# Patient Record
Sex: MALE | Race: WHITE | Employment: OTHER | URBAN - METROPOLITAN AREA
[De-identification: names, ages, dates, MRNs, and addresses within clinical notes are randomized per-mention and may not be internally consistent; named-entity substitution may affect disease eponyms.]

---

## 2017-02-01 DIAGNOSIS — Z94.0 KIDNEY TRANSPLANT RECIPIENT: Primary | ICD-10-CM

## 2017-02-01 DIAGNOSIS — Z79.60 LONG-TERM USE OF IMMUNOSUPPRESSANT MEDICATION: ICD-10-CM

## 2017-02-01 RX ORDER — CYCLOSPORINE 100 MG/1
100 CAPSULE, LIQUID FILLED ORAL 2 TIMES DAILY
Qty: 60 CAPSULE | Refills: 11 | Status: SHIPPED | OUTPATIENT
Start: 2017-02-01 | End: 2017-12-26

## 2017-02-01 RX ORDER — CYCLOSPORINE 25 MG/1
25 CAPSULE, LIQUID FILLED ORAL 2 TIMES DAILY
Qty: 60 CAPSULE | Refills: 11 | Status: SHIPPED | OUTPATIENT
Start: 2017-02-01 | End: 2017-12-26

## 2017-02-15 ENCOUNTER — TRANSFERRED RECORDS (OUTPATIENT)
Dept: HEALTH INFORMATION MANAGEMENT | Facility: CLINIC | Age: 62
End: 2017-02-15

## 2017-04-11 DIAGNOSIS — Z79.60 LONG-TERM USE OF IMMUNOSUPPRESSANT MEDICATION: ICD-10-CM

## 2017-04-11 DIAGNOSIS — Z94.0 KIDNEY TRANSPLANT RECIPIENT: ICD-10-CM

## 2017-04-12 DIAGNOSIS — Z51.81 ENCOUNTER FOR THERAPEUTIC DRUG MONITORING: Primary | ICD-10-CM

## 2017-04-12 PROCEDURE — 80158 DRUG ASSAY CYCLOSPORINE: CPT

## 2017-04-15 LAB
CYCLOSPORINE BLD LC/MS/MS-MCNC: 53 UG/L (ref 50–400)
TME LAST DOSE: NORMAL H

## 2017-05-08 ENCOUNTER — TRANSFERRED RECORDS (OUTPATIENT)
Dept: HEALTH INFORMATION MANAGEMENT | Facility: CLINIC | Age: 62
End: 2017-05-08

## 2017-05-10 ENCOUNTER — TELEPHONE (OUTPATIENT)
Dept: TRANSPLANT | Facility: CLINIC | Age: 62
End: 2017-05-10

## 2017-05-10 NOTE — TELEPHONE ENCOUNTER
Pt stated he spoke w/ you regarding creatine levels being elevated pt had labs drawn Monday and would like an update and to discuss further

## 2017-05-11 NOTE — TELEPHONE ENCOUNTER
Called patient to discuss elevated Cr.  Pt reported having diarrhea once a day for the past two months.  Writer encouraged him to hydrate.  He does report intermittent edema in his lower extremities.  He states his cardiologist started and stopped a blood pressure medication, other than that, no new meds.  Message sent to Dr. Sun, will wait for further orders.

## 2017-05-15 ENCOUNTER — TELEPHONE (OUTPATIENT)
Dept: TRANSPLANT | Facility: CLINIC | Age: 62
End: 2017-05-15

## 2017-05-15 NOTE — TELEPHONE ENCOUNTER
"----- Message from Christi Sun MD sent at 5/15/2017  2:28 PM CDT -----  Regarding: RE: Elevated Cr  Franklyn can you please get DSA?  Also prot/Cr ratio.  If BP still labile would get transplant US with dopplers.  Thanks  ----- Message -----     From: Kaitlyn Mason RN     Sent: 5/15/2017  11:37 AM       To: Christi Sun MD, Melodie Sanz RN  Subject: FW: Elevated Cr                                  Pt called our office again today regarding his elevated Cr.  He also stated he has had erratic blood pressures, reported his highest BP to be 170/111.  Today his BP is stable at 116/87.  How would you like to proceed with elevated Cr and BP.      ----- Message -----     From: Kaitlyn Mason RN     Sent: 5/11/2017  11:16 AM       To: Christi Sun MD, Elroy Dumont RN  Subject: Elevated Cr                                      Pt's Cr has been trending up over the past few months.  I called patient today.  He feels, \"pretty well,\" but did report having diarrhea once a day for the past two months.  I did encourage him to hydrate.  He did not notice any changes in UOP.  He does not have any new medications, he states his cardiologist started and stopped a blood pressure medication earlier this year.  He reports some intermittent edema in his legs.    Would you like any additional tests?  How would you like to proceed with elevated Cr?    ThanksKaitlyn"

## 2017-05-15 NOTE — TELEPHONE ENCOUNTER
Issue:  Pt called regarding follow up of his elevated Cr.  He also states his BP is elevated, reported having BP of 170/111.  Today BP is stable at 116/87.    Plan:  Writer sent a message to Dr. Sun last week but have not heard back, another message was sent to her this morning.

## 2017-05-17 ENCOUNTER — TELEPHONE (OUTPATIENT)
Dept: TRANSPLANT | Facility: CLINIC | Age: 62
End: 2017-05-17

## 2017-05-17 DIAGNOSIS — Z94.0 KIDNEY TRANSPLANTED: Primary | ICD-10-CM

## 2017-05-17 DIAGNOSIS — T86.10 COMPLICATION OF TRANSPLANTED KIDNEY: ICD-10-CM

## 2017-05-17 NOTE — TELEPHONE ENCOUNTER
Rory Bustamante 27 years post kidney transplant    Reviewed the need for transplant labs and follow up ultrasound of his transplant  Kidney   Orders placed--  sent message     Reviewed his BP's -- his bp machine 10 years old   Reviewed that Megan will call him to review his BP at home

## 2017-05-19 ENCOUNTER — RESULTS ONLY (OUTPATIENT)
Dept: OTHER | Facility: CLINIC | Age: 62
End: 2017-05-19

## 2017-05-19 DIAGNOSIS — Z94.0 KIDNEY TRANSPLANTED: ICD-10-CM

## 2017-05-19 DIAGNOSIS — T86.10 COMPLICATION OF TRANSPLANTED KIDNEY: ICD-10-CM

## 2017-05-19 LAB
ANION GAP SERPL CALCULATED.3IONS-SCNC: 12 MMOL/L (ref 3–14)
BUN SERPL-MCNC: 47 MG/DL (ref 7–30)
CALCIUM SERPL-MCNC: 9.6 MG/DL (ref 8.5–10.1)
CHLORIDE SERPL-SCNC: 104 MMOL/L (ref 94–109)
CO2 SERPL-SCNC: 22 MMOL/L (ref 20–32)
CREAT SERPL-MCNC: 2.71 MG/DL (ref 0.66–1.25)
CREAT UR-MCNC: 94 MG/DL
ERYTHROCYTE [DISTWIDTH] IN BLOOD BY AUTOMATED COUNT: 13 % (ref 10–15)
GFR SERPL CREATININE-BSD FRML MDRD: 24 ML/MIN/1.7M2
GLUCOSE SERPL-MCNC: 89 MG/DL (ref 70–99)
HCT VFR BLD AUTO: 30.7 % (ref 40–53)
HGB BLD-MCNC: 10.9 G/DL (ref 13.3–17.7)
MCH RBC QN AUTO: 36.1 PG (ref 26.5–33)
MCHC RBC AUTO-ENTMCNC: 35.5 G/DL (ref 31.5–36.5)
MCV RBC AUTO: 102 FL (ref 78–100)
PLATELET # BLD AUTO: 132 10E9/L (ref 150–450)
POTASSIUM SERPL-SCNC: 4.2 MMOL/L (ref 3.4–5.3)
PROT UR-MCNC: 4.57 G/L
PROT/CREAT 24H UR: 4.84 G/G CR (ref 0–0.2)
RBC # BLD AUTO: 3.02 10E12/L (ref 4.4–5.9)
SODIUM SERPL-SCNC: 138 MMOL/L (ref 133–144)
WBC # BLD AUTO: 6.7 10E9/L (ref 4–11)

## 2017-05-19 PROCEDURE — 86832 HLA CLASS I HIGH DEFIN QUAL: CPT | Performed by: INTERNAL MEDICINE

## 2017-05-19 PROCEDURE — 86833 HLA CLASS II HIGH DEFIN QUAL: CPT | Performed by: INTERNAL MEDICINE

## 2017-05-22 ENCOUNTER — TELEPHONE (OUTPATIENT)
Dept: TRANSPLANT | Facility: CLINIC | Age: 62
End: 2017-05-22

## 2017-05-22 LAB — PRA DONOR SPECIFIC ABY: NORMAL

## 2017-05-22 NOTE — TELEPHONE ENCOUNTER
Called Rory Bustamante  reviewed preliminary ultrasound   Concern about 4 grams of urine protein    Sent message to Dr Sun for follow up plan

## 2017-05-24 ENCOUNTER — TELEPHONE (OUTPATIENT)
Dept: TRANSPLANT | Facility: CLINIC | Age: 62
End: 2017-05-24

## 2017-05-24 ENCOUNTER — CARE COORDINATION (OUTPATIENT)
Dept: NEPHROLOGY | Facility: CLINIC | Age: 62
End: 2017-05-24

## 2017-05-24 DIAGNOSIS — Z94.0 KIDNEY REPLACED BY TRANSPLANT: ICD-10-CM

## 2017-05-24 RX ORDER — LISINOPRIL 10 MG/1
10 TABLET ORAL DAILY
Qty: 90 TABLET | Refills: 1 | Status: SHIPPED | OUTPATIENT
Start: 2017-05-24 | End: 2017-07-18

## 2017-05-24 NOTE — TELEPHONE ENCOUNTER
Spoke to Rory Bustamante regarding the preliminary ultrasound results   Reviewed my concern  Regarding his increase protein in urine -   Sent message Dr Sun for follow up clinic

## 2017-05-24 NOTE — PROGRESS NOTES
Per Dr. 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  Please see what his BP is doing and ask which BP meds he is taking.  I would like to increase lisinopril to 10 daily unless his SBP < 110.  Hopefully he can come to clinic next week Tues.    Called patient. Said BP has been all over the place. Can range from 130/75 to 150/90. Has had a few readings as high as 174/117. Notes occasional headaches and nausea. Said when he has these symptoms, he knows BP is high. Denied edema, chest pain, shortness of breath, vision changes, or dizziness. He occasionally takes nifedipine dose a little early (after 20 hours, instead of q24hr).    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 He did check in with cardiologist, but was referred back to the U because of elevated creatinine. He's a little frustrated with the management / irregularity of BP.    Sent update to Dr. Sun and transplant coordinator.    Melani Mehta RN

## 2017-05-24 NOTE — PROGRESS NOTES
Received message from Dr. Sun:  Have him increase the lisinopril to 10.      Called patient with update. He agreed to plan, not currently taking any lisinopril. Expressed concern over possible development of leg cramps. Said it's happened to him before when he has no edema. He's unsure if it was due to lisinopril or lasix. He will monitor symptoms and call if any changes.    Melani Mehta RN

## 2017-05-26 ENCOUNTER — TELEPHONE (OUTPATIENT)
Dept: NEPHROLOGY | Facility: CLINIC | Age: 62
End: 2017-05-26

## 2017-05-26 ENCOUNTER — TELEPHONE (OUTPATIENT)
Dept: TRANSPLANT | Facility: CLINIC | Age: 62
End: 2017-05-26

## 2017-05-26 DIAGNOSIS — Z94.0 S/P KIDNEY TRANSPLANT: Primary | ICD-10-CM

## 2017-05-26 ASSESSMENT — ENCOUNTER SYMPTOMS
BOWEL INCONTINENCE: 0
BLOATING: 0
EXERCISE INTOLERANCE: 0
SYNCOPE: 0
LIGHT-HEADEDNESS: 0
NAUSEA: 0
TACHYCARDIA: 0
VOMITING: 0
RECTAL PAIN: 0
JAUNDICE: 0
LEG SWELLING: 1
BLOOD IN STOOL: 0
SLEEP DISTURBANCES DUE TO BREATHING: 0
DIARRHEA: 1
HYPOTENSION: 0
ORTHOPNEA: 0
HEARTBURN: 1
HYPERTENSION: 1
ABDOMINAL PAIN: 0
PALPITATIONS: 0
RECTAL BLEEDING: 0
LEG PAIN: 0
CONSTIPATION: 0
CLAUDICATION: 0

## 2017-05-26 NOTE — TELEPHONE ENCOUNTER
----- Message from Stacey Bray sent at 5/24/2017 10:58 AM CDT -----  Regarding: Update HLA typings  Rory AND his kidney donor brother Toni Bustamante need an updated HLA type for more accurate DSA reporting. Please order HLA Typing Complete SOT Recipient, [id number], in Epic. Have a 7ml EDTA drawn or buccal swab sent to Immunology. Stacey

## 2017-05-30 ENCOUNTER — OFFICE VISIT (OUTPATIENT)
Dept: NEPHROLOGY | Facility: CLINIC | Age: 62
End: 2017-05-30
Attending: INTERNAL MEDICINE
Payer: MEDICARE

## 2017-05-30 VITALS
TEMPERATURE: 98 F | DIASTOLIC BLOOD PRESSURE: 80 MMHG | HEART RATE: 81 BPM | HEIGHT: 73 IN | SYSTOLIC BLOOD PRESSURE: 141 MMHG | WEIGHT: 241.8 LBS | BODY MASS INDEX: 32.05 KG/M2 | RESPIRATION RATE: 18 BRPM

## 2017-05-30 DIAGNOSIS — Z94.0 S/P KIDNEY TRANSPLANT: ICD-10-CM

## 2017-05-30 DIAGNOSIS — R80.9 PROTEINURIA: ICD-10-CM

## 2017-05-30 DIAGNOSIS — Z94.0 S/P KIDNEY TRANSPLANT: Primary | ICD-10-CM

## 2017-05-30 LAB
ALBUMIN SERPL-MCNC: 3.4 G/DL (ref 3.4–5)
ANION GAP SERPL CALCULATED.3IONS-SCNC: 11 MMOL/L (ref 3–14)
BUN SERPL-MCNC: 62 MG/DL (ref 7–30)
CALCIUM SERPL-MCNC: 9.2 MG/DL (ref 8.5–10.1)
CHLORIDE SERPL-SCNC: 104 MMOL/L (ref 94–109)
CO2 SERPL-SCNC: 23 MMOL/L (ref 20–32)
CREAT SERPL-MCNC: 2.9 MG/DL (ref 0.66–1.25)
GFR SERPL CREATININE-BSD FRML MDRD: 22 ML/MIN/1.7M2
GLUCOSE SERPL-MCNC: 103 MG/DL (ref 70–99)
PHOSPHATE SERPL-MCNC: 3.9 MG/DL (ref 2.5–4.5)
POTASSIUM SERPL-SCNC: 4.4 MMOL/L (ref 3.4–5.3)
PTH-INTACT SERPL-MCNC: 238 PG/ML (ref 12–72)
SODIUM SERPL-SCNC: 138 MMOL/L (ref 133–144)

## 2017-05-30 PROCEDURE — 99213 OFFICE O/P EST LOW 20 MIN: CPT | Mod: ZF

## 2017-05-30 PROCEDURE — 86160 COMPLEMENT ANTIGEN: CPT | Performed by: INTERNAL MEDICINE

## 2017-05-30 PROCEDURE — 83970 ASSAY OF PARATHORMONE: CPT | Performed by: INTERNAL MEDICINE

## 2017-05-30 PROCEDURE — 80069 RENAL FUNCTION PANEL: CPT | Performed by: INTERNAL MEDICINE

## 2017-05-30 PROCEDURE — 36415 COLL VENOUS BLD VENIPUNCTURE: CPT | Performed by: INTERNAL MEDICINE

## 2017-05-30 ASSESSMENT — PAIN SCALES - GENERAL: PAINLEVEL: NO PAIN (0)

## 2017-05-30 NOTE — NURSING NOTE
"Chief Complaint   Patient presents with     RECHECK     kIDNEY FOLLOW UP       Initial /80 (BP Location: Right arm, Patient Position: Chair, Cuff Size: Adult Large)  Pulse 81  Temp 98  F (36.7  C) (Oral)  Resp 18  Ht 1.854 m (6' 1\")  Wt 109.7 kg (241 lb 12.8 oz)  BMI 31.9 kg/m2 Estimated body mass index is 31.9 kg/(m^2) as calculated from the following:    Height as of this encounter: 1.854 m (6' 1\").    Weight as of this encounter: 109.7 kg (241 lb 12.8 oz).  Medication Reconciliation: complete   FLORINA WASHINGTON CMA      "

## 2017-05-30 NOTE — PATIENT INSTRUCTIONS
1.  Check BP when standing  2.  Please ask cardiologist if you can stop plavix for a biopsy  3.  Proteinuria and Cr rise could be MPGN or another process.  Need biopsy to determine  4.  Stay on lisinopril 10 mg.  If BP stays over 120 and does not drop with standing, can increase to 20 mg  5.  Melani Mehta is the BP nurse.  Please call Hollie with your BP readings    6.  Would like to do biopsy if ok to stop plavix

## 2017-05-30 NOTE — MR AVS SNAPSHOT
After Visit Summary   5/30/2017    Rory Bustamante    MRN: [id number]           Patient Information     Date Of Birth          1955        Visit Information        Provider Department      5/30/2017 2:45 PM Christi Sun MD [facility] Nephrology        Today's Diagnoses     S/P kidney transplant    -  1    Proteinuria          Care Instructions    1.  Check BP when standing  2.  Please ask cardiologist if you can stop plavix for a biopsy  3.  Proteinuria and Cr rise could be MPGN or another process.  Need biopsy to determine  4.  Stay on lisinopril 10 mg.  If BP stays over 120 and does not drop with standing, can increase to 20 mg  5.  Melani Mehta is the BP nurse.  Please call Hollie with your BP readings    6.  Would like to do biopsy if ok to stop plavix            Follow-ups after your visit        Follow-up notes from your care team     Return in about 6 months (around 11/30/2017).      Your next 10 appointments already scheduled     May 30, 2017  4:45 PM CDT   Lab with  LAB   [facility] Lab ([facility])    [address] [place] [id number] [phone]            Oct 17, 2017 12:00 PM CDT   Lab with  LAB   [facility] ([facility])    [address] [place] [address] [id number] [phone]            Oct 17, 2017  1:05 PM CDT   (Arrive by 12:35 PM)   Return Visit with Christi Sun MD   [facility] Nephrology ([facility])    [address] [place] [id number] [phone]              Future tests that were ordered for you today     Open Future Orders        Priority Expected Expires Ordered    Renal panel Routine  6/29/2017 5/30/2017            Who to contact     If you have questions or need follow up information about today's clinic visit or your schedule please contact [facility] NEPHROLOG[facility] directly at [phone].  Normal or  "non-critical lab and imaging results will be communicated to you by MyChart, letter or phone within 4 business days after the clinic has received the results. If you do not hear from us within 7 days, please contact the clinic through [employer] or phone. If you have a critical or abnormal lab result, we will notify you by phone as soon as possible.  Submit refill requests through [employer] or call your pharmacy and they will forward the refill request to us. Please allow 3 business days for your refill to be completed.          Additional Information About Your Visit        [employer] Information     [employer] gives you secure access to your electronic health record. If you see a primary care provider, you can also send messages to your care team and make appointments. If you have questions, please call your primary care clinic.  If you do not have a primary care provider, please call [phone] and they will assist you.        Care EveryWhere ID     This is your Care EveryWhere ID. This could be used by other organizations to access your [facility] medical records  [id number]        Your Vitals Were     Pulse Temperature Respirations Height BMI (Body Mass Index)       81 98  F (36.7  C) (Oral) 18 1.854 m (6' 1\") 31.9 kg/m2        Blood Pressure from Last 3 Encounters:   05/30/17 141/80   10/21/16 140/76   10/27/15 130/84    Weight from Last 3 Encounters:   05/30/17 109.7 kg (241 lb 12.8 oz)   10/21/16 111.9 kg (246 lb 12.8 oz)   10/27/15 108 kg (238 lb)              We Performed the Following     Parathyroid Hormone Intact     PRA Donor Specific Antibody          Today's Medication Changes          These changes are accurate as of: 5/30/17  4:28 PM.  If you have any questions, ask your nurse or doctor.               Stop taking these medicines if you haven't already. Please contact your care team if you have questions.     TIMOLOL MALEATE PO   Stopped by:  Christi Sun MD                    Primary Care Provider " Office Phone # Fax #    Moris Diaz [phone]       [facility] [address] [facility] [address]  [facility] [address]        Thank you!     Thank you for choosing [facility] NEPHROLOGY  for your care. Our goal is always to provide you with excellent care. Hearing back from our patients is one way we can continue to improve our services. Please take a few minutes to complete the written survey that you may receive in the mail after your visit with us. Thank you!             Your Updated Medication List - Protect others around you: Learn how to safely use, store and throw away your medicines at www.disposemymeds.org.          This list is accurate as of: 5/30/17  4:28 PM.  Always use your most recent med list.                   Brand Name Dispense Instructions for use    allopurinol 100 MG tablet    ZYLOPRIM    180 tablet    Take 2 tablets by mouth daily.       ALPHAGAN OP      2 drops daily       BABY ASPIRIN PO      Take 81 mg by mouth daily       cholecalciferol 5000 UNITS Tabs tablet    vitamin D3    30 tablet    Take 1 tablet (5,000 Units) by mouth daily       CRESTOR 5 MG tablet   Generic drug:  rosuvastatin      Take 10 mg by mouth daily       * cycloSPORINE modified 100 MG capsule    GENERIC EQUIVALENT    60 capsule    Take 1 capsule (100 mg) by mouth 2 times daily (total dose 125 mg twice daily)       * cycloSPORINE modified 25 MG capsule    GENERIC EQUIVALENT    60 capsule    Take 1 capsule (25 mg) by mouth 2 times daily (total dose 125 mg twice daily)       glucosamine 500 MG Caps      Take 500 mg by mouth 3 times daily       ISOSORBIDE MONONITRATE PO      Take 30 mg by mouth 2 times daily       lisinopril 10 MG tablet    PRINIVIL/ZESTRIL    90 tablet    Take 1 tablet (10 mg) by mouth daily       metoprolol 100 MG tablet    LOPRESSOR    60 tablet    Take 1 tablet by mouth 2 times daily.       NIFEdipine ER osmotic 30 MG 24 hr tablet    NIFEDICAL XL    180 tablet    Take 2 tablets by  mouth daily.       NITROSTAT 0.4 MG sublingual tablet   Generic drug:  nitroglycerin      Place 0.4 mg under the tongue every 5 minutes as needed.       NONFORMULARY      Focus Macula Pro:  Eye vitamin and mineral supplement A, C, E, Zinc, Copper       PLAVIX 75 MG tablet   Generic drug:  clopidogrel      Take 75 mg by mouth daily.       predniSONE 5 MG tablet    DELTASONE     Take 5 mg by mouth daily.       probenecid 500 MG tablet    BENEMID     Take 500 mg by mouth 2 times daily.       sulfamethoxazole-trimethoprim 400-80 MG per tablet    BACTRIM/SEPTRA     Take 1 tablet by mouth daily Take on every other day       TAMSULOSIN HCL PO      Take 0.4 mg by mouth daily       TYLENOL ARTHRITIS PAIN 650 MG CR tablet   Generic drug:  acetaminophen      Take 2 tablets by mouth 3 times daily.       * Notice:  This list has 2 medication(s) that are the same as other medications prescribed for you. Read the directions carefully, and ask your doctor or other care provider to review them with you.

## 2017-05-30 NOTE — LETTER
5/30/2017      RE: Rory Bustamante  [address]  [address] [place] [id number]       REASON FOR VISIT:  Mr. Bustamante is a 61-year-old gentleman here for followup after an HLA identical transplant in 1989.  He has recently developed nephrotic range proteinuria.  He has no known recurrent MPGN first seen on a biopsy in 2008.  However, recently his proteinuria level has increased dramatically, as has his creatinine.  The protein was 0.45 in 02/2015, 1.27 grams in 11/2015, 0.86 gram in 08/2016 and is now 4.8 grams.  His albumin is 4.0.       His immunosuppression is currently cyclosporine with a target of 50-60 with prednisone 5 daily.  His Imuran was stopped in 2012 because he has extensive skin cancer.       Recently his blood pressure has been increasingly difficult to control.  It went up as high as 174/111 on 05/14.  Blood pressure meds have been adjusted and currently his blood pressure is in the 140/80-90 range at home.  His current blood pressure medications include lisinopril 10 mg started on 05/24, isosorbide 30 b.i.d. (started for angina), tamsulosin 0.4  (for voiding symptoms), nifedipine XL 60 mg daily and metoprolol 100 b.i.d.       The patient has a history of anginal symptoms.  He was completely evaluated in 08/2016 including cardiac Lexiscan.  He is status post MI in 2011 with two stents placed and currently is on Plavix for that.  He has not had chest pain for the past year.       He has been seeing a dermatologist and has had a number of skin lesions removed recently.       REVIEW OF SYSTEMS:  Comprehensive review of systems was completed and negative except as in the HPI.  He has stable peripheral neuropathy of uncertain cause.  He has no recent episodes of gout.       PAST MEDICAL HISTORY:   1.  HLA identical transplant in 1989.   2.  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    3.  Coronary artery disease, status post MI in 2011, status post stents x2 in 2011 and 2012, recurrent angina in  08/2016 with a cardiac Lexiscan most consistent with distal disease per the patient.  No intervention.    4.  History of hypertriglyceridemia, on fibrate.   5.  History of gout, none recently.   6.  Skin cancer with active followup by Dermatology currently.      MEDICATIONS:  CSA, lisinopril 10 mg, pred 5 mg.  Meds reviewed     SOCIAL HISTORY:  He has gained some weight.  He is unable to exercise at first because of the chest pain and also he has spinal stenosis, so he has back pain when he is doing most exercise.       SOCIAL HISTORY:  He does not smoke or drink alcohol.       PHYSICAL EXAMINATION:   VITAL SIGNS:  Blood pressure 141/80, heart rate 81, weight 242.   GENERAL:  He appears healthy.   HEENT:  He has two large bandages on his head where he has had recent lesions resected on the forehead.     SKIN:  Skin is diffusely involved with actinic keratoses and also warts.     LYMPH:  No adenopathy.   LUNGS:  Clear bilaterally.   CARDIAC:  Regular sinus rhythm.  No murmurs, rubs or gallops.   ABDOMEN:  Obese.  No bruit over the allograft.   LOWER EXTREMITIES:  No peripheral edema.   NEUROLOGIC:  Grossly intact.  He does have diminished sensation in his feet.  Normal gait.       LABORATORY DATA:  Creatinine 2.9.  Creatinine was 2.7 on 05/19 and 2.9 on 05/05.  BUN 62.  Protein-to-creatinine 4.8 grams per gram.  Hemoglobin 10.9.   (it has previously been normal).  Complement 91 (normal).  Renal ultrasound shows no renal artery stenosis and no hydronephrosis.    Electrolytes/Renal -   Recent Labs   Lab Test  05/30/17   1651  05/19/17   1051  10/21/16   1153   06/05/12   1749   NA  138  138  138   < >  139   POTASSIUM  4.4  4.2  4.1   < >  5.4*   CHLORIDE  104  104  104   < >  101   CO2  23  22  22   < >  26   BUN  62*  47*  37*   < >  37*   CR  2.90*  2.71*  1.92*   < >  2.25*   GLC  103*  89  118*   < >  108*   TRISHA  9.2  9.6  9.6   < >  10.1   PHOS  3.9   --   2.7   --   3.2    < > = values in this interval  not displayed.       CBC -   Recent Labs   Lab Test  06/06/17   0935  05/19/17   1051  10/21/16   1153   WBC  8.6  6.7  7.2   HGB  11.1*  10.9*  11.4*   PLT  171  132*  136*       LFTs -   Recent Labs   Lab Test  05/30/17   1651  10/21/16   1153  06/05/12   1749   07/01/09   1004   ALKPHOS   --   68   --    --   105   BILITOTAL   --   0.6   --    --   0.4   ALT   --   28   --    --   7   AST   --   21   --    --   24   PROTTOTAL   --   7.2   --    --   8.4   ALBUMIN  3.4  3.6  4.3   < >  4.4    < > = values in this interval not displayed.      IMPRESSION:   1.  Allograft function.  His allograft is failing.  He has known recurrent MPGN but, interestingly, his C3 is normal currently. It had previously been on the low side.  This suggests the possibility of a superimposed process such as antibody-mediated rejection causing this degree of proteinuria.  We will check a DSA today and I would like to do a biopsy.  Unfortunately, he is on Plavix so he will contact his cardiologist to be sure we can stop that medication prior to biopsy.  His creatinine has been stable for the past month at this level of 2.9 which suggests a process that is not acute.    2.  Proteinuria.  He is on lisinopril 10 mg and will increase that further if his systolic pressure is over 120 with no orthostatic change (the patient will measure this at home).   3.  Hypertension.  Blood pressure is more stable now, but we will continue to monitor.  I have asked Patti and Damaris to be involved in his blood pressure management.    4.  Calcium, phosphorus, PTH.  His parathyroid level is high for the first time.  We will need to get a phosphorus level with his next regular lab tests to determine further therapy.  I also ordered a vitamin D level.    5.  Nephrotic syndrome.  He is at increased risk for hypercholesterolemia as well as clotting.  His outpatient cardiologist is following his lipid levels.    6.  Immunosuppression.  His doses are low because he  has extensive skin cancer.  We might consider an increase once we get the DSA and the kidney biopsy.  Today in clinic he asked about the possibility of another transplant which would be reasonable given his age.  He would need to lose weight.       PLAN:   1.  Kidney biopsy if patient can stop Plavix.   2.  Continue lisinopril 10 mg and increase to 20 if systolic pressure stays over 120.   3.  Monthly lab tests.   4.  Check DSA.   5.  Check phosphorus with next labs.         CHRISTI LANGSTON MD               D: 2017 13:37   T: 2017 17:30   MT: DP      Name:     JEANMARIE GONZALEZ   MRN:      [id number]        Account:      [id number]   :      1955           Service Date: 2017      Document: [id number]        Christi Langston MD

## 2017-05-31 DIAGNOSIS — Z94.0 KIDNEY TRANSPLANT RECIPIENT: Primary | ICD-10-CM

## 2017-05-31 DIAGNOSIS — R80.9 PROTEINURIA: ICD-10-CM

## 2017-05-31 DIAGNOSIS — N05.5 MPGN (MEMBRANOPROLIFERATIVE GLOMERULONEPHRITIDES): ICD-10-CM

## 2017-05-31 LAB — C3 SERPL-MCNC: 91 MG/DL (ref 76–169)

## 2017-05-31 NOTE — PROGRESS NOTES
REASON FOR VISIT:  Mr. Bustamante is a 61-year-old gentleman here for followup after an HLA identical transplant in 1989.  He has recently developed nephrotic range proteinuria.  He has no known recurrent MPGN first seen on a biopsy in 2008.  However, recently his proteinuria level has increased dramatically, as has his creatinine.  The protein was 0.45 in 02/2015, 1.27 grams in 11/2015, 0.86 gram in 08/2016 and is now 4.8 grams.  His albumin is 4.0.       His immunosuppression is currently cyclosporine with a target of 50-60 with prednisone 5 daily.  His Imuran was stopped in 2012 because he has extensive skin cancer.       Recently his blood pressure has been increasingly difficult to control.  It went up as high as 174/111 on 05/14.  Blood pressure meds have been adjusted and currently his blood pressure is in the 140/80-90 range at home.  His current blood pressure medications include lisinopril 10 mg started on 05/24, isosorbide 30 b.i.d. (started for angina), tamsulosin 0.4  (for voiding symptoms), nifedipine XL 60 mg daily and metoprolol 100 b.i.d.       The patient has a history of anginal symptoms.  He was completely evaluated in 08/2016 including cardiac Lexiscan.  He is status post MI in 2011 with two stents placed and currently is on Plavix for that.  He has not had chest pain for the past year.       He has been seeing a dermatologist and has had a number of skin lesions removed recently.       REVIEW OF SYSTEMS:  Comprehensive review of systems was completed and negative except as in the HPI.  He has stable peripheral neuropathy of uncertain cause.  He has no recent episodes of gout.       PAST MEDICAL HISTORY:   1.  HLA identical transplant in 1989.   2.  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    3.  Coronary artery disease, status post MI in 2011, status post stents x2 in 2011 and 2012, recurrent angina in 08/2016 with a cardiac Lexiscan most consistent with distal disease per the  patient.  No intervention.    4.  History of hypertriglyceridemia, on fibrate.   5.  History of gout, none recently.   6.  Skin cancer with active followup by Dermatology currently.      MEDICATIONS:  CSA, lisinopril 10 mg, pred 5 mg.  Meds reviewed     SOCIAL HISTORY:  He has gained some weight.  He is unable to exercise at first because of the chest pain and also he has spinal stenosis, so he has back pain when he is doing most exercise.       SOCIAL HISTORY:  He does not smoke or drink alcohol.       PHYSICAL EXAMINATION:   VITAL SIGNS:  Blood pressure 141/80, heart rate 81, weight 242.   GENERAL:  He appears healthy.   HEENT:  He has two large bandages on his head where he has had recent lesions resected on the forehead.     SKIN:  Skin is diffusely involved with actinic keratoses and also warts.     LYMPH:  No adenopathy.   LUNGS:  Clear bilaterally.   CARDIAC:  Regular sinus rhythm.  No murmurs, rubs or gallops.   ABDOMEN:  Obese.  No bruit over the allograft.   LOWER EXTREMITIES:  No peripheral edema.   NEUROLOGIC:  Grossly intact.  He does have diminished sensation in his feet.  Normal gait.       LABORATORY DATA:  Creatinine 2.9.  Creatinine was 2.7 on 05/19 and 2.9 on 05/05.  BUN 62.  Protein-to-creatinine 4.8 grams per gram.  Hemoglobin 10.9.   (it has previously been normal).  Complement 91 (normal).  Renal ultrasound shows no renal artery stenosis and no hydronephrosis.    Electrolytes/Renal -   Recent Labs   Lab Test  05/30/17   1651  05/19/17   1051  10/21/16   1153   06/05/12   1749   NA  138  138  138   < >  139   POTASSIUM  4.4  4.2  4.1   < >  5.4*   CHLORIDE  104  104  104   < >  101   CO2  23  22  22   < >  26   BUN  62*  47*  37*   < >  37*   CR  2.90*  2.71*  1.92*   < >  2.25*   GLC  103*  89  118*   < >  108*   TRISHA  9.2  9.6  9.6   < >  10.1   PHOS  3.9   --   2.7   --   3.2    < > = values in this interval not displayed.       CBC -   Recent Labs   Lab Test  06/06/17   0940   05/19/17   1051  10/21/16   1153   WBC  8.6  6.7  7.2   HGB  11.1*  10.9*  11.4*   PLT  171  132*  136*       LFTs -   Recent Labs   Lab Test  05/30/17   1651  10/21/16   1153  06/05/12   1749   07/01/09   1004   ALKPHOS   --   68   --    --   105   BILITOTAL   --   0.6   --    --   0.4   ALT   --   28   --    --   7   AST   --   21   --    --   24   PROTTOTAL   --   7.2   --    --   8.4   ALBUMIN  3.4  3.6  4.3   < >  4.4    < > = values in this interval not displayed.      IMPRESSION:   1.  Allograft function.  His allograft is failing.  He has known recurrent MPGN but, interestingly, his C3 is normal currently. It had previously been on the low side.  This suggests the possibility of a superimposed process such as antibody-mediated rejection causing this degree of proteinuria.  We will check a DSA today and I would like to do a biopsy.  Unfortunately, he is on Plavix so he will contact his cardiologist to be sure we can stop that medication prior to biopsy.  His creatinine has been stable for the past month at this level of 2.9 which suggests a process that is not acute.    2.  Proteinuria.  He is on lisinopril 10 mg and will increase that further if his systolic pressure is over 120 with no orthostatic change (the patient will measure this at home).   3.  Hypertension.  Blood pressure is more stable now, but we will continue to monitor.  I have asked Patti and Damaris to be involved in his blood pressure management.    4.  Calcium, phosphorus, PTH.  His parathyroid level is high for the first time.  We will need to get a phosphorus level with his next regular lab tests to determine further therapy.  I also ordered a vitamin D level.    5.  Nephrotic syndrome.  He is at increased risk for hypercholesterolemia as well as clotting.  His outpatient cardiologist is following his lipid levels.    6.  Immunosuppression.  His doses are low because he has extensive skin cancer.  We might consider an increase once we get  the DSA and the kidney biopsy.  Today in clinic he asked about the possibility of another transplant which would be reasonable given his age.  He would need to lose weight.       PLAN:   1.  Kidney biopsy if patient can stop Plavix.   2.  Continue lisinopril 10 mg and increase to 20 if systolic pressure stays over 120.   3.  Monthly lab tests.   4.  Check DSA.   5.  Check phosphorus with next labs.         SARABJIT LANGSTON MD               D: 2017 13:37   T: 2017 17:30   MT: LM      Name:     JEANMARIE GONZALEZ   MRN:      [id number]        Account:      [id number]   :      1955           Service Date: 2017      Document: [id number]

## 2017-05-31 NOTE — PROGRESS NOTES
Kidney Transplant Biopsy for Proteinuria  - on Plavix and aspirin for heart  - per cardiology, OK to HOLD Plavix for biopsy, but continue aspirin 81 mg daily    PLAN:  - since patient must continue on aspirin, will ask IR to do biopsy  - patient instructed to HOLD Plavix for biopsy

## 2017-06-02 NOTE — PROGRESS NOTES
ADDENDUM:  Scheduled in IR for biopsy on Tuesday, June 6, 2017  Check in to [facility] Waiting Room at 9:30 for 11:00 procedure    PHONE CALL:  Called patient with biopsy instructions.

## 2017-06-05 ENCOUNTER — TELEPHONE (OUTPATIENT)
Dept: INTERVENTIONAL RADIOLOGY/VASCULAR | Facility: CLINIC | Age: 62
End: 2017-06-05

## 2017-06-06 ENCOUNTER — RESULTS ONLY (OUTPATIENT)
Dept: OTHER | Facility: CLINIC | Age: 62
End: 2017-06-06

## 2017-06-06 ENCOUNTER — HOSPITAL ENCOUNTER (OUTPATIENT)
Facility: CLINIC | Age: 62
Discharge: HOME | End: 2017-06-06
Attending: INTERNAL MEDICINE | Admitting: INTERNAL MEDICINE
Payer: MEDICARE

## 2017-06-06 ENCOUNTER — APPOINTMENT (OUTPATIENT)
Dept: INTERVENTIONAL RADIOLOGY/VASCULAR | Facility: CLINIC | Age: 62
End: 2017-06-06
Attending: INTERNAL MEDICINE
Payer: MEDICARE

## 2017-06-06 ENCOUNTER — APPOINTMENT (OUTPATIENT)
Dept: MEDSURG UNIT | Facility: CLINIC | Age: 62
End: 2017-06-06
Attending: INTERNAL MEDICINE
Payer: MEDICARE

## 2017-06-06 VITALS
HEART RATE: 69 BPM | DIASTOLIC BLOOD PRESSURE: 74 MMHG | RESPIRATION RATE: 14 BRPM | OXYGEN SATURATION: 98 % | TEMPERATURE: 97.6 F | SYSTOLIC BLOOD PRESSURE: 124 MMHG

## 2017-06-06 DIAGNOSIS — N05.5 MPGN (MEMBRANOPROLIFERATIVE GLOMERULONEPHRITIDES): ICD-10-CM

## 2017-06-06 DIAGNOSIS — R80.9 PROTEINURIA: ICD-10-CM

## 2017-06-06 DIAGNOSIS — Z94.0 KIDNEY TRANSPLANT RECIPIENT: ICD-10-CM

## 2017-06-06 LAB
BASOPHILS # BLD AUTO: 0 10E9/L (ref 0–0.2)
BASOPHILS NFR BLD AUTO: 0.5 %
DIFFERENTIAL METHOD BLD: ABNORMAL
EOSINOPHIL # BLD AUTO: 0.2 10E9/L (ref 0–0.7)
EOSINOPHIL NFR BLD AUTO: 2.5 %
ERYTHROCYTE [DISTWIDTH] IN BLOOD BY AUTOMATED COUNT: 13.1 % (ref 10–15)
HCT VFR BLD AUTO: 32 % (ref 40–53)
HGB BLD-MCNC: 11.1 G/DL (ref 13.3–17.7)
IMM GRANULOCYTES # BLD: 0.1 10E9/L (ref 0–0.4)
IMM GRANULOCYTES NFR BLD: 0.8 %
INR PPP: 1 (ref 0.86–1.14)
LYMPHOCYTES # BLD AUTO: 1.9 10E9/L (ref 0.8–5.3)
LYMPHOCYTES NFR BLD AUTO: 22.5 %
MCH RBC QN AUTO: 35.5 PG (ref 26.5–33)
MCHC RBC AUTO-ENTMCNC: 34.7 G/DL (ref 31.5–36.5)
MCV RBC AUTO: 102 FL (ref 78–100)
MONOCYTES # BLD AUTO: 0.6 10E9/L (ref 0–1.3)
MONOCYTES NFR BLD AUTO: 7.2 %
NEUTROPHILS # BLD AUTO: 5.7 10E9/L (ref 1.6–8.3)
NEUTROPHILS NFR BLD AUTO: 66.5 %
NRBC # BLD AUTO: 0 10*3/UL
NRBC BLD AUTO-RTO: 0 /100
PLATELET # BLD AUTO: 171 10E9/L (ref 150–450)
RBC # BLD AUTO: 3.13 10E12/L (ref 4.4–5.9)
WBC # BLD AUTO: 8.6 10E9/L (ref 4–11)

## 2017-06-06 PROCEDURE — 88350 IMFLUOR EA ADDL 1ANTB STN PX: CPT | Performed by: INTERNAL MEDICINE

## 2017-06-06 PROCEDURE — 81376 HLA II TYPING 1 LOCUS LR: CPT | Performed by: INTERNAL MEDICINE

## 2017-06-06 PROCEDURE — 88305 TISSUE EXAM BY PATHOLOGIST: CPT | Performed by: INTERNAL MEDICINE

## 2017-06-06 PROCEDURE — 85610 PROTHROMBIN TIME: CPT | Performed by: RADIOLOGY

## 2017-06-06 PROCEDURE — 88313 SPECIAL STAINS GROUP 2: CPT | Performed by: INTERNAL MEDICINE

## 2017-06-06 PROCEDURE — 27210995 ZZH RX 272: Performed by: PHYSICIAN ASSISTANT

## 2017-06-06 PROCEDURE — 81370 HLA I & II TYPING LR: CPT | Performed by: INTERNAL MEDICINE

## 2017-06-06 PROCEDURE — 88346 IMFLUOR 1ST 1ANTB STAIN PX: CPT | Performed by: INTERNAL MEDICINE

## 2017-06-06 PROCEDURE — 25000128 H RX IP 250 OP 636: Performed by: PHYSICIAN ASSISTANT

## 2017-06-06 PROCEDURE — 40000167 ZZH STATISTIC PP CARE STAGE 2

## 2017-06-06 PROCEDURE — 76942 ECHO GUIDE FOR BIOPSY: CPT

## 2017-06-06 PROCEDURE — 88348 ELECTRON MICROSCOPY DX: CPT | Performed by: INTERNAL MEDICINE

## 2017-06-06 PROCEDURE — 85025 COMPLETE CBC W/AUTO DIFF WBC: CPT | Performed by: RADIOLOGY

## 2017-06-06 RX ORDER — NALOXONE HYDROCHLORIDE 0.4 MG/ML
.1-.4 INJECTION, SOLUTION INTRAMUSCULAR; INTRAVENOUS; SUBCUTANEOUS
Status: DISCONTINUED | OUTPATIENT
Start: 2017-06-06 | End: 2017-06-06 | Stop reason: HOSPADM

## 2017-06-06 RX ORDER — FLUMAZENIL 0.1 MG/ML
0.2 INJECTION, SOLUTION INTRAVENOUS
Status: DISCONTINUED | OUTPATIENT
Start: 2017-06-06 | End: 2017-06-06 | Stop reason: HOSPADM

## 2017-06-06 RX ORDER — LIDOCAINE 40 MG/G
CREAM TOPICAL
Status: DISCONTINUED | OUTPATIENT
Start: 2017-06-06 | End: 2017-06-06 | Stop reason: HOSPADM

## 2017-06-06 RX ORDER — SODIUM CHLORIDE 9 MG/ML
INJECTION, SOLUTION INTRAVENOUS CONTINUOUS
Status: DISCONTINUED | OUTPATIENT
Start: 2017-06-06 | End: 2017-06-06 | Stop reason: HOSPADM

## 2017-06-06 RX ORDER — FENTANYL CITRATE 50 UG/ML
25-50 INJECTION, SOLUTION INTRAMUSCULAR; INTRAVENOUS EVERY 5 MIN PRN
Status: DISCONTINUED | OUTPATIENT
Start: 2017-06-06 | End: 2017-06-06 | Stop reason: HOSPADM

## 2017-06-06 RX ADMIN — SODIUM CHLORIDE: 9 INJECTION, SOLUTION INTRAVENOUS at 09:58

## 2017-06-06 RX ADMIN — LIDOCAINE HYDROCHLORIDE 10 ML: 10 INJECTION, SOLUTION EPIDURAL; INFILTRATION; INTRACAUDAL; PERINEURAL at 11:32

## 2017-06-06 NOTE — PROCEDURES
Interventional Radiology Brief Post Procedure Note    Procedure: IR RENAL BIOPSY RIGHT    Proceduralist: Casper Berg PA-C    Assistant: None    Time Out: Prior to the start of the procedure and with procedural staff participation, I verbally confirmed the patient s identity using two indicators, relevant allergies, that the procedure was appropriate and matched the consent or emergent situation, and that the correct equipment/implants were available. Immediately prior to starting the procedure I conducted the Time Out with the procedural staff and re-confirmed the patient s name, procedure, and site/side. (The Joint Commission universal protocol was followed.)  Yes    Sedation: None. Local Anesthestic used    Findings: Completed ultrasound-guided RLQ transplant renal biopsy. 3 passes yielded 3 cores given to pathology at bedside. Gelfoam in tract. No immediate complication.    Estimated Blood Loss: Less than 10 ml    Fluoroscopy Time: N/a    SPECIMENS: Core needle biopsy specimens sent for pathological analysis    Complications: 1. None     Condition: Stable    Plan: Follow up per primary team. Biopsy results pending.    Comments: See dictated procedure note for full details.    Casper Berg PA-C

## 2017-06-06 NOTE — PROGRESS NOTES
Rory arrived on 2a to prepare for a transplanted kidney biopsy.  Pre-procedure assessment is complete.  He is being consented.  Labs have been collected.

## 2017-06-06 NOTE — DISCHARGE INSTRUCTIONS
[facility]    Interventional Radiology  Patient Instructions Following Biopsy    AFTER YOU GO HOME  ? If you were given sedation DO NOT drive or operate machinery at home or at work for at least 24 hours  ? DO relax and take it easy for 48 hours, no strenuous activity for 24 hours  ? DO drink plenty of fluids  ? DO resume your regular diet, unless otherwise instructed by your Primary Physician  ? Keep the dressing dry and in place for 24 hours.  ? DO NOT drink alcoholic beverages the day of your procedure  ? DO NOT do any strenuous exercise or lifting (> 10 lbs) for at least 7 days following your procedure  ? DO NOT take a bath or shower for at least 12 hours following your procedure  ? Remove dressing after shower the next day. Replace with Band aid for 2 days.  Never leave a wet dressing in place.  ? DO NOT make any important or legal decisions for 24 hours following your procedure  ? There should be minimum drainage from the biopsy site    CALL THE PHYSICIAN IF:  ? You start bleeding from the procedure site.  If you do start to bleed from that site, lie down flat and hold pressure on the site for a minimum of 10 minutes.  Your physician will tell you if you need to return to the hospital  ? You develop nausea or vomiting  ? You have excessive swelling, redness, or tenderness at the site  ? You have drainage that looks like it is infected.  ? You experience severe pain  ? You develop hives or a rash or unexplained itching  ? You develop shortness of breath  ? You develop a temperature of 101 degrees F or greater  ? You develop bloody clots or red urine after you are discharged        [facility] INTERVENTIONAL RADIOLOGY DEPARTMENT  Procedure Physician:  Casper RAMOS                                     Date of procedure: June 6, 2017  Telephone Numbers: [phone] Monday-Friday 8:00 am to 4:30 pm  [phone] After 4:30 pm Monday-Friday, Weekends & Holidays.   Ask for the Interventional Radiologist  on call.  Someone is on call 24 hrs/day  [facility] toll free number: [phone] Monday-Friday 8:00 am to 4:30 pm  [facility] Emergency Dept: [phone]

## 2017-06-06 NOTE — PROGRESS NOTES
Rory returned to 2a after having a transplanted kidney biopsy.  The biopsy site is dry and intact.  He is tolerating po.  He states he has no pain.  1330:  Rory did well throughout recovery.  He ambulated in the hallway without difficulty.  Discharge instructions were reviewed with patient prior to discharge.  He was discharged to home accompanied by his spouse.

## 2017-06-06 NOTE — PROGRESS NOTES
Interventional Radiology Intra-procedural Nursing Note    Patient Name: Rory Bustamante  Medical Record Number: [id number]  Today's Date: June 6, 2017    Start Time:1120  End of procedure time: 1128  Procedure: kidney biopsy  Report given to: Zoe RONQUILLO  Time pt departs:  1135      Other Notes: pt from 2 a to ir 6. VSS, no sedation. Lido 10 cc. 3 cores given to path.    JENNIFER SALAS

## 2017-06-06 NOTE — PROGRESS NOTES
Interventional Radiology Pre-Procedure Sedation Assessment   Time of Assessment: 10:03 AM    Expected Level: Moderate Sedation    Indication: Sedation is required for the following type of Procedure: Biopsy    Sedation and procedural consent: Risks, benefits and alternatives were discussed with Patient    PO Intake: Appropriately NPO for procedure    ASA Class: Class 2 - MILD SYSTEMIC DISEASE, NO ACUTE PROBLEMS, NO FUNCTIONAL LIMITATIONS.    Mallampati: Grade 2:  Soft palate, base of uvula, tonsillar pillars, and portion of posterior pharyngeal wall visible    Lungs: Lungs Clear with good breath sounds bilaterally    Heart: Normal heart sounds and rate    History and physical reviewed and no updates needed. I have reviewed the lab findings, diagnostic data, medications, and the plan for sedation. I have determined this patient to be an appropriate candidate for the planned sedation and procedure and have reassessed the patient IMMEDIATELY PRIOR to sedation and procedure.    Dalia Oneil PA-C

## 2017-06-06 NOTE — IP AVS SNAPSHOT
[address] [address]                                       After Visit Summary   6/6/2017    Rory Bustamante    MRN: [id number]           After Visit Summary Signature Page     I have received my discharge instructions, and my questions have been answered. I have discussed any challenges I see with this plan with the nurse or doctor.    ..........................................................................................................................................  Patient/Patient Representative Signature      ..........................................................................................................................................  Patient Representative Print Name and Relationship to Patient    ..................................................               ................................................  Date                                            Time    ..........................................................................................................................................  Reviewed by Signature/Title    ...................................................              ..............................................  Date                                                            Time

## 2017-06-06 NOTE — IP AVS SNAPSHOT
MRN:[id number]                      After Visit Summary   6/6/2017    Rory Bustamante    MRN: [id number]           Visit Information        Department      6/6/2017  9:14 AM [address] [facility] [place]          Review of your medicines      UNREVIEWED medicines. Ask your doctor about these medicines        Dose / Directions    allopurinol 100 MG tablet   Commonly known as:  ZYLOPRIM   Used for:  Kidney replaced by transplant, Gout        Dose:  200 mg   Take 2 tablets by mouth daily.   Quantity:  180 tablet   Refills:  3       ALPHAGAN OP        2 drops daily   Refills:  0       BABY ASPIRIN PO        Dose:  81 mg   Take 81 mg by mouth daily   Refills:  0       cholecalciferol 5000 UNITS Tabs tablet   Commonly known as:  vitamin D3   Used for:  Hyperparathyroidism due to renal insufficiency (H)        Dose:  5000 Units   Take 1 tablet (5,000 Units) by mouth daily   Quantity:  30 tablet   Refills:  0       CRESTOR 5 MG tablet   Generic drug:  rosuvastatin        Dose:  10 mg   Take 10 mg by mouth daily   Refills:  0       * cycloSPORINE modified 100 MG capsule   Commonly known as:  GENERIC EQUIVALENT   Used for:  Kidney transplant recipient, Long-term use of immunosuppressant medication        Dose:  100 mg   Take 1 capsule (100 mg) by mouth 2 times daily (total dose 125 mg twice daily)   Quantity:  60 capsule   Refills:  11       * cycloSPORINE modified 25 MG capsule   Commonly known as:  GENERIC EQUIVALENT   Used for:  Kidney transplant recipient, Long-term use of immunosuppressant medication        Dose:  25 mg   Take 1 capsule (25 mg) by mouth 2 times daily (total dose 125 mg twice daily)   Quantity:  60 capsule   Refills:  11       glucosamine 500 MG Caps        Dose:  500 mg   Take 500 mg by mouth 3 times daily   Refills:  0       ISOSORBIDE MONONITRATE PO        Dose:  60 mg   Take 60 mg by mouth daily   Refills:  0       lisinopril 10 MG tablet   Commonly known as:  PRINIVIL/ZESTRIL   Used for:   Kidney replaced by transplant        Dose:  10 mg   Take 1 tablet (10 mg) by mouth daily   Quantity:  90 tablet   Refills:  1       metoprolol 100 MG tablet   Commonly known as:  LOPRESSOR   Used for:  Unspecified hypertensive kidney disease with chronic kidney disease stage I through stage IV, or unspecified, Kidney replaced by transplant        Dose:  100 mg   Take 1 tablet by mouth 2 times daily.   Quantity:  60 tablet   Refills:  6       NIFEdipine ER osmotic 30 MG 24 hr tablet   Commonly known as:  NIFEDICAL XL   Used for:  Unspecified hypertensive kidney disease with chronic kidney disease stage V or end stage renal disease        Dose:  60 mg   Take 2 tablets by mouth daily.   Quantity:  180 tablet   Refills:  4       NITROSTAT 0.4 MG sublingual tablet   Generic drug:  nitroglycerin        Dose:  0.4 mg   Place 0.4 mg under the tongue every 5 minutes as needed.   Refills:  0       NONFORMULARY        Focus Macula Pro:  Eye vitamin and mineral supplement A, C, E, Zinc, Copper   Refills:  0       PLAVIX 75 MG tablet   Generic drug:  clopidogrel        Dose:  75 mg   Take 75 mg by mouth daily.   Refills:  0       predniSONE 5 MG tablet   Commonly known as:  DELTASONE        Dose:  5 mg   Take 5 mg by mouth daily.   Refills:  0       probenecid 500 MG tablet   Commonly known as:  BENEMID        Dose:  500 mg   Take 500 mg by mouth 2 times daily.   Refills:  0       sulfamethoxazole-trimethoprim 400-80 MG per tablet   Commonly known as:  BACTRIM/SEPTRA        Dose:  1 tablet   Take 1 tablet by mouth daily Take on every other day   Refills:  0       TAMSULOSIN HCL PO        Dose:  0.4 mg   Take 0.4 mg by mouth daily   Refills:  0       TYLENOL ARTHRITIS PAIN 650 MG CR tablet   Generic drug:  acetaminophen        Dose:  2 tablet   Take 2 tablets by mouth 3 times daily.   Refills:  0       * Notice:  This list has 2 medication(s) that are the same as other medications prescribed for you. Read the directions  carefully, and ask your doctor or other care provider to review them with you.             Protect others around you: Learn how to safely use, store and throw away your medicines at www.disposemymeds.org.         Follow-ups after your visit        Your next 10 appointments already scheduled     Oct 17, 2017 12:00 PM CDT   Lab with [facility]   [facility] ([facility])    [address]  1st Floor  [facility] [id number] [phone]            Oct 17, 2017  1:05 PM CDT   (Arrive by 12:35 PM)   Return Visit with Christi Sun MD   [facility] ([facility])    [address]  3rd Floor  [facility] [address] [phone]               Care Instructions        Further instructions from your care team       [facility]    Interventional Radiology  Patient Instructions Following Biopsy    AFTER YOU GO HOME  ? If you were given sedation DO NOT drive or operate machinery at home or at work for at least 24 hours  ? DO relax and take it easy for 48 hours, no strenuous activity for 24 hours  ? DO drink plenty of fluids  ? DO resume your regular diet, unless otherwise instructed by your Primary Physician  ? Keep the dressing dry and in place for 24 hours.  ? DO NOT drink alcoholic beverages the day of your procedure  ? DO NOT do any strenuous exercise or lifting (> 10 lbs) for at least 7 days following your procedure  ? DO NOT take a bath or shower for at least 12 hours following your procedure  ? Remove dressing after shower the next day. Replace with Band aid for 2 days.  Never leave a wet dressing in place.  ? DO NOT make any important or legal decisions for 24 hours following your procedure  ? There should be minimum drainage from the biopsy site    CALL THE PHYSICIAN IF:  ? You start bleeding from the procedure site.  If you do start to bleed from that site, lie down flat and hold pressure on the site for a minimum of 10  minutes.  Your physician will tell you if you need to return to the hospital  ? You develop nausea or vomiting  ? You have excessive swelling, redness, or tenderness at the site  ? You have drainage that looks like it is infected.  ? You experience severe pain  ? You develop hives or a rash or unexplained itching  ? You develop shortness of breath  ? You develop a temperature of 101 degrees F or greater  ? You develop bloody clots or red urine after you are discharged        [facility] INTERVENTIONAL RADIOLOGY DEPARTMENT  Procedure Physician:  Casper RAMOS                                     Date of procedure: June 6, 2017  Telephone Numbers: [phone] Monday-Friday 8:00 am to 4:30 pm  [phone] After 4:30 pm Monday-Friday, Weekends & Holidays.   Ask for the Interventional Radiologist on call.  Someone is on call 24 hrs/day  [facility] toll free number: [phone] Monday-Friday 8:00 am to 4:30 pm  [facility] Emergency Dept: [phone]           Additional Information About Your Visit        [employer] Information     The [employer] gives you secure access to your electronic health record. If you see a primary care provider, you can also send messages to your care team and make appointments. If you have questions, please call your primary care clinic.  If you do not have a primary care provider, please call [phone] and they will assist you.        Care EveryWhere ID     This is your Care EveryWhere ID. This could be used by other organizations to access your [facility] medical records  [id number]        Your Vitals Were     Blood Pressure Pulse Temperature Respirations Pulse Oximetry       127/78 69 97.6  F (36.4  C) 14 97%        Primary Care Provider Office Phone # Fax #    Moris Diaz [phone]      Thank you!     Thank you for choosing [facility] for your care. Our goal is always to provide you with excellent care. Hearing back from our patients is one way we can continue to improve our services.  Please take a few minutes to complete the written survey that you may receive in the mail after you visit with us. Thank you!             Medication List: This is a list of all your medications and when to take them. Check marks below indicate your daily home schedule. Keep this list as a reference.      Medications           Morning Afternoon Evening Bedtime As Needed    allopurinol 100 MG tablet   Commonly known as:  ZYLOPRIM   Take 2 tablets by mouth daily.                                ALPHAGAN OP   2 drops daily                                BABY ASPIRIN PO   Take 81 mg by mouth daily                                cholecalciferol 5000 UNITS Tabs tablet   Commonly known as:  vitamin D3   Take 1 tablet (5,000 Units) by mouth daily                                CRESTOR 5 MG tablet   Take 10 mg by mouth daily   Generic drug:  rosuvastatin                                * cycloSPORINE modified 100 MG capsule   Commonly known as:  GENERIC EQUIVALENT   Take 1 capsule (100 mg) by mouth 2 times daily (total dose 125 mg twice daily)                                * cycloSPORINE modified 25 MG capsule   Commonly known as:  GENERIC EQUIVALENT   Take 1 capsule (25 mg) by mouth 2 times daily (total dose 125 mg twice daily)                                glucosamine 500 MG Caps   Take 500 mg by mouth 3 times daily                                ISOSORBIDE MONONITRATE PO   Take 60 mg by mouth daily                                lisinopril 10 MG tablet   Commonly known as:  PRINIVIL/ZESTRIL   Take 1 tablet (10 mg) by mouth daily                                metoprolol 100 MG tablet   Commonly known as:  LOPRESSOR   Take 1 tablet by mouth 2 times daily.                                NIFEdipine ER osmotic 30 MG 24 hr tablet   Commonly known as:  NIFEDICAL XL   Take 2 tablets by mouth daily.                                NITROSTAT 0.4 MG sublingual tablet   Place 0.4 mg under the tongue every 5 minutes as needed.    Generic drug:  nitroglycerin                                NONFORMULARY   Focus Macula Pro:  Eye vitamin and mineral supplement A, C, E, Zinc, Copper                                PLAVIX 75 MG tablet   Take 75 mg by mouth daily.   Generic drug:  clopidogrel                                predniSONE 5 MG tablet   Commonly known as:  DELTASONE   Take 5 mg by mouth daily.                                probenecid 500 MG tablet   Commonly known as:  BENEMID   Take 500 mg by mouth 2 times daily.                                sulfamethoxazole-trimethoprim 400-80 MG per tablet   Commonly known as:  BACTRIM/SEPTRA   Take 1 tablet by mouth daily Take on every other day                                TAMSULOSIN HCL PO   Take 0.4 mg by mouth daily                                TYLENOL ARTHRITIS PAIN 650 MG CR tablet   Take 2 tablets by mouth 3 times daily.   Generic drug:  acetaminophen                                * Notice:  This list has 2 medication(s) that are the same as other medications prescribed for you. Read the directions carefully, and ask your doctor or other care provider to review them with you.

## 2017-06-13 LAB
A* LOCUS: NORMAL
A*: NORMAL
ABSSOP COMMENTS: NORMAL
ABTEST METHOD: NORMAL
B* LOCUS: NORMAL
B*: NORMAL
BW-1: NORMAL
BW-2: NORMAL
C* LOCUS: NORMAL
C*: NORMAL
DPA1*: NORMAL
DPB1*: NORMAL
DPB1*LOCUS: NORMAL
DQA1*LOCUS: NORMAL
DQB1* LOCUS: NORMAL
DRB1* LOCUS: NORMAL
DRB3* LOCUS: NORMAL
DRSSO TEST METHOD: NORMAL

## 2017-06-14 LAB
DONOR IDENTIFICATION: NORMAL
DSA COMMENTS: NORMAL
DSA PRESENT: NORMAL
DSA TEST METHOD: NORMAL
ORGAN: NORMAL
SA1 CELL: NORMAL
SA1 COMMENTS: NORMAL
SA1 HI RISK ABY: NORMAL
SA1 MOD RISK ABY: NORMAL
SA1 TEST METHOD: NORMAL
SA2 CELL: NORMAL
SA2 COMMENTS: NORMAL
SA2 HI RISK ABY UA: NORMAL
SA2 MOD RISK ABY: NORMAL
SA2 TEST METHOD: NORMAL

## 2017-06-23 LAB — COPATH REPORT: NORMAL

## 2017-07-07 DIAGNOSIS — T86.10 COMPLICATION OF TRANSPLANTED KIDNEY: ICD-10-CM

## 2017-07-07 DIAGNOSIS — Z94.0 KIDNEY TRANSPLANT RECIPIENT: Primary | ICD-10-CM

## 2017-07-07 DIAGNOSIS — Z79.60 LONG-TERM USE OF IMMUNOSUPPRESSANT MEDICATION: ICD-10-CM

## 2017-07-11 ENCOUNTER — TRANSFERRED RECORDS (OUTPATIENT)
Dept: HEALTH INFORMATION MANAGEMENT | Facility: CLINIC | Age: 62
End: 2017-07-11

## 2017-07-11 ENCOUNTER — CARE COORDINATION (OUTPATIENT)
Dept: NEPHROLOGY | Facility: CLINIC | Age: 62
End: 2017-07-11

## 2017-07-11 NOTE — PROGRESS NOTES
Reason for Call    Spoke with patient. States he hasn't been checking BP lately, as he got tired of checking it so often. He will start checking it again. Did notice BP went down after lisinopril increase (cannot recall exact number). Med makes him sleepy and diminished his gag reflex. His PCP stopped this med a few weeks ago as a result. He will follow up with PCP this week to discuss further.    Wanted to know more about biopsy results. Said he doesn't know what he should be doing other than managing BP.     Patient Education    1. Call this nurse if systolic (top number) blood pressure is consistently <110 or >160 for further instructions.     Plan    1. Follow up in 3 weeks    Patient was given an opportunity to ask questions and have those questions answered to his satisfaction.  Patient verbalized understanding of instructions provided and agreed to plan of care.    Melani Mehta RN

## 2017-07-17 ENCOUNTER — CARE COORDINATION (OUTPATIENT)
Dept: NEPHROLOGY | Facility: CLINIC | Age: 62
End: 2017-07-17

## 2017-07-17 DIAGNOSIS — Z94.0 KIDNEY REPLACED BY TRANSPLANT: Primary | ICD-10-CM

## 2017-07-17 NOTE — PROGRESS NOTES
Per Dr. Sun:    He has not had a renal panel for a while.  So he needs   1.  Renal panel this week   2.  Clinic appt.  If not tomorrow then how about Aug 3 when I see donors?   3.  Options class   4.  Dietician visit for weight loss and low phos, low sodium   5.  Transplant evaluation   6.  Re BP let's determine how active the PCP is.  I would still try to lower protein level with ARB   7.  Needs extra labs done as ordered Rosemary 15       Attempted to call patient. Call rang out, unable to leave voicemail. Will try again at a later time.    Melani Mehta RN

## 2017-07-18 RX ORDER — LOSARTAN POTASSIUM 50 MG/1
50 TABLET ORAL DAILY
Qty: 90 TABLET | Refills: 3 | Status: ON HOLD | OUTPATIENT
Start: 2017-07-18 | End: 2018-06-17

## 2017-07-18 NOTE — PROGRESS NOTES
Reason for Call    Followed up with patient. He agreed to referrals, as recommended. Will fax labs to his preferred lab. BP averaging 140/90's, PCP deferred management to nephrology. He is no longer taking lisinopril, wanted to know if there's something else he should be taking.    Discussed transplant referral. He's wondering if his sons could be worked up for an eval. However, at least one of them has struggled with substance use, so he wonders how long he would have to be sober to be considered.    Collaboration    Above discussed with Dr. Sun.  Orders received.    I would like him to try losartan 50 mg daily.  Would need to coordinate that with his PCP.  Both CEIs and ARBs reduce proteinuria.  People have more adverse reactions to CEIs.       Plan    1. Repeat labs  2. Follow up with transplant team and dietician  3. Schedule Kidney Smart class  4. Follow up in clinic with Dr. Sun  5. Begin losartan 50mg daily    Patient was given an opportunity to ask questions and have those questions answered to his satisfaction.  Patient verbalized understanding of instructions provided and agreed to plan of care.    Melani Mehta RN

## 2017-07-20 ENCOUNTER — TELEPHONE (OUTPATIENT)
Dept: TRANSPLANT | Facility: CLINIC | Age: 62
End: 2017-07-20

## 2017-07-20 ENCOUNTER — APPOINTMENT (OUTPATIENT)
Dept: TRANSPLANT | Facility: CLINIC | Age: 62
End: 2017-07-20
Attending: INTERNAL MEDICINE
Payer: MEDICARE

## 2017-07-20 DIAGNOSIS — I10 ESSENTIAL HYPERTENSION: ICD-10-CM

## 2017-07-20 DIAGNOSIS — N18.9 CHRONIC RENAL FAILURE: ICD-10-CM

## 2017-07-20 DIAGNOSIS — Z86.718 HISTORY OF DVT (DEEP VEIN THROMBOSIS): ICD-10-CM

## 2017-07-20 DIAGNOSIS — R76.0 LUPUS ANTICOAGULANT POSITIVE: Primary | ICD-10-CM

## 2017-07-20 DIAGNOSIS — E78.5 HYPERLIPIDEMIA: ICD-10-CM

## 2017-07-20 DIAGNOSIS — T86.91 TRANSPLANT FAILURE DUE TO REJECTION: ICD-10-CM

## 2017-07-20 DIAGNOSIS — Z76.82 ORGAN TRANSPLANT CANDIDATE: ICD-10-CM

## 2017-07-20 DIAGNOSIS — I25.10 CARDIOVASCULAR DISEASE: ICD-10-CM

## 2017-07-20 DIAGNOSIS — Z12.5 ENCOUNTER FOR SCREENING FOR MALIGNANT NEOPLASM OF PROSTATE: ICD-10-CM

## 2017-07-20 DIAGNOSIS — T86.12 KIDNEY TRANSPLANT FAILURE: ICD-10-CM

## 2017-07-20 NOTE — Clinical Note
Please see pre kidney transplant evaluation smart set orders. Pt is not yet on dialysis. Pt is a patient of Dr. Christi Sun - Dr. Sun wants to be scheduled as the nephrologist for eval.  Thanks Carolina

## 2017-07-20 NOTE — TELEPHONE ENCOUNTER
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 phone    Records:  [facility] from: [facility],  [facility], [facility]      Insurance information:  Current in EPIC    History of diabetes:  No            History of a kidney biopsy:  Yes         If Yes,when and where:  [facility]    Past Medical and Surgical History (updated in Epic medical / surgical):             History of  cancer personally:  Yes, What type? Skin cancer on knuckle removed a couple months ago, at [facility]                              History of cardiac events:  Yes              When and where was it was it treated? Heart attack 7/2005, stent placements, treated at [place] and [facility]             History abdominal surgeries other than previous transplant: No               History of previous transplant: Yes             If Yes where and estimated date:  12/13/1989 [facility]             Listed or in eval at another transplant center?  No             History of hospitalization in last 12 months:  No                History of blood transfusion:  Yes               Smoking history:  No     On dialysis: No        If NYOD, estimated GFR:  22  Height:  73\"  Weight:  241lbs  BMI:  37    Health Maintenance:  Colon:  Within last year, [place]  PSA:  unsure  Dental: year ago  Vaccines up to date  Special Needs (ie--wheelchair, assistance, guardian, interpretor):  no    As for the next steps, as long as we are able to get financial approval and receive your medical records, you should be expecting a call from your Transplant Coordinator in " about 2 weeks. Your Transplant Coordinator will go into more detail about the evaluation process, but we can put a hold on a tentative date for your transplant evaluation now. Kidney (K/P) evaluations are scheduled for Monday, Wednesday, or Thursdays, and can start as early as 6:30 am and end as late as 4:30pm. Would one of these days work better for you? Great, I am going to put a hold on Day/Month for you. Again, this appointment day and time is subject to change and is dependent on financial approval from your insurance company and our receiving your medical records so we are able to meet your individual needs.    I also want to schedule your first call with the coordinator. (Offer a couple choices and schedule patient's preferred date and time.)      Inform patient on the need to arrange age appropriate cancer screening, vaccines up to date and dental clearance    Reviewed evaluation process and reminded patient to complete questionnaire, complete medical records release, and review packet prior to evaluation visit     Informed patient that coordinator will review their chart and insurance coverage and if no concerns they will receive a call from a  to schedule evaluation

## 2017-07-20 NOTE — LETTER
07/20/17    Rory Bustamante  [address]  [address] [place] [address]          Dear Rory,    Thank you for your interest in the Transplant Center at [facility]. We look forward to being a part your care team and assisting you through the transplant process.    As we discussed, your transplant coordinator is Carolina Bull [phone].  You may call your coordinator at any time with questions or concerns. Your first scheduled call will be on Tuesday, August 1st, between 8am and Noon.  If this needs to change, call [phone].    Please complete the following.    1. Sign up for:    [employer], your electronic medical record    [employer], the Transplant Center's website (see enclosed booklet)    You can use these tools to learn more about your transplant, communicate with your care team, and track your medical details    2. Fill out and return the enclosed forms    Authorization for Electronic Communication    Authorization to Discuss Protected Health Information    [employer] Release of Information       Best Wishes,      Solid Organ Transplant Intake  [facility], [facility]    cc: Referring Physician        Dialysis Unit        PCP

## 2017-07-20 NOTE — Clinical Note
Please see order changes: canceled order for Transplant Nephrologist Block appt on 08- and added order for pt to be seen in [facility] for evaluation of new positive lupus anticoagulant result in patient who has a history of DVT. Also cancel appt for 2nd ABO as pt does not need this. Pt is in pre kidney transplant evaluation.  Thanks Carolina

## 2017-07-20 NOTE — LETTER
August 4, 2017    Jeanmarie Bustamante  [address]  [address] [place] [id number]      Dear Mr. Bustamante,    Attached is your Pre Kidney Evaluation schedule on August 21, 2017. Please feel free to contact me at [phone], if you have any questions.       Sincerely,   Luciana PIRES    CC:Carolina BEGUM                                                [facility]  [address] [place] [address]  [id number]      EVALUATION SCHEDULE: KIDNEY TRANSPLANT SLOT 3 EVAL    Patient:   JEANMARIE BUSTAMANTE   MR#:    [id number]  Coordinator:  Carolina BEGUM     [phone]  :   Luciana PIRES     [phone]  Location:   Transplant Center Clinic 3A  Date:    August 21, 2017    This is your evaluation schedule, please follow dates and times.  You  will receive reminder phone calls for other tests, but please follow this schedule only!  If you have any questions about dates and times,  please call us on number listed above.  Thank you, Transplant Clinic.     NO FOOD or DRINK AFTER MIDNIGHT  (You may only have small amounts of water)    Day/Date:   Monday, August 21, 2017  Time Location Activity   6:30a.m. [facility]  Imaging and Lab Testing  1st floor Blood tests (fasting, PLEASE)    7:00a.m. [facility]  Imaging and Lab Testing  1st floor Aorta Imaging =NO FOOD or DRINK AFTER MIDNIGHT!!   7:30a.m. [facility]  Transplant Services  3rd floor; Clinic 3A Review evaluation process, vitals, medication review   7:50a.m-  9:00a.m. [facility]  Transplant Services  3rd floor; Clinic 3A Pre-transplant class   9:00a.m. [facility]  Transplant Services  3rd floor; Clinic 3A; Consult Room Appointment with Mary Flower,  Registered Dietitian   9:30a.m. [facility]  Transplant Services  3rd floor; Clinic 3A Appointment with Dr. Dawkins,   Transplant  Surgeon   10:00a.m. [facility]  Transplant Services  3rd floor; Clinic 3A Meet with Carolina BEGUM  Transplant Coordinator   10:30a.m. [facility]  Transplant Services  3rd floor; Clinic 3B Appointment with Dr. Lizama,  Transplant Nephrologist   11:15a.m. [facility]  Transplant Services  3rd floor; Clinic 3A; Consult Room Appointment with either Christi Cristina  Transplant      12:15p.m.-  12:30p.m. [facility]  Transplant Services  3rd floor; Clinic 3A; Consult Room Research    12:30p.m.-  1:30p.m. LUNCH    1:30p.m. [facility]  Imaging and Lab Testing  1st floor Chest X-ray    1:45p.m. [facility]  Imaging and Lab Testing  1st floor 2nd ABO blood draw - confirmation of 1st draw   2:00p.m. [facility]  Imaging and Lab Testing  1st floor Lower Ext Ultrasound

## 2017-07-24 ENCOUNTER — ALLIED HEALTH/NURSE VISIT (OUTPATIENT)
Dept: TRANSPLANT | Facility: CLINIC | Age: 62
End: 2017-07-24
Attending: INTERNAL MEDICINE
Payer: MEDICARE

## 2017-07-24 VITALS — WEIGHT: 248.7 LBS | BODY MASS INDEX: 32.81 KG/M2

## 2017-07-24 DIAGNOSIS — N18.4 CHRONIC KIDNEY DISEASE, STAGE 4, SEVERELY DECREASED GFR (H): Primary | ICD-10-CM

## 2017-07-24 DIAGNOSIS — Z94.0 HISTORY OF KIDNEY TRANSPLANT: ICD-10-CM

## 2017-07-24 PROCEDURE — 97802 MEDICAL NUTRITION INDIV IN: CPT | Mod: ZF | Performed by: DIETITIAN, REGISTERED

## 2017-07-24 NOTE — PROGRESS NOTES
"OUTPATIENT NUTRITION ASSESSMENT NOTE    REASON FOR ASSESSMENT  Rory Bustamante is a 61 year old male seen by the dietitian for CKD diet referred by Dr. Sun  Pt accompanied by self  NUTRITION HISTORY  - Pt-reported special diets/eating habits: none   - Dining out/food not made at home: 2-3x/week (Daina, DQ).   - Appetite: too good  - Vitamins/supplements/herbal products: vit D, eye vitamin  - Pt is s/p kidney txp 1989. GFR now 22. He previously followed a low Na+, low protein diet. He cooks for himself and does not add salt, maybe a little seasoned salt, to foods. He knows to aim for 2 g Na+/day.     Typical food/fluid intake:  B: ground pork sausage (homemade w/ no salt) with 2 egg rolls or eggs with asparagus  L: \"small lunch\" - fruit and energy bar or stir nava with veggies, small amount of soy sauce and chix  D: cheese, crackers, avocado, 2 pieces toast or stir nava with veggies and chix   Snacks: bread, cheese, leftovers, occasional chips   Beverages: water, 20 oz Mt Dew/week, juice if wife buys it, sweet tea (not daily), 16 oz chocolate milk   ETOH (1 drink = 12 oz beer, 5 oz wine, 1.5 oz liquor): none    Physical Activity: Activity has declined over the past 1 year d/t spinal stenosis and other things. He can walk 10 minutes max. He did just buy a bike last week.     PHYSICAL FINDINGS  Observed  None; pt reports LE edema    ANTHROPOMETRICS  Height: 73\"  Weight: 248 lbs  BMI: 32.8  IBW: 184  % IBW: 135  Weight History: Wt has increased slightly x 1 yr d/t reduced activity and increased fluid retention.   Adjusted/dosing weight: 200 lbs/91 kg    LABS  Labs reviewed  K+/Phos WNL 5/2017    MEDICATIONS  Medications reviewed    PROCEDURES WITH NUTRITIONAL IMPLICATIONS  Kidney txp 1989    ASSESSED NUTRITION NEEDS:  Estimated Energy Needs: 2275 kcals (25 Kcal/Kg)  Justification: obese  Estimated Protein Needs: 55-73 protein (0.6-0.8 g pro/Kg)  Justification: CKD  Estimated Fluid Needs: (1 mL/Kcal)  Justification: " maintenance    MALNUTRITION  % Intake:  No decreased intake noted  % Weight Loss:  None noted  Subcutaneous Fat Loss:  None  Muscle Loss:  None  Fluid Accumulation/Edema:  Mild/Moderate  Malnutrition Diagnosis: Patient does not meet two of the above criteria necessary for diagnosing malnutrition    NUTRITION DIAGNOSIS:  Food and nutrition-related knowledge deficit related to length of time since hearing CKD diet education as evidenced by pt report, RD referral.    INTERVENTIONS  Provided handouts and education on the following:  - Na+ 2 g/day or less. Read labels to self educate, avoid canned/processed/boxed foods, choosing less processed meat, fresh or frozen fruits and veggies, etc.   - Avoid excessive amounts of protein. Pt is not consuming excessive protein currently.   - Pending upcoming renal panel results, may need to more strictly monitor K+/Phos intakes. Levels WNL 2 months ago. If levels increase or are elevated, educated pt on this.     Goals  Na+ <2 g/day  Patient Understanding: Good  Expected Compliance: Good  Follow-Up Plans: PRN  Provided pt with contact info.   Time spent with patient: 30 minutes.  Parisa Flower RD, LD

## 2017-07-24 NOTE — MR AVS SNAPSHOT
After Visit Summary   7/24/2017    Rory Bustamante    MRN: [id number]           Patient Information     Date Of Birth          1955        Visit Information        Provider Department      7/24/2017 3:00 PM Parisa Flower RD [facility]        Today's Diagnoses     Chronic kidney disease, stage 4, severely decreased GFR (H)    -  1    History of kidney transplant           Follow-ups after your visit        Your next 10 appointments already scheduled     Aug 03, 2017  3:00 PM CDT   (Arrive by 2:30 PM)   Return Visit with Christi Sun MD   [facility] ([facility])    [address] [place] [id number] [phone]            Oct 17, 2017 12:00 PM CDT   Lab with  LAB   [facility] ([facility])    [address] [place] [id number] [phone]            Oct 17, 2017  1:05 PM CDT   (Arrive by 12:35 PM)   Return Visit with Christi Sun MD   [facility] ([facility])    [address] [place] [id number] [phone]              Who to contact     If you have questions or need follow up information about today's clinic visit or your schedule please contact [facility] directly at [phone].  Normal or non-critical lab and imaging results will be communicated to you by MyChart, letter or phone within 4 business days after the clinic has received the results. If you do not hear from us within 7 days, please contact the clinic through [employer] or phone. If you have a critical or abnormal lab result, we will notify you by phone as soon as possible.  Submit refill requests through [employer] or call your pharmacy and they will forward the refill request to us. Please allow 3 business days for your refill to be completed.          Additional Information About Your Visit        [facility]  Information     [employer] gives you secure access to your electronic health record. If you see a primary care provider, you can also send messages to your care team and make appointments. If you have questions, please call your primary care clinic.  If you do not have a primary care provider, please call [phone] and they will assist you.        Care EveryWhere ID     This is your Care EveryWhere ID. This could be used by other organizations to access your [place] medical records  [id number]        Your Vitals Were     BMI (Body Mass Index)                   32.81 kg/m2            Blood Pressure from Last 3 Encounters:   06/06/17 124/74   05/30/17 141/80   10/21/16 140/76    Weight from Last 3 Encounters:   07/24/17 112.8 kg (248 lb 11.2 oz)   05/30/17 109.7 kg (241 lb 12.8 oz)   10/21/16 111.9 kg (246 lb 12.8 oz)              We Performed the Following     MNT INDIVIDUAL INITIAL EA 15 MIN        Primary Care Provider Office Phone # Fax #    Moris Diaz [phone]       [facility] [address] [facility] [address]  [facility] [id number]        Equal Access to Services     [facility] : Hadii aad ku hadasho Soomaali, waaxda luqadaha, qaybta kaalmada adeegyada, waxay idiin hayaan adeeg mylesaraeliseo lareedn ah. So [facility] [phone].    ATENCIÓN: Si habla español, tiene a carr disposición servicios gratuitos de asistencia lingüística. Llame al [phone].    We comply with applicable federal civil rights laws and [place] laws. We do not discriminate on the basis of race, color, national origin, age, disability sex, sexual orientation or gender identity.            Thank you!     Thank you for choosing [facility]  for your care. Our goal is always to provide you with excellent care. Hearing back from our patients is one way we can continue to improve our services. Please take a few minutes to complete the written survey that you may receive in the mail after your visit with us.  Thank you!             Your Updated Medication List - Protect others around you: Learn how to safely use, store and throw away your medicines at www.disposemymeds.org.          This list is accurate as of: 7/24/17  3:39 PM.  Always use your most recent med list.                   Brand Name Dispense Instructions for use Diagnosis    allopurinol 100 MG tablet    ZYLOPRIM    180 tablet    Take 2 tablets by mouth daily.    Kidney replaced by transplant, Gout       ALPHAGAN OP      2 drops daily        BABY ASPIRIN PO      Take 81 mg by mouth daily        cholecalciferol 5000 UNITS Tabs tablet    vitamin D3    30 tablet    Take 1 tablet (5,000 Units) by mouth daily    Hyperparathyroidism due to renal insufficiency (H)       CRESTOR 5 MG tablet   Generic drug:  rosuvastatin      Take 10 mg by mouth daily        * cycloSPORINE modified 100 MG capsule    GENERIC EQUIVALENT    60 capsule    Take 1 capsule (100 mg) by mouth 2 times daily (total dose 125 mg twice daily)    Kidney transplant recipient, Long-term use of immunosuppressant medication       * cycloSPORINE modified 25 MG capsule    GENERIC EQUIVALENT    60 capsule    Take 1 capsule (25 mg) by mouth 2 times daily (total dose 125 mg twice daily)    Kidney transplant recipient, Long-term use of immunosuppressant medication       glucosamine 500 MG Caps      Take 500 mg by mouth 3 times daily        ISOSORBIDE MONONITRATE PO      Take 60 mg by mouth daily        losartan 50 MG tablet    COZAAR    90 tablet    Take 1 tablet (50 mg) by mouth daily    Kidney replaced by transplant       metoprolol 100 MG tablet    LOPRESSOR    60 tablet    Take 1 tablet by mouth 2 times daily.    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    Hyp kid NOS w cr kid V       NITROSTAT 0.4 MG sublingual tablet   Generic drug:  nitroGLYcerin      Place 0.4 mg under the  tongue every 5 minutes as needed.        NONFORMULARY      Focus Macula Pro:  Eye vitamin and mineral supplement A, C, E, Zinc, Copper        PLAVIX 75 MG tablet   Generic drug:  clopidogrel      Take 75 mg by mouth daily.        predniSONE 5 MG tablet    DELTASONE     Take 5 mg by mouth daily.        probenecid 500 MG tablet    BENEMID     Take 500 mg by mouth 2 times daily.        sulfamethoxazole-trimethoprim 400-80 MG per tablet    BACTRIM/SEPTRA     Take 1 tablet by mouth daily Take on every other day        TAMSULOSIN HCL PO      Take 0.4 mg by mouth daily        TYLENOL ARTHRITIS PAIN 650 MG CR tablet   Generic drug:  acetaminophen      Take 2 tablets by mouth 3 times daily.        * Notice:  This list has 2 medication(s) that are the same as other medications prescribed for you. Read the directions carefully, and ask your doctor or other care provider to review them with you.

## 2017-07-25 ENCOUNTER — TELEPHONE (OUTPATIENT)
Dept: TRANSPLANT | Facility: CLINIC | Age: 62
End: 2017-07-25

## 2017-07-25 ENCOUNTER — CARE COORDINATION (OUTPATIENT)
Dept: NEPHROLOGY | Facility: CLINIC | Age: 62
End: 2017-07-25

## 2017-07-25 ENCOUNTER — MEDICAL CORRESPONDENCE (OUTPATIENT)
Dept: TRANSPLANT | Facility: CLINIC | Age: 62
End: 2017-07-25

## 2017-07-25 DIAGNOSIS — Z94.0 KIDNEY REPLACED BY TRANSPLANT: Primary | ICD-10-CM

## 2017-07-25 NOTE — TELEPHONE ENCOUNTER
rom: Parisa Flower, ANSHUL     Sent: 7/25/2017   8:30 AM       To: Elroy Dumont, RN, Adrienne Johnson RN  Subject: montly labs?                                     Hello! I met with Rory yesterday and although I see Eros is his post coordinator, he mentioned that Franklyn is (that is why I'm also copying you!). He mentioned that he requested monthly labs and was wondering about the status of this. Thanks!      Lab orders faxed to [facility].  Copy of lab order mailed to his residence.  Spoke with Rory and made aware.

## 2017-07-25 NOTE — PROGRESS NOTES
Reason for Call    Followed up with patient. Started losartan a few days late as he had to go to another town to  rx. Has not checked BP since then. He will start and record readings for appointment next week. Denied any new side effects or symptoms related to new medication. Will get follow up labs done this week.    Did have follow up questions regarding dietician meeting. Wondering if there are diet books recommended for kidney disease. Message sent to Mary.    Plan    1. Follow up with Dr. Sun on 8/3  2. Monitor BP daily and record readings    Patient was given an opportunity to ask questions and have those questions answered to his satisfaction.  Patient verbalized understanding of instructions provided and agreed to plan of care.      Melani Mehta RN

## 2017-07-26 ENCOUNTER — DOCUMENTATION ONLY (OUTPATIENT)
Dept: TRANSPLANT | Facility: CLINIC | Age: 62
End: 2017-07-26

## 2017-07-26 PROCEDURE — 80158 DRUG ASSAY CYCLOSPORINE: CPT | Performed by: INTERNAL MEDICINE

## 2017-07-28 DIAGNOSIS — Z48.298 AFTERCARE FOLLOWING ORGAN TRANSPLANT: Primary | ICD-10-CM

## 2017-07-28 LAB
CYCLOSPORINE BLD LC/MS/MS-MCNC: 70 UG/L (ref 50–400)
TME LAST DOSE: NORMAL H

## 2017-07-31 ENCOUNTER — MEDICAL CORRESPONDENCE (OUTPATIENT)
Dept: TRANSPLANT | Facility: CLINIC | Age: 62
End: 2017-07-31

## 2017-08-01 ENCOUNTER — MEDICAL CORRESPONDENCE (OUTPATIENT)
Dept: TRANSPLANT | Facility: CLINIC | Age: 62
End: 2017-08-01

## 2017-08-01 NOTE — TELEPHONE ENCOUNTER
Reviewed pt's chart. S/P kidney transplant 1989 that is now failing. Pt is followed by Dr. Sun. PMH coronary artery disease, HTN, hyperlipidemia, spinal stenosis gout. Pt sees cardiologist locally. Last stress test 08- does not suggest ischemia. Follows locally with dermatologist - last seen 07- - no documentation of any skin cancer history. Past surgical history includes kidney transplant. No history of alcohol use of smoking. BMI 37. All ok to proceed with pre kidney transplant evaluation. I called pt today and spoke to him at length. Pt confirmed he wants to pursue a new kidney transplant. I reviewed the general components of the pre kidney transplant evaluation and process. I explained a  will call him soon to confirm appts for pre kidney eval - pt will make own appts locally for cardiology eval and clearance and will continue to follow with derm. Also will plan to have pt scheduled with Dr. Sun for pre kidney eval nephrologist consult. Instructed pt to call me with questions. Pt expressed good understanding of all and was in good agreement with the plan.     Smart set orders into [facility] today for pre kidney eval - routed to .

## 2017-08-03 ENCOUNTER — RESULTS ONLY (OUTPATIENT)
Dept: OTHER | Facility: CLINIC | Age: 62
End: 2017-08-03

## 2017-08-03 ENCOUNTER — OFFICE VISIT (OUTPATIENT)
Dept: NEPHROLOGY | Facility: CLINIC | Age: 62
End: 2017-08-03
Attending: INTERNAL MEDICINE
Payer: MEDICARE

## 2017-08-03 VITALS
OXYGEN SATURATION: 96 % | DIASTOLIC BLOOD PRESSURE: 74 MMHG | BODY MASS INDEX: 32.55 KG/M2 | HEART RATE: 75 BPM | TEMPERATURE: 97.5 F | WEIGHT: 245.6 LBS | HEIGHT: 73 IN | SYSTOLIC BLOOD PRESSURE: 132 MMHG

## 2017-08-03 DIAGNOSIS — N06.8 ISOLATED PROTEINURIA WITH OTHER MORPHOLOGIC LESION: ICD-10-CM

## 2017-08-03 DIAGNOSIS — T86.12 KIDNEY TRANSPLANT FAILURE: ICD-10-CM

## 2017-08-03 DIAGNOSIS — Z94.0 S/P KIDNEY TRANSPLANT: ICD-10-CM

## 2017-08-03 DIAGNOSIS — N18.9 CHRONIC RENAL FAILURE: ICD-10-CM

## 2017-08-03 DIAGNOSIS — E78.5 HYPERLIPIDEMIA: ICD-10-CM

## 2017-08-03 DIAGNOSIS — T86.91 TRANSPLANT FAILURE DUE TO REJECTION: ICD-10-CM

## 2017-08-03 DIAGNOSIS — I10 ESSENTIAL HYPERTENSION: ICD-10-CM

## 2017-08-03 DIAGNOSIS — R80.9 PROTEINURIA: ICD-10-CM

## 2017-08-03 DIAGNOSIS — Z12.5 ENCOUNTER FOR SCREENING FOR MALIGNANT NEOPLASM OF PROSTATE: ICD-10-CM

## 2017-08-03 DIAGNOSIS — Z76.82 ORGAN TRANSPLANT CANDIDATE: ICD-10-CM

## 2017-08-03 DIAGNOSIS — I25.10 CARDIOVASCULAR DISEASE: ICD-10-CM

## 2017-08-03 DIAGNOSIS — Z94.0 S/P KIDNEY TRANSPLANT: Primary | ICD-10-CM

## 2017-08-03 LAB
ABO + RH BLD: NORMAL
ABO + RH BLD: NORMAL
ALBUMIN SERPL-MCNC: 3.8 G/DL (ref 3.4–5)
ALBUMIN UR-MCNC: >499 MG/DL
ALP SERPL-CCNC: 84 U/L (ref 40–150)
ALT SERPL W P-5'-P-CCNC: 22 U/L (ref 0–70)
ANION GAP SERPL CALCULATED.3IONS-SCNC: 13 MMOL/L (ref 3–14)
APPEARANCE UR: CLEAR
APTT PPP: 28 SEC (ref 22–37)
AST SERPL W P-5'-P-CCNC: 16 U/L (ref 0–45)
BASOPHILS # BLD AUTO: 0.1 10E9/L (ref 0–0.2)
BASOPHILS NFR BLD AUTO: 0.7 %
BILIRUB SERPL-MCNC: 0.3 MG/DL (ref 0.2–1.3)
BILIRUB UR QL STRIP: NEGATIVE
BLD GP AB SCN SERPL QL: NORMAL
BLOOD BANK CMNT PATIENT-IMP: NORMAL
BUN SERPL-MCNC: 58 MG/DL (ref 7–30)
CALCIUM SERPL-MCNC: 9.8 MG/DL (ref 8.5–10.1)
CHLORIDE SERPL-SCNC: 99 MMOL/L (ref 94–109)
CHOLEST SERPL-MCNC: 210 MG/DL
CO2 SERPL-SCNC: 22 MMOL/L (ref 20–32)
COLOR UR AUTO: YELLOW
CREAT SERPL-MCNC: 3.16 MG/DL (ref 0.66–1.25)
DIFFERENTIAL METHOD BLD: ABNORMAL
EOSINOPHIL # BLD AUTO: 0.2 10E9/L (ref 0–0.7)
EOSINOPHIL NFR BLD AUTO: 2 %
ERYTHROCYTE [DISTWIDTH] IN BLOOD BY AUTOMATED COUNT: 12.4 % (ref 10–15)
GFR SERPL CREATININE-BSD FRML MDRD: 20 ML/MIN/1.7M2
GLUCOSE SERPL-MCNC: 106 MG/DL (ref 70–99)
GLUCOSE UR STRIP-MCNC: NEGATIVE MG/DL
HBV CORE AB SERPL QL IA: NONREACTIVE
HBV SURFACE AB SERPL IA-ACNC: 0.26 M[IU]/ML
HBV SURFACE AG SERPL QL IA: NONREACTIVE
HCT VFR BLD AUTO: 30.6 % (ref 40–53)
HCV AB SERPL QL IA: NORMAL
HDLC SERPL-MCNC: 34 MG/DL
HGB BLD-MCNC: 11.1 G/DL (ref 13.3–17.7)
HGB UR QL STRIP: NEGATIVE
HIV 1+2 AB+HIV1 P24 AG SERPL QL IA: NORMAL
IMM GRANULOCYTES # BLD: 0.1 10E9/L (ref 0–0.4)
IMM GRANULOCYTES NFR BLD: 1.2 %
INR PPP: 0.99 (ref 0.86–1.14)
KETONES UR STRIP-MCNC: NEGATIVE MG/DL
LDLC SERPL CALC-MCNC: ABNORMAL MG/DL
LEUKOCYTE ESTERASE UR QL STRIP: ABNORMAL
LYMPHOCYTES # BLD AUTO: 1.4 10E9/L (ref 0.8–5.3)
LYMPHOCYTES NFR BLD AUTO: 16.8 %
MCH RBC QN AUTO: 35.9 PG (ref 26.5–33)
MCHC RBC AUTO-ENTMCNC: 36.3 G/DL (ref 31.5–36.5)
MCV RBC AUTO: 99 FL (ref 78–100)
MONOCYTES # BLD AUTO: 0.8 10E9/L (ref 0–1.3)
MONOCYTES NFR BLD AUTO: 9.4 %
NEUTROPHILS # BLD AUTO: 5.9 10E9/L (ref 1.6–8.3)
NEUTROPHILS NFR BLD AUTO: 69.9 %
NITRATE UR QL: NEGATIVE
NONHDLC SERPL-MCNC: 176 MG/DL
NRBC # BLD AUTO: 0 10*3/UL
NRBC BLD AUTO-RTO: 0 /100
PH UR STRIP: 5 PH (ref 5–7)
PHOSPHATE SERPL-MCNC: 3.2 MG/DL (ref 2.5–4.5)
PLATELET # BLD AUTO: 141 10E9/L (ref 150–450)
POTASSIUM SERPL-SCNC: 4.3 MMOL/L (ref 3.4–5.3)
PROT SERPL-MCNC: 7.8 G/DL (ref 6.8–8.8)
PSA SERPL-ACNC: 1.8 UG/L (ref 0–4)
RBC # BLD AUTO: 3.09 10E12/L (ref 4.4–5.9)
RBC #/AREA URNS AUTO: 2 /HPF (ref 0–2)
SODIUM SERPL-SCNC: 134 MMOL/L (ref 133–144)
SP GR UR STRIP: 1.01 (ref 1–1.03)
SPECIMEN EXP DATE BLD: NORMAL
SQUAMOUS #/AREA URNS AUTO: <1 /HPF (ref 0–1)
TRIGL SERPL-MCNC: 815 MG/DL
URN SPEC COLLECT METH UR: ABNORMAL
UROBILINOGEN UR STRIP-MCNC: 0 MG/DL (ref 0–2)
WBC # BLD AUTO: 8.4 10E9/L (ref 4–11)
WBC #/AREA URNS AUTO: 11 /HPF (ref 0–2)

## 2017-08-03 PROCEDURE — 86780 TREPONEMA PALLIDUM: CPT | Performed by: PHYSICIAN ASSISTANT

## 2017-08-03 PROCEDURE — G0499 HEPB SCREEN HIGH RISK INDIV: HCPCS | Performed by: PHYSICIAN ASSISTANT

## 2017-08-03 PROCEDURE — 86850 RBC ANTIBODY SCREEN: CPT | Performed by: PHYSICIAN ASSISTANT

## 2017-08-03 PROCEDURE — 40000866 ZZHCL STATISTIC HIV 1/2 ANTIGEN/ANTIBODY PRETRANSPLANT ONLY: Performed by: PHYSICIAN ASSISTANT

## 2017-08-03 PROCEDURE — 86706 HEP B SURFACE ANTIBODY: CPT | Performed by: PHYSICIAN ASSISTANT

## 2017-08-03 PROCEDURE — 86900 BLOOD TYPING SEROLOGIC ABO: CPT | Performed by: PHYSICIAN ASSISTANT

## 2017-08-03 PROCEDURE — 82784 ASSAY IGA/IGD/IGG/IGM EACH: CPT | Performed by: PHYSICIAN ASSISTANT

## 2017-08-03 PROCEDURE — 86335 IMMUNFIX E-PHORSIS/URINE/CSF: CPT | Performed by: INTERNAL MEDICINE

## 2017-08-03 PROCEDURE — 84100 ASSAY OF PHOSPHORUS: CPT | Performed by: PHYSICIAN ASSISTANT

## 2017-08-03 PROCEDURE — 80061 LIPID PANEL: CPT | Performed by: PHYSICIAN ASSISTANT

## 2017-08-03 PROCEDURE — 85613 RUSSELL VIPER VENOM DILUTED: CPT | Mod: 91 | Performed by: PHYSICIAN ASSISTANT

## 2017-08-03 PROCEDURE — 86886 COOMBS TEST INDIRECT TITER: CPT | Performed by: PHYSICIAN ASSISTANT

## 2017-08-03 PROCEDURE — G0103 PSA SCREENING: HCPCS | Performed by: PHYSICIAN ASSISTANT

## 2017-08-03 PROCEDURE — 86832 HLA CLASS I HIGH DEFIN QUAL: CPT | Performed by: INTERNAL MEDICINE

## 2017-08-03 PROCEDURE — 86665 EPSTEIN-BARR CAPSID VCA: CPT | Performed by: PHYSICIAN ASSISTANT

## 2017-08-03 PROCEDURE — 99213 OFFICE O/P EST LOW 20 MIN: CPT | Mod: ZF

## 2017-08-03 PROCEDURE — G0472 HEP C SCREEN HIGH RISK/OTHER: HCPCS | Performed by: PHYSICIAN ASSISTANT

## 2017-08-03 PROCEDURE — 85610 PROTHROMBIN TIME: CPT | Performed by: PHYSICIAN ASSISTANT

## 2017-08-03 PROCEDURE — 86787 VARICELLA-ZOSTER ANTIBODY: CPT | Performed by: PHYSICIAN ASSISTANT

## 2017-08-03 PROCEDURE — 86334 IMMUNOFIX E-PHORESIS SERUM: CPT | Performed by: PHYSICIAN ASSISTANT

## 2017-08-03 PROCEDURE — 86147 CARDIOLIPIN ANTIBODY EA IG: CPT | Performed by: PHYSICIAN ASSISTANT

## 2017-08-03 PROCEDURE — 80053 COMPREHEN METABOLIC PANEL: CPT | Performed by: PHYSICIAN ASSISTANT

## 2017-08-03 PROCEDURE — 81001 URINALYSIS AUTO W/SCOPE: CPT | Performed by: PHYSICIAN ASSISTANT

## 2017-08-03 PROCEDURE — 36415 COLL VENOUS BLD VENIPUNCTURE: CPT | Performed by: PHYSICIAN ASSISTANT

## 2017-08-03 PROCEDURE — 85613 RUSSELL VIPER VENOM DILUTED: CPT | Performed by: PHYSICIAN ASSISTANT

## 2017-08-03 PROCEDURE — 81241 F5 GENE: CPT | Performed by: PHYSICIAN ASSISTANT

## 2017-08-03 PROCEDURE — 87799 DETECT AGENT NOS DNA QUANT: CPT | Performed by: PHYSICIAN ASSISTANT

## 2017-08-03 PROCEDURE — 83883 ASSAY NEPHELOMETRY NOT SPEC: CPT | Performed by: PHYSICIAN ASSISTANT

## 2017-08-03 PROCEDURE — 81240 F2 GENE: CPT | Performed by: PHYSICIAN ASSISTANT

## 2017-08-03 PROCEDURE — 85670 THROMBIN TIME PLASMA: CPT | Performed by: PHYSICIAN ASSISTANT

## 2017-08-03 PROCEDURE — 86901 BLOOD TYPING SEROLOGIC RH(D): CPT | Performed by: PHYSICIAN ASSISTANT

## 2017-08-03 PROCEDURE — 85730 THROMBOPLASTIN TIME PARTIAL: CPT | Performed by: PHYSICIAN ASSISTANT

## 2017-08-03 PROCEDURE — 86480 TB TEST CELL IMMUN MEASURE: CPT | Performed by: PHYSICIAN ASSISTANT

## 2017-08-03 PROCEDURE — 86334 IMMUNOFIX E-PHORESIS SERUM: CPT | Performed by: INTERNAL MEDICINE

## 2017-08-03 PROCEDURE — 85025 COMPLETE CBC W/AUTO DIFF WBC: CPT | Performed by: PHYSICIAN ASSISTANT

## 2017-08-03 PROCEDURE — 86704 HEP B CORE ANTIBODY TOTAL: CPT | Performed by: PHYSICIAN ASSISTANT

## 2017-08-03 PROCEDURE — 86644 CMV ANTIBODY: CPT | Performed by: PHYSICIAN ASSISTANT

## 2017-08-03 RX ORDER — TIMOLOL MALEATE 2.5 MG/ML
SOLUTION/ DROPS OPHTHALMIC
Status: ON HOLD | COMMUNITY
Start: 2016-02-22 | End: 2017-12-08

## 2017-08-03 ASSESSMENT — PAIN SCALES - GENERAL: PAINLEVEL: NO PAIN (0)

## 2017-08-03 NOTE — PATIENT INSTRUCTIONS
1.  Triglyceride level is high.  May need to restart gemfibrozil.  Weight loss recommended  2.  Transplant evaluation is scheduled for Aug 21  3.  Low salt diet  4.  Continue to monitor BP  5. Will discuss fistula placement in August  6.  If swleling increases can add diuretic

## 2017-08-03 NOTE — PROGRESS NOTES
Assessment and Plan:  1. Kidney transplant evaluation - patient is a excellent candidate overall. Benefits of a living donor transplant were discussed. Has son and several siblings as possible donors   - full transplant eval   - PRA sent  2. 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  Biopsy is equivocal but shows ESRD   - fistula mapping  3. Hypertriglyceridemia:  Discussed role of wt loss, resumption of fibrate.     - to contact PCP once he has fasting lipids  4.  CAD: S/P stent placement remotely with CP recurrence last year.  Seen by cardiology with Lexiscan suggesting distal disease.    - Will need to see cardiology  5.  HBP: reasonable control today.    - Continued edema so could add diuretic if needed  6. Proteinuria: large increase in past year so needs immunofixation.  Biospy is equivocal about cause of proteinuria.  Pathologist specifically said the degree of proteinuria is out of proportion to glomerular damage.   - serum and urine IEP and immunofixation  7.  Hx Skin Ca:  Higher risk of skin cancer with re-induction.  Will need close derm FU as  well as derm clearance pre-op    Discussed the risks and benefits of a transplant, including the risk of surgery and immunosuppression medications.  Patients overall evaluation will be discussed in the Transplant Program's regular meeting with a final recommendation on the patients suitability for transplant to be made at that time.    Christi Sun MD    Consult:  Rory Bustamante was seen in consultation at the request of Dr. Sydney Dawkins for evaluation as a potential Kidney transplant recipient.    Reason for Visit:  Rory Bustamante is a 61 year old male with CKD from failing transplant who presents for kidney transplant evaluation.    HPI:  Patient  Mr. Bustamante is a 61-year-old gentleman S/P HLA identical transplant in 1989.  He has recently developed nephrotic range proteinuria.  He has no known recurrent MPGN first seen on a biopsy in 2008.   However, recently his proteinuria level has increased dramatically, as has his creatinine.  The protein was 0.45 in 02/2015, 1.27 grams in 11/2015, 0.86 gram in 08/2016 and is now 4.8 grams.  His albumin is 4.0.  Kidney biopsy in May 2017 showed end stage kidney.  Possibility of recurrent MPGN was not excluded but changes were not felt to be typical.       His immunosuppression is currently cyclosporine with a target of 50-60 with prednisone 5 daily.  His Imuran was stopped in 2012 because he has extensive skin cancer.        Recently his blood pressure has been increasingly difficult to control.  It went up as high as 174/111 on 05/14.  Blood pressure meds have been adjusted and currently his blood pressure is in the 130-150/80-90 range at home.  His current blood pressure medications include losartan 50 mg started 2 weeks ago after he developed throat constriction with lisinoprl, isosorbide 30 b.i.d. (started for angina), tamsulosin 0.4  (for voiding symptoms), nifedipine XL 60 mg daily and metoprolol 100 b.i.d.  He is not on a diuretic and is not alwyas compliant with low salt diet.       The patient has a history of anginal symptoms.  He was completely evaluated in 08/2016 including cardiac Lexiscan.  He is status post MI in 2011 with two stents placed and currently is on Plavix for that.  He has not had chest pain for the past year.        He has been seeing a dermatologist and has had a number of skin lesions removed         Kidney Disease Hx:        Kidney Disease Dx: see above       Biopsy Proven: Yes       On Dialysis: No       Primary Nephrologist: Dr Rianna lfowers      Medical Hx:       h/o HTN: Yes  Usual BP: 130-150/80-95       h/o DM:  No       h/o Protein in Urine: Yes        h/o Blood in Urine:  No       h/o Kidney Stones:  No       h/o UTI: No       h/o Chronic NSAID Use: No         Previous Transplant Hx:       Yes; see above         Transplant Sensitization Hx:       Previous Tx: Yes       Blood  Transfusion: Yes       Pregnancy: No         Uremic Symptoms: none          Cardiovascular Hx:       h/o Cardiac Issues:  S/P stents with re-eval 2016 Lexiscan DD      Exercise Tolerance: no chest pain or shortness of breath with exertion. Pt has recently started rising a bike and can go up to 3 miles without sx.  Exercise is limited by spinal stenosis and LE numbness         Health Maintenance:       Colonoscopy: Up to date         Potential Donor(s): Yes; son, siblings    ROS:  A comprehensive review of systems was obtained and negative, except as noted in the HPI or PMH.    PMH:   Medical records were reviewed  and PMH was discussed with patient and noted below.  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  HLA identical transplant.  Biopsy in 4/2008 with recurrent MPGN type 2, mild interstitial fibrosis and tubular atrophy, CNI toxicity     Spinal stenosis 2013     Warts        PSH:   Past Surgical History:   Procedure Laterality Date     C TOTAL KNEE ARTHROPLASTY  2015     TRANSPLANT KIDNEY RECIPIENT LIVING RELATED  1989    HLA identical     Personal or family history of bleeding or anesthesia problems: No    Family Hx:  Family History   Problem Relation Age of Onset     DIABETES Mother      Colon Cancer Mother 78     Cardiac Sudden Death Sister 87     DIABETES Brother      CEREBROVASCULAR DISEASE Brother        Personal Hx:   Social History     Social History     Marital status:      Spouse name: N/A      Number of children: N/A     Years of education: N/A     Occupational History     Not on file.     Social History Main Topics     Smoking status: Never Smoker     Smokeless tobacco: Never Used     Alcohol use No     Drug use: No     Sexual activity: Not on file     Other Topics Concern     Not on file     Social History Narrative       Allergies:  Allergies   Allergen Reactions     Simvastatin Muscle Pain (Myalgia)       Medications:  Prior to Admission medications    Medication Sig Start Date End Date Taking? Authorizing Provider   timolol (TIMOPTIC) 0.25 % ophthalmic solution 2 times daily. 2/22/16  Yes Reported, Patient   BEVACizumab (AVASTIN) 400 MG/16ML injection  10/6/16  Yes Reported, Patient   losartan (COZAAR) 50 MG tablet Take 1 tablet (50 mg) by mouth daily 7/18/17  Yes Christi Sun MD   cycloSPORINE modified (GENERIC EQUIVALENT) 100 MG capsule Take 1 capsule (100 mg) by mouth 2 times daily (total dose 125 mg twice daily) 2/1/17  Yes Christi Sun MD   cycloSPORINE modified (GENERIC EQUIVALENT) 25 MG capsule Take 1 capsule (25 mg) by mouth 2 times daily (total dose 125 mg twice daily) 2/1/17  Yes Christi Sun MD   cholecalciferol (VITAMIN D3) 5000 UNITS TABS tablet Take 1 tablet (5,000 Units) by mouth daily 11/17/15  Yes Christi Sun MD   BABY ASPIRIN PO Take 81 mg by mouth daily   Yes Reported, Patient   NONFORMULARY Focus Macula Pro:  Eye vitamin and mineral supplement A, C, E, Zinc, Copper   Yes Reported, Patient   ISOSORBIDE MONONITRATE PO Take 60 mg by mouth daily    Yes Reported, Patient   TAMSULOSIN HCL PO Take 0.4 mg by mouth daily    Yes Reported, Patient   allopurinol (ZYLOPRIM) 100 MG tablet Take 2 tablets by mouth daily. 1/14/13  Yes Christi Sun MD   NIFEdipine osmotic (NIFEDICAL XL) 30 MG 24 hr tablet Take 2 tablets by mouth daily. 9/10/12  Yes Christi Sun MD   metoprolol (LOPRESSOR) 100 MG tablet Take 1 tablet by mouth 2 times daily. 6/13/12  Yes Christi Sun  "MD DAGOBERTO   rosuvastatin (CRESTOR) 5 MG tablet Take 10 mg by mouth daily    Yes Reported, Patient   nitroGLYCERIN (NITROSTAT) 0.4 MG SL tablet Place 0.4 mg under the tongue every 5 minutes as needed.   Yes Reported, Patient   Brimonidine Tartrate (ALPHAGAN OP) 2 drops daily   Yes Reported, Patient   clopidogrel (PLAVIX) 75 MG tablet Take 75 mg by mouth daily.   Yes Reported, Patient   predniSONE (DELTASONE) 5 MG tablet Take 5 mg by mouth daily.   Yes Reported, Patient   probenecid (BENEMID) 500 MG tablet Take 500 mg by mouth 2 times daily.   Yes Reported, Patient   sulfamethoxazole-trimethoprim (BACTRIM,SEPTRA) 400-80 MG per tablet Take 1 tablet by mouth daily Take on every other day   Yes Reported, Patient   acetaminophen (TYLENOL ARTHRITIS PAIN) 650 MG CR tablet Take 2 tablets by mouth 3 times daily.   Yes Reported, Patient       Vitals:  /74  Pulse 75  Temp 97.5  F (36.4  C) (Oral)  Ht 1.854 m (6' 1\")  Wt 111.4 kg (245 lb 9.6 oz)  SpO2 96%  BMI 32.4 kg/m2  GENERAL APPEARANCE: alert and no distress  HENT: mouth without ulcers or lesions  LYMPHATICS: no cervical or supraclavicular nodes  RESP: lungs clear to auscultation - no rales, rhonchi or wheezes  CV: regular rhythm, normal rate, no rub, no murmur  EDEMA: one plus edema to mid calf  LE edema bilaterally  ABDOMEN: soft, nondistended, nontender, bowel sounds normal.  Central obesity  MS: extremities normal - no gross deformities noted, no evidence of inflammation in joints, no muscle tenderness.  Nonfunction LUE fistula  SKIN: no rash  multiple skin tags and AKs over head, LEs, UEs    Results:   Labs and imaging were ordered for this visit and reviewed by me.  Recent Results (from the past 336 hour(s))   TXP External Lab Result    Collection Time: 07/26/17 10:09 AM   Result Value Ref Range    WBC Count (External) 8.4 4.0 - 11.0 X10^3/uL    RBC Count (External) 3.07 (L) 4.40 - 6.00 X10^6/uL    Hemoglobin (External) 11.1 (L) 13.0 - 18.0 g/dL    Hematocrit " (External) 30.6 (L) 40.0 - 52.0 %    MCV (External) 100 (H) 80 - 96 fL    MCH (External) 36 (H) 27 - 34 pg    MCHC (External) 36 32 - 36 g/dL    Platelet Count (External) 126 (L) 150 - 420 X10^3/uL    RDW (External) 12.8 11.6 - 14.4 %    Sodium (External) 135 (L) 136 - 145 mmol/L    Potassium (External) 3.9 3.5 - 5.1 mmol/L    Chloride (External) 98 98 - 107 mmol/L    CO2 (External) 23 21 - 32 mmol/L    Calcium (External) 10.0 8.5 - 10.1 mg/dL    Glucose (External) 147 (H) 74 - 100 mg/dL    Urea Nitrogen (External) 61 (H) 7 - 18 mg/dL    Creatinine (External) 3.02 (H) 0.67 - 1.17 mg/dL    GFR Estimated (External) 21 (L) 60 - 150 ml/min/1.73 m(2)   Cyclosporine    Collection Time: 07/26/17 10:25 AM   Result Value Ref Range    Cyclosporine Last Dose 07/25/17 2225     Cyclosporine Level 70 50 - 400 ug/L   Routine UA with microscopic [KLG7292]    Collection Time: 08/03/17  2:08 PM   Result Value Ref Range    Color Urine Yellow     Appearance Urine Clear     Glucose Urine Negative NEG mg/dL    Bilirubin Urine Negative NEG    Ketones Urine Negative NEG mg/dL    Specific Gravity Urine 1.012 1.003 - 1.035    Blood Urine Negative NEG    pH Urine 5.0 5.0 - 7.0 pH    Protein Albumin Urine >499 (A) NEG mg/dL    Urobilinogen mg/dL 0.0 0.0 - 2.0 mg/dL    Nitrite Urine Negative NEG    Leukocyte Esterase Urine Small (A) NEG    Source Midstream Urine     WBC Urine 11 (H) 0 - 2 /HPF    RBC Urine 2 0 - 2 /HPF    Squamous Epithelial /HPF Urine <1 0 - 1 /HPF   Lipid Profile [LAB18]    Collection Time: 08/03/17  2:27 PM   Result Value Ref Range    Cholesterol 210 (H) <200 mg/dL    Triglycerides 815 (H) <150 mg/dL    HDL Cholesterol 34 (L) >39 mg/dL    LDL Cholesterol Calculated  <100 mg/dL     Cannot estimate LDL when triglyceride exceeds 400 mg/dL    Non HDL Cholesterol 176 (H) <130 mg/dL   Comprehensive metabolic panel [LAB17]    Collection Time: 08/03/17  2:27 PM   Result Value Ref Range    Sodium 134 133 - 144 mmol/L    Potassium 4.3  3.4 - 5.3 mmol/L    Chloride 99 94 - 109 mmol/L    Carbon Dioxide 22 20 - 32 mmol/L    Anion Gap 13 3 - 14 mmol/L    Glucose 106 (H) 70 - 99 mg/dL    Urea Nitrogen 58 (H) 7 - 30 mg/dL    Creatinine 3.16 (H) 0.66 - 1.25 mg/dL    GFR Estimate 20 (L) >60 mL/min/1.7m2    GFR Estimate If Black 24 (L) >60 mL/min/1.7m2    Calcium 9.8 8.5 - 10.1 mg/dL    Bilirubin Total 0.3 0.2 - 1.3 mg/dL    Albumin 3.8 3.4 - 5.0 g/dL    Protein Total 7.8 6.8 - 8.8 g/dL    Alkaline Phosphatase 84 40 - 150 U/L    ALT 22 0 - 70 U/L    AST 16 0 - 45 U/L   Phosphorus    Collection Time: 08/03/17  2:27 PM   Result Value Ref Range    Phosphorus 3.2 2.5 - 4.5 mg/dL   Prostate spec antigen screen [ZTR8489]    Collection Time: 08/03/17  2:27 PM   Result Value Ref Range    PSA 1.80 0 - 4 ug/L   INR [USW7616]    Collection Time: 08/03/17  2:27 PM   Result Value Ref Range    INR 0.99 0.86 - 1.14   Partial thromboplastin time [LAB56]    Collection Time: 08/03/17  2:27 PM   Result Value Ref Range    PTT 28 22 - 37 sec   CBC with platelets differential [ORG684]    Collection Time: 08/03/17  2:27 PM   Result Value Ref Range    WBC 8.4 4.0 - 11.0 10e9/L    RBC Count 3.09 (L) 4.4 - 5.9 10e12/L    Hemoglobin 11.1 (L) 13.3 - 17.7 g/dL    Hematocrit 30.6 (L) 40.0 - 53.0 %    MCV 99 78 - 100 fl    MCH 35.9 (H) 26.5 - 33.0 pg    MCHC 36.3 31.5 - 36.5 g/dL    RDW 12.4 10.0 - 15.0 %    Platelet Count 141 (L) 150 - 450 10e9/L    Diff Method Automated Method     % Neutrophils 69.9 %    % Lymphocytes 16.8 %    % Monocytes 9.4 %    % Eosinophils 2.0 %    % Basophils 0.7 %    % Immature Granulocytes 1.2 %    Nucleated RBCs 0 0 /100    Absolute Neutrophil 5.9 1.6 - 8.3 10e9/L    Absolute Lymphocytes 1.4 0.8 - 5.3 10e9/L    Absolute Monocytes 0.8 0.0 - 1.3 10e9/L    Absolute Eosinophils 0.2 0.0 - 0.7 10e9/L    Absolute Basophils 0.1 0.0 - 0.2 10e9/L    Abs Immature Granulocytes 0.1 0 - 0.4 10e9/L    Absolute Nucleated RBC 0.0    ABO/Rh type and screen [LHG151]     Collection Time: 08/03/17  2:28 PM   Result Value Ref Range    ABO Pending     RH(D) Pending     Antibody Screen Pending     Test Valid Only At       [facility]    Specimen Expires 08/06/2017

## 2017-08-03 NOTE — NURSING NOTE
"Chief Complaint   Patient presents with     RECHECK     Follow up kidney transplant       Initial /74  Pulse 75  Temp 97.5  F (36.4  C) (Oral)  Ht 1.854 m (6' 1\")  Wt 111.4 kg (245 lb 9.6 oz)  SpO2 96%  BMI 32.4 kg/m2 Estimated body mass index is 32.4 kg/(m^2) as calculated from the following:    Height as of this encounter: 1.854 m (6' 1\").    Weight as of this encounter: 111.4 kg (245 lb 9.6 oz).  Medication Reconciliation: complete   Rebecca Wong., CMA    "

## 2017-08-03 NOTE — MR AVS SNAPSHOT
After Visit Summary   8/3/2017    Rory Bustamante    MRN: [id number]           Patient Information     Date Of Birth          1955        Visit Information        Provider Department      8/3/2017 3:00 PM Christi Sun MD [facility]        Today's Diagnoses     S/P kidney transplant    -  1    Isolated proteinuria with other morphologic lesion          Care Instructions    1.  Triglyceride level is high.  May need to restart gemfibrozil.  Weight loss recommended  2.  Transplant evaluation is scheduled for Aug 21  3.  Low salt diet  4.  Continue to monitor BP  5. Will discuss fistula placement in August  6.  If swleling increases can add diuretic          Follow-ups after your visit        Follow-up notes from your care team     Return in about 6 months (around 2/3/2018).      Your next 10 appointments already scheduled     Aug 21, 2017  7:00 AM CDT   US AORTIC IMAGING with [email or web address]   [facility] US ([facility])    [address] [place] [id number] [phone]           Please bring a list of your medicines (including vitamins, minerals and over-the-counter drugs). Also, tell your doctor about any allergies you may have. Wear comfortable clothes and leave your valuables at home.  Adults: No eating or drinking for 8 hours before the exam. You may take medicine with a small sip of water.  Children: - Children 6+ years: No food or drink for 6 hours before exam. - Children 1-5 years: No food or drink for 4 hours before exam. - Infants, breast-fed: may have breast milk up to 2 hours before exam. - Infants, formula: may have bottle until 4 hours before exam.  Please call the Imaging Department at your exam site with any questions.            Aug 21, 2017  1:30 PM CDT   (Arrive by 1:15 PM)   XR CHEST 2 VIEWS with [id number]   [facility] Xray ([facility])    [address] [place] [address]  [place] [id number] [phone]           Please bring a list of your current medicines to your exam. (Include vitamins, minerals and over-thecounter medicines.) Leave your valuables at home.  Tell your doctor if there is a chance you may be pregnant.  You do not need to do anything special for this exam.            Feb 06, 2018 12:30 PM CST   Lab with  LAB   [facility] ([facility])    [address]  1st Floor  [facility] [id number] [phone]            Feb 06, 2018  1:30 PM CST   (Arrive by 1:00 PM)   Return Visit with Christi Sun MD   [facility] ([facility])    [address]  3rd Floor  [facility] [address] [phone]              Who to contact     If you have questions or need follow up information about today's clinic visit or your schedule please contact [facility] directly at [phone].  Normal or non-critical lab and imaging results will be communicated to you by [employer], letter or phone within 4 business days after the clinic has received the results. If you do not hear from us within 7 days, please contact the clinic through [employer] or phone. If you have a critical or abnormal lab result, we will notify you by phone as soon as possible.  Submit refill requests through [employer] or call your pharmacy and they will forward the refill request to us. Please allow 3 business days for your refill to be completed.          Additional Information About Your Visit        [employer] Information     [employer] gives you secure access to your electronic health record. If you see a primary care provider, you can also send messages to your care team and make appointments. If you have questions, please call your primary care clinic.  If you do not have a primary care provider, please call [phone] and they will assist you.        Care EveryWhere ID     This is your Care EveryWhere ID. This could be used by other  "organizations to access your [place] medical records  [id number]        Your Vitals Were     Pulse Temperature Height Pulse Oximetry BMI (Body Mass Index)       75 97.5  F (36.4  C) (Oral) 1.854 m (6' 1\") 96% 32.4 kg/m2        Blood Pressure from Last 3 Encounters:   08/03/17 132/74   06/06/17 124/74   05/30/17 141/80    Weight from Last 3 Encounters:   08/03/17 111.4 kg (245 lb 9.6 oz)   07/24/17 112.8 kg (248 lb 11.2 oz)   05/30/17 109.7 kg (241 lb 12.8 oz)              We Performed the Following     Immunofix and Prot Elect R Ur ([facility])     SERUM PROTEIN IMMUNO ELP        Primary Care Provider Office Phone # Fax #    Moris Diaz [phone]       [facility] [address]  [facility] [address]        Equal Access to Services     [facility]: Hadii aad ku hadasho Soomaali, waaxda luqadaha, qaybta kaalmada adeegyada, waxay idiin hayaan adeeg kharash la'nickn . So [facility] [place] [phone].    ATENCIÓN: Si habla español, tiene a carr disposición servicios gratuitos de asistencia lingüística. Llame al [phone].    We comply with applicable federal civil rights laws and [place] laws. We do not discriminate on the basis of race, color, national origin, age, disability sex, sexual orientation or gender identity.            Thank you!     Thank you for choosing [facility] NEPHROLOG[facility]  for your care. Our goal is always to provide you with excellent care. Hearing back from our patients is one way we can continue to improve our services. Please take a few minutes to complete the written survey that you may receive in the mail after your visit with us. Thank you!             Your Updated Medication List - Protect others around you: Learn how to safely use, store and throw away your medicines at www.disposemymeds.org.          This list is accurate as of: 8/3/17  4:41 PM.  Always use your most recent med list.                   Brand Name Dispense Instructions for use Diagnosis    " allopurinol 100 MG tablet    ZYLOPRIM    180 tablet    Take 2 tablets by mouth daily.    Kidney replaced by transplant, Gout       ALPHAGAN OP      2 drops daily        BABY ASPIRIN PO      Take 81 mg by mouth daily        BEVACizumab 400 MG/16ML injection    AVASTIN          cholecalciferol 5000 UNITS Tabs tablet    vitamin D3    30 tablet    Take 1 tablet (5,000 Units) by mouth daily    Hyperparathyroidism due to renal insufficiency (H)       CRESTOR 5 MG tablet   Generic drug:  rosuvastatin      Take 10 mg by mouth daily        * cycloSPORINE modified 100 MG capsule    GENERIC EQUIVALENT    60 capsule    Take 1 capsule (100 mg) by mouth 2 times daily (total dose 125 mg twice daily)    Kidney transplant recipient, Long-term use of immunosuppressant medication       * cycloSPORINE modified 25 MG capsule    GENERIC EQUIVALENT    60 capsule    Take 1 capsule (25 mg) by mouth 2 times daily (total dose 125 mg twice daily)    Kidney transplant recipient, Long-term use of immunosuppressant medication       ISOSORBIDE MONONITRATE PO      Take 60 mg by mouth daily        losartan 50 MG tablet    COZAAR    90 tablet    Take 1 tablet (50 mg) by mouth daily    Kidney replaced by transplant       metoprolol 100 MG tablet    LOPRESSOR    60 tablet    Take 1 tablet by mouth 2 times daily.    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    Hyp kid NOS w cr kid V       NITROSTAT 0.4 MG sublingual tablet   Generic drug:  nitroGLYcerin      Place 0.4 mg under the tongue every 5 minutes as needed.        NONFORMULARY      Focus Macula Pro:  Eye vitamin and mineral supplement A, C, E, Zinc, Copper        PLAVIX 75 MG tablet   Generic drug:  clopidogrel      Take 75 mg by mouth daily.        predniSONE 5 MG tablet    DELTASONE     Take 5 mg by mouth daily.        probenecid 500 MG tablet    BENEMID     Take 500 mg  by mouth 2 times daily.        sulfamethoxazole-trimethoprim 400-80 MG per tablet    BACTRIM/SEPTRA     Take 1 tablet by mouth daily Take on every other day        TAMSULOSIN HCL PO      Take 0.4 mg by mouth daily        timolol 0.25 % ophthalmic solution    TIMOPTIC     2 times daily.        TYLENOL ARTHRITIS PAIN 650 MG CR tablet   Generic drug:  acetaminophen      Take 2 tablets by mouth 3 times daily.        * Notice:  This list has 2 medication(s) that are the same as other medications prescribed for you. Read the directions carefully, and ask your doctor or other care provider to review them with you.

## 2017-08-04 DIAGNOSIS — Z94.0 STATUS POST KIDNEY TRANSPLANT: Primary | ICD-10-CM

## 2017-08-04 LAB
BKV DNA # SPEC NAA+PROBE: NORMAL COPIES/ML
BKV DNA SPEC NAA+PROBE-LOG#: NORMAL LOG COPIES/ML
BLD GP AB SCN TITR SERPL: NORMAL {TITER}
CARDIOLIPIN ANTIBODY IGG: NORMAL GPL-U/ML (ref 0–19.9)
CARDIOLIPIN ANTIBODY IGM: 1.1 MPL-U/ML (ref 0–19.9)
CMV IGG SERPL QL IA: 0.9 AI (ref 0–0.8)
EBV VCA IGG SER QL IA: ABNORMAL AI (ref 0–0.8)
KAPPA LC UR-MCNC: 4.88 MG/DL (ref 0.33–1.94)
KAPPA LC/LAMBDA SER: 1.15 {RATIO} (ref 0.26–1.65)
LAMBDA LC SERPL-MCNC: 4.23 MG/DL (ref 0.57–2.63)
PRA SINGLE ANTIGEN IGG ANTIBODY: NORMAL
PROT ELPH PNL UR ELPH: NORMAL
SPECIMEN SOURCE: NORMAL
T PALLIDUM IGG+IGM SER QL: NEGATIVE
THROMBIN TIME: 18.1 SEC (ref 13–19)
VZV IGG SER QL IA: ABNORMAL AI (ref 0–0.8)

## 2017-08-06 LAB
IGA SERPL-MCNC: 327 MG/DL (ref 70–380)
IGG SERPL-MCNC: 1080 MG/DL (ref 695–1620)
IGM SERPL-MCNC: 73 MG/DL (ref 60–265)
IMMUNOFIXATION ELP: NORMAL

## 2017-08-07 LAB
LA PPP-IMP: ABNORMAL
M TB TUBERC IFN-G BLD QL: NEGATIVE
M TB TUBERC IFN-G/MITOGEN IGNF BLD: 0 IU/ML

## 2017-08-08 LAB — COPATH REPORT: NORMAL

## 2017-08-16 DIAGNOSIS — N18.4 CKD (CHRONIC KIDNEY DISEASE) STAGE 4, GFR 15-29 ML/MIN (H): Primary | ICD-10-CM

## 2017-08-18 ENCOUNTER — TELEPHONE (OUTPATIENT)
Dept: TRANSPLANT | Facility: CLINIC | Age: 62
End: 2017-08-18

## 2017-08-21 ENCOUNTER — APPOINTMENT (OUTPATIENT)
Dept: TRANSPLANT | Facility: CLINIC | Age: 62
End: 2017-08-21
Attending: INTERNAL MEDICINE
Payer: MEDICARE

## 2017-08-21 ENCOUNTER — DOCUMENTATION ONLY (OUTPATIENT)
Dept: TRANSPLANT | Facility: CLINIC | Age: 62
End: 2017-08-21

## 2017-08-21 ENCOUNTER — OFFICE VISIT (OUTPATIENT)
Dept: TRANSPLANT | Facility: CLINIC | Age: 62
End: 2017-08-21
Attending: SURGERY
Payer: MEDICARE

## 2017-08-21 ENCOUNTER — OFFICE VISIT (OUTPATIENT)
Dept: HEMATOLOGY | Facility: CLINIC | Age: 62
End: 2017-08-21
Attending: PHYSICIAN ASSISTANT
Payer: MEDICARE

## 2017-08-21 VITALS
SYSTOLIC BLOOD PRESSURE: 171 MMHG | DIASTOLIC BLOOD PRESSURE: 93 MMHG | BODY MASS INDEX: 32.6 KG/M2 | HEART RATE: 79 BPM | WEIGHT: 246 LBS | HEIGHT: 73 IN | TEMPERATURE: 98 F

## 2017-08-21 VITALS
SYSTOLIC BLOOD PRESSURE: 171 MMHG | OXYGEN SATURATION: 97 % | DIASTOLIC BLOOD PRESSURE: 93 MMHG | HEIGHT: 73 IN | RESPIRATION RATE: 16 BRPM | HEART RATE: 79 BPM | TEMPERATURE: 98 F | BODY MASS INDEX: 32.72 KG/M2 | WEIGHT: 246.9 LBS

## 2017-08-21 DIAGNOSIS — R80.9 PROTEINURIA: ICD-10-CM

## 2017-08-21 DIAGNOSIS — Z76.82 ORGAN TRANSPLANT CANDIDATE: Primary | ICD-10-CM

## 2017-08-21 DIAGNOSIS — Z94.0 S/P KIDNEY TRANSPLANT: ICD-10-CM

## 2017-08-21 DIAGNOSIS — N18.4 CKD (CHRONIC KIDNEY DISEASE) STAGE 4, GFR 15-29 ML/MIN (H): ICD-10-CM

## 2017-08-21 DIAGNOSIS — R76.0 LUPUS ANTICOAGULANT POSITIVE: Primary | ICD-10-CM

## 2017-08-21 DIAGNOSIS — Z94.0 STATUS POST KIDNEY TRANSPLANT: Primary | ICD-10-CM

## 2017-08-21 DIAGNOSIS — N18.9 CHRONIC KIDNEY DISEASE, UNSPECIFIED CKD STAGE: ICD-10-CM

## 2017-08-21 LAB
CHOLEST SERPL-MCNC: 208 MG/DL
HDLC SERPL-MCNC: 28 MG/DL
LDLC SERPL CALC-MCNC: ABNORMAL MG/DL
LDLC SERPL DIRECT ASSAY-MCNC: 62 MG/DL
NONHDLC SERPL-MCNC: 180 MG/DL
TRIGL SERPL-MCNC: 838 MG/DL

## 2017-08-21 PROCEDURE — 83883 ASSAY NEPHELOMETRY NOT SPEC: CPT | Performed by: INTERNAL MEDICINE

## 2017-08-21 PROCEDURE — 82784 ASSAY IGA/IGD/IGG/IGM EACH: CPT | Performed by: INTERNAL MEDICINE

## 2017-08-21 PROCEDURE — 86334 IMMUNOFIX E-PHORESIS SERUM: CPT | Performed by: INTERNAL MEDICINE

## 2017-08-21 PROCEDURE — 99213 OFFICE O/P EST LOW 20 MIN: CPT

## 2017-08-21 PROCEDURE — 84166 PROTEIN E-PHORESIS/URINE/CSF: CPT | Performed by: INTERNAL MEDICINE

## 2017-08-21 PROCEDURE — 83721 ASSAY OF BLOOD LIPOPROTEIN: CPT | Performed by: INTERNAL MEDICINE

## 2017-08-21 PROCEDURE — 86335 IMMUNFIX E-PHORSIS/URINE/CSF: CPT | Performed by: INTERNAL MEDICINE

## 2017-08-21 PROCEDURE — 99213 OFFICE O/P EST LOW 20 MIN: CPT | Performed by: PHYSICIAN ASSISTANT

## 2017-08-21 PROCEDURE — 36415 COLL VENOUS BLD VENIPUNCTURE: CPT | Performed by: INTERNAL MEDICINE

## 2017-08-21 PROCEDURE — 99207 ZZC CONSULT E&M CHANGED TO SUBSEQUENT LEVEL: CPT | Performed by: PHYSICIAN ASSISTANT

## 2017-08-21 PROCEDURE — 80061 LIPID PANEL: CPT | Performed by: INTERNAL MEDICINE

## 2017-08-21 ASSESSMENT — PAIN SCALES - GENERAL
PAINLEVEL: NO PAIN (0)
PAINLEVEL: NO PAIN (0)

## 2017-08-21 NOTE — PROGRESS NOTES
Rory attended the Pre Kidney Transplant Patient Education class today with his wife.  They were very attentive.  Rory shared his story; that he needed re-transplant after having his kidney for 28 years.  I introduced the [facility] website and encouraged them to access it for additional education.  In addition to the standard video content I reviewed living donation, paired exchange, KDPI, typical length of stay, the need to stay locally post-transplant discharge and I described typical post-transplant surveillance routines.  They expressed understanding of the information presented.

## 2017-08-21 NOTE — PROGRESS NOTES
[facility]  [address] [place] [address] [address], [address] [address]  Phone: [phone], Fax: [phone].    Patient seen at: [facility]    Outpatient Visit Note:    Patient: Rory Bustamante  MRN: [id number]  : 1955  RENE: 2017    Reason:  History of living donor renal transplant back in the . Now with worsening renal function and being evaluated for a second renal transplant. Here for evaluation of recently incidentally found positive Lupus anticoagulant testing.     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 Sun, Nephrology for consultation in regard to recent incidental finding of positive Lupus anticoagulant during his routine pre-transplant workup.     Rory and his wife reports to me that he has had no history of DVT/PE. He did have a left upper extremity fistula many years ago prior to his renal transplant for dialysis. He does have CAD S/P stent placement a few years ago for which he has been on Plavix. He denies any history of arterial thrombosis / CVA    During his recent routine pre-transplant evaluation and lab testing, his Lupus anticoagulant came back positive. Hence he is referred to our center for pre-transplant hematological consultation.     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  HLA identical transplant.  Biopsy in 4/2008 with recurrent MPGN type 2, mild interstitial fibrosis and tubular atrophy, CNI toxicity     Spinal stenosis 2013     Warts        Past Surgical History:  Past Surgical History:   Procedure Laterality Date     C TOTAL KNEE ARTHROPLASTY  2015     TRANSPLANT KIDNEY RECIPIENT LIVING RELATED  1989    HLA identical       Medications:  Current Outpatient Prescriptions   Medication Sig Dispense Refill     timolol (TIMOPTIC) 0.25 % ophthalmic solution 2 times daily.       BEVACizumab (AVASTIN) 400 MG/16ML injection        losartan (COZAAR) 50 MG tablet Take 1 tablet (50 mg) by mouth daily 90 tablet 3     cycloSPORINE modified (GENERIC EQUIVALENT) 100 MG capsule Take 1 capsule (100 mg) by mouth 2 times daily (total dose 125 mg twice daily) 60 capsule 11     cycloSPORINE modified (GENERIC EQUIVALENT) 25 MG capsule Take 1 capsule (25 mg) by mouth 2 times daily (total dose 125 mg twice daily) 60 capsule 11     cholecalciferol (VITAMIN D3) 5000 UNITS TABS tablet Take 1 tablet (5,000 Units) by mouth daily 30 tablet 0     BABY ASPIRIN PO Take 81 mg by mouth daily       NONFORMULARY Focus Macula Pro:  Eye vitamin and mineral supplement A, C, E, Zinc, Copper       ISOSORBIDE MONONITRATE PO Take 60 mg by mouth daily        TAMSULOSIN HCL PO Take 0.4 mg by mouth daily        allopurinol (ZYLOPRIM) 100 MG tablet Take 2 tablets by mouth daily. 180 tablet 3     NIFEdipine osmotic (NIFEDICAL XL) 30 MG 24 hr tablet Take 2 tablets by mouth daily. (Patient taking differently: Take 90 mg by mouth daily ) 180 tablet 4     metoprolol (LOPRESSOR) 100 MG tablet Take 1 tablet by mouth 2 times daily. 60 tablet 6     rosuvastatin (CRESTOR) 5 MG tablet Take 20 mg by mouth daily        nitroGLYCERIN (NITROSTAT) 0.4 MG SL tablet Place 0.4 mg under the tongue every 5 minutes as needed.       Brimonidine Tartrate (ALPHAGAN OP) 2 drops daily       clopidogrel (PLAVIX) 75 MG  tablet Take 75 mg by mouth daily.       predniSONE (DELTASONE) 5 MG tablet Take 5 mg by mouth daily.       probenecid (BENEMID) 500 MG tablet Take 500 mg by mouth 2 times daily.       sulfamethoxazole-trimethoprim (BACTRIM,SEPTRA) 400-80 MG per tablet Take 1 tablet by mouth daily Take on every other day       acetaminophen (TYLENOL ARTHRITIS PAIN) 650 MG CR tablet Take 2 tablets by mouth 3 times daily.          Allergies:  Allergies   Allergen Reactions     Contrast Dye      Simvastatin Muscle Pain (Myalgia)       ROS:  Denies any bleeding issues. No gum bleeding, No nose bleed. Denies any hematuria or blood in stools. Denies any ecchymosis. Denies any fever, no chest pain. Denies any shortness of breath. Some bilateral lower extremity swelling and reported claudications.    Social History:  Denies any tobacco use. No significant alcohol use. Denies any illicit drug use.     Family History:  Denies any family history of DVT/PE.    Objectives:  Pleasant 61 year old male in no acute distress.  Vitals: B/P: 171/93, T: 98, P: 79, Wt: 246 lbs 0 oz  Exam:   Complete exam is not performed today.    Labs:  Component      Latest Ref Rng & Units 8/3/2017   Cardiolipin Antibody IgG      0.0 - 19.9 GPL-U/mL <1.6 . . .   Cardiolipin Antibody IgM      0.0 - 19.9 MPL-U/mL 1.1   INR      0.86 - 1.14 0.99   PTT      22 - 37 sec 28   Thrombin Time      13.0 - 19.0 sec 18.1   Lupus Result      NEG Positive (A) . . .   Factor V Leiden Mutation       Negative   Prothrombin Gene Mutation Negative       Assessment / Plan:  In summary, Rory is a 61 year old male who is being evaluated for his second renal transplant, who is incidentally found to have positive Lupus Anticoagulant during his pre-transplant evaluation referred to this clinic for pre-transplant hematological consultation. As mentioned above, Rory has had no history of DVT/PE. This positive Lupus anticoagulant is an incidental findings with no pathology to support the diagnosis  of Antiphospholipid Antibody Syndrome (APLS).     I explain to both Rory and his wife that this isolated positive Lupus anticoagulant testing is basically not clinically significant. Hence no specific hematological treatments are necessary or indicated. For his renal transplant, routine standard DVT/PE prophylaxis is recommended. I would also NOT recommend repeating this testing as the recommendation would not change since this patient has had no history of pathological findings to be concern about the diagnosis of APLS.     Diagnosis:  1. Isolated Positive Lupus Anticoagulant. With no history of DVT/PE or arterial thrombosis, this is as of no clinical significance.     The patient is given our center's contact information and is instructed to call if he should have any further questions or concerns.  Otherwise, I have no plans to see this patient back on a routine basis.     Richa Jimenez, nurse clinician is present throughout the entire clinic visit with the patient today.  Patient understands and agrees with the above plan and recommendation.    Total Time Spent:  23 minutes, all 23 minutes was spent on face-to-face consultation of the patient and coordination of care in regard to an isolated positive Lupus Anticoagulant testing.     Time IN: 15:43  Time OUT: 16:06      Gunner Daniel PA-C, MPAS  Physician Assistant  [facility].

## 2017-08-21 NOTE — NURSING NOTE
Present with Rory and his wife during entire visit with provider.  Rory and his wife say there has been no DVT history, so the isolated lupus anticoagulant testing is not of concern. No real follow up needed with out team, but they were given our contact card in case they have future questions. Richa Jimenez, RN - Nurse Clinician - [facility] - [phone]

## 2017-08-21 NOTE — PROGRESS NOTES
NUTRITION KIDNEY TRANSPLANT EVALUATION  Medical Nutrition Therapy    Weight and BMI:  Current BMI: 32.5  BMI is within criteria of <35 for kidney transplant    Malnutrition Status:    Pt does not meet malnutrition criteria       Visit Type: F/U Assessment    Rory Bustamante referred by Dr. Chu for MNT related to kidney transplant evaluation    Patient accompanied by his wife, Kajal    H/o previous txp: kidney 1989    Nutrition Assessment:  Anthropometrics  Height:   73 in   BMI:    32.57    Weight Status:Obesity Grade I BMI 30-34.9   Weight:  246 lbs            IBW (lb): 184  % IBW: 134    Wt Hx: No changes in weight    Adj/dosing BW: 200 lbs/91 kg       Recent Labs   Lab Test  08/21/17   0653  08/03/17   1427   CHOL  208*  210*   HDL  28*  34*   LDL  Cannot estimate LDL when triglyceride exceeds 400 mg/dL  Cannot estimate LDL when triglyceride exceeds 400 mg/dL   TRIG  838*  815*     Lab Results   Component Value Date    A1C 5.5 06/07/2011     Potassium   Date Value Ref Range Status   08/03/2017 4.3 3.4 - 5.3 mmol/L Final     Phosphorus: 3.2 on 8/3      Vitamins, Supplements, Herbals, Pertinent Meds:   Vit D, eye vitamin    Dialysis Modality: None    Nutrition History  Pt-reported special diets/eating habits: renal diet   I saw pt 1 month ago for renal diet education. We did not review diet recall again today.     Physical Activity  Did buy a bike after last visit, but reports that if he bikes in the morning, his legs become so fatigued and it is hard to walk later in the day. He overall has difficulty trying to figure out when would be best to bike, coupled with his leg fatigue, so unsure if biking at night would be better.      MALNUTRITION  % Intake:  No decreased intake noted  % Weight Loss:  None noted  Subcutaneous Fat Loss:  None  Muscle Loss:  None  Fluid Accumulation/Edema:  None noted  Malnutrition Diagnosis: Patient does not meet two of the above criteria necessary for diagnosing malnutrition     Nutrition  Prescription  Energy:  2275     (25 kcal/kg for maintenance)     Protein:  55-73  (0.6-0.8 g/kg for CKD)           Fluid:  1 ml/kcal or per MD        Micronutrient:  Na+: <2000 mg/day  K+: 4853-5749 mg/day  Phos: 800-1000 mg/day          Nutrition Diagnosis:  Food and nutrition related knowledge deficit r/t pre kidney transplant eval AEB pt verbalized not hearing pre/post transplant diet guidelines.    Nutrition Intervention:  Nutrition education provided:  Discussed sodium intake (low sodium foods and drinks, seasoning food without salt and tips for low sodium diet). Reviewed recent WNL K+/Phos labs and that if these become elevated, he will need to monitor these things more in his diet. If he does start dialysis, his diet will likely need some alterations and his dialysis RD would provide education on that. Pt otherwise has no questions or concerns about his diet at this time since our last visit.     Reviewed post txp diet guidelines in brief (will review in further detail post txp):   (1) Review of proper food safety measures d/t immunosuppressant therapy post-op and increased risk for food-borne illness (2) Stressed importance of not taking any herbal/Chinese/alternative medicines or supplements post txp (d/t risk for rejection, unknown effects on the organs, potential interactions with immunosuppresants). (3) Med regimen and possible side effects    Patient Understanding: Pt verbalized understanding of education provided.  Expected Compliance: Good  Follow-Up Plans: PRN     Nutrition Goals:  Limit Na+ <2000mg/day    Provided pt with contact info.   Time spent with patient: 15 minutes.  Parisa Flower, RD, LD

## 2017-08-21 NOTE — MR AVS SNAPSHOT
After Visit Summary   8/21/2017    Rory Bustamante    MRN: [id number]           Patient Information     Date Of Birth          1955        Visit Information        Provider Department      8/21/2017 9:30 AM [facility] PKE PATIENT 3 [facility] Solid Organ Transplant        Today's Diagnoses     Organ transplant candidate    -  1       Follow-ups after your visit        Your next 10 appointments already scheduled     Aug 21, 2017  1:30 PM CDT   (Arrive by 1:15 PM)   XR CHEST 2 VIEWS with [id number]   [facility] Xray ([facility])    [address] [id number] [phone]           Please bring a list of your current medicines to your exam. (Include vitamins, minerals and over-thecounter medicines.) Leave your valuables at home.  Tell your doctor if there is a chance you may be pregnant.  You do not need to do anything special for this exam.            Aug 21, 2017  2:00 PM CDT   US UPPER EXTREMITY VENOUS MAPPING BILATERAL with [id number]   [facility] US ([facility])    [address] [address] [phone]           Please bring a list of your medicines (including vitamins, minerals and over-the-counter drugs). Also, tell your doctor about any allergies you may have. Wear comfortable clothes and leave your valuables at home.  You do not need to do anything special to prepare for your exam.  Please call the Imaging Department at your exam site with any questions.            Aug 21, 2017  3:30 PM CDT   (Arrive by 3:15 PM)   NEW CLOTTING DISORDER with Gunner Daniel PA-C   [facility] Ble[facility] Clotting ([facility])    [address] [id number] [phone]            Feb 06, 2018 12:30 PM CST   Lab with  LAB   [facility] Lab ([facility])    [address] [address]  "  [phone]            Feb 06, 2018  1:30 PM CST   (Arrive by 1:00 PM)   Return Visit with Christi Sun MD   [facility] Nephrology ([facility])    [address] [address] [phone]              Who to contact     If you have questions or need follow up information about today's clinic visit or your schedule please contact [facility] directly at [phone].  Normal or non-critical lab and imaging results will be communicated to you by [employer], letter or phone within 4 business days after the clinic has received the results. If you do not hear from us within 7 days, please contact the clinic through [employer] or phone. If you have a critical or abnormal lab result, we will notify you by phone as soon as possible.  Submit refill requests through [employer] or call your pharmacy and they will forward the refill request to us. Please allow 3 business days for your refill to be completed.          Additional Information About Your Visit        [employer] Information     [employer] gives you secure access to your electronic health record. If you see a primary care provider, you can also send messages to your care team and make appointments. If you have questions, please call your primary care clinic.  If you do not have a primary care provider, please call [phone] and they will assist you.        Care EveryWhere ID     This is your Care EveryWhere ID. This could be used by other organizations to access your [facility] medical records  [id number]        Your Vitals Were     Pulse Temperature Respirations Height Pulse Oximetry BMI (Body Mass Index)    79 98  F (36.7  C) (Oral) 16 1.854 m (6' 1\") 97% 32.57 kg/m2       Blood Pressure from Last 3 Encounters:   08/21/17 (!) 171/93   08/03/17 132/74   06/06/17 124/74    Weight from Last 3 Encounters:   08/21/17 112 kg (246 lb 14.4 oz)   08/03/17 111.4 kg (245 lb 9.6 oz)   07/24/17 112.8 kg (248 " lb 11.2 oz)              Today, you had the following     No orders found for display         Today's Medication Changes          These changes are accurate as of: 8/21/17 11:26 AM.  If you have any questions, ask your nurse or doctor.               These medicines have changed or have updated prescriptions.        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   Quantity:  180 tablet   Refills:  4                Primary Care Provider Office Phone # Fax #    Moris Diaz [phone]       [facility] [address] [facility] [address]  [facility][note 3] [id number]        Equal Access to Services     FIDELIA [facility] : Hadii av simpson hadasho Soomaali, waaxda luqadaha, qaybta kaalmada adeegyada, devin perez. So [facility] [place] [facility] [phone].    ATENCIÓN: Si habla español, tiene a carr disposición servicios gratuitos de asistencia lingüística. Llame al [phone].    We comply with applicable federal civil rights laws and [place] laws. We do not discriminate on the basis of race, color, national origin, age, disability sex, sexual orientation or gender identity.            Thank you!     Thank you for choosing [facility]  for your care. Our goal is always to provide you with excellent care. Hearing back from our patients is one way we can continue to improve our services. Please take a few minutes to complete the written survey that you may receive in the mail after your visit with us. Thank you!             Your Updated Medication List - Protect others around you: Learn how to safely use, store and throw away your medicines at www.disposemymeds.org.          This list is accurate as of: 8/21/17 11:26 AM.  Always use your most recent med list.                   Brand Name Dispense Instructions for  use Diagnosis    allopurinol 100 MG tablet    ZYLOPRIM    180 tablet    Take 2 tablets by mouth daily.    Kidney replaced by transplant, Gout       ALPHAGAN OP      2 drops daily        BABY ASPIRIN PO      Take 81 mg by mouth daily        BEVACizumab 400 MG/16ML injection    AVASTIN          cholecalciferol 5000 UNITS Tabs tablet    vitamin D3    30 tablet    Take 1 tablet (5,000 Units) by mouth daily    Hyperparathyroidism due to renal insufficiency (H)       CRESTOR 5 MG tablet   Generic drug:  rosuvastatin      Take 20 mg by mouth daily        * cycloSPORINE modified 100 MG capsule    GENERIC EQUIVALENT    60 capsule    Take 1 capsule (100 mg) by mouth 2 times daily (total dose 125 mg twice daily)    Kidney transplant recipient, Long-term use of immunosuppressant medication       * cycloSPORINE modified 25 MG capsule    GENERIC EQUIVALENT    60 capsule    Take 1 capsule (25 mg) by mouth 2 times daily (total dose 125 mg twice daily)    Kidney transplant recipient, Long-term use of immunosuppressant medication       ISOSORBIDE MONONITRATE PO      Take 60 mg by mouth daily        losartan 50 MG tablet    COZAAR    90 tablet    Take 1 tablet (50 mg) by mouth daily    Kidney replaced by transplant       metoprolol 100 MG tablet    LOPRESSOR    60 tablet    Take 1 tablet by mouth 2 times daily.    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    Hyp kid NOS w cr kid V       NITROSTAT 0.4 MG sublingual tablet   Generic drug:  nitroGLYcerin      Place 0.4 mg under the tongue every 5 minutes as needed.        NONFORMULARY      Focus Macula Pro:  Eye vitamin and mineral supplement A, C, E, Zinc, Copper        PLAVIX 75 MG tablet   Generic drug:  clopidogrel      Take 75 mg by mouth daily.        predniSONE 5 MG tablet    DELTASONE     Take 5 mg by mouth daily.        probenecid 500 MG tablet    KIRK      Take 500 mg by mouth 2 times daily.        sulfamethoxazole-trimethoprim 400-80 MG per tablet    BACTRIM/SEPTRA     Take 1 tablet by mouth daily Take on every other day        TAMSULOSIN HCL PO      Take 0.4 mg by mouth daily        timolol 0.25 % ophthalmic solution    TIMOPTIC     2 times daily.        TYLENOL ARTHRITIS PAIN 650 MG CR tablet   Generic drug:  acetaminophen      Take 2 tablets by mouth 3 times daily.        * Notice:  This list has 2 medication(s) that are the same as other medications prescribed for you. Read the directions carefully, and ask your doctor or other care provider to review them with you.

## 2017-08-21 NOTE — MR AVS SNAPSHOT
After Visit Summary   8/21/2017    Rory Bustamante    MRN: [id number]           Patient Information     Date Of Birth          1955        Visit Information        Provider Department      8/21/2017 11:15 AM Ibeth Novak, CH[facility] Transplant        Today's Diagnoses     Organ transplant candidate    -  1       Follow-ups after your visit        Your next 10 appointments already scheduled     Feb 06, 2018 12:30 PM CST   Lab with  LAB   [facility] ([facility])    [address]  1st Floor  [place] [address] [phone]            Feb 06, 2018  1:30 PM CST   (Arrive by 1:00 PM)   Return Visit with Christi Sun MD   [facility] ([facility])    [address]  3rd [address]  [place] [address] [phone]              Who to contact     If you have questions or need follow up information about today's clinic visit or your schedule please contact [facility] directly at [phone].  Normal or non-critical lab and imaging results will be communicated to you by [employer], letter or phone within 4 business days after the clinic has received the results. If you do not hear from us within 7 days, please contact the clinic through [employer] or phone. If you have a critical or abnormal lab result, we will notify you by phone as soon as possible.  Submit refill requests through [employer] or call your pharmacy and they will forward the refill request to us. Please allow 3 business days for your refill to be completed.          Additional Information About Your Visit        [employer] Information     [employer] gives you secure access to your electronic health record. If you see a primary care provider, you can also send messages to your care team and make appointments. If you have questions, please call your primary care clinic.  If you do not have a primary care provider, please call  [phone] and they will assist you.        Care EveryWhere ID     This is your Care EveryWhere ID. This could be used by other organizations to access your [place] medical records  [id number]         Blood Pressure from Last 3 Encounters:   08/21/17 (!) 171/93   08/21/17 (!) 171/93 08/03/17 132/74    Weight from Last 3 Encounters:   08/21/17 111.6 kg (246 lb)   08/21/17 112 kg (246 lb 14.4 oz)   08/03/17 111.4 kg (245 lb 9.6 oz)              Today, you had the following     No orders found for display         Today's Medication Changes          These changes are accurate as of: 8/21/17 11:59 PM.  If you have any questions, ask your nurse or doctor.               These medicines have changed or have updated prescriptions.        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   Quantity:  180 tablet   Refills:  4                Primary Care Provider Office Phone # Fax #    Moris Diaz [phone]       [facility] [address]  [facility] [address]        Equal Access to Services     AZEEM JACOBSEN AH: Hadii av ku hadasho Soomaali, waaxda luqadaha, qaybta kaalmada adeegyada, waxay arsalan perez. So [facility] [phone].    ATENCIÓN: Si habla español, tiene a carr disposición servicios gratuitos de asistencia lingüística. Llame al [phone].    We comply with applicable federal civil rights laws and [place] laws. We do not discriminate on the basis of race, color, national origin, age, disability sex, sexual orientation or gender identity.            Thank you!     Thank you for choosing [facility]  for your care. Our goal is always to provide you with excellent care. Hearing back from our patients is one way we can continue to improve our services. Please  take a few minutes to complete the written survey that you may receive in the mail after your visit with us. Thank you!             Your Updated Medication List - Protect others around you: Learn how to safely use, store and throw away your medicines at www.disposemymeds.org.          This list is accurate as of: 8/21/17 11:59 PM.  Always use your most recent med list.                   Brand Name Dispense Instructions for use Diagnosis    allopurinol 100 MG tablet    ZYLOPRIM    180 tablet    Take 2 tablets by mouth daily.    Kidney replaced by transplant, Gout       ALPHAGAN OP      2 drops daily        BABY ASPIRIN PO      Take 81 mg by mouth daily        BEVACizumab 400 MG/16ML injection    AVASTIN          cholecalciferol 5000 UNITS Tabs tablet    vitamin D3    30 tablet    Take 1 tablet (5,000 Units) by mouth daily    Hyperparathyroidism due to renal insufficiency (H)       CRESTOR 5 MG tablet   Generic drug:  rosuvastatin      Take 20 mg by mouth daily        * cycloSPORINE modified 100 MG capsule    GENERIC EQUIVALENT    60 capsule    Take 1 capsule (100 mg) by mouth 2 times daily (total dose 125 mg twice daily)    Kidney transplant recipient, Long-term use of immunosuppressant medication       * cycloSPORINE modified 25 MG capsule    GENERIC EQUIVALENT    60 capsule    Take 1 capsule (25 mg) by mouth 2 times daily (total dose 125 mg twice daily)    Kidney transplant recipient, Long-term use of immunosuppressant medication       ISOSORBIDE MONONITRATE PO      Take 60 mg by mouth daily        losartan 50 MG tablet    COZAAR    90 tablet    Take 1 tablet (50 mg) by mouth daily    Kidney replaced by transplant       metoprolol 100 MG tablet    LOPRESSOR    60 tablet    Take 1 tablet by mouth 2 times daily.    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        NONFORMULARY      Focus Macula Pro:  Eye vitamin and mineral supplement A, C, E, Zinc, Copper        PLAVIX 75 MG tablet   Generic drug:  clopidogrel      Take 75 mg by mouth daily.        predniSONE 5 MG tablet    DELTASONE     Take 5 mg by mouth daily.        probenecid 500 MG tablet    BENEMID     Take 500 mg by mouth 2 times daily.        sulfamethoxazole-trimethoprim 400-80 MG per tablet    BACTRIM/SEPTRA     Take 1 tablet by mouth daily Take on every other day        TAMSULOSIN HCL PO      Take 0.4 mg by mouth daily        timolol 0.25 % ophthalmic solution    TIMOPTIC     2 times daily.        TYLENOL ARTHRITIS PAIN 650 MG CR tablet   Generic drug:  acetaminophen      Take 2 tablets by mouth 3 times daily.        * Notice:  This list has 2 medication(s) that are the same as other medications prescribed for you. Read the directions carefully, and ask your doctor or other care provider to review them with you.

## 2017-08-21 NOTE — PROGRESS NOTES
Transplant Surgery Consult Note     Medical record number: [id number]  YOB: 1955,   Consult requested by Dr. Sun for evaluation of kidney re- transplant candidacy.    Assessment and Recommendations:Mr. Bustamante appears to be a good candidate for kidney transplantation and has a good understanding of the risks and benefits of this approach to the management of renal failure. The following issues should be addressed prior to finalizing his transplant candidacy:     62 yo h/o kidney tx in  for CGN   Currently declining function from chr rejection  Is a candidate for retx  No live donors at this time  Has a qualifying GFR  Some abdominal obesity   Would recommend weight loss centered around the abdomen  Has a high PRA as well with O blood type  Could have a long wait if no live donors   He will keep looking   Risks of the surgical procedure including but not limited to the rare risk of mortality discussed in detail. Patient verbalized good understanding and had several pertinent questions which were answered satisfactorily.   On  bid and Pred 5 mg qd    Immunosuppressive regimen, management and long term risks discussed in detail.     The majority of our visit was spent in counselling, discussing the medical and surgical risks of kidney transplantation. We discussed approximate wait time and how that is influenced by issues such as blood type and sensitization (PRA) and access to a living donor. I contrasted potential waiting time for living vs  donor kidneys from  normal (0-85%) or higher (%) kidney donor profile index (KDPI) donors and their associated outcomes. I would not recommend this individual to consider kidneys from high KDPI donors. The reason for this decision is best summarized as: not on dialysis yet. Potential surgical complications of kidney transplantation include bleeding, superficial or deep wound complications (infection, hernia, lymphocele), ureteral  anastomotic failure (leak or stenosis), graft thrombosis, need for reoperation and other issues such as cardiac complications, pneumonia, deep venous thrombosis, pulmonary embolism, post transplant diabetes and death. The potential for recurrent disease or need for retransplantation was also addressed. We discussed the possible need for ureteral stent (and subsequent removal), and the utility of protocol biopsy and laboratory studies to evaluate for rejection or recurrent disease. We discussed the risk of graft rejection, our center's average graft and patient survival rates, immunosuppression protocols, as well as the potential opportunity to participate in clinical trials.  We also discussed the average length of stay, recovery process, and posttransplant lab and monitoring protocol.  I emphasized the need for strict immunosuppression medication adherence and the potential for complications of immunosuppression such as skin cancer or lymphoma, as well as a very low but not zero risk of donor-derived disease transmission risks (infection, cancer). Mr. Bustamante asked good questions and his candidacy will be reviewed at our Multidisciplinary Selection Committee. Thank you for the opportunity to participate in Mr. Bustamante's care.      Total time: 45 minutes  Counselling time: 40 minutes    .    ---------------------------------------------------------------------------------------------------    HPI: Mr. Bustamante has Chronic renal failure due to chronic rejn of tx kidney. The patient is non-diabetic.       The patient is not on dialysis.    Has potential kidney donors:  No.  Interested in participation in paired exchange if a donor is willing: Doesn't know     The patient has the following pertinent history:       No    Yes  Dialysis:    [x]      [] via:       Blood Transfusion                  [x]      []  Number of units:   Most recently:  Pregnancy:    [x]      [] Number:       Previous Transplant:  [x]      [] Details:     Cancer    [x]      [] Comment:   Kidney stones   [x]      [] Comment:      Recurrent infections  [x]      []  Type:                  Bladder dysfunction  [x]      [] Cause:    Claudication   [x]      [] Distance:    Previous Amputation  [x]      [] Cause:     Chronic anticoagulation  [x]      [] Indication:   Buddhism  [x]      []    Past Medical History:   Diagnosis Date     Angina effort (H) 8/2016     CAD (coronary artery disease) July 2011    MI in July 2011, stent placed, on plavix and aspirin     Gout     Uric acid as high as 11 previously, now on allopurinol and probenecid     Hypercholesteremia 2015     Hypertriglyceridemia 2015     Intraocular bleeding     previously treated wtih Avastin     Presence of stent in coronary artery August 2012     Proteinuria 11/2015    recurrent MPGN on biopsy     S/P kidney transplant     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  HLA identical transplant.  Biopsy in 4/2008 with recurrent MPGN type 2, mild interstitial fibrosis and tubular atrophy, CNI toxicity     Spinal stenosis 2013     Warts      Past Surgical History:   Procedure Laterality Date     C TOTAL KNEE ARTHROPLASTY  2015     TRANSPLANT KIDNEY RECIPIENT LIVING RELATED  1989    HLA identical     Family History   Problem Relation Age of Onset     DIABETES Mother      Colon Cancer Mother 78     Cardiac Sudden Death Sister 87     DIABETES Brother      CEREBROVASCULAR DISEASE Brother      Social History     Social History     Marital status:      Spouse name: N/A     Number of children: N/A     Years of education: N/A     Occupational History     Not on file.     Social History Main Topics     Smoking status: Never Smoker     Smokeless tobacco: Never Used     Alcohol use No     Drug use: No     Sexual activity: Not on file     Other Topics Concern     Not on file     Social History Narrative       ROS:   CONSTITUTIONAL:  No fevers or chills  EYES: negative for icterus  ENT:  negative for  hearing loss, tinnitus and sore throat  RESPIRATORY:  negative for cough, sputum, dyspnea  CARDIOVASCULAR:  negative for chest pain Fatigue  GASTROINTESTINAL:  negative for nausea, vomiting, diarrhea or constipation  GENITOURINARY:  negative for incontinence, dysuria, bladder emptying problems  HEME:  No easy bruising  INTEGUMENT:  negative for rash and pruritus  NEURO:  Negative for headache, seizure disorder  Allergies:   Allergies   Allergen Reactions     Contrast Dye      Simvastatin Muscle Pain (Myalgia)     Medications:  Prescription Medications as of 8/21/2017             timolol (TIMOPTIC) 0.25 % ophthalmic solution 2 times daily.    BEVACizumab (AVASTIN) 400 MG/16ML injection     losartan (COZAAR) 50 MG tablet Take 1 tablet (50 mg) by mouth daily    cycloSPORINE modified (GENERIC EQUIVALENT) 100 MG capsule Take 1 capsule (100 mg) by mouth 2 times daily (total dose 125 mg twice daily)    cycloSPORINE modified (GENERIC EQUIVALENT) 25 MG capsule Take 1 capsule (25 mg) by mouth 2 times daily (total dose 125 mg twice daily)    cholecalciferol (VITAMIN D3) 5000 UNITS TABS tablet Take 1 tablet (5,000 Units) by mouth daily    BABY ASPIRIN PO Take 81 mg by mouth daily    NONFORMULARY Focus Macula Pro:  Eye vitamin and mineral supplement A, C, E, Zinc, Copper    ISOSORBIDE MONONITRATE PO Take 60 mg by mouth daily     TAMSULOSIN HCL PO Take 0.4 mg by mouth daily     allopurinol (ZYLOPRIM) 100 MG tablet Take 2 tablets by mouth daily.    NIFEdipine osmotic (NIFEDICAL XL) 30 MG 24 hr tablet Take 2 tablets by mouth daily.    metoprolol (LOPRESSOR) 100 MG tablet Take 1 tablet by mouth 2 times daily.    rosuvastatin (CRESTOR) 5 MG tablet Take 20 mg by mouth daily     nitroGLYCERIN (NITROSTAT) 0.4 MG SL tablet Place 0.4 mg under the tongue every 5 minutes as needed.    Brimonidine Tartrate (ALPHAGAN OP) 2 drops daily    clopidogrel (PLAVIX) 75 MG tablet Take 75 mg by mouth daily.    predniSONE (DELTASONE) 5 MG tablet Take 5  mg by mouth daily.    probenecid (BENEMID) 500 MG tablet Take 500 mg by mouth 2 times daily.    sulfamethoxazole-trimethoprim (BACTRIM,SEPTRA) 400-80 MG per tablet Take 1 tablet by mouth daily Take on every other day    acetaminophen (TYLENOL ARTHRITIS PAIN) 650 MG CR tablet Take 2 tablets by mouth 3 times daily.        Exam:   Temp:  [98  F (36.7  C)] 98  F (36.7  C)  Pulse:  [79] 79  Resp:  [16] 16  BP: (171)/(93) 171/93  SpO2:  [97 %] 97 %  Appearance: in no apparent distress.   Skin: chronic steroid changes  Head and Neck: Normal, no rashes or jaundice  Respiratory: easy respirations, no audible wheezing.  Cardiovascular: RRR  Abdomen: obese, Surgical scars consistent with history     Diagnostics:   Recent Results (from the past 672 hour(s))   TXP External Lab Result    Collection Time: 07/26/17 10:09 AM   Result Value Ref Range    WBC Count (External) 8.4 4.0 - 11.0 X10^3/uL    RBC Count (External) 3.07 (L) 4.40 - 6.00 X10^6/uL    Hemoglobin (External) 11.1 (L) 13.0 - 18.0 g/dL    Hematocrit (External) 30.6 (L) 40.0 - 52.0 %    MCV (External) 100 (H) 80 - 96 fL    MCH (External) 36 (H) 27 - 34 pg    MCHC (External) 36 32 - 36 g/dL    Platelet Count (External) 126 (L) 150 - 420 X10^3/uL    RDW (External) 12.8 11.6 - 14.4 %    Sodium (External) 135 (L) 136 - 145 mmol/L    Potassium (External) 3.9 3.5 - 5.1 mmol/L    Chloride (External) 98 98 - 107 mmol/L    CO2 (External) 23 21 - 32 mmol/L    Calcium (External) 10.0 8.5 - 10.1 mg/dL    Glucose (External) 147 (H) 74 - 100 mg/dL    Urea Nitrogen (External) 61 (H) 7 - 18 mg/dL    Creatinine (External) 3.02 (H) 0.67 - 1.17 mg/dL    GFR Estimated (External) 21 (L) 60 - 150 ml/min/1.73 m(2)   Cyclosporine    Collection Time: 07/26/17 10:25 AM   Result Value Ref Range    Cyclosporine Last Dose 07/25/17 6685     Cyclosporine Level 70 50 - 400 ug/L   Routine UA with microscopic [[id number]]    Collection Time: 08/03/17  2:08 PM   Result Value Ref Range    Color Urine Yellow      Appearance Urine Clear     Glucose Urine Negative NEG mg/dL    Bilirubin Urine Negative NEG    Ketones Urine Negative NEG mg/dL    Specific Gravity Urine 1.012 1.003 - 1.035    Blood Urine Negative NEG    pH Urine 5.0 5.0 - 7.0 pH    Protein Albumin Urine >499 (A) NEG mg/dL    Urobilinogen mg/dL 0.0 0.0 - 2.0 mg/dL    Nitrite Urine Negative NEG    Leukocyte Esterase Urine Small (A) NEG    Source Midstream Urine     WBC Urine 11 (H) 0 - 2 /HPF    RBC Urine 2 0 - 2 /HPF    Squamous Epithelial /HPF Urine <1 0 - 1 /HPF   Protein immunofixation urine    Collection Time: 08/03/17  2:08 PM   Result Value Ref Range    Immunofix ELP Urine       No monoclonal protein seen on immunofixation.  Pathological significance   requires clinical correlation.   GARY Guardado M.D., Ph.D.     Lipid Profile [LAB18]    Collection Time: 08/03/17  2:27 PM   Result Value Ref Range    Cholesterol 210 (H) <200 mg/dL    Triglycerides 815 (H) <150 mg/dL    HDL Cholesterol 34 (L) >39 mg/dL    LDL Cholesterol Calculated  <100 mg/dL     Cannot estimate LDL when triglyceride exceeds 400 mg/dL    Non HDL Cholesterol 176 (H) <130 mg/dL   Comprehensive metabolic panel [LAB17]    Collection Time: 08/03/17  2:27 PM   Result Value Ref Range    Sodium 134 133 - 144 mmol/L    Potassium 4.3 3.4 - 5.3 mmol/L    Chloride 99 94 - 109 mmol/L    Carbon Dioxide 22 20 - 32 mmol/L    Anion Gap 13 3 - 14 mmol/L    Glucose 106 (H) 70 - 99 mg/dL    Urea Nitrogen 58 (H) 7 - 30 mg/dL    Creatinine 3.16 (H) 0.66 - 1.25 mg/dL    GFR Estimate 20 (L) >60 mL/min/1.7m2    GFR Estimate If Black 24 (L) >60 mL/min/1.7m2    Calcium 9.8 8.5 - 10.1 mg/dL    Bilirubin Total 0.3 0.2 - 1.3 mg/dL    Albumin 3.8 3.4 - 5.0 g/dL    Protein Total 7.8 6.8 - 8.8 g/dL    Alkaline Phosphatase 84 40 - 150 U/L    ALT 22 0 - 70 U/L    AST 16 0 - 45 U/L   Phosphorus    Collection Time: 08/03/17  2:27 PM   Result Value Ref Range    Phosphorus 3.2 2.5 - 4.5 mg/dL   Cardiolipin Constanza IgG and IgM  [[id number]]    Collection Time: 08/03/17  2:27 PM   Result Value Ref Range    Cardiolipin Antibody IgG <1.6  Negative   0.0 - 19.9 GPL-U/mL    Cardiolipin Antibody IgM 1.1 0.0 - 19.9 MPL-U/mL   Prostate spec antigen screen [[id number]]    Collection Time: 08/03/17  2:27 PM   Result Value Ref Range    PSA 1.80 0 - 4 ug/L   M Tuberculosis by Quantiferon [[id number]]    Collection Time: 08/03/17  2:27 PM   Result Value Ref Range    M Tuberculosis Result Negative NEG    M Tuberculosis Antigen Value 0.00 IU/mL   INR [EFZ0075]    Collection Time: 08/03/17  2:27 PM   Result Value Ref Range    INR 0.99 0.86 - 1.14   Partial thromboplastin time [LAB56]    Collection Time: 08/03/17  2:27 PM   Result Value Ref Range    PTT 28 22 - 37 sec   Thrombin time [UIU405]    Collection Time: 08/03/17  2:27 PM   Result Value Ref Range    Thrombin Time 18.1 13.0 - 19.0 sec   Lupus Anticoagulant Panel (XHB4007)    Collection Time: 08/03/17  2:27 PM   Result Value Ref Range    Lupus Result (A) NEG     Positive  (Note)  COMMENTS:  The INR is normal.  APTT ratio is normal.  DRVVT Screen ratio is elevated.  DRVVT Confirm Normalized ratio is elevated.  Thrombin time is normal.  POSITIVE TEST; THIS PATIENT HAS A  LUPUS ANTICOAGULANT.  Recommend repeat testing in 12 weeks to determine if the Lupus  Anticoagulant is persistent or transient, since a transient one does  not confer an increased thrombotic risk.  If the patient is on an anticoagulant, recommend repeat testing  without anticoagulation interference to rule out a false positive  lupus anticoagulant.  Patients with a lupus anticoagulant on warfarin therapy should be  monitored with a factor 2 (factor II) level or chromogenic factor 10  (factor X) assay.  Supa Carroll M.D. [phone]  8/7/2017    APTT:       Ratio  Patient  =  1.05  1:2 Mix  =  N/A  Reference:  Negative: Less than or equal to 1.16  Positive: Greater than or equal to 1.17     DILUTE LEIGHA VIPER VENOM TEST:  Screen Ratio  = 1.32   Normal is less than 1.21  Conf irm Normalized Ratio  = 1.24   Normal is less than 1.21       CBC with platelets differential [ERV722]    Collection Time: 08/03/17  2:27 PM   Result Value Ref Range    WBC 8.4 4.0 - 11.0 10e9/L    RBC Count 3.09 (L) 4.4 - 5.9 10e12/L    Hemoglobin 11.1 (L) 13.3 - 17.7 g/dL    Hematocrit 30.6 (L) 40.0 - 53.0 %    MCV 99 78 - 100 fl    MCH 35.9 (H) 26.5 - 33.0 pg    MCHC 36.3 31.5 - 36.5 g/dL    RDW 12.4 10.0 - 15.0 %    Platelet Count 141 (L) 150 - 450 10e9/L    Diff Method Automated Method     % Neutrophils 69.9 %    % Lymphocytes 16.8 %    % Monocytes 9.4 %    % Eosinophils 2.0 %    % Basophils 0.7 %    % Immature Granulocytes 1.2 %    Nucleated RBCs 0 0 /100    Absolute Neutrophil 5.9 1.6 - 8.3 10e9/L    Absolute Lymphocytes 1.4 0.8 - 5.3 10e9/L    Absolute Monocytes 0.8 0.0 - 1.3 10e9/L    Absolute Eosinophils 0.2 0.0 - 0.7 10e9/L    Absolute Basophils 0.1 0.0 - 0.2 10e9/L    Abs Immature Granulocytes 0.1 0 - 0.4 10e9/L    Absolute Nucleated RBC 0.0    PRA Single Antigen IgG Antibody    Collection Time: 08/03/17  2:27 PM   Result Value Ref Range    PRA Single Antigen IgG Antibody       Specimen received - Immunology report to follow upon completion.   BK virus PCR quantitative [[id number]]    Collection Time: 08/03/17  2:27 PM   Result Value Ref Range    BK Virus Specimen Plasma     BK Virus Result BK Virus DNA Not Detected BKNEG copies/mL    BK Virus Log  <2.7 Log copies/mL     Not Calculated   The Real-Time quantitative BK Virus assay was developed and its performance   characteristics determined by the [facility] at   the [facility] in [place], [place]. The   primers and probes for each analyte are Analyte Specific Reagents (ASRs)   manufactured by [employer].   ASRs are used in many laboratory tests necessary for standard medical care and   generally do not require U.S. Food and Drug Administration approval. The  FDA   has determined that such clearance or approval is not necessary.   This test is used for clinical purposes. It should not be regarded as   investigational or for research. This laboratory is certified under the   Clinical Laboratory Improvement Amendments of 1988 (CLIA-88) as qualified to   perform high complexity clinical laboratory testing.     CMV Antibody IgG [NAL0061]    Collection Time: 08/03/17  2:27 PM   Result Value Ref Range    CMV Antibody IgG 0.9 (H) 0.0 - 0.8 AI   EBV Capsid Antibody IgG [IGI8646]    Collection Time: 08/03/17  2:27 PM   Result Value Ref Range    EBV Capsid Antibody IgG (H) 0.0 - 0.8 AI     >8.0  Positive, suggests recent or past exposure   Antibody index (AI) values reflect qualitative changes in antibody   concentration that cannot be directly associated with clinical condition or   disease state.     Hepatitis B core antibody [[id number]]    Collection Time: 08/03/17  2:27 PM   Result Value Ref Range    Hepatitis B Core Constanza Nonreactive NR   Hepatitis B Surface Antibody [CIJ1240]    Collection Time: 08/03/17  2:27 PM   Result Value Ref Range    Hepatitis B Surface Antibody 0.26 <8.00 m[IU]/mL   Hepatitis B surface antigen [WAC635]    Collection Time: 08/03/17  2:27 PM   Result Value Ref Range    Hep B Surface Agn Nonreactive NR   Hepatitis C antibody [WXA665]    Collection Time: 08/03/17  2:27 PM   Result Value Ref Range    Hepatitis C Antibody  NR     Nonreactive   Assay performance characteristics have not been established for newborns,   infants, and children     HIV Antigen Antibody Combo Pretransplant    Collection Time: 08/03/17  2:27 PM   Result Value Ref Range    HIV Antigen Antibody Combo Pretransplant  NR     Nonreactive   HIV-1 p24 Ag & HIV-1/HIV-2 Ab Not Detected     Anti Treponema [[id number]]    Collection Time: 08/03/17  2:27 PM   Result Value Ref Range    Treponema pallidum Antibody Negative NEG   Varicella Zoster Virus Antibody IgG [[id number]]    Collection Time:  08/03/17  2:27 PM   Result Value Ref Range    Varicella Zoster Virus Antibody IgG (H) 0.0 - 0.8 AI     >8.0  Positive, suggests prev. exposure and probable immunity   Antibody index (AI) values reflect qualitative changes in antibody   concentration that cannot be directly associated with clinical condition or   disease state.     Kappa and lambda light chain    Collection Time: 08/03/17  2:27 PM   Result Value Ref Range    Kappa Free Lt Chain 4.88 (H) 0.33 - 1.94 mg/dL    Lambda Free Lt Chain 4.23 (H) 0.57 - 2.63 mg/dL    Kappa Lambda Ratio 1.15 0.26 - 1.65   Protein Immunofixation Serum    Collection Time: 08/03/17  2:27 PM   Result Value Ref Range    Immunofixation ELP       (Note)  Monoclonal IgA immunoglobulin of kappa light chain type.  Possible monoclonal IgG immunoglobulin of lambda light chain type.  Pathological significance requires clinical correlation.  MITESH Guardado M.D., Ph.D., Pathologist        IGG 1080 695 - 1620 mg/dL     70 - 380 mg/dL    IGM 73 60 - 265 mg/dL   Factor 2 and 5 mutation analysis    Collection Time: 08/03/17  2:27 PM   Result Value Ref Range    Copath Report       Patient Name: JEANMARIE GONZALEZ  MR#: [id number]  Specimen #: [id number]  Collected: 8/3/2017 14:27  Received: 8/4/2017 10:41  Reported: 8/8/2017 16:31  Ordering Phy(s): NILSA ORELLANA    For improved result formatting, select 'View Enhanced Report Format'  under Linked Documents section.  _________________________________________    TEST(S) REQUESTED:  Factor 5 Leiden and Factor 2 by PCR    SPECIMEN DESCRIPTION:  Blood    METHODOLOGY:   The regions of genomic DNA containing the S6001P Factor 5  gene mutation (Factor V Leiden) and the Factor 2(Prothrombin U53075G)  gene mutation were simultaneously amplified using the polymerase chain  reaction.  The amplified products were digested with restriction  endonuclease TaqI and products were analyzed by gel electrophoresis.    RESULTS:    Factor V 1691G>A (Leiden)   RESULTS:  Mutation analyzed:     1691G>A  Factor V 1691G>A (Leiden)  Interpretation:      ABSENT  Factor V 1691G>A (Leiden) mutation  genotype:      G/G    FACTOR 2/PROTHROMBIN RESUL TS:  Mutation analyzed:     06885Z>A  Factor 2 Mutation Interpretation:      ABSENT  Factor 2 Mutation genotype:      G/G    INTERPRETATION:  The patient is negative for the Factor V 1691G>A (Leiden) and negative  for the Factor 2 mutation.    COMMENTS:  If a patient is the recipient of an allogeneic bone marrow transplant,  this test must be done on a pre-transplant sample or buccal swab.  A  previous allogeneic bone marrow transplant will interfere with test  results.  Call the [facility]([phone]) for  instructions on sample collection for these patients.    This test was developed and its performance characteristics determined  by the [facility],  [facility]. It has not been cleared or approved by the FDA. The  laboratory is regulated under CLIA as qualified to perform  high-complexity testing. This test is used for clinical purposes. It  should not be regarded as investigational or for research.    A resident/celestino escobedow in an accredited training program was involved in the  selection of testing, review of laboratory data, and/or interpretation  of this case.  I, as the senior physician, attest that I: (i) confirmed  appropriate testing, (ii) examined the relevant raw data for the  specimen(s); and (iii) rendered or confirmed the interpretation(s).    Electronically Signed Out By:  Rigo Stiles MD    CPT Codes:  A: 15724-P5PLCP, 09208-Q1TITW, -GEJHXI(2)    TESTING LAB LOCATION:  [address] [address]  [phone]    COLLECTION SITE:  Client[facility]  [facility]  [facility]     ABO/Rh type and screen [SCL438]    Collection Time:  08/03/17  2:28 PM   Result Value Ref Range    ABO O     RH(D)  Pos     Antibody Screen Neg     Test Valid Only At       [facility]    Specimen Expires 08/06/2017    Antibody titer red cell [[id number]]    Collection Time: 08/03/17  2:28 PM   Result Value Ref Range    Antibody Titer Anti A: IgG >256  Anti B: IgG >256      Lipid panel reflex to direct LDL    Collection Time: 08/21/17  6:53 AM   Result Value Ref Range    Cholesterol 208 (H) <200 mg/dL    Triglycerides 838 (H) <150 mg/dL    HDL Cholesterol 28 (L) >39 mg/dL    LDL Cholesterol Calculated  <100 mg/dL     Cannot estimate LDL when triglyceride exceeds 400 mg/dL    Non HDL Cholesterol 180 (H) <130 mg/dL   LDL cholesterol direct    Collection Time: 08/21/17  6:53 AM   Result Value Ref Range    LDL Cholesterol Direct 62 <100 mg/dL     No results found for: CPRA

## 2017-08-21 NOTE — LETTER
2017       RE: Rory Bustamante  [address]  [address]  [facility] [address]     Dear Colleague,    Thank you for referring your patient, Rory Bustamante, to the [facility]. Please see a copy of my visit note below.    Transplant Surgery Consult Note     Medical record number: [id number]  YOB: 1955,   Consult requested by Dr. Sun for evaluation of kidney re- transplant candidacy.    Assessment and Recommendations:Mr. Bustamante appears to be a good candidate for kidney transplantation and has a good understanding of the risks and benefits of this approach to the management of renal failure. The following issues should be addressed prior to finalizing his transplant candidacy:     60 yo h/o kidney tx in  for CGN   Currently declining function from chr rejection  Is a candidate for retx  No live donors at this time  Has a qualifying GFR  Some abdominal obesity   Would recommend weight loss centered around the abdomen  Has a high PRA as well with O blood type  Could have a long wait if no live donors   He will keep looking   Risks of the surgical procedure including but not limited to the rare risk of mortality discussed in detail. Patient verbalized good understanding and had several pertinent questions which were answered satisfactorily.   On  bid and Pred 5 mg qd    Immunosuppressive regimen, management and long term risks discussed in detail.     The majority of our visit was spent in counselling, discussing the medical and surgical risks of kidney transplantation. We discussed approximate wait time and how that is influenced by issues such as blood type and sensitization (PRA) and access to a living donor. I contrasted potential waiting time for living vs  donor kidneys from  normal (0-85%) or higher (%) kidney donor profile index (KDPI) donors and their associated outcomes. I would not recommend this individual to  consider kidneys from high KDPI donors. The reason for this decision is best summarized as: not on dialysis yet. Potential surgical complications of kidney transplantation include bleeding, superficial or deep wound complications (infection, hernia, lymphocele), ureteral anastomotic failure (leak or stenosis), graft thrombosis, need for reoperation and other issues such as cardiac complications, pneumonia, deep venous thrombosis, pulmonary embolism, post transplant diabetes and death. The potential for recurrent disease or need for retransplantation was also addressed. We discussed the possible need for ureteral stent (and subsequent removal), and the utility of protocol biopsy and laboratory studies to evaluate for rejection or recurrent disease. We discussed the risk of graft rejection, our center's average graft and patient survival rates, immunosuppression protocols, as well as the potential opportunity to participate in clinical trials.  We also discussed the average length of stay, recovery process, and posttransplant lab and monitoring protocol.  I emphasized the need for strict immunosuppression medication adherence and the potential for complications of immunosuppression such as skin cancer or lymphoma, as well as a very low but not zero risk of donor-derived disease transmission risks (infection, cancer). Mr. Bustamante asked good questions and his candidacy will be reviewed at our Multidisciplinary Selection Committee. Thank you for the opportunity to participate in Mr. Bustamante's care.      Total time: 45 minutes  Counselling time: 40 minutes    .    ---------------------------------------------------------------------------------------------------    HPI: Mr. Bustamante has Chronic renal failure due to chronic rejn of tx kidney. The patient is non-diabetic.       The patient is not on dialysis.    Has potential kidney donors:  No.  Interested in participation in paired exchange if a donor is willing: Doesn't know      The patient has the following pertinent history:       No    Yes  Dialysis:    [x]      [] via:       Blood Transfusion                  [x]      []  Number of units:   Most recently:  Pregnancy:    [x]      [] Number:       Previous Transplant:  [x]      [] Details:    Cancer    [x]      [] Comment:   Kidney stones   [x]      [] Comment:      Recurrent infections  [x]      []  Type:                  Bladder dysfunction  [x]      [] Cause:    Claudication   [x]      [] Distance:    Previous Amputation  [x]      [] Cause:     Chronic anticoagulation  [x]      [] Indication:   Denominational  [x]      []    Past Medical History:   Diagnosis Date     Angina effort (H) 8/2016     CAD (coronary artery disease) July 2011    MI in July 2011, stent placed, on plavix and aspirin     Gout     Uric acid as high as 11 previously, now on allopurinol and probenecid     Hypercholesteremia 2015     Hypertriglyceridemia 2015     Intraocular bleeding     previously treated wtih Avastin     Presence of stent in coronary artery August 2012     Proteinuria 11/2015    recurrent MPGN on biopsy     S/P kidney transplant     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  HLA identical transplant.  Biopsy in 4/2008 with recurrent MPGN type 2, mild interstitial fibrosis and tubular atrophy, CNI toxicity     Spinal stenosis 2013     Warts      Past Surgical History:   Procedure Laterality Date     C TOTAL KNEE ARTHROPLASTY  2015     TRANSPLANT KIDNEY RECIPIENT LIVING RELATED  1989    HLA identical     Family History   Problem Relation Age of Onset     DIABETES Mother      Colon Cancer Mother 78     Cardiac Sudden Death Sister 87     DIABETES Brother      CEREBROVASCULAR DISEASE Brother      Social History     Social History     Marital status:      Spouse name: N/A     Number of children: N/A     Years of education: N/A     Occupational History     Not on file.     Social History Main Topics     Smoking status: Never Smoker      Smokeless tobacco: Never Used     Alcohol use No     Drug use: No     Sexual activity: Not on file     Other Topics Concern     Not on file     Social History Narrative       ROS:   CONSTITUTIONAL:  No fevers or chills  EYES: negative for icterus  ENT:  negative for hearing loss, tinnitus and sore throat  RESPIRATORY:  negative for cough, sputum, dyspnea  CARDIOVASCULAR:  negative for chest pain Fatigue  GASTROINTESTINAL:  negative for nausea, vomiting, diarrhea or constipation  GENITOURINARY:  negative for incontinence, dysuria, bladder emptying problems  HEME:  No easy bruising  INTEGUMENT:  negative for rash and pruritus  NEURO:  Negative for headache, seizure disorder  Allergies:   Allergies   Allergen Reactions     Contrast Dye      Simvastatin Muscle Pain (Myalgia)     Medications:  Prescription Medications as of 8/21/2017             timolol (TIMOPTIC) 0.25 % ophthalmic solution 2 times daily.    BEVACizumab (AVASTIN) 400 MG/16ML injection     losartan (COZAAR) 50 MG tablet Take 1 tablet (50 mg) by mouth daily    cycloSPORINE modified (GENERIC EQUIVALENT) 100 MG capsule Take 1 capsule (100 mg) by mouth 2 times daily (total dose 125 mg twice daily)    cycloSPORINE modified (GENERIC EQUIVALENT) 25 MG capsule Take 1 capsule (25 mg) by mouth 2 times daily (total dose 125 mg twice daily)    cholecalciferol (VITAMIN D3) 5000 UNITS TABS tablet Take 1 tablet (5,000 Units) by mouth daily    BABY ASPIRIN PO Take 81 mg by mouth daily    NONFORMULARY Focus Macula Pro:  Eye vitamin and mineral supplement A, C, E, Zinc, Copper    ISOSORBIDE MONONITRATE PO Take 60 mg by mouth daily     TAMSULOSIN HCL PO Take 0.4 mg by mouth daily     allopurinol (ZYLOPRIM) 100 MG tablet Take 2 tablets by mouth daily.    NIFEdipine osmotic (NIFEDICAL XL) 30 MG 24 hr tablet Take 2 tablets by mouth daily.    metoprolol (LOPRESSOR) 100 MG tablet Take 1 tablet by mouth 2 times daily.    rosuvastatin (CRESTOR) 5 MG tablet Take 20 mg by mouth  daily     nitroGLYCERIN (NITROSTAT) 0.4 MG SL tablet Place 0.4 mg under the tongue every 5 minutes as needed.    Brimonidine Tartrate (ALPHAGAN OP) 2 drops daily    clopidogrel (PLAVIX) 75 MG tablet Take 75 mg by mouth daily.    predniSONE (DELTASONE) 5 MG tablet Take 5 mg by mouth daily.    probenecid (BENEMID) 500 MG tablet Take 500 mg by mouth 2 times daily.    sulfamethoxazole-trimethoprim (BACTRIM,SEPTRA) 400-80 MG per tablet Take 1 tablet by mouth daily Take on every other day    acetaminophen (TYLENOL ARTHRITIS PAIN) 650 MG CR tablet Take 2 tablets by mouth 3 times daily.        Exam:   Temp:  [98  F (36.7  C)] 98  F (36.7  C)  Pulse:  [79] 79  Resp:  [16] 16  BP: (171)/(93) 171/93  SpO2:  [97 %] 97 %  Appearance: in no apparent distress.   Skin: chronic steroid changes  Head and Neck: Normal, no rashes or jaundice  Respiratory: easy respirations, no audible wheezing.  Cardiovascular: RRR  Abdomen: obese, Surgical scars consistent with history     Diagnostics:   Recent Results (from the past 672 hour(s))   TXP External Lab Result    Collection Time: 07/26/17 10:09 AM   Result Value Ref Range    WBC Count (External) 8.4 4.0 - 11.0 X10^3/uL    RBC Count (External) 3.07 (L) 4.40 - 6.00 X10^6/uL    Hemoglobin (External) 11.1 (L) 13.0 - 18.0 g/dL    Hematocrit (External) 30.6 (L) 40.0 - 52.0 %    MCV (External) 100 (H) 80 - 96 fL    MCH (External) 36 (H) 27 - 34 pg    MCHC (External) 36 32 - 36 g/dL    Platelet Count (External) 126 (L) 150 - 420 X10^3/uL    RDW (External) 12.8 11.6 - 14.4 %    Sodium (External) 135 (L) 136 - 145 mmol/L    Potassium (External) 3.9 3.5 - 5.1 mmol/L    Chloride (External) 98 98 - 107 mmol/L    CO2 (External) 23 21 - 32 mmol/L    Calcium (External) 10.0 8.5 - 10.1 mg/dL    Glucose (External) 147 (H) 74 - 100 mg/dL    Urea Nitrogen (External) 61 (H) 7 - 18 mg/dL    Creatinine (External) 3.02 (H) 0.67 - 1.17 mg/dL    GFR Estimated (External) 21 (L) 60 - 150 ml/min/1.73 m(2)    Cyclosporine    Collection Time: 07/26/17 10:25 AM   Result Value Ref Range    Cyclosporine Last Dose 07/25/17 2225     Cyclosporine Level 70 50 - 400 ug/L   Routine UA with microscopic [[id number]]    Collection Time: 08/03/17  2:08 PM   Result Value Ref Range    Color Urine Yellow     Appearance Urine Clear     Glucose Urine Negative NEG mg/dL    Bilirubin Urine Negative NEG    Ketones Urine Negative NEG mg/dL    Specific Gravity Urine 1.012 1.003 - 1.035    Blood Urine Negative NEG    pH Urine 5.0 5.0 - 7.0 pH    Protein Albumin Urine >499 (A) NEG mg/dL    Urobilinogen mg/dL 0.0 0.0 - 2.0 mg/dL    Nitrite Urine Negative NEG    Leukocyte Esterase Urine Small (A) NEG    Source Midstream Urine     WBC Urine 11 (H) 0 - 2 /HPF    RBC Urine 2 0 - 2 /HPF    Squamous Epithelial /HPF Urine <1 0 - 1 /HPF   Protein immunofixation urine    Collection Time: 08/03/17  2:08 PM   Result Value Ref Range    Immunofix ELP Urine       No monoclonal protein seen on immunofixation.  Pathological significance   requires clinical correlation.   GARY Guardado M.D., Ph.D.     Lipid Profile [LAB18]    Collection Time: 08/03/17  2:27 PM   Result Value Ref Range    Cholesterol 210 (H) <200 mg/dL    Triglycerides 815 (H) <150 mg/dL    HDL Cholesterol 34 (L) >39 mg/dL    LDL Cholesterol Calculated  <100 mg/dL     Cannot estimate LDL when triglyceride exceeds 400 mg/dL    Non HDL Cholesterol 176 (H) <130 mg/dL   Comprehensive metabolic panel [LAB17]    Collection Time: 08/03/17  2:27 PM   Result Value Ref Range    Sodium 134 133 - 144 mmol/L    Potassium 4.3 3.4 - 5.3 mmol/L    Chloride 99 94 - 109 mmol/L    Carbon Dioxide 22 20 - 32 mmol/L    Anion Gap 13 3 - 14 mmol/L    Glucose 106 (H) 70 - 99 mg/dL    Urea Nitrogen 58 (H) 7 - 30 mg/dL    Creatinine 3.16 (H) 0.66 - 1.25 mg/dL    GFR Estimate 20 (L) >60 mL/min/1.7m2    GFR Estimate If Black 24 (L) >60 mL/min/1.7m2    Calcium 9.8 8.5 - 10.1 mg/dL    Bilirubin Total 0.3 0.2 - 1.3 mg/dL     Albumin 3.8 3.4 - 5.0 g/dL    Protein Total 7.8 6.8 - 8.8 g/dL    Alkaline Phosphatase 84 40 - 150 U/L    ALT 22 0 - 70 U/L    AST 16 0 - 45 U/L   Phosphorus    Collection Time: 08/03/17  2:27 PM   Result Value Ref Range    Phosphorus 3.2 2.5 - 4.5 mg/dL   Cardiolipin Constanza IgG and IgM [LAT7715]    Collection Time: 08/03/17  2:27 PM   Result Value Ref Range    Cardiolipin Antibody IgG <1.6  Negative   0.0 - 19.9 GPL-U/mL    Cardiolipin Antibody IgM 1.1 0.0 - 19.9 MPL-U/mL   Prostate spec antigen screen [[id number]]    Collection Time: 08/03/17  2:27 PM   Result Value Ref Range    PSA 1.80 0 - 4 ug/L   M Tuberculosis by Quantiferon [[id number]]    Collection Time: 08/03/17  2:27 PM   Result Value Ref Range    M Tuberculosis Result Negative NEG    M Tuberculosis Antigen Value 0.00 IU/mL   INR [[id number]]    Collection Time: 08/03/17  2:27 PM   Result Value Ref Range    INR 0.99 0.86 - 1.14   Partial thromboplastin time [LAB56]    Collection Time: 08/03/17  2:27 PM   Result Value Ref Range    PTT 28 22 - 37 sec   Thrombin time [VXV896]    Collection Time: 08/03/17  2:27 PM   Result Value Ref Range    Thrombin Time 18.1 13.0 - 19.0 sec   Lupus Anticoagulant Panel (IYB4519)    Collection Time: 08/03/17  2:27 PM   Result Value Ref Range    Lupus Result (A) NEG     Positive  (Note)  COMMENTS:  The INR is normal.  APTT ratio is normal.  DRVVT Screen ratio is elevated.  DRVVT Confirm Normalized ratio is elevated.  Thrombin time is normal.  POSITIVE TEST; THIS PATIENT HAS A  LUPUS ANTICOAGULANT.  Recommend repeat testing in 12 weeks to determine if the Lupus  Anticoagulant is persistent or transient, since a transient one does  not confer an increased thrombotic risk.  If the patient is on an anticoagulant, recommend repeat testing  without anticoagulation interference to rule out a false positive  lupus anticoagulant.  Patients with a lupus anticoagulant on warfarin therapy should be  monitored with a factor 2 (factor II) level or  chromogenic factor 10  (factor X) assay.  Supa Carroll M.D. [phone]  8/7/2017    APTT:       Ratio  Patient  =  1.05  1:2 Mix  =  N/A  Reference:  Negative: Less than or equal to 1.16  Positive: Greater than or equal to 1.17     DILUTE LEIGHA VIPER VENOM TEST:  Screen Ratio = 1.32   Normal is less than 1.21  Conf irm Normalized Ratio  = 1.24   Normal is less than 1.21       CBC with platelets differential [TTS405]    Collection Time: 08/03/17  2:27 PM   Result Value Ref Range    WBC 8.4 4.0 - 11.0 10e9/L    RBC Count 3.09 (L) 4.4 - 5.9 10e12/L    Hemoglobin 11.1 (L) 13.3 - 17.7 g/dL    Hematocrit 30.6 (L) 40.0 - 53.0 %    MCV 99 78 - 100 fl    MCH 35.9 (H) 26.5 - 33.0 pg    MCHC 36.3 31.5 - 36.5 g/dL    RDW 12.4 10.0 - 15.0 %    Platelet Count 141 (L) 150 - 450 10e9/L    Diff Method Automated Method     % Neutrophils 69.9 %    % Lymphocytes 16.8 %    % Monocytes 9.4 %    % Eosinophils 2.0 %    % Basophils 0.7 %    % Immature Granulocytes 1.2 %    Nucleated RBCs 0 0 /100    Absolute Neutrophil 5.9 1.6 - 8.3 10e9/L    Absolute Lymphocytes 1.4 0.8 - 5.3 10e9/L    Absolute Monocytes 0.8 0.0 - 1.3 10e9/L    Absolute Eosinophils 0.2 0.0 - 0.7 10e9/L    Absolute Basophils 0.1 0.0 - 0.2 10e9/L    Abs Immature Granulocytes 0.1 0 - 0.4 10e9/L    Absolute Nucleated RBC 0.0    PRA Single Antigen IgG Antibody    Collection Time: 08/03/17  2:27 PM   Result Value Ref Range    PRA Single Antigen IgG Antibody       Specimen received - Immunology report to follow upon completion.   BK virus PCR quantitative [[id number]]    Collection Time: 08/03/17  2:27 PM   Result Value Ref Range    BK Virus Specimen Plasma     BK Virus Result BK Virus DNA Not Detected BKNEG copies/mL    BK Virus Log  <2.7 Log copies/mL     Not Calculated   The Real-Time quantitative BK Virus assay was developed and its performance   characteristics determined by the [facility] at   the [facility] [place] in  [place], [place]. The   primers and probes for each analyte are Analyte Specific Reagents (ASRs)   manufactured by [employer].   ASRs are used in many laboratory tests necessary for standard medical care and   generally do not require U.S. Food and Drug Administration approval. The FDA   has determined that such clearance or approval is not necessary.   This test is used for clinical purposes. It should not be regarded as   investigational or for research. This laboratory is certified under the   Clinical Laboratory Improvement Amendments of 1988 (CLIA-88) as qualified to   perform high complexity clinical laboratory testing.     CMV Antibody IgG [OXS0331]    Collection Time: 08/03/17  2:27 PM   Result Value Ref Range    CMV Antibody IgG 0.9 (H) 0.0 - 0.8 AI   EBV Capsid Antibody IgG [[id number]]    Collection Time: 08/03/17  2:27 PM   Result Value Ref Range    EBV Capsid Antibody IgG (H) 0.0 - 0.8 AI     >8.0  Positive, suggests recent or past exposure   Antibody index (AI) values reflect qualitative changes in antibody   concentration that cannot be directly associated with clinical condition or   disease state.     Hepatitis B core antibody [[id number]]    Collection Time: 08/03/17  2:27 PM   Result Value Ref Range    Hepatitis B Core Constanza Nonreactive NR   Hepatitis B Surface Antibody [[id number]]    Collection Time: 08/03/17  2:27 PM   Result Value Ref Range    Hepatitis B Surface Antibody 0.26 <8.00 m[IU]/mL   Hepatitis B surface antigen [KIE953]    Collection Time: 08/03/17  2:27 PM   Result Value Ref Range    Hep B Surface Agn Nonreactive NR   Hepatitis C antibody [ILA686]    Collection Time: 08/03/17  2:27 PM   Result Value Ref Range    Hepatitis C Antibody  NR     Nonreactive   Assay performance characteristics have not been established for newborns,   infants, and children     HIV Antigen Antibody Combo Pretransplant    Collection Time: 08/03/17  2:27 PM   Result Value Ref Range    HIV Antigen Antibody Combo  Pretransplant  NR     Nonreactive   HIV-1 p24 Ag & HIV-1/HIV-2 Ab Not Detected     Anti Treponema [[id number]]    Collection Time: 08/03/17  2:27 PM   Result Value Ref Range    Treponema pallidum Antibody Negative NEG   Varicella Zoster Virus Antibody IgG [[id number]]    Collection Time: 08/03/17  2:27 PM   Result Value Ref Range    Varicella Zoster Virus Antibody IgG (H) 0.0 - 0.8 AI     >8.0  Positive, suggests prev. exposure and probable immunity   Antibody index (AI) values reflect qualitative changes in antibody   concentration that cannot be directly associated with clinical condition or   disease state.     Kappa and lambda light chain    Collection Time: 08/03/17  2:27 PM   Result Value Ref Range    Kappa Free Lt Chain 4.88 (H) 0.33 - 1.94 mg/dL    Lambda Free Lt Chain 4.23 (H) 0.57 - 2.63 mg/dL    Kappa Lambda Ratio 1.15 0.26 - 1.65   Protein Immunofixation Serum    Collection Time: 08/03/17  2:27 PM   Result Value Ref Range    Immunofixation ELP       (Note)  Monoclonal IgA immunoglobulin of kappa light chain type.  Possible monoclonal IgG immunoglobulin of lambda light chain type.  Pathological significance requires clinical correlation.  MIETSH Guardado M.D., Ph.D., Pathologist        IGG 1080 695 - 1620 mg/dL     70 - 380 mg/dL    IGM 73 60 - 265 mg/dL   Factor 2 and 5 mutation analysis    Collection Time: 08/03/17  2:27 PM   Result Value Ref Range    Copath Report       Patient Name: JEANMARIE GONZALEZ  MR#: [id number]  Specimen #: [id number]  Collected: 8/3/2017 14:27  Received: 8/4/2017 10:41  Reported: 8/8/2017 16:31  Ordering Phy(s): NILSA ORELLANA    For improved result formatting, select 'View Enhanced Report Format'  under Linked Documents section.  _________________________________________    TEST(S) REQUESTED:  Factor 5 Leiden and Factor 2 by PCR    SPECIMEN DESCRIPTION:  Blood    METHODOLOGY:   The regions of genomic DNA containing the F6129P Factor 5  gene mutation (Factor V Leiden) and the  Factor 2(Prothrombin G87562D)  gene mutation were simultaneously amplified using the polymerase chain  reaction.  The amplified products were digested with restriction  endonuclease TaqI and products were analyzed by gel electrophoresis.    RESULTS:    Factor V 1691G>A (Leiden)  RESULTS:  Mutation analyzed:     1691G>A  Factor V 1691G>A (Leiden)  Interpretation:      ABSENT  Factor V 1691G>A (Leiden) mutation  genotype:      G/G    FACTOR 2/PROTHROMBIN RESUL TS:  Mutation analyzed:     74469D>A  Factor 2 Mutation Interpretation:      ABSENT  Factor 2 Mutation genotype:      G/G    INTERPRETATION:  The patient is negative for the Factor V 1691G>A (Leiden) and negative  for the Factor 2 mutation.    COMMENTS:  If a patient is the recipient of an allogeneic bone marrow transplant,  this test must be done on a pre-transplant sample or buccal swab.  A  previous allogeneic bone marrow transplant will interfere with test  results.  Call the [facility]([phone]) for  instructions on sample collection for these patients.    This test was developed and its performance characteristics determined  by the [facility]. It has not been cleared or approved by the FDA. The  laboratory is regulated under CLIA as qualified to perform  high-complexity testing. This test is used for clinical purposes. It  should not be regarded as investigational or for research.    A resident/celestino escobedow in an accredited training program was involved in the  selection of testing, review of laboratory data, and/or interpretation  of this case.  I, as the senior physician, attest that I: (i) confirmed  appropriate testing, (ii) examined the relevant raw data for the  specimen(s); and (iii) rendered or confirmed the interpretation(s).    Electronically Signed Out By:  Rigo Stiles MD    CPT Codes:  A: 60022-Z7PDDA, 28873-T6HTBN, -ZLJWGB(2)    TESTING LAB  LOCATION:  [facility]  [address], [facility] [address] [place] [id number] [phone]    COLLECTION SITE:  Client:  [facility]  Location:  [facility] (B)     ABO/Rh type and screen [BLG489]    Collection Time: 08/03/17  2:28 PM   Result Value Ref Range    ABO O     RH(D)  Pos     Antibody Screen Neg     Test Valid Only At       [facility] acute hospital    Specimen Expires 08/06/2017    Antibody titer red cell [[id number]]    Collection Time: 08/03/17  2:28 PM   Result Value Ref Range    Antibody Titer Anti A: IgG >256  Anti B: IgG >256      Lipid panel reflex to direct LDL    Collection Time: 08/21/17  6:53 AM   Result Value Ref Range    Cholesterol 208 (H) <200 mg/dL    Triglycerides 838 (H) <150 mg/dL    HDL Cholesterol 28 (L) >39 mg/dL    LDL Cholesterol Calculated  <100 mg/dL     Cannot estimate LDL when triglyceride exceeds 400 mg/dL    Non HDL Cholesterol 180 (H) <130 mg/dL   LDL cholesterol direct    Collection Time: 08/21/17  6:53 AM   Result Value Ref Range    LDL Cholesterol Direct 62 <100 mg/dL     No results found for: CPRA    Again, thank you for allowing me to participate in the care of your patient.      Sincerely,    BUFFY

## 2017-08-21 NOTE — MR AVS SNAPSHOT
After Visit Summary   8/21/2017    Rory Bustamante    MRN: [id number]           Patient Information     Date Of Birth          1955        Visit Information        Provider Department      8/21/2017 9:00 AM Parisa Flower RD [facility] Solid Organ Transplant        Today's Diagnoses     Organ transplant candidate    -  1       Follow-ups after your visit        Your next 10 appointments already scheduled     Aug 21, 2017  9:30 AM CDT   (Arrive by 9:15 AM)   Surgery Consult with ANN LEMUSE PATIENT 3   [facility] Solid Organ Transplant ([facility])    [address] [place] [id number]   [phone]            Aug 21, 2017 10:00 AM CDT   (Arrive by 9:45 AM)   SOT CARE COORDINATOR EVAL with Carolina Bull RN   [facility] Solid Organ Transplant ([facility])    [address] [place] [id number]   [phone]            Aug 21, 2017 11:15 AM CDT   (Arrive by 11:00 AM)   SOT SOCIAL WORK EVAL with CHARY Walter   [facility] Solid Organ Transplant ([facility])    [address] [place] [id number]   [phone]            Aug 21, 2017  1:30 PM CDT   (Arrive by 1:15 PM)   XR CHEST 2 VIEWS with UCXR1   [facility] Xray ([facility])    [address] [place] [id number] [phone]           Please bring a list of your current medicines to your exam. (Include vitamins, minerals and over-thecounter medicines.) Leave your valuables at home.  Tell your doctor if there is a chance you may be pregnant.  You do not need to do anything special for this exam.            Aug 21, 2017  2:00 PM CDT   US UPPER EXTREMITY VENOUS MAPPING BILATERAL with UCUSV2   [facility] Imaging [facility] US ([facility])    [address] [place] [id number] [phone]            Please bring a list of your medicines (including vitamins, minerals and over-the-counter drugs). Also, tell your doctor about any allergies you may have. Wear comfortable clothes and leave your valuables at home.  You do not need to do anything special to prepare for your exam.  Please call the Imaging Department at your exam site with any questions.            Aug 21, 2017  3:30 PM CDT   (Arrive by 3:15 PM)   NEW CLOTTING DISORDER with Gunner Daniel PA-C   [facility] Bleeding [facility] Clotting ([facility])    [address] [place] [id number] [phone]            Feb 06, 2018 12:30 PM CST   Lab with  LAB   [facility] Lab ([facility])    [address] [place] [id number] [phone]            Feb 06, 2018  1:30 PM CST   (Arrive by 1:00 PM)   Return Visit with Christi Sun MD   [facility] Nephrology ([facility])    [address] [place] [address] [phone]              Who to contact     If you have questions or need follow up information about today's clinic visit or your schedule please contact [facility] directly at [phone].  Normal or non-critical lab and imaging results will be communicated to you by MyChart, letter or phone within 4 business days after the clinic has received the results. If you do not hear from us within 7 days, please contact the clinic through MyChart or phone. If you have a critical or abnormal lab result, we will notify you by phone as soon as possible.  Submit refill requests through [employer] or call your pharmacy and they will forward the refill request to us. Please allow 3 business days for your refill to be completed.          Additional Information About Your Visit        [employer] Information     [employer] gives you secure access to your electronic health record. If you see a primary care provider, you  can also send messages to your care team and make appointments. If you have questions, please call your primary care clinic.  If you do not have a primary care provider, please call [phone] and they will assist you.        Care EveryWhere ID     This is your Care EveryWhere ID. This could be used by other organizations to access your [place] medical records  [id number]         Blood Pressure from Last 3 Encounters:   08/03/17 132/74   06/06/17 124/74   05/30/17 141/80    Weight from Last 3 Encounters:   08/03/17 111.4 kg (245 lb 9.6 oz)   07/24/17 112.8 kg (248 lb 11.2 oz)   05/30/17 109.7 kg (241 lb 12.8 oz)              Today, you had the following     No orders found for display         Today's Medication Changes          These changes are accurate as of: 8/21/17  9:16 AM.  If you have any questions, ask your nurse or doctor.               These medicines have changed or have updated prescriptions.        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   Quantity:  180 tablet   Refills:  4                Primary Care Provider Office Phone # Fax #    Moris Diaz [phone]       [facility] [address] [facility] [address]  [facility] [address]        Equal Access to Services     [facility]ID[facility] AH: Hadii av simpson hadasho Soomaali, waaxda luqadaha, qaybta kaalmada destiny, devin perez. So [facility] [phone].    ATENCIÓN: Si habla español, tiene a carr disposición servicios gratuitos de asistencia lingüística. Llame al [phone].    We comply with applicable federal civil rights laws and [place] laws. We do not discriminate on the basis of race, color, national origin, age, disability sex, sexual orientation or gender identity.            Thank you!     Thank you for choosing [facility]  for your care. Our goal is always to provide you with excellent care. Hearing back from our patients is one way we can continue to improve our services. Please take a few minutes to complete the written survey that you may receive in the mail after your visit with us. Thank you!             Your Updated Medication List - Protect others around you: Learn how to safely use, store and throw away your medicines at www.disposemymeds.org.          This list is accurate as of: 8/21/17  9:16 AM.  Always use your most recent med list.                   Brand Name Dispense Instructions for use Diagnosis    allopurinol 100 MG tablet    ZYLOPRIM    180 tablet    Take 2 tablets by mouth daily.    Kidney replaced by transplant, Gout       ALPHAGAN OP      2 drops daily        BABY ASPIRIN PO      Take 81 mg by mouth daily        BEVACizumab 400 MG/16ML injection    AVASTIN          cholecalciferol 5000 UNITS Tabs tablet    vitamin D3    30 tablet    Take 1 tablet (5,000 Units) by mouth daily    Hyperparathyroidism due to renal insufficiency (H)       CRESTOR 5 MG tablet   Generic drug:  rosuvastatin      Take 20 mg by mouth daily        * cycloSPORINE modified 100 MG capsule    GENERIC EQUIVALENT    60 capsule    Take 1 capsule (100 mg) by mouth 2 times daily (total dose 125 mg twice daily)    Kidney transplant recipient, Long-term use of immunosuppressant medication       * cycloSPORINE modified 25 MG capsule    GENERIC EQUIVALENT    60 capsule    Take 1 capsule (25 mg) by mouth 2 times daily (total dose 125 mg twice daily)    Kidney transplant recipient, Long-term use of immunosuppressant medication       ISOSORBIDE MONONITRATE PO      Take 60 mg by mouth daily        losartan 50 MG tablet    COZAAR    90 tablet    Take 1 tablet (50 mg) by mouth daily    Kidney replaced by transplant       metoprolol 100 MG tablet    LOPRESSOR    60 tablet    Take 1 tablet by mouth 2 times daily.    Hy kid NOS w cr kid  I-IV, Kidney replaced by transplant       NIFEdipine ER osmotic 30 MG 24 hr tablet    NIFEDICAL XL    180 tablet    Take 2 tablets by mouth daily.    Hyp kid NOS w cr kid V       NITROSTAT 0.4 MG sublingual tablet   Generic drug:  nitroGLYcerin      Place 0.4 mg under the tongue every 5 minutes as needed.        NONFORMULARY      Focus Macula Pro:  Eye vitamin and mineral supplement A, C, E, Zinc, Copper        PLAVIX 75 MG tablet   Generic drug:  clopidogrel      Take 75 mg by mouth daily.        predniSONE 5 MG tablet    DELTASONE     Take 5 mg by mouth daily.        probenecid 500 MG tablet    BENEMID     Take 500 mg by mouth 2 times daily.        sulfamethoxazole-trimethoprim 400-80 MG per tablet    BACTRIM/SEPTRA     Take 1 tablet by mouth daily Take on every other day        TAMSULOSIN HCL PO      Take 0.4 mg by mouth daily        timolol 0.25 % ophthalmic solution    TIMOPTIC     2 times daily.        TYLENOL ARTHRITIS PAIN 650 MG CR tablet   Generic drug:  acetaminophen      Take 2 tablets by mouth 3 times daily.        * Notice:  This list has 2 medication(s) that are the same as other medications prescribed for you. Read the directions carefully, and ask your doctor or other care provider to review them with you.

## 2017-08-21 NOTE — NURSING NOTE
"Chief Complaint   Patient presents with     Consult     consult with vanna DANIELS cma       Initial BP (!) 171/93  Pulse 79  Temp 98  F (36.7  C) (Oral)  Ht 1.854 m (6' 1\")  Wt 111.6 kg (246 lb)  BMI 32.46 kg/m2 Estimated body mass index is 32.46 kg/(m^2) as calculated from the following:    Height as of this encounter: 1.854 m (6' 1\").    Weight as of this encounter: 111.6 kg (246 lb).  Medication Reconciliation: complete    "

## 2017-08-21 NOTE — MR AVS SNAPSHOT
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    -  1    S/P kidney transplant        Chronic kidney disease, unspecified CKD stage           Follow-ups after your visit        Your next 10 appointments already scheduled     Feb 06, 2018 12:30 PM CST   Lab with  LAB   [facility] Lab ([facility])    [address] [facility]  [address] [facility] [address] [phone]            Feb 06, 2018  1:30 PM CST   (Arrive by 1:00 PM)   Return Visit with Christi Sun MD   [facility] Nephrology ([facility])    [address] [place] [address]  [address] [facility] [address] [phone]              Who to contact     If you have questions or need follow up information about today's clinic visit or your schedule please contact [facility] directly at [phone].  Normal or non-critical lab and imaging results will be communicated to you by "[employer]hart, letter or phone within 4 business days after the clinic has received the results. If you do not hear from us within 7 days, please contact the clinic through [employer] or phone. If you have a critical or abnormal lab result, we will notify you by phone as soon as possible.  Submit refill requests through [employer] or call your pharmacy and they will forward the refill request to us. Please allow 3 business days for your refill to be completed.          Additional Information About Your Visit        MyChart Information     [employer] gives you secure access to your electronic health record. If you see a primary care provider, you can also send messages to your care team and make appointments. If you  "have questions, please call your primary care clinic.  If you do not have a primary care provider, please call [phone] and they will assist you.        Care EveryWhere ID     This is your Care EveryWhere ID. This could be used by other organizations to access your [place] medical records  [id number]        Your Vitals Were     Pulse Temperature Height BMI (Body Mass Index)          79 98  F (36.7  C) (Oral) 1.854 m (6' 1\") 32.46 kg/m2         Blood Pressure from Last 3 Encounters:   08/21/17 (!) 171/93   08/21/17 (!) 171/93   08/03/17 132/74    Weight from Last 3 Encounters:   08/21/17 111.6 kg (246 lb)   08/21/17 112 kg (246 lb 14.4 oz)   08/03/17 111.4 kg (245 lb 9.6 oz)              Today, you had the following     No orders found for display         Today's Medication Changes          These changes are accurate as of: 8/21/17 11:59 PM.  If you have any questions, ask your nurse or doctor.               These medicines have changed or have updated prescriptions.        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   Quantity:  180 tablet   Refills:  4                Primary Care Provider Office Phone # Fax #    Moris Diaz [phone]       [facility] [address] [facility] [address]  [facility] [address]        Equal Access to Services     [facility] AH: Hadii aad ku hadasho Soomaali, waaxda luqadaha, qaybta kaalmada adeegyada, devin perez. So [facility] [phone].    ATENCIÓN: Si habla español, tiene a carr disposición servicios gratuitos de asistencia lingüística. Llame al [phone].    We comply with applicable federal civil rights laws and [place] laws. We do not discriminate on the basis of race, color, national origin, age, disability sex, sexual orientation or gender " identity.            Thank you!     Thank you for choosing [facility]  for your care. Our goal is always to provide you with excellent care. Hearing back from our patients is one way we can continue to improve our services. Please take a few minutes to complete the written survey that you may receive in the mail after your visit with us. Thank you!             Your Updated Medication List - Protect others around you: Learn how to safely use, store and throw away your medicines at www.disposemymeds.org.          This list is accurate as of: 8/21/17 11:59 PM.  Always use your most recent med list.                   Brand Name Dispense Instructions for use Diagnosis    allopurinol 100 MG tablet    ZYLOPRIM    180 tablet    Take 2 tablets by mouth daily.    Kidney replaced by transplant, Gout       ALPHAGAN OP      2 drops daily        BABY ASPIRIN PO      Take 81 mg by mouth daily        BEVACizumab 400 MG/16ML injection    AVASTIN          cholecalciferol 5000 UNITS Tabs tablet    vitamin D3    30 tablet    Take 1 tablet (5,000 Units) by mouth daily    Hyperparathyroidism due to renal insufficiency (H)       CRESTOR 5 MG tablet   Generic drug:  rosuvastatin      Take 20 mg by mouth daily        * cycloSPORINE modified 100 MG capsule    GENERIC EQUIVALENT    60 capsule    Take 1 capsule (100 mg) by mouth 2 times daily (total dose 125 mg twice daily)    Kidney transplant recipient, Long-term use of immunosuppressant medication       * cycloSPORINE modified 25 MG capsule    GENERIC EQUIVALENT    60 capsule    Take 1 capsule (25 mg) by mouth 2 times daily (total dose 125 mg twice daily)    Kidney transplant recipient, Long-term use of immunosuppressant medication       ISOSORBIDE MONONITRATE PO      Take 60 mg by mouth daily        losartan 50 MG tablet    COZAAR    90 tablet    Take 1 tablet (50 mg) by mouth daily    Kidney replaced by transplant       metoprolol 100 MG tablet    LOPRESSOR    60  tablet    Take 1 tablet by mouth 2 times daily.    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    Hyp kid NOS w cr kid V       NITROSTAT 0.4 MG sublingual tablet   Generic drug:  nitroGLYcerin      Place 0.4 mg under the tongue every 5 minutes as needed.        NONFORMULARY      Focus Macula Pro:  Eye vitamin and mineral supplement A, C, E, Zinc, Copper        PLAVIX 75 MG tablet   Generic drug:  clopidogrel      Take 75 mg by mouth daily.        predniSONE 5 MG tablet    DELTASONE     Take 5 mg by mouth daily.        probenecid 500 MG tablet    BENEMID     Take 500 mg by mouth 2 times daily.        sulfamethoxazole-trimethoprim 400-80 MG per tablet    BACTRIM/SEPTRA     Take 1 tablet by mouth daily Take on every other day        TAMSULOSIN HCL PO      Take 0.4 mg by mouth daily        timolol 0.25 % ophthalmic solution    TIMOPTIC     2 times daily.        TYLENOL ARTHRITIS PAIN 650 MG CR tablet   Generic drug:  acetaminophen      Take 2 tablets by mouth 3 times daily.        * Notice:  This list has 2 medication(s) that are the same as other medications prescribed for you. Read the directions carefully, and ask your doctor or other care provider to review them with you.

## 2017-08-21 NOTE — PROGRESS NOTES
Psychosocial Assessment  Patient Name/ Age: Rory Bustamante 61 year old   Medical Record #: [id number]  Duration of Interview:  45 min  Process:   Face-to-Face Interview                (counseling < 50%)   Present at Appointment: Rory and his wife Kajal        : TATIANA Lezama  Date:  2017        Type of transplant: Kidney    Donor type: Rory reported he does not have any potential donors at this time.     Cadaver   Prior Transplants:    Yes - Kidney  Status of Transplant: chronic rejection, not on dialysis       Current Living Situation    Location:   [address] [place], [place] [id number]  With Whom: lives with their family (wife and son Franki)       Family/ Social Support:    Rory reported he has two sons, Franki and Jay (lives in [place]). Rory has 9 siblings and 8 live near him. Rory reported his parents are .  available, helpful   Committed relationship: Rory has been  to Kajal for 39 years.      stable/supportive   Other supports: [employer] friends   available, helpful       Activities/ Functional Ability    Current level: ambulatory, visually impaired (glasses) and independent with ADL's     Transportation drives self       Vocational/Employment/Financial     Employment   disabled   Job Description   Rory reported he has been on SSDI for 3 years due to multiple medical issues. Gucci' wife Kajal is retired.    Income   SSDI and SS FDC (spouse)   Insurance      At this time, patient can afford medication costs:  Yes  private insurance (Cigna supplement) and Medicare    Discussed cost of Valcyte and the effect on the Medicare Part D [place]. Rory and Kajal expressed concerns about being able to afford Valcyte in the donut hole. Explained that unfortunately the grants for Valcyte are currently closed at this time and there is very little assistance available for this medication.        Medical Status    Current mode of treatment for ESRD kidney transplant    Complications none       Behavioral    Tobacco Use No Chemical Dependency No   Rory denied any tobacco use.  Rory denied any alcohol use, substance use, or history of chemical dependency treatment.      Psychiatric Impairment No  Rory denied any current or history of anxiety, depression, or mental health concerns.    Reading ability Good  Education level: Bachelor's Degree Recent Legal History No      Coping Style/Strategies: Rory reported he does not have any stress       Ability to Adhere to Complex Medical Regime: Yes     Adherence History: Rory reported he follow his physician's recommendations, takes his medications as prescribed, and attends his appointments as needed.        Education  _X_ Medicare  _X_ Rehabilitation  _X_ Donor issues  _X_ Community resources  _X_ Post discharge housing  _X_ Financial resources  _X_ Medical insurance options  _X_ Psych adjustment  _X_ Family adjustment  _X_ Health Care Directive Yes   Psychosocial Risks of Transplant Reviewed and Discussed:  _X_ Increased stress related to emotional,            family, social, employment or financial           situation  _X_ Affect on work and/or disability benefits  _X_ Affect on future health and life           insurance  _X_ Transplant outcome expectations may           not be met  _X_ Mental Health Risks: anxiety,           depression, PTSD, guilt, grief and           chronic fatigue     Notable Items:   Discussed cost of Valcyte and the effect on the Medicare Part D [place]. Rory and Kajal expressed concerns about being able to afford Valcyte in the donut hole. Explained that unfortunately the grants for Valcyte are currently closed at this time and there is very little assistance available for this medication.        Final Evaluation/Assessment   Patient seemed to process information well. Appeared well informed, motivated and able to follow post transplant requirements. Behavior was appropriate during interview. Has adequate  income and insurance coverage. Adequate social support. No major contraindications noted for transplant.  At this time patient appears to understand the risks and benefits of transplant.      Recommendation  Acceptable    Selection Criteria Met:  Plan for support Yes   Chemical Dependence Yes   Smoking Yes   Mental Health Yes   Adequate Finances Yes - Rory express concerned with affording Valcyte if in the Medicare Part D [facility].   Signature: TATIANA Lezama    Title: Clinical

## 2017-08-21 NOTE — NURSING NOTE
Patient roomed and ready for provider exam.     When writer requested this patients KDPI form he stated the surgeon informed him he did not need to fill one out at this time.

## 2017-08-22 ENCOUNTER — CARE COORDINATION (OUTPATIENT)
Dept: NEPHROLOGY | Facility: CLINIC | Age: 62
End: 2017-08-22

## 2017-08-22 LAB
ALBUMIN MFR UR ELPH: 80.5 %
ALPHA1 GLOB MFR UR ELPH: 3 %
ALPHA2 GLOB MFR UR ELPH: 3.7 %
B-GLOBULIN MFR UR ELPH: 4.8 %
GAMMA GLOB MFR UR ELPH: 8 %
KAPPA LC UR-MCNC: 3.56 MG/DL (ref 0.33–1.94)
KAPPA LC/LAMBDA SER: 0.91 {RATIO} (ref 0.26–1.65)
LAMBDA LC SERPL-MCNC: 3.93 MG/DL (ref 0.57–2.63)
M PROTEIN MFR UR ELPH: 0 %
PROT ELPH PNL UR ELPH: NORMAL
PROT PATTERN UR ELPH-IMP: ABNORMAL

## 2017-08-22 NOTE — PROGRESS NOTES
Received message from [place]a / Dr. Sun:    Patient above follows with Dr. Sun in kidney transplant/CKD clinic.  He has very high TGL which on fasting recheck remain >800.  Dr. Sun expressed concern that patient may need addt'l agent such as fenofibrate to treat. He does have a paraproteinemia work-up pending as well.  She did recommend to the patient diet adjustment and increased exercise with weight loss.     Attempted to call patient. Unable to leave a voicemail, no answer. Will try again later and send [employer] message.    Melani Mehta RN

## 2017-08-23 LAB
IGA SERPL-MCNC: 312 MG/DL (ref 70–380)
IGG SERPL-MCNC: 984 MG/DL (ref 695–1620)
IGM SERPL-MCNC: 74 MG/DL (ref 60–265)
PROT PATTERN SERPL IFE-IMP: NORMAL

## 2017-08-23 NOTE — PROGRESS NOTES
Spoke with patient. He is following up with his PCP next week and a cardiologist tomorrow. Said they are aware of triglyceride level. No further questions for nephrology at this time.    Melani Mehta RN

## 2017-08-28 ENCOUNTER — TRANSFERRED RECORDS (OUTPATIENT)
Dept: HEALTH INFORMATION MANAGEMENT | Facility: CLINIC | Age: 62
End: 2017-08-28

## 2017-09-01 ENCOUNTER — TELEPHONE (OUTPATIENT)
Dept: TRANSPLANT | Facility: CLINIC | Age: 62
End: 2017-09-01

## 2017-09-01 ENCOUNTER — TRANSFERRED RECORDS (OUTPATIENT)
Dept: HEALTH INFORMATION MANAGEMENT | Facility: CLINIC | Age: 62
End: 2017-09-01

## 2017-09-01 NOTE — TELEPHONE ENCOUNTER
Called pt today and spoke to him - reviewed the outcome of the Selection Committee this week. Explained I can add him onto the kidney wait list as soon as I have his KDPI form which pt currently has with him - pt to mail back to me.  I explained he will be added on INACTIVE status until his cardiology clearance is completed, needs to continue with dermatology and triglyceride management for optimization.  I explained I will call him again when I have received his KDPI form. Pt wanting to know what post transplant lab schedule he should be on - explained I will have his post transplant coordinator give him a call with this. Pt expressed good understanding of all and was in good agreement with the plan.

## 2017-09-01 NOTE — TELEPHONE ENCOUNTER
Spoke with Claribel lazo from [facility] who called to say pt is cleared for kidney transplant - I provided our fax # and she will fax the records today.

## 2017-09-14 ENCOUNTER — DOCUMENTATION ONLY (OUTPATIENT)
Dept: TRANSPLANT | Facility: CLINIC | Age: 62
End: 2017-09-14

## 2017-09-14 DIAGNOSIS — Z76.82 AWAITING ORGAN TRANSPLANT: Primary | ICD-10-CM

## 2017-09-14 NOTE — LETTER
September 14, 2017    Rory Bustamante  [address]  [address] [place] [id number]      Dear Mr. Bustamante,    It was a pleasure to see you here recently for consideration of kidney transplantation. Your pre-transplant evaluation appointments were on August 21, 2017. Your evaluation results were reviewed at our Multidisciplinary Selection Committee Meeting on August 30, 2017. The Committee has approved you as a candidate for kidney transplant pending the successful completion of the following things.     1. Cardiology clearance to proceed with kidney transplant surgery. Please follow-up with your local cardiologist for this. The records from this will need to be faxed to our Office at [phone] when available.   2. Lipid management needs to be optimized. Please follow-up with your regular physicians for this.    3. Hepatitis B and pneumovax vaccination records need to be faxed to our office at [phone].   4. Dental work needs to remain up to date.  Please make an appointment for a check-up if you have not had one in the last 12 months.     You have already been added onto the kidney transplant wait list here today, please see my separate letter regarding your listing. You have been placed on INACTIVE status due to needing all the above successfully completed. You will be notified by our Office when you can be changed to ACTIVE.      Please have any potential live donors register now online with our Program to [email or web address].  You will be notified by our Office in the event of an approved live donor.                              Please feel free to call me with any questions or concerns at [phone].            Sincerely,       Carolina Bull RN BSN Transplant Coordinator     Enclosure: [facility] Letter     CC: Moris Diaz MD (PCP), MD Jeremy Montague PA-C, RICHARD Simmons PA-C

## 2017-09-14 NOTE — LETTER
2017    Rory Bustamante  [address] [place] [address] [address]  [address] [place] [id number]      Dear Mr. Bustamante,    This letter is sent to confirm that you are a candidate in the kidney transplant program at the [facility].  You were placed on the kidney INACTIVE waitlist on 2017.  This means you will accumulate waiting time, but not receive  donor calls.  You have been placed on INACTIVE status due to an incomplete evaluation, please see my separate letter regarding the details of this. You will be notified by our Office when your status can be changed to ACTIVE.     Items we will need from you:      We will receive approval from your insurance company for the transplant procedure when your status is ready to change to ACTIVE.  It is critical that you notify us if there is any change in your insurance.  It is also important that you familiarize yourself with the details of your specific insurance policy.  Our patient  is available to assist you if you should have any questions regarding your coverage.      An ALA or PRA blood sample needs to be sent here every 3 months to match you with  donors or any potential living donors. Your next sample is due here by 2017.  You can have this done at our Clinic or any [facility] by simply making a Lab Appointment. In the event you go to an outside lab, special mailing boxes (called mailers) will be sent to you directly from the Outreach Department.  You should take the physician order form and the  to your home laboratory when it is time to for this testing to be done.  Additional mailers can be obtained by calling the Transplant Office and asking to speak to a kidney .      During this waiting period, we may request additional periodic laboratory tests with your primary physician.  It will be your responsibility to remind your physician to forward your results to the Transplant  Office.              We need to be kept informed of any changes in your medical condition such as:    o changes in your medications,   o significant changes in your health  o significant infections (such as pneumonia or abscesses)  o blood transfusions  o any condition which requires hospitalization  o any surgery      Remember to complete any routine cancer screening tests required before your transplant.  This includes colonoscopy; prostrate screening for men, and mammogram and gynecologic testing for women, as well as dental work.  Your primary care clinic can assist you with arranging for these exams.  Remind your caregivers to forward copies of the records and final reports.    We want you to know that our program has physician and surgeon coverage 24 hours a day, 365 days a year. If this coverage changes or there are substantial program changes, you will be notified in writing by letter.     Attached is a letter from the [facility] ([facility]). It describes the services and information offered to patients by [facility] and the [facility] Trans[facility] Network.    We appreciate having had the opportunity to participate in your care.  If you have questions, please feel free to call the Transplant Office at [phone] or [phone].      Sincerely,       Kidney Transplant Program    Enclosures: ALA/BRYANT Physician Order, Telephone Contact List, Travel Resources, [facility] Letter, Waitlist Information Update and While You Are Waiting  CC:   Moris Diaz MD (PCP), Christi Sun MD, Jeremy Sage PA-C, RICHARD Anne PA-c

## 2017-09-14 NOTE — LETTER
PHYSICIAN ORDER   ALA/PRA BLOOD    DATE & TIME ISSUED: 20173:14 PM  PATIENT NAME: Rory Bustamante   : 1955     [facility] MR# [if applicable]: [id number]     DIAGNOSIS/ICD-10 CODE: Z76.82  EXPIRES: (1 YEAR AFTER DATE ISSUED)  EVERY 12 weeks   1. Please draw 20ml of blood in red top (plain) tube for Antileukocyte Antibody (ALA or PRA).   2. Label tubes with the patient s name, complete lab slip.        3. Mailers, lab slips with instructions are sent to patient separately.     4. Call the Outreach Lab at [phone] to reorder mailers.      5. Mail blood to (this address is also on the mailers):    IMMUNOLOGY LABORATORY  [facility]  Room [phone]  [address]        Luke Chu MD  Surgical Director, Kidney Transplantation

## 2017-09-14 NOTE — PROGRESS NOTES
Added patient today 09/14/2017 onto the kidney transplant wait list in OS on INACTIVE status due to an incomplete evaluation. Pt is not yet on dialysis yet. Pt does qualify for wait time with an est GFR 20 on 08/03/2017.  Entered option of KDPI offers with KDPI > 85 % - consent signed. Generated listing letter, PRA order, Transplant Evaluation Summary Letter in EPIC - routed to .

## 2017-09-19 ENCOUNTER — MEDICAL CORRESPONDENCE (OUTPATIENT)
Dept: TRANSPLANT | Facility: CLINIC | Age: 62
End: 2017-09-19

## 2017-09-19 ENCOUNTER — TELEPHONE (OUTPATIENT)
Dept: TRANSPLANT | Facility: CLINIC | Age: 62
End: 2017-09-19

## 2017-09-19 NOTE — TELEPHONE ENCOUNTER
Spoke with pt today who called to ask about transplant requirements for lipid management - instructed pt to continue to work with his regular doctor on this and explain to him that it needs to be at as good of levels as they can achieve. Pt also had questions about dental work requirements which I answered.

## 2017-10-05 DIAGNOSIS — Z48.298 AFTERCARE FOLLOWING ORGAN TRANSPLANT: ICD-10-CM

## 2017-10-05 PROCEDURE — 80158 DRUG ASSAY CYCLOSPORINE: CPT | Performed by: INTERNAL MEDICINE

## 2017-10-08 LAB
CYCLOSPORINE BLD LC/MS/MS-MCNC: 90 UG/L (ref 50–400)
TME LAST DOSE: NORMAL H

## 2017-10-09 ENCOUNTER — TELEPHONE (OUTPATIENT)
Dept: TRANSPLANT | Facility: CLINIC | Age: 62
End: 2017-10-09

## 2017-10-09 NOTE — LETTER
OUTPATIENT LABORATORY TEST ORDER    DATE & TIME ISSUED: 2017 10:21 AM  PATIENT NAME: Rory Bustamante   : 1955       DIAGNOSIS / ICD - 10 CODES    Kidney Transplanted (Z94.0)    After Care Following Organ Transplant (Z48.298)    Long Term Use of Medication (Z79.899)    Complications Kidney Transplant (T86.10)    Creatinine Elevation (R79.89)        Please recheck Basic metabolic panel.      Lab results to be available on the same day drawn.  Patient should release information to the [facility].   Please fax to the Transplant Center at [phone].  Any questions please call [phone].  Thank you.

## 2017-10-10 ENCOUNTER — MEDICAL CORRESPONDENCE (OUTPATIENT)
Dept: HEALTH INFORMATION MANAGEMENT | Facility: CLINIC | Age: 62
End: 2017-10-10

## 2017-10-10 ENCOUNTER — TRANSFERRED RECORDS (OUTPATIENT)
Dept: HEALTH INFORMATION MANAGEMENT | Facility: CLINIC | Age: 62
End: 2017-10-10

## 2017-10-13 ENCOUNTER — TELEPHONE (OUTPATIENT)
Dept: TRANSPLANT | Facility: CLINIC | Age: 62
End: 2017-10-13

## 2017-10-16 ENCOUNTER — RESULTS ONLY (OUTPATIENT)
Dept: OTHER | Facility: CLINIC | Age: 62
End: 2017-10-16

## 2017-10-16 DIAGNOSIS — Z94.0 KIDNEY TRANSPLANTED: ICD-10-CM

## 2017-10-16 DIAGNOSIS — T86.10 COMPLICATION OF TRANSPLANTED KIDNEY: ICD-10-CM

## 2017-10-16 PROCEDURE — 86832 HLA CLASS I HIGH DEFIN QUAL: CPT | Performed by: INTERNAL MEDICINE

## 2017-10-16 PROCEDURE — 86833 HLA CLASS II HIGH DEFIN QUAL: CPT | Performed by: INTERNAL MEDICINE

## 2017-10-19 LAB — PRA DONOR SPECIFIC ABY: NORMAL

## 2017-10-20 ENCOUNTER — TRANSFERRED RECORDS (OUTPATIENT)
Dept: HEALTH INFORMATION MANAGEMENT | Facility: CLINIC | Age: 62
End: 2017-10-20

## 2017-10-23 LAB
DONOR IDENTIFICATION: NORMAL
DSA COMMENTS: NORMAL
DSA PRESENT: NORMAL
DSA TEST METHOD: NORMAL
ORGAN: NORMAL
SA1 CELL: NORMAL
SA1 COMMENTS: NORMAL
SA1 HI RISK ABY: NORMAL
SA1 MOD RISK ABY: NORMAL
SA1 TEST METHOD: NORMAL
SA2 CELL: NORMAL
SA2 COMMENTS: NORMAL
SA2 HI RISK ABY UA: NORMAL
SA2 MOD RISK ABY: NORMAL
SA2 TEST METHOD: NORMAL
UNOS CPRA: 98

## 2017-10-24 ENCOUNTER — TELEPHONE (OUTPATIENT)
Dept: TRANSPLANT | Facility: CLINIC | Age: 62
End: 2017-10-24

## 2017-10-24 NOTE — TELEPHONE ENCOUNTER
Returned a call today to patient and spoke to him at length. Pt had several questions and was not clear on what provider/coordinator was doing what in his health care. I reviewed in detail the purpose of nephrologist, primary care MD, pre kidney transplant coord, post kidney transplant coord and the routines/purpose of each. Pt not sure of how to fax records to the Transplant Coordinator's office - I provided this number. I explained I have received a cardiology clearance note from a local provider and will have Dr. Sun look at this for transplant approval. Pt stated he has already sent his PRA sample again - I reviewed that it was due 11/03/2017 - pt seemed aware of this but decided to send it anyway. I told him I will call him again after cardiology note reveiwed. Pt expressed good understanding of all and was in good agreement with the plan.     Staff message to Dr. Sun to review cardiology note.

## 2017-10-31 ENCOUNTER — TELEPHONE (OUTPATIENT)
Dept: TRANSPLANT | Facility: CLINIC | Age: 62
End: 2017-10-31

## 2017-10-31 DIAGNOSIS — Z76.82 AWAITING ORGAN TRANSPLANT: Primary | ICD-10-CM

## 2017-10-31 NOTE — TELEPHONE ENCOUNTER
Called pt today and spoke with him. Explained that Dr. Sun wants him to be seen here by a cardiologist for evaluation pre kidney transplant to get a recommendation to proceed with kidney transplant. Pt agreeable to this appt. Explained a  will call him soon to set up appt. Instructed him to call me with quesitons. Pt expressed good understanding of all and was in good agreement with the plan.

## 2017-10-31 NOTE — Clinical Note
Please see order for cardiologist appt. Pt is in pre kidney transplant eval and needs cardiology assessment and recommendation to proceed with kidney transplant surgery. Thanks Carolina

## 2017-11-09 ASSESSMENT — ENCOUNTER SYMPTOMS
LEG PAIN: 0
LOSS OF CONSCIOUSNESS: 0
TREMORS: 1
BLOATING: 0
DISTURBANCES IN COORDINATION: 0
NECK MASS: 0
NUMBNESS: 1
TROUBLE SWALLOWING: 0
EXERCISE INTOLERANCE: 1
SEIZURES: 0
BLOOD IN STOOL: 0
CONSTIPATION: 1
HOARSE VOICE: 0
SINUS PAIN: 0
ORTHOPNEA: 0
DIZZINESS: 1
RECTAL PAIN: 0
SLEEP DISTURBANCES DUE TO BREATHING: 0
JAUNDICE: 0
SYNCOPE: 0
BOWEL INCONTINENCE: 0
PARALYSIS: 0
SPEECH CHANGE: 0
HYPERTENSION: 1
SINUS CONGESTION: 1
NAUSEA: 1
PALPITATIONS: 0
HYPOTENSION: 1
DIARRHEA: 1
VOMITING: 0
TASTE DISTURBANCE: 0
WEAKNESS: 1
ABDOMINAL PAIN: 0
SORE THROAT: 0
SMELL DISTURBANCE: 0
LIGHT-HEADEDNESS: 1
MEMORY LOSS: 0
HEARTBURN: 1
TINGLING: 1
HEADACHES: 0

## 2017-11-11 ENCOUNTER — OFFICE VISIT (OUTPATIENT)
Dept: CARDIOLOGY | Facility: CLINIC | Age: 62
End: 2017-11-11
Attending: INTERNAL MEDICINE
Payer: MEDICARE

## 2017-11-11 VITALS
WEIGHT: 243.3 LBS | DIASTOLIC BLOOD PRESSURE: 90 MMHG | SYSTOLIC BLOOD PRESSURE: 169 MMHG | BODY MASS INDEX: 32.25 KG/M2 | OXYGEN SATURATION: 97 % | HEIGHT: 73 IN | HEART RATE: 70 BPM

## 2017-11-11 DIAGNOSIS — N18.4 CKD (CHRONIC KIDNEY DISEASE) STAGE 4, GFR 15-29 ML/MIN (H): ICD-10-CM

## 2017-11-11 DIAGNOSIS — Z01.810 PRE-OPERATIVE CARDIOVASCULAR EXAMINATION: Primary | ICD-10-CM

## 2017-11-11 PROCEDURE — 99204 OFFICE O/P NEW MOD 45 MIN: CPT | Mod: ZP | Performed by: INTERNAL MEDICINE

## 2017-11-11 PROCEDURE — 99213 OFFICE O/P EST LOW 20 MIN: CPT | Mod: ZF

## 2017-11-11 ASSESSMENT — PAIN SCALES - GENERAL: PAINLEVEL: NO PAIN (0)

## 2017-11-11 NOTE — PATIENT INSTRUCTIONS
Patient Instructions:  It was a pleasure to see you in the cardiology clinic today.      If you have any questions, you can reach my nurse, Toni Eisenberg at [phone].  Press Option #1 for the [facility], and then press Option #3 for nursing.  We are encouraging the use of [employer] to communicate with your HealthCare Provider    Please follow up with Dr. JERRICA Mcintyre as needed    Sincerely,    JERRICA Mcintyre MD     If you have an urgent need after hours (8:00 am to 4:30 pm) please call [phone] and ask for the cardiology fellow on call.

## 2017-11-11 NOTE — PROGRESS NOTES
SUBJECTIVE:  Rory Bustamante is a 62 year old male who presents for Renal Tx evaluation.    Patient with CGN. Had first Tx in 1989(Brother as donor). Now failing kidney function. Not on HD.    Not very active due to spinal stenosis. Occasional chest pain,especially after food.    Had dRCA stent on 7/26/2011. Had repeat angiogram after an abnormal stress echo in 2012. 60% ISR. Distal 99% stenosis. Attempted PCI. Not successful.    HTN+. On statin. No DM. Non smoker.    Patient Active Problem List    Diagnosis Date Noted     Proteinuria 11/01/2015     Priority: Medium     recurrent MPGN on biopsy       Peripheral neuropathy 10/27/2015     Priority: Medium     Gout 08/17/2015     Priority: Medium     Warts      Priority: Medium     S/P kidney transplant 06/05/2012     Priority: Medium     Essential hypertension 06/05/2012     Priority: Medium     Problem list name updated by automated process. Provider to review       CAD (coronary artery disease) 07/01/2011     Priority: Medium     MI in July 2011, stent placed, on plavix and aspirin      .  Current Outpatient Prescriptions   Medication Sig     BEVACizumab (AVASTIN) 400 MG/16ML injection      losartan (COZAAR) 50 MG tablet Take 1 tablet (50 mg) by mouth daily     cycloSPORINE modified (GENERIC EQUIVALENT) 100 MG capsule Take 1 capsule (100 mg) by mouth 2 times daily (total dose 125 mg twice daily)     cycloSPORINE modified (GENERIC EQUIVALENT) 25 MG capsule Take 1 capsule (25 mg) by mouth 2 times daily (total dose 125 mg twice daily)     cholecalciferol (VITAMIN D3) 5000 UNITS TABS tablet Take 1 tablet (5,000 Units) by mouth daily     BABY ASPIRIN PO Take 81 mg by mouth daily     NONFORMULARY 2 times daily Focus Macula Pro:  Eye vitamin and mineral supplement A, C, E, Zinc, Copper      ISOSORBIDE MONONITRATE PO Take 60 mg by mouth daily      TAMSULOSIN HCL PO Take 0.4 mg by mouth daily      allopurinol (ZYLOPRIM) 100 MG tablet Take 2 tablets by mouth daily.      NIFEdipine osmotic (NIFEDICAL XL) 30 MG 24 hr tablet Take 2 tablets by mouth daily. (Patient taking differently: Take 90 mg by mouth daily )     metoprolol (LOPRESSOR) 100 MG tablet Take 1 tablet by mouth 2 times daily.     rosuvastatin (CRESTOR) 5 MG tablet Take 20 mg by mouth daily      nitroGLYCERIN (NITROSTAT) 0.4 MG SL tablet Place 0.4 mg under the tongue every 5 minutes as needed.     clopidogrel (PLAVIX) 75 MG tablet Take 75 mg by mouth daily.     predniSONE (DELTASONE) 5 MG tablet Take 5 mg by mouth daily.     probenecid (BENEMID) 500 MG tablet Take 500 mg by mouth 2 times daily.     sulfamethoxazole-trimethoprim (BACTRIM,SEPTRA) 400-80 MG per tablet Take 1 tablet by mouth daily Take on every other day     acetaminophen (TYLENOL ARTHRITIS PAIN) 650 MG CR tablet Take 2 tablets by mouth 3 times daily.     timolol (TIMOPTIC) 0.25 % ophthalmic solution 2 times daily.     Brimonidine Tartrate (ALPHAGAN OP) 2 drops daily     No current facility-administered medications for this visit.      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  HLA identical transplant.  Biopsy in 4/2008 with recurrent MPGN type 2, mild interstitial fibrosis and tubular atrophy, CNI toxicity     Spinal stenosis 2013     Warts      Past Surgical History:   Procedure Laterality Date     C TOTAL KNEE ARTHROPLASTY  2015     TRANSPLANT KIDNEY RECIPIENT LIVING RELATED  1989    HLA identical     Allergies   Allergen Reactions     Contrast Dye       "Simvastatin Muscle Pain (Myalgia)     Social History     Social History     Marital status:      Spouse name: N/A     Number of children: N/A     Years of education: N/A     Occupational History     Not on file.     Social History Main Topics     Smoking status: Never Smoker     Smokeless tobacco: Never Used     Alcohol use No     Drug use: No     Sexual activity: Not on file     Other Topics Concern     Not on file     Social History Narrative     Family History   Problem Relation Age of Onset     DIABETES Mother      Colon Cancer Mother 78     Cardiac Sudden Death Sister 87     DIABETES Brother      CEREBROVASCULAR DISEASE Brother           REVIEW OF SYSTEMS:  General: negative, fever, chills, night sweats  Skin: negative, acne, rash and scaling  Eyes: negative, double vision, eye pain and photophobia  Ears/Nose/Throat: negative, nasal congestion and purulent rhinorrhea  Respiratory: No dyspnea on exertion, No cough, No hemoptysis and negative  Cardiovascular: negative, palpitations, tachycardia, irregular heart beat, chest pain, paroxysmal nocturnal dyspnea, dyspnea on exertion and orthopnea  Gastrointestinal: negative, dysphagia, nausea and vomiting  Genitourinary: negative, nocturia, dysuria and frequency  Musculoskeletal: negative, fracture, neck pain and joint swelling  Neurologic: negative, headaches, syncope, stroke, seizures and paralysis  Psychiatric: negative, nervous breakdown, thoughts of self-harm and thoughts of hurting someone else  Hematologic/Lymphatic/Immunologic: negative, bleeding disorder, chills and fever  Endocrine: negative, cold intolerance, heat intolerance and hot flashes       OBJECTIVE:  Blood pressure 169/90, pulse 70, height 1.854 m (6' 1\"), weight 110.4 kg (243 lb 4.8 oz), SpO2 97 %.  General Appearance: alert and no distress  Head: Normocephalic. No masses, lesions, tenderness or abnormalities  Eyes: conjuctiva clear, PERRL, EOM intact  Ears: External ears normal. Canals " clear. TM's normal.  Nose: Nares normal  Mouth: normal  Neck: Supple, no cervical adenopathy, no thyromegaly  Lungs: clear to auscultation  Cardiac: regular rate and rhythm, normal S1 and S2, no murmur  Abdomen: Soft, nontender.  Normal bowel sounds.  No hepatosplenomegaly or abnormal masses  Extremities: no peripheral edema, peripheral pulses normal  Musculoskeletal: negative  Neurological: Cranial nerves 2-12 intact, motor strength intact       ASSESSMENT/LUIS:  62 yr old male here for evaluation for a second Kidney Tx. CKD due to CGN. Not on HD now. First Tx in 1989.  HTN+ On Statin. No DM. Non smoker.  dRCA stent 2011. Repeat angiogram in 2012. Very distal RCA with 99% stenosis. Unsutable for PCI as bifurcation lesion. [facility].  Recent echo normal.  Stress MPI 8/2016. Normal except minimal ischemia of basal inferior wall corresponding to RCA disease unsuitable for intervention.     He may proceed with transplant. No reason to repeat an angiogram now as lesion unsuitable for PCI and the ischemic burden is minimal. May have minimal Trop elevation post op No other significant past medical history or family history. Intervention can prevent this.Discussed with patient with this possibility.  Per orders.   Return to Clinic PRN.  Answers for HPI/ROS submitted by the patient on 11/9/2017   General Symptoms: No  Skin Symptoms: No  HENT Symptoms: Yes  EYE SYMPTOMS: No  HEART SYMPTOMS: Yes  LUNG SYMPTOMS: No  INTESTINAL SYMPTOMS: Yes  URINARY SYMPTOMS: No  REPRODUCTIVE SYMPTOMS: No  SKELETAL SYMPTOMS: No  BLOOD SYMPTOMS: No  NERVOUS SYSTEM SYMPTOMS: Yes  MENTAL HEALTH SYMPTOMS: No  Ear pain: No  Ear discharge: No  Hearing loss: No  Tinnitus: Yes  Nosebleeds: No  Congestion: Yes  Sinus pain: No  Trouble swallowing: No   Voice hoarseness: No  Mouth sores: No  Sore throat: No  Tooth pain: No  Gum tenderness: Yes  Bleeding gums: No  Change in taste: No  Change in sense of smell: No  Dry mouth: Yes  Hearing  aid used: No  Neck lump: No  Chest pain or pressure: No  Fast or irregular heartbeat: No  Pain in legs with walking: No  Trouble breathing while lying down: No  Fingers or toes appear blue: No  High blood pressure: Yes  Low blood pressure: Yes  Fainting: No  Murmurs: No  Pacemaker: No  Varicose veins: No  Edema or swelling: Yes  Wake up at night with shortness of breath: No  Light-headedness: Yes  Exercise intolerance: Yes  Heart burn or indigestion: Yes  Nausea: Yes  Vomiting: No  Abdominal pain: No  Bloating: No  Constipation: Yes  Diarrhea: Yes  Blood in stool: No  Black stools: No  Rectal or Anal pain: No  Fecal incontinence: No  Yellowing of skin or eyes: No  Vomit with blood: No  Change in stools: No  Trouble with coordination: No  Dizziness or trouble with balance: Yes  Fainting or black-out spells: No  Memory loss: No  Headache: No  Seizures: No  Speech problems: No  Tingling: Yes  Tremor: Yes  Weakness: Yes  Difficulty walking: No  Paralysis: No  Numbness: Yes

## 2017-11-11 NOTE — NURSING NOTE
Chief Complaint   Patient presents with     New Patient     Pt is in pre kidney transplant evaluation. Please see pt for cardiology assessment and recommendation to proceed with kidney transplant surgery     Vitals were taken and medications were reconciled.  ALIRIO Dahl  10:17 AM

## 2017-11-11 NOTE — LETTER
11/11/2017      RE: Rory Bustamante  [address]  [address]  [place] [address]       Dear Colleague,    Thank you for the opportunity to participate in the care of your patient, Rory Bustamante, at the [facility]. Please see a copy of my visit note below.       SUBJECTIVE:  Rory Bustamante is a 62 year old male who presents for Renal Tx evaluation.    Patient with CGN. Had first Tx in 1989(Brother as donor). Now failing kidney function. Not on HD.    Not very active due to spinal stenosis. Occasional chest pain,especially after food.    Had dRCA stent on 7/26/2011. Had repeat angiogram after an abnormal stress echo in 2012. 60% ISR. Distal 99% stenosis. Attempted PCI. Not successful.    HTN+. On statin. No DM. Non smoker.    Patient Active Problem List    Diagnosis Date Noted     Proteinuria 11/01/2015     Priority: Medium     recurrent MPGN on biopsy       Peripheral neuropathy 10/27/2015     Priority: Medium     Gout 08/17/2015     Priority: Medium     Warts      Priority: Medium     S/P kidney transplant 06/05/2012     Priority: Medium     Essential hypertension 06/05/2012     Priority: Medium     Problem list name updated by automated process. Provider to review       CAD (coronary artery disease) 07/01/2011     Priority: Medium     MI in July 2011, stent placed, on plavix and aspirin      .  Current Outpatient Prescriptions   Medication Sig     BEVACizumab (AVASTIN) 400 MG/16ML injection      losartan (COZAAR) 50 MG tablet Take 1 tablet (50 mg) by mouth daily     cycloSPORINE modified (GENERIC EQUIVALENT) 100 MG capsule Take 1 capsule (100 mg) by mouth 2 times daily (total dose 125 mg twice daily)     cycloSPORINE modified (GENERIC EQUIVALENT) 25 MG capsule Take 1 capsule (25 mg) by mouth 2 times daily (total dose 125 mg twice daily)     cholecalciferol (VITAMIN D3) 5000 UNITS TABS tablet Take 1 tablet (5,000 Units) by mouth daily     BABY ASPIRIN PO Take 81 mg by  mouth daily     NONFORMULARY 2 times daily Focus Macula Pro:  Eye vitamin and mineral supplement A, C, E, Zinc, Copper      ISOSORBIDE MONONITRATE PO Take 60 mg by mouth daily      TAMSULOSIN HCL PO Take 0.4 mg by mouth daily      allopurinol (ZYLOPRIM) 100 MG tablet Take 2 tablets by mouth daily.     NIFEdipine osmotic (NIFEDICAL XL) 30 MG 24 hr tablet Take 2 tablets by mouth daily. (Patient taking differently: Take 90 mg by mouth daily )     metoprolol (LOPRESSOR) 100 MG tablet Take 1 tablet by mouth 2 times daily.     rosuvastatin (CRESTOR) 5 MG tablet Take 20 mg by mouth daily      nitroGLYCERIN (NITROSTAT) 0.4 MG SL tablet Place 0.4 mg under the tongue every 5 minutes as needed.     clopidogrel (PLAVIX) 75 MG tablet Take 75 mg by mouth daily.     predniSONE (DELTASONE) 5 MG tablet Take 5 mg by mouth daily.     probenecid (BENEMID) 500 MG tablet Take 500 mg by mouth 2 times daily.     sulfamethoxazole-trimethoprim (BACTRIM,SEPTRA) 400-80 MG per tablet Take 1 tablet by mouth daily Take on every other day     acetaminophen (TYLENOL ARTHRITIS PAIN) 650 MG CR tablet Take 2 tablets by mouth 3 times daily.     timolol (TIMOPTIC) 0.25 % ophthalmic solution 2 times daily.     Brimonidine Tartrate (ALPHAGAN OP) 2 drops daily     No current facility-administered medications for this visit.      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  HLA identical transplant.  Biopsy in 4/2008 with recurrent MPGN type 2, mild  interstitial fibrosis and tubular atrophy, CNI toxicity     Spinal stenosis 2013     Warts      Past Surgical History:   Procedure Laterality Date     C TOTAL KNEE ARTHROPLASTY  2015     TRANSPLANT KIDNEY RECIPIENT LIVING RELATED  1989    HLA identical     Allergies   Allergen Reactions     Contrast Dye      Simvastatin Muscle Pain (Myalgia)     Social History     Social History     Marital status:      Spouse name: N/A     Number of children: N/A     Years of education: N/A     Occupational History     Not on file.     Social History Main Topics     Smoking status: Never Smoker     Smokeless tobacco: Never Used     Alcohol use No     Drug use: No     Sexual activity: Not on file     Other Topics Concern     Not on file     Social History Narrative     Family History   Problem Relation Age of Onset     DIABETES Mother      Colon Cancer Mother 78     Cardiac Sudden Death Sister 87     DIABETES Brother      CEREBROVASCULAR DISEASE Brother           REVIEW OF SYSTEMS:  General: negative, fever, chills, night sweats  Skin: negative, acne, rash and scaling  Eyes: negative, double vision, eye pain and photophobia  Ears/Nose/Throat: negative, nasal congestion and purulent rhinorrhea  Respiratory: No dyspnea on exertion, No cough, No hemoptysis and negative  Cardiovascular: negative, palpitations, tachycardia, irregular heart beat, chest pain, paroxysmal nocturnal dyspnea, dyspnea on exertion and orthopnea  Gastrointestinal: negative, dysphagia, nausea and vomiting  Genitourinary: negative, nocturia, dysuria and frequency  Musculoskeletal: negative, fracture, neck pain and joint swelling  Neurologic: negative, headaches, syncope, stroke, seizures and paralysis  Psychiatric: negative, nervous breakdown, thoughts of self-harm and thoughts of hurting someone else  Hematologic/Lymphatic/Immunologic: negative, bleeding disorder, chills and fever  Endocrine: negative, cold intolerance, heat intolerance and hot flashes       " OBJECTIVE:  Blood pressure 169/90, pulse 70, height 1.854 m (6' 1\"), weight 110.4 kg (243 lb 4.8 oz), SpO2 97 %.  General Appearance: alert and no distress  Head: Normocephalic. No masses, lesions, tenderness or abnormalities  Eyes: conjuctiva clear, PERRL, EOM intact  Ears: External ears normal. Canals clear. TM's normal.  Nose: Nares normal  Mouth: normal  Neck: Supple, no cervical adenopathy, no thyromegaly  Lungs: clear to auscultation  Cardiac: regular rate and rhythm, normal S1 and S2, no murmur  Abdomen: Soft, nontender.  Normal bowel sounds.  No hepatosplenomegaly or abnormal masses  Extremities: no peripheral edema, peripheral pulses normal  Musculoskeletal: negative  Neurological: Cranial nerves 2-12 intact, motor strength intact       ASSESSMENT/LUIS:  62 yr old male here for evaluation for a second Kidney Tx. CKD due to CGN. Not on HD now. First Tx in 1989.  HTN+ On Statin. No DM. Non smoker.  dRCA stent 2011. Repeat angiogram in 2012. Very distal RCA with 99% stenosis. Unsutable for PCI as bifurcation lesion. Reviewed records from [facility].  Recent echo normal.  Stress MPI 8/2016. Normal except minimal ischemia of basal inferior wall corresponding to RCA disease unsuitable for intervention.     He may proceed with transplant. No reason to repeat an angiogram now as lesion unsuitable for PCI and the ischemic burden is minimal. May have minimal Trop elevation post op No other significant past medical history or family history. Intervention can prevent this.Discussed with patient with this possibility.  Per orders.   Return to Clinic PRN.    Sincerely,     JERRICA Kelley MD    "

## 2017-11-11 NOTE — MR AVS SNAPSHOT
After Visit Summary   11/11/2017    Rory Bustamante    MRN: [id number]           Patient Information     Date Of Birth          1955        Visit Information        Provider Department      11/11/2017 11:00 AM JERRICA Kelley MD [facility] Instructions    Patient Instructions:  It was a pleasure to see you in the cardiology clinic today.      If you have any questions, you can reach my nurse, Toni Eisenberg at [phone].  Press Option #1 for the [facility], and then press Option #3 for nursing.  We are encouraging the use of [employer] to communicate with your HealthCare Provider    Please follow up with Dr. JERRICA Mcintyre as needed    Sincerely,    JERRICA Mcintyre MD     If you have an urgent need after hours (8:00 am to 4:30 pm) please call [phone] and ask for the cardiology fellow on call.                    Follow-ups after your visit        Your next 10 appointments already scheduled     Nov 11, 2017 11:00 AM CST   (Arrive by 10:45 AM)   NEW PANCREAS/KIDNEY TRANSPLANT WORK-UP with JERRICA Kelley MD   [facility] ([facility])    [address] [place] [id number] [phone]            Feb 06, 2018 12:30 PM CST   Lab with  LAB   [facility] ([facility])    [address] [place] [id number] [phone]            Feb 06, 2018  1:30 PM CST   (Arrive by 1:00 PM)   Return Visit with Christi Sun MD   [facility] Neph[facility] ([facility])    [address] [place] [id number] [phone]              Who to contact     If you have questions or need follow up information about today's clinic visit or your schedule please contact [facility] directly at [phone].  Normal or non-critical lab and imaging results will be communicated to you by MyChart, letter or phone within 4  "business days after the clinic has received the results. If you do not hear from us within 7 days, please contact the clinic through [email or web address] or phone. If you have a critical or abnormal lab result, we will notify you by phone as soon as possible.  Submit refill requests through [email or web address] or call your pharmacy and they will forward the refill request to us. Please allow 3 business days for your refill to be completed.          Additional Information About Your Visit        [email or web address] Information     [email or web address] gives you secure access to your electronic health record. If you see a primary care provider, you can also send messages to your care team and make appointments. If you have questions, please call your primary care clinic.  If you do not have a primary care provider, please call [phone] and they will assist you.        Care EveryWhere ID     This is your Care EveryWhere ID. This could be used by other organizations to access your [facility] medical records  [id number]        Your Vitals Were     Pulse Height Pulse Oximetry BMI (Body Mass Index)          70 1.854 m (6' 1\") 97% 32.1 kg/m2         Blood Pressure from Last 3 Encounters:   11/11/17 169/90   08/21/17 (!) 171/93   08/21/17 (!) 171/93    Weight from Last 3 Encounters:   11/11/17 110.4 kg (243 lb 4.8 oz)   08/21/17 111.6 kg (246 lb)   08/21/17 112 kg (246 lb 14.4 oz)              Today, you had the following     No orders found for display         Today's Medication Changes          These changes are accurate as of: 11/11/17 10:55 AM.  If you have any questions, ask your nurse or doctor.               These medicines have changed or have updated prescriptions.        Dose/Directions    NIFEdipine ER osmotic 30 MG 24 hr tablet   Commonly known as:  NIFEDICAL XL   This may have changed:  how much to take   Used for:  Unspecified hypertensive kidney disease with chronic kidney disease stage V or end stage renal disease(403.91) (H)        Dose:  60 mg   Take 2 " tablets by mouth daily.   Quantity:  180 tablet   Refills:  4                Primary Care Provider Office Phone # Fax #    Moris Diaz [phone]       [facility] [address] [facility] [address]  [facility] [address]        Equal Access to Services     AZEEM ANN-MARIE : Hadii aad ku hadcollino Soomaali, waaxda luqadaha, qaybta kaalmada adeegyada, waxay idiin hayaan adevirginia sidhu lareedliudmila perez. So [facility] [phone].    ATENCIÓN: Si habla español, tiene a carr disposición servicios gratuitos de asistencia lingüística. Llame al [phone].    We comply with applicable federal civil rights laws and [place] laws. We do not discriminate on the basis of race, color, national origin, age, disability, sex, sexual orientation, or gender identity.            Thank you!     Thank you for choosing [facility]  for your care. Our goal is always to provide you with excellent care. Hearing back from our patients is one way we can continue to improve our services. Please take a few minutes to complete the written survey that you may receive in the mail after your visit with us. Thank you!             Your Updated Medication List - Protect others around you: Learn how to safely use, store and throw away your medicines at www.disposemymeds.org.          This list is accurate as of: 11/11/17 10:55 AM.  Always use your most recent med list.                   Brand Name Dispense Instructions for use Diagnosis    allopurinol 100 MG tablet    ZYLOPRIM    180 tablet    Take 2 tablets by mouth daily.    Kidney replaced by transplant, Gout       ALPHAGAN OP      2 drops daily        BABY ASPIRIN PO      Take 81 mg by mouth daily        BEVACizumab 400 MG/16ML injection    AVASTIN          cholecalciferol 5000 UNITS Tabs tablet    vitamin D3    30 tablet    Take 1 tablet (5,000 Units) by mouth daily    Hyperparathyroidism due to renal insufficiency (H)       CRESTOR 5 MG tablet   Generic drug:  rosuvastatin      Take 20  mg by mouth daily        * cycloSPORINE modified 100 MG capsule    GENERIC EQUIVALENT    60 capsule    Take 1 capsule (100 mg) by mouth 2 times daily (total dose 125 mg twice daily)    Kidney transplant recipient, Long-term use of immunosuppressant medication       * cycloSPORINE modified 25 MG capsule    GENERIC EQUIVALENT    60 capsule    Take 1 capsule (25 mg) by mouth 2 times daily (total dose 125 mg twice daily)    Kidney transplant recipient, Long-term use of immunosuppressant medication       ISOSORBIDE MONONITRATE PO      Take 60 mg by mouth daily        losartan 50 MG tablet    COZAAR    90 tablet    Take 1 tablet (50 mg) by mouth daily    Kidney replaced by transplant       metoprolol 100 MG tablet    LOPRESSOR    60 tablet    Take 1 tablet by mouth 2 times daily.    Unspecified hypertensive kidney disease with chronic kidney disease stage I through stage IV, or unspecified(403.90), Kidney replaced by transplant       NIFEdipine ER osmotic 30 MG 24 hr tablet    NIFEDICAL XL    180 tablet    Take 2 tablets by mouth daily.    Unspecified hypertensive kidney disease with chronic kidney disease stage V or end stage renal disease(403.91) (H)       NITROSTAT 0.4 MG sublingual tablet   Generic drug:  nitroGLYcerin      Place 0.4 mg under the tongue every 5 minutes as needed.        NONFORMULARY      2 times daily Focus Macula Pro:  Eye vitamin and mineral supplement A, C, E, Zinc, Copper        PLAVIX 75 MG tablet   Generic drug:  clopidogrel      Take 75 mg by mouth daily.        predniSONE 5 MG tablet    DELTASONE     Take 5 mg by mouth daily.        probenecid 500 MG tablet    BENEMID     Take 500 mg by mouth 2 times daily.        sulfamethoxazole-trimethoprim 400-80 MG per tablet    BACTRIM/SEPTRA     Take 1 tablet by mouth daily Take on every other day        TAMSULOSIN HCL PO      Take 0.4 mg by mouth daily        timolol 0.25 % ophthalmic solution    TIMOPTIC     2 times daily.        TYLENOL ARTHRITIS  PAIN 650 MG CR tablet   Generic drug:  acetaminophen      Take 2 tablets by mouth 3 times daily.        * Notice:  This list has 2 medication(s) that are the same as other medications prescribed for you. Read the directions carefully, and ask your doctor or other care provider to review them with you.

## 2017-11-28 ENCOUNTER — TELEPHONE (OUTPATIENT)
Dept: TRANSPLANT | Facility: CLINIC | Age: 62
End: 2017-11-28

## 2017-12-06 ENCOUNTER — COMMITTEE REVIEW (OUTPATIENT)
Dept: TRANSPLANT | Facility: CLINIC | Age: 62
End: 2017-12-06

## 2017-12-06 NOTE — LETTER
December 6, 2017    Rory Bustamante  [address]  [address] [place] [id number]      Dear Mr. Bustamante,    This letter is sent to notify you that your listing status was changed from Inactive to Active on 12/06/2017 on the kidney transplant waitlist at the [facility].  Your status was changed because you now meet all criteria for this. Your original listing date was 09/14/2017 and you have been earning wait time since then.     You will be transferred now to a new pre kidney transplant coordinator due to your ACTIVE status. You will now follow with Micaela Pablo RN - direct phone is [phone]. Micaela is assisted by Rosita Hannah LPN - direct phone is [phone]    Please continue to follow all of your regular post kidney transplant routines including communication as warranted with your post kidney transplant coordinator, Adrienne.     Please continue to have any potential live donors register asap online with our Program to initiate their evaluations at [email or web address]. You will be notified by our Office in the event of an approved live donor.     During this waiting period, we may still request periodic laboratory tests with your primary physician.  It will be your responsibility to remind your physician to forward your results to the Transplant Office.    We still need to be kept informed of any changes such as:    o changes in your insurance coverage  o change in your phone number, address or emergency contact  o significant changes in your health  o significant infections (such as pneumonia or abscesses)  o blood transfusions  o any condition which requires hospitalization or surgery                  Sincerely,        Kidney Transplant Program    Enclosures: ALA/PRA Physician Order, Telephone Contact List, Travel Resources, UNOS Letter, Waitlist Information Update and While You Are Waiting          CC: Moris Diaz MD;  Christi Sun MD;  Jeremy Sage PA-C

## 2017-12-06 NOTE — COMMITTEE REVIEW
Abdominal Patient Discussion Note Transplant Coordinator: Adrienne West  Transplant Surgeon:  Dr. Win Lemus     Referring Physician: Christi Sun    Committee Review Members:  Nephrology Elroy Lizama MD, Will Yang, APRN CNP, Sj Haskins MD   Nutrition Parisa Flower, RD   Pharmacy Jessica Bond, [facility]    - Clinical Ibeth Novak, [facility], Kassy Hunt, [facility]   Transplant Tiara Waite PA-C, Meri Romo, RN, Micaela Pablo, RN, Carolina Bull, RN, Missy Du, RN, Melodie Huff, SHIMA, Neo Modi MD, Sheron Gilman, SHIMA, Elmer Gallego MD, Win Lemus MD       Additional Discussion Notes and Findings: Committee in agreement that pt meets criteria to be ACTIVE status on DD kidney wait list now.  Noted that pt's triglyceride level is now 1329 - Dr. Win Lemus stating that this is not a problem or contraindication to proceeding with transplant and that pt should continue to follow with his local cardiologist to maximize the management of triglycerides.       Called patient to inform them of eligibility of listing to active status.    -Verified contact information as documented in EPIC  -Informed patient that a  donor offer can happen at any time (24 hours/day, 7 days/week)  -Instructed patient about importance of having PRAs drawn every 3 months via: ([facility])  -Reviewed organ offer process including request to accept or deny offer, informing coordinator of any recent infections  -Reviewed timeframe after call with organ offer (process for admission to hospital and pre-op testing and work and timeframe)  -Discussed expected length of surgical procedure, expected inpatient length of stay post transplant, and potential to stay locally post transplant.    Provided name of Micaela Pablo and Rosita Hannah and contact information for additional questions or concerns.      Instructed pt to continue his post kidney  Discontinue Bactrim.  No objective indication to continue antibiotics at this point.    Follow-up with ENT as an outpatient     transplant routines as he is doing while waiting for his new kidney transplant - pt confirmed he knows that Adrienne GENTILE is his post transplant coordinator. Instructed pt to continue to have any potential live donors register to initiate their evaluations and he will be notified in the event of an approved live donor by our Office. Also, instructed pt that his last triglyceride level was 1329 which is very high - explained he needs to work diligently with his local cardiologist to achieve maximum control of this - pt states he is doing this as well has discussed diet, etc with local cardiologist. I also explained his c PRA is 98 meaning he will likely have difficulty getting matched with a kidney donor.  I explained I will change his status to ACTIVE now on the kidney wait list and that he will receive a letter stating this in the mail. Instructed him to call me with questions. Pt expressed good understanding of all and was in good agreement with the plan.     Changed pt's status on DD kidney wait list to ACTIVE today, 12/06/2017. Generated change of status letter today in EPIC - routed to  for mailing.

## 2017-12-07 ENCOUNTER — DOCUMENTATION ONLY (OUTPATIENT)
Dept: LAB | Facility: CLINIC | Age: 62
End: 2017-12-07

## 2017-12-07 DIAGNOSIS — Z76.82 AWAITING ORGAN TRANSPLANT: Primary | ICD-10-CM

## 2017-12-08 ENCOUNTER — RESULTS ONLY (OUTPATIENT)
Dept: OTHER | Facility: CLINIC | Age: 62
End: 2017-12-08

## 2017-12-08 ENCOUNTER — DOCUMENTATION ONLY (OUTPATIENT)
Dept: TRANSPLANT | Facility: CLINIC | Age: 62
End: 2017-12-08

## 2017-12-08 ENCOUNTER — HOSPITAL ENCOUNTER (OUTPATIENT)
Facility: CLINIC | Age: 62
Discharge: HOME | End: 2017-12-08
Attending: TRANSPLANT SURGERY | Admitting: TRANSPLANT SURGERY
Payer: MEDICARE

## 2017-12-08 ENCOUNTER — APPOINTMENT (OUTPATIENT)
Dept: GENERAL RADIOLOGY | Facility: CLINIC | Age: 62
End: 2017-12-08
Attending: NURSE PRACTITIONER
Payer: MEDICARE

## 2017-12-08 ENCOUNTER — ORGAN (OUTPATIENT)
Dept: TRANSPLANT | Facility: CLINIC | Age: 62
End: 2017-12-08

## 2017-12-08 VITALS
BODY MASS INDEX: 32.13 KG/M2 | WEIGHT: 242.4 LBS | DIASTOLIC BLOOD PRESSURE: 89 MMHG | OXYGEN SATURATION: 95 % | RESPIRATION RATE: 18 BRPM | SYSTOLIC BLOOD PRESSURE: 141 MMHG | TEMPERATURE: 97.7 F | HEIGHT: 73 IN

## 2017-12-08 DIAGNOSIS — Z76.82 AWAITING ORGAN TRANSPLANT: Primary | ICD-10-CM

## 2017-12-08 DIAGNOSIS — I25.118 CORONARY ARTERY DISEASE OF NATIVE ARTERY OF NATIVE HEART WITH STABLE ANGINA PECTORIS (H): ICD-10-CM

## 2017-12-08 DIAGNOSIS — Z94.0 S/P KIDNEY TRANSPLANT: ICD-10-CM

## 2017-12-08 DIAGNOSIS — M19.90 CHRONIC ARTHRITIS: ICD-10-CM

## 2017-12-08 LAB
ABO + RH BLD: NORMAL
ABO + RH BLD: NORMAL
ALBUMIN SERPL-MCNC: 3.7 G/DL (ref 3.4–5)
ALBUMIN UR-MCNC: 30 MG/DL
ALP SERPL-CCNC: 50 U/L (ref 40–150)
ALT SERPL W P-5'-P-CCNC: 27 U/L (ref 0–70)
ANION GAP SERPL CALCULATED.3IONS-SCNC: 11 MMOL/L (ref 3–14)
APPEARANCE UR: CLEAR
APTT PPP: 29 SEC (ref 22–37)
AST SERPL W P-5'-P-CCNC: 26 U/L (ref 0–45)
BASOPHILS # BLD AUTO: 0 10E9/L (ref 0–0.2)
BASOPHILS NFR BLD AUTO: 0.8 %
BILIRUB SERPL-MCNC: 0.3 MG/DL (ref 0.2–1.3)
BILIRUB UR QL STRIP: NEGATIVE
BLD GP AB SCN SERPL QL: NORMAL
BLD PROD TYP BPU: NORMAL
BLOOD BANK CMNT PATIENT-IMP: NORMAL
BUN SERPL-MCNC: 73 MG/DL (ref 7–30)
CALCIUM SERPL-MCNC: 9.5 MG/DL (ref 8.5–10.1)
CHLORIDE SERPL-SCNC: 102 MMOL/L (ref 94–109)
CHOLEST SERPL-MCNC: 267 MG/DL
CMV IGG SERPL QL IA: 0.7 AI (ref 0–0.8)
CMV IGM SERPL QL IA: <0.2 AI (ref 0–0.8)
CO2 SERPL-SCNC: 20 MMOL/L (ref 20–32)
COLOR UR AUTO: ABNORMAL
CREAT SERPL-MCNC: 4.77 MG/DL (ref 0.66–1.25)
DIFFERENTIAL METHOD BLD: ABNORMAL
EBV VCA IGM SER QL IA: <0.2 AI (ref 0–0.8)
EOSINOPHIL # BLD AUTO: 0.1 10E9/L (ref 0–0.7)
EOSINOPHIL NFR BLD AUTO: 2.3 %
ERYTHROCYTE [DISTWIDTH] IN BLOOD BY AUTOMATED COUNT: 15.1 % (ref 10–15)
GFR SERPL CREATININE-BSD FRML MDRD: 13 ML/MIN/1.7M2
GLUCOSE SERPL-MCNC: 101 MG/DL (ref 70–99)
GLUCOSE UR STRIP-MCNC: 30 MG/DL
HBA1C MFR BLD: 4.8 % (ref 4.3–6)
HBV CORE IGM SERPL QL IA: NONREACTIVE
HBV SURFACE AG SERPL QL IA: NONREACTIVE
HCT VFR BLD AUTO: 25.7 % (ref 40–53)
HCV AB SERPL QL IA: NONREACTIVE
HDLC SERPL-MCNC: 7 MG/DL
HGB BLD-MCNC: 8.6 G/DL (ref 13.3–17.7)
HGB UR QL STRIP: NEGATIVE
HIV 1+2 AB+HIV1 P24 AG SERPL QL IA: NONREACTIVE
IMM GRANULOCYTES # BLD: 0 10E9/L (ref 0–0.4)
IMM GRANULOCYTES NFR BLD: 0.6 %
INR PPP: 1.02 (ref 0.86–1.14)
KETONES UR STRIP-MCNC: NEGATIVE MG/DL
LDLC SERPL CALC-MCNC: ABNORMAL MG/DL
LEUKOCYTE ESTERASE UR QL STRIP: NEGATIVE
LYMPHOCYTES # BLD AUTO: 1.2 10E9/L (ref 0.8–5.3)
LYMPHOCYTES NFR BLD AUTO: 23 %
MCH RBC QN AUTO: 35 PG (ref 26.5–33)
MCHC RBC AUTO-ENTMCNC: 33.5 G/DL (ref 31.5–36.5)
MCV RBC AUTO: 105 FL (ref 78–100)
MONOCYTES # BLD AUTO: 0.3 10E9/L (ref 0–1.3)
MONOCYTES NFR BLD AUTO: 5.4 %
MUCOUS THREADS #/AREA URNS LPF: PRESENT /LPF
NEUTROPHILS # BLD AUTO: 3.5 10E9/L (ref 1.6–8.3)
NEUTROPHILS NFR BLD AUTO: 67.9 %
NITRATE UR QL: NEGATIVE
NONHDLC SERPL-MCNC: 260 MG/DL
NRBC # BLD AUTO: 0 10*3/UL
NRBC BLD AUTO-RTO: 0 /100
NUM BPU REQUESTED: 2
PH UR STRIP: 6 PH (ref 5–7)
PLATELET # BLD AUTO: 162 10E9/L (ref 150–450)
POTASSIUM SERPL-SCNC: 5 MMOL/L (ref 3.4–5.3)
PROT SERPL-MCNC: 7.6 G/DL (ref 6.8–8.8)
PROT UR-MCNC: 0.77 G/L
PROT/CREAT 24H UR: 1.26 G/G CR (ref 0–0.2)
RBC # BLD AUTO: 2.46 10E12/L (ref 4.4–5.9)
RBC #/AREA URNS AUTO: <1 /HPF (ref 0–2)
SODIUM SERPL-SCNC: 133 MMOL/L (ref 133–144)
SOURCE: ABNORMAL
SP GR UR STRIP: 1.01 (ref 1–1.03)
SPECIMEN EXP DATE BLD: NORMAL
SQUAMOUS #/AREA URNS AUTO: <1 /HPF (ref 0–1)
TRIGL SERPL-MCNC: 1340 MG/DL
UROBILINOGEN UR STRIP-MCNC: NORMAL MG/DL (ref 0–2)
WBC # BLD AUTO: 5.2 10E9/L (ref 4–11)
WBC #/AREA URNS AUTO: <1 /HPF (ref 0–2)

## 2017-12-08 PROCEDURE — 87389 HIV-1 AG W/HIV-1&-2 AB AG IA: CPT | Performed by: NURSE PRACTITIONER

## 2017-12-08 PROCEDURE — 86665 EPSTEIN-BARR CAPSID VCA: CPT | Performed by: NURSE PRACTITIONER

## 2017-12-08 PROCEDURE — 86901 BLOOD TYPING SEROLOGIC RH(D): CPT | Performed by: NURSE PRACTITIONER

## 2017-12-08 PROCEDURE — 36415 COLL VENOUS BLD VENIPUNCTURE: CPT | Performed by: NURSE PRACTITIONER

## 2017-12-08 PROCEDURE — 25000125 ZZHC RX 250: Performed by: NURSE PRACTITIONER

## 2017-12-08 PROCEDURE — 86825 HLA X-MATH NON-CYTOTOXIC: CPT | Performed by: INTERNAL MEDICINE

## 2017-12-08 PROCEDURE — 86900 BLOOD TYPING SEROLOGIC ABO: CPT | Performed by: NURSE PRACTITIONER

## 2017-12-08 PROCEDURE — G0472 HEP C SCREEN HIGH RISK/OTHER: HCPCS | Performed by: NURSE PRACTITIONER

## 2017-12-08 PROCEDURE — 86645 CMV ANTIBODY IGM: CPT | Performed by: NURSE PRACTITIONER

## 2017-12-08 PROCEDURE — 80061 LIPID PANEL: CPT | Performed by: NURSE PRACTITIONER

## 2017-12-08 PROCEDURE — 85610 PROTHROMBIN TIME: CPT | Performed by: NURSE PRACTITIONER

## 2017-12-08 PROCEDURE — 86923 COMPATIBILITY TEST ELECTRIC: CPT | Performed by: NURSE PRACTITIONER

## 2017-12-08 PROCEDURE — 93010 ELECTROCARDIOGRAM REPORT: CPT | Performed by: INTERNAL MEDICINE

## 2017-12-08 PROCEDURE — 86833 HLA CLASS II HIGH DEFIN QUAL: CPT | Performed by: INTERNAL MEDICINE

## 2017-12-08 PROCEDURE — 86832 HLA CLASS I HIGH DEFIN QUAL: CPT | Performed by: INTERNAL MEDICINE

## 2017-12-08 PROCEDURE — 86825 HLA X-MATH NON-CYTOTOXIC: CPT | Mod: 91 | Performed by: INTERNAL MEDICINE

## 2017-12-08 PROCEDURE — 84156 ASSAY OF PROTEIN URINE: CPT | Performed by: NURSE PRACTITIONER

## 2017-12-08 PROCEDURE — 80053 COMPREHEN METABOLIC PANEL: CPT | Performed by: NURSE PRACTITIONER

## 2017-12-08 PROCEDURE — 40000141 ZZH STATISTIC PERIPHERAL IV START W/O US GUIDANCE

## 2017-12-08 PROCEDURE — 83036 HEMOGLOBIN GLYCOSYLATED A1C: CPT | Performed by: NURSE PRACTITIONER

## 2017-12-08 PROCEDURE — 25000132 ZZH RX MED GY IP 250 OP 250 PS 637: Mod: GY | Performed by: PHYSICIAN ASSISTANT

## 2017-12-08 PROCEDURE — 81001 URINALYSIS AUTO W/SCOPE: CPT | Performed by: NURSE PRACTITIONER

## 2017-12-08 PROCEDURE — A9270 NON-COVERED ITEM OR SERVICE: HCPCS | Mod: GY | Performed by: PHYSICIAN ASSISTANT

## 2017-12-08 PROCEDURE — 86705 HEP B CORE ANTIBODY IGM: CPT | Performed by: NURSE PRACTITIONER

## 2017-12-08 PROCEDURE — 87086 URINE CULTURE/COLONY COUNT: CPT | Performed by: NURSE PRACTITIONER

## 2017-12-08 PROCEDURE — 86644 CMV ANTIBODY: CPT | Performed by: NURSE PRACTITIONER

## 2017-12-08 PROCEDURE — 85730 THROMBOPLASTIN TIME PARTIAL: CPT | Performed by: NURSE PRACTITIONER

## 2017-12-08 PROCEDURE — 86850 RBC ANTIBODY SCREEN: CPT | Performed by: NURSE PRACTITIONER

## 2017-12-08 PROCEDURE — G0499 HEPB SCREEN HIGH RISK INDIV: HCPCS | Performed by: NURSE PRACTITIONER

## 2017-12-08 PROCEDURE — 71020 XR CHEST 2 VW: CPT

## 2017-12-08 PROCEDURE — 85025 COMPLETE CBC W/AUTO DIFF WBC: CPT | Performed by: NURSE PRACTITIONER

## 2017-12-08 PROCEDURE — 93005 ELECTROCARDIOGRAM TRACING: CPT

## 2017-12-08 RX ORDER — LOPERAMIDE HCL 2 MG
2 CAPSULE ORAL DAILY
COMMUNITY

## 2017-12-08 RX ORDER — METOPROLOL TARTRATE 1 MG/ML
5 INJECTION, SOLUTION INTRAVENOUS EVERY 6 HOURS
Status: DISCONTINUED | OUTPATIENT
Start: 2017-12-08 | End: 2017-12-08 | Stop reason: HOSPADM

## 2017-12-08 RX ORDER — CALCIUM CARBONATE 500 MG/1
500 TABLET, CHEWABLE ORAL DAILY PRN
Status: DISCONTINUED | OUTPATIENT
Start: 2017-12-08 | End: 2017-12-08 | Stop reason: HOSPADM

## 2017-12-08 RX ORDER — ACETAMINOPHEN 325 MG/1
975 TABLET ORAL ONCE
Status: DISCONTINUED | OUTPATIENT
Start: 2017-12-08 | End: 2017-12-08 | Stop reason: HOSPADM

## 2017-12-08 RX ORDER — CEFUROXIME 1.5 G/1
1.5 INJECTION, POWDER, FOR SOLUTION INTRAVENOUS ONCE
Status: DISCONTINUED | OUTPATIENT
Start: 2017-12-08 | End: 2017-12-08 | Stop reason: HOSPADM

## 2017-12-08 RX ORDER — NIFEDIPINE 90 MG/1
90 TABLET, FILM COATED, EXTENDED RELEASE ORAL DAILY
COMMUNITY
End: 2018-07-18 | Stop reason: DRUGHIGH

## 2017-12-08 RX ORDER — ACETAMINOPHEN 325 MG/1
650 TABLET ORAL ONCE
Status: DISCONTINUED | OUTPATIENT
Start: 2017-12-08 | End: 2017-12-08 | Stop reason: HOSPADM

## 2017-12-08 RX ORDER — FENOFIBRATE 145 MG/1
145 TABLET, COATED ORAL EVERY MORNING
COMMUNITY

## 2017-12-08 RX ADMIN — METOPROLOL TARTRATE 5 MG: 5 INJECTION INTRAVENOUS at 12:33

## 2017-12-08 RX ADMIN — CALCIUM CARBONATE (ANTACID) CHEW TAB 500 MG 500 MG: 500 CHEW TAB at 11:49

## 2017-12-08 ASSESSMENT — ACTIVITIES OF DAILY LIVING (ADL)
DRESS: 0-->INDEPENDENT
TOILETING: 0-->INDEPENDENT
FALL_HISTORY_WITHIN_LAST_SIX_MONTHS: NO
AMBULATION: 0-->INDEPENDENT
TRANSFERRING: 0-->INDEPENDENT
COGNITION: 0 - NO COGNITION ISSUES REPORTED
RETIRED_COMMUNICATION: 0-->UNDERSTANDS/COMMUNICATES WITHOUT DIFFICULTY
BATHING: 0-->INDEPENDENT
RETIRED_EATING: 0-->INDEPENDENT
SWALLOWING: 0-->SWALLOWS FOODS/LIQUIDS WITHOUT DIFFICULTY

## 2017-12-08 NOTE — PLAN OF CARE
Problem: Kidney Transplant (Adult)  Goal: Signs and Symptoms of Listed Potential Problems Will be Absent, Minimized or Managed (Kidney Transplant)  Signs and symptoms of listed potential problems will be absent, minimized or managed by discharge/transition of care (reference Kidney Transplant (Adult) CPG).     D:  Pt admitted to unit from home at 0930 to pre-op for DD K tx later today.  I:  Settled into room 206-1.  Oriented to room, call-light, pre-op routine.  VS, ht & wt obtained.  Profile initiated.  Assessment completed.  PIV placed by Vasc Access.  Labs drawn, urine specimens sent.  ECG and CXR performed.  Med Rec completed by NP.    A:  Pt has history of kidney transplant in 1989.  Alert and oriented x4, good historian for admission.  Arrived in company of his wife, Kajal, and sister, Jacki.  Denies pain.  Stated understanding of pre-op process as explained by this writer and NP.    P:  Continue plan of care until time of transfer to .    Update @ 1500:  Given scheduled iv Metoprolol x1.  Napped occasionally this afternoon.  Provided supplies for pre-op shower and next shift staff to assist with this.  Spoke to pt about [facility] - Arleen are interested in creating a login and watching videos after showering.  OR time tentatively scheduled for 1700 tonight.  Continue pre-op plan of care prior to transfer for transplant.

## 2017-12-08 NOTE — IP AVS SNAPSHOT
MRN:[id number]                      After Visit Summary   12/8/2017    Rory Bustamante    MRN: [id number]           Thank you!     Thank you for choosing [facility] for your care. Our goal is always to provide you with excellent care. Hearing back from our patients is one way we can continue to improve our services. Please take a few minutes to complete the written survey that you may receive in the mail after you visit with us. Thank you!        Patient Information     Date Of Birth          1955        Designated Caregiver       Most Recent Value    Caregiver    Will someone help with your care after discharge? yes    Name of designated caregiver Kajal Bustamante, wife    Phone number of caregiver [phone] /cell     Caregiver address [address] [place] [address], [address]  [address]      About your hospital stay     You were admitted on:  December 8, 2017 You last received care in the:  [address] [facility]    You were discharged on:  December 8, 2017        Reason for your hospital stay       Cancelled transplant due to need to address invasive well differentiated squamous cell carcinoma. Patient to follow up with dermatologist. Patient will be placed on inactive kidney list.                  Who to Call     For medical emergencies, please call 911.  For non-urgent questions about your medical care, please call your primary care provider or clinic, [phone]          Attending Provider     Provider Specialty    Finger, Dannie Payne MD Transplant       Primary Care Provider Office Phone # Fax #    Moris Diaz [phone]       When to contact your care team       Contact transplant coordinator with any new medical information.    Your transplant coordinator is Micaela Pablo. Phone: [phone], Fax: [phone]                  After Care Instructions     Activity       Your activity upon discharge: Resume previous activities, increase as tolerated. Exercise daily            Diet     "   Follow this diet upon discharge: Regular diet                  Follow-up Appointments     Follow Up and recommended labs and tests       Follow up with dermatologist or primary physician for management of  invasive well differentiated squamous cell carcinoma.  Follow up with urology for evaluation decreased urinary stream.                  Your next 10 appointments already scheduled     Feb 06, 2018 12:30 PM CST   Lab with  LAB   [facility] ([facility])    [address]  1st Floor  [facility] [id number] [phone]            Feb 06, 2018  1:30 PM CST   (Arrive by 1:00 PM)   Return Visit with Christi Sun MD   [facility] ([facility])    [address]  3rd Floor  [facility] [address] [phone]              Pending Results     Date and Time Order Name Status Description    12/8/2017 1010 Urine Culture Aerobic Bacterial Preliminary     12/8/2017 1010 EKG 12-lead, tracing only Preliminary     12/8/2017 1010 HLA Final Crossmatch Recipient In process             Statement of Approval     Ordered          12/08/17 8306  I have reviewed and agree with all the recommendations and orders detailed in this document.     Approved and electronically signed by:  Ruchi Niño PA-C             Admission Information     Date & Time Provider Department Dept. Phone    12/8/2017 Dannie Abbott MD [address] [facility] [place] [phone]      Your Vitals Were     Blood Pressure Temperature Respirations Height Weight Pulse Oximetry    141/89 (BP Location: Right arm, Cuff Size: Adult Regular) 97.7  F (36.5  C) (Oral) 18 1.854 m (6' 1\") 110 kg (242 lb 6.4 oz) 95%    BMI (Body Mass Index)                   31.98 kg/m2           MyChart Information     [employer] gives you secure access to your electronic health record. If you see a primary care provider, you can also send messages to your care team and make appointments. If you have " questions, please call your primary care clinic.  If you do not have a primary care provider, please call [phone] and they will assist you.        Care EveryWhere ID     This is your Care EveryWhere ID. This could be used by other organizations to access your [facility] medical records  [id number]        Equal Access to Services     AZEEM JACOBSEN : Hadii aad ku hadcollinsherrell Sodennis, waaxda luqadaha, qaybta kaalmada destiny, devin perez. [facility] [phone].    ATENCIÓN: Si habla español, tiene a carr disposición servicios gratuitos de asistencia lingüística. [facility] [phone].    We comply with applicable federal civil rights laws and [place] laws. We do not discriminate on the basis of race, color, national origin, age, disability, sex, sexual orientation, or gender identity.               Review of your medicines      CONTINUE these medicines which may have CHANGED, or have new prescriptions. If we are uncertain of the size of tablets/capsules you have at home, strength may be listed as something that might have changed.        Dose / Directions    allopurinol 100 MG tablet   Commonly known as:  ZYLOPRIM   This may have changed:    - how much to take  - when to take this   Used for:  Kidney replaced by transplant, Gout        Dose:  200 mg   Take 2 tablets by mouth daily.   Quantity:  180 tablet   Refills:  3         CONTINUE these medicines which have NOT CHANGED        Dose / Directions    ALPHAGAN OP        Dose:  1 drop   Apply 1 drop to eye 2 times daily   Refills:  0       BABY ASPIRIN PO        Dose:  81 mg   Take 81 mg by mouth daily   Refills:  0       BEVACizumab 400 MG/16ML injection   Commonly known as:  AVASTIN        Refills:  0       cholecalciferol 5000 UNITS Tabs tablet   Commonly known as:  vitamin D3   Used for:  Hyperparathyroidism due to renal insufficiency (H)        Dose:  5000 Units   Take 1 tablet (5,000 Units) by mouth daily   Quantity:  30 tablet    Refills:  0       CRESTOR 5 MG tablet   Generic drug:  rosuvastatin        Dose:  20 mg   Take 20 mg by mouth daily   Refills:  0       * cycloSPORINE modified 100 MG capsule   Commonly known as:  GENERIC EQUIVALENT   Used for:  Kidney transplant recipient, Long-term use of immunosuppressant medication        Dose:  100 mg   Take 1 capsule (100 mg) by mouth 2 times daily (total dose 125 mg twice daily)   Quantity:  60 capsule   Refills:  11       * cycloSPORINE modified 25 MG capsule   Commonly known as:  GENERIC EQUIVALENT   Used for:  Kidney transplant recipient, Long-term use of immunosuppressant medication        Dose:  25 mg   Take 1 capsule (25 mg) by mouth 2 times daily (total dose 125 mg twice daily)   Quantity:  60 capsule   Refills:  11       fenofibrate 145 MG tablet        Dose:  145 mg   Take 145 mg by mouth daily   Refills:  0       ISOSORBIDE MONONITRATE PO        Dose:  60 mg   Take 60 mg by mouth daily   Refills:  0       loperamide 2 MG capsule   Commonly known as:  IMODIUM        Dose:  1 mg   Take 1 mg by mouth daily   Refills:  0       losartan 50 MG tablet   Commonly known as:  COZAAR   Used for:  Kidney replaced by transplant        Dose:  50 mg   Take 1 tablet (50 mg) by mouth daily   Quantity:  90 tablet   Refills:  3       metoprolol 100 MG tablet   Commonly known as:  LOPRESSOR   Used for:  Unspecified hypertensive kidney disease with chronic kidney disease stage I through stage IV, or unspecified(403.90), Kidney replaced by transplant        Dose:  100 mg   Take 1 tablet by mouth 2 times daily.   Quantity:  60 tablet   Refills:  6       NIFEdipine ER 90 MG Tb24   Commonly known as:  ADALAT CC        Dose:  90 mg   Take 90 mg by mouth daily   Refills:  0       NITROSTAT 0.4 MG sublingual tablet   Generic drug:  nitroGLYcerin        Dose:  0.4 mg   Place 0.4 mg under the tongue every 5 minutes as needed.   Refills:  0       NONFORMULARY        2 times daily Focus Macula Pro:  Eye vitamin  and mineral supplement A, C, E, Zinc, Copper   Refills:  0       PLAVIX 75 MG tablet   Generic drug:  clopidogrel        Dose:  75 mg   Take 75 mg by mouth daily.   Refills:  0       predniSONE 5 MG tablet   Commonly known as:  DELTASONE        Dose:  5 mg   Take 5 mg by mouth daily.   Refills:  0       probenecid 500 MG tablet   Commonly known as:  BENEMID        Dose:  500 mg   Take 500 mg by mouth 2 times daily.   Refills:  0       sulfamethoxazole-trimethoprim 400-80 MG per tablet   Commonly known as:  BACTRIM/SEPTRA        Dose:  1 tablet   Take 1 tablet by mouth daily Take on every other day   Refills:  0       TAMSULOSIN HCL PO        Dose:  0.4 mg   Take 0.4 mg by mouth daily   Refills:  0       TYLENOL ARTHRITIS PAIN 650 MG CR tablet   Generic drug:  acetaminophen        Dose:  2 tablet   Take 2 tablets by mouth 3 times daily.   Refills:  0       * Notice:  This list has 2 medication(s) that are the same as other medications prescribed for you. Read the directions carefully, and ask your doctor or other care provider to review them with you.             Protect others around you: Learn how to safely use, store and throw away your medicines at www.disposemymeds.org.             Medication List: This is a list of all your medications and when to take them. Check marks below indicate your daily home schedule. Keep this list as a reference.      Medications           Morning Afternoon Evening Bedtime As Needed    allopurinol 100 MG tablet   Commonly known as:  ZYLOPRIM   Take 2 tablets by mouth daily.                                ALPHAGAN OP   Apply 1 drop to eye 2 times daily                                BABY ASPIRIN PO   Take 81 mg by mouth daily                                BEVACizumab 400 MG/16ML injection   Commonly known as:  AVASTIN                                cholecalciferol 5000 UNITS Tabs tablet   Commonly known as:  vitamin D3   Take 1 tablet (5,000 Units) by mouth daily                                 CRESTOR 5 MG tablet   Take 20 mg by mouth daily   Generic drug:  rosuvastatin                                * cycloSPORINE modified 100 MG capsule   Commonly known as:  GENERIC EQUIVALENT   Take 1 capsule (100 mg) by mouth 2 times daily (total dose 125 mg twice daily)                                * cycloSPORINE modified 25 MG capsule   Commonly known as:  GENERIC EQUIVALENT   Take 1 capsule (25 mg) by mouth 2 times daily (total dose 125 mg twice daily)                                fenofibrate 145 MG tablet   Take 145 mg by mouth daily                                ISOSORBIDE MONONITRATE PO   Take 60 mg by mouth daily                                loperamide 2 MG capsule   Commonly known as:  IMODIUM   Take 1 mg by mouth daily                                losartan 50 MG tablet   Commonly known as:  COZAAR   Take 1 tablet (50 mg) by mouth daily                                metoprolol 100 MG tablet   Commonly known as:  LOPRESSOR   Take 1 tablet by mouth 2 times daily.                                NIFEdipine ER 90 MG Tb24   Commonly known as:  ADALAT CC   Take 90 mg by mouth daily                                NITROSTAT 0.4 MG sublingual tablet   Place 0.4 mg under the tongue every 5 minutes as needed.   Generic drug:  nitroGLYcerin                                NONFORMULARY   2 times daily Focus Macula Pro:  Eye vitamin and mineral supplement A, C, E, Zinc, Copper                                PLAVIX 75 MG tablet   Take 75 mg by mouth daily.   Generic drug:  clopidogrel                                predniSONE 5 MG tablet   Commonly known as:  DELTASONE   Take 5 mg by mouth daily.                                probenecid 500 MG tablet   Commonly known as:  BENEMID   Take 500 mg by mouth 2 times daily.                                sulfamethoxazole-trimethoprim 400-80 MG per tablet   Commonly known as:  BACTRIM/SEPTRA   Take 1 tablet by mouth daily Take on every other day                                 TAMSULOSIN HCL PO   Take 0.4 mg by mouth daily                                TYLENOL ARTHRITIS PAIN 650 MG CR tablet   Take 2 tablets by mouth 3 times daily.   Generic drug:  acetaminophen                                * Notice:  This list has 2 medication(s) that are the same as other medications prescribed for you. Read the directions carefully, and ask your doctor or other care provider to review them with you.

## 2017-12-08 NOTE — IP AVS SNAPSHOT
[address] [address]    Phone:  [phone]                                       After Visit Summary   12/8/2017    Rory Bustamante    MRN: [id number]           After Visit Summary Signature Page     I have received my discharge instructions, and my questions have been answered. I have discussed any challenges I see with this plan with the nurse or doctor.    ..........................................................................................................................................  Patient/Patient Representative Signature      ..........................................................................................................................................  Patient Representative Print Name and Relationship to Patient    ..................................................               ................................................  Date                                            Time    ..........................................................................................................................................  Reviewed by Signature/Title    ...................................................              ..............................................  Date                                                            Time

## 2017-12-08 NOTE — H&P
[facility], [place]    History and Physical  Transplant Surgery     Date of Admission:  2017    Assessment & Plan   Rory Bustamante is a 62 year old  male with history of MPGN, LDKT  now failing but not yet on dialysis, CAD with RCA stent in  and subsequent RCA stenosis not suitable for PCI, stable angina, HTN, hypertriglyceridemia (>1000), skin cancer, BPH, arthritis, and spinal stenosis.  Presents today for a possible  donor kidney transplant.    NPO  Labs, CXR, EKG, crossmatch  Continue beta blocker due to CAD  Nephrology consult  Pre-op shower    Inocencia Rizzo NP     Chief Complaint   Organ transplant candidate    History of Present Illness   Rory Bustamante is a 62 year old highly sensitized male with history of MPGN, LDKT  now failing but not yet on dialysis, CAD with RCA stent in  and subsequent RCA stenosis not suitable for PCI, stable angina, HTN, hypertriglyceridemia (>1000), skin cancer, BPH, arthritis, and spinal stenosis.  Presents today for a possible  donor kidney transplant.    Pt feels well today.  Reports some ongoing sinus congestions with occasional cough, no other symptoms of URI, notes that cough/sputum is worse after dairy intake.  Does note occasional angina, usually with activity or after heavy meals, no recent change.  He has chronically loose stools 1-4x/day.  He has noted a weaker than usual urinary stream recently (last PSA noted as 1.8).  Multiple skin lesions noted, last saw Dermatology in August (seborrheic keratosis and hyperplasia per their note).    Past Medical History    I have reviewed this patient's medical history and updated it with pertinent information if needed.   Past Medical History:   Diagnosis Date     Angina effort (H) 2016     Arthritis      BPH (benign prostatic hyperplasia)      CAD (coronary artery disease) 2011    MI in 2011, stent placed, on plavix and aspirin     Glaucoma      Gout      Uric acid as high as 11 previously, now on allopurinol and probenecid     HTN (hypertension)      Hypercholesteremia      Hypertriglyceridemia      Intraocular bleeding     previously treated [facility] Avastin     Macular degeneration      Neuropathy     Limited sensation bilateral knees to feet     Presence of stent in coronary artery 2012     Proteinuria 2015    recurrent MPGN on biopsy     S/P kidney transplant     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  HLA identical transplant.  Biopsy in 2008 with recurrent MPGN type 2, mild interstitial fibrosis and tubular atrophy, CNI toxicity     Spinal stenosis      Warts        Past Surgical History   I have reviewed this patient's surgical history and updated it with pertinent information if needed.  Past Surgical History:   Procedure Laterality Date     C TOTAL KNEE ARTHROPLASTY       TONSILLECTOMY       TRANSPLANT KIDNEY RECIPIENT LIVING RELATED      HLA identical     VASECTOMY         Prior to Admission Medications   Prior to Admission Medications   Prescriptions Last Dose Informant Patient Reported? Taking?   BABY ASPIRIN PO Unknown at Unknown time Self Yes No   Sig: Take 81 mg by mouth daily   BEVACizumab (AVASTIN) 400 MG/16ML injection Unknown at Unknown time  Yes No   Brimonidine Tartrate (ALPHAGAN OP) Unknown at Unknown time Self Yes No   Sig: Apply 1 drop to eye 2 times daily    ISOSORBIDE MONONITRATE PO Unknown at Unknown time Self Yes No   Sig: Take 60 mg by mouth daily    NIFEdipine ER (ADALAT CC) 90 MG TB24 Unknown at Unknown time  Yes No   Sig: Take 90 mg by mouth daily   NONFORMULARY Unknown at Unknown time Self Yes No   Si times daily Focus Macula Pro:  Eye vitamin and mineral supplement A, C, E, Zinc, Copper    TAMSULOSIN HCL PO Unknown at Unknown time Self Yes No   Sig: Take 0.4 mg by mouth daily    acetaminophen (TYLENOL ARTHRITIS PAIN) 650 MG CR tablet Unknown at Unknown time Self Yes No   Sig: Take 2  tablets by mouth 3 times daily.   allopurinol (ZYLOPRIM) 100 MG tablet Unknown at Unknown time Self No No   Sig: Take 2 tablets by mouth daily.   Patient taking differently: Take 100 mg by mouth 2 times daily    cholecalciferol (VITAMIN D3) 5000 UNITS TABS tablet Unknown at Unknown time Self Yes No   Sig: Take 1 tablet (5,000 Units) by mouth daily   clopidogrel (PLAVIX) 75 MG tablet Unknown at Unknown time Self Yes No   Sig: Take 75 mg by mouth daily.   cycloSPORINE modified (GENERIC EQUIVALENT) 100 MG capsule Unknown at Unknown time Self No No   Sig: Take 1 capsule (100 mg) by mouth 2 times daily (total dose 125 mg twice daily)   cycloSPORINE modified (GENERIC EQUIVALENT) 25 MG capsule Unknown at Unknown time Self No No   Sig: Take 1 capsule (25 mg) by mouth 2 times daily (total dose 125 mg twice daily)   fenofibrate 145 MG tablet Unknown at Unknown time  Yes No   Sig: Take 145 mg by mouth daily   loperamide (IMODIUM) 2 MG capsule Unknown at Unknown time  Yes No   Sig: Take 1 mg by mouth daily   losartan (COZAAR) 50 MG tablet Unknown at Unknown time  No No   Sig: Take 1 tablet (50 mg) by mouth daily   metoprolol (LOPRESSOR) 100 MG tablet Unknown at Unknown time Self No No   Sig: Take 1 tablet by mouth 2 times daily.   nitroGLYCERIN (NITROSTAT) 0.4 MG SL tablet Past Month at Unknown time Self Yes Yes   Sig: Place 0.4 mg under the tongue every 5 minutes as needed.   predniSONE (DELTASONE) 5 MG tablet Unknown at Unknown time Self Yes No   Sig: Take 5 mg by mouth daily.   probenecid (BENEMID) 500 MG tablet Unknown at Unknown time Self Yes No   Sig: Take 500 mg by mouth 2 times daily.   rosuvastatin (CRESTOR) 5 MG tablet Unknown at Unknown time Self Yes No   Sig: Take 20 mg by mouth daily    sulfamethoxazole-trimethoprim (BACTRIM,SEPTRA) 400-80 MG per tablet Unknown at Unknown time Self Yes No   Sig: Take 1 tablet by mouth daily Take on every other day      Facility-Administered Medications: None     Allergies  "  Allergies   Allergen Reactions     Contrast Dye Other (See Comments) and Itching     Pt reports sneezing     Simvastatin Muscle Pain (Myalgia)       Social History   I have reviewed this patient's social history and updated it with pertinent information if needed. Rory Bustamante  reports that he has never smoked. He has never used smokeless tobacco. He reports that he does not drink alcohol or use illicit drugs.    Family History   I have reviewed this patient's family history and updated it with pertinent information if needed.   Family History   Problem Relation Age of Onset     DIABETES Mother      Colon Cancer Mother 78     Cardiac Sudden Death Sister 87     DIABETES Brother      CEREBROVASCULAR DISEASE Brother        Review of Systems   The 10 point Review of Systems is negative other than noted in the HPI or here. Pharyngitis 2 weeks ago, none since.  Chronic numbness BLE for \"25 years\".    Physical Exam   Temp: 97.7  F (36.5  C) Temp src: Oral BP: (!) 144/93   Heart Rate: 77 Resp: 18 SpO2: 96 % O2 Device: None (Room air)    Vital Signs with Ranges  Temp:  [97.7  F (36.5  C)] 97.7  F (36.5  C)  Heart Rate:  [77] 77  Resp:  [18] 18  BP: (144)/(93) 144/93  SpO2:  [96 %] 96 %  242 lbs 6.4 oz    Constitutional: Awake, alert, cooperative, no apparent distress, and appears stated age.  Eyes: Lids and lashes normal, pupils equal, round and reactive to light, extra ocular muscles intact, sclera clear, conjunctiva normal.  ENT: Normocephalic, without obvious abnormality, atraumatic, oral pharynx with moist mucus membranes, gums normal and good dentition.  Respiratory: No increased work of breathing, good air exchange, clear to auscultation bilaterally, no crackles or wheezing.  Cardiovascular: Normal apical impulse, regular rate and rhythm, normal S1 and S2, no S3 or S4, and no murmur noted.  GI: No scars, soft, non-distended, non-tender, no masses palpated  Skin: No bruising or bleeding, normal skin color, no rashes, " multiple scattered moles, skin tags, crusted lesions over face, back, neck, trunk, upper extremities  Musculoskeletal: There is no redness, warmth, or swelling of the joints.  Full range of motion noted.  Motor strength is 5 out of 5 all extremities bilaterally.  Tone is normal.  Neurologic: Awake, alert, oriented to name, place and time.  Motor is 5 out of 5 bilaterally.  Cerebellar finger to nose, heel to shin intact.  Sensory is abnormal: numbness bilateral knees to feet.  Gait is normal.   Neuropsychiatric: Calm, normal eye contact, alert, normal affect, oriented to self, place, time and situation, memory for past and recent events intact and thought process normal.    Data   Pending

## 2017-12-08 NOTE — PROGRESS NOTES
PATHOLOGY HLA CROSSMATCH CONSULTATION: DONOR/RECIPIENT  VIRTUAL CROSSMATCH-Kidney  Consultation Date: 2017  Consultation Requested by: Dr. Harrington  Regarding: Compatibility of  donor organ UNOS #[id number] with Rory Bustamante    Findings: Regarding a virtual crossmatch between Rory Bustamante and  donor listed above (match ID [id number]):  The most recent and historic antisera  were analyzed.  The patient has no antibodies listed with specificity against the donor organ.      Record Review Indicates: I personally reviewed the most recent serum and historic  sera.  The patient has no antibodies against the donor organ.  Based on historical data from this [facility]'s histocompatibility lab, the probability of an incompatible B cell crossmatch is near 0%.     The results of this virtual XM are:   -most recent serum: compatible   -peak #1: compatible          Disclaimer: Clinical judgement must take into account other factors, such as non-HLA antibodies not detected in the assay. The VXM gives probabilities only.  The probability does not account for the potential for auto-antibodies that may be present in the patient's serum.  These autoantibodies may render the physical crossmatch falsely positive, and would be detected by an autologous crossmatch.  When possible, confirm findings with prospective allogeneic and autologous flow crossmatches before going to transplant.         Raina Jett MD, PhD  Transfusion Medicine Attending  Medical Director, Blood Bank Laboratory  Associate Medical Director, HLA Lab  Pager [phone]

## 2017-12-08 NOTE — DISCHARGE SUMMARY
[facility], [place]    Discharge Summary  Solid Organ Transplant    Date of Admission:  2017  Date of Discharge:  2017  Discharging Provider: Ruchi Niño PA-C/    Discharge Diagnoses   Active Problems:    Organ transplant candidate      History of Present Illness   Rory Bustamante is a 62 year old  male with history of MPGN, LDKT 1989 now failing but not yet on dialysis, CAD with RCA stent in  and subsequent RCA stenosis not suitable for PCI, stable angina, HTN, hypertriglyceridemia (>1000), skin cancer, BPH, arthritis, and spinal stenosis.  Presents today for a possible  donor kidney transplant.    Hospital Course   Rory Bustamante was admitted on 2017.  The following problems were addressed during his hospitalization:    Cancelled transplant due to need to address invasive well differentiated squamous cell carcinoma. Patient to follow up with dermatologist. Patient will be placed on inactive kidney list.     Urinary hesitancy. Request patient follow up with Urology as well.     Ruchi Niño PA-C      Code Status   Full Code    Primary Care Physician   SERAFIN SHABAZZ    Constitutional: Awake, alert, cooperative, no apparent distress, and appears stated age.  Eyes: Lids and lashes normal, pupils equal, round and reactive to light, extra ocular muscles intact, sclera clear, conjunctiva normal.  ENT: Normocephalic, without obvious abnormality, atraumatic, oral pharynx with moist mucus membranes, gums normal and good dentition.  Respiratory: No increased work of breathing, good air exchange, clear to auscultation bilaterally, no crackles or wheezing.  Cardiovascular: Normal apical impulse, regular rate and rhythm, normal S1 and S2, no S3 or S4, and no murmur noted.  GI: No scars, soft, non-distended, non-tender, no masses palpated  Skin: No bruising or bleeding, normal skin color, no rashes, multiple scattered moles, skin tags, crusted lesions over  face, back, neck, trunk, upper extremities  Musculoskeletal: There is no redness, warmth, or swelling of the joints.  Full range of motion noted.  Motor strength is 5 out of 5 all extremities bilaterally.  Tone is normal.  Neurologic: Awake, alert, oriented to name, place and time.  Motor is 5 out of 5 bilaterally.  Cerebellar finger to nose, heel to shin intact.  Sensory is abnormal: numbness bilateral knees to feet.  Gait is normal.   Neuropsychiatric: Calm, normal eye contact, alert, normal affect, oriented to self, place, time and situation, memory for past and recent events intact and thought process normal.    Time Spent on this Encounter   Ruchi PEOPLES PA-C, personally saw the patient today and spent Less than 30 minutes discharging this patient.    Discharge Disposition   Discharged to home  Condition at discharge: Stable    Consultations This Hospital Stay   NEPHROLOGY KIDNEY/PANCREAS TRANSPLANT ADULT IP CONSULT  VASCULAR ACCESS CARE ADULT IP CONSULT    Discharge Orders     Reason for your hospital stay   Cancelled transplant due to need to address invasive well differentiated squamous cell carcinoma. Patient to follow up with dermatologist. Patient will be placed on inactive kidney list.     Follow Up and recommended labs and tests   Follow up with dermatologist or primary physician for management of  invasive well differentiated squamous cell carcinoma.  Follow up with urology for evaluation decreased urinary stream.     Activity   Your activity upon discharge: Resume previous activities, increase as tolerated. Exercise daily     When to contact your care team   Contact transplant coordinator with any new medical information.    Your transplant coordinator is Micaela Pablo. Phone: [phone], Fax: [phone]     Full Code     Diet   Follow this diet upon discharge: Regular diet       Discharge Medications   Current Discharge Medication List      CONTINUE these medications which have NOT  CHANGED    Details   nitroGLYCERIN (NITROSTAT) 0.4 MG SL tablet Place 0.4 mg under the tongue every 5 minutes as needed.      loperamide (IMODIUM) 2 MG capsule Take 1 mg by mouth daily      fenofibrate 145 MG tablet Take 145 mg by mouth daily      NIFEdipine ER (ADALAT CC) 90 MG TB24 Take 90 mg by mouth daily      BEVACizumab (AVASTIN) 400 MG/16ML injection       losartan (COZAAR) 50 MG tablet Take 1 tablet (50 mg) by mouth daily  Qty: 90 tablet, Refills: 3    Associated Diagnoses: Kidney replaced by transplant      cycloSPORINE modified (GENERIC EQUIVALENT) 100 MG capsule Take 1 capsule (100 mg) by mouth 2 times daily (total dose 125 mg twice daily)  Qty: 60 capsule, Refills: 11    Associated Diagnoses: Kidney transplant recipient; Long-term use of immunosuppressant medication      cycloSPORINE modified (GENERIC EQUIVALENT) 25 MG capsule Take 1 capsule (25 mg) by mouth 2 times daily (total dose 125 mg twice daily)  Qty: 60 capsule, Refills: 11    Associated Diagnoses: Kidney transplant recipient; Long-term use of immunosuppressant medication      cholecalciferol (VITAMIN D3) 5000 UNITS TABS tablet Take 1 tablet (5,000 Units) by mouth daily  Qty: 30 tablet, Refills: 0    Associated Diagnoses: Hyperparathyroidism due to renal insufficiency (H)      BABY ASPIRIN PO Take 81 mg by mouth daily      NONFORMULARY 2 times daily Focus Macula Pro:  Eye vitamin and mineral supplement A, C, E, Zinc, Copper       ISOSORBIDE MONONITRATE PO Take 60 mg by mouth daily       TAMSULOSIN HCL PO Take 0.4 mg by mouth daily       allopurinol (ZYLOPRIM) 100 MG tablet Take 2 tablets by mouth daily.  Qty: 180 tablet, Refills: 3    Associated Diagnoses: Kidney replaced by transplant; Gout      metoprolol (LOPRESSOR) 100 MG tablet Take 1 tablet by mouth 2 times daily.  Qty: 60 tablet, Refills: 6    Associated Diagnoses: Unspecified hypertensive kidney disease with chronic kidney disease stage I through stage IV, or unspecified(403.90); Kidney  replaced by transplant      rosuvastatin (CRESTOR) 5 MG tablet Take 20 mg by mouth daily       Brimonidine Tartrate (ALPHAGAN OP) Apply 1 drop to eye 2 times daily       clopidogrel (PLAVIX) 75 MG tablet Take 75 mg by mouth daily.      predniSONE (DELTASONE) 5 MG tablet Take 5 mg by mouth daily.      probenecid (BENEMID) 500 MG tablet Take 500 mg by mouth 2 times daily.      sulfamethoxazole-trimethoprim (BACTRIM,SEPTRA) 400-80 MG per tablet Take 1 tablet by mouth daily Take on every other day      acetaminophen (TYLENOL ARTHRITIS PAIN) 650 MG CR tablet Take 2 tablets by mouth 3 times daily.           Allergies   Allergies   Allergen Reactions     Contrast Dye Other (See Comments) and Itching     Pt reports sneezing     Simvastatin Muscle Pain (Myalgia)     Data   Results for orders placed or performed during the hospital encounter of 17   XR Chest 2 Views    Narrative    XR CHEST 2 VW  2017 11:37 AM      HISTORY:  donor transplant going to OR;     COMPARISON: 2017    FINDINGS: The cardiomediastinal silhouette and pulmonary vasculature  are within normal limits. No focal pulmonary opacity. No pleural  effusion or pneumothorax are identified. No suspicious osseous lesion.  Upper abdomen is unremarkable.      Impression    IMPRESSION: Clear lungs.    I have personally reviewed the examination and initial interpretation  and I agree with the findings.    MIKE MENDEZ MD

## 2017-12-08 NOTE — TELEPHONE ENCOUNTER
Organ Offer Encounter Information    Organ Offer Information   Organ offer date & time:  12/8/2017 12:53 AM   Coordinator/Fellow/Attending name:  ARMIN CARMONA   Organ(s):   Organ UNOS ID Match Run ID Comment Organ Laterality   Kidney [id number] MNOP, Back Up          Recent infections?:  No    New medications?:  No Recent pregnancy?:  No   Angicoagulation medications?:  Yes (Comment: Plavix, ASA) Recent vaccinations?:  No   Recent blood transfusions?:  No Recent hospitalizations?:  No   Has your insurance changed in the last 6-12 months?:  Neg    Discussed organ offer with:  Patient   Patient/Caregiver name:  Rory   Discussed risk category with Patient/Other:  N/A   Understood donor criteria, verbalized understanding   Patient/Other asked to speak to a surgeon?:  No   Discussed program-specific outcomes:  Did not have questions regarding SRTR   Right to decline organ offer without penalty, Patient/Other:  Aware of option to decline without penalty   Organ offer decision status Patient/Other:  Accepted Offer   Organ disposition:  Case Cancelled - Recipient medical condition   Additional Comments:  December 8, 2017 12:54 AM  Kidney:Local, DBD, Back Up  MD: Len  Plan: VXM done by Dr. Jett, compatible, no DSA; Spoke to patient about back up offer, he is currently in [place] & will come to the [facility] lab in the morning to have blood drawn for FXM.  Donor OR TBD - likely early tomorrow afternoon.  Requested patient call writer after he gets back to [place] tomorrow morning, orders placed for FXM.  Contact info provided.  Cristina Carmona RN   Transplant Coordinator    December 8, 2017 8:27 AM  Patient now primary for kidney, discussed with Dr. Harrington - will admit patient for FXM and pre-op.  Called patient to give admission instructions and update.  Donor OR at 1400.  Admissions: JADE Price 0900  Unit: [facility]  Update Provider Entering Orders:  Updated Yvonne - needs FXM order ASAP   Immunology:  Grecia updated  OR: Linda, booked for 1700  Blood Bank: Lucie updated, needs update with laterality  Research:  Text page sent  TransNet/ABO Verification:  TBD pending laterality  Add Organ: TBD pending laterality  Cristina Carmona, SHIMA   Transplant Coordinator    December 8, 2017 4:23 PM  Call from Dr. Abbott - patient is unable to proceed forward with transplant at this time due to some concerns for skin cancer, incomplete work up.  Transplant cancelled, Katherine in OR updated, blood bank updated.  Immunology updated.  Spoke to Mihir, the onsite coordinator, to inform right away.  MD spoke to patient.  Cristina Carmona, SHIMA   Transplant Coordinator             Attestation I have discussed all of the above with the Patient/Legal Guardian/Caregiver regarding this organ offer.:  Yes   Coordinator/Fellow/Attending name:  ARMIN CARMONA

## 2017-12-09 LAB
BACTERIA SPEC CULT: NO GROWTH
Lab: NORMAL
SPECIMEN SOURCE: NORMAL

## 2017-12-09 NOTE — PLAN OF CARE
Problem: Patient Care Overview  Goal: Plan of Care/Patient Progress Review  DISCHARGE: Kidney Tx cancelled d/t squamous cell carcinoma found on neck.  D: Patient with orders to discharge to home. Patient left 7A at 1800.  I: Discharge instructions, medications, & follow ups reviewed with patient. Copy of discharge summary given to patient.  PIV removed. All belongings packed & sent with patient.   A: Patient had no further questions regarding discharge. Patient transferred out by foot & left with wife.   P: Plan for follow up with Nephrology on Feb 6, 2018.

## 2017-12-11 ENCOUNTER — TELEPHONE (OUTPATIENT)
Dept: TRANSPLANT | Facility: CLINIC | Age: 62
End: 2017-12-11

## 2017-12-11 LAB
HLA FINAL CROSSMATCH RECIPIENT: NORMAL
HLA FINAL CROSSMATCH RECIPIENT: NORMAL
INTERPRETATION ECG - MUSE: NORMAL

## 2017-12-11 NOTE — LETTER
December 11, 2017    Rory Bustamante  [address]  [place] [facility] [id number]    Dear Mr. Bustamante,    This letter is sent to notify you that your listing status was changed from Active to Inactive on the kidney transplant waitlist at the [facility].  Your status was changed because you need to follow up with dermatology for squamous cell carcinoma. You will need to arrange follow up to have full margins excised. You also will need to follow up with urology for urinary hesitation.    During this waiting period, we may still request periodic laboratory tests with your primary physician.  It will be your responsibility to remind your physician to forward your results to the Transplant Office.    We still need to be kept informed of any changes such as:    o changes in your insurance coverage  o change in your phone number, address or emergency contact  o significant changes in your health  o significant infections (such as pneumonia or abscesses)  o blood transfusions  o any condition which requires hospitalization or surgery    Sincerely,        Kidney Transplant Program    Enclosures: Travel Resources, UNOS Letter, Waitlist Information Update and While You Are Waiting    CC: Moris Diaz MD (PCP), Christi Sun MD

## 2017-12-11 NOTE — TELEPHONE ENCOUNTER
Coordinator called pt to let him know that his status was changed to inactive on kidney wait list d/t needing dermatology clearance (for squamous cell carcinoma. Will need full margins excised). Pt will also need to f/u with urology for urinary hesitation.Pt states he is scheduled to see his PCP on 12/13/17 to f/u on both his derm and urinary hesitation. Pt states in Sept or Oct this year he noticed a thimble size growth on his neck. His PCP then took a biopsy which showed SCC. Pt was not aware this would affect his transplant status. D/t being on plavix and baby ASA his PCP had not wanted to go back in for further removal until he was completely healed from first biopsy. Pt states his Flomax dose may need to be adjusted for his urinary hesitation and will have PCP address this.  Explained he will not be eligible for kidney offers while inactive, but will still accumulate wait time. Pt verbalized understanding and had no further questions. Contact information provided.    UNOS updated, immunology & PFR notified, change in status letter routed to admin team to send out.

## 2017-12-12 LAB
BLD PROD TYP BPU: NORMAL
BLD PROD TYP BPU: NORMAL
BLD UNIT ID BPU: 0
BLD UNIT ID BPU: 0
BLOOD PRODUCT CODE: NORMAL
BLOOD PRODUCT CODE: NORMAL
BPU ID: NORMAL
BPU ID: NORMAL
DONOR IDENTIFICATION: NORMAL
DSA COMMENTS: NORMAL
DSA PRESENT: NORMAL
DSA TEST METHOD: NORMAL
ORGAN: NORMAL
SA1 CELL: NORMAL
SA1 COMMENTS: NORMAL
SA1 HI RISK ABY: NORMAL
SA1 MOD RISK ABY: NORMAL
SA1 TEST METHOD: NORMAL
SA2 CELL: NORMAL
SA2 COMMENTS: NORMAL
SA2 HI RISK ABY UA: NORMAL
SA2 MOD RISK ABY: NORMAL
SA2 TEST METHOD: NORMAL
TRANSFUSION STATUS PATIENT QL: NORMAL

## 2017-12-13 ENCOUNTER — TRANSFERRED RECORDS (OUTPATIENT)
Dept: HEALTH INFORMATION MANAGEMENT | Facility: CLINIC | Age: 62
End: 2017-12-13

## 2017-12-13 ENCOUNTER — TEAM CONFERENCE (OUTPATIENT)
Dept: TRANSPLANT | Facility: CLINIC | Age: 62
End: 2017-12-13

## 2017-12-13 LAB
CROSSMATCH RESULT: NORMAL
CROSSMATCH RESULT: NORMAL

## 2017-12-13 NOTE — TELEPHONE ENCOUNTER
TEAM CONFERENCE:    ATTENDEES: Ly Lemus Riad, Keys, Schumaker, Schenk, Coordinators, Social Work, Pharmacy, Finance, and Dietary    DISCUSSION and OUTCOME: Status change to inactive approved. Patient needs Dermatology f/u of SCC removal and Urology w/u of urinary hesitation.

## 2017-12-18 ENCOUNTER — MEDICAL CORRESPONDENCE (OUTPATIENT)
Dept: TRANSPLANT | Facility: CLINIC | Age: 62
End: 2017-12-18

## 2017-12-26 ENCOUNTER — TELEPHONE (OUTPATIENT)
Dept: TRANSPLANT | Facility: CLINIC | Age: 62
End: 2017-12-26

## 2017-12-26 ENCOUNTER — TRANSFERRED RECORDS (OUTPATIENT)
Dept: HEALTH INFORMATION MANAGEMENT | Facility: CLINIC | Age: 62
End: 2017-12-26

## 2017-12-26 DIAGNOSIS — Z79.60 LONG-TERM USE OF IMMUNOSUPPRESSANT MEDICATION: ICD-10-CM

## 2017-12-26 DIAGNOSIS — Z94.0 KIDNEY TRANSPLANT RECIPIENT: ICD-10-CM

## 2017-12-27 RX ORDER — CYCLOSPORINE 100 MG/1
CAPSULE, LIQUID FILLED ORAL
Qty: 180 CAPSULE | Refills: 3 | Status: SHIPPED | OUTPATIENT
Start: 2017-12-27 | End: 2018-05-03

## 2017-12-27 RX ORDER — CYCLOSPORINE 25 MG/1
CAPSULE, LIQUID FILLED ORAL
Qty: 180 CAPSULE | Refills: 3 | Status: SHIPPED | OUTPATIENT
Start: 2017-12-27 | End: 2018-05-03

## 2018-01-01 ENCOUNTER — ANESTHESIA EVENT (OUTPATIENT)
Dept: SURGERY | Facility: CLINIC | Age: 63
End: 2018-01-01
Payer: MEDICARE

## 2018-01-01 ENCOUNTER — CARE COORDINATION (OUTPATIENT)
Dept: UROLOGY | Facility: CLINIC | Age: 63
End: 2018-01-01

## 2018-01-01 ENCOUNTER — OFFICE VISIT (OUTPATIENT)
Dept: UROLOGY | Facility: CLINIC | Age: 63
End: 2018-01-01
Payer: MEDICARE

## 2018-01-01 ENCOUNTER — TELEPHONE (OUTPATIENT)
Dept: PHARMACY | Facility: CLINIC | Age: 63
End: 2018-01-01

## 2018-01-01 ENCOUNTER — PRE VISIT (OUTPATIENT)
Dept: UROLOGY | Facility: CLINIC | Age: 63
End: 2018-01-01

## 2018-01-01 ENCOUNTER — CARE COORDINATION (OUTPATIENT)
Dept: GASTROENTEROLOGY | Facility: CLINIC | Age: 63
End: 2018-01-01

## 2018-01-01 ENCOUNTER — TELEPHONE (OUTPATIENT)
Dept: CARDIOLOGY | Facility: CLINIC | Age: 63
End: 2018-01-01

## 2018-01-01 ENCOUNTER — TELEPHONE (OUTPATIENT)
Dept: UROLOGY | Facility: CLINIC | Age: 63
End: 2018-01-01

## 2018-01-01 ENCOUNTER — HOSPITAL ENCOUNTER (OUTPATIENT)
Facility: CLINIC | Age: 63
Discharge: HOME | End: 2018-11-05
Attending: UROLOGY | Admitting: UROLOGY
Payer: MEDICARE

## 2018-01-01 ENCOUNTER — DOCUMENTATION ONLY (OUTPATIENT)
Dept: TRANSPLANT | Facility: CLINIC | Age: 63
End: 2018-01-01

## 2018-01-01 ENCOUNTER — RADIANT APPOINTMENT (OUTPATIENT)
Dept: RADIOLOGY | Facility: AMBULATORY SURGERY CENTER | Age: 63
End: 2018-01-01
Attending: PHYSICIAN ASSISTANT
Payer: MEDICARE

## 2018-01-01 ENCOUNTER — TRANSFERRED RECORDS (OUTPATIENT)
Dept: HEALTH INFORMATION MANAGEMENT | Facility: CLINIC | Age: 63
End: 2018-01-01

## 2018-01-01 ENCOUNTER — HOSPITAL ENCOUNTER (OUTPATIENT)
Dept: MRI IMAGING | Facility: CLINIC | Age: 63
Discharge: HOME | End: 2018-10-29
Attending: SURGERY | Admitting: SURGERY
Payer: MEDICARE

## 2018-01-01 ENCOUNTER — APPOINTMENT (OUTPATIENT)
Dept: LAB | Facility: CLINIC | Age: 63
End: 2018-01-01
Attending: TRANSPLANT SURGERY
Payer: MEDICARE

## 2018-01-01 ENCOUNTER — TEAM CONFERENCE (OUTPATIENT)
Dept: TRANSPLANT | Facility: CLINIC | Age: 63
End: 2018-01-01

## 2018-01-01 ENCOUNTER — TRANSFERRED RECORDS (OUTPATIENT)
Dept: NEPHROLOGY | Facility: CLINIC | Age: 63
End: 2018-01-01

## 2018-01-01 ENCOUNTER — OFFICE VISIT (OUTPATIENT)
Dept: CARDIOLOGY | Facility: CLINIC | Age: 63
End: 2018-01-01
Attending: INTERNAL MEDICINE
Payer: MEDICARE

## 2018-01-01 ENCOUNTER — OFFICE VISIT (OUTPATIENT)
Dept: TRANSPLANT | Facility: CLINIC | Age: 63
End: 2018-01-01
Attending: SURGERY
Payer: MEDICARE

## 2018-01-01 ENCOUNTER — TELEPHONE (OUTPATIENT)
Dept: TRANSPLANT | Facility: CLINIC | Age: 63
End: 2018-01-01

## 2018-01-01 ENCOUNTER — OFFICE VISIT (OUTPATIENT)
Dept: TRANSPLANT | Facility: CLINIC | Age: 63
End: 2018-01-01
Attending: CLINICAL NURSE SPECIALIST
Payer: MEDICARE

## 2018-01-01 ENCOUNTER — TELEPHONE (OUTPATIENT)
Dept: GASTROENTEROLOGY | Facility: CLINIC | Age: 63
End: 2018-01-01

## 2018-01-01 ENCOUNTER — APPOINTMENT (OUTPATIENT)
Dept: ULTRASOUND IMAGING | Facility: CLINIC | Age: 63
End: 2018-01-01
Attending: EMERGENCY MEDICINE
Payer: MEDICARE

## 2018-01-01 ENCOUNTER — PRE VISIT (OUTPATIENT)
Dept: GASTROENTEROLOGY | Facility: CLINIC | Age: 63
End: 2018-01-01

## 2018-01-01 ENCOUNTER — TELEPHONE (OUTPATIENT)
Dept: NEPHROLOGY | Facility: CLINIC | Age: 63
End: 2018-01-01

## 2018-01-01 ENCOUNTER — HOSPITAL ENCOUNTER (EMERGENCY)
Facility: CLINIC | Age: 63
Discharge: HOME | End: 2018-10-29
Attending: EMERGENCY MEDICINE | Admitting: EMERGENCY MEDICINE
Payer: MEDICARE

## 2018-01-01 ENCOUNTER — APPOINTMENT (OUTPATIENT)
Dept: LAB | Facility: CLINIC | Age: 63
End: 2018-01-01
Payer: MEDICARE

## 2018-01-01 ENCOUNTER — PRE VISIT (OUTPATIENT)
Dept: CARDIOLOGY | Facility: CLINIC | Age: 63
End: 2018-01-01

## 2018-01-01 ENCOUNTER — ANESTHESIA (OUTPATIENT)
Dept: SURGERY | Facility: CLINIC | Age: 63
End: 2018-01-01
Payer: MEDICARE

## 2018-01-01 ENCOUNTER — TELEPHONE (OUTPATIENT)
Dept: CALL CENTER | Age: 63
End: 2018-01-01

## 2018-01-01 ENCOUNTER — OFFICE VISIT (OUTPATIENT)
Dept: GASTROENTEROLOGY | Facility: CLINIC | Age: 63
End: 2018-01-01
Attending: INTERNAL MEDICINE
Payer: MEDICARE

## 2018-01-01 VITALS
HEART RATE: 76 BPM | RESPIRATION RATE: 16 BRPM | DIASTOLIC BLOOD PRESSURE: 94 MMHG | WEIGHT: 239.2 LBS | TEMPERATURE: 97.6 F | BODY MASS INDEX: 31.7 KG/M2 | SYSTOLIC BLOOD PRESSURE: 164 MMHG | HEIGHT: 73 IN | OXYGEN SATURATION: 100 %

## 2018-01-01 VITALS
SYSTOLIC BLOOD PRESSURE: 154 MMHG | OXYGEN SATURATION: 97 % | RESPIRATION RATE: 16 BRPM | HEART RATE: 74 BPM | DIASTOLIC BLOOD PRESSURE: 96 MMHG | TEMPERATURE: 98 F

## 2018-01-01 VITALS
DIASTOLIC BLOOD PRESSURE: 96 MMHG | WEIGHT: 235 LBS | OXYGEN SATURATION: 97 % | BODY MASS INDEX: 31.14 KG/M2 | SYSTOLIC BLOOD PRESSURE: 154 MMHG | HEIGHT: 73 IN | RESPIRATION RATE: 16 BRPM | HEART RATE: 74 BPM | TEMPERATURE: 98 F

## 2018-01-01 VITALS
HEART RATE: 75 BPM | TEMPERATURE: 98 F | DIASTOLIC BLOOD PRESSURE: 59 MMHG | BODY MASS INDEX: 31.03 KG/M2 | WEIGHT: 234.1 LBS | OXYGEN SATURATION: 98 % | SYSTOLIC BLOOD PRESSURE: 139 MMHG | HEIGHT: 73 IN

## 2018-01-01 VITALS
SYSTOLIC BLOOD PRESSURE: 152 MMHG | DIASTOLIC BLOOD PRESSURE: 78 MMHG | WEIGHT: 236.2 LBS | HEART RATE: 96 BPM | BODY MASS INDEX: 31.3 KG/M2 | HEIGHT: 73 IN | OXYGEN SATURATION: 98 %

## 2018-01-01 VITALS
WEIGHT: 234 LBS | SYSTOLIC BLOOD PRESSURE: 138 MMHG | HEIGHT: 73 IN | BODY MASS INDEX: 31.01 KG/M2 | HEART RATE: 77 BPM | DIASTOLIC BLOOD PRESSURE: 62 MMHG

## 2018-01-01 VITALS — WEIGHT: 240 LBS | BODY MASS INDEX: 31.81 KG/M2 | HEIGHT: 73 IN

## 2018-01-01 VITALS
HEART RATE: 74 BPM | DIASTOLIC BLOOD PRESSURE: 80 MMHG | WEIGHT: 238 LBS | OXYGEN SATURATION: 99 % | SYSTOLIC BLOOD PRESSURE: 149 MMHG | BODY MASS INDEX: 31.54 KG/M2 | HEIGHT: 73 IN

## 2018-01-01 DIAGNOSIS — E78.1 HYPERTRIGLYCERIDEMIA: ICD-10-CM

## 2018-01-01 DIAGNOSIS — N18.4 CKD (CHRONIC KIDNEY DISEASE) STAGE 4, GFR 15-29 ML/MIN (H): Primary | ICD-10-CM

## 2018-01-01 DIAGNOSIS — R39.198 SLOW URINARY STREAM: Primary | ICD-10-CM

## 2018-01-01 DIAGNOSIS — R39.198 SLOWING OF URINARY STREAM: ICD-10-CM

## 2018-01-01 DIAGNOSIS — R31.0 GROSS HEMATURIA: ICD-10-CM

## 2018-01-01 DIAGNOSIS — N21.0 BLADDER STONE: ICD-10-CM

## 2018-01-01 DIAGNOSIS — D49.0 IPMN (INTRADUCTAL PAPILLARY MUCINOUS NEOPLASM): ICD-10-CM

## 2018-01-01 DIAGNOSIS — Z94.0 STATUS POST KIDNEY TRANSPLANT: ICD-10-CM

## 2018-01-01 DIAGNOSIS — R23.3 ECCHYMOSES, SPONTANEOUS: ICD-10-CM

## 2018-01-01 DIAGNOSIS — Z09 POSTOP CHECK: ICD-10-CM

## 2018-01-01 DIAGNOSIS — T82.9XXA COMPLICATION OF VASCULAR ACCESS FOR DIALYSIS, INITIAL ENCOUNTER: Primary | ICD-10-CM

## 2018-01-01 DIAGNOSIS — Z94.0 KIDNEY REPLACED BY TRANSPLANT: ICD-10-CM

## 2018-01-01 DIAGNOSIS — N21.0 BLADDER STONE: Primary | ICD-10-CM

## 2018-01-01 DIAGNOSIS — N18.5 CHRONIC KIDNEY DISEASE, STAGE 5, KIDNEY FAILURE (H): ICD-10-CM

## 2018-01-01 DIAGNOSIS — I77.0 AV FISTULA (H): Primary | ICD-10-CM

## 2018-01-01 DIAGNOSIS — N18.9 CHRONIC KIDNEY DISEASE, UNSPECIFIED CKD STAGE: ICD-10-CM

## 2018-01-01 DIAGNOSIS — D49.0 IPMN (INTRADUCTAL PAPILLARY MUCINOUS NEOPLASM): Primary | ICD-10-CM

## 2018-01-01 DIAGNOSIS — E78.5 HYPERLIPIDEMIA LDL GOAL <70: ICD-10-CM

## 2018-01-01 DIAGNOSIS — S40.021A HEMATOMA OF ARM, RIGHT, INITIAL ENCOUNTER: ICD-10-CM

## 2018-01-01 DIAGNOSIS — K85.00 IDIOPATHIC ACUTE PANCREATITIS WITHOUT INFECTION OR NECROSIS: ICD-10-CM

## 2018-01-01 DIAGNOSIS — T82.9XXA COMPLICATION OF ARTERIOVENOUS DIALYSIS FISTULA, INITIAL ENCOUNTER: ICD-10-CM

## 2018-01-01 DIAGNOSIS — K85.10 ACUTE BILIARY PANCREATITIS: Primary | ICD-10-CM

## 2018-01-01 DIAGNOSIS — R33.9 INCOMPLETE BLADDER EMPTYING: ICD-10-CM

## 2018-01-01 LAB
ALBUMIN SERPL-MCNC: 3.8 G/DL (ref 3.4–5)
ALBUMIN SERPL-MCNC: NORMAL G/DL (ref 3.4–5)
ALP SERPL-CCNC: 55 U/L (ref 40–150)
ALP SERPL-CCNC: NORMAL U/L (ref 40–150)
ALT SERPL W P-5'-P-CCNC: 24 U/L (ref 0–70)
ALT SERPL W P-5'-P-CCNC: NORMAL U/L (ref 0–70)
ANION GAP SERPL CALCULATED.3IONS-SCNC: 9 MMOL/L (ref 3–14)
ANION GAP SERPL CALCULATED.3IONS-SCNC: NORMAL MMOL/L (ref 6–17)
APPEARANCE STONE: NORMAL
APPEARANCE UR: CLEAR
AST SERPL W P-5'-P-CCNC: 32 U/L (ref 0–45)
AST SERPL W P-5'-P-CCNC: NORMAL U/L (ref 0–45)
BASOPHILS # BLD AUTO: 0 10E9/L (ref 0–0.2)
BASOPHILS NFR BLD AUTO: 0.6 %
BILIRUB SERPL-MCNC: 0.4 MG/DL (ref 0.2–1.3)
BILIRUB SERPL-MCNC: NORMAL MG/DL (ref 0.2–1.3)
BILIRUB UR QL: NORMAL
BUN SERPL-MCNC: 72 MG/DL (ref 7–30)
BUN SERPL-MCNC: 73 MG/DL (ref 8–27)
BUN SERPL-MCNC: NORMAL MG/DL (ref 7–30)
CALCIUM SERPL-MCNC: 9.3 MG/DL (ref 8.5–10.1)
CALCIUM SERPL-MCNC: 9.6 MG/DL (ref 8.6–10.2)
CALCIUM SERPL-MCNC: NORMAL MG/DL (ref 8.5–10.1)
CHLORIDE SERPL-SCNC: 101 MMOL/L (ref 94–109)
CHLORIDE SERPL-SCNC: NORMAL MMOL/L (ref 94–109)
CHLORIDE SERPLBLD-SCNC: 95 MMOL/L (ref 96–106)
CO2 SERPL-SCNC: 17 MMOL/L (ref 20–29)
CO2 SERPL-SCNC: 22 MMOL/L (ref 20–32)
CO2 SERPL-SCNC: NORMAL MMOL/L (ref 20–32)
COLOR UR: YELLOW
COMPN STONE: NORMAL
CREAT SERPL-MCNC: 3.92 MG/DL (ref 0.66–1.25)
CREAT SERPL-MCNC: 3.99 MG/DL (ref 0.67–1.17)
CREAT SERPL-MCNC: 5.13 MG/DL (ref 0.76–1.27)
CREAT SERPL-MCNC: NORMAL MG/DL (ref 0.66–1.25)
DIFFERENTIAL METHOD BLD: ABNORMAL
ELASTASE PANC STL-MCNT: NORMAL UG/G
EOSINOPHIL # BLD AUTO: 0.1 10E9/L (ref 0–0.7)
EOSINOPHIL NFR BLD AUTO: 1.6 %
ERYTHROCYTE [DISTWIDTH] IN BLOOD BY AUTOMATED COUNT: 14.2 % (ref 10–15)
FERRITIN SERPL-MCNC: 486 NG/ML
FERRITIN SERPL-MCNC: 580 NG/ML
FERRITIN SERPL-MCNC: 675 NG/ML
GFR SERPL CREATININE-BSD FRML MDRD: 11 ML/MIN/1.73M2
GFR SERPL CREATININE-BSD FRML MDRD: 15 ML/MIN/1.73M2 (ref 60–150)
GFR SERPL CREATININE-BSD FRML MDRD: 16 ML/MIN/1.7M2
GFR SERPL CREATININE-BSD FRML MDRD: NORMAL ML/MIN/1.7M2
GLUCOSE BLDC GLUCOMTR-MCNC: 108 MG/DL (ref 70–99)
GLUCOSE SERPL-MCNC: 142 MG/DL (ref 74–100)
GLUCOSE SERPL-MCNC: 158 MG/DL (ref 70–99)
GLUCOSE SERPL-MCNC: 92 MG/DL (ref 70–99)
GLUCOSE SERPL-MCNC: NORMAL MG/DL (ref 70–99)
GLUCOSE URINE: 100 MG/DL
HCT VFR BLD AUTO: 26.4 %
HCT VFR BLD AUTO: 28.5 %
HCT VFR BLD AUTO: 29.3 %
HCT VFR BLD AUTO: 30.2 %
HCT VFR BLD AUTO: 31.1 %
HCT VFR BLD AUTO: 31.1 %
HCT VFR BLD AUTO: 31.5 %
HCT VFR BLD AUTO: 33.3 % (ref 40–53)
HEMOGLOBIN: 10.1 G/DL (ref 13.3–17.7)
HEMOGLOBIN: 10.4 G/DL (ref 13.3–17.7)
HEMOGLOBIN: 10.5 G/DL (ref 13.3–17.7)
HEMOGLOBIN: 10.5 G/DL (ref 13.3–17.7)
HEMOGLOBIN: 9 G/DL (ref 13.3–17.7)
HEMOGLOBIN: 9.5 G/DL (ref 13.3–17.7)
HEMOGLOBIN: 9.7 G/DL (ref 13.3–17.7)
HGB BLD-MCNC: 10.7 G/DL (ref 13.3–17.7)
HGB UR QL: NORMAL
IMM GRANULOCYTES # BLD: 0 10E9/L (ref 0–0.4)
IMM GRANULOCYTES NFR BLD: 0.6 %
INR PPP: 1.15 (ref 0.86–1.14)
INR PPP: 2.6 (ref 0.86–1.14)
INR PPP: 3 (ref 0.9–1.1)
INTERPRETATION ECG - MUSE: NORMAL
IRON BINDING CAP: 312
IRON BINDING CAP: 385
IRON BINDING CAP: 387
IRON SATURATION INDEX: 20 %
IRON SATURATION INDEX: 21 %
IRON SATURATION INDEX: 28 %
IRON: 79 UG/DL
IRON: 81 UG/DL
IRON: 88 UG/DL
KETONES UR QL: NORMAL MG/DL
LEUKOCYTE ESTERASE URINE: NORMAL
LYMPHOCYTES # BLD AUTO: 1.1 10E9/L (ref 0.8–5.3)
LYMPHOCYTES NFR BLD AUTO: 22.2 %
MCH RBC QN AUTO: 35 PG (ref 26.5–33)
MCHC RBC AUTO-ENTMCNC: 32.1 G/DL (ref 31.5–36.5)
MCV RBC AUTO: 109 FL (ref 78–100)
MONOCYTES # BLD AUTO: 0.4 10E9/L (ref 0–1.3)
MONOCYTES NFR BLD AUTO: 7.5 %
NEUTROPHILS # BLD AUTO: 3.4 10E9/L (ref 1.6–8.3)
NEUTROPHILS NFR BLD AUTO: 67.5 %
NITRITE UR QL STRIP: NORMAL
NRBC # BLD AUTO: 0 10*3/UL
NRBC BLD AUTO-RTO: 0 /100
NUMBER STONE: NORMAL
PH UR STRIP: 6 PH (ref 5–7)
PLATELET # BLD AUTO: 104 10E9/L (ref 150–450)
POTASSIUM SERPL-SCNC: 4.6 MMOL/L (ref 3.5–5.2)
POTASSIUM SERPL-SCNC: 4.9 MMOL/L (ref 3.4–5.3)
POTASSIUM SERPL-SCNC: 5.3 MMOL/L (ref 3.5–5.1)
POTASSIUM SERPL-SCNC: NORMAL MMOL/L (ref 3.4–5.3)
PROCALCITONIN SERPL-MCNC: 0.06 NG/ML
PROT SERPL-MCNC: 7.7 G/DL (ref 6.8–8.8)
PROT SERPL-MCNC: NORMAL G/DL (ref 6.8–8.8)
PROTEIN ALBUMIN URINE: 100 MG/DL
RBC # BLD AUTO: 3.06 10E12/L (ref 4.4–5.9)
SIZE STONE: NORMAL MM
SODIUM SERPL-SCNC: 133 MMOL/L (ref 133–144)
SODIUM SERPL-SCNC: 135 MMOL/L (ref 134–144)
SODIUM SERPL-SCNC: NORMAL MMOL/L (ref 133–144)
SOURCE: NORMAL
SP GR UR STRIP: 1.02 (ref 1–1.03)
UROBILINOGEN UR QL STRIP: 0.2 EU/DL (ref 0.2–1)
WBC # BLD AUTO: 5.1 10E9/L (ref 4–11)
WT STONE: 332 MG

## 2018-01-01 PROCEDURE — G0463 HOSPITAL OUTPT CLINIC VISIT: HCPCS | Mod: ZF

## 2018-01-01 PROCEDURE — 37000008 ZZH ANESTHESIA TECHNICAL FEE, 1ST 30 MIN: Performed by: UROLOGY

## 2018-01-01 PROCEDURE — 99203 OFFICE O/P NEW LOW 30 MIN: CPT | Mod: ZP | Performed by: INTERNAL MEDICINE

## 2018-01-01 PROCEDURE — 93010 ELECTROCARDIOGRAM REPORT: CPT | Performed by: INTERNAL MEDICINE

## 2018-01-01 PROCEDURE — 85025 COMPLETE CBC W/AUTO DIFF WBC: CPT | Performed by: EMERGENCY MEDICINE

## 2018-01-01 PROCEDURE — 25000132 ZZH RX MED GY IP 250 OP 250 PS 637: Mod: GY | Performed by: ANESTHESIOLOGY

## 2018-01-01 PROCEDURE — 40000170 ZZH STATISTIC PRE-PROCEDURE ASSESSMENT II: Performed by: UROLOGY

## 2018-01-01 PROCEDURE — 82365 CALCULUS SPECTROSCOPY: CPT | Performed by: UROLOGY

## 2018-01-01 PROCEDURE — 36000064 ZZH SURGERY LEVEL 4 EA 15 ADDTL MIN - UMMC: Performed by: UROLOGY

## 2018-01-01 PROCEDURE — 25000128 H RX IP 250 OP 636: Performed by: ANESTHESIOLOGY

## 2018-01-01 PROCEDURE — 25000566 ZZH SEVOFLURANE, EA 15 MIN: Performed by: UROLOGY

## 2018-01-01 PROCEDURE — 82962 GLUCOSE BLOOD TEST: CPT

## 2018-01-01 PROCEDURE — 80053 COMPREHEN METABOLIC PANEL: CPT | Performed by: EMERGENCY MEDICINE

## 2018-01-01 PROCEDURE — 25000128 H RX IP 250 OP 636: Performed by: UROLOGY

## 2018-01-01 PROCEDURE — C1894 INTRO/SHEATH, NON-LASER: HCPCS | Performed by: UROLOGY

## 2018-01-01 PROCEDURE — 25000128 H RX IP 250 OP 636: Performed by: NURSE ANESTHETIST, CERTIFIED REGISTERED

## 2018-01-01 PROCEDURE — 25000125 ZZHC RX 250: Performed by: NURSE ANESTHETIST, CERTIFIED REGISTERED

## 2018-01-01 PROCEDURE — 93990 DOPPLER FLOW TESTING: CPT

## 2018-01-01 PROCEDURE — 84145 PROCALCITONIN (PCT): CPT | Performed by: EMERGENCY MEDICINE

## 2018-01-01 PROCEDURE — 71000014 ZZH RECOVERY PHASE 1 LEVEL 2 FIRST HR: Performed by: UROLOGY

## 2018-01-01 PROCEDURE — 74181 MRI ABDOMEN W/O CONTRAST: CPT

## 2018-01-01 PROCEDURE — G0463 HOSPITAL OUTPT CLINIC VISIT: HCPCS | Mod: 25

## 2018-01-01 PROCEDURE — 27210794 ZZH OR GENERAL SUPPLY STERILE: Performed by: UROLOGY

## 2018-01-01 PROCEDURE — 71000027 ZZH RECOVERY PHASE 2 EACH 15 MINS: Performed by: UROLOGY

## 2018-01-01 PROCEDURE — 71000015 ZZH RECOVERY PHASE 1 LEVEL 2 EA ADDTL HR: Performed by: UROLOGY

## 2018-01-01 PROCEDURE — 25000132 ZZH RX MED GY IP 250 OP 250 PS 637: Mod: GY | Performed by: STUDENT IN AN ORGANIZED HEALTH CARE EDUCATION/TRAINING PROGRAM

## 2018-01-01 PROCEDURE — 37000009 ZZH ANESTHESIA TECHNICAL FEE, EACH ADDTL 15 MIN: Performed by: UROLOGY

## 2018-01-01 PROCEDURE — 36415 COLL VENOUS BLD VENIPUNCTURE: CPT | Performed by: ANESTHESIOLOGY

## 2018-01-01 PROCEDURE — A9270 NON-COVERED ITEM OR SERVICE: HCPCS | Mod: GY | Performed by: STUDENT IN AN ORGANIZED HEALTH CARE EDUCATION/TRAINING PROGRAM

## 2018-01-01 PROCEDURE — 85610 PROTHROMBIN TIME: CPT | Performed by: ANESTHESIOLOGY

## 2018-01-01 PROCEDURE — 85610 PROTHROMBIN TIME: CPT | Performed by: EMERGENCY MEDICINE

## 2018-01-01 PROCEDURE — 40000065 ZZH STATISTIC EKG NON-CHARGEABLE

## 2018-01-01 PROCEDURE — 99284 EMERGENCY DEPT VISIT MOD MDM: CPT | Mod: 25 | Performed by: EMERGENCY MEDICINE

## 2018-01-01 PROCEDURE — C1769 GUIDE WIRE: HCPCS | Performed by: UROLOGY

## 2018-01-01 PROCEDURE — 99284 EMERGENCY DEPT VISIT MOD MDM: CPT | Mod: Z6 | Performed by: EMERGENCY MEDICINE

## 2018-01-01 PROCEDURE — 40000556 ZZH STATISTIC PERIPHERAL IV START W US GUIDANCE

## 2018-01-01 PROCEDURE — 36000066 ZZH SURGERY LEVEL 4 W FLUORO 1ST 30 MIN - UMMC: Performed by: UROLOGY

## 2018-01-01 PROCEDURE — 25000125 ZZHC RX 250: Performed by: STUDENT IN AN ORGANIZED HEALTH CARE EDUCATION/TRAINING PROGRAM

## 2018-01-01 RX ORDER — FENOFIBRATE 145 MG/1
145 TABLET, COATED ORAL
COMMUNITY
Start: 2018-01-01

## 2018-01-01 RX ORDER — SODIUM CHLORIDE 9 MG/ML
INJECTION, SOLUTION INTRAVENOUS CONTINUOUS PRN
Status: DISCONTINUED | OUTPATIENT
Start: 2018-01-01 | End: 2018-01-01

## 2018-01-01 RX ORDER — METOPROLOL TARTRATE 25 MG/1
100 TABLET, FILM COATED ORAL ONCE
Status: COMPLETED | OUTPATIENT
Start: 2018-01-01 | End: 2018-01-01

## 2018-01-01 RX ORDER — ONDANSETRON 4 MG/1
4 TABLET, ORALLY DISINTEGRATING ORAL EVERY 30 MIN PRN
Status: DISCONTINUED | OUTPATIENT
Start: 2018-01-01 | End: 2018-01-01 | Stop reason: HOSPADM

## 2018-01-01 RX ORDER — LABETALOL HYDROCHLORIDE 5 MG/ML
10 INJECTION, SOLUTION INTRAVENOUS
Status: COMPLETED | OUTPATIENT
Start: 2018-01-01 | End: 2018-01-01

## 2018-01-01 RX ORDER — HYDRALAZINE HYDROCHLORIDE 20 MG/ML
2.5-5 INJECTION INTRAMUSCULAR; INTRAVENOUS EVERY 10 MIN PRN
Status: DISCONTINUED | OUTPATIENT
Start: 2018-01-01 | End: 2018-01-01 | Stop reason: HOSPADM

## 2018-01-01 RX ORDER — FENTANYL CITRATE 50 UG/ML
INJECTION, SOLUTION INTRAMUSCULAR; INTRAVENOUS PRN
Status: DISCONTINUED | OUTPATIENT
Start: 2018-01-01 | End: 2018-01-01

## 2018-01-01 RX ORDER — CEFAZOLIN SODIUM 2 G/100ML
2 INJECTION, SOLUTION INTRAVENOUS
Status: COMPLETED | OUTPATIENT
Start: 2018-01-01 | End: 2018-01-01

## 2018-01-01 RX ORDER — FENTANYL CITRATE 50 UG/ML
25-50 INJECTION, SOLUTION INTRAMUSCULAR; INTRAVENOUS
Status: DISCONTINUED | OUTPATIENT
Start: 2018-01-01 | End: 2018-01-01 | Stop reason: HOSPADM

## 2018-01-01 RX ORDER — FENTANYL CITRATE 50 UG/ML
25-50 INJECTION, SOLUTION INTRAMUSCULAR; INTRAVENOUS EVERY 5 MIN PRN
Status: DISCONTINUED | OUTPATIENT
Start: 2018-01-01 | End: 2018-01-01 | Stop reason: HOSPADM

## 2018-01-01 RX ORDER — CEFAZOLIN SODIUM 1 G/3ML
1 INJECTION, POWDER, FOR SOLUTION INTRAMUSCULAR; INTRAVENOUS SEE ADMIN INSTRUCTIONS
Status: DISCONTINUED | OUTPATIENT
Start: 2018-01-01 | End: 2018-01-01 | Stop reason: HOSPADM

## 2018-01-01 RX ORDER — LIDOCAINE HYDROCHLORIDE 20 MG/ML
INJECTION, SOLUTION INFILTRATION; PERINEURAL PRN
Status: DISCONTINUED | OUTPATIENT
Start: 2018-01-01 | End: 2018-01-01

## 2018-01-01 RX ORDER — NALOXONE HYDROCHLORIDE 0.4 MG/ML
.1-.4 INJECTION, SOLUTION INTRAMUSCULAR; INTRAVENOUS; SUBCUTANEOUS
Status: DISCONTINUED | OUTPATIENT
Start: 2018-01-01 | End: 2018-01-01 | Stop reason: HOSPADM

## 2018-01-01 RX ORDER — ONDANSETRON 2 MG/ML
INJECTION INTRAMUSCULAR; INTRAVENOUS PRN
Status: DISCONTINUED | OUTPATIENT
Start: 2018-01-01 | End: 2018-01-01

## 2018-01-01 RX ORDER — CEPHALEXIN 500 MG/1
CAPSULE ORAL
COMMUNITY
Start: 2018-01-01

## 2018-01-01 RX ORDER — LIDOCAINE 40 MG/G
CREAM TOPICAL
Status: DISCONTINUED | OUTPATIENT
Start: 2018-01-01 | End: 2018-01-01 | Stop reason: HOSPADM

## 2018-01-01 RX ORDER — PROPOFOL 10 MG/ML
INJECTION, EMULSION INTRAVENOUS PRN
Status: DISCONTINUED | OUTPATIENT
Start: 2018-01-01 | End: 2018-01-01

## 2018-01-01 RX ORDER — SODIUM CHLORIDE, SODIUM LACTATE, POTASSIUM CHLORIDE, CALCIUM CHLORIDE 600; 310; 30; 20 MG/100ML; MG/100ML; MG/100ML; MG/100ML
INJECTION, SOLUTION INTRAVENOUS CONTINUOUS
Status: DISCONTINUED | OUTPATIENT
Start: 2018-01-01 | End: 2018-01-01 | Stop reason: HOSPADM

## 2018-01-01 RX ORDER — ONDANSETRON 2 MG/ML
4 INJECTION INTRAMUSCULAR; INTRAVENOUS EVERY 30 MIN PRN
Status: DISCONTINUED | OUTPATIENT
Start: 2018-01-01 | End: 2018-01-01 | Stop reason: HOSPADM

## 2018-01-01 RX ORDER — HYDROMORPHONE HYDROCHLORIDE 1 MG/ML
.3-.5 INJECTION, SOLUTION INTRAMUSCULAR; INTRAVENOUS; SUBCUTANEOUS EVERY 10 MIN PRN
Status: DISCONTINUED | OUTPATIENT
Start: 2018-01-01 | End: 2018-01-01 | Stop reason: HOSPADM

## 2018-01-01 RX ORDER — WARFARIN SODIUM 5 MG/1
TABLET ORAL
COMMUNITY
Start: 2018-09-12

## 2018-01-01 RX ORDER — LOSARTAN POTASSIUM 50 MG/1
TABLET ORAL
Qty: 90 TABLET | Refills: 3 | Status: SHIPPED | OUTPATIENT
Start: 2018-01-01 | End: 2019-01-01

## 2018-01-01 RX ORDER — TOLTERODINE TARTRATE 1 MG/1
2 TABLET, EXTENDED RELEASE ORAL ONCE
Status: COMPLETED | OUTPATIENT
Start: 2018-01-01 | End: 2018-01-01

## 2018-01-01 RX ADMIN — SUGAMMADEX 220 MG: 100 INJECTION, SOLUTION INTRAVENOUS at 17:34

## 2018-01-01 RX ADMIN — METOPROLOL TARTRATE 100 MG: 50 TABLET ORAL at 22:45

## 2018-01-01 RX ADMIN — PROPOFOL 20 MG: 10 INJECTION, EMULSION INTRAVENOUS at 17:39

## 2018-01-01 RX ADMIN — MIDAZOLAM 2 MG: 1 INJECTION INTRAMUSCULAR; INTRAVENOUS at 14:50

## 2018-01-01 RX ADMIN — HYDROCORTISONE SODIUM SUCCINATE 25 MG: 100 INJECTION, POWDER, FOR SOLUTION INTRAMUSCULAR; INTRAVENOUS at 15:28

## 2018-01-01 RX ADMIN — FENTANYL CITRATE 100 MCG: 50 INJECTION, SOLUTION INTRAMUSCULAR; INTRAVENOUS at 15:16

## 2018-01-01 RX ADMIN — ROCURONIUM BROMIDE 50 MG: 10 INJECTION INTRAVENOUS at 15:18

## 2018-01-01 RX ADMIN — LIDOCAINE HYDROCHLORIDE 100 MG: 20 INJECTION, SOLUTION INFILTRATION; PERINEURAL at 15:16

## 2018-01-01 RX ADMIN — TOLTERODINE TARTRATE 2 MG: 1 TABLET, FILM COATED ORAL at 19:08

## 2018-01-01 RX ADMIN — ATROPA BELLADONNA AND OPIUM 1 SUPPOSITORY: 16.2; 3 SUPPOSITORY RECTAL at 19:19

## 2018-01-01 RX ADMIN — PROPOFOL 30 MG: 10 INJECTION, EMULSION INTRAVENOUS at 17:37

## 2018-01-01 RX ADMIN — HYDRALAZINE HYDROCHLORIDE 5 MG: 20 INJECTION INTRAMUSCULAR; INTRAVENOUS at 20:57

## 2018-01-01 RX ADMIN — CEFAZOLIN SODIUM 2 G: 2 INJECTION, SOLUTION INTRAVENOUS at 15:30

## 2018-01-01 RX ADMIN — PROPOFOL 100 MG: 10 INJECTION, EMULSION INTRAVENOUS at 15:16

## 2018-01-01 RX ADMIN — Medication 10 MG: at 18:48

## 2018-01-01 RX ADMIN — SODIUM CHLORIDE: 9 INJECTION, SOLUTION INTRAVENOUS at 14:50

## 2018-01-01 RX ADMIN — ONDANSETRON 4 MG: 2 INJECTION INTRAMUSCULAR; INTRAVENOUS at 15:25

## 2018-01-01 RX ADMIN — PROPOFOL 50 MG: 10 INJECTION, EMULSION INTRAVENOUS at 17:42

## 2018-01-01 ASSESSMENT — PAIN SCALES - GENERAL
PAINLEVEL: NO PAIN (0)

## 2018-01-01 ASSESSMENT — ENCOUNTER SYMPTOMS: DYSRHYTHMIAS: 1

## 2018-01-03 ENCOUNTER — TEAM CONFERENCE (OUTPATIENT)
Dept: TRANSPLANT | Facility: CLINIC | Age: 63
End: 2018-01-03

## 2018-01-03 ENCOUNTER — TELEPHONE (OUTPATIENT)
Dept: TRANSPLANT | Facility: CLINIC | Age: 63
End: 2018-01-03

## 2018-01-03 NOTE — TELEPHONE ENCOUNTER
Called to inform him we received the dermatology clearance pathology and he is now active on the wait list.

## 2018-01-03 NOTE — TELEPHONE ENCOUNTER
TEAM CONFERENCE:    ATTENDEES: Alda Lemus Schumaker, Schenk, Coordinators, Social Work, Pharmacy, Finance, and Dietary    DISCUSSION and OUTCOME: Status change to active approved. Status changed with Dermatology clearance.

## 2018-01-13 DIAGNOSIS — Z48.298 AFTERCARE FOLLOWING ORGAN TRANSPLANT: ICD-10-CM

## 2018-01-13 PROCEDURE — 80158 DRUG ASSAY CYCLOSPORINE: CPT | Performed by: INTERNAL MEDICINE

## 2018-01-14 LAB
CYCLOSPORINE BLD LC/MS/MS-MCNC: 77 UG/L (ref 50–400)
TME LAST DOSE: NORMAL H

## 2018-02-01 ENCOUNTER — TRANSFERRED RECORDS (OUTPATIENT)
Dept: HEALTH INFORMATION MANAGEMENT | Facility: CLINIC | Age: 63
End: 2018-02-01

## 2018-02-02 DIAGNOSIS — I10 ESSENTIAL HYPERTENSION: Primary | ICD-10-CM

## 2018-02-02 DIAGNOSIS — Z94.0 S/P KIDNEY TRANSPLANT: ICD-10-CM

## 2018-02-02 DIAGNOSIS — D63.1 ANEMIA IN CHRONIC KIDNEY DISEASE (CODE): ICD-10-CM

## 2018-02-02 DIAGNOSIS — E55.9 VITAMIN D DEFICIENCY: ICD-10-CM

## 2018-02-06 ENCOUNTER — OFFICE VISIT (OUTPATIENT)
Dept: NEPHROLOGY | Facility: CLINIC | Age: 63
End: 2018-02-06
Attending: INTERNAL MEDICINE
Payer: MEDICARE

## 2018-02-06 ENCOUNTER — TELEPHONE (OUTPATIENT)
Dept: NEPHROLOGY | Facility: CLINIC | Age: 63
End: 2018-02-06

## 2018-02-06 VITALS
HEIGHT: 73 IN | WEIGHT: 241 LBS | BODY MASS INDEX: 31.94 KG/M2 | SYSTOLIC BLOOD PRESSURE: 128 MMHG | DIASTOLIC BLOOD PRESSURE: 79 MMHG | OXYGEN SATURATION: 99 % | HEART RATE: 77 BPM

## 2018-02-06 DIAGNOSIS — I10 ESSENTIAL HYPERTENSION: ICD-10-CM

## 2018-02-06 DIAGNOSIS — N18.4 ANEMIA OF CHRONIC RENAL FAILURE, STAGE 4 (SEVERE) (H): ICD-10-CM

## 2018-02-06 DIAGNOSIS — N18.4 CKD (CHRONIC KIDNEY DISEASE) STAGE 4, GFR 15-29 ML/MIN (H): Primary | ICD-10-CM

## 2018-02-06 DIAGNOSIS — Z94.0 S/P KIDNEY TRANSPLANT: Primary | ICD-10-CM

## 2018-02-06 DIAGNOSIS — N18.4 CKD (CHRONIC KIDNEY DISEASE) STAGE 4, GFR 15-29 ML/MIN (H): ICD-10-CM

## 2018-02-06 DIAGNOSIS — N06.8 ISOLATED PROTEINURIA WITH OTHER MORPHOLOGIC LESION: ICD-10-CM

## 2018-02-06 DIAGNOSIS — D63.1 ANEMIA OF CHRONIC RENAL FAILURE, STAGE 4 (SEVERE) (H): ICD-10-CM

## 2018-02-06 DIAGNOSIS — E55.9 VITAMIN D DEFICIENCY: ICD-10-CM

## 2018-02-06 DIAGNOSIS — Z76.82 ORGAN TRANSPLANT CANDIDATE: ICD-10-CM

## 2018-02-06 DIAGNOSIS — Z94.0 S/P KIDNEY TRANSPLANT: ICD-10-CM

## 2018-02-06 DIAGNOSIS — D63.1 ANEMIA IN CHRONIC KIDNEY DISEASE (CODE): ICD-10-CM

## 2018-02-06 LAB
ALBUMIN SERPL-MCNC: 3.7 G/DL (ref 3.4–5)
ALBUMIN SERPL-MCNC: 3.8 G/DL (ref 3.4–5)
ALP SERPL-CCNC: 43 U/L (ref 40–150)
ALT SERPL W P-5'-P-CCNC: 21 U/L (ref 0–70)
ANION GAP SERPL CALCULATED.3IONS-SCNC: 10 MMOL/L (ref 3–14)
AST SERPL W P-5'-P-CCNC: 22 U/L (ref 0–45)
BILIRUB DIRECT SERPL-MCNC: 0.1 MG/DL (ref 0–0.2)
BILIRUB SERPL-MCNC: 0.3 MG/DL (ref 0.2–1.3)
BUN SERPL-MCNC: 51 MG/DL (ref 7–30)
CALCIUM SERPL-MCNC: 9.4 MG/DL (ref 8.5–10.1)
CHLORIDE SERPL-SCNC: 103 MMOL/L (ref 94–109)
CO2 SERPL-SCNC: 20 MMOL/L (ref 20–32)
CREAT SERPL-MCNC: 4.04 MG/DL (ref 0.66–1.25)
CREAT UR-MCNC: 84 MG/DL
DEPRECATED CALCIDIOL+CALCIFEROL SERPL-MC: 24 UG/L (ref 20–75)
ERYTHROCYTE [DISTWIDTH] IN BLOOD BY AUTOMATED COUNT: 14.2 % (ref 10–15)
FERRITIN SERPL-MCNC: 214 NG/ML (ref 26–388)
GFR SERPL CREATININE-BSD FRML MDRD: 15 ML/MIN/1.7M2
GLUCOSE SERPL-MCNC: 144 MG/DL (ref 70–99)
HCT VFR BLD AUTO: 24.1 % (ref 40–53)
HGB BLD-MCNC: 8.2 G/DL (ref 13.3–17.7)
IRON SATN MFR SERPL: 20 % (ref 15–46)
IRON SERPL-MCNC: 79 UG/DL (ref 35–180)
LIPASE SERPL-CCNC: 372 U/L (ref 73–393)
MCH RBC QN AUTO: 36.1 PG (ref 26.5–33)
MCHC RBC AUTO-ENTMCNC: 34 G/DL (ref 31.5–36.5)
MCV RBC AUTO: 106 FL (ref 78–100)
PHOSPHATE SERPL-MCNC: 3.6 MG/DL (ref 2.5–4.5)
PLATELET # BLD AUTO: 152 10E9/L (ref 150–450)
POTASSIUM SERPL-SCNC: 4.6 MMOL/L (ref 3.4–5.3)
PROT SERPL-MCNC: 7.9 G/DL (ref 6.8–8.8)
PROT UR-MCNC: 0.86 G/L
PROT/CREAT 24H UR: 1.03 G/G CR (ref 0–0.2)
PTH-INTACT SERPL-MCNC: 248 PG/ML (ref 12–72)
RBC # BLD AUTO: 2.27 10E12/L (ref 4.4–5.9)
SODIUM SERPL-SCNC: 133 MMOL/L (ref 133–144)
TIBC SERPL-MCNC: 391 UG/DL (ref 240–430)
WBC # BLD AUTO: 7 10E9/L (ref 4–11)

## 2018-02-06 PROCEDURE — 82784 ASSAY IGA/IGD/IGG/IGM EACH: CPT | Performed by: INTERNAL MEDICINE

## 2018-02-06 PROCEDURE — 82306 VITAMIN D 25 HYDROXY: CPT | Performed by: INTERNAL MEDICINE

## 2018-02-06 PROCEDURE — 84156 ASSAY OF PROTEIN URINE: CPT | Performed by: INTERNAL MEDICINE

## 2018-02-06 PROCEDURE — G0463 HOSPITAL OUTPT CLINIC VISIT: HCPCS | Mod: ZF

## 2018-02-06 PROCEDURE — 85027 COMPLETE CBC AUTOMATED: CPT | Performed by: INTERNAL MEDICINE

## 2018-02-06 PROCEDURE — 82040 ASSAY OF SERUM ALBUMIN: CPT | Performed by: INTERNAL MEDICINE

## 2018-02-06 PROCEDURE — 83540 ASSAY OF IRON: CPT | Performed by: INTERNAL MEDICINE

## 2018-02-06 PROCEDURE — 86334 IMMUNOFIX E-PHORESIS SERUM: CPT | Performed by: INTERNAL MEDICINE

## 2018-02-06 PROCEDURE — 36415 COLL VENOUS BLD VENIPUNCTURE: CPT | Performed by: INTERNAL MEDICINE

## 2018-02-06 PROCEDURE — 80069 RENAL FUNCTION PANEL: CPT | Performed by: INTERNAL MEDICINE

## 2018-02-06 PROCEDURE — 83970 ASSAY OF PARATHORMONE: CPT | Performed by: INTERNAL MEDICINE

## 2018-02-06 PROCEDURE — 83690 ASSAY OF LIPASE: CPT | Performed by: INTERNAL MEDICINE

## 2018-02-06 PROCEDURE — 83550 IRON BINDING TEST: CPT | Performed by: INTERNAL MEDICINE

## 2018-02-06 PROCEDURE — 80076 HEPATIC FUNCTION PANEL: CPT | Performed by: INTERNAL MEDICINE

## 2018-02-06 PROCEDURE — 82728 ASSAY OF FERRITIN: CPT | Performed by: INTERNAL MEDICINE

## 2018-02-06 RX ORDER — OMEGA-3-ACID ETHYL ESTERS 1 G/1
2 CAPSULE, LIQUID FILLED ORAL 2 TIMES DAILY
COMMUNITY
Start: 2017-11-22

## 2018-02-06 ASSESSMENT — PAIN SCALES - GENERAL: PAINLEVEL: NO PAIN (0)

## 2018-02-06 NOTE — MR AVS SNAPSHOT
After Visit Summary   2/6/2018    Rory Bustamante    MRN: [id number]           Patient Information     Date Of Birth          1955        Visit Information        Provider Department      2/6/2018 1:30 PM Christi Sun MD [facility]        Today's Diagnoses     S/P kidney transplant    -  1    Isolated proteinuria with other morphologic lesion        Organ transplant candidate        Essential hypertension        Anemia of chronic renal failure, stage 4 (severe) (H)        CKD (chronic kidney disease) stage 4, GFR 15-29 ml/min (H)          Care Instructions    1,  See cards or primary care doc about lowering triglycerides  2.  Dietician re diet to lower triglycerides  3.  Pancreatitis is a very serious disease so would like to prevent it  4.  See heme about monclonal protein  5.  Start epo shots and enroll in anemia clinic  6.  Symptoms of kidney failure to watch for: nausea, low appetite, severe shortness of breath  7.  Transplant US because of possible bladder stone seen in hospital.              Follow-ups after your visit        Additional Services     Onc/Heme Adult Referral       Monoclonal protein                  Follow-up notes from your care team     Return in about 6 months (around 8/6/2018).      Your next 10 appointments already scheduled     Feb 09, 2018 12:00 PM CST   US RENAL TRANSPLANT with [address] [facility] ([facility])    [address] [place] [address] [phone]           Please bring a list of your medicines (including vitamins, minerals and over-the-counter drugs). Also, tell your doctor about any allergies you may have. Wear comfortable clothes and leave your valuables at home.  You do not need to do anything special to prepare for your exam.  Please call the Imaging Department at your exam site with any questions.            Feb 09, 2018  1:15 PM CST   (Arrive by 1:00 PM)   New Patient Visit  with Belinda Moseley MD   [facility] ([facility])    [address]  5th Floor  [facility] [address]   [phone]            Feb 21, 2018  4:30 PM CST   (Arrive by 4:15 PM)   New Patient Visit with Scooby Falcon MD   [facility] ([facility])    [address]  [address]  [facility] [address]   [phone]            Aug 07, 2018  3:00 PM CDT   Lab with  LAB   [facility] ([facility])    [address]  1st Floor  [facility] [address]   [phone]            Aug 07, 2018  4:00 PM CDT   (Arrive by 3:30 PM)   Return Visit with Christi Sun MD   [facility] ([facility])    [address]  [address]  [facility] [id number] [phone]              Who to contact     If you have questions or need follow up information about today's clinic visit or your schedule please contact [facility] directly at [phone].  Normal or non-critical lab and imaging results will be communicated to you by [employer], letter or phone within 4 business days after the clinic has received the results. If you do not hear from us within 7 days, please contact the clinic through [employer] or phone. If you have a critical or abnormal lab result, we will notify you by phone as soon as possible.  Submit refill requests through [employer] or call your pharmacy and they will forward the refill request to us. Please allow 3 business days for your refill to be completed.          Additional Information About Your Visit        [employer] Information     [employer] gives you secure access to your electronic health record. If you see a primary care provider, you can also send messages to your care team and make appointments. If you have questions, please call your primary care clinic.  If you do not have a primary care provider, please call  "[phone] and they will assist you.        Care EveryWhere ID     This is your Care EveryWhere ID. This could be used by other organizations to access your [place] medical records  [id number]        Your Vitals Were     Pulse Height Pulse Oximetry BMI (Body Mass Index)          77 1.854 m (6' 1\") 99% 31.8 kg/m2         Blood Pressure from Last 3 Encounters:   02/06/18 128/79   12/08/17 141/89   11/11/17 169/90    Weight from Last 3 Encounters:   02/06/18 109.3 kg (241 lb)   12/08/17 110 kg (242 lb 6.4 oz)   11/11/17 110.4 kg (243 lb 4.8 oz)              We Performed the Following     Hepatic panel     Lipase     Onc/Heme Adult Referral     Protein Immunofixation Serum     US renal transplant          Today's Medication Changes          These changes are accurate as of 2/6/18  3:18 PM.  If you have any questions, ask your nurse or doctor.               Start taking these medicines.        Dose/Directions    darbepoetin freya 60 MCG/0.3ML injection   Commonly known as:  ARANESP   Used for:  CKD (chronic kidney disease) stage 4, GFR 15-29 ml/min (H)   Started by:  Christi Sun MD        Dose:  60 mcg   Inject 0.3 mLs (60 mcg) Subcutaneous once for 1 dose   Quantity:  0.3 mL   Refills:  0       epoetin freya 4000 UNIT/ML injection   Commonly known as:  PROCRIT   Used for:  CKD (chronic kidney disease) stage 4, GFR 15-29 ml/min (H)   Started by:  Christi Sun MD        Dose:  4000 Units   Inject 1 mL (4,000 Units) Subcutaneous once for 1 dose   Quantity:  1 mL   Refills:  0         These medicines have changed or have updated prescriptions.        Dose/Directions    allopurinol 100 MG tablet   Commonly known as:  ZYLOPRIM   This may have changed:    - how much to take  - when to take this   Used for:  Kidney replaced by transplant, Gout        Dose:  200 mg   Take 2 tablets by mouth daily.   Quantity:  180 tablet   Refills:  3            Where to get your medicines      These medications were sent to " [place], [place] - [address]  [address], [facility] [address]    Hours:  TRANSPLANT PHONE NUMBER [phone] Phone:  [phone]     darbepoetin freya 60 MCG/0.3ML injection    epoetin freya 4000 UNIT/ML injection                Primary Care Provider Office Phone # Fax #    Moris Diaz [phone]       [facility] [address]  [facility] [address]        Equal Access to Services     [facility]: Hadii aad ku hadasho Soomaali, waaxda luqadaha, qaybta kaalmada adeegyada, waxay idiin hayaan adeeg kharaeliseo lamadiha . So [facility] [phone].    ATENCIÓN: Si habla español, tiene a carr disposición servicios gratuitos de asistencia lingüística. [facility] [phone].    We comply with applicable federal civil rights laws and [place] laws. We do not discriminate on the basis of race, color, national origin, age, disability, sex, sexual orientation, or gender identity.            Thank you!     Thank you for choosing [facility]  for your care. Our goal is always to provide you with excellent care. Hearing back from our patients is one way we can continue to improve our services. Please take a few minutes to complete the written survey that you may receive in the mail after your visit with us. Thank you!             Your Updated Medication List - Protect others around you: Learn how to safely use, store and throw away your medicines at www.disposemymeds.org.          This list is accurate as of 2/6/18  3:18 PM.  Always use your most recent med list.                   Brand Name Dispense Instructions for use Diagnosis    allopurinol 100 MG tablet    ZYLOPRIM    180 tablet    Take 2 tablets by mouth daily.    Kidney replaced by transplant, Gout       ALPHAGAN OP      Apply 1 drop to eye 2 times daily        BABY ASPIRIN PO      Take 81 mg by mouth daily        BEVACizumab 400 MG/16ML injection    AVASTIN           cholecalciferol 5000 UNITS Tabs tablet    vitamin D3    30 tablet    Take 1 tablet (5,000 Units) by mouth daily    Hyperparathyroidism due to renal insufficiency (H)       CRESTOR 5 MG tablet   Generic drug:  rosuvastatin      Take 20 mg by mouth daily        * cycloSPORINE modified 100 MG capsule    GENERIC EQUIVALENT    180 capsule    TAKE ONE CAPSULE BY MOUTH TWICE A DAY (TOTAL DOSE 125MG TWICE A DAY)    Kidney transplant recipient, Long-term use of immunosuppressant medication       * cycloSPORINE modified 25 MG capsule    GENERIC EQUIVALENT    180 capsule    TAKE ONE CAPSULE BY MOUTH TWICE A DAY (TOTAL DOSE 125MG TWIC A DAY)    Kidney transplant recipient, Long-term use of immunosuppressant medication       darbepoetin freya 60 MCG/0.3ML injection    ARANESP    0.3 mL    Inject 0.3 mLs (60 mcg) Subcutaneous once for 1 dose    CKD (chronic kidney disease) stage 4, GFR 15-29 ml/min (H)       epoetin freya 4000 UNIT/ML injection    PROCRIT    1 mL    Inject 1 mL (4,000 Units) Subcutaneous once for 1 dose    CKD (chronic kidney disease) stage 4, GFR 15-29 ml/min (H)       fenofibrate 145 MG tablet      Take 145 mg by mouth daily        ISOSORBIDE MONONITRATE PO      Take 60 mg by mouth daily        loperamide 2 MG capsule    IMODIUM     Take 1 mg by mouth daily        losartan 50 MG tablet    COZAAR    90 tablet    Take 1 tablet (50 mg) by mouth daily    Kidney replaced by transplant       metoprolol tartrate 100 MG tablet    LOPRESSOR    60 tablet    Take 1 tablet by mouth 2 times daily.    Unspecified hypertensive kidney disease with chronic kidney disease stage I through stage IV, or unspecified(403.90), Kidney replaced by transplant       NIFEdipine ER 90 MG Tb24    ADALAT CC     Take 90 mg by mouth daily        NITROSTAT 0.4 MG sublingual tablet   Generic drug:  nitroGLYcerin      Place 0.4 mg under the tongue every 5 minutes as needed.        NONFORMULARY      2 times daily Focus Macula Pro:  Eye vitamin and  mineral supplement A, C, E, Zinc, Copper        omega-3 acid ethyl esters 1 G capsule    Lovaza     Take 2 g by mouth        PLAVIX 75 MG tablet   Generic drug:  clopidogrel      Take 75 mg by mouth daily.        predniSONE 5 MG tablet    DELTASONE     Take 5 mg by mouth daily.        probenecid 500 MG tablet    BENEMID     Take 500 mg by mouth 2 times daily.        sulfamethoxazole-trimethoprim 400-80 MG per tablet    BACTRIM/SEPTRA     Take 1 tablet by mouth daily Take on every other day        TAMSULOSIN HCL PO      Take 0.4 mg by mouth daily        TYLENOL ARTHRITIS PAIN 650 MG CR tablet   Generic drug:  acetaminophen      Take 2 tablets by mouth 3 times daily.        * Notice:  This list has 2 medication(s) that are the same as other medications prescribed for you. Read the directions carefully, and ask your doctor or other care provider to review them with you.

## 2018-02-06 NOTE — NURSING NOTE
"Chief Complaint   Patient presents with     RECHECK     6 mo kidney follow up       Initial /79  Pulse 77  Ht 1.854 m (6' 1\")  Wt 109.3 kg (241 lb)  SpO2 99%  BMI 31.8 kg/m2 Estimated body mass index is 31.8 kg/(m^2) as calculated from the following:    Height as of this encounter: 1.854 m (6' 1\").    Weight as of this encounter: 109.3 kg (241 lb).  Medication Reconciliation: complete   FLORINA WASHINGTON CMA      "

## 2018-02-06 NOTE — LETTER
2/6/2018      RE: Rory Bustamante  [address]  [facility] [address]       Service Date: 02/06/2018      REASON FOR VISIT:  Mr. 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      HISTORY OF PRESENT ILLNESS:  The patient has a GFR of 15 and has gone through a full transplant evaluation and was activated on the list as of 09/14; however, he was recently hospitalized for pancreatitis and has a number of other issues that need attention.      The patient has end-stage renal disease from MPGN, had recurrence in the transplant in kidney biopsy in 2008.  He had a second kidney biopsy in 05/2017 that shows end-stage renal disease.  The possibility of recurrent MPGN was not excluded, but the changes were not felt to be typical at that time.  During that time period, he developed nephrotic range proteinuria which would be consistent with the possibility.  However, the proteinuria seemed out of range for the biopsy, so we had immunofixation sent.  He has a low level of monoclonal IgA kappa in the serum and was seen by Hematology in September, at which point they addressed his positive antiphospholipid antibody but did not address the kappa LC.      The patient had known hypertriglyceridemia and at his last clinic visit we discussed a plan of seeing his primary care provider, restarting fibrate therapy, and weight loss.  He apparently thought the Fenofibrate might have side effects so stopped taking that, has not really seen a dietitian, and was hospitalized for pancreatitis last week.  The pancreatitis was mild with a maximum lipase level of 1363 on 02/02 and today in clinic it is normal.  He presented with acute abdominal pain and not particularly severe, mostly characterized by a dull pain with bloating.  He was n.p.o. for 24 hours but then started food the next following day and has no recurrence of symptoms.  He does not have a  gallstone and does not drink alcohol so the most likely cause of pancreatitis was felt to be the hypertriglyceridemia.      The patient during his hospital stay had a CT scan which showed a possible calcification in the bladder as well as a cyst in the renal transplant.  Recommendation was made by the radiologist that he have an ultrasound done to further characterize both of these abnormalities.      His hypertension control has been good at home, he is on 4 drugs including losartan 50 mg, Nifedipine 90 mg, Isordil 100.  He is not on a diuretic because it does not seem to affect his swelling and does affect his creatinine.      Of note, the patient was called in for a transplant 1 month ago, but did not receive a transplant because he had a possible cancer in the margins of his last skin cancer resection, so he was not given the transplant for that reason.  Subsequent to that, he has seen a dermatologist and is cleared as the margins of this lesion are felt to be clean.      Since his last visit, his hemoglobin has dropped from 10 to 8.2 gradually (not necessarily from the recent hospital stay).  He now has dyspnea on exertion but does not describe chest pain.  He gets tired after walking up 6 steps, his iron stores are replete.  He has been on EPO in the past but not recently.      Today in clinic, he is concerned about spinal stenosis.  He has had a longstanding history of that and now has leg numbness when he stands up, so is not really able to do any exercise.  He would like to see a surgeon about having some type of limited spinal procedure.  Apparently he was seen at [facility] and was not felt to be a candidate.  He would like a second opinion.      REVIEW OF SYSTEMS:  The patient has all of the symptoms mentioned above including dyspnea on exertion, numbness when walking, but has resolution of his abdominal pain.  Otherwise, a comprehensive review of systems is completed and negative.  Of note, his appetite is  good.  He has no nausea.      PAST MEDICAL HISTORY:   1.  Status post HLA identical transplant in 1989.   2.  Recurrent MPGN on kidney biopsy in 2008.   3.  Hypertriglyceridemia with triglycerides of greater than 2000.   4.  History of gout, now on allopurinol.   5.  Coronary artery disease, status post stent in 07/2011.   6.  Spinal stenosis diagnosed in 2013.   7.  History of skin cancer followed closely.   8.  History of hypertension.      MEDICATIONS:  See below.      SOCIAL HISTORY:  Lives with his wife.  He cannot get exercise because of the leg numbness.  He does not smoke or drink alcohol.      PHYSICAL EXAMINATION:   VITAL SIGNS:  Blood pressure 128/71, weight 241, BMI 32.   GENERAL:  He is well-developed, well-nourished.   HEENT: good dentitian  LYMPH: no cervical adenopathy  SKIN:  Has extensive skin changes on his face and arms.   LUNGS:  Clear bilaterally.   CARDIAC:  Regular sinus rhythm, no murmurs, rubs, gallops.   ABDOMEN:  Obese.  No bruit.   EXTREMITIES:  Lower extremities have 1-2+ peripheral edema bilaterally which has been present for many years.   NEUROLOGIC:  He is able to get up and sit on the exam chair, but did not do a full neuro exam to assess the degree of numbness with ambulation.      LABORATORY DATA:  Creatinine 4.0.  Triglyceride max on 02/02 was 1363.  CT abdomen and pelvis, edematous pancreas head and proximal pancreas body with peripancreatic fat stranding consistent with pancreatitis.  A 2.4 cm and 2.1 cm possible cysts on the right transplant, 1.5 cm area of low attenuation in the pancreatic body, needs to be evaluated with followup CT, and lastly a 1.2 cm calculus in the right side of the bladder near the insertion of the transplant ureter without hydronephrosis.   Electrolytes/Renal -   Recent Labs   Lab Test  02/06/18   1221  12/08/17   1104  08/03/17   1427  05/30/17   1651   NA  133  133  134  138   POTASSIUM  4.6  5.0  4.3  4.4   CHLORIDE  103  102  99  104   CO2  20  20   22  23   BUN  51*  73*  58*  62*   CR  4.04*  4.77*  3.16*  2.90*   GLC  144*  101*  106*  103*   TRISHA  9.4  9.5  9.8  9.2   PHOS  3.6   --   3.2  3.9       CBC -   Recent Labs   Lab Test  02/06/18   1221  12/08/17   1104  08/03/17   1427   WBC  7.0  5.2  8.4   HGB  8.2*  8.6*  11.1*   PLT  152  162  141*       LFTs -   Recent Labs   Lab Test  02/06/18   1223  02/06/18   1221  12/08/17   1104  08/03/17   1427   ALKPHOS  43   --   50  84   BILITOTAL  0.3   --   0.3  0.3   ALT  21   --   27  22   AST  22   --   26  16   PROTTOTAL  7.9   --   7.6  7.8   ALBUMIN  3.8  3.7  3.7  3.8       Iron Panel -   Recent Labs   Lab Test  02/06/18   1221  02/23/10   1538   IRON  79  90   IRONSAT  20  37   VALERIA  214  483*       Endocrine -   Recent Labs   Lab Test  12/08/17   1104  06/07/11   1347   A1C  4.8  5.5        IMPRESSION:   1.  Chronic kidney disease.  He has stage IV CKD, no uremic symptoms, currently not nauseated, good appetite.  Will continue to monitor.   2.  Hypertension.  Blood pressure control is good on his 4 medications.   3.  Pancreatitis.  He has new onset pancreatitis, likely secondary to hypertriglyceridemia.  He also has a low attenuation mass seen in the pancreas CT, which needs to be followed and he has an appointment to see a GI specialist close to his home.   4.  Bladder calculus.  They have a new finding of a bladder calculus seen on the CT scan and so will do a renal ultrasound to look at this and refer him to Urology for cysto if the finding is verified.   5.  Monoclonal gammopathy.  He has a low level IgA kappa in the serum, will repeat that today, and refer him to Hematology if it is still present. I asked Dr Gutierres to reread his biopsy from 6/2017 and there is no monoclonal LC staining  6.  Skin cancer.  He was denied a transplant in mid-December because of irregular margins of the cancer that were not clear of malignancy.  We will verify that this is stable, but he says he has been cleared at this point.    7.  Spinal stenosis.  He has ongoing symptoms with leg numbness and inability to walk.  He would like to see a specialist about surgery.   8.  Anemia.  His hemoglobin is 8.2, he has stage IV CKD and would be a candidate for EPO.  This has been a slow downturn in the hemoglobin.  His iron stores are replete.  Hopefully this is not reflective of myeloma, but just indicates progression of his CKD.      PLAN:   1.  Repeat serum immunofixation.   2.  Transplant ultrasound to look at the bladder calculus seen on CT.   3.  Gastrointestinal followup for pancreatitis.   4.  See primary care provider and a dietitian regarding medication and diet for hypertriglyceridemia.   5.  No change in blood pressure meds.   6.  No change in immunosuppression.   7.  Referral to Orthopedics regarding stenosis treatment.   8.  Referral to Anemia Clinic for hemoglobin 8.2.        The patient should be on hold for a transplant and I will contact the transplant office.         SARABJIT LANGSTON MD      Current Outpatient Prescriptions   Medication Sig Dispense Refill     omega-3 acid ethyl esters (LOVAZA) 1 G capsule Take 2 g by mouth       cycloSPORINE modified (GENERIC EQUIVALENT) 100 MG capsule TAKE ONE CAPSULE BY MOUTH TWICE A DAY (TOTAL DOSE 125MG TWICE A DAY) 180 capsule 3     cycloSPORINE modified (GENERIC EQUIVALENT) 25 MG capsule TAKE ONE CAPSULE BY MOUTH TWICE A DAY (TOTAL DOSE 125MG TWIC A DAY) 180 capsule 3     loperamide (IMODIUM) 2 MG capsule Take 1 mg by mouth daily       fenofibrate 145 MG tablet Take 145 mg by mouth daily       NIFEdipine ER (ADALAT CC) 90 MG TB24 Take 90 mg by mouth daily       BEVACizumab (AVASTIN) 400 MG/16ML injection        losartan (COZAAR) 50 MG tablet Take 1 tablet (50 mg) by mouth daily 90 tablet 3     BABY ASPIRIN PO Take 81 mg by mouth daily       NONFORMULARY 2 times daily Focus Macula Pro:  Eye vitamin and mineral supplement A, C, E, Zinc, Copper        ISOSORBIDE MONONITRATE PO Take 60 mg by  mouth daily        TAMSULOSIN HCL PO Take 0.4 mg by mouth daily        allopurinol (ZYLOPRIM) 100 MG tablet Take 2 tablets by mouth daily. (Patient taking differently: Take 100 mg by mouth 2 times daily ) 180 tablet 3     metoprolol (LOPRESSOR) 100 MG tablet Take 1 tablet by mouth 2 times daily. 60 tablet 6     rosuvastatin (CRESTOR) 5 MG tablet Take 20 mg by mouth daily        nitroGLYCERIN (NITROSTAT) 0.4 MG SL tablet Place 0.4 mg under the tongue every 5 minutes as needed.       Brimonidine Tartrate (ALPHAGAN OP) Apply 1 drop to eye 2 times daily        clopidogrel (PLAVIX) 75 MG tablet Take 75 mg by mouth daily.       predniSONE (DELTASONE) 5 MG tablet Take 5 mg by mouth daily.       probenecid (BENEMID) 500 MG tablet Take 500 mg by mouth 2 times daily.       sulfamethoxazole-trimethoprim (BACTRIM,SEPTRA) 400-80 MG per tablet Take 1 tablet by mouth daily Take on every other day       acetaminophen (TYLENOL ARTHRITIS PAIN) 650 MG CR tablet Take 2 tablets by mouth 3 times daily.       cholecalciferol (VITAMIN D3) 5000 UNITS TABS tablet Take 1 tablet (5,000 Units) by mouth daily 30 tablet 0         Christi Sun MD         D: 2018   T: 2018   MT: JEISON      Name:     JEANMARIE GONZALEZ   MRN:      [id number]        Account:      [id number]   :      1955           Service Date: 2018   Document: [id number]

## 2018-02-06 NOTE — PATIENT INSTRUCTIONS
1,  See cards or primary care doc about lowering triglycerides  2.  Dietician re diet to lower triglycerides  3.  Pancreatitis is a very serious disease so would like to prevent it  4.  See heme about monclonal protein  5.  Start epo shots and enroll in anemia clinic  6.  Symptoms of kidney failure to watch for: nausea, low appetite, severe shortness of breath  7.  Transplant US because of possible bladder stone seen in hospital.

## 2018-02-07 ENCOUNTER — TELEPHONE (OUTPATIENT)
Dept: NEPHROLOGY | Facility: CLINIC | Age: 63
End: 2018-02-07

## 2018-02-07 ENCOUNTER — TELEPHONE (OUTPATIENT)
Dept: PHARMACY | Facility: CLINIC | Age: 63
End: 2018-02-07

## 2018-02-07 ENCOUNTER — TELEPHONE (OUTPATIENT)
Dept: TRANSPLANT | Facility: CLINIC | Age: 63
End: 2018-02-07

## 2018-02-07 ENCOUNTER — TEAM CONFERENCE (OUTPATIENT)
Dept: TRANSPLANT | Facility: CLINIC | Age: 63
End: 2018-02-07

## 2018-02-07 DIAGNOSIS — N18.4 CKD (CHRONIC KIDNEY DISEASE) STAGE 4, GFR 15-29 ML/MIN (H): Primary | ICD-10-CM

## 2018-02-07 LAB
IGA SERPL-MCNC: 230 MG/DL (ref 70–380)
IGG SERPL-MCNC: 826 MG/DL (ref 695–1620)
IGM SERPL-MCNC: 56 MG/DL (ref 60–265)
PROT PATTERN SERPL IFE-IMP: ABNORMAL

## 2018-02-07 NOTE — TELEPHONE ENCOUNTER
Team Conference:      Attendees: Divine Dawkins Keys, Schumacher, Schenk,   Coordinator, , Dietician, Pharmacy    Discussion and Outcome: Patient status changed to inactive approved. Patient temporarily too ill.

## 2018-02-07 NOTE — TELEPHONE ENCOUNTER
Called Rory to formally state is inactive status. He said he understood the reasons why and what he needs to do. I relayed the message from his nephrologist and that I would also write the follow up needs in the notification letter. I also let him know that Dr. Sun's clinic staff will be working with him and assist him in his care. Should he have any questions, please reach out to me.

## 2018-02-07 NOTE — LETTER
[facility] [place]  [facility]         FAX             [phone]    February 9, 2018      Total Number of Pages:  3_____  ____

## 2018-02-07 NOTE — TELEPHONE ENCOUNTER
Anemia Management Note - Enrollment  SUBJECTIVE/OBJECTIVE:    Referred by Dr. Christi Sun on 2018  Primary Diagnosis: Anemia in Chronic Kidney Disease (N18.3, D63.1)     Secondary Diagnosis:  Chronic Kidney Disease, Stage 3 (N18.3)   Hgb goal range:  9-10  Epo/Darbo: Procrit  10,000 units  once weekly  In clinic  Iron regimen:  No HX in med list  Labs : 2019  Recent MARY LOU use, transfusion, IV iron: Ferrous Sulfate 325mg QD  RX/TX plans : 2019  No history of stroke, MI and blood clots, has HX cancer, stent for CAD   Contact:  Ok to leave message regarding Medical/Scheduling and billing on cell per consent to communicate dated 2014    OK to speak with wife Flavia regarding Medical per consent to communicate dated 2014  Anemia Latest Ref Rng & Units 2012 10/21/2016 2017 2017 8/3/2017 2017 2018   Hemoglobin 13.3 - 17.7 g/dL 11.3(L) 11.4(L) 10.9(L) 11.1(L) 11.1(L) 8.6(L) 8.2(L)   TSAT 15 - 46 % - - - - - - 20   Ferritin 26 - 388 ng/mL - - - - - - 214       BP Readings from Last 3 Encounters:   18 128/79   17 141/89   17 169/90     Wt Readings from Last 2 Encounters:   18 241 lb (109.3 kg)   17 242 lb 6.4 oz (110 kg)     Current Outpatient Prescriptions   Medication Sig Dispense Refill     omega-3 acid ethyl esters (LOVAZA) 1 G capsule Take 2 g by mouth       cycloSPORINE modified (GENERIC EQUIVALENT) 100 MG capsule TAKE ONE CAPSULE BY MOUTH TWICE A DAY (TOTAL DOSE 125MG TWICE A DAY) 180 capsule 3     cycloSPORINE modified (GENERIC EQUIVALENT) 25 MG capsule TAKE ONE CAPSULE BY MOUTH TWICE A DAY (TOTAL DOSE 125MG TWIC A DAY) 180 capsule 3     loperamide (IMODIUM) 2 MG capsule Take 1 mg by mouth daily       fenofibrate 145 MG tablet Take 145 mg by mouth daily       NIFEdipine ER (ADALAT CC) 90 MG TB24 Take 90 mg by mouth daily       BEVACizumab (AVASTIN) 400 MG/16ML injection        losartan (COZAAR) 50 MG tablet Take 1  tablet (50 mg) by mouth daily 90 tablet 3     cholecalciferol (VITAMIN D3) 5000 UNITS TABS tablet Take 1 tablet (5,000 Units) by mouth daily 30 tablet 0     BABY ASPIRIN PO Take 81 mg by mouth daily       NONFORMULARY 2 times daily Focus Macula Pro:  Eye vitamin and mineral supplement A, C, E, Zinc, Copper        ISOSORBIDE MONONITRATE PO Take 60 mg by mouth daily        TAMSULOSIN HCL PO Take 0.4 mg by mouth daily        allopurinol (ZYLOPRIM) 100 MG tablet Take 2 tablets by mouth daily. (Patient taking differently: Take 100 mg by mouth 2 times daily ) 180 tablet 3     metoprolol (LOPRESSOR) 100 MG tablet Take 1 tablet by mouth 2 times daily. 60 tablet 6     rosuvastatin (CRESTOR) 5 MG tablet Take 20 mg by mouth daily        nitroGLYCERIN (NITROSTAT) 0.4 MG SL tablet Place 0.4 mg under the tongue every 5 minutes as needed.       Brimonidine Tartrate (ALPHAGAN OP) Apply 1 drop to eye 2 times daily        clopidogrel (PLAVIX) 75 MG tablet Take 75 mg by mouth daily.       predniSONE (DELTASONE) 5 MG tablet Take 5 mg by mouth daily.       probenecid (BENEMID) 500 MG tablet Take 500 mg by mouth 2 times daily.       sulfamethoxazole-trimethoprim (BACTRIM,SEPTRA) 400-80 MG per tablet Take 1 tablet by mouth daily Take on every other day       acetaminophen (TYLENOL ARTHRITIS PAIN) 650 MG CR tablet Take 2 tablets by mouth 3 times daily.       ASSESSMENT:  Hgb Not at goal/Initiation of therapy   Ferritin: At goal (>100ng/mL)  TSat: not at goal of >30%  Iron regimen recommended: consider oral  Recommended MARY LOU regimen:Procrit 01314b weekly  Blood Pressure: stable    PLAN:  1. Patient called today for enrollment in Anemia Management Service.  2. Discussed:  anemia overview and risks of MARY LOU blood clots, stroke, increase in blood pressure and cancer risk  3. Dose location: [facility]  4. Labs: [facility] in [place]  5. Pharmacy: N/A  6. Sent new patient letter with medication guide to patient & Procrit  info, patient uses My Chart    Spoke with Rory King to [facility] center staff to verify fax number to send orders, and that they could administer. Fax is [phone]    Next call date: 02/13/2018    Anemia Management Service  Mirza LozanoD, BCACP and Michelle RamírezCPGeovanna  Phone: [phone]  Fax: [phone]

## 2018-02-07 NOTE — PROGRESS NOTES
Service Date: 02/06/2018      REASON FOR VISIT:  Mr. 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      HISTORY OF PRESENT ILLNESS:  The patient has a GFR of 15 and has gone through a full transplant evaluation and was activated on the list as of 09/14; however, he was recently hospitalized for pancreatitis and has a number of other issues that need attention.      The patient has end-stage renal disease from MPGN, had recurrence in the transplant in kidney biopsy in 2008.  He had a second kidney biopsy in 05/2017 that shows end-stage renal disease.  The possibility of recurrent MPGN was not excluded, but the changes were not felt to be typical at that time.  During that time period, he developed nephrotic range proteinuria which would be consistent with the possibility.  However, the proteinuria seemed out of range for the biopsy, so we had immunofixation sent.  He has a low level of monoclonal IgA kappa in the serum and was seen by Hematology in September, at which point they addressed his positive antiphospholipid antibody but did not address the kappa LC.      The patient had known hypertriglyceridemia and at his last clinic visit we discussed a plan of seeing his primary care provider, restarting fibrate therapy, and weight loss.  He apparently thought the Fenofibrate might have side effects so stopped taking that, has not really seen a dietitian, and was hospitalized for pancreatitis last week.  The pancreatitis was mild with a maximum lipase level of 1363 on 02/02 and today in clinic it is normal.  He presented with acute abdominal pain and not particularly severe, mostly characterized by a dull pain with bloating.  He was n.p.o. for 24 hours but then started food the next following day and has no recurrence of symptoms.  He does not have a gallstone and does not drink alcohol so the most likely cause of pancreatitis  was felt to be the hypertriglyceridemia.      The patient during his hospital stay had a CT scan which showed a possible calcification in the bladder as well as a cyst in the renal transplant.  Recommendation was made by the radiologist that he have an ultrasound done to further characterize both of these abnormalities.      His hypertension control has been good at home, he is on 4 drugs including losartan 50 mg, Nifedipine 90 mg, Isordil 100.  He is not on a diuretic because it does not seem to affect his swelling and does affect his creatinine.      Of note, the patient was called in for a transplant 1 month ago, but did not receive a transplant because he had a possible cancer in the margins of his last skin cancer resection, so he was not given the transplant for that reason.  Subsequent to that, he has seen a dermatologist and is cleared as the margins of this lesion are felt to be clean.      Since his last visit, his hemoglobin has dropped from 10 to 8.2 gradually (not necessarily from the recent hospital stay).  He now has dyspnea on exertion but does not describe chest pain.  He gets tired after walking up 6 steps, his iron stores are replete.  He has been on EPO in the past but not recently.      Today in clinic, he is concerned about spinal stenosis.  He has had a longstanding history of that and now has leg numbness when he stands up, so is not really able to do any exercise.  He would like to see a surgeon about having some type of limited spinal procedure.  Apparently he was seen at [facility] and was not felt to be a candidate.  He would like a second opinion.      REVIEW OF SYSTEMS:  The patient has all of the symptoms mentioned above including dyspnea on exertion, numbness when walking, but has resolution of his abdominal pain.  Otherwise, a comprehensive review of systems is completed and negative.  Of note, his appetite is good.  He has no nausea.      PAST MEDICAL HISTORY:   1.  Status post HLA  identical transplant in 1989.   2.  Recurrent MPGN on kidney biopsy in 2008.   3.  Hypertriglyceridemia with triglycerides of greater than 2000.   4.  History of gout, now on allopurinol.   5.  Coronary artery disease, status post stent in 07/2011.   6.  Spinal stenosis diagnosed in 2013.   7.  History of skin cancer followed closely.   8.  History of hypertension.      MEDICATIONS:  See below.      SOCIAL HISTORY:  Lives with his wife.  He cannot get exercise because of the leg numbness.  He does not smoke or drink alcohol.      PHYSICAL EXAMINATION:   VITAL SIGNS:  Blood pressure 128/71, weight 241, BMI 32.   GENERAL:  He is well-developed, well-nourished.   HEENT: good dentitian  LYMPH: no cervical adenopathy  SKIN:  Has extensive skin changes on his face and arms.   LUNGS:  Clear bilaterally.   CARDIAC:  Regular sinus rhythm, no murmurs, rubs, gallops.   ABDOMEN:  Obese.  No bruit.   EXTREMITIES:  Lower extremities have 1-2+ peripheral edema bilaterally which has been present for many years.   NEUROLOGIC:  He is able to get up and sit on the exam chair, but did not do a full neuro exam to assess the degree of numbness with ambulation.      LABORATORY DATA:  Creatinine 4.0.  Triglyceride max on 02/02 was 1363.  CT abdomen and pelvis, edematous pancreas head and proximal pancreas body with peripancreatic fat stranding consistent with pancreatitis.  A 2.4 cm and 2.1 cm possible cysts on the right transplant, 1.5 cm area of low attenuation in the pancreatic body, needs to be evaluated with followup CT, and lastly a 1.2 cm calculus in the right side of the bladder near the insertion of the transplant ureter without hydronephrosis.   Electrolytes/Renal -   Recent Labs   Lab Test  02/06/18   1221  12/08/17   1104  08/03/17   1427  05/30/17   1651   NA  133  133  134  138   POTASSIUM  4.6  5.0  4.3  4.4   CHLORIDE  103  102  99  104   CO2  20  20  22  23   BUN  51*  73*  58*  62*   CR  4.04*  4.77*  3.16*  2.90*   GLC   144*  101*  106*  103*   TRISHA  9.4  9.5  9.8  9.2   PHOS  3.6   --   3.2  3.9       CBC -   Recent Labs   Lab Test  02/06/18   1221  12/08/17   1104  08/03/17   1427   WBC  7.0  5.2  8.4   HGB  8.2*  8.6*  11.1*   PLT  152  162  141*       LFTs -   Recent Labs   Lab Test  02/06/18   1223  02/06/18   1221  12/08/17   1104  08/03/17   1427   ALKPHOS  43   --   50  84   BILITOTAL  0.3   --   0.3  0.3   ALT  21   --   27  22   AST  22   --   26  16   PROTTOTAL  7.9   --   7.6  7.8   ALBUMIN  3.8  3.7  3.7  3.8       Iron Panel -   Recent Labs   Lab Test  02/06/18   1221  02/23/10   1538   IRON  79  90   IRONSAT  20  37   VALERIA  214  483*       Endocrine -   Recent Labs   Lab Test  12/08/17   1104  06/07/11   1347   A1C  4.8  5.5        IMPRESSION:   1.  Chronic kidney disease.  He has stage IV CKD, no uremic symptoms, currently not nauseated, good appetite.  Will continue to monitor.   2.  Hypertension.  Blood pressure control is good on his 4 medications.   3.  Pancreatitis.  He has new onset pancreatitis, likely secondary to hypertriglyceridemia.  He also has a low attenuation mass seen in the pancreas CT, which needs to be followed and he has an appointment to see a GI specialist close to his home.   4.  Bladder calculus.  They have a new finding of a bladder calculus seen on the CT scan and so will do a renal ultrasound to look at this and refer him to Urology for cysto if the finding is verified.   5.  Monoclonal gammopathy.  He has a low level IgA kappa in the serum, will repeat that today, and refer him to Hematology if it is still present. I asked Dr Gutierres to reread his biopsy from 6/2017 and there is no monoclonal LC staining  6.  Skin cancer.  He was denied a transplant in mid-December because of irregular margins of the cancer that were not clear of malignancy.  We will verify that this is stable, but he says he has been cleared at this point.   7.  Spinal stenosis.  He has ongoing symptoms with leg numbness and  inability to walk.  He would like to see a specialist about surgery.   8.  Anemia.  His hemoglobin is 8.2, he has stage IV CKD and would be a candidate for EPO.  This has been a slow downturn in the hemoglobin.  His iron stores are replete.  Hopefully this is not reflective of myeloma, but just indicates progression of his CKD.      PLAN:   1.  Repeat serum immunofixation.   2.  Transplant ultrasound to look at the bladder calculus seen on CT.   3.  Gastrointestinal followup for pancreatitis.   4.  See primary care provider and a dietitian regarding medication and diet for hypertriglyceridemia.   5.  No change in blood pressure meds.   6.  No change in immunosuppression.   7.  Referral to Orthopedics regarding stenosis treatment.   8.  Referral to Anemia Clinic for hemoglobin 8.2.        The patient should be on hold for a transplant and I will contact the transplant office.         SARABJIT LANGSTON MD      Current Outpatient Prescriptions   Medication Sig Dispense Refill     omega-3 acid ethyl esters (LOVAZA) 1 G capsule Take 2 g by mouth       cycloSPORINE modified (GENERIC EQUIVALENT) 100 MG capsule TAKE ONE CAPSULE BY MOUTH TWICE A DAY (TOTAL DOSE 125MG TWICE A DAY) 180 capsule 3     cycloSPORINE modified (GENERIC EQUIVALENT) 25 MG capsule TAKE ONE CAPSULE BY MOUTH TWICE A DAY (TOTAL DOSE 125MG TWIC A DAY) 180 capsule 3     loperamide (IMODIUM) 2 MG capsule Take 1 mg by mouth daily       fenofibrate 145 MG tablet Take 145 mg by mouth daily       NIFEdipine ER (ADALAT CC) 90 MG TB24 Take 90 mg by mouth daily       BEVACizumab (AVASTIN) 400 MG/16ML injection        losartan (COZAAR) 50 MG tablet Take 1 tablet (50 mg) by mouth daily 90 tablet 3     BABY ASPIRIN PO Take 81 mg by mouth daily       NONFORMULARY 2 times daily Focus Macula Pro:  Eye vitamin and mineral supplement A, C, E, Zinc, Copper        ISOSORBIDE MONONITRATE PO Take 60 mg by mouth daily        TAMSULOSIN HCL PO Take 0.4 mg by mouth daily         allopurinol (ZYLOPRIM) 100 MG tablet Take 2 tablets by mouth daily. (Patient taking differently: Take 100 mg by mouth 2 times daily ) 180 tablet 3     metoprolol (LOPRESSOR) 100 MG tablet Take 1 tablet by mouth 2 times daily. 60 tablet 6     rosuvastatin (CRESTOR) 5 MG tablet Take 20 mg by mouth daily        nitroGLYCERIN (NITROSTAT) 0.4 MG SL tablet Place 0.4 mg under the tongue every 5 minutes as needed.       Brimonidine Tartrate (ALPHAGAN OP) Apply 1 drop to eye 2 times daily        clopidogrel (PLAVIX) 75 MG tablet Take 75 mg by mouth daily.       predniSONE (DELTASONE) 5 MG tablet Take 5 mg by mouth daily.       probenecid (BENEMID) 500 MG tablet Take 500 mg by mouth 2 times daily.       sulfamethoxazole-trimethoprim (BACTRIM,SEPTRA) 400-80 MG per tablet Take 1 tablet by mouth daily Take on every other day       acetaminophen (TYLENOL ARTHRITIS PAIN) 650 MG CR tablet Take 2 tablets by mouth 3 times daily.       cholecalciferol (VITAMIN D3) 5000 UNITS TABS tablet Take 1 tablet (5,000 Units) by mouth daily 30 tablet 0              D: 2018   T: 2018   MT: JEISON      Name:     JEANMARIE GONZALEZ   MRN:      [id number]        Account:      [id number]   :      1955           Service Date: 2018      Document: [id number]

## 2018-02-07 NOTE — LETTER
2018    Rory Bustamante  [address]  [facility] [place] [facility] [address]      Dear Mr. Bustamante,    This letter is sent to notify you that your listing status was changed from Active to Inactive on the kidney transplant waitlist at the [facility].  Your status was changed due to some health issues that need to be resolved per your last visit with Dr. Sun.    Per the visit summary, you will need to followin. Repeat lab serum draw for immunofixation.  2. Ultrasound of the bladder  3. Gastroenterologist for pancreatitis  4. Please see primary care provider for medication and diet change to control hypertriglyceridemia.  5. Establish care in Anemia Clinic for low hemaglobin  6. Orthopedics appointment and care of stenosis treatment.    Dr. Sun's office and clinic staff will be assisting you with these items. If you have further questions, please call Francy Murrell RN Transplant Coordinator at [phone].     We still need to be kept informed of any changes such as:  o changes in your insurance coverage  o change in your phone number, address or emergency contact  o significant changes in your health  o significant infections (such as pneumonia or abscesses)  o blood transfusions  o any condition which requires hospitalization or surgery    Sincerely,      Kidney Transplant Program    Enclosures: UNOS Letter and Waitlist Information Update    CC:  Christi Sun MD;  Moris Diaz MD

## 2018-02-07 NOTE — TELEPHONE ENCOUNTER
Call to confirm with patient if he has been taking Epo/procrit injections at home or at another clinic. Patient states that he used to give himself injections and or get them before his transplant 1989 and reports that he was instructed on how to administer them from [facility] in [place] about 9-10 years ago? Will update [place], Pharm D for anemia services.  Hollie Zhang LPN  Nephrology  [phone]

## 2018-02-07 NOTE — TELEPHONE ENCOUNTER
Attempted to call patient to discuss options.  Left Detailed voice message per OK to [employer] to communicate on file, and verification that voice mail is his via his greeting that identifies his full name.    If Rory receives for self administration estimated cost about $400 through Part D benefit.  Covered at infusion site through Medicare if receives only Procrit injection and no additional services that day.      Awaiting direct communication, therapy orders and standing labs set, and noted in VM in case he opts to go to nearest [facility] infusion center before I am able to speak directly with him.    Grecia Carroll, PharmD, BCACP  Anemia Management Service  Phone: [phone]  Fax: [phone]

## 2018-02-07 NOTE — LETTER
[facility]  Medication Monitoring Clinic         FAX             [phone]    February 7, 2018      Total Number of Pages:  _____  ____

## 2018-02-08 ENCOUNTER — MYC MEDICAL ADVICE (OUTPATIENT)
Dept: PHARMACY | Facility: CLINIC | Age: 63
End: 2018-02-08

## 2018-02-09 ENCOUNTER — OFFICE VISIT (OUTPATIENT)
Dept: ORTHOPEDICS | Facility: CLINIC | Age: 63
End: 2018-02-09
Payer: MEDICARE

## 2018-02-09 ENCOUNTER — RADIANT APPOINTMENT (OUTPATIENT)
Dept: ULTRASOUND IMAGING | Facility: CLINIC | Age: 63
End: 2018-02-09
Attending: INTERNAL MEDICINE
Payer: MEDICARE

## 2018-02-09 ENCOUNTER — TELEPHONE (OUTPATIENT)
Dept: TRANSPLANT | Facility: CLINIC | Age: 63
End: 2018-02-09

## 2018-02-09 VITALS — HEIGHT: 73 IN | BODY MASS INDEX: 31.94 KG/M2 | WEIGHT: 241 LBS

## 2018-02-09 DIAGNOSIS — M48.07 SPINAL STENOSIS OF LUMBOSACRAL REGION: Primary | ICD-10-CM

## 2018-02-09 DIAGNOSIS — I73.9 CLAUDICATION OF BOTH LOWER EXTREMITIES (H): ICD-10-CM

## 2018-02-09 NOTE — LETTER
2/9/2018      RE: Rory Bustamante  [address] [place] [address]        Subjective:   Rory Bustamante is a 62 year old male who is here for back weakness and bilateral leg numbness worse in the last couple years. Needs a kidney tx.   Nephrology appt on Tuesday.    Back doesn't hurt, taking Tylenol 3000mg daily  Legs go numb if stands for long periods.  Standing on carpet is impossible, thinks he's going to tip over.  [facility], didn't hurt, so didn't think surgery.  Seen back in 2013, 2016  Can walk a city block.  Heart blockage, kidney failure  Hard to carry garbage out.  Not as active as he used to be.  Wants to stay active.      Background:   Date of injury: none     PAST MEDICAL, SOCIAL, SURGICAL AND FAMILY HISTORY: He  has a past medical history of Angina effort (H) (8/2016); Arthritis; BPH (benign prostatic hyperplasia); CAD (coronary artery disease) (July 2011); Glaucoma; Gout; HTN (hypertension); Hypercholesteremia (2015); Hypertriglyceridemia (2015); Intraocular bleeding; Macular degeneration; Neuropathy; Presence of stent in coronary artery (August 2012); Proteinuria (11/2015); S/P kidney transplant; Spinal stenosis (2013); and Warts.  He  has a past surgical history that includes Transplant kidney recipient living related (1989); TOTAL KNEE ARTHROPLASTY (2015); tonsillectomy; and vasectomy.  His family history includes CEREBROVASCULAR DISEASE in his brother; Cardiac Sudden Death (age of onset: 87) in his sister; Colon Cancer (age of onset: 78) in his mother; DIABETES in his brother and mother.  He reports that he has never smoked. He has never used smokeless tobacco. He reports that he does not drink alcohol or use illicit drugs.    ALLERGIES: He is allergic to contrast dye and simvastatin.    CURRENT MEDICATIONS: He has a current medication list which includes the following prescription(s): omega-3 acid ethyl esters, cyclosporine modified, cyclosporine modified, loperamide, fenofibrate,  "nifedipine er, bevacizumab, losartan, cholecalciferol, aspirin, NONFORMULARY, isosorbide mononitrate, tamsulosin hcl, allopurinol, metoprolol tartrate, rosuvastatin, nitroglycerin, brimonidine tartrate, clopidogrel, prednisone, probenecid, sulfamethoxazole-trimethoprim, and acetaminophen.     REVIEW OF SYSTEMS: 10 point review of systems is negative except as noted above.     Exam:   Ht 6' 1\" (1.854 m)  Wt 241 lb (109.3 kg)  BMI 31.8 kg/m2           CONSTITUTIONAL: alert, obese, mild distress and cooperative  HEAD: Normocephalic. No masses, lesions, tenderness or abnormalities  SKIN: no suspicious lesions or rashes  GAIT: normal  NEUROLOGIC: Non-focal, Normal muscle tone and strength, reflexes normal, sensation grossly normal.  PSYCHIATRIC: affect normal/bright and mentation appears normal.    MUSCULOSKELETAL: lumbar pain  Tender:  left para lumbar muscles, right para lumbar muscles  Non-tender:  thoracic spinous processes, left parathoracic muscles, right parathoracic muscles, lumbar spinous processes  Range of Motion:  lumbar flexion  decreased, lumbar extension  full  Strength:  able to heel walk, able to toe walk  Special tests:  Positive left straight leg raises    Hip Exam: Hip ROM full         Assessment/Plan:   Pt is a 63 yo white male with PMHx of kidney disease, obesity, CAD, HTN presenting with lumbar pain in setting of spinal stenosis and peripheral neuropathy  1. Lumbar spinal stenosis with left radiculopathy- Pool therapy, consider LISA  2. Claudication of lower extremities- CUATE ordered  3. Peripheral neuropathy- EMG ordered    RTC 4-6 weeks    Encounter Diagnoses   Name Primary?     Spinal stenosis of lumbosacral region Yes     Claudication of both lower extremities (H)      X-RAY INTERPRETATION:   MRI outside imaging 2.17  Multilevel spinal changes  Stenosis noted - left L4-5- severe foraminal stenosis    Belinda Moseley MD    "

## 2018-02-09 NOTE — MR AVS SNAPSHOT
After Visit Summary   2/9/2018    Rory Bustamante    MRN: [id number]           Patient Information     Date Of Birth          1955        Visit Information        Provider Department      2/9/2018 1:15 PM Belinda Moseley MD [facility]        Today's Diagnoses     Spinal stenosis of lumbosacral region    -  1    Claudication of both lower extremities (H)           Follow-ups after your visit        Additional Services     PHYSICAL THERAPY REFERRAL (External-Prints)       Physical Therapy Referral                  Your next 10 appointments already scheduled     Feb 21, 2018  4:30 PM CST   (Arrive by 4:15 PM)   New Patient Visit with Scooby Falcon MD   [facility] ([facility] [place])    [address]  [address]  [facility] [id number] [phone]            Feb 26, 2018  9:00 AM CST   (Arrive by 8:45 AM)   EMG with Chris Hannon MD   [facility] ([facility] [place])    [address]  3rd Floor  [facility] [id number] [phone]            Feb 26, 2018 11:30 AM CST   US CUATE DOPPLER NO EXERCISE 1-2 LVLS BILAT with UCUSV2   [facility] ([facility] [place])    [address]  1st Floor  [facility] [id number] [phone]           Please bring a list of your medicines (including vitamins, minerals and over-the-counter drugs). Also, tell your doctor about any allergies you may have. Wear comfortable clothes and leave your valuables at home.  No caffeine or tobacco for 1 hour prior to exam.  Please call the Imaging Department at your exam site with any questions.            Feb 28, 2018 10:30 AM CST   (Arrive by 10:15 AM)   Return Visit with Belinda Moseley MD   [facility] ([facility] [place])    [address]  5th Floor  [facility] [id number] [phone]            Aug 07, 2018  3:00 PM CDT   Lab  "with  LAB    Health Lab ([facility])    [address]  1st Floor  [facility] [id number] [phone]            Aug 07, 2018  4:00 PM CDT   (Arrive by 3:30 PM)   Return Visit with Christi Sun MD   [facility] ([facility])    [address]  [address]  [place] [address] [phone]              Future tests that were ordered for you today     Open Future Orders        Priority Expected Expires Ordered    US Ankle-arm index Routine  2/9/2019 2/9/2018    EMG ([facility]) Routine  3/11/2018 2/9/2018            Who to contact     Please call your clinic at [phone] to:    Ask questions about your health    Make or cancel appointments    Discuss your medicines    Learn about your test results    Speak to your doctor            Additional Information About Your Visit        [employer] Information     [employer] gives you secure access to your electronic health record. If you see a primary care provider, you can also send messages to your care team and make appointments. If you have questions, please call your primary care clinic.  If you do not have a primary care provider, please call [phone] and they will assist you.      [employer] is an electronic gateway that provides easy, online access to your medical records. With [employer], you can request a clinic appointment, read your test results, renew a prescription or communicate with your care team.     To access your existing account, please contact your [facility] or call [phone] for assistance.        Care EveryWhere ID     This is your Care EveryWhere ID. This could be used by other organizations to access your [place] medical records  [id number]        Your Vitals Were     Height BMI (Body Mass Index)                6' 1\" (1.854 m) 31.8 kg/m2           Blood Pressure from Last 3 Encounters:   02/06/18 128/79   12/08/17 141/89 "   11/11/17 169/90    Weight from Last 3 Encounters:   02/09/18 241 lb (109.3 kg)   02/06/18 241 lb (109.3 kg)   12/08/17 242 lb 6.4 oz (110 kg)              We Performed the Following     PHYSICAL THERAPY REFERRAL (External-Prints)          Today's Medication Changes          These changes are accurate as of 2/9/18  2:14 PM.  If you have any questions, ask your nurse or doctor.               These medicines have changed or have updated prescriptions.        Dose/Directions    allopurinol 100 MG tablet   Commonly known as:  ZYLOPRIM   This may have changed:    - how much to take  - when to take this   Used for:  Kidney replaced by transplant, Gout        Dose:  200 mg   Take 2 tablets by mouth daily.   Quantity:  180 tablet   Refills:  3                Primary Care Provider Office Phone # Fax #    Moris Diaz [phone]       [facility] [address]  [facility] [place] [address]        Equal Access to Services     FIDELIA JACOBSEN AH: Hadii aad ku hadasho Soomaali, waaxda luqadaha, qaybta kaalmada adeegyada, devin barriga . So [facility] [phone].    ATENCIÓN: Si habla español, tiene a carr disposición servicios gratuitos de asistencia lingüística. Llame al [phone].    We comply with applicable federal civil rights laws and [place] laws. We do not discriminate on the basis of race, color, national origin, age, disability, sex, sexual orientation, or gender identity.            Thank you!     Thank you for choosing [facility]  for your care. Our goal is always to provide you with excellent care. Hearing back from our patients is one way we can continue to improve our services. Please take a few minutes to complete the written survey that you may receive in the mail after your visit with us. Thank you!             Your Updated Medication List - Protect others around you: Learn how to safely use, store and throw away your medicines at  www.disposemymeds.org.          This list is accurate as of 2/9/18  2:14 PM.  Always use your most recent med list.                   Brand Name Dispense Instructions for use Diagnosis    allopurinol 100 MG tablet    ZYLOPRIM    180 tablet    Take 2 tablets by mouth daily.    Kidney replaced by transplant, Gout       ALPHAGAN OP      Apply 1 drop to eye 2 times daily        BABY ASPIRIN PO      Take 81 mg by mouth daily        BEVACizumab 400 MG/16ML injection    AVASTIN          cholecalciferol 5000 UNITS Tabs tablet    vitamin D3    30 tablet    Take 1 tablet (5,000 Units) by mouth daily    Hyperparathyroidism due to renal insufficiency (H)       CRESTOR 5 MG tablet   Generic drug:  rosuvastatin      Take 20 mg by mouth daily        * cycloSPORINE modified 100 MG capsule    GENERIC EQUIVALENT    180 capsule    TAKE ONE CAPSULE BY MOUTH TWICE A DAY (TOTAL DOSE 125MG TWICE A DAY)    Kidney transplant recipient, Long-term use of immunosuppressant medication       * cycloSPORINE modified 25 MG capsule    GENERIC EQUIVALENT    180 capsule    TAKE ONE CAPSULE BY MOUTH TWICE A DAY (TOTAL DOSE 125MG TWIC A DAY)    Kidney transplant recipient, Long-term use of immunosuppressant medication       fenofibrate 145 MG tablet      Take 145 mg by mouth daily        ISOSORBIDE MONONITRATE PO      Take 60 mg by mouth daily        loperamide 2 MG capsule    IMODIUM     Take 1 mg by mouth daily        losartan 50 MG tablet    COZAAR    90 tablet    Take 1 tablet (50 mg) by mouth daily    Kidney replaced by transplant       metoprolol tartrate 100 MG tablet    LOPRESSOR    60 tablet    Take 1 tablet by mouth 2 times daily.    Unspecified hypertensive kidney disease with chronic kidney disease stage I through stage IV, or unspecified(403.90), Kidney replaced by transplant       NIFEdipine ER 90 MG Tb24    ADALAT CC     Take 90 mg by mouth daily        NITROSTAT 0.4 MG sublingual tablet   Generic drug:  nitroGLYcerin      Place 0.4 mg  under the tongue every 5 minutes as needed.        NONFORMULARY      2 times daily Focus Macula Pro:  Eye vitamin and mineral supplement A, C, E, Zinc, Copper        omega-3 acid ethyl esters 1 G capsule    Lovaza     Take 2 g by mouth        PLAVIX 75 MG tablet   Generic drug:  clopidogrel      Take 75 mg by mouth daily.        predniSONE 5 MG tablet    DELTASONE     Take 5 mg by mouth daily.        probenecid 500 MG tablet    BENEMID     Take 500 mg by mouth 2 times daily.        sulfamethoxazole-trimethoprim 400-80 MG per tablet    BACTRIM/SEPTRA     Take 1 tablet by mouth daily Take on every other day        TAMSULOSIN HCL PO      Take 0.4 mg by mouth daily        TYLENOL ARTHRITIS PAIN 650 MG CR tablet   Generic drug:  acetaminophen      Take 2 tablets by mouth 3 times daily.        * Notice:  This list has 2 medication(s) that are the same as other medications prescribed for you. Read the directions carefully, and ask your doctor or other care provider to review them with you.

## 2018-02-09 NOTE — TELEPHONE ENCOUNTER
Call transferred to Donor . Caller did not state reason for tristan. Ángel Thompson requesting direct call at [phone].

## 2018-02-09 NOTE — PROGRESS NOTES
Subjective:   Rory Bustamante is a 62 year old male who is here for back weakness and bilateral leg numbness worse in the last couple years. Needs a kidney tx.   Nephrology appt on Tuesday.    Back doesn't hurt, taking Tylenol 3000mg daily  Legs go numb if stands for long periods.  Standing on carpet is impossible, thinks he's going to tip over.  [facility], didn't hurt, so didn't think surgery.  Seen back in 2013, 2016  Can walk a city block.  Heart blockage, kidney failure  Hard to carry garbage out.  Not as active as he used to be.  Wants to stay active.      Background:   Date of injury: none     PAST MEDICAL, SOCIAL, SURGICAL AND FAMILY HISTORY: He  has a past medical history of Angina effort (H) (8/2016); Arthritis; BPH (benign prostatic hyperplasia); CAD (coronary artery disease) (July 2011); Glaucoma; Gout; HTN (hypertension); Hypercholesteremia (2015); Hypertriglyceridemia (2015); Intraocular bleeding; Macular degeneration; Neuropathy; Presence of stent in coronary artery (August 2012); Proteinuria (11/2015); S/P kidney transplant; Spinal stenosis (2013); and Warts.  He  has a past surgical history that includes Transplant kidney recipient living related (1989); TOTAL KNEE ARTHROPLASTY (2015); tonsillectomy; and vasectomy.  His family history includes CEREBROVASCULAR DISEASE in his brother; Cardiac Sudden Death (age of onset: 87) in his sister; Colon Cancer (age of onset: 78) in his mother; DIABETES in his brother and mother.  He reports that he has never smoked. He has never used smokeless tobacco. He reports that he does not drink alcohol or use illicit drugs.    ALLERGIES: He is allergic to contrast dye and simvastatin.    CURRENT MEDICATIONS: He has a current medication list which includes the following prescription(s): omega-3 acid ethyl esters, cyclosporine modified, cyclosporine modified, loperamide, fenofibrate, nifedipine er, bevacizumab, losartan, cholecalciferol, aspirin, NONFORMULARY,  "isosorbide mononitrate, tamsulosin hcl, allopurinol, metoprolol tartrate, rosuvastatin, nitroglycerin, brimonidine tartrate, clopidogrel, prednisone, probenecid, sulfamethoxazole-trimethoprim, and acetaminophen.     REVIEW OF SYSTEMS: 10 point review of systems is negative except as noted above.     Exam:   Ht 6' 1\" (1.854 m)  Wt 241 lb (109.3 kg)  BMI 31.8 kg/m2           CONSTITUTIONAL: alert, obese, mild distress and cooperative  HEAD: Normocephalic. No masses, lesions, tenderness or abnormalities  SKIN: no suspicious lesions or rashes  GAIT: normal  NEUROLOGIC: Non-focal, Normal muscle tone and strength, reflexes normal, sensation grossly normal.  PSYCHIATRIC: affect normal/bright and mentation appears normal.    MUSCULOSKELETAL: lumbar pain  Tender:  left para lumbar muscles, right para lumbar muscles  Non-tender:  thoracic spinous processes, left parathoracic muscles, right parathoracic muscles, lumbar spinous processes  Range of Motion:  lumbar flexion  decreased, lumbar extension  full  Strength:  able to heel walk, able to toe walk  Special tests:  Positive left straight leg raises    Hip Exam: Hip ROM full         Assessment/Plan:   Pt is a 63 yo white male with PMHx of kidney disease, obesity, CAD, HTN presenting with lumbar pain in setting of spinal stenosis and peripheral neuropathy  1. Lumbar spinal stenosis with left radiculopathy- Pool therapy, consider LISA  2. Claudication of lower extremities- CUATE ordered  3. Peripheral neuropathy- EMG ordered    RTC 4-6 weeks    Encounter Diagnoses   Name Primary?     Spinal stenosis of lumbosacral region Yes     Claudication of both lower extremities (H)      X-RAY INTERPRETATION:   MRI outside imaging 2.17  Multilevel spinal changes  Stenosis noted - left L4-5- severe foraminal stenosis  "

## 2018-02-12 NOTE — TELEPHONE ENCOUNTER
Been trying to call number provided several times ([phone]).  Busy tone then disconnects. Unable to leave VM.  Also son is not included in the consent to discuss PHI.  States he would like that changed and his son added.  Message sent to [facility] admin pool to mail paperwork.

## 2018-02-13 LAB
HCT VFR BLD AUTO: 25.2 %
HEMOGLOBIN: 8.6 G/DL (ref 13.3–17.7)

## 2018-02-13 NOTE — TELEPHONE ENCOUNTER
I spoke to Rory on 2/13/18, he is going into the [facility] in [place] for his procrit dose today.    Then he should RTC for a Hgb lab and another procrit dose if needed in 1 week (2/20/18)    He started taking ferrous sulfate QD on 2/7, he is having some diarrhea and GI issues but he did have a pancreatitis attack a couple of weeks ago which may account for some of his GI issues. He is scheduled to have an endoscopy on 2/19/18.  Next iron labs will be due on 3/6/18      Follow up Date: 2/20/18    Devang

## 2018-02-14 ENCOUNTER — TELEPHONE (OUTPATIENT)
Dept: PHARMACY | Facility: CLINIC | Age: 63
End: 2018-02-14

## 2018-02-14 NOTE — TELEPHONE ENCOUNTER
Anemia Management Note  SUBJECTIVE/OBJECTIVE:  Referred by Dr. Christi Sun on 2018  Primary Diagnosis: Anemia in Chronic Kidney Disease (N18.3, D63.1)     Secondary Diagnosis:  Chronic Kidney Disease, Stage 3 (N18.3)   Hgb goal range:  9-10  Epo/Darbo: Procrit  10,000 units  once weekly  In clinic  Iron regimen:  No HX in med list  Labs : 2019  Recent MARY LOU use, transfusion, IV iron: Ferrous Sulfate 325mg QD  RX/TX plans : 2019  No history of stroke, MI and blood clots, has HX cancer, stent for CAD   Contact:                      Ok to leave message regarding Medical/Scheduling and billing on cell per consent to communicate dated 2014                                                  OK to speak with wife Flavia regarding Medical per consent to communicate dated 2014    Anemia Latest Ref Rng & Units 10/21/2016 2017 2017 8/3/2017 2017 2018 2018   MARY LOU Dose - - - - - - - 10,000 units   Hemoglobin 13.3 - 17.7 g/dL 11.4(L) 10.9(L) 11.1(L) 11.1(L) 8.6(L) 8.2(L) -   TSAT 15 - 46 % - - - - - 20 -   Ferritin 26 - 388 ng/mL - - - - - 214 -     BP Readings from Last 3 Encounters:   18 128/79   17 141/89   17 169/90     Wt Readings from Last 2 Encounters:   18 241 lb (109.3 kg)   18 241 lb (109.3 kg)     Rory went to the clinic to get his procrit dose.  His BP was elevated (184/? ) because he forgot to take his BP meds so he went home and took his meds and returned to the infusion center later in the afternoon and received his procrit dose.      ASSESSMENT:  Hgb: Not at goal - received dose in clinic - recommend continue current regimen  TSat: not at goal of >30% Ferritin: At goal (>100ng/mL)    PLAN:  Dosed with procrit and RTC for hgb then procrit if needed in 1 week(s)    Orders needed to be renewed (for next follow-up date) in LETTERS - for external labs: None    Iron labs due:  3/6/18    Plan discussed with:  Rory Lopez  provided by:  Devang    NEXT FOLLOW-UP DATE:  2/20/18    Anemia Management Service  Grecia Carroll PharmD and Michelle Ramírez CPhT  Phone: [phone]  Fax: [phone]

## 2018-02-14 NOTE — TELEPHONE ENCOUNTER
Anemia Management Note  SUBJECTIVE/OBJECTIVE:  Referred by Dr. Christi Sun on 2018  Primary Diagnosis: Anemia in Chronic Kidney Disease (N18.3, D63.1)     Secondary Diagnosis:  Chronic Kidney Disease, Stage 3 (N18.3)   Hgb goal range:  9-10  Epo/Darbo: Procrit  10,000 units  once weekly  In clinic  Iron regimen:  No HX in med list  Labs : 2019  Recent MARY LOU use, transfusion, IV iron: Ferrous Sulfate 325mg QD  RX/TX plans : 2019  No history of stroke, MI and blood clots, has HX cancer, stent for CAD   Contact:                      Ok to leave message regarding Medical/Scheduling and billing on cell per consent to communicate dated 2014                                                  OK to speak with wife Flavia regarding Medical per consent to communicate dated 2014    Anemia Latest Ref Rng & Units 10/21/2016 2017 2017 8/3/2017 2017 2018 2018   MARY LOU Dose - - - - - - - 10,000 units   Hemoglobin 13.3 - 17.7 g/dL 11.4(L) 10.9(L) 11.1(L) 11.1(L) 8.6(L) 8.2(L) 8.6(A)   TSAT 15 - 46 % - - - - - 20 -   Ferritin 26 - 388 ng/mL - - - - - 214 -     BP Readings from Last 3 Encounters:   18 128/79   17 141/89   17 169/90     Wt Readings from Last 2 Encounters:   18 241 lb (109.3 kg)   18 241 lb (109.3 kg)     Rory went to the clinic to get his procrit dose.  His BP was elevated (184/? ) because he forgot to take his BP meds so he went home and took his meds and returned to the infusion center later in the afternoon and received his procrit dose.      ASSESSMENT:  Hgb: Not at goal - received dose in clinic - recommend continue current regimen  TSat: not at goal of >30% Ferritin: At goal (>100ng/mL)    PLAN:  Dosed with procrit and RTC for hgb then procrit if needed in 1 week(s)    Orders needed to be renewed (for next follow-up date) in EPIC: None    Iron labs due:  3/6/18    Plan discussed with:  Rory  Plan provided by:   Sjw  Reviewed 02/15/2018 DENISE  NEXT FOLLOW-UP DATE:  2/20/18    Anemia Management Service  Grecia Carroll PharmD and Michelle Ramírez CPhT  Phone: [phone]  Fax: [phone]

## 2018-02-15 ENCOUNTER — TRANSFERRED RECORDS (OUTPATIENT)
Dept: HEALTH INFORMATION MANAGEMENT | Facility: CLINIC | Age: 63
End: 2018-02-15

## 2018-02-20 ENCOUNTER — TELEPHONE (OUTPATIENT)
Dept: PHARMACY | Facility: CLINIC | Age: 63
End: 2018-02-20

## 2018-02-20 LAB
HCT VFR BLD AUTO: 26.2 %
HEMOGLOBIN: 8.8 G/DL (ref 13.3–17.7)

## 2018-02-20 NOTE — TELEPHONE ENCOUNTER
Anemia Management Note  SUBJECTIVE/OBJECTIVE:  Referred by Dr. Christi Sun on 2018  Primary Diagnosis: Anemia in Chronic Kidney Disease (N18.3, D63.1)     Secondary Diagnosis:  Chronic Kidney Disease, Stage 3 (N18.3)   Hgb goal range:  9-10  Epo/Darbo: Procrit  10,000 units  once weekly  In clinic  Iron regimen:  No HX in med list  Labs : 2019  Recent MARY LOU use, transfusion, IV iron: Ferrous Sulfate 325mg QD  RX/TX plans : 2019  No history of stroke, MI and blood clots, has HX cancer, stent for CAD   Contact:                      Ok to leave message regarding Medical/Scheduling and billing on cell per consent to communicate dated 2014                                                  OK to speak with wife Flavia regarding Medical per consent to communicate dated 2014     Anemia Latest Ref Rng & Units 2017 2017 8/3/2017 2017 2018 2018 2018   MARY LOU Dose - - - - - - 10,000 units 10,000 units   Hemoglobin 13.3 - 17.7 g/dL 10.9(L) 11.1(L) 11.1(L) 8.6(L) 8.2(L) 8.6(A) 8.8(A)   TSAT 15 - 46 % - - - - 20 - -   Ferritin 26 - 388 ng/mL - - - - 214 - -       BP Readings from Last 3 Encounters:   18 128/79   17 141/89   17 169/90     Wt Readings from Last 2 Encounters:   18 241 lb (109.3 kg)   18 241 lb (109.3 kg)         ASSESSMENT:  Hgb:At goal - recommend dose  TSat: not at goal of >30% Ferritin: At goal (>100ng/mL)    PLAN:  RTC for hgb then procrit if needed in 1 week(s)    Orders needed to be renewed (for next follow-up date) in LETTERS - for external labs: None    Iron labs due:  2018    Plan discussed with: Flavia  Plan provided by:     NEXT FOLLOW-UP DATE: 2018    Anemia Management Service  Grecia Carroll,PharmD and Michelle Ramírez CPhT  Phone: [phone]  Fax: [phone]

## 2018-02-21 ENCOUNTER — ONCOLOGY VISIT (OUTPATIENT)
Dept: ONCOLOGY | Facility: CLINIC | Age: 63
End: 2018-02-21
Attending: INTERNAL MEDICINE
Payer: MEDICARE

## 2018-02-21 VITALS
BODY MASS INDEX: 32.35 KG/M2 | TEMPERATURE: 97.6 F | HEIGHT: 73 IN | HEART RATE: 76 BPM | OXYGEN SATURATION: 96 % | SYSTOLIC BLOOD PRESSURE: 136 MMHG | WEIGHT: 244.1 LBS | DIASTOLIC BLOOD PRESSURE: 70 MMHG

## 2018-02-21 DIAGNOSIS — N06.8 ISOLATED PROTEINURIA WITH OTHER MORPHOLOGIC LESION: ICD-10-CM

## 2018-02-21 PROCEDURE — 83883 ASSAY NEPHELOMETRY NOT SPEC: CPT | Performed by: INTERNAL MEDICINE

## 2018-02-21 PROCEDURE — 99204 OFFICE O/P NEW MOD 45 MIN: CPT | Performed by: INTERNAL MEDICINE

## 2018-02-21 PROCEDURE — 84165 PROTEIN E-PHORESIS SERUM: CPT | Performed by: INTERNAL MEDICINE

## 2018-02-21 PROCEDURE — 00000402 ZZHCL STATISTIC TOTAL PROTEIN: Performed by: INTERNAL MEDICINE

## 2018-02-21 PROCEDURE — G0463 HOSPITAL OUTPT CLINIC VISIT: HCPCS | Mod: ZF

## 2018-02-21 ASSESSMENT — PAIN SCALES - GENERAL: PAINLEVEL: NO PAIN (0)

## 2018-02-21 NOTE — PATIENT INSTRUCTIONS
Your visit the [facility] was to discuss elevated blood protein called IgA Kappa.  This could be a benign condition called MGUS (Monoclonal Gammopathy of Undetermined Significance) or a blood disease that needs treatment.  Today we will do some more blood tests and schedule X rays to look for weakening of the bones to try to decide which of these diseases you do have.    I will plan to call you to discuss these results and decide whether a bone marrow biopsy is necessary    If you have questions or concerns before your next appointment you can call Dr Falcon's Care Coordinator, Bernarda Rodriguez at:  [phone]

## 2018-02-21 NOTE — LETTER
2018       RE: Rory Bustamante  [address]  [facility] [address]     Dear Colleague,    Thank you for referring your patient, Rory Bustamante, to the [facility]. Please see a copy of my visit note below.        [facility] Hematology Consultation    Outpatient Visit Note:    Patient: Rory Bustamante  MRN: [id number]  : 1955  RENE: 2018    Reason for Consultation:  Rory Bustamante is a referred by Dr Sun for evaluation and treatment of suspected monoclonal gammopathy.    History of Present Illness:  Rory Bustamante is a 62 year old man with a history of LR kidney transplant, now with slowly progressive failure of the graft and CKD4 (not had HD).  In addition he has had slowly progressive anemia as well.  It is unclear whether this is related to his worsening renal function alone.  He has had not had any blood transfusions recently.  He is going to start on EPO regimen soon.  He is iron replete (ferritin in early Feb was 214).    He reports no back or bone pain.  Does his energy level and appetite are good.  He has had no easy bruising or bleeding.  No unintentional weight loss, fevers, chills, night sweats.    I reviewed the patient's past medical history, medicines, allergies in Epic    Social History:  Denies any tobacco use. No significant alcohol use. Denies any illicit drug use.     Family History:  He reports no history of blood disorders in the family    Objective:  Vitals: B/P: 136/70, T: 97.6, P: 76, R: Data Unavailable, Wt: 244 lbs 1.6 oz  Exam:   Gen: Appears well, no distress  HEENT: no scleral icterus or hemorrhage, no wet purpura, no lymphadenopathy  CV: regular, no murmurs  Pulm: clear  Abd: soft, nontender, no splenomegaly  Ext: no edema  MSK: no pain with palpation of the spine, iliac crests or long bones    Labs:  Reviewed in EPIC.  Creatinine is 4.04 with estimated GFR of 15.  Recent hemoglobin is 8.8.  He has not had a recent erythropoietin  level.    He had a serum immunofixation performed which demonstrated the presence of an IgA kappa.  He has not had an SPEP or serum free light chains assay performed since August 2017.  In August he had no evidence of an M spike seen and a normal K/L ratio    Imaging:  none    Assessment:  In summary, Rory Bustamante is a 62 year old man with slowly progressive chronic kidney disease new anemia suspected to be secondary to low EPO state and isolated IgA kappa seen immunofixation without an M spike at last SPEP in August 2017.  Overall my suspicion is that this is MGUS but I told him we should repeat his light chain assay and SPEP with immunofixation to see if any changes have occurred in the interim.  I think this is especially reasonable given the change in hemoglobin.  We will also perform a skeletal survey to assess for lytic lesions.    Primary concern is for eligibility for second kidney transplant.  I think once we have results from today's tests will have a better understanding of his risk profile for multiple myeloma.  I will coordinate with his transplant physician to discuss whether a bone marrow biopsy is required prior to putting going back on the transplant list.    Plan:  1. Majority of today's visit was spent counseling the patient regarding the interpretation of his recent laboratory studies.  Explained what the plasma cell dyscrasias involved in terms of symptoms and laboratory findings.  I explained that MGUS is my suspected diagnosis but we do not have sufficient information to officially this diagnosis.  2.  Today it will perform an SPEP and immunofixation and kappa and lambda free light chains.  3.  I will arrange for skeletal survey  4.  I will discuss with her bone marrow biopsy is necessary based on results of these studies in order for him to go back on the kidney transplant list.    The patient is given our center's contact information and is instructed to call if he should have any further  questions or concerns.  Otherwise, we will plan on seeing him back as needed.      Total Time Spent:  I spent a total of 45 minutes face-to-face with Rory Bustamante during today's office visit.  Over 50% of this time was spent counseling the patient and/or coordinating care regarding his M spike and how a diagnosis of MGUS versus MM is made.      Scooby Falcon MD   of Medicine  [facility]

## 2018-02-21 NOTE — MR AVS SNAPSHOT
After Visit Summary   2/21/2018    Rory Bustamante    MRN: [id number]           Patient Information     Date Of Birth          1955        Visit Information        Provider Department      2/21/2018 4:30 PM Scooby Falcon MD [facility]        Today's Diagnoses     Isolated proteinuria with other morphologic lesion          Care Instructions    Your visit the [facility] was to discuss elevated blood protein called IgA Kappa.  This could be a benign condition called MGUS (Monoclonal Gammopathy of Undetermined Significance) or a blood disease that needs treatment.  Today we will do some more blood tests and schedule X rays to look for weakening of the bones to try to decide which of these diseases you do have.    I will plan to call you to discuss these results and decide whether a bone marrow biopsy is necessary    If you have questions or concerns before your next appointment you can call Dr Falcon's Care Coordinator, Bernarda Rodriguez at:  [phone]            Follow-ups after your visit        Your next 10 appointments already scheduled     Feb 21, 2018  5:45 PM CST   LAB with [facility] ([facility])    [address] [place] [address] [phone]           Please do not eat 10-12 hours before your appointment if you are coming in fasting for labs on lipids, cholesterol, or glucose (sugar). This does not apply to pregnant women. Water, hot tea and black coffee (with nothing added) are okay. Do not drink other fluids, diet soda or chew gum.            Feb 26, 2018  9:00 AM CST   (Arrive by 8:45 AM)   EMG with Chris Hannon MD   [facility]G ([facility])    [address] [place] [address] [phone]            Feb 26, 2018 11:30 AM CST   US CUATE DOPPLER NO EXERCISE 1-2 LVLS BILAT with [id number] [facility] US  ([facility])    [address] [facility]  [address] [facility] [address]   [phone]           Please bring a list of your medicines (including vitamins, minerals and over-the-counter drugs). Also, tell your doctor about any allergies you may have. Wear comfortable clothes and leave your valuables at home.  No caffeine or tobacco for 1 hour prior to exam.  Please call the Imaging Department at your exam site with any questions.            Feb 27, 2018 11:15 AM CST   XR BONE SURVEY LIMITED with UCXR1   [facility] ([facility])    [address] [facility]  [address] [facility] [address] [phone]           Please bring a list of your current medicines to your exam. (Include vitamins, minerals and over-thecounter medicines.) Leave your valuables at home.  Tell your doctor if there is a chance you may be pregnant.  You do not need to do anything special for this exam.            Feb 28, 2018 10:30 AM CST   (Arrive by 10:15 AM)   Return Visit with Belinda Moseley MD   [facility] ([facility])    [address] [place] [address]  5th Floor  [facility] [address]   [phone]            Aug 07, 2018  3:00 PM CDT   Lab with  LAB   [facility] ([facility])    [address] [place] [address]  [address] [facility] [address]   [phone]            Aug 07, 2018  4:00 PM CDT   (Arrive by 3:30 PM)   Return Visit with Christi Sun MD   [facility] ([facility])    [address] [place] [address]  [address]  [facility] [address]   [phone]              Future tests that were ordered for you today     Open Future Orders        Priority Expected Expires Ordered    Protein electrophoresis Routine  2/21/2019 2/21/2018    Kappa and lambda light chain (Serum) Routine  2/21/2019 2/21/2018    XR Bone Survey Limited Routine 2/21/2018 2/21/2019 2/21/2018           "  Who to contact     If you have questions or need follow up information about today's clinic visit or your schedule please contact [facility] directly at [phone].  Normal or non-critical lab and imaging results will be communicated to you by MyChart, letter or phone within 4 business days after the clinic has received the results. If you do not hear from us within 7 days, please contact the clinic through [employer] or phone. If you have a critical or abnormal lab result, we will notify you by phone as soon as possible.  Submit refill requests through youcalc or call your pharmacy and they will forward the refill request to us. Please allow 3 business days for your refill to be completed.          Additional Information About Your Visit        [employer] Information     youcalc gives you secure access to your electronic health record. If you see a primary care provider, you can also send messages to your care team and make appointments. If you have questions, please call your primary care clinic.  If you do not have a primary care provider, please call [phone] and they will assist you.        Care EveryWhere ID     This is your Care EveryWhere ID. This could be used by other organizations to access your [facility] medical records  [id number]        Your Vitals Were     Pulse Temperature Height Pulse Oximetry BMI (Body Mass Index)       76 97.6  F (36.4  C) (Oral) 1.854 m (6' 1\") 96% 32.21 kg/m2        Blood Pressure from Last 3 Encounters:   02/21/18 136/70   02/06/18 128/79   12/08/17 141/89    Weight from Last 3 Encounters:   02/21/18 110.7 kg (244 lb 1.6 oz)   02/09/18 109.3 kg (241 lb)   02/06/18 109.3 kg (241 lb)                 Today's Medication Changes          These changes are accurate as of 2/21/18  5:26 PM.  If you have any questions, ask your nurse or doctor.               These medicines have changed or have updated prescriptions.        Dose/Directions    allopurinol 100 MG " tablet   Commonly known as:  ZYLOPRIM   This may have changed:    - how much to take  - when to take this   Used for:  Kidney replaced by transplant, Gout        Dose:  200 mg   Take 2 tablets by mouth daily.   Quantity:  180 tablet   Refills:  3                Primary Care Provider Office Phone # Fax #    Moris Diaz [phone]       [facility] [address] [facility] [address]  [facility] [address]        Equal Access to Services     [place]IS : Hadii aad ku hadasho Soomaali, waaxda luqadaha, qaybta kaalmada adeegyada, waxay idiin hayaan adeeg kharash la'terell . So [facility] [phone].    ATENCIÓN: Si habla español, tiene a carr disposición servicios gratuitos de asistencia lingüística. Larissa al [phone].    We comply with applicable federal civil rights laws and [place] laws. We do not discriminate on the basis of race, color, national origin, age, disability, sex, sexual orientation, or gender identity.            Thank you!     Thank you for choosing [facility]  for your care. Our goal is always to provide you with excellent care. Hearing back from our patients is one way we can continue to improve our services. Please take a few minutes to complete the written survey that you may receive in the mail after your visit with us. Thank you!             Your Updated Medication List - Protect others around you: Learn how to safely use, store and throw away your medicines at www.disposemymeds.org.          This list is accurate as of 2/21/18  5:26 PM.  Always use your most recent med list.                   Brand Name Dispense Instructions for use Diagnosis    allopurinol 100 MG tablet    ZYLOPRIM    180 tablet    Take 2 tablets by mouth daily.    Kidney replaced by transplant, Gout       ALPHAGAN OP      Apply 1 drop to eye 2 times daily        BABY ASPIRIN PO      Take 81 mg by mouth daily        BEVACizumab 400 MG/16ML injection    AVASTIN          cholecalciferol 5000  UNITS Tabs tablet    vitamin D3    30 tablet    Take 1 tablet (5,000 Units) by mouth daily    Hyperparathyroidism due to renal insufficiency (H)       CRESTOR 5 MG tablet   Generic drug:  rosuvastatin      Take 20 mg by mouth daily        * cycloSPORINE modified 100 MG capsule    GENERIC EQUIVALENT    180 capsule    TAKE ONE CAPSULE BY MOUTH TWICE A DAY (TOTAL DOSE 125MG TWICE A DAY)    Kidney transplant recipient, Long-term use of immunosuppressant medication       * cycloSPORINE modified 25 MG capsule    GENERIC EQUIVALENT    180 capsule    TAKE ONE CAPSULE BY MOUTH TWICE A DAY (TOTAL DOSE 125MG TWIC A DAY)    Kidney transplant recipient, Long-term use of immunosuppressant medication       epoetin freya 75136 UNIT/ML injection    PROCRIT    4 mL    Inject 1 mL (10,000 Units) Subcutaneous once a week Please make sure Hgb has been checked within 4 days of dose, use as needed for Hgb<10.  If Hgb increases >1 point in 2 weeks, SYSTOLIC BP > 180 mmHg, OR Hgb >=10 g/dL, HOLD DOSE.  Per anemia protocol with Christi Sun MD.    CKD (chronic kidney disease) stage 4, GFR 15-29 ml/min (H)       fenofibrate 145 MG tablet      Take 145 mg by mouth daily        ISOSORBIDE MONONITRATE PO      Take 60 mg by mouth daily        loperamide 2 MG capsule    IMODIUM     Take 1 mg by mouth daily        losartan 50 MG tablet    COZAAR    90 tablet    Take 1 tablet (50 mg) by mouth daily    Kidney replaced by transplant       metoprolol tartrate 100 MG tablet    LOPRESSOR    60 tablet    Take 1 tablet by mouth 2 times daily.    Unspecified hypertensive kidney disease with chronic kidney disease stage I through stage IV, or unspecified(403.90), Kidney replaced by transplant       NIFEdipine ER 90 MG Tb24    ADALAT CC     Take 90 mg by mouth daily        NITROSTAT 0.4 MG sublingual tablet   Generic drug:  nitroGLYcerin      Place 0.4 mg under the tongue every 5 minutes as needed.        NONFORMULARY      2 times daily Focus Macula Pro:   Eye vitamin and mineral supplement A, C, E, Zinc, Copper        omega-3 acid ethyl esters 1 G capsule    Lovaza     Take 2 g by mouth        PLAVIX 75 MG tablet   Generic drug:  clopidogrel      Take 75 mg by mouth daily.        predniSONE 5 MG tablet    DELTASONE     Take 5 mg by mouth daily.        probenecid 500 MG tablet    BENEMID     Take 500 mg by mouth 2 times daily.        sulfamethoxazole-trimethoprim 400-80 MG per tablet    BACTRIM/SEPTRA     Take 1 tablet by mouth daily Take on every other day        TAMSULOSIN HCL PO      Take 0.4 mg by mouth daily        TYLENOL ARTHRITIS PAIN 650 MG CR tablet   Generic drug:  acetaminophen      Take 2 tablets by mouth 3 times daily.        * Notice:  This list has 2 medication(s) that are the same as other medications prescribed for you. Read the directions carefully, and ask your doctor or other care provider to review them with you.

## 2018-02-21 NOTE — NURSING NOTE
"Oncology Rooming Note    February 21, 2018 3:52 PM   Rory Bustamante is a 62 year old male who presents for:    Chief Complaint   Patient presents with     Oncology Clinic Visit     New Patient-Isolated Proteinuria     Initial Vitals: /70 (BP Location: Right arm, Patient Position: Sitting, Cuff Size: Adult Large)  Pulse 76  Temp 97.6  F (36.4  C) (Oral)  Ht 1.854 m (6' 1\")  Wt 110.7 kg (244 lb 1.6 oz)  SpO2 96%  BMI 32.21 kg/m2 Estimated body mass index is 32.21 kg/(m^2) as calculated from the following:    Height as of this encounter: 1.854 m (6' 1\").    Weight as of this encounter: 110.7 kg (244 lb 1.6 oz). Body surface area is 2.39 meters squared.  No Pain (0) Comment: Data Unavailable   No LMP for male patient.  Allergies reviewed: Yes  Medications reviewed: Yes    Medications: Medication refills not needed today.  Pharmacy name entered into [facility]: [facility] - [address]    Clinical concerns: Questions Dr. Falcon was notified.    10 minutes for nursing intake (face to face time)     Nallely Cartwright LPN              "

## 2018-02-22 LAB
ALBUMIN SERPL ELPH-MCNC: 5.1 G/DL (ref 3.7–5.1)
ALPHA1 GLOB SERPL ELPH-MCNC: 0.2 G/DL (ref 0.2–0.4)
ALPHA2 GLOB SERPL ELPH-MCNC: 0.7 G/DL (ref 0.5–0.9)
B-GLOBULIN SERPL ELPH-MCNC: 0.9 G/DL (ref 0.6–1)
GAMMA GLOB SERPL ELPH-MCNC: 0.9 G/DL (ref 0.7–1.6)
KAPPA LC UR-MCNC: 3.11 MG/DL (ref 0.33–1.94)
KAPPA LC/LAMBDA SER: 0.95 {RATIO} (ref 0.26–1.65)
LAMBDA LC SERPL-MCNC: 3.29 MG/DL (ref 0.57–2.63)
M PROTEIN SERPL ELPH-MCNC: 0.1 G/DL
PROT PATTERN SERPL ELPH-IMP: ABNORMAL

## 2018-02-23 NOTE — PROGRESS NOTES
[facility] Hematology Consultation    Outpatient Visit Note:    Patient: Rory Bustamante  MRN: [id number]  : 1955  RENE: 2018    Reason for Consultation:  Rory Bustamante is a referred by Dr Sun for evaluation and treatment of suspected monoclonal gammopathy.    History of Present Illness:  Rory Bustamante is a 62 year old man with a history of LR kidney transplant, now with slowly progressive failure of the graft and CKD4 (not had HD).  In addition he has had slowly progressive anemia as well.  It is unclear whether this is related to his worsening renal function alone.  He has had not had any blood transfusions recently.  He is going to start on EPO regimen soon.  He is iron replete (ferritin in early Feb was 214).    He reports no back or bone pain.  Does his energy level and appetite are good.  He has had no easy bruising or bleeding.  No unintentional weight loss, fevers, chills, night sweats.    I reviewed the patient's past medical history, medicines, allergies in Epic    Social History:  Denies any tobacco use. No significant alcohol use. Denies any illicit drug use.     Family History:  He reports no history of blood disorders in the family    Objective:  Vitals: B/P: 136/70, T: 97.6, P: 76, R: Data Unavailable, Wt: 244 lbs 1.6 oz  Exam:   Gen: Appears well, no distress  HEENT: no scleral icterus or hemorrhage, no wet purpura, no lymphadenopathy  CV: regular, no murmurs  Pulm: clear  Abd: soft, nontender, no splenomegaly  Ext: no edema  MSK: no pain with palpation of the spine, iliac crests or long bones    Labs:  Reviewed in EPIC.  Creatinine is 4.04 with estimated GFR of 15.  Recent hemoglobin is 8.8.  He has not had a recent erythropoietin level.    He had a serum immunofixation performed which demonstrated the presence of an IgA kappa.  He has not had an SPEP or serum free light chains assay performed since 2017.  In August he had no evidence of an M spike seen and a  normal K/L ratio    Imaging:  none    Assessment:  In summary, Rory Bustamante is a 62 year old man with slowly progressive chronic kidney disease new anemia suspected to be secondary to low EPO state and isolated IgA kappa seen immunofixation without an M spike at last SPEP in August 2017.  Overall my suspicion is that this is MGUS but I told him we should repeat his light chain assay and SPEP with immunofixation to see if any changes have occurred in the interim.  I think this is especially reasonable given the change in hemoglobin.  We will also perform a skeletal survey to assess for lytic lesions.    Primary concern is for eligibility for second kidney transplant.  I think once we have results from today's tests will have a better understanding of his risk profile for multiple myeloma.  I will coordinate with his transplant physician to discuss whether a bone marrow biopsy is required prior to putting going back on the transplant list.    Plan:  1. Majority of today's visit was spent counseling the patient regarding the interpretation of his recent laboratory studies.  Explained what the plasma cell dyscrasias involved in terms of symptoms and laboratory findings.  I explained that MGUS is my suspected diagnosis but we do not have sufficient information to officially this diagnosis.  2.  Today it will perform an SPEP and immunofixation and kappa and lambda free light chains.  3.  I will arrange for skeletal survey  4.  I will discuss with her bone marrow biopsy is necessary based on results of these studies in order for him to go back on the kidney transplant list.    The patient is given our center's contact information and is instructed to call if he should have any further questions or concerns.  Otherwise, we will plan on seeing him back as needed.      Total Time Spent:  I spent a total of 45 minutes face-to-face with Rory Bustamante during today's office visit.  Over 50% of this time was spent counseling the  patient and/or coordinating care regarding his M spike and how a diagnosis of MGUS versus MM is made.      Scooby Falcon MD   of Medicine  [facility]

## 2018-02-26 ENCOUNTER — OFFICE VISIT (OUTPATIENT)
Dept: NEUROLOGY | Facility: CLINIC | Age: 63
End: 2018-02-26
Payer: MEDICARE

## 2018-02-26 ENCOUNTER — RADIANT APPOINTMENT (OUTPATIENT)
Dept: ULTRASOUND IMAGING | Facility: CLINIC | Age: 63
End: 2018-02-26
Attending: FAMILY MEDICINE
Payer: MEDICARE

## 2018-02-26 DIAGNOSIS — G60.3 IDIOPATHIC PROGRESSIVE POLYNEUROPATHY: Primary | ICD-10-CM

## 2018-02-26 DIAGNOSIS — I73.9 CLAUDICATION OF BOTH LOWER EXTREMITIES (H): ICD-10-CM

## 2018-02-26 DIAGNOSIS — M48.07 SPINAL STENOSIS OF LUMBOSACRAL REGION: ICD-10-CM

## 2018-02-26 NOTE — MR AVS SNAPSHOT
After Visit Summary   2/26/2018    Rory Bustamante    MRN: [id number]           Patient Information     Date Of Birth          1955        Visit Information        Provider Department      2/26/2018 9:00 AM Chris Hannon MD [facility] EMG        Today's Diagnoses     Idiopathic progressive polyneuropathy    -  1    Claudication of both lower extremities (H)        Spinal stenosis of lumbosacral region           Follow-ups after your visit        Your next 10 appointments already scheduled     Feb 26, 2018 11:30 AM CST   US CUATE DOPPLER NO EXERCISE 1-2 LVLS BILAT with UCUSV2   [facility] US ([facility])    [address] [id number] [phone]           Please bring a list of your medicines (including vitamins, minerals and over-the-counter drugs). Also, tell your doctor about any allergies you may have. Wear comfortable clothes and leave your valuables at home.  No caffeine or tobacco for 1 hour prior to exam.  Please call the Imaging Department at your exam site with any questions.            Feb 27, 2018 11:15 AM CST   XR BONE SURVEY LIMITED with UCXR1   [facility] Xray ([facility])    [address] [place] [id number] [phone]           Please bring a list of your current medicines to your exam. (Include vitamins, minerals and over-thecounter medicines.) Leave your valuables at home.  Tell your doctor if there is a chance you may be pregnant.  You do not need to do anything special for this exam.            Feb 28, 2018 10:30 AM CST   (Arrive by 10:15 AM)   Return Visit with Belinda Moseley MD   [facility] Medicine ([facility])    [address] [place] [id number]   [phone]            Aug 07, 2018  3:00 PM CDT   Lab with  LAB   [facility])    [facility]  [facility] [address]  1st Floor  [facility] [id number] [phone]            Aug 07, 2018  4:00 PM CDT   (Arrive by 3:30 PM)   Return Visit with Christi Sun MD   [facility] Nephrology ([facility])    [address]  [address]  [facility] [id number] [phone]              Who to contact     Please call your clinic at [phone] to:    Ask questions about your health    Make or cancel appointments    Discuss your medicines    Learn about your test results    Speak to your doctor            Additional Information About Your Visit        [employer] Information     [employer] gives you secure access to your electronic health record. If you see a primary care provider, you can also send messages to your care team and make appointments. If you have questions, please call your primary care clinic.  If you do not have a primary care provider, please call [phone] and they will assist you.      [employer] is an electronic gateway that provides easy, online access to your medical records. With [employer], you can request a clinic appointment, read your test results, renew a prescription or communicate with your care team.     To access your existing account, please contact your [facility] or call [phone] for assistance.        Care EveryWhere ID     This is your Care EveryWhere ID. This could be used by other organizations to access your [facility] medical records  [id number]         Blood Pressure from Last 3 Encounters:   02/21/18 136/70   02/06/18 128/79   12/08/17 141/89    Weight from Last 3 Encounters:   02/21/18 110.7 kg (244 lb 1.6 oz)   02/09/18 109.3 kg (241 lb)   02/06/18 109.3 kg (241 lb)              We Performed the Following     EMG ([facility])     NCS Motor with or without F-Wave, 11-12 nerves (03971)     Needle EMB 2 Extremities (98659)          Today's Medication Changes          These changes are accurate as of 2/26/18 11:13  AM.  If you have any questions, ask your nurse or doctor.               These medicines have changed or have updated prescriptions.        Dose/Directions    allopurinol 100 MG tablet   Commonly known as:  ZYLOPRIM   This may have changed:    - how much to take  - when to take this   Used for:  Kidney replaced by transplant, Gout        Dose:  200 mg   Take 2 tablets by mouth daily.   Quantity:  180 tablet   Refills:  3                Primary Care Provider Office Phone # Fax #    Moris Diaz [phone]       [facility] [address] [facility] [address] [facility] [address]  [facility] [address]        Equal Access to Services     [facility]: Hadii aad ku hadasho Soomaali, waaxda luqadaha, qaybta kaalmada adeegyada, waxay arsalan hayterell barriga . So [facility] [phone].    ATENCIÓN: Si habla español, tiene a carr disposición servicios gratuitos de asistencia lingüística. [facility] [phone].    We comply with applicable federal civil rights laws and [place] laws. We do not discriminate on the basis of race, color, national origin, age, disability, sex, sexual orientation, or gender identity.            Thank you!     Thank you for choosing [facility]  for your care. Our goal is always to provide you with excellent care. Hearing back from our patients is one way we can continue to improve our services. Please take a few minutes to complete the written survey that you may receive in the mail after your visit with us. Thank you!             Your Updated Medication List - Protect others around you: Learn how to safely use, store and throw away your medicines at www.disposemymeds.org.          This list is accurate as of 2/26/18 11:13 AM.  Always use your most recent med list.                   Brand Name Dispense Instructions for use Diagnosis    allopurinol 100 MG tablet    ZYLOPRIM    180 tablet    Take 2 tablets by mouth daily.    Kidney replaced by transplant, Gout       ALPHAGAN OP      Apply 1 drop  to eye 2 times daily        BABY ASPIRIN PO      Take 81 mg by mouth daily        BEVACizumab 400 MG/16ML injection    AVASTIN          cholecalciferol 5000 UNITS Tabs tablet    vitamin D3    30 tablet    Take 1 tablet (5,000 Units) by mouth daily    Hyperparathyroidism due to renal insufficiency (H)       CRESTOR 5 MG tablet   Generic drug:  rosuvastatin      Take 20 mg by mouth daily        * cycloSPORINE modified 100 MG capsule    GENERIC EQUIVALENT    180 capsule    TAKE ONE CAPSULE BY MOUTH TWICE A DAY (TOTAL DOSE 125MG TWICE A DAY)    Kidney transplant recipient, Long-term use of immunosuppressant medication       * cycloSPORINE modified 25 MG capsule    GENERIC EQUIVALENT    180 capsule    TAKE ONE CAPSULE BY MOUTH TWICE A DAY (TOTAL DOSE 125MG TWIC A DAY)    Kidney transplant recipient, Long-term use of immunosuppressant medication       epoetin freya 28963 UNIT/ML injection    PROCRIT    4 mL    Inject 1 mL (10,000 Units) Subcutaneous once a week Please make sure Hgb has been checked within 4 days of dose, use as needed for Hgb<10.  If Hgb increases >1 point in 2 weeks, SYSTOLIC BP > 180 mmHg, OR Hgb >=10 g/dL, HOLD DOSE.  Per anemia protocol with Christi Sun MD.    CKD (chronic kidney disease) stage 4, GFR 15-29 ml/min (H)       fenofibrate 145 MG tablet      Take 145 mg by mouth daily        ISOSORBIDE MONONITRATE PO      Take 60 mg by mouth daily        loperamide 2 MG capsule    IMODIUM     Take 1 mg by mouth daily        losartan 50 MG tablet    COZAAR    90 tablet    Take 1 tablet (50 mg) by mouth daily    Kidney replaced by transplant       metoprolol tartrate 100 MG tablet    LOPRESSOR    60 tablet    Take 1 tablet by mouth 2 times daily.    Unspecified hypertensive kidney disease with chronic kidney disease stage I through stage IV, or unspecified(403.90), Kidney replaced by transplant       NIFEdipine ER 90 MG Tb24    ADALAT CC     Take 90 mg by mouth daily        NITROSTAT 0.4 MG sublingual  tablet   Generic drug:  nitroGLYcerin      Place 0.4 mg under the tongue every 5 minutes as needed.        NONFORMULARY      2 times daily Focus Macula Pro:  Eye vitamin and mineral supplement A, C, E, Zinc, Copper        omega-3 acid ethyl esters 1 G capsule    Lovaza     Take 2 g by mouth        PLAVIX 75 MG tablet   Generic drug:  clopidogrel      Take 75 mg by mouth daily.        predniSONE 5 MG tablet    DELTASONE     Take 5 mg by mouth daily.        probenecid 500 MG tablet    BENEMID     Take 500 mg by mouth 2 times daily.        sulfamethoxazole-trimethoprim 400-80 MG per tablet    BACTRIM/SEPTRA     Take 1 tablet by mouth daily Take on every other day        TAMSULOSIN HCL PO      Take 0.4 mg by mouth daily        TYLENOL ARTHRITIS PAIN 650 MG CR tablet   Generic drug:  acetaminophen      Take 2 tablets by mouth 3 times daily.        * Notice:  This list has 2 medication(s) that are the same as other medications prescribed for you. Read the directions carefully, and ask your doctor or other care provider to review them with you.

## 2018-02-26 NOTE — LETTER
2/26/2018       RE: Rory Bustamante  [address]  [facility] [address]     Dear Colleague,    Thank you for referring your patient, Rory Bustamante, to the [facility] EMG at [facility]. Please see a copy of my visit note below.        [facility]  Electrodiagnostic Laboratory    Nerve Conduction & EMG Report          Patient: Rory Bustamante YOB: 1955  Patient ID: [id number] Age: 62 Years 3 Months  Gender: Male        History & Examination: 62 years old male with history of kidney transplant for glomerulonephritis, presenting with numbness over his bilateral knee down. Also numbness over the tip of right 3rd, 4th digits tips for the last 1 month.        Techniques: Motor conduction studies were done with surface recording electrodes. Sensory conduction studies were performed with surface electrodes, unless indicated otherwise by (n), designating the use of subdermal recording electrodes. Temperature was monitored and recorded throughout the study. Upper extremities were maintained at a temperature of 32 degrees Centigrade or higher.  Lower extremities were maintained at a temperature of 31degrees Centigrade or higher. EMG was done with a concentric needle electrode.        Results:  Nerve conduction studies  1. In sensory conduction studies, there is no response recorded in the bilateral sural nerves.Right ulnar and radial studies show markedly reduced SNAP with marginal CV across the wrist. Needle electrode was used for sural nerves.   2. Right median sensory response is not recorded. Right median motor study shows prolonged distal latency across the wrist with low normal CMAP. CV in the forearm segment is mildly slow.   3. Motor conduction studies in the bilateral lower extremities, no responses are recorded except more proximal muscle (TA) for right peroneal nerve, which shows low CMAP with normal distal latency.     Needle EMG  1. Abnormal spontaneous  activities were observed in the bilateral Gastrocnemius and left EHL muscles.\  2. MUAPs were large and polyphasic in distal L5,S1 muscles, which were not observed in the proximal muscles in the same root distributions.     Interpretation:  1.The test is abnormal.  2. Severe axonal predominant length dependent polyneuropathy.  3. Moderate to severe median neuropathy at the wrist in the right upper extremity.       EMG Physician: Chris Hannon MD              Sensory NCS      Nerve / Sites Rec. Site Onset Peak NP Amp Ref. PP Amp Dist Pio Ref. Temp     ms ms  V  V  V cm m/s m/s  C   R MEDIAN - Dig II Anti      Wrist Dig II NR NR NR 10.0 NR 14 NR 48.0 33.3   R ULNAR - Dig V Anti      Wrist Dig V 3.54 4.11 1.2 8.0 3.4 14 39.5 48.0 33   R RADIAL - Snuff      Forearm Snuff 2.08 2.55 9.1 15.0 7.7 10 48.0 48.0 33.8   R SURAL - Lat Mall 60      Calf Ankle (N) NR NR NR 5.0 NR 14 NR 38.0 32.5      Calf Ankle NR NR NR 5.0 NR 14 NR  32.4   L SURAL - Lat Mall 60      Calf Ankle NR NR NR 5.0 NR 14 NR 38.0 31.6       Motor NCS      Nerve / Sites Rec. Site Lat Ref. Amp Ref. Rel Amp Dist Pio Ref. Dur. Area Temp.     ms ms mV mV % cm m/s m/s ms %  C   R MEDIAN - APB      Wrist APB 4.64 4.40 5.1 5.0 100 8   6.09 100 34.1      Elbow APB 11.09  4.7  90.4 28.5 44.1 48.0 6.87 95.5 34.3   R DEEP PERONEAL - EDB 60      Ankle EDB NR 6.00 NR 2.0 NR 8   NR NR 33   L DEEP PERONEAL - EDB 60      Ankle EDB NR 6.00 NR 2.0 NR 8   NR NR 32   R TIBIAL - AH      Ankle AH NR 6.00 NR 4.0 NR 8   NR NR 33   L TIBIAL - AH      Ankle AH NR 6.00 NR 4.0 NR 8   NR NR 32.6   R PERONEAL - Tib Ant      Fib Head Tib Ant 3.70 6.5 2.1 3.0 100    10.52 100 33      Knee Tib Ant 5.63  1.9  87.4 8 41.5 41 14.74 80.1 32.9       EMG Summary Table     Spontaneous MUAP Recruitment    IA Fib PSW Fasc H.F. Amp Dur. PPP Pattern   R. TIB ANTERIOR N None None None None 1+ 2+ None Mild Reduced   R. GASTROCN (MED) N 2+ 2+ None None N 1+ 1+ Mild Reduced   R. VAST LATERALIS N None  None None None N N None Normal   L. GASTROCN (MED) N 2+ 2+ None CRD N 1+ 1+ Moderate Reduced   L. TIB ANTERIOR N None None None None 1+ 2+ 1+ Mild Reduced   R. T FASCIA DANIEL N None None None None N N None Normal   R. GLUTEUS MAX N None None None None N N None Discrete   R. S1 PSP N None None None None N N None Normal   L. T FASCIA DANIEL N None None None None N N None Normal   L. EXT WHATLEY LONG N 1+ 2+ None None 1+ 1+ None Mild Reduced                                  Again, thank you for allowing me to participate in the care of your patient.      Sincerely,    Chris Hannon MD

## 2018-02-26 NOTE — PROGRESS NOTES
[facility]  Electrodiagnostic Laboratory    Nerve Conduction & EMG Report          Patient: Rory Bustamante YOB: 1955  Patient ID: [id number] Age: 62 Years 3 Months  Gender: Male        History & Examination: 62 years old male with history of kidney transplant for glomerulonephritis, presenting with numbness over his bilateral knee down. Also numbness over the tip of right 3rd, 4th digits tips for the last 1 month.        Techniques: Motor conduction studies were done with surface recording electrodes. Sensory conduction studies were performed with surface electrodes, unless indicated otherwise by (n), designating the use of subdermal recording electrodes. Temperature was monitored and recorded throughout the study. Upper extremities were maintained at a temperature of 32 degrees Centigrade or higher.  Lower extremities were maintained at a temperature of 31degrees Centigrade or higher. EMG was done with a concentric needle electrode.        Results:  Nerve conduction studies  1. In sensory conduction studies, there is no response recorded in the bilateral sural nerves.Right ulnar and radial studies show markedly reduced SNAP with marginal CV across the wrist. Needle electrode was used for sural nerves.   2. Right median sensory response is not recorded. Right median motor study shows prolonged distal latency across the wrist with low normal CMAP. CV in the forearm segment is mildly slow.   3. Motor conduction studies in the bilateral lower extremities, no responses are recorded except more proximal muscle (TA) for right peroneal nerve, which shows low CMAP with normal distal latency.     Needle EMG  1. Abnormal spontaneous activities were observed in the bilateral Gastrocnemius and left EHL muscles.  2. MUAPs were large and polyphasic in distal L5,S1 muscles, which were not observed in the proximal muscles in the same root distributions.     Interpretation:  1.The test is abnormal.  2.  Severe axonal predominant length dependent sensory motor polyneuropathy.  3. Moderate to severe median neuropathy at the wrist in the right upper extremity.   4. Neurophysiologic findings are not supportive of lumbosacral radiculopathy.       EMG Physician: Chris Hannon MD              Sensory NCS      Nerve / Sites Rec. Site Onset Peak NP Amp Ref. PP Amp Dist Pio Ref. Temp     ms ms  V  V  V cm m/s m/s  C   R MEDIAN - Dig II Anti      Wrist Dig II NR NR NR 10.0 NR 14 NR 48.0 33.3   R ULNAR - Dig V Anti      Wrist Dig V 3.54 4.11 1.2 8.0 3.4 14 39.5 48.0 33   R RADIAL - Snuff      Forearm Snuff 2.08 2.55 9.1 15.0 7.7 10 48.0 48.0 33.8   R SURAL - Lat Mall 60      Calf Ankle (N) NR NR NR 5.0 NR 14 NR 38.0 32.5      Calf Ankle NR NR NR 5.0 NR 14 NR  32.4   L SURAL - Lat Mall 60      Calf Ankle NR NR NR 5.0 NR 14 NR 38.0 31.6       Motor NCS      Nerve / Sites Rec. Site Lat Ref. Amp Ref. Rel Amp Dist Pio Ref. Dur. Area Temp.     ms ms mV mV % cm m/s m/s ms %  C   R MEDIAN - APB      Wrist APB 4.64 4.40 5.1 5.0 100 8   6.09 100 34.1      Elbow APB 11.09  4.7  90.4 28.5 44.1 48.0 6.87 95.5 34.3   R DEEP PERONEAL - EDB 60      Ankle EDB NR 6.00 NR 2.0 NR 8   NR NR 33   L DEEP PERONEAL - EDB 60      Ankle EDB NR 6.00 NR 2.0 NR 8   NR NR 32   R TIBIAL - AH      Ankle AH NR 6.00 NR 4.0 NR 8   NR NR 33   L TIBIAL - AH      Ankle AH NR 6.00 NR 4.0 NR 8   NR NR 32.6   R PERONEAL - Tib Ant      Fib Head Tib Ant 3.70 6.5 2.1 3.0 100    10.52 100 33      Knee Tib Ant 5.63  1.9  87.4 8 41.5 41 14.74 80.1 32.9       EMG Summary Table     Spontaneous MUAP Recruitment    IA Fib PSW Fasc H.F. Amp Dur. PPP Pattern   R. TIB ANTERIOR N None None None None 1+ 2+ None Mild Reduced   R. GASTROCN (MED) N 2+ 2+ None None N 1+ 1+ Mild Reduced   R. VAST LATERALIS N None None None None N N None Normal   L. GASTROCN (MED) N 2+ 2+ None CRD N 1+ 1+ Moderate Reduced   L. TIB ANTERIOR N None None None None 1+ 2+ 1+ Mild Reduced   R. T FASCIA DANIEL  N None None None None N N None Normal   R. GLUTEUS MAX N None None None None N N None Discrete   R. S1 PSP N None None None None N N None Normal   L. T FASCIA DANIEL N None None None None N N None Normal   L. EXT WHATLEY LONG N 1+ 2+ None None 1+ 1+ None Mild Reduced

## 2018-02-27 ENCOUNTER — TELEPHONE (OUTPATIENT)
Dept: PHARMACY | Facility: CLINIC | Age: 63
End: 2018-02-27

## 2018-02-27 ENCOUNTER — RADIANT APPOINTMENT (OUTPATIENT)
Dept: GENERAL RADIOLOGY | Facility: CLINIC | Age: 63
End: 2018-02-27
Attending: INTERNAL MEDICINE
Payer: MEDICARE

## 2018-02-27 DIAGNOSIS — N06.8 ISOLATED PROTEINURIA WITH OTHER MORPHOLOGIC LESION: ICD-10-CM

## 2018-02-27 LAB
HCT VFR BLD AUTO: 25.2 %
HEMOGLOBIN: 8.3 G/DL (ref 13.3–17.7)

## 2018-02-27 NOTE — TELEPHONE ENCOUNTER
Anemia Management Note  SUBJECTIVE/OBJECTIVE:  Referred by Dr. Christi Sun on 2018  Primary Diagnosis: Anemia in Chronic Kidney Disease (N18.3, D63.1)     Secondary Diagnosis:  Chronic Kidney Disease, Stage 3 (N18.3)   Hgb goal range:  9-10  Epo/Darbo: Procrit  10,000 units  once weekly  In clinic  Iron regimen:  No HX in med list  Labs : 2019  Recent MARY LOU use, transfusion, IV iron: Ferrous Sulfate 325mg QD  RX/TX plans : 2019  No history of stroke, MI and blood clots, has HX cancer, stent for CAD   Contact:                      Ok to leave message regarding Medical/Scheduling and billing on cell per consent to communicate dated 2014                                                  OK to speak with wife Flavia regarding Medical per consent to communicate dated 2014      Anemia Latest Ref Rng & Units 2017 8/3/2017 2017 2018 2018 2018 2018   MARY LOU Dose - - - - - 10,000 units 10,000 units 10,000 units   Hemoglobin 13.3 - 17.7 g/dL 11.1(L) 11.1(L) 8.6(L) 8.2(L) 8.6(A) 8.8(A) 8.3(A)   TSAT 15 - 46 % - - - 20 - - -   Ferritin 26 - 388 ng/mL - - - 214 - - -     BP Readings from Last 3 Encounters:   18 136/70   18 128/79   17 141/89     Wt Readings from Last 2 Encounters:   18 244 lb 1.6 oz (110.7 kg)   18 241 lb (109.3 kg)         ASSESSMENT:  Hgb:at goal - received dose in clinic - recommend continue current regimen  TSat: not at goal of >30% Ferritin: At goal (>100ng/mL)    PLAN:  Dose with procrit and RTC for hgb, ferritin and iron labs then procrit if needed in 1 week(s)    Orders needed to be renewed (for next follow-up date) in LETTERS - for external labs: None    Iron labs due:  3/6/18    Plan discussed with:  Rory   Plan provided by:  Devang  Reviewed 2018 DENISE  NEXT FOLLOW-UP DATE:  3/6/18    Anemia Management Service  Grecia Carroll PharmD and Michelle Ramírez CPhT  Phone: [phone]  Fax: [phone]

## 2018-02-28 ENCOUNTER — TRANSFERRED RECORDS (OUTPATIENT)
Dept: HEALTH INFORMATION MANAGEMENT | Facility: CLINIC | Age: 63
End: 2018-02-28

## 2018-03-01 ENCOUNTER — OFFICE VISIT (OUTPATIENT)
Dept: ORTHOPEDICS | Facility: CLINIC | Age: 63
End: 2018-03-01
Payer: MEDICARE

## 2018-03-01 VITALS — WEIGHT: 244 LBS | BODY MASS INDEX: 32.34 KG/M2 | HEIGHT: 73 IN | RESPIRATION RATE: 16 BRPM

## 2018-03-01 DIAGNOSIS — G62.9 POLYNEUROPATHY: Primary | ICD-10-CM

## 2018-03-01 NOTE — PROGRESS NOTES
Subjective:   Rory Bustamante is a 62 year old male who is here for an EMG and US f/u.  So hard standing, legs are going numb hips to knees.  Legs feel numb.  Not as concerned about the neuropathy.  Was looking for minimally invasive surgery- OLIF-key hole surgery, healing time is 10 days.  Going on dialysis, feeling in his heart, sitting around the house, can't do much.  Not painful, standing for 5 min and legs go numb  Seems to improve when the patient is leaning forward.  TKA 3 years ago, in TCU for recovery and Neurontin didn't help.  Needs a kidney transplant.  Antibodies are high.  [facility]  Date of injury: None  Date last seen: 2/9/2018  Following Therapeutic Plan: Yes, hasn't started pool therapy  Pain: Unchanged  Function: Unchanged  Interval History: EMG and US    PAST MEDICAL, SOCIAL, SURGICAL AND FAMILY HISTORY: He  has a past medical history of Angina effort (H) (8/2016); Arthritis; BPH (benign prostatic hyperplasia); CAD (coronary artery disease) (July 2011); Glaucoma; Gout; HTN (hypertension); Hypercholesteremia (2015); Hypertriglyceridemia (2015); Intraocular bleeding; Macular degeneration; Neuropathy; Presence of stent in coronary artery (August 2012); Proteinuria (11/2015); S/P kidney transplant; Spinal stenosis (2013); and Warts.  He  has a past surgical history that includes Transplant kidney recipient living related (1989); TOTAL KNEE ARTHROPLASTY (2015); tonsillectomy; and vasectomy.  His family history includes CEREBROVASCULAR DISEASE in his brother; Cardiac Sudden Death (age of onset: 87) in his sister; Colon Cancer (age of onset: 78) in his mother; DIABETES in his brother and mother.  He reports that he has never smoked. He has never used smokeless tobacco. He reports that he does not drink alcohol or use illicit drugs.    ALLERGIES: He is allergic to contrast dye and simvastatin.    CURRENT MEDICATIONS: He has a current medication list which includes the following  "prescription(s): epoetin freya, omega-3 acid ethyl esters, cyclosporine modified, cyclosporine modified, loperamide, fenofibrate, nifedipine er, bevacizumab, losartan, cholecalciferol, aspirin, NONFORMULARY, isosorbide mononitrate, tamsulosin hcl, allopurinol, metoprolol tartrate, rosuvastatin, nitroglycerin, clopidogrel, prednisone, probenecid, sulfamethoxazole-trimethoprim, acetaminophen, and brimonidine tartrate.     REVIEW OF SYSTEMS: 10 point review of systems is negative except as noted above.     Exam:   Resp 16  Ht 6' 1\" (1.854 m)  Wt 244 lb (110.7 kg)  BMI 32.19 kg/m2           CONSTITUTIONAL: healthy, alert, no distress and cooperative  HEAD: Normocephalic. No masses, lesions, tenderness or abnormalities  SKIN: no suspicious lesions or rashes  GAIT: antalgic and can't walk long distance  NEUROLOGIC: positive findings: numbness, decreased reflexes  PSYCHIATRIC: affect normal/bright and mentation appears normal.    MUSCULOSKELETAL: no pain, numbness  Inspection; swelling, no ecchymosis, no deformity  Palpation: Tender: lower extremities, pt reports legs feel better with fluid than when legs are normal, decreased sensation  Non-tender: through the hips today because he has already taken Tylenol ES  Strength: intact  Special tests: negative SLR       Assessment/Plan:   Pt is a 61 yo white male with PMHx of kidney failure presenting with numbness into bilateral legs and feet  1. Polyneuropathy- B12 checked at Cards, awaiting results, offered Gabapentin, Lyrica and pt declines  EMG suggesting findings not supportive of lumbosacral radiculopathy  Referral to Neurology  2. Obesity, co-morbidities- strongly encouraged pt work on pursing pool therapy, pt has had multiple medical appts  RTC 8-12 weeks  X-RAY INTERPRETATION:   EMG  Interpretation:  1.The test is abnormal.  2. Severe axonal predominant length dependent sensory motor polyneuropathy.  3. Moderate to severe median neuropathy at the wrist in the right " upper extremity.   4. Neurophysiologic findings are not supportive of lumbosacral radiculopathy.   CUATE  Negative right and left leg

## 2018-03-01 NOTE — LETTER
3/1/2018      RE: Rory Bustamante  [address] [place] [address]        Subjective:   Rory Bustamante is a 62 year old male who is here for an EMG and US f/u.  So hard standing, legs are going numb hips to knees.  Legs feel numb.  Not as concerned about the neuropathy.  Was looking for minimally invasive surgery- OLIF-key hole surgery, healing time is 10 days.  Going on dialysis, feeling in his heart, sitting around the house, can't do much.  Not painful, standing for 5 min and legs go numb  Seems to improve when the patient is leaning forward.  TKA 3 years ago, in TCU for recovery and Neurontin didn't help.  Needs a kidney transplant.  Antibodies are high.  B12- checked at [facility]  Date of injury: None  Date last seen: 2/9/2018  Following Therapeutic Plan: Yes, hasn't started pool therapy  Pain: Unchanged  Function: Unchanged  Interval History: EMG and US    PAST MEDICAL, SOCIAL, SURGICAL AND FAMILY HISTORY: He  has a past medical history of Angina effort (H) (8/2016); Arthritis; BPH (benign prostatic hyperplasia); CAD (coronary artery disease) (July 2011); Glaucoma; Gout; HTN (hypertension); Hypercholesteremia (2015); Hypertriglyceridemia (2015); Intraocular bleeding; Macular degeneration; Neuropathy; Presence of stent in coronary artery (August 2012); Proteinuria (11/2015); S/P kidney transplant; Spinal stenosis (2013); and Warts.  He  has a past surgical history that includes Transplant kidney recipient living related (1989); TOTAL KNEE ARTHROPLASTY (2015); tonsillectomy; and vasectomy.  His family history includes CEREBROVASCULAR DISEASE in his brother; Cardiac Sudden Death (age of onset: 87) in his sister; Colon Cancer (age of onset: 78) in his mother; DIABETES in his brother and mother.  He reports that he has never smoked. He has never used smokeless tobacco. He reports that he does not drink alcohol or use illicit drugs.    ALLERGIES: He is allergic to contrast dye and simvastatin.    CURRENT  "MEDICATIONS: He has a current medication list which includes the following prescription(s): epoetin freya, omega-3 acid ethyl esters, cyclosporine modified, cyclosporine modified, loperamide, fenofibrate, nifedipine er, bevacizumab, losartan, cholecalciferol, aspirin, NONFORMULARY, isosorbide mononitrate, tamsulosin hcl, allopurinol, metoprolol tartrate, rosuvastatin, nitroglycerin, clopidogrel, prednisone, probenecid, sulfamethoxazole-trimethoprim, acetaminophen, and brimonidine tartrate.     REVIEW OF SYSTEMS: 10 point review of systems is negative except as noted above.     Exam:   Resp 16  Ht 6' 1\" (1.854 m)  Wt 244 lb (110.7 kg)  BMI 32.19 kg/m2           CONSTITUTIONAL: healthy, alert, no distress and cooperative  HEAD: Normocephalic. No masses, lesions, tenderness or abnormalities  SKIN: no suspicious lesions or rashes  GAIT: antalgic and can't walk long distance  NEUROLOGIC: positive findings: numbness, decreased reflexes  PSYCHIATRIC: affect normal/bright and mentation appears normal.    MUSCULOSKELETAL: no pain, numbness  Inspection; swelling, no ecchymosis, no deformity  Palpation: Tender: lower extremities, pt reports legs feel better with fluid than when legs are normal, decreased sensation  Non-tender: through the hips today because he has already taken Tylenol ES  Strength: intact  Special tests: negative SLR       Assessment/Plan:   Pt is a 61 yo white male with PMHx of kidney failure presenting with numbness into bilateral legs and feet  1. Polyneuropathy- B12 checked at [facility], awaiting results, offered Gabapentin, Lyrica and pt declines  EMG suggesting findings not supportive of lumbosacral radiculopathy  Referral to Neurology  2. Obesity, co-morbidities- strongly encouraged pt work on pursing pool therapy, pt has had multiple medical appts  RTC 8-12 weeks  X-RAY INTERPRETATION:   EMG  Interpretation:  1.The test is abnormal.  2. 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  3. Moderate to severe median neuropathy at the wrist in the right upper extremity.   4. Neurophysiologic findings are not supportive of lumbosacral radiculopathy.   CUATE  Negative right and left leg      Belinda Moseley MD

## 2018-03-01 NOTE — MR AVS SNAPSHOT
After Visit Summary   3/1/2018    Rory Bustamante    MRN: [id number]           Patient Information     Date Of Birth          1955        Visit Information        Provider Department      3/1/2018 3:45 PM Belinda Moseley MD [facility]        Today's Diagnoses     Polyneuropathy    -  1       Follow-ups after your visit        Additional Services     NEUROLOGY ADULT REFERRAL       Your provider has referred you for the following:   Consult at PREFERRED PROVIDERS:    Please be aware that coverage of these services is subject to the terms and limitations of your health insurance plan.  Call member services at your health plan with any benefit or coverage questions.      Please bring the following with you to your appointment:    (1) Any X-Rays, CTs or MRIs which have been performed.  Contact the facility where they were done to arrange for  prior to your scheduled appointment.    (2) List of current medications  (3) This referral request   (4) Any documents/labs given to you for this referral                  Your next 10 appointments already scheduled     Mar 06, 2018  1:00 PM CST   (Arrive by 12:45 PM)   New Patient Visit with Pradip Carbajal MD   [facility] ([facility])    [address] [place] [id number] [phone]            Aug 07, 2018  3:00 PM CDT   Lab with  LAB   [facility] ([facility])    [address] [place] [id number] [phone]            Aug 07, 2018  4:00 PM CDT   (Arrive by 3:30 PM)   Return Visit with Christi Sun MD   [facility] ([facility])    [address] [place] [address]  [address]  [facility] [id number] [phone]              Who to contact     Please call your clinic at [phone] to:    Ask questions about your health    Make or cancel appointments    Discuss your medicines    Learn about  "your test results    Speak to your doctor            Additional Information About Your Visit        [facility] Information     [employer] gives you secure access to your electronic health record. If you see a primary care provider, you can also send messages to your care team and make appointments. If you have questions, please call your primary care clinic.  If you do not have a primary care provider, please call [phone] and they will assist you.      [employer] is an electronic gateway that provides easy, online access to your medical records. With [employer], you can request a clinic appointment, read your test results, renew a prescription or communicate with your care team.     To access your existing account, please contact your [facility] or call [phone] for assistance.        Care EveryWhere ID     This is your Care EveryWhere ID. This could be used by other organizations to access your [facility] medical records  [id number]        Your Vitals Were     Respirations Height BMI (Body Mass Index)             16 6' 1\" (1.854 m) 32.19 kg/m2          Blood Pressure from Last 3 Encounters:   02/21/18 136/70   02/06/18 128/79   12/08/17 141/89    Weight from Last 3 Encounters:   03/01/18 244 lb (110.7 kg)   02/21/18 244 lb 1.6 oz (110.7 kg)   02/09/18 241 lb (109.3 kg)              We Performed the Following     NEUROLOGY ADULT REFERRAL          Today's Medication Changes          These changes are accurate as of 3/1/18  4:13 PM.  If you have any questions, ask your nurse or doctor.               These medicines have changed or have updated prescriptions.        Dose/Directions    allopurinol 100 MG tablet   Commonly known as:  ZYLOPRIM   This may have changed:    - how much to take  - when to take this   Used for:  Kidney replaced by transplant, Gout        Dose:  200 mg   Take 2 tablets by mouth daily.   Quantity:  180 tablet   Refills:  3                Primary Care Provider Office " Phone # Fax #    Moris Diaz [phone]       [facility] [address] [facility] [address]  [facility] [address]        Equal Access to Services     DUGLASFIDELIA ANN-MARIE : Agatha av simpson darleneo Sodennis, waaxda luqadaha, qaybta kaalmada abhida, devin sidhu lareedliudmila perez. So [facility] [phone].    ATENCIÓN: Si habla español, tiene a carr disposición servicios gratuitos de asistencia lingüística. Lilliamame al [phone].    We comply with applicable federal civil rights laws and [place] laws. We do not discriminate on the basis of race, color, national origin, age, disability, sex, sexual orientation, or gender identity.            Thank you!     Thank you for choosing [facility]  for your care. Our goal is always to provide you with excellent care. Hearing back from our patients is one way we can continue to improve our services. Please take a few minutes to complete the written survey that you may receive in the mail after your visit with us. Thank you!             Your Updated Medication List - Protect others around you: Learn how to safely use, store and throw away your medicines at www.disposemymeds.org.          This list is accurate as of 3/1/18  4:13 PM.  Always use your most recent med list.                   Brand Name Dispense Instructions for use Diagnosis    allopurinol 100 MG tablet    ZYLOPRIM    180 tablet    Take 2 tablets by mouth daily.    Kidney replaced by transplant, Gout       ALPHAGAN OP      Apply 1 drop to eye 2 times daily        BABY ASPIRIN PO      Take 81 mg by mouth daily        BEVACizumab 400 MG/16ML injection    AVASTIN          cholecalciferol 5000 UNITS Tabs tablet    vitamin D3    30 tablet    Take 1 tablet (5,000 Units) by mouth daily    Hyperparathyroidism due to renal insufficiency (H)       CRESTOR 5 MG tablet   Generic drug:  rosuvastatin      Take 20 mg by mouth daily        * cycloSPORINE modified 100 MG capsule    GENERIC EQUIVALENT     180 capsule    TAKE ONE CAPSULE BY MOUTH TWICE A DAY (TOTAL DOSE 125MG TWICE A DAY)    Kidney transplant recipient, Long-term use of immunosuppressant medication       * cycloSPORINE modified 25 MG capsule    GENERIC EQUIVALENT    180 capsule    TAKE ONE CAPSULE BY MOUTH TWICE A DAY (TOTAL DOSE 125MG TWIC A DAY)    Kidney transplant recipient, Long-term use of immunosuppressant medication       epoetin freya 71841 UNIT/ML injection    PROCRIT    4 mL    Inject 1 mL (10,000 Units) Subcutaneous once a week Please make sure Hgb has been checked within 4 days of dose, use as needed for Hgb<10.  If Hgb increases >1 point in 2 weeks, SYSTOLIC BP > 180 mmHg, OR Hgb >=10 g/dL, HOLD DOSE.  Per anemia protocol with Christi Sun MD.    CKD (chronic kidney disease) stage 4, GFR 15-29 ml/min (H)       fenofibrate 145 MG tablet      Take 145 mg by mouth daily        ISOSORBIDE MONONITRATE PO      Take 60 mg by mouth daily        loperamide 2 MG capsule    IMODIUM     Take 1 mg by mouth daily        losartan 50 MG tablet    COZAAR    90 tablet    Take 1 tablet (50 mg) by mouth daily    Kidney replaced by transplant       metoprolol tartrate 100 MG tablet    LOPRESSOR    60 tablet    Take 1 tablet by mouth 2 times daily.    Unspecified hypertensive kidney disease with chronic kidney disease stage I through stage IV, or unspecified(403.90), Kidney replaced by transplant       NIFEdipine ER 90 MG Tb24    ADALAT CC     Take 90 mg by mouth daily        NITROSTAT 0.4 MG sublingual tablet   Generic drug:  nitroGLYcerin      Place 0.4 mg under the tongue every 5 minutes as needed.        NONFORMULARY      2 times daily Focus Macula Pro:  Eye vitamin and mineral supplement A, C, E, Zinc, Copper        omega-3 acid ethyl esters 1 G capsule    Lovaza     Take 2 g by mouth        PLAVIX 75 MG tablet   Generic drug:  clopidogrel      Take 75 mg by mouth daily.        predniSONE 5 MG tablet    DELTASONE     Take 5 mg by mouth daily.         probenecid 500 MG tablet    BENEMID     Take 500 mg by mouth 2 times daily.        sulfamethoxazole-trimethoprim 400-80 MG per tablet    BACTRIM/SEPTRA     Take 1 tablet by mouth daily Take on every other day        TAMSULOSIN HCL PO      Take 0.4 mg by mouth daily        TYLENOL ARTHRITIS PAIN 650 MG CR tablet   Generic drug:  acetaminophen      Take 2 tablets by mouth 3 times daily.        * Notice:  This list has 2 medication(s) that are the same as other medications prescribed for you. Read the directions carefully, and ask your doctor or other care provider to review them with you.

## 2018-03-05 NOTE — TELEPHONE ENCOUNTER
APPT INFO    Date /Time: 3/6/18, 1pm   Reason for Appt: Polyneuropathy    Ref Provider/Clinic: ZULEIKA ARIAS   Are there internal records? Yes/No?  IF YES, list clinic names:  [facility]  [facility]     Are there outside records? Yes/No? No    Patient Contact (Y/N) & Call Details: No, referred    Action:

## 2018-03-06 ENCOUNTER — OFFICE VISIT (OUTPATIENT)
Dept: NEUROLOGY | Facility: CLINIC | Age: 63
End: 2018-03-06
Payer: MEDICARE

## 2018-03-06 ENCOUNTER — PRE VISIT (OUTPATIENT)
Dept: NEUROLOGY | Facility: CLINIC | Age: 63
End: 2018-03-06

## 2018-03-06 VITALS
HEIGHT: 73 IN | DIASTOLIC BLOOD PRESSURE: 82 MMHG | HEART RATE: 74 BPM | WEIGHT: 242 LBS | BODY MASS INDEX: 32.07 KG/M2 | SYSTOLIC BLOOD PRESSURE: 151 MMHG

## 2018-03-06 DIAGNOSIS — M48.061 SPINAL STENOSIS OF LUMBAR REGION WITHOUT NEUROGENIC CLAUDICATION: ICD-10-CM

## 2018-03-06 DIAGNOSIS — G63 POLYNEUROPATHY ASSOCIATED WITH UNDERLYING DISEASE (H): Primary | ICD-10-CM

## 2018-03-06 LAB
FERRITIN SERPL-MCNC: 114 NG/ML
HCT VFR BLD AUTO: 23.6 %
HEMOGLOBIN: 7.9 G/DL (ref 13.3–17.7)
IRON BINDING CAP: 371
IRON SATURATION INDEX: 22 %

## 2018-03-06 ASSESSMENT — ENCOUNTER SYMPTOMS
HEADACHES: 0
PALPITATIONS: 0
BLOOD IN STOOL: 0
CONSTIPATION: 1
WEAKNESS: 1
MEMORY LOSS: 0
LIGHT-HEADEDNESS: 0
RECTAL PAIN: 0
VOMITING: 0
PARALYSIS: 0
BLOATING: 0
TASTE DISTURBANCE: 0
SMELL DISTURBANCE: 0
TROUBLE SWALLOWING: 0
SINUS CONGESTION: 1
HYPOTENSION: 0
JAUNDICE: 0
DYSPNEA ON EXERTION: 0
SPEECH CHANGE: 0
EXERCISE INTOLERANCE: 1
HEARTBURN: 1
DIARRHEA: 1
HOARSE VOICE: 0
COUGH DISTURBING SLEEP: 1
TINGLING: 1
BRUISES/BLEEDS EASILY: 0
SWOLLEN GLANDS: 0
SINUS PAIN: 0
SYNCOPE: 0
BOWEL INCONTINENCE: 0
NECK MASS: 0
DISTURBANCES IN COORDINATION: 0
NAUSEA: 0
LEG PAIN: 0
SEIZURES: 0
SNORES LOUDLY: 1
SLEEP DISTURBANCES DUE TO BREATHING: 0
LOSS OF CONSCIOUSNESS: 0
HEMOPTYSIS: 0
ORTHOPNEA: 1
TREMORS: 0
SHORTNESS OF BREATH: 0
POSTURAL DYSPNEA: 1
WHEEZING: 1
HYPERTENSION: 1
SORE THROAT: 1
ABDOMINAL PAIN: 0
NUMBNESS: 1
SPUTUM PRODUCTION: 1
COUGH: 1

## 2018-03-06 NOTE — LETTER
3/6/2018        RE: Rory Bustamante  [address]  [place] [address]     Dear Colleague,    Thank you for referring your patient, Rory Bustamante, to the [facility] at [facility]. Please see a copy of my visit note below.    Service Date: 2018         Moris Diaz MD   [facility]   [address] [facility] [address]   [place] [address]      Belinda Moseley MD   [facility]   [facility] [place] [address]      RE: Rory Bustamante   MRN: [id number]   : 1955      Dear Doctors Joe and Pradip:      This is a very pleasant 62-year-old man with chronic kidney disease and a previous transplant, who is seen regarding a long history of progressive numbness in his feet.  This started approximately 20 years ago and has been progressive, especially apparently lately when his kidneys have failed again in a transplant fail.  He had glomerulonephritis as a cause for the transplant.  The GFR, he says, is now to 50%.  He also complains of numbness and tingling in his feet but no true burning.  He does have back pain issues.  His last MRI in  outside showed lumbar stenosis, significant.  He does not have lumbar claudication.  He has symptoms of pain in his back when he stands up, but the numbness in his feet from the knee down and tingling are chronically present.  He has had a little bit of numbness of the lateral fingers of the hand, but generally the process is limited to his lower extremity.  He has no bladder or bowel problem, but sex is not working.  He has had an EMG done recently by Dr. Hannon on  and this showed severe axonal length dependent sensorimotor polyneuropathy and moderate to severe median neuropathy in the right upper extremity at the wrist.        PAST MEDICAL HISTORY:  The patient has a past medical history that indicates other problems such as chronic kidney disease stage IV, organ transplant  candidate, proteinuria, gout or status post kidney transplant, coronary artery disease with a stent.  He has had intraocular bleeding, warts, spinal stenosis, vasectomy, tonsillectomy and a right knee replacement in 2015.      SOCIAL HISTORY:  He is disabled.  He lives with his wife.      FAMILY HISTORY:  Indicates the mother may have had some neuropathy but it was in a setting of chronic illness.      He was offered pool therapy for his back and feet and is considering it.      PHYSICAL EXAMINATION:  He is a very pleasant man.  Blood pressure 151/82, pulse 74, weight is 242.  His mental status exam is normal.  Cranial nerves II-XII are normal.  No shoulder, arm weakness, hand weakness.  Lower extremity actually did not show any weakness.  He does have significant sensory loss to vibration which he cannot perceive and pinprick up to about mid to lower calf area.  He has absent ankle reflex present.  Knee reflexes, upper extremities are present.  No sensory loss in the arms.  He had good coordination test.  His Romberg shows some swaying.      He recently had some workup for neuropathy and this included B12, folate and I will not repeat this.  His neuropathy is probably related to his renal failure which is not very common, but can occur.  He has been on vitamins, some supportive therapy.  I do not see any other drugs he is on that could cause neuropathy.  He is on cyclosporine, nifedipine, BEVACizumab, Avastin.  He is also on Plavix, metoprolol.      He has had a stress test, it is negative.      In summary, his lower extremity problem is a neuropathy sensory type, and there is very little from a lumbar radiculopathy point of view, although his MRI does show significant lumbar stenosis.  We will reassess the MRI of the spine.  I will wait for B12 and folate levels which he has them ordered.  I do not see any other possible etiology.  Because of his renal failure, we cannot order pain medications or gabapentin.  We  will wait for the MRI of the lumbar spine and see him as needed.      Sincerely,         ARIK HOFFMAN MD             D: 2018   T: 2018   MT: al      Name:     JEANMARIE GONZALEZ   MRN:      [id number]        Account:      [id number]   :      1955           Service Date: 2018      Document: [id number]

## 2018-03-06 NOTE — MR AVS SNAPSHOT
After Visit Summary   3/6/2018    Rory Bustamante    MRN: [id number]           Patient Information     Date Of Birth          1955        Visit Information        Provider Department      3/6/2018 1:00 PM Pradip Carbajal MD [facility]        Today's Diagnoses     Polyneuropathy associated with underlying disease (H)    -  1    Spinal stenosis of lumbar region without neurogenic claudication           Follow-ups after your visit        Your next 10 appointments already scheduled     Mar 12, 2018  6:15 PM CDT   (Arrive by 6:00 PM)   MR LUMBAR SPINE W/O CONTRAST with [id number]   [facility] [place] [facility] ([facility])    [address]  [facility] [address] [phone]           Take your medicines as usual, unless your doctor tells you not to. Bring a list of your current medicines to your exam (including vitamins, minerals and over-the-counter drugs). Also bring the results of similar scans you may have had.  Please remove any body piercings and hair extensions before you arrive.  Follow your doctor s orders. If you do not, we may have to postpone your exam.  You may or may not receive IV contrast for this exam pending the discretion of the Radiologist.  You do not need to do anything special to prepare.  The MRI machine uses a strong magnet. Please wear clothes without metal (snaps, zippers). A sweatsuit works well, or we may give you a hospital gown.   **IMPORTANT** THE INSTRUCTIONS BELOW ARE ONLY FOR THOSE PATIENTS WHO HAVE BEEN PRESCRIBED SEDATION OR GENERAL ANESTHESIA DURING THEIR MRI PROCEDURE:  IF YOUR DOCTOR PRESCRIBED ORAL SEDATION (take medicine to help you relax during your exam):   You must get the medicine from your doctor (oral medication) before you arrive. Bring the medicine to the exam. Do not take it at home. You ll be told when to take it upon arriving for your exam.   Arrive one hour early. Bring someone who can take you home  after the test. Your medicine will make you sleepy. After the exam, you may not drive, take a bus or take a taxi by yourself.  IF YOUR DOCTOR PRESCRIBED IV SEDATION:   Arrive one hour early. Bring someone who can take you home after the test. Your medicine will make you sleepy. After the exam, you may not drive, take a bus or take a taxi by yourself.   No eating 6 hours before your exam. You may have clear liquids up until 4 hours before your exam. (Clear liquids include water, clear tea, black coffee and fruit juice without pulp.)  IF YOUR DOCTOR PRESCRIBED ANESTHESIA (be asleep for your exam):   Arrive 1 1/2 hours early. Bring someone who can take you home after the test. You may not drive, take a bus or take a taxi by yourself.   No eating 8 hours before your exam. You may have clear liquids up until 4 hours before your exam. (Clear liquids include water, clear tea, black coffee and fruit juice without pulp.)   You will spend four to five hours in the recovery room.  Please call the Imaging Department at your exam site with any questions.            Mar 20, 2018  8:00 AM CDT   (Arrive by 7:45 AM)   Return Visit with Pradip Carbajal MD   [facility] ([facility])    [address]  3rd Floor  [facility] [id number] [phone]            Aug 07, 2018  3:00 PM CDT   Lab with  LAB   [facility] ([facility])    [address]  1st Floor  [facility] [id number] [phone]            Aug 07, 2018  4:00 PM CDT   (Arrive by 3:30 PM)   Return Visit with Christi Sun MD   [facility] Neph[facility] ([facility])    [address]  [address]  [facility] [id number] [phone]              Future tests that were ordered for you today     Open Future Orders        Priority Expected Expires Ordered    MRI Lumbar spine w/o contrast Routine  3/6/2019 3/6/2018            Who to contact     Please call your clinic  "at [phone] to:    Ask questions about your health    Make or cancel appointments    Discuss your medicines    Learn about your test results    Speak to your doctor            Additional Information About Your Visit        [employer] Information     [employer] gives you secure access to your electronic health record. If you see a primary care provider, you can also send messages to your care team and make appointments. If you have questions, please call your primary care clinic.  If you do not have a primary care provider, please call [phone] and they will assist you.      [employer] is an electronic gateway that provides easy, online access to your medical records. With [employer], you can request a clinic appointment, read your test results, renew a prescription or communicate with your care team.     To access your existing account, please contact your [facility] or call [phone] for assistance.        Care EveryWhere ID     This is your Care EveryWhere ID. This could be used by other organizations to access your [facility] medical records  [id number]        Your Vitals Were     Pulse Height BMI (Body Mass Index)             74 1.854 m (6' 1\") 31.93 kg/m2          Blood Pressure from Last 3 Encounters:   03/06/18 151/82   02/21/18 136/70   02/06/18 128/79    Weight from Last 3 Encounters:   03/06/18 109.8 kg (242 lb)   03/01/18 110.7 kg (244 lb)   02/21/18 110.7 kg (244 lb 1.6 oz)                 Today's Medication Changes          These changes are accurate as of 3/6/18  2:01 PM.  If you have any questions, ask your nurse or doctor.               These medicines have changed or have updated prescriptions.        Dose/Directions    allopurinol 100 MG tablet   Commonly known as:  ZYLOPRIM   This may have changed:    - how much to take  - when to take this   Used for:  Kidney replaced by transplant, Gout        Dose:  200 mg   Take 2 tablets by mouth daily.   Quantity:  180 tablet "   Refills:  3                Primary Care Provider Office Phone # Fax #    Moris Diaz [phone]       [facility] [address] [facility] [address]  [facility] [address]        Equal Access to Services     AZEEM JACOBSEN : Agatha av ku darleneo Sorickyali, waaxda luqadaha, qaybta kaalmada adeegyada, devin sidhu laJose Mariaterell perez. So [facility] [phone].    ATENCIÓN: Si habla español, tiene a carr disposición servicios gratuitos de asistencia lingüística. Llame al [phone].    We comply with applicable federal civil rights laws and [place] laws. We do not discriminate on the basis of race, color, national origin, age, disability, sex, sexual orientation, or gender identity.            Thank you!     Thank you for choosing [facility] NEUROLOG[facility]  for your care. Our goal is always to provide you with excellent care. Hearing back from our patients is one way we can continue to improve our services. Please take a few minutes to complete the written survey that you may receive in the mail after your visit with us. Thank you!             Your Updated Medication List - Protect others around you: Learn how to safely use, store and throw away your medicines at www.disposemymeds.org.          This list is accurate as of 3/6/18  2:01 PM.  Always use your most recent med list.                   Brand Name Dispense Instructions for use Diagnosis    allopurinol 100 MG tablet    ZYLOPRIM    180 tablet    Take 2 tablets by mouth daily.    Kidney replaced by transplant, Gout       ALPHAGAN OP      Apply 1 drop to eye 2 times daily        BABY ASPIRIN PO      Take 81 mg by mouth daily        BEVACizumab 400 MG/16ML injection    AVASTIN          cholecalciferol 5000 UNITS Tabs tablet    vitamin D3    30 tablet    Take 1 tablet (5,000 Units) by mouth daily    Hyperparathyroidism due to renal insufficiency (H)       CRESTOR 5 MG tablet   Generic drug:  rosuvastatin      Take 20 mg by mouth daily        *  cycloSPORINE modified 100 MG capsule    GENERIC EQUIVALENT    180 capsule    TAKE ONE CAPSULE BY MOUTH TWICE A DAY (TOTAL DOSE 125MG TWICE A DAY)    Kidney transplant recipient, Long-term use of immunosuppressant medication       * cycloSPORINE modified 25 MG capsule    GENERIC EQUIVALENT    180 capsule    TAKE ONE CAPSULE BY MOUTH TWICE A DAY (TOTAL DOSE 125MG TWIC A DAY)    Kidney transplant recipient, Long-term use of immunosuppressant medication       epoetin freya 17168 UNIT/ML injection    PROCRIT    4 mL    Inject 1 mL (10,000 Units) Subcutaneous once a week Please make sure Hgb has been checked within 4 days of dose, use as needed for Hgb<10.  If Hgb increases >1 point in 2 weeks, SYSTOLIC BP > 180 mmHg, OR Hgb >=10 g/dL, HOLD DOSE.  Per anemia protocol with Christi Sun MD.    CKD (chronic kidney disease) stage 4, GFR 15-29 ml/min (H)       fenofibrate 145 MG tablet      Take 145 mg by mouth daily        ISOSORBIDE MONONITRATE PO      Take 60 mg by mouth daily        loperamide 2 MG capsule    IMODIUM     Take 1 mg by mouth daily        losartan 50 MG tablet    COZAAR    90 tablet    Take 1 tablet (50 mg) by mouth daily    Kidney replaced by transplant       metoprolol tartrate 100 MG tablet    LOPRESSOR    60 tablet    Take 1 tablet by mouth 2 times daily.    Unspecified hypertensive kidney disease with chronic kidney disease stage I through stage IV, or unspecified(403.90), Kidney replaced by transplant       NIFEdipine ER 90 MG Tb24    ADALAT CC     Take 90 mg by mouth daily        NITROSTAT 0.4 MG sublingual tablet   Generic drug:  nitroGLYcerin      Place 0.4 mg under the tongue every 5 minutes as needed.        NONFORMULARY      2 times daily Focus Macula Pro:  Eye vitamin and mineral supplement A, C, E, Zinc, Copper        omega-3 acid ethyl esters 1 G capsule    Lovaza     Take 2 g by mouth        PLAVIX 75 MG tablet   Generic drug:  clopidogrel      Take 75 mg by mouth daily.        predniSONE 5  MG tablet    DELTASONE     Take 5 mg by mouth daily.        probenecid 500 MG tablet    BENEMID     Take 500 mg by mouth 2 times daily.        sulfamethoxazole-trimethoprim 400-80 MG per tablet    BACTRIM/SEPTRA     Take 1 tablet by mouth daily Take on every other day        TAMSULOSIN HCL PO      Take 0.4 mg by mouth daily        TYLENOL ARTHRITIS PAIN 650 MG CR tablet   Generic drug:  acetaminophen      Take 2 tablets by mouth 3 times daily.        * Notice:  This list has 2 medication(s) that are the same as other medications prescribed for you. Read the directions carefully, and ask your doctor or other care provider to review them with you.

## 2018-03-07 ENCOUNTER — TELEPHONE (OUTPATIENT)
Dept: PHARMACY | Facility: CLINIC | Age: 63
End: 2018-03-07

## 2018-03-07 DIAGNOSIS — D63.1 ANEMIA IN STAGE 4 CHRONIC KIDNEY DISEASE (H): ICD-10-CM

## 2018-03-07 DIAGNOSIS — N18.4 CKD (CHRONIC KIDNEY DISEASE) STAGE 4, GFR 15-29 ML/MIN (H): Primary | ICD-10-CM

## 2018-03-07 DIAGNOSIS — N18.4 ANEMIA IN STAGE 4 CHRONIC KIDNEY DISEASE (H): ICD-10-CM

## 2018-03-07 NOTE — LETTER
[facility]  Medication Monitoring [facility]         FAX             [phone]    March 7, 2018      Total Number of Pages:  _____  ____

## 2018-03-07 NOTE — TELEPHONE ENCOUNTER
Anemia Management Note  SUBJECTIVE/OBJECTIVE:  Referred by Dr. Christi Sun on 2018  Primary Diagnosis: Anemia in Chronic Kidney Disease (N18.3, D63.1)     Secondary Diagnosis:  Chronic Kidney Disease, Stage 3 (N18.3)   Hgb goal range:  9-10  Epo/Darbo: Procrit  10,000 units  once weekly  In clinic  Iron regimen:  Ferrous Sulfate 325mg QD  Labs : 2019  Recent MARY LOU use, transfusion, IV iron:   RX/TX plans : 2019  No history of stroke, MI and blood clots, has HX cancer, stent for CAD   Contact:                      Ok to leave message regarding Medical/Scheduling and billing on cell per consent to communicate dated 2014                                                  OK to speak with wife Flavia regarding Medical per consent to communicate dated 2014  Anemia Latest Ref Rng & Units 8/3/2017 2017 2018 2018 2018 2018 3/6/2018   MARY LOU Dose - - - - 10,000 units 10,000 units 10,000 units 10,000 units   Hemoglobin 13.3 - 17.7 g/dL 11.1(L) 8.6(L) 8.2(L) 8.6(A) 8.8(A) 8.3(A) 7.9(A)   TSAT % - - 20 - - - 22   Ferritin ng/mL - - 214 - - - 114       BP Readings from Last 3 Encounters:   18 151/82   18 136/70   18 128/79     Wt Readings from Last 2 Encounters:   18 242 lb (109.8 kg)   18 244 lb (110.7 kg)       Bleeding hemorrhoids, getting diarrhea with iron.    D/C PO iron, orders faxed to [facility] for IV iron infusion.    ASSESSMENT:  Hgb:At goal - recommend dose  TSat: not at goal of >30% Ferritin: At goal (>100ng/mL)    PLAN:      Orders needed to be renewed (for next follow-up date) in EPIC & LETTERS: None    Iron labs due:  4 weeks after last IV infusion    Plan discussed with:  Rory  Plan provided by:      NEXT FOLLOW-UP DATE:  2018    Anemia Management Service  GreciaMirza CaseyD and Michelle Ramírez CPhT  Phone: [phone]  Fax: [phone]

## 2018-03-07 NOTE — PROGRESS NOTES
Service Date: 2018         Moris Diaz MD   [facility]   [address]   [place] [address]      Belinda Moseley MD   [facility]   [facility] [place] [address]      RE: Rory Bustamante   MRN: [id number]   : 1955      Dear Doctors Joe and Pradip:      This is a very pleasant 62-year-old man with chronic kidney disease and a previous transplant, who is seen regarding a long history of progressive numbness in his feet.  This started approximately 20 years ago and has been progressive, especially apparently lately when his kidneys have failed again in a transplant fail.  He had glomerulonephritis as a cause for the transplant.  The GFR, he says, is now to 50%.  He also complains of numbness and tingling in his feet but no true burning.  He does have back pain issues.  His last MRI in  outside showed lumbar stenosis, significant.  He does not have lumbar claudication.  He has symptoms of pain in his back when he stands up, but the numbness in his feet from the knee down and tingling are chronically present.  He has had a little bit of numbness of the lateral fingers of the hand, but generally the process is limited to his lower extremity.  He has no bladder or bowel problem, but sex is not working.  He has had an EMG done recently by Dr. Hannon on  and this showed severe axonal length dependent sensorimotor polyneuropathy and moderate to severe median neuropathy in the right upper extremity at the wrist.        PAST MEDICAL HISTORY:  The patient has a past medical history that indicates other problems such as chronic kidney disease stage IV, organ transplant candidate, proteinuria, gout or status post kidney transplant, coronary artery disease with a stent.  He has had intraocular bleeding, warts, spinal stenosis, vasectomy, tonsillectomy and a right knee replacement in .      SOCIAL HISTORY:  He is disabled.  He lives  with his wife.      FAMILY HISTORY:  Indicates the mother may have had some neuropathy but it was in a setting of chronic illness.      He was offered pool therapy for his back and feet and is considering it.      PHYSICAL EXAMINATION:  He is a very pleasant man.  Blood pressure 151/82, pulse 74, weight is 242.  His mental status exam is normal.  Cranial nerves II-XII are normal.  No shoulder, arm weakness, hand weakness.  Lower extremity actually did not show any weakness.  He does have significant sensory loss to vibration which he cannot perceive and pinprick up to about mid to lower calf area.  He has absent ankle reflex present.  Knee reflexes, upper extremities are present.  No sensory loss in the arms.  He had good coordination test.  His Romberg shows some swaying.      He recently had some workup for neuropathy and this included B12, folate and I will not repeat this.  His neuropathy is probably related to his renal failure which is not very common, but can occur.  He has been on vitamins, some supportive therapy.  I do not see any other drugs he is on that could cause neuropathy.  He is on cyclosporine, nifedipine, BEVACizumab, Avastin.  He is also on Plavix, metoprolol.      He has had a stress test, it is negative.      In summary, his lower extremity problem is a neuropathy sensory type, and there is very little from a lumbar radiculopathy point of view, although his MRI does show significant lumbar stenosis.  We will reassess the MRI of the spine.  I will wait for B12 and folate levels which he has them ordered.  I do not see any other possible etiology.  Because of his renal failure, we cannot order pain medications or gabapentin.  We will wait for the MRI of the lumbar spine and see him as needed.      Sincerely,         ARIK HOFFMAN MD             D: 2018   T: 2018   MT: al      Name:     JEANMARIE GONZALEZ   MRN:      [id number]        Account:      [id number]   :      1955            Service Date: 03/06/2018      Document: [id number]

## 2018-03-12 ENCOUNTER — RADIANT APPOINTMENT (OUTPATIENT)
Dept: MRI IMAGING | Facility: CLINIC | Age: 63
End: 2018-03-12
Attending: PSYCHIATRY & NEUROLOGY
Payer: MEDICARE

## 2018-03-12 DIAGNOSIS — M48.061 SPINAL STENOSIS OF LUMBAR REGION WITHOUT NEUROGENIC CLAUDICATION: ICD-10-CM

## 2018-03-12 DIAGNOSIS — G63 POLYNEUROPATHY ASSOCIATED WITH UNDERLYING DISEASE (H): ICD-10-CM

## 2018-03-13 ENCOUNTER — TELEPHONE (OUTPATIENT)
Dept: PHARMACY | Facility: CLINIC | Age: 63
End: 2018-03-13

## 2018-03-13 LAB
HCT VFR BLD AUTO: 26.9 %
HEMOGLOBIN: 8.9 G/DL (ref 13.3–17.7)

## 2018-03-13 NOTE — TELEPHONE ENCOUNTER
Anemia Management Note  SUBJECTIVE/OBJECTIVE:  Referred by Dr. Christi Sun on 2018  Primary Diagnosis: Anemia in Chronic Kidney Disease (N18.3, D63.1)     Secondary Diagnosis:  Chronic Kidney Disease, Stage 3 (N18.3)   Hgb goal range:  9-10  Epo/Darbo: Procrit  10,000 units  once weekly for Hgb < 10  In clinic  RX/TX plans : 2019  Iron regimen:  Ferrous Sulfate 325mg QD  Labs : 2019  Recent MARY LOU use, transfusion, IV iron:     No history of stroke, MI and blood clots, has HX cancer, stent for CAD   Contact:                      Ok to leave message regarding Medical/Scheduling and billing on cell per consent to communicate dated 2014                                                  OK to speak with wife Flavia regarding Medical per consent to communicate dated 2014  Anemia Latest Ref Rng & Units 2017 2018 2018 2018 2018 3/6/2018 3/13/2018   MARY LOU Dose - - - 10,000 units 10,000 units 10,000 units 10,000 units 10,000 units   Hemoglobin 13.3 - 17.7 g/dL 8.6(L) 8.2(L) 8.6(A) 8.8(A) 8.3(A) 7.9(A) 8.9(A)   TSAT % - 20 - - - 22 -   Ferritin ng/mL - 214 - - - 114 -     BP Readings from Last 3 Encounters:   18 151/82   18 136/70   18 128/79     Wt Readings from Last 2 Encounters:   18 242 lb (109.8 kg)   18 244 lb (110.7 kg)     Received and documented outside lab results drawn on 3/13 and verified that he did receive a procrit dose in clinic    ASSESSMENT:   Hgb: Not at goal but improving - received procrit dose in clinic and continue current regimen  TSat: not at goal of >30% Ferritin: At goal (>100ng/mL) - Orders for 2 doses of injectafer were put into Epic on 3/7/18    PLAN:  Dose with procrit and RTC for hgb then procrit if needed in 1 week(s)  Need to verify if he has had any iron infusions and if not, he needs to schedule - No infusions given yet, waiting for PA approval    Orders needed to be renewed (for next follow-up  date) in LETTERS - for external labs: None    Iron labs due:  4 weeks after final IV injectafer infusion    Plan discussed with: Rory  Plan provided by:  Devang  Reviewed 03/13/2018 DENISE  NEXT FOLLOW-UP DATE:  3/20/18    Anemia Management Service  Mirza LozanoD and Michelle Ramírez CPhT  Phone: [phone]  Fax: [phone]

## 2018-03-15 DIAGNOSIS — Z94.0 S/P KIDNEY TRANSPLANT: ICD-10-CM

## 2018-03-15 DIAGNOSIS — N18.4 CKD (CHRONIC KIDNEY DISEASE) STAGE 4, GFR 15-29 ML/MIN (H): Primary | ICD-10-CM

## 2018-03-20 ENCOUNTER — TELEPHONE (OUTPATIENT)
Dept: PHARMACY | Facility: CLINIC | Age: 63
End: 2018-03-20

## 2018-03-20 ENCOUNTER — OFFICE VISIT (OUTPATIENT)
Dept: NEUROLOGY | Facility: CLINIC | Age: 63
End: 2018-03-20
Payer: MEDICARE

## 2018-03-20 VITALS
BODY MASS INDEX: 31.74 KG/M2 | WEIGHT: 239.5 LBS | HEIGHT: 73 IN | SYSTOLIC BLOOD PRESSURE: 180 MMHG | HEART RATE: 73 BPM | DIASTOLIC BLOOD PRESSURE: 94 MMHG

## 2018-03-20 DIAGNOSIS — M48.061 SPINAL STENOSIS OF LUMBAR REGION, UNSPECIFIED WHETHER NEUROGENIC CLAUDICATION PRESENT: Primary | ICD-10-CM

## 2018-03-20 PROBLEM — N18.4 ANEMIA IN STAGE 4 CHRONIC KIDNEY DISEASE (H): Status: ACTIVE | Noted: 2018-03-20

## 2018-03-20 PROBLEM — D63.1 ANEMIA IN STAGE 4 CHRONIC KIDNEY DISEASE (H): Status: ACTIVE | Noted: 2018-03-20

## 2018-03-20 LAB
HCT VFR BLD AUTO: 26.8 %
HEMOGLOBIN: 9 G/DL (ref 13.3–17.7)

## 2018-03-20 NOTE — TELEPHONE ENCOUNTER
Anemia Management Note  SUBJECTIVE/OBJECTIVE:  Referred by Dr. Christi Sun on 2018  Primary Diagnosis: Anemia in Chronic Kidney Disease (N18.3, D63.1)     Secondary Diagnosis:  Chronic Kidney Disease, Stage 3 (N18.3)   Hgb goal range:  9-10  Epo/Darbo: Procrit  10,000 units  once weekly for Hgb < 10  In clinic  RX/TX plans : 2019  Iron regimen:  Ferrous Sulfate 325mg QD  Labs : 2019  Recent MARY LOU use, transfusion, IV iron: IV iron scheduled 2018     No history of stroke, MI and blood clots, has HX cancer, stent for CAD   Contact:                      Ok to leave message regarding Medical/Scheduling and billing on cell per consent to communicate dated 2014                                      OK to speak with wife Flavia regarding Medical per consent to communicate dated 2014    Anemia Latest Ref Rng & Units 2018 2018 2018 2018 3/6/2018 3/13/2018 3/20/2018   MARY LOU Dose - - 10,000 units 10,000 units 10,000 units 10,000 units 10,000 units -   Hemoglobin 13.3 - 17.7 g/dL 8.2(L) 8.6(A) 8.8(A) 8.3(A) 7.9(A) 8.9(A) 9.0(A)   TSAT % 20 - - - 22 - -   Ferritin ng/mL 214 - - - 114 - -     BP Readings from Last 3 Encounters:   18 (!) 180/94   18 151/82   18 136/70     Wt Readings from Last 2 Encounters:   18 239 lb 8 oz (108.6 kg)   18 242 lb (109.8 kg)       Received and documented outside lab results drawn on 3/20  I sent a message to Dusty to see if she can help us get the PA for IV injectafer - Per Sis at the [facility], they do not do PA's there    Rory has several other appointments at the  coming up, he will plan on receiving the Injectafer here.  DENISE 2018     ASSESSMENT:  Hgb: At goal - Procrit dose was held due to rapid increase - verfied with SHIMA Alegre at [facility]  TSat: not at goal of >30% Ferritin: At goal (>100ng/mL)    PLAN:  Held procrit and RTC for hgb then procrit if needed in 1  week(s)  Rory still needs to schedule his appts for 2 IV iron infusion(s) at the [facility] but PA is pending    Orders needed to be renewed (for next follow-up date) in EPIC: None    Iron labs due:  4/3/18    Plan discussed with:  Rory  Plan provided by:  Devang     NEXT FOLLOW-UP DATE:  3/27/18    Anemia Management Service  Grecia Carroll PharmD and Michelle Ramírez CPhT  Phone: [phone]  Fax: [phone]

## 2018-03-20 NOTE — MR AVS SNAPSHOT
After Visit Summary   3/20/2018    Rory Bustamante    MRN: [id number]           Patient Information     Date Of Birth          1955        Visit Information        Provider Department      3/20/2018 8:00 AM Pradip Carbajal MD [facility]        Today's Diagnoses     Spinal stenosis of lumbar region, unspecified whether neurogenic claudication present    -  1       Follow-ups after your visit        Additional Services     ORTHOPEDICS ADULT REFERRAL       Your provider has referred you to: Lumbar stenosis for low back clinic of orthoppedics    Please be aware that coverage of these services is subject to the terms and limitations of your health insurance plan.  Call member services at your health plan with any benefit or coverage questions.      Please bring the following to your appointment:    >>   Any x-rays, CTs or MRIs which have been performed.  Contact the facility where they were done to arrange for  prior to your scheduled appointment.    >>   List of current medications   >>   This referral request   >>   Any documents/labs given to you for this referral                  Follow-up notes from your care team     Return if symptoms worsen or fail to improve.      Your next 10 appointments already scheduled     Mar 29, 2018  1:00 PM CDT   (Arrive by 12:30 PM)   NEW SPINE with Harshal Rucker MD   [facility] ([facility])    [address]  4th Floor  [facility] [address] [phone]            Aug 07, 2018  3:00 PM CDT   Lab with  LAB   [facility] ([facility])    [address]  1st Floor  [facility] [id number] [phone]            Aug 07, 2018  4:00 PM CDT   (Arrive by 3:30 PM)   Return Visit with Christi Sun MD   [facility] ([facility])    [address] [place] [address]  [address]  [facility] [id number] [phone]              Who to  "contact     Please call your clinic at [phone] to:    Ask questions about your health    Make or cancel appointments    Discuss your medicines    Learn about your test results    Speak to your doctor            Additional Information About Your Visit        [employer] Information     [employer] gives you secure access to your electronic health record. If you see a primary care provider, you can also send messages to your care team and make appointments. If you have questions, please call your primary care clinic.  If you do not have a primary care provider, please call [phone] and they will assist you.      [employer] is an electronic gateway that provides easy, online access to your medical records. With [employer], you can request a clinic appointment, read your test results, renew a prescription or communicate with your care team.     To access your existing account, please contact your [facility] or call [phone] for assistance.        Care EveryWhere ID     This is your Care EveryWhere ID. This could be used by other organizations to access your [place] medical records  [id number]        Your Vitals Were     Pulse Height BMI (Body Mass Index)             73 1.854 m (6' 1\") 31.6 kg/m2          Blood Pressure from Last 3 Encounters:   03/20/18 (!) 180/94   03/06/18 151/82   02/21/18 136/70    Weight from Last 3 Encounters:   03/20/18 108.6 kg (239 lb 8 oz)   03/06/18 109.8 kg (242 lb)   03/01/18 110.7 kg (244 lb)              We Performed the Following     ORTHOPEDICS ADULT REFERRAL          Today's Medication Changes          These changes are accurate as of 3/20/18  9:09 AM.  If you have any questions, ask your nurse or doctor.               These medicines have changed or have updated prescriptions.        Dose/Directions    allopurinol 100 MG tablet   Commonly known as:  ZYLOPRIM   This may have changed:    - how much to take  - when to take this   Used for:  Kidney " replaced by transplant, Gout        Dose:  200 mg   Take 2 tablets by mouth daily.   Quantity:  180 tablet   Refills:  3                Primary Care Provider Office Phone # Fax #    Moris Diaz [phone]       [facility] [address] [facility] [address]  [facility] [address]        Equal Access to Services     DUGLASFIDELIA ANN-MARIE : Hadii aad ku hadasho Soomaali, waaxda luqadaha, qaybta kaalmada adeegyada, waxay idiin hayaan adeeg thea la'aan ah. So [facility] [phone].    ATENCIÓN: Si habla español, tiene a carr disposición servicios gratuitos de asistencia lingüística. [facility] [phone].    We comply with applicable federal civil rights laws and [place] laws. We do not discriminate on the basis of race, color, national origin, age, disability, sex, sexual orientation, or gender identity.            Thank you!     Thank you for choosing [facility]  for your care. Our goal is always to provide you with excellent care. Hearing back from our patients is one way we can continue to improve our services. Please take a few minutes to complete the written survey that you may receive in the mail after your visit with us. Thank you!             Your Updated Medication List - Protect others around you: Learn how to safely use, store and throw away your medicines at www.disposemymeds.org.          This list is accurate as of 3/20/18  9:09 AM.  Always use your most recent med list.                   Brand Name Dispense Instructions for use Diagnosis    allopurinol 100 MG tablet    ZYLOPRIM    180 tablet    Take 2 tablets by mouth daily.    Kidney replaced by transplant, Gout       ALPHAGAN OP      Apply 1 drop to eye 2 times daily        BABY ASPIRIN PO      Take 81 mg by mouth daily        BEVACizumab 400 MG/16ML injection    AVASTIN          cholecalciferol 5000 UNITS Tabs tablet    vitamin D3    30 tablet    Take 1 tablet (5,000 Units) by mouth daily    Hyperparathyroidism due to renal  insufficiency (H)       CRESTOR 5 MG tablet   Generic drug:  rosuvastatin      Take 20 mg by mouth daily        * cycloSPORINE modified 100 MG capsule    GENERIC EQUIVALENT    180 capsule    TAKE ONE CAPSULE BY MOUTH TWICE A DAY (TOTAL DOSE 125MG TWICE A DAY)    Kidney transplant recipient, Long-term use of immunosuppressant medication       * cycloSPORINE modified 25 MG capsule    GENERIC EQUIVALENT    180 capsule    TAKE ONE CAPSULE BY MOUTH TWICE A DAY (TOTAL DOSE 125MG TWIC A DAY)    Kidney transplant recipient, Long-term use of immunosuppressant medication       epoetin freya 83633 UNIT/ML injection    PROCRIT    4 mL    Inject 1 mL (10,000 Units) Subcutaneous once a week Please make sure Hgb has been checked within 4 days of dose, use as needed for Hgb<10.  If Hgb increases >1 point in 2 weeks, SYSTOLIC BP > 180 mmHg, OR Hgb >=10 g/dL, HOLD DOSE.  Per anemia protocol with Christi Sun MD.    CKD (chronic kidney disease) stage 4, GFR 15-29 ml/min (H)       fenofibrate 145 MG tablet      Take 145 mg by mouth daily        ferric carboxymaltose 50 MG/ML injection    INJECTAFER    30 mL    Inject 15 mLs (750 mg) into the vein For 2 doses    CKD (chronic kidney disease) stage 4, GFR 15-29 ml/min (H), Anemia in stage 4 chronic kidney disease (H)       ISOSORBIDE MONONITRATE PO      Take 60 mg by mouth daily        loperamide 2 MG capsule    IMODIUM     Take 1 mg by mouth daily        losartan 50 MG tablet    COZAAR    90 tablet    Take 1 tablet (50 mg) by mouth daily    Kidney replaced by transplant       metoprolol tartrate 100 MG tablet    LOPRESSOR    60 tablet    Take 1 tablet by mouth 2 times daily.    Unspecified hypertensive kidney disease with chronic kidney disease stage I through stage IV, or unspecified(403.90), Kidney replaced by transplant       NIFEdipine ER 90 MG Tb24    ADALAT CC     Take 90 mg by mouth daily        NITROSTAT 0.4 MG sublingual tablet   Generic drug:  nitroGLYcerin      Place 0.4 mg  under the tongue every 5 minutes as needed.        NONFORMULARY      2 times daily Focus Macula Pro:  Eye vitamin and mineral supplement A, C, E, Zinc, Copper        omega-3 acid ethyl esters 1 G capsule    Lovaza     Take 2 g by mouth        PLAVIX 75 MG tablet   Generic drug:  clopidogrel      Take 75 mg by mouth daily.        predniSONE 5 MG tablet    DELTASONE     Take 5 mg by mouth daily.        probenecid 500 MG tablet    BENEMID     Take 500 mg by mouth 2 times daily.        sulfamethoxazole-trimethoprim 400-80 MG per tablet    BACTRIM/SEPTRA     Take 1 tablet by mouth daily Take on every other day        TAMSULOSIN HCL PO      Take 0.4 mg by mouth daily        TYLENOL ARTHRITIS PAIN 650 MG CR tablet   Generic drug:  acetaminophen      Take 2 tablets by mouth 3 times daily.        * Notice:  This list has 2 medication(s) that are the same as other medications prescribed for you. Read the directions carefully, and ask your doctor or other care provider to review them with you.

## 2018-03-20 NOTE — LETTER
3/20/2018       RE: Rory Bustamante  [address]  [facility] [address]     Dear Colleague,    Thank you for referring your patient, Rory Bustamante, to the [facility] at [facility]. Please see a copy of my visit note below.    Service Date: 2018      Moris Diaz MD   [address]  [address]      RE: Rory Bustamante   MRN: [id number]   : 1955      Dear Dr. Diaz:      Mr. Bustamante was seen for followup after his recent evaluation for back pain and foot numbness.  He does have a diagnosis of polyneuropathy and this was confirmed by an EMG done by Dr. Ash on .  On my examination on  of this year, I found that he did have neuropathy of the sensory type and there was not much evidence of lumbar radiculopathy on a clinical basis.  This was supported by the EMG.  His workup for sensory neuropathy is being completed but is probably related to his metabolic problems with chronic kidney disease, transplants, etc. The patient returns today and we reviewed the recently obtained MRI.  The MRI showed significant stenosis at a couple of levels and this is concurred with upon reviewing the imaging and it is more severe at L2 to L4 with spinal stenosis highest at L4-L5.  The MRIs were discussed with the patient.      His history is compatible with lumbar claudication syndrome from the lumbar stenosis.  He had an epidural injection many years ago and said it did not last more than 10 minutes.  He has no interest in that.  We told him we should explore the surgical option and he has agreed to it.      His examination today showed his blood pressure is elevated at 180/94.  His pulse is 73.      We have asked the Orthopedic Service, their low back specialist, to look at the surgical option for him, maybe a localized procedure for the lumbar stenosis and lumbar claudication.     D: 2018   T: 2018   MT: CONNOR       Name:     JEANMARIE GONZALEZ   MRN:      [id number]        Account:      [id number]   :      1955           Service Date: 2018      Document: [id number]       Again, thank you for allowing me to participate in the care of your patient.      Sincerely,    Pradip Carbajal MD

## 2018-03-20 NOTE — TELEPHONE ENCOUNTER
APPT INFO    Date /Time: 3/29/18 1pm   Reason for Appt: Spinal Stenosis of Lumbar Region   Ref Provider/Clinic: Dr. Carbajal   Are there internal records? Yes/No?  IF YES, list clinic names: p Neuro  [facility]   Are there outside records? Yes/No? no   Patient Contact (Y/N) & Call Details: No - Patient is referred. All records are in Epic/PACS.      Action: Chart reviewed

## 2018-03-20 NOTE — PROGRESS NOTES
Service Date: 2018      Moris Diaz MD   [address]  [address]      RE: Jeanmarie Bustamante   MRN: [id number]   : 1955      Dear Dr. Diaz:      Mr. Bustamante was seen for followup after his recent evaluation for back pain and foot numbness.  He does have a diagnosis of polyneuropathy and this was confirmed by an EMG done by Dr. Ash on .  On my examination on  of this year, I found that he did have neuropathy of the sensory type and there was not much evidence of lumbar radiculopathy on a clinical basis.  This was supported by the EMG.  His workup for sensory neuropathy is being completed but is probably related to his metabolic problems with chronic kidney disease, transplants, etc. The patient returns today and we reviewed the recently obtained MRI.  The MRI showed significant stenosis at a couple of levels and this is concurred with upon reviewing the imaging and it is more severe at L2 to L4 with spinal stenosis highest at L4-L5.  The MRIs were discussed with the patient.      His history is compatible with lumbar claudication syndrome from the lumbar stenosis.  He had an epidural injection many years ago and said it did not last more than 10 minutes.  He has no interest in that.  We told him we should explore the surgical option and he has agreed to it.      His examination today showed his blood pressure is elevated at 180/94.  His pulse is 73.      We have asked the Orthopedic Service, their low back specialist, to look at the surgical option for him, maybe a localized procedure for the lumbar stenosis and lumbar claudication.      Sincerely,         ARIK HOFFMAN MD             D: 2018   T: 2018   MT: CONNOR      Name:     JEANMARIE BUSTAMANTE   MRN:      [id number]        Account:      [id number]   :      1955           Service Date: 2018      Document: [id number]

## 2018-03-27 ENCOUNTER — TELEPHONE (OUTPATIENT)
Dept: PHARMACY | Facility: CLINIC | Age: 63
End: 2018-03-27

## 2018-03-27 DIAGNOSIS — M48.00 SPINAL STENOSIS: Primary | ICD-10-CM

## 2018-03-27 NOTE — TELEPHONE ENCOUNTER
Anemia Management Note  SUBJECTIVE/OBJECTIVE:  Referred by Dr. Christi Sun on 2018  Primary Diagnosis: Anemia in Chronic Kidney Disease (N18.3, D63.1)     Secondary Diagnosis:  Chronic Kidney Disease, Stage 3 (N18.3)   Hgb goal range:  9-10  Epo/Darbo: Procrit  10,000 units  once weekly for Hgb < 10  In clinic  RX/TX plans : 2019  Iron regimen:  Ferrous Sulfate 325mg QD  Labs : 2019  Recent MARY LOU use, transfusion, IV iron: IV iron scheduled 2018      No history of stroke, MI and blood clots, has HX cancer, stent for CAD   Contact:                      Ok to leave message regarding Medical/Scheduling and billing on cell per consent to communicate dated 2014                                      OK to speak with wife Flavia regarding Medical per consent to communicate dated 2014    Anemia Latest Ref Rng & Units 2018 2018 2018 3/6/2018 3/13/2018 3/20/2018 3/27/2018   MARY LOU Dose - 10,000 units 10,000 units 10,000 units 10,000 units 10,000 units - 10,000 units   Hemoglobin 13.3 - 17.7 g/dL 8.6(A) 8.8(A) 8.3(A) 7.9(A) 8.9(A) 9.0(A) -   TSAT % - - - 22 - - -   Ferritin ng/mL - - - 114 - - -     BP Readings from Last 3 Encounters:   18 (!) 180/94   18 151/82   18 136/70     Wt Readings from Last 2 Encounters:   18 239 lb 8 oz (108.6 kg)   18 242 lb (109.8 kg)     Rory will be getting his labs drawn on  in the future  Per Rory, hgb on 3/27 was 8.9 and he did receive a procrit dose 3/27  Still waiting for outside lab results from [facility] in [place] lab Fax #[phone] Phone #[phone]  IV iron infusions are scheduled for 3/29 and 18    ASSESSMENT:  Hgb: Not at goal - received Procrit dose in clinic   labs due again on 3/27  TSat: not at goal of >30% Ferritin: At goal (>100ng/mL)    PLAN:  Dosed with procrit and will RTC for hgb then procrit if needed in 1 week(s)  RTC for IV injectafer infusion on  3/29/18    Orders needed to be renewed (for next follow-up date) in EPIC: None  Reviewed 03/28/2018 DENISE  Iron labs due:  4/5/18    Plan discussed with:  Rory  Plan provided by:  Devang    NEXT FOLLOW-UP DATE:  3/29/18 to document IV iron infusion then 4/5    Anemia Management Service  Grecia Carroll PharmD and Michelle Ramírez CPhT  Phone: [phone]  Fax: [phone]

## 2018-03-29 ENCOUNTER — RADIANT APPOINTMENT (OUTPATIENT)
Dept: GENERAL RADIOLOGY | Facility: CLINIC | Age: 63
End: 2018-03-29
Attending: ORTHOPAEDIC SURGERY
Payer: MEDICARE

## 2018-03-29 ENCOUNTER — INFUSION THERAPY VISIT (OUTPATIENT)
Dept: INFUSION THERAPY | Facility: CLINIC | Age: 63
End: 2018-03-29
Attending: INTERNAL MEDICINE
Payer: MEDICARE

## 2018-03-29 ENCOUNTER — PRE VISIT (OUTPATIENT)
Dept: ORTHOPEDICS | Facility: CLINIC | Age: 63
End: 2018-03-29

## 2018-03-29 ENCOUNTER — OFFICE VISIT (OUTPATIENT)
Dept: ORTHOPEDICS | Facility: CLINIC | Age: 63
End: 2018-03-29
Payer: MEDICARE

## 2018-03-29 VITALS — HEIGHT: 73 IN | RESPIRATION RATE: 16 BRPM | WEIGHT: 222 LBS | BODY MASS INDEX: 29.42 KG/M2

## 2018-03-29 VITALS
DIASTOLIC BLOOD PRESSURE: 73 MMHG | HEART RATE: 81 BPM | SYSTOLIC BLOOD PRESSURE: 132 MMHG | RESPIRATION RATE: 16 BRPM | OXYGEN SATURATION: 97 % | TEMPERATURE: 98.2 F

## 2018-03-29 DIAGNOSIS — M43.8X9 SAGITTAL PLANE IMBALANCE: ICD-10-CM

## 2018-03-29 DIAGNOSIS — M40.209 ACQUIRED KYPHOSIS: ICD-10-CM

## 2018-03-29 DIAGNOSIS — D63.1 ANEMIA IN STAGE 4 CHRONIC KIDNEY DISEASE (H): Primary | ICD-10-CM

## 2018-03-29 DIAGNOSIS — M85.88 OSTEOPENIA OF SPINE: ICD-10-CM

## 2018-03-29 DIAGNOSIS — M48.00 SPINAL STENOSIS: ICD-10-CM

## 2018-03-29 DIAGNOSIS — N18.4 CKD (CHRONIC KIDNEY DISEASE) STAGE 4, GFR 15-29 ML/MIN (H): ICD-10-CM

## 2018-03-29 DIAGNOSIS — Z48.298 AFTERCARE FOLLOWING ORGAN TRANSPLANT: ICD-10-CM

## 2018-03-29 DIAGNOSIS — M48.062 LUMBAR STENOSIS WITH NEUROGENIC CLAUDICATION: Primary | ICD-10-CM

## 2018-03-29 DIAGNOSIS — Z79.52 CURRENT CHRONIC USE OF SYSTEMIC STEROIDS: ICD-10-CM

## 2018-03-29 DIAGNOSIS — N18.4 ANEMIA IN STAGE 4 CHRONIC KIDNEY DISEASE (H): Primary | ICD-10-CM

## 2018-03-29 PROCEDURE — 96374 THER/PROPH/DIAG INJ IV PUSH: CPT

## 2018-03-29 PROCEDURE — 25000128 H RX IP 250 OP 636: Mod: ZF | Performed by: INTERNAL MEDICINE

## 2018-03-29 PROCEDURE — 80158 DRUG ASSAY CYCLOSPORINE: CPT | Performed by: INTERNAL MEDICINE

## 2018-03-29 RX ADMIN — FERRIC CARBOXYMALTOSE INJECTION 750 MG: 50 INJECTION, SOLUTION INTRAVENOUS at 16:43

## 2018-03-29 ASSESSMENT — ENCOUNTER SYMPTOMS
JOINT SWELLING: 0
BLOOD IN STOOL: 0
MYALGIAS: 0
MEMORY LOSS: 0
BACK PAIN: 1
DIARRHEA: 1
VOMITING: 0
FLANK PAIN: 0
TASTE DISTURBANCE: 0
ARTHRALGIAS: 0
SLEEP DISTURBANCES DUE TO BREATHING: 0
NECK MASS: 0
MUSCLE CRAMPS: 0
TREMORS: 0
HYPOTENSION: 1
BLOATING: 0
BRUISES/BLEEDS EASILY: 1
RECTAL PAIN: 1
DOUBLE VISION: 0
MUSCLE WEAKNESS: 1
DIFFICULTY URINATING: 0
SYNCOPE: 0
TINGLING: 1
LEG PAIN: 0
SINUS PAIN: 0
HOARSE VOICE: 0
WEAKNESS: 1
DYSURIA: 0
NUMBNESS: 1
ABDOMINAL PAIN: 0
SINUS CONGESTION: 1
JAUNDICE: 0
CONSTIPATION: 1
EYE REDNESS: 0
HEARTBURN: 1
BOWEL INCONTINENCE: 0
LOSS OF CONSCIOUSNESS: 0
PALPITATIONS: 0
SWOLLEN GLANDS: 0
DIZZINESS: 1
EYE PAIN: 0
STIFFNESS: 0
LIGHT-HEADEDNESS: 1
EYE IRRITATION: 0
NECK PAIN: 1
SMELL DISTURBANCE: 0
PARALYSIS: 0
ORTHOPNEA: 0
SORE THROAT: 0
EXERCISE INTOLERANCE: 1
EYE WATERING: 0
SEIZURES: 0
HYPERTENSION: 1
NAUSEA: 0
HEADACHES: 0
SPEECH CHANGE: 0
TROUBLE SWALLOWING: 0
HEMATURIA: 0
DISTURBANCES IN COORDINATION: 1

## 2018-03-29 NOTE — MR AVS SNAPSHOT
After Visit Summary   3/29/2018    Rory Bustamante    MRN: [id number]           Patient Information     Date Of Birth          1955        Visit Information        Provider Department      3/29/2018 3:00 PM ANN 50 ATC; ANN SPEC [facility] Specialty and Procedure        Today's Diagnoses     Anemia in stage 4 chronic kidney disease (H)    -  1    CKD (chronic kidney disease) stage 4, GFR 15-29 ml/min (H)          Care Instructions      Ferric carboxymaltose injection  Brand Name: Injectafer  What is this medicine?  FERRIC CARBOXYMALTOSE (ferr-ik car-box-ee-mol-toes) is an iron complex. Iron is used to make healthy red blood cells, which carry oxygen and nutrients throughout the body. This medicine is used to treat anemia in people with chronic kidney disease or people who cannot take iron by mouth.  How should I use this medicine?  This medicine is for infusion into a vein. It is given by a health care professional in a hospital or clinic setting.  Talk to your pediatrician regarding the use of this medicine in children. Special care may be needed.  What side effects may I notice from receiving this medicine?  Side effects that you should report to your doctor or health care professional as soon as possible:    allergic reactions like skin rash, itching or hives, swelling of the face, lips, or tongue    breathing problems    changes in blood pressure    feeling faint or lightheaded, falls    flushing, sweating, or hot feelings  Side effects that usually do not require medical attention (report to your doctor or health care professional if they continue or are bothersome):    changes in taste    constipation    dizziness    headache    nausea    pain, redness, or irritation at site where injected    vomiting  What may interact with this medicine?  Do not take this medicine with any of the following medications:    deferoxamine    dimercaprol    other iron products  This  medicine may also interact with the following medications:    chloramphenicol    deferasirox  What if I miss a dose?  It is important not to miss your dose. Call your doctor or health care professional if you are unable to keep an appointment.  Where should I keep my medicine?  This drug is given in a hospital or clinic and will not be stored at home.  What should I tell my health care provider before I take this medicine?  They need to know if you have any of these conditions:    anemia not caused by low iron levels    high levels of iron in the blood    liver disease    an unusual or allergic reaction to iron, other medicines, foods, dyes, or preservatives    pregnant or trying to get pregnant    breast-feeding  What should I watch for while using this medicine?  Visit your doctor or health care professional regularly. Tell your doctor if your symptoms do not start to get better or if they get worse. You may need blood work done while you are taking this medicine.  You may need to follow a special diet. Talk to your doctor. Foods that contain iron include: whole grains/cereals, dried fruits, beans, or peas, leafy green vegetables, and organ meats (liver, kidney).  NOTE:This sheet is a summary. It may not cover all possible information. If you have questions about this medicine, talk to your doctor, pharmacist, or health care provider. Copyright  2017 Elsevier                Follow-ups after your visit        Your next 10 appointments already scheduled     Apr 05, 2018  2:00 PM CDT   Infusion 120 with ANN SPEC INFUSION, UC 43 ATC   [facility] Specialty and Procedure ([facility])    [address] [place] [address]  [address]  [facility] [id number] [phone]            Aug 07, 2018  3:00 PM CDT   Lab with  LAB   [facility] ([facility])    [address] [place] [address]  1st Floor  [facility] [address]   [phone]            Aug 07, 2018  4:00 PM  CDT   (Arrive by 3:30 PM)   Return Visit with Christi Sun MD   [facility] Nephrology ([facility])    [address]  [address]  [facility] [address] [phone]              Future tests that were ordered for you today     Open Future Orders        Priority Expected Expires Ordered    Dexa hip/pelvis/spine* Routine  3/29/2019 3/29/2018    Dexa wrist heel Routine  3/29/2019 3/29/2018            Who to contact     If you have questions or need follow up information about today's clinic visit or your schedule please contact [facility] SPECIAL[facility] AND PROCED[facility] directly at [phone].  Normal or non-critical lab and imaging results will be communicated to you by Algisyshart, letter or phone within 4 business days after the clinic has received the results. If you do not hear from us within 7 days, please contact the clinic through Ibetort or phone. If you have a critical or abnormal lab result, we will notify you by phone as soon as possible.  Submit refill requests through [employer] or call your pharmacy and they will forward the refill request to us. Please allow 3 business days for your refill to be completed.          Additional Information About Your Visit        AlgisysharmyMedScore Information     [employer] gives you secure access to your electronic health record. If you see a primary care provider, you can also send messages to your care team and make appointments. If you have questions, please call your primary care clinic.  If you do not have a primary care provider, please call [phone] and they will assist you.        Care EveryWhere ID     This is your Care EveryWhere ID. This could be used by other organizations to access your [place] medical records  [id number]        Your Vitals Were     Pulse Temperature Respirations Pulse Oximetry          81 98.2  F (36.8  C) (Oral) 16 97%         Blood Pressure from Last 3 Encounters:   03/29/18 132/73   03/20/18 (!)  180/94   03/06/18 151/82    Weight from Last 3 Encounters:   03/29/18 100.7 kg (222 lb)   03/20/18 108.6 kg (239 lb 8 oz)   03/06/18 109.8 kg (242 lb)              Today, you had the following     No orders found for display         Today's Medication Changes          These changes are accurate as of 3/29/18  4:30 PM.  If you have any questions, ask your nurse or doctor.               These medicines have changed or have updated prescriptions.        Dose/Directions    allopurinol 100 MG tablet   Commonly known as:  ZYLOPRIM   This may have changed:    - how much to take  - when to take this   Used for:  Kidney replaced by transplant, Gout        Dose:  200 mg   Take 2 tablets by mouth daily.   Quantity:  180 tablet   Refills:  3                Primary Care Provider Office Phone # Fax #    Moris Diaz [phone]       [facility] [address]  [facility] [address]        Equal Access to Services     AZEEM JACOBSEN AH: Hadii av simpson hadasho Soomaali, waaxda luqadaha, qaybta kaalmada adeegyada, devin barriga . So [facility] [phone].    ATENCIÓN: Si habla español, tiene a carr disposición servicios gratuitos de asistencia lingüística. Llame al [phone].    We comply with applicable federal civil rights laws and [place] laws. We do not discriminate on the basis of race, color, national origin, age, disability, sex, sexual orientation, or gender identity.            Thank you!     Thank you for choosing [facility] SPECIALTY AND PROCEDURE  for your care. Our goal is always to provide you with excellent care. Hearing back from our patients is one way we can continue to improve our services. Please take a few minutes to complete the written survey that you may receive in the mail after your visit with us. Thank you!             Your Updated Medication List - Protect others around you: Learn how to safely use, store and throw away your  medicines at www.disposemymeds.org.          This list is accurate as of 3/29/18  4:30 PM.  Always use your most recent med list.                   Brand Name Dispense Instructions for use Diagnosis    allopurinol 100 MG tablet    ZYLOPRIM    180 tablet    Take 2 tablets by mouth daily.    Kidney replaced by transplant, Gout       ALPHAGAN OP      Apply 1 drop to eye 2 times daily        BABY ASPIRIN PO      Take 81 mg by mouth daily        BEVACizumab 400 MG/16ML injection    AVASTIN          cholecalciferol 5000 UNITS Tabs tablet    vitamin D3    30 tablet    Take 1 tablet (5,000 Units) by mouth daily    Hyperparathyroidism due to renal insufficiency (H)       CRESTOR 5 MG tablet   Generic drug:  rosuvastatin      Take 20 mg by mouth daily        * cycloSPORINE modified 100 MG capsule    GENERIC EQUIVALENT    180 capsule    TAKE ONE CAPSULE BY MOUTH TWICE A DAY (TOTAL DOSE 125MG TWICE A DAY)    Kidney transplant recipient, Long-term use of immunosuppressant medication       * cycloSPORINE modified 25 MG capsule    GENERIC EQUIVALENT    180 capsule    TAKE ONE CAPSULE BY MOUTH TWICE A DAY (TOTAL DOSE 125MG TWIC A DAY)    Kidney transplant recipient, Long-term use of immunosuppressant medication       epoetin freya 79231 UNIT/ML injection    PROCRIT    4 mL    Inject 1 mL (10,000 Units) Subcutaneous once a week Please make sure Hgb has been checked within 4 days of dose, use as needed for Hgb<10.  If Hgb increases >1 point in 2 weeks, SYSTOLIC BP > 180 mmHg, OR Hgb >=10 g/dL, HOLD DOSE.  Per anemia protocol with Christi Sun MD.    CKD (chronic kidney disease) stage 4, GFR 15-29 ml/min (H)       fenofibrate 145 MG tablet      Take 145 mg by mouth daily        ferric carboxymaltose 50 MG/ML injection    INJECTAFER    30 mL    Inject 15 mLs (750 mg) into the vein For 2 doses    CKD (chronic kidney disease) stage 4, GFR 15-29 ml/min (H), Anemia in stage 4 chronic kidney disease (H)       ISOSORBIDE MONONITRATE PO       Take 60 mg by mouth daily        loperamide 2 MG capsule    IMODIUM     Take 1 mg by mouth daily        losartan 50 MG tablet    COZAAR    90 tablet    Take 1 tablet (50 mg) by mouth daily    Kidney replaced by transplant       metoprolol tartrate 100 MG tablet    LOPRESSOR    60 tablet    Take 1 tablet by mouth 2 times daily.    Unspecified hypertensive kidney disease with chronic kidney disease stage I through stage IV, or unspecified(403.90), Kidney replaced by transplant       NIFEdipine ER 90 MG Tb24    ADALAT CC     Take 90 mg by mouth daily        NITROSTAT 0.4 MG sublingual tablet   Generic drug:  nitroGLYcerin      Place 0.4 mg under the tongue every 5 minutes as needed.        NONFORMULARY      2 times daily Focus Macula Pro:  Eye vitamin and mineral supplement A, C, E, Zinc, Copper        omega-3 acid ethyl esters 1 G capsule    Lovaza     Take 2 g by mouth        PLAVIX 75 MG tablet   Generic drug:  clopidogrel      Take 75 mg by mouth daily.        predniSONE 5 MG tablet    DELTASONE     Take 5 mg by mouth daily.        probenecid 500 MG tablet    BENEMID     Take 500 mg by mouth 2 times daily.        sulfamethoxazole-trimethoprim 400-80 MG per tablet    BACTRIM/SEPTRA     Take 1 tablet by mouth daily Take on every other day        TAMSULOSIN HCL PO      Take 0.4 mg by mouth daily        TYLENOL ARTHRITIS PAIN 650 MG CR tablet   Generic drug:  acetaminophen      Take 2 tablets by mouth 3 times daily.        * Notice:  This list has 2 medication(s) that are the same as other medications prescribed for you. Read the directions carefully, and ask your doctor or other care provider to review them with you.

## 2018-03-29 NOTE — LETTER
3/29/2018       RE: Rory Bustamante  [address]  [place] [address]     Dear Colleague,    Thank you for referring your patient, Rory Bustamante, to the [facility]TH[facility] at [facility]. Please see a copy of my visit note below.    REASON FOR CONSULTATION: New Patient (Low back pain)     REFERRING PHYSICIAN: Pradip Carbajal   PCP:Moris Diaz    History of Present Illness:  62/m, with bilateral buttock and hip pain; both knees go numb.  No/minimal back pain.  Worse: standing, walking, (+) decreased walking distance (may also be due to heart issues).  Better: sitting, (+) shopping cart sign.    Previous treatment:   Pool therapy x 2 sessions thus far - helpful.  No formal land-based PT.  1 LISA, done in [place] - helped until parking lot (~ 30 mins).  Does not think rpt LISA's is good options, because he needs to get off Plavix each time.  Tylenol (arthritis formulation)  (-) opioids.      Oswestry (HIRAL) Questionnaire    OSWESTRY DISABILITY INDEX 3/29/2018   Count 7   Sum 7   Oswestry Score (%) 20   Some recent data might be hidden        Visual Analog Pain Scale  Back Pain Scale 0-10: 0  Right leg pain: 0  Left leg pain: 0    PROMIS-10 Scores  Global Mental Health Score: (P) 17  Global Physical Health Score: (P) 12  PROMIS TOTAL - SUBSCORES: (P) 29    ROS:  A 12-point review of systems was completed and is negative except for otherwise noted above in the history of present illness.    Med Hx:  Past Medical History:   Diagnosis Date     Angina effort (H) 8/2016     Arthritis      BPH (benign prostatic hyperplasia)      CAD (coronary artery disease) July 2011    MI in July 2011, stent placed, on plavix and aspirin     Glaucoma      Gout     Uric acid as high as 11 previously, now on allopurinol and probenecid     HTN (hypertension)      Hypercholesteremia 2015     Hypertriglyceridemia 2015     Intraocular bleeding     previously treated wt Avastin     Macular  degeneration      Neuropathy     Limited sensation bilateral knees to feet     Presence of stent in coronary artery August 2012     Proteinuria 11/2015    recurrent MPGN on biopsy     S/P kidney transplant     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  HLA identical transplant.  Biopsy in 4/2008 with recurrent MPGN type 2, mild interstitial fibrosis and tubular atrophy, CNI toxicity     Spinal stenosis 2013     Warts      Significant medical issues:    S/p kidney transplant 28 yrs ago; now needs another.  Currently off the transplant list.  Per patient he has 98/100 antibodies; thus, hard to find matching kidney.  (+) heart problems.  Previous MI 2011; CAD 2012; on Plavix.      Surg Hx:  Past Surgical History:   Procedure Laterality Date     C TOTAL KNEE ARTHROPLASTY  2015     TONSILLECTOMY       TRANSPLANT KIDNEY RECIPIENT LIVING RELATED  1989    HLA identical     VASECTOMY         Allergies:  Allergies   Allergen Reactions     Contrast Dye Other (See Comments) and Itching     Pt reports sneezing     Simvastatin Muscle Pain (Myalgia)       Meds:  Current Outpatient Prescriptions   Medication     ferric carboxymaltose (INJECTAFER) 50 MG/ML injection     epoetin freya (PROCRIT) 19224 UNIT/ML injection     omega-3 acid ethyl esters (LOVAZA) 1 G capsule     cycloSPORINE modified (GENERIC EQUIVALENT) 100 MG capsule     cycloSPORINE modified (GENERIC EQUIVALENT) 25 MG capsule     loperamide (IMODIUM) 2 MG capsule     fenofibrate 145 MG tablet     NIFEdipine ER (ADALAT CC) 90 MG TB24     BEVACizumab (AVASTIN) 400 MG/16ML injection     losartan (COZAAR) 50 MG tablet     cholecalciferol (VITAMIN D3) 5000 UNITS TABS tablet     BABY ASPIRIN PO     NONFORMULARY     ISOSORBIDE MONONITRATE PO     TAMSULOSIN HCL PO     allopurinol (ZYLOPRIM) 100 MG tablet     metoprolol (LOPRESSOR) 100 MG tablet     rosuvastatin (CRESTOR) 5 MG tablet     nitroGLYCERIN (NITROSTAT) 0.4 MG SL tablet     Brimonidine Tartrate (ALPHAGAN OP)      "clopidogrel (PLAVIX) 75 MG tablet     predniSONE (DELTASONE) 5 MG tablet     probenecid (BENEMID) 500 MG tablet     sulfamethoxazole-trimethoprim (BACTRIM,SEPTRA) 400-80 MG per tablet     acetaminophen (TYLENOL ARTHRITIS PAIN) 650 MG CR tablet     No current facility-administered medications for this visit.      On chronic prednisone x 28 yrs (since kidney transplant).  NOT on anti-osteoporosis meds.  Does not know if he's had DEXA scan done.    Fam Hx:  Family History   Problem Relation Age of Onset     DIABETES Mother      Colon Cancer Mother 78     Cardiac Sudden Death Sister 87     DIABETES Brother      CEREBROVASCULAR DISEASE Brother    (-) back problems    P/S Hx:  Social History   Substance Use Topics     Smoking status: Never Smoker     Smokeless tobacco: Never Used     Alcohol use No     On disability (for multiple issues, including ESRD).  , lives with wife in apartment.  One son lives with them, another son had moved.  Nonsmoker.  (-) EtOH.      Physical Exam:  Very pleasant, healthy appearing, alert, oriented x 3, cooperative.  Normal mood and affect.  Not in cardiorespiratory distress.  Resp 16  Ht 1.842 m (6' 0.5\")  Wt 100.7 kg (222 lb)  BMI 29.69 kg/m2  Good upright posture.    Normal gait without assistive device.  No antalgia / imbalance.  Able to walk on toes and on heels.  Back: (+) decreased lordosis, no skin lesions or surgical scars.  Localizes numbness across beltline, down buttocks, posterior thighs, back of knees.  Tenderness: (-) midline, (-) paraspinal, (-) R and L PSIS.  ROM:   Mild LOM painless extension.   Mild LOM painless flexion, able to reach down ~6 inches off ground.     Neuro Exam:  Motor: 4/5 bilateral EHL; 5/5 all other muscle groups in both LE's.  Sensory:  Reports some decrease in sensation distally (toes and forefeet) in stocking type pattern; nondermatomal (Note: not diabetic).  Reflexes:  Knee 1+ bilat.  Ankle 1+ bilat.  (-) Babinski, (-) clonus.    Lower " Extremity:  Equal leg lengths, full pulses, (-) atrophy / asymmetry.  Full painless passive knee and ankle motion.  Straight leg raise: (-) right, (-) left.  Hip impingement: (-) right, (-) left.  VARUN/Neel's: (-) right, (-) left.           Imaging:   EOS full spine ap-lat today: flattened lumbar lordosis 2' to multilevel spondylosis / degeneration, most advanced L3-S1.  Significant PI-LL mismatch (25 degrees).  No listhesis.  Compensatory posterior pelvic tilt. Sagittal measurements as follows:  LL = 13  L3-S1 = 10  L4-S1 = 8  L5-S1 = 8  PI = 38  PI-LL mismatch = 25  PT = 25  SVA = +8.2cm  TPA = 26  GT = 32    Lumbar MRI 3/12/18 shows multilevel severe stenosis L2-3,L3-4,L4-5; mild-mod stenosis L1-2.  (+) multilevel foraminal stenosis including bilateral L5-S1; (+) flattened lumbar lordosis.    Impression:   - Multilevel lumbar stenosis L1-2,L2-3,L3-4,L4-5,L5-S1, with neurogenic claudication   - Flatback syndrome / global positive sagittal malalignment, with significant PI-LL mismatch (25 degrees), pelvic retroversion (PT 25 deg) and SVA +8.2cm.  - Multiple medical co-morbiditis, including ESRD with hx of kidney transplant, chronic prednisone intake, previous MI, CAD, on plavix.  - Suspect osteoporosis, 2' to chronic steroid intake.    Plan:   Had lengthy discussion with patient re his spine conditions, and treatment options.  Has 2 spinal problems: multilevel stenosis and significant sagittal deformity (flatback).  These are likely both contributing to his symptoms.    Stenosis causing nerve pinching, neuro claudication, decreased standing/walking tolerance.    Sag deformity may be causing back pain/fatigue of paraspinal and gluteal muscles.    Discussed nonop and surg tx options: observation, living with pain, activity modification, PT / pool therapy, rpt LISA, surgery.  Discussed 2 surgical options:  (1) multilevel decompression / laminectomies L2-3,L3-4,L4-5 - may be done MIS; (2) Multilevel fusion  T10-pelvis with osteotomies to improve alignment as well.    Discussed pros and cons, rationale, risks, benefits, alternatives.  Elects to proceed with decompression surgery.  Understands this may lead to only partial symptom relief; may still need fusion surgery in future.  - Surg request placed:  MIS laminectomies L2-3,L3-4,L4-5.  - PAC referral placed.  - DEXA scan (back, hip, wrist)  - Advised continue Pool therapy.  - Because of multielvel nature of surgery, with anticipated long duration of surgery and anesthesia, and multiple medical co-morbidities, I anticipate and recommend 1-2 day hospital stay for close observation, drain placement and monitoring (ongoing anticoagulation puts him at high risk for postop epidural hematoma).    All questions and concerns were answered to the patient's apparent satisfaction before leaving the clinic.     Total visit time > 60 mins, > 50% counseling and coordination of care.    Respectfully,    Harshal Rucker MD    Orthopaedic Spine Surgery  Dept Orthopaedic Surgery, [facility]  [phone] office, [phone] pager  [email or web address]

## 2018-03-29 NOTE — PROGRESS NOTES
REASON FOR CONSULTATION: New Patient (Low back pain)     REFERRING PHYSICIAN: Pradip Carbajal   PCP:Moris Diaz    History of Present Illness:  62/m, with bilateral buttock and hip pain; both knees go numb.  No/minimal back pain.  Worse: standing, walking, (+) decreased walking distance (may also be due to heart issues).  Better: sitting, (+) shopping cart sign.    Previous treatment:   Pool therapy x 2 sessions thus far - helpful.  No formal land-based PT.  1 LISA, done in [place] - helped until parking lot (~ 30 mins).  Does not think rpt LISA's is good options, because he needs to get off Plavix each time.  Tylenol (arthritis formulation)  (-) opioids.      Oswestry (HIRAL) Questionnaire    OSWESTRY DISABILITY INDEX 3/29/2018   Count 7   Sum 7   Oswestry Score (%) 20   Some recent data might be hidden        Visual Analog Pain Scale  Back Pain Scale 0-10: 0  Right leg pain: 0  Left leg pain: 0    PROMIS-10 Scores  Global Mental Health Score: (P) 17  Global Physical Health Score: (P) 12  PROMIS TOTAL - SUBSCORES: (P) 29    ROS:  A 12-point review of systems was completed and is negative except for otherwise noted above in the history of present illness.    Med Hx:  Past Medical History:   Diagnosis Date     Angina effort (H) 8/2016     Arthritis      BPH (benign prostatic hyperplasia)      CAD (coronary artery disease) July 2011    MI in July 2011, stent placed, on plavix and aspirin     Glaucoma      Gout     Uric acid as high as 11 previously, now on allopurinol and probenecid     HTN (hypertension)      Hypercholesteremia 2015     Hypertriglyceridemia 2015     Intraocular bleeding     previously treated [facility] Avastin     Macular degeneration      Neuropathy     Limited sensation bilateral knees to feet     Presence of stent in coronary artery August 2012     Proteinuria 11/2015    recurrent MPGN on biopsy     S/P kidney transplant     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  HLA identical transplant.   Biopsy in 4/2008 with recurrent MPGN type 2, mild interstitial fibrosis and tubular atrophy, CNI toxicity     Spinal stenosis 2013     Warts      Significant medical issues:    S/p kidney transplant 28 yrs ago; now needs another.  Currently off the transplant list.  Per patient he has 98/100 antibodies; thus, hard to find matching kidney.  (+) heart problems.  Previous MI 2011; CAD 2012; on Plavix.      Surg Hx:  Past Surgical History:   Procedure Laterality Date     C TOTAL KNEE ARTHROPLASTY  2015     TONSILLECTOMY       TRANSPLANT KIDNEY RECIPIENT LIVING RELATED  1989    HLA identical     VASECTOMY         Allergies:  Allergies   Allergen Reactions     Contrast Dye Other (See Comments) and Itching     Pt reports sneezing     Simvastatin Muscle Pain (Myalgia)       Meds:  Current Outpatient Prescriptions   Medication     ferric carboxymaltose (INJECTAFER) 50 MG/ML injection     epoetin freya (PROCRIT) 60011 UNIT/ML injection     omega-3 acid ethyl esters (LOVAZA) 1 G capsule     cycloSPORINE modified (GENERIC EQUIVALENT) 100 MG capsule     cycloSPORINE modified (GENERIC EQUIVALENT) 25 MG capsule     loperamide (IMODIUM) 2 MG capsule     fenofibrate 145 MG tablet     NIFEdipine ER (ADALAT CC) 90 MG TB24     BEVACizumab (AVASTIN) 400 MG/16ML injection     losartan (COZAAR) 50 MG tablet     cholecalciferol (VITAMIN D3) 5000 UNITS TABS tablet     BABY ASPIRIN PO     NONFORMULARY     ISOSORBIDE MONONITRATE PO     TAMSULOSIN HCL PO     allopurinol (ZYLOPRIM) 100 MG tablet     metoprolol (LOPRESSOR) 100 MG tablet     rosuvastatin (CRESTOR) 5 MG tablet     nitroGLYCERIN (NITROSTAT) 0.4 MG SL tablet     Brimonidine Tartrate (ALPHAGAN OP)     clopidogrel (PLAVIX) 75 MG tablet     predniSONE (DELTASONE) 5 MG tablet     probenecid (BENEMID) 500 MG tablet     sulfamethoxazole-trimethoprim (BACTRIM,SEPTRA) 400-80 MG per tablet     acetaminophen (TYLENOL ARTHRITIS PAIN) 650 MG CR tablet     No current facility-administered  "medications for this visit.      On chronic prednisone x 28 yrs (since kidney transplant).  NOT on anti-osteoporosis meds.  Does not know if he's had DEXA scan done.    Fam Hx:  Family History   Problem Relation Age of Onset     DIABETES Mother      Colon Cancer Mother 78     Cardiac Sudden Death Sister 87     DIABETES Brother      CEREBROVASCULAR DISEASE Brother    (-) back problems    P/S Hx:  Social History   Substance Use Topics     Smoking status: Never Smoker     Smokeless tobacco: Never Used     Alcohol use No     On disability (for multiple issues, including ESRD).  , lives with wife in apartment.  One son lives with them, another son had moved.  Nonsmoker.  (-) EtOH.      Physical Exam:  Very pleasant, healthy appearing, alert, oriented x 3, cooperative.  Normal mood and affect.  Not in cardiorespiratory distress.  Resp 16  Ht 1.842 m (6' 0.5\")  Wt 100.7 kg (222 lb)  BMI 29.69 kg/m2  Good upright posture.    Normal gait without assistive device.  No antalgia / imbalance.  Able to walk on toes and on heels.  Back: (+) decreased lordosis, no skin lesions or surgical scars.  Localizes numbness across beltline, down buttocks, posterior thighs, back of knees.  Tenderness: (-) midline, (-) paraspinal, (-) R and L PSIS.  ROM:   Mild LOM painless extension.   Mild LOM painless flexion, able to reach down ~6 inches off ground.     Neuro Exam:  Motor: 4/5 bilateral EHL; 5/5 all other muscle groups in both LE's.  Sensory:  Reports some decrease in sensation distally (toes and forefeet) in stocking type pattern; nondermatomal (Note: not diabetic).  Reflexes:  Knee 1+ bilat.  Ankle 1+ bilat.  (-) Babinski, (-) clonus.    Lower Extremity:  Equal leg lengths, full pulses, (-) atrophy / asymmetry.  Full painless passive knee and ankle motion.  Straight leg raise: (-) right, (-) left.  Hip impingement: (-) right, (-) left.  VARUN/Neel's: (-) right, (-) left.           Imaging:   EOS full spine ap-lat today: " flattened lumbar lordosis 2' to multilevel spondylosis / degeneration, most advanced L3-S1.  Significant PI-LL mismatch (25 degrees).  No listhesis.  Compensatory posterior pelvic tilt. Sagittal measurements as follows:  LL = 13  L3-S1 = 10  L4-S1 = 8  L5-S1 = 8  PI = 38  PI-LL mismatch = 25  PT = 25  SVA = +8.2cm  TPA = 26  GT = 32    Lumbar MRI 3/12/18 shows multilevel severe stenosis L2-3,L3-4,L4-5; mild-mod stenosis L1-2.  (+) multilevel foraminal stenosis including bilateral L5-S1; (+) flattened lumbar lordosis.    Impression:   - Multilevel lumbar stenosis L1-2,L2-3,L3-4,L4-5,L5-S1, with neurogenic claudication   - Flatback syndrome / global positive sagittal malalignment, with significant PI-LL mismatch (25 degrees), pelvic retroversion (PT 25 deg) and SVA +8.2cm.  - Multiple medical co-morbiditis, including ESRD with hx of kidney transplant, chronic prednisone intake, previous MI, CAD, on plavix.  - Suspect osteoporosis, 2' to chronic steroid intake.    Plan:   Had lengthy discussion with patient re his spine conditions, and treatment options.  Has 2 spinal problems: multilevel stenosis and significant sagittal deformity (flatback).  These are likely both contributing to his symptoms.    Stenosis causing nerve pinching, neuro claudication, decreased standing/walking tolerance.    Sag deformity may be causing back pain/fatigue of paraspinal and gluteal muscles.    Discussed nonop and surg tx options: observation, living with pain, activity modification, PT / pool therapy, rpt LISA, surgery.  Discussed 2 surgical options:  (1) multilevel decompression / laminectomies L2-3,L3-4,L4-5 - may be done MIS; (2) Multilevel fusion T10-pelvis with osteotomies to improve alignment as well.    Discussed pros and cons, rationale, risks, benefits, alternatives.  Elects to proceed with decompression surgery.  Understands this may lead to only partial symptom relief; may still need fusion surgery in future.  - Surg request  placed:  MIS laminectomies L2-3,L3-4,L4-5.  - PAC referral placed.  - DEXA scan (back, hip, wrist)  - Advised continue Pool therapy.  - Because of multielvel nature of surgery, with anticipated long duration of surgery and anesthesia, and multiple medical co-morbidities, I anticipate and recommend 1-2 day hospital stay for close observation, drain placement and monitoring (ongoing anticoagulation puts him at high risk for postop epidural hematoma).    All questions and concerns were answered to the patient's apparent satisfaction before leaving the clinic.     Total visit time > 60 mins, > 50% counseling and coordination of care.    Respectfully,    Harshal Rucker MD    Orthopaedic Spine Surgery  Dept Orthopaedic Surgery, [facility]  [phone] office, [phone] pager  [email or web address]

## 2018-03-29 NOTE — PROGRESS NOTES
Nursing Note  Rory Bustamante presents today to [facility] for:   Chief Complaint   Patient presents with     Infusion     Injectafer     During today's [facility] appointment, orders from  Dr. diaz were completed.  Frequency: today is dose 1 of 2 total.    Progress note:  Patient identification verified by name and date of birth.  Assessment completed.  Vitals recorded in Doc Flowsheets.  Patient was provided with education regarding infusion and possible side effects.  Patient verbalized understanding.      needed: No  Premedications: were not ordered.  Infusion Rates: infusion given over approximately 7.5 minutes.  Approximate Infusion length:1 hours.   Labs: were not ordered for this appointment.  Vascular access: peripheral IV placed today.  Treatment Conditions: patient monitored for 30 minutes after medication was infused.  Patient tolerated infusion: well.    Discharge Plan:   Follow up plan of care with: ongoing infusions at [facility].  Discharge instructions were reviewed with patient.  Patient/representative verbalized understanding of discharge instructions and all questions answered.  Patient discharged from [facility] in stable condition.    Allyson Pyle RN       Administrations This Visit     ferric carboxymaltose (INJECTAFER) 750 mg intermittent infusion     Admin Date Action Dose Route Administered By             03/29/2018 New Bag 750 mg Intravenous Allyson Pyle RN                            /73  Pulse 81  Temp 98.2  F (36.8  C) (Oral)  Resp 16  SpO2 97%

## 2018-03-29 NOTE — PATIENT INSTRUCTIONS
Ferric carboxymaltose injection  Brand Name: Injectafer  What is this medicine?  FERRIC CARBOXYMALTOSE (ferr-ik car-box-ee-mol-toes) is an iron complex. Iron is used to make healthy red blood cells, which carry oxygen and nutrients throughout the body. This medicine is used to treat anemia in people with chronic kidney disease or people who cannot take iron by mouth.  How should I use this medicine?  This medicine is for infusion into a vein. It is given by a health care professional in a hospital or clinic setting.  Talk to your pediatrician regarding the use of this medicine in children. Special care may be needed.  What side effects may I notice from receiving this medicine?  Side effects that you should report to your doctor or health care professional as soon as possible:    allergic reactions like skin rash, itching or hives, swelling of the face, lips, or tongue    breathing problems    changes in blood pressure    feeling faint or lightheaded, falls    flushing, sweating, or hot feelings  Side effects that usually do not require medical attention (report to your doctor or health care professional if they continue or are bothersome):    changes in taste    constipation    dizziness    headache    nausea    pain, redness, or irritation at site where injected    vomiting  What may interact with this medicine?  Do not take this medicine with any of the following medications:    deferoxamine    dimercaprol    other iron products  This medicine may also interact with the following medications:    chloramphenicol    deferasirox  What if I miss a dose?  It is important not to miss your dose. Call your doctor or health care professional if you are unable to keep an appointment.  Where should I keep my medicine?  This drug is given in a hospital or clinic and will not be stored at home.  What should I tell my health care provider before I take this medicine?  They need to know if you have any of these  conditions:    anemia not caused by low iron levels    high levels of iron in the blood    liver disease    an unusual or allergic reaction to iron, other medicines, foods, dyes, or preservatives    pregnant or trying to get pregnant    breast-feeding  What should I watch for while using this medicine?  Visit your doctor or health care professional regularly. Tell your doctor if your symptoms do not start to get better or if they get worse. You may need blood work done while you are taking this medicine.  You may need to follow a special diet. Talk to your doctor. Foods that contain iron include: whole grains/cereals, dried fruits, beans, or peas, leafy green vegetables, and organ meats (liver, kidney).  NOTE:This sheet is a summary. It may not cover all possible information. If you have questions about this medicine, talk to your doctor, pharmacist, or health care provider. Copyright  2017 [employer]

## 2018-03-29 NOTE — NURSING NOTE
"Reason For Visit:   Chief Complaint   Patient presents with     New Patient     Low back pain       Primary MD: Moris Diaz  Ref. MD: Dr. Carbajal     ?  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 No.  Work status?  On disability.  Date of injury: N/A  Date of surgery: N/A  Smoker: No      Resp 16  Ht 1.842 m (6' 0.5\")  Wt 100.7 kg (222 lb)  BMI 29.69 kg/m2    Pain Assessment  Patient Currently in Pain: No    Oswestry (HIRAL) Questionnaire    OSWESTRY DISABILITY INDEX 3/29/2018   Count 7   Sum 7   Oswestry Score (%) 20   Some recent data might be hidden            Neck Disability Index (NDI) Questionnaire    No flowsheet data found.           Visual Analog Pain Scale  Back Pain Scale 0-10: 0  Right leg pain: 0  Left leg pain: 0    Promis 10 Assessment    PROMIS 10 3/29/2018   In general, would you say your health is: Fair   In general, would you say your quality of life is: Very good   In general, how would you rate your physical health? Fair   In general, how would you rate your mental health, including your mood and your ability to think? Very good   In general, how would you rate your satisfaction with your social activities and relationships? Very good   In general, please rate how well you carry out your usual social activities and roles Good   To what extent are you able to carry out your everyday physical activities such as walking, climbing stairs, carrying groceries, or moving a chair? Moderately   How often have you been bothered by emotional problems such as feeling anxious, depressed or irritable? Never   How would you rate your fatigue on average? Severe   How would you rate your pain on average?   0 = No Pain  to  10 = Worst Imaginable Pain 0   In general, would you say your health is: 2   In general, would you say your quality of life is: 4   In general, how would you rate your physical health? 2   In general, how would you rate your mental health, including your mood and " your ability to think? 4   In general, how would you rate your satisfaction with your social activities and relationships? 4   In general, please rate how well you carry out your usual social activities and roles. (This includes activities at home, at work and in your community, and responsibilities as a parent, child, spouse, employee, friend, etc.) 3   To what extent are you able to carry out your everyday physical activities such as walking, climbing stairs, carrying groceries, or moving a chair? 3   In the past 7 days, how often have you been bothered by emotional problems such as feeling anxious, depressed, or irritable? 1   In the past 7 days, how would you rate your fatigue on average? 4   In the past 7 days, how would you rate your pain on average, where 0 means no pain, and 10 means worst imaginable pain? 0   Global Mental Health Score 17   Global Physical Health Score 12   PROMIS TOTAL - SUBSCORES 29   Some recent data might be hidden                Carmel Hunter, ARNOLDO

## 2018-03-29 NOTE — MR AVS SNAPSHOT
After Visit Summary   3/29/2018    Rory Bustamante    MRN: [id number]           Patient Information     Date Of Birth          1955        Visit Information        Provider Department      3/29/2018 1:00 PM Harshal Rucker MD [facility]        Today's Diagnoses     Lumbar stenosis with neurogenic claudication    -  1    Acquired kyphosis        Sagittal plane imbalance        Osteopenia of spine        Current chronic use of systemic steroids           Follow-ups after your visit        Additional Services     PAC Visit Referral (For [facility] Only)       Does this visit require an Anesthesia consult?    Will undergo 3-level laminectomy L2-L5.  (+) multiple medical co-morbidities:  - ESRD s/p kidney transplant 28 yrs ago; per patient, will need another one.  - previous MRI; (+) CAD, On plavix.  - chronic prednisone x 28 yrs      H&P done by:  N/A and PAC      Please be aware that coverage of these services is subject to the terms and limitations of your health insurance plan.  Call member services at your health plan with any benefit or coverage questions.      Please bring the following to your appointment:  >>   Any x-rays, CTs or MRIs which have been performed.  Contact the facility where they were done to arrange for  prior to your scheduled appointment.  Any new CT, MRI or other procedures ordered by your specialist must be performed at a [place] facility or coordinated by your clinic's referral office.    >>   List of current medications  >>   This referral request   >>   Any documents/labs given to you for this referral                  Your next 10 appointments already scheduled     Apr 05, 2018  2:00 PM CDT   Infusion 120 with UC SPEC INFUSION, UC 43 ATC   [facility] ([facility])    [address]  [address]  [place] [address] [phone]            Aug 07, 2018  3:00 PM CDT  "  Lab with  LAB   [facility] ([facility])    [address]  1st Floor  [place] [id number] [phone]            Aug 07, 2018  4:00 PM CDT   (Arrive by 3:30 PM)   Return Visit with Christi Sun MD   [facility] Nephrology ([facility])    [address]  [address]  [place] [address] [phone]              Who to contact     Please call your clinic at [phone] to:    Ask questions about your health    Make or cancel appointments    Discuss your medicines    Learn about your test results    Speak to your doctor            Additional Information About Your Visit        KIWATCH Information     KIWATCH gives you secure access to your electronic health record. If you see a primary care provider, you can also send messages to your care team and make appointments. If you have questions, please call your primary care clinic.  If you do not have a primary care provider, please call [phone] and they will assist you.      KIWATCH is an electronic gateway that provides easy, online access to your medical records. With KIWATCH, you can request a clinic appointment, read your test results, renew a prescription or communicate with your care team.     To access your existing account, please contact your [facility] or call [phone] for assistance.        Care EveryWhere ID     This is your Care EveryWhere ID. This could be used by other organizations to access your [facility] medical records  [id number]        Your Vitals Were     Respirations Height BMI (Body Mass Index)             16 1.842 m (6' 0.5\") 29.69 kg/m2          Blood Pressure from Last 3 Encounters:   03/29/18 132/73   03/20/18 (!) 180/94   03/06/18 151/82    Weight from Last 3 Encounters:   03/29/18 100.7 kg (222 lb)   03/20/18 108.6 kg (239 lb 8 oz)   03/06/18 109.8 kg (242 lb)              We Performed the Following     PAC Visit " Referral (For [facility] Only)     Iram-Operative Worksheet          Today's Medication Changes          These changes are accurate as of 3/29/18 11:59 PM.  If you have any questions, ask your nurse or doctor.               These medicines have changed or have updated prescriptions.        Dose/Directions    allopurinol 100 MG tablet   Commonly known as:  ZYLOPRIM   This may have changed:    - how much to take  - when to take this   Used for:  Kidney replaced by transplant, Gout        Dose:  200 mg   Take 2 tablets by mouth daily.   Quantity:  180 tablet   Refills:  3                Primary Care Provider Office Phone # Fax #    Moris Diaz [phone]       [facility] [address]  [place] [address]        Equal Access to Services     AZEEM JACOBSEN : Hadii av simpson hadasho Sodennis, waaxda luqadaha, qaybta kaalmada adeegyada, devin perez. So [facility] [phone].    ATENCIÓN: Si habla español, tiene a carr disposición servicios gratuitos de asistencia lingüística. Llame al [phone].    We comply with applicable federal civil rights laws and [place] laws. We do not discriminate on the basis of race, color, national origin, age, disability, sex, sexual orientation, or gender identity.            Thank you!     Thank you for choosing [facility]  for your care. Our goal is always to provide you with excellent care. Hearing back from our patients is one way we can continue to improve our services. Please take a few minutes to complete the written survey that you may receive in the mail after your visit with us. Thank you!             Your Updated Medication List - Protect others around you: Learn how to safely use, store and throw away your medicines at www.disposemymeds.org.          This list is accurate as of 3/29/18 11:59 PM.  Always use your most recent med list.                   Brand Name Dispense Instructions for use Diagnosis     allopurinol 100 MG tablet    ZYLOPRIM    180 tablet    Take 2 tablets by mouth daily.    Kidney replaced by transplant, Gout       ALPHAGAN OP      Apply 1 drop to eye 2 times daily        BABY ASPIRIN PO      Take 81 mg by mouth daily        BEVACizumab 400 MG/16ML injection    AVASTIN          cholecalciferol 5000 UNITS Tabs tablet    vitamin D3    30 tablet    Take 1 tablet (5,000 Units) by mouth daily    Hyperparathyroidism due to renal insufficiency (H)       CRESTOR 5 MG tablet   Generic drug:  rosuvastatin      Take 20 mg by mouth daily        * cycloSPORINE modified 100 MG capsule    GENERIC EQUIVALENT    180 capsule    TAKE ONE CAPSULE BY MOUTH TWICE A DAY (TOTAL DOSE 125MG TWICE A DAY)    Kidney transplant recipient, Long-term use of immunosuppressant medication       * cycloSPORINE modified 25 MG capsule    GENERIC EQUIVALENT    180 capsule    TAKE ONE CAPSULE BY MOUTH TWICE A DAY (TOTAL DOSE 125MG TWIC A DAY)    Kidney transplant recipient, Long-term use of immunosuppressant medication       epoetin freya 91642 UNIT/ML injection    PROCRIT    4 mL    Inject 1 mL (10,000 Units) Subcutaneous once a week Please make sure Hgb has been checked within 4 days of dose, use as needed for Hgb<10.  If Hgb increases >1 point in 2 weeks, SYSTOLIC BP > 180 mmHg, OR Hgb >=10 g/dL, HOLD DOSE.  Per anemia protocol with Christi Sun MD.    CKD (chronic kidney disease) stage 4, GFR 15-29 ml/min (H)       fenofibrate 145 MG tablet      Take 145 mg by mouth daily        ferric carboxymaltose 50 MG/ML injection    INJECTAFER    30 mL    Inject 15 mLs (750 mg) into the vein For 2 doses    CKD (chronic kidney disease) stage 4, GFR 15-29 ml/min (H), Anemia in stage 4 chronic kidney disease (H)       ISOSORBIDE MONONITRATE PO      Take 60 mg by mouth daily        loperamide 2 MG capsule    IMODIUM     Take 1 mg by mouth daily        losartan 50 MG tablet    COZAAR    90 tablet    Take 1 tablet (50 mg) by mouth daily    Kidney  replaced by transplant       metoprolol tartrate 100 MG tablet    LOPRESSOR    60 tablet    Take 1 tablet by mouth 2 times daily.    Unspecified hypertensive kidney disease with chronic kidney disease stage I through stage IV, or unspecified(403.90), Kidney replaced by transplant       NIFEdipine ER 90 MG Tb24    ADALAT CC     Take 90 mg by mouth daily        NITROSTAT 0.4 MG sublingual tablet   Generic drug:  nitroGLYcerin      Place 0.4 mg under the tongue every 5 minutes as needed.        NONFORMULARY      2 times daily Focus Macula Pro:  Eye vitamin and mineral supplement A, C, E, Zinc, Copper        omega-3 acid ethyl esters 1 G capsule    Lovaza     Take 2 g by mouth        PLAVIX 75 MG tablet   Generic drug:  clopidogrel      Take 75 mg by mouth daily.        predniSONE 5 MG tablet    DELTASONE     Take 5 mg by mouth daily.        probenecid 500 MG tablet    BENEMID     Take 500 mg by mouth 2 times daily.        sulfamethoxazole-trimethoprim 400-80 MG per tablet    BACTRIM/SEPTRA     Take 1 tablet by mouth daily Take on every other day        TAMSULOSIN HCL PO      Take 0.4 mg by mouth daily        TYLENOL ARTHRITIS PAIN 650 MG CR tablet   Generic drug:  acetaminophen      Take 2 tablets by mouth 3 times daily.        * Notice:  This list has 2 medication(s) that are the same as other medications prescribed for you. Read the directions carefully, and ask your doctor or other care provider to review them with you.

## 2018-03-30 ENCOUNTER — TELEPHONE (OUTPATIENT)
Dept: PHARMACY | Facility: CLINIC | Age: 63
End: 2018-03-30

## 2018-03-30 NOTE — TELEPHONE ENCOUNTER
Anemia Management Note  SUBJECTIVE/OBJECTIVE:  Referred by Dr. Christi Sun on 2018  Primary Diagnosis: Anemia in Chronic Kidney Disease (N18.3, D63.1)     Secondary Diagnosis:  Chronic Kidney Disease, Stage 3 (N18.3)   Hgb goal range:  9-10  Epo/Darbo: Procrit  10,000 units  once weekly for Hgb < 10  In clinic  RX/TX plans : 2019  Iron regimen:  Ferrous Sulfate 325mg QD  Labs : 2019  Recent MARY LOU use, transfusion, IV iron: IV iron scheduled 2018      No history of stroke, MI and blood clots, has HX cancer, stent for CAD   Contact:                      Ok to leave message regarding Medical/Scheduling and billing on cell per consent to communicate dated 2014                                      OK to speak with wife Flavia regarding Medical per consent to communicate dated 2014  Anemia Latest Ref Rng & Units 2018 2018 3/6/2018 3/13/2018 3/20/2018 3/27/2018 3/29/2018   MARY LOU Dose - 10,000 units 10,000 units 10,000 units 10,000 units - 10,000 units -   Hemoglobin 13.3 - 17.7 g/dL 8.8(A) 8.3(A) 7.9(A) 8.9(A) 9.0(A) - -   IV Iron Dose - - - - - - - 750mg   TSAT % - - 22 - - - -   Ferritin ng/mL - - 114 - - - -       BP Readings from Last 3 Encounters:   18 132/73   18 (!) 180/94   18 151/82     Wt Readings from Last 2 Encounters:   18 222 lb (100.7 kg)   18 239 lb 8 oz (108.6 kg)       ASSESSMENT:  Hgb: not at goal - no changes to current regimen  TSat: Due for iron studies 4 weeks after last IV iron infusion Ferritin: Due for iron studies 4 weeks after last IV iron infusion    PLAN:  RTC for hgb then procrit if needed in 1 week(s)    Orders needed to be renewed (for next follow-up date) in LETTERS - for external labs: None    Iron labs due:  4 weeks after second IV iron    Plan discussed with:  No calls made  Plan provided by:      NEXT FOLLOW-UP DATE:  2018    Anemia Management Service  Grecia Carroll PharmD and Michelle  Sheri Ramírez  Phone: [phone]  Fax: [phone]

## 2018-04-01 LAB
CYCLOSPORINE BLD LC/MS/MS-MCNC: 71 UG/L (ref 50–400)
TME LAST DOSE: NORMAL H

## 2018-04-04 LAB
FERRITIN SERPL-MCNC: 852 NG/ML
HCT VFR BLD AUTO: 23.1 %
HEMOGLOBIN: 7.6 G/DL (ref 13.3–17.7)
IRON BINDING CAP: 397
IRON SATURATION INDEX: 33 %
IRON: 130 UG/DL

## 2018-04-05 ENCOUNTER — MEDICAL CORRESPONDENCE (OUTPATIENT)
Dept: HEALTH INFORMATION MANAGEMENT | Facility: CLINIC | Age: 63
End: 2018-04-05

## 2018-04-05 ENCOUNTER — TELEPHONE (OUTPATIENT)
Dept: PHARMACY | Facility: CLINIC | Age: 63
End: 2018-04-05

## 2018-04-05 ENCOUNTER — TELEPHONE (OUTPATIENT)
Dept: ORTHOPEDICS | Facility: CLINIC | Age: 63
End: 2018-04-05

## 2018-04-05 ENCOUNTER — RADIANT APPOINTMENT (OUTPATIENT)
Dept: BONE DENSITY | Facility: CLINIC | Age: 63
End: 2018-04-05
Attending: ORTHOPAEDIC SURGERY
Payer: MEDICARE

## 2018-04-05 ENCOUNTER — TRANSFERRED RECORDS (OUTPATIENT)
Dept: HEALTH INFORMATION MANAGEMENT | Facility: CLINIC | Age: 63
End: 2018-04-05

## 2018-04-05 ENCOUNTER — INFUSION THERAPY VISIT (OUTPATIENT)
Dept: INFUSION THERAPY | Facility: CLINIC | Age: 63
End: 2018-04-05
Attending: INTERNAL MEDICINE
Payer: MEDICARE

## 2018-04-05 VITALS
SYSTOLIC BLOOD PRESSURE: 113 MMHG | OXYGEN SATURATION: 97 % | RESPIRATION RATE: 18 BRPM | DIASTOLIC BLOOD PRESSURE: 58 MMHG | TEMPERATURE: 97.6 F

## 2018-04-05 DIAGNOSIS — D63.1 ANEMIA IN STAGE 4 CHRONIC KIDNEY DISEASE (H): Primary | ICD-10-CM

## 2018-04-05 DIAGNOSIS — N18.4 CKD (CHRONIC KIDNEY DISEASE) STAGE 4, GFR 15-29 ML/MIN (H): ICD-10-CM

## 2018-04-05 DIAGNOSIS — N18.4 ANEMIA IN STAGE 4 CHRONIC KIDNEY DISEASE (H): Primary | ICD-10-CM

## 2018-04-05 DIAGNOSIS — Z79.52 CURRENT CHRONIC USE OF SYSTEMIC STEROIDS: ICD-10-CM

## 2018-04-05 PROCEDURE — 25000128 H RX IP 250 OP 636: Mod: ZF | Performed by: INTERNAL MEDICINE

## 2018-04-05 PROCEDURE — 96374 THER/PROPH/DIAG INJ IV PUSH: CPT

## 2018-04-05 RX ADMIN — FERRIC CARBOXYMALTOSE INJECTION 750 MG: 50 INJECTION, SOLUTION INTRAVENOUS at 14:15

## 2018-04-05 NOTE — PROGRESS NOTES
Nursing Note  [facility]ustamante [facility]:   Chief Complaint   Patient presents with     Infusion     injectafer     During [facility]'s [facility] appointment, orders from  Dr. diaz were completed.  Frequency: today is dose 2 of 2 total.    Progress note:  Patient identification verified by name and date of birth.  Assessment completed.  Vitals recorded in Doc Flowsheets.  Patient was provided with education regarding infusion and possible side effects.  Patient verbalized understanding.      needed: No  Premedications: were not ordered.  Infusion Rates: infusion given over approximately 7.5 minutes.  Labs: were not ordered for this appointment.  Vascular access: peripheral IV placed today.  Treatment Conditions: patient monitored for 30 minutes after medication was infused.  Patient tolerated infusion: well.    Discharge Plan:   Follow up plan of care with: ongoing infusions at [facility].  Discharge instructions were reviewed with patient.  Patient/representative verbalized understanding of discharge instructions and all questions answered.  [facility] discharged [facility] in stable condition.    Flavia Zhou RN    Administrations This Visit     ferric carboxymaltose (INJECTAFER) 750 mg intermittent infusion     Admin Date Action Dose Route Administered By             04/05/2018 New Bag 750 mg Intravenous Flavia Zhou RN                               /58  Temp 97.6  F (36.4  C) (Oral)  Resp 18  SpO2 97%

## 2018-04-05 NOTE — TELEPHONE ENCOUNTER
Called patient to schedule surgery with Dr Rucker, gave patient my number [phone] to call back to schedule.

## 2018-04-05 NOTE — TELEPHONE ENCOUNTER
Anemia Management Note  SUBJECTIVE/OBJECTIVE:  Referred by Dr. Christi Sun on 2018  Primary Diagnosis: Anemia in Chronic Kidney Disease (N18.3, D63.1)     Secondary Diagnosis:  Chronic Kidney Disease, Stage 3 (N18.3)   Hgb goal range:  9-10  Epo/Darbo: Procrit  10,000 units  once weekly for Hgb < 10  In clinic  RX/TX plans : 2019  Iron regimen:  Ferrous Sulfate 325mg QD  Labs : 2019  Recent MARY LOU use, transfusion, IV iron: IV iron scheduled 2018      No history of stroke, MI and blood clots, has HX cancer, stent for CAD   Contact:                      Ok to leave message regarding Medical/Scheduling and billing on cell per consent to communicate dated 2014                                      OK to speak with wife Flavia regarding Medical per consent to communicate dated 2014    Anemia Latest Ref Rng & Units 3/6/2018 3/13/2018 3/20/2018 3/27/2018 3/29/2018 2018 2018   MARY LOU Dose - 10,000 units 10,000 units - 10,000 units - 10,000 units -   Hemoglobin 13.3 - 17.7 g/dL 7.9(A) 8.9(A) 9.0(A) - - 7.6(A) -   IV Iron Dose - - - - - 750mg - 750mg   TSAT % 22 - - - - 33 -   Ferritin ng/mL 114 - - - - 852 -       BP Readings from Last 3 Encounters:   18 132/73   18 (!) 180/94   18 151/82     Wt Readings from Last 2 Encounters:   18 222 lb (100.7 kg)   18 239 lb 8 oz (108.6 kg)       Received and documented outside lab results drawn on 18  Second IV Injectafer infusion is scheduled for 18 at 2:00 pm  Verified with Angelia at the clinic ([phone]) that the procrit dose was given on   States feeling a little better since IV iron.  Feeling a little light headed, BP has been fine, but pulse monitor erratic on BP machine.  Had evaluation at heart clinic, doing a 1 week monitor.      ASSESSMENT:  Hgb: Not at goal  - received procrit dose in clinic and continue current regimen  TSat: at goal >30% Ferritin: At goal  (>100ng/mL) - labs were drawn too soon after IV iron infusion    PLAN:  Dosed with procrit and RTC for hgb then procrit if needed in 1 week    Orders needed to be renewed (for next follow-up date) in LETTERS - for external labs: None    Iron labs due:  5/3/18    Plan discussed with:  Rory  Plan provided by:  Devang  Reviewed 04/10/2018 DENISE  NEXT FOLLOW-UP DATE:  4/11/18    Anemia Management Service  Grecia Carroll PharmD and Michelle Ramírez CPhT  Phone: [phone]  Fax: [phone]

## 2018-04-05 NOTE — MR AVS SNAPSHOT
After Visit Summary   4/5/2018    Rory Bustamante    MRN: [id number]           Patient Information     Date Of Birth          1955        Visit Information        Provider Department      4/5/2018 2:00 PM UC 43 ATC; UC SPEC INFUSION [facility] Specialty and Procedure        Today's Diagnoses     Anemia in stage 4 chronic kidney disease (H)    -  1    CKD (chronic kidney disease) stage 4, GFR 15-29 ml/min (H)           Follow-ups after your visit        Your next 10 appointments already scheduled     Apr 16, 2018  1:30 PM CDT   (Arrive by 1:15 PM)   New Patient Visit with Shaun Diaz MD   [facility] ([facility])    [address]  [address]  [place] [id number] [phone]            Aug 07, 2018  3:00 PM CDT   Lab with UC LAB   [facility] ([facility])    [address]  1st Floor  [place] [id number] [phone]            Aug 07, 2018  4:00 PM CDT   (Arrive by 3:30 PM)   Return Visit with Christi Sun MD   [facility] Neph[facility] ([facility])    [address]  [address]  [place] [id number] [phone]              Who to contact     If you have questions or need follow up information about today's clinic visit or your schedule please contact [facility] SPECIALTY AND PROCEDURE directly at [phone].  Normal or non-critical lab and imaging results will be communicated to you by MyChart, letter or phone within 4 business days after the clinic has received the results. If you do not hear from us within 7 days, please contact the clinic through MyChart or phone. If you have a critical or abnormal lab result, we will notify you by phone as soon as possible.  Submit refill requests through [employer] or call your pharmacy and they will forward the refill request to us. Please allow 3 business days for your refill  to be completed.          Additional Information About Your Visit        bewarkethart Information     "[employer] gives you secure access to your electronic health record. If you see a primary care provider, you can also send messages to your care team and make appointments. If you have questions, please call your primary care clinic.  If you do not have a primary care provider, please call [phone] and they will assist you.        Care EveryWhere ID     This is your Care EveryWhere ID. This could be used by other organizations to access your [place] medical records  [id number]        Your Vitals Were     Temperature Respirations Pulse Oximetry             97.6  F (36.4  C) (Oral) 18 97%          Blood Pressure from Last 3 Encounters:   04/05/18 113/58   03/29/18 132/73   03/20/18 (!) 180/94    Weight from Last 3 Encounters:   03/29/18 100.7 kg (222 lb)   03/20/18 108.6 kg (239 lb 8 oz)   03/06/18 109.8 kg (242 lb)              Today, you had the following     No orders found for display         Today's Medication Changes          These changes are accurate as of 4/5/18  4:38 PM.  If you have any questions, ask your nurse or doctor.               These medicines have changed or have updated prescriptions.        Dose/Directions    allopurinol 100 MG tablet   Commonly known as:  ZYLOPRIM   This may have changed:    - how much to take  - when to take this   Used for:  Kidney replaced by transplant, Gout        Dose:  200 mg   Take 2 tablets by mouth daily.   Quantity:  180 tablet   Refills:  3                Primary Care Provider Office Phone # Fax #    Moris Diaz [phone]       [facility] [address]  [facility] [place] [address]        Equal Access to Services     [facility]: Hadii av Doss, waleonora montoya, qaybta devin hudson. So [facility] [phone].    ATENCIÓN: Si habla español, tiene a carr disposición servicios  sid de asistencia lingüística. Larissa boogie [phone].    We comply with applicable federal civil rights laws and [place] laws. We do not discriminate on the basis of race, color, national origin, age, disability, sex, sexual orientation, or gender identity.            Thank you!     Thank you for choosing [facility] SPECIALTY AND PROCEDURE  for your care. Our goal is always to provide you with excellent care. Hearing back from our patients is one way we can continue to improve our services. Please take a few minutes to complete the written survey that you may receive in the mail after your visit with us. Thank you!             Your Updated Medication List - Protect others around you: Learn how to safely use, store and throw away your medicines at www.disposemymeds.org.          This list is accurate as of 4/5/18  4:38 PM.  Always use your most recent med list.                   Brand Name Dispense Instructions for use Diagnosis    allopurinol 100 MG tablet    ZYLOPRIM    180 tablet    Take 2 tablets by mouth daily.    Kidney replaced by transplant, Gout       ALPHAGAN OP      Apply 1 drop to eye 2 times daily        BABY ASPIRIN PO      Take 81 mg by mouth daily        BEVACizumab 400 MG/16ML injection    AVASTIN          cholecalciferol 5000 UNITS Tabs tablet    vitamin D3    30 tablet    Take 1 tablet (5,000 Units) by mouth daily    Hyperparathyroidism due to renal insufficiency (H)       CRESTOR 5 MG tablet   Generic drug:  rosuvastatin      Take 20 mg by mouth daily        * cycloSPORINE modified 100 MG capsule    GENERIC EQUIVALENT    180 capsule    TAKE ONE CAPSULE BY MOUTH TWICE A DAY (TOTAL DOSE 125MG TWICE A DAY)    Kidney transplant recipient, Long-term use of immunosuppressant medication       * cycloSPORINE modified 25 MG capsule    GENERIC EQUIVALENT    180 capsule    TAKE ONE CAPSULE BY MOUTH TWICE A DAY (TOTAL DOSE 125MG TWIC A DAY)    Kidney transplant recipient,  Long-term use of immunosuppressant medication       epoetin freya 46658 UNIT/ML injection    PROCRIT    4 mL    Inject 1 mL (10,000 Units) Subcutaneous once a week Please make sure Hgb has been checked within 4 days of dose, use as needed for Hgb<10.  If Hgb increases >1 point in 2 weeks, SYSTOLIC BP > 180 mmHg, OR Hgb >=10 g/dL, HOLD DOSE.  Per anemia protocol with Christi Sun MD.    CKD (chronic kidney disease) stage 4, GFR 15-29 ml/min (H)       fenofibrate 145 MG tablet      Take 145 mg by mouth daily        ferric carboxymaltose 50 MG/ML injection    INJECTAFER    30 mL    Inject 15 mLs (750 mg) into the vein For 2 doses    CKD (chronic kidney disease) stage 4, GFR 15-29 ml/min (H), Anemia in stage 4 chronic kidney disease (H)       ISOSORBIDE MONONITRATE PO      Take 60 mg by mouth daily        loperamide 2 MG capsule    IMODIUM     Take 1 mg by mouth daily        losartan 50 MG tablet    COZAAR    90 tablet    Take 1 tablet (50 mg) by mouth daily    Kidney replaced by transplant       metoprolol tartrate 100 MG tablet    LOPRESSOR    60 tablet    Take 1 tablet by mouth 2 times daily.    Unspecified hypertensive kidney disease with chronic kidney disease stage I through stage IV, or unspecified(403.90), Kidney replaced by transplant       NIFEdipine ER 90 MG Tb24    ADALAT CC     Take 90 mg by mouth daily        NITROSTAT 0.4 MG sublingual tablet   Generic drug:  nitroGLYcerin      Place 0.4 mg under the tongue every 5 minutes as needed.        NONFORMULARY      2 times daily Focus Macula Pro:  Eye vitamin and mineral supplement A, C, E, Zinc, Copper        omega-3 acid ethyl esters 1 G capsule    Lovaza     Take 2 g by mouth        PLAVIX 75 MG tablet   Generic drug:  clopidogrel      Take 75 mg by mouth daily.        predniSONE 5 MG tablet    DELTASONE     Take 5 mg by mouth daily.        probenecid 500 MG tablet    BENEMID     Take 500 mg by mouth 2 times daily.        sulfamethoxazole-trimethoprim 400-80  MG per tablet    BACTRIM/SEPTRA     Take 1 tablet by mouth daily Take on every other day        TAMSULOSIN HCL PO      Take 0.4 mg by mouth daily        TYLENOL ARTHRITIS PAIN 650 MG CR tablet   Generic drug:  acetaminophen      Take 2 tablets by mouth 3 times daily.        * Notice:  This list has 2 medication(s) that are the same as other medications prescribed for you. Read the directions carefully, and ask your doctor or other care provider to review them with you.

## 2018-04-10 ENCOUNTER — CARE COORDINATION (OUTPATIENT)
Dept: SURGERY | Facility: CLINIC | Age: 63
End: 2018-04-10

## 2018-04-10 NOTE — PROGRESS NOTES
Surgical Oncology/Hepatobiliary Surgery Referral      Referring provider: Dr. Riggs - [facility] - GI      Referred to: Dr. Diaz - Surgical oncology - Hepatobiliary Surgeon      Referral Received: 4/5/2018      Evaluation for: Pancreas cysts       Oncology provider (if applicable): NA     Clinical History (per RN review of records provided):      - Hx of kidney transplant.     - Patient was admitted at an outside facility for acute pancreatitis in 02/2018.    - During admission, incidental findings of pancreatic lesions seen on imaging studies.     - EUS completed on 2/28/2018 with Dr. Riggs. Multiple cystic lesion in the pancreas. Full EUS reports scanned in to [facility] for reference.     - Pathology Diagnosis:  Pancreas, cyst, FNA: Rare clusters of mucinous epithelium, cannot exclude a mucinous cystic neoplasm.   Comment: preservation of the cells is poor and limits the evaluation for atypia.     Imaging:     CT A/P without contrast 2/1/18 - Full report scanned in.   Impression:  - Edematous pancreatic head and proximal pancreatic body with adjacent peripancreatic fat stranding consistent with pancreatitis. No evidence for pseudocyst formation at this time.   - 1.5 cm x 1.5 cm low-attenuation area involving the posterior mid pancreatic body best seen on axial images 37-38. This can be better evaluated on follow up exams with pancreatic protocol or MRI on a non emergent basis.   - Right pelvic transplant kidney. 1.2 cm calculus within the right side of the bladder near the insertion of the right renal transplant ureter but no evidence of hydroureteronephrosis.   - 2.4 cm and 2.1 cm possible cyst involving the mid zone of the transplant kidney. Further evaluation with ultrasound on non emergent basis would be helpful to better characterize.       MRI Pancreas 2/15/2018 - Full report scanned in.   Impression:   -2.6 cm lobulated cyst in the body of the pancreas may represent a side branch IPMN of the  pancreas. No evidence of main duct extension.     Staging complete (if applicable): NA.     Patient scheduled for new patient consult as of 4/5/2018.

## 2018-04-11 ENCOUNTER — TELEPHONE (OUTPATIENT)
Dept: PHARMACY | Facility: CLINIC | Age: 63
End: 2018-04-11

## 2018-04-11 LAB
HCT VFR BLD AUTO: 23.8 %
HEMOGLOBIN: 7.9 G/DL (ref 13.3–17.7)

## 2018-04-11 NOTE — TELEPHONE ENCOUNTER
Follow-up with anemia management service:    LM for Rory reminding him that he is due for a Hgb lab and possibly a procrit dose    Anemia Latest Ref Rng & Units 3/6/2018 3/13/2018 3/20/2018 3/27/2018 3/29/2018 2018 2018   MARY LOU Dose - 10,000 units 10,000 units - 10,000 units - 10,000 units -   Hemoglobin 13.3 - 17.7 g/dL 7.9(A) 8.9(A) 9.0(A) - - 7.6(A) -   IV Iron Dose - - - - - 750mg - 750mg   TSAT % 22 - - - - 33 -   Ferritin ng/mL 114 - - - - 852 -       Orders needed to be renewed (for next follow-up date) in EPIC: None   Med order expires: 19   Lab orders : 19    Follow-up call date: 18  Reviewed 2018 MAJ Siddiqi    Anemia Management Service  Grecia Carroll,PharmD and Michelle Ramírez CPhT  Phone: [phone]  Fax: [phone]

## 2018-04-16 ENCOUNTER — OFFICE VISIT (OUTPATIENT)
Dept: SURGERY | Facility: CLINIC | Age: 63
End: 2018-04-16
Attending: SURGERY
Payer: MEDICARE

## 2018-04-16 VITALS
DIASTOLIC BLOOD PRESSURE: 72 MMHG | RESPIRATION RATE: 16 BRPM | HEIGHT: 73 IN | TEMPERATURE: 97 F | OXYGEN SATURATION: 99 % | SYSTOLIC BLOOD PRESSURE: 138 MMHG | HEART RATE: 73 BPM | WEIGHT: 240.6 LBS | BODY MASS INDEX: 31.89 KG/M2

## 2018-04-16 DIAGNOSIS — D49.0 IPMN (INTRADUCTAL PAPILLARY MUCINOUS NEOPLASM): Primary | ICD-10-CM

## 2018-04-16 PROCEDURE — G0463 HOSPITAL OUTPT CLINIC VISIT: HCPCS | Mod: ZF

## 2018-04-16 ASSESSMENT — PAIN SCALES - GENERAL: PAINLEVEL: NO PAIN (0)

## 2018-04-16 NOTE — MR AVS SNAPSHOT
After Visit Summary   4/16/2018    Rory Bustamante    MRN: [id number]           Patient Information     Date Of Birth          1955        Visit Information        Provider Department      4/16/2018 1:30 PM Shaun Diaz MD [facility]        Today's Diagnoses     IPMN (intraductal papillary mucinous neoplasm)    -  1      Care Instructions    Below is a brief summary of your discussion and care plan from today's visit below.   ______________________________________________________________________    As discussed with Dr. Diaz we will proceed with the following:     - Referral to Dr. Goldy Manning for continue surveillance for IPMN (Pancreas cyst).     - We will arrange a repeat MRI prior to that appointment with Dr. Manning 6 months from now.   ______________________________________________________________________    It was a pleasure seeing you in clinic today - please be in touch if there are any further questions that arise following today's visit.  During business hours, you may reach my Clinic Coordinator at [phone].  For urgent/emergent questions after business hours, you may reach the on-call Surgery Resident by contacting the [facility]  at [phone].    Any benign/non-urgent test results are usually communicated via letter or MyChart message within 1-2 weeks after completion.  Urgent results (those that require a change in the previously-discussed care plan) are usually communicated via a phone call once available from our clinic staff to discuss the results and the next steps in your evaluation.    I recommend signing up for [employer] access if you have not already done so and are comfortable with using a computer.  This allows for online access to your lab results and also helps you communicate efficiently with my clinic should any questions arise in your care.      Sincerely,    Ann Marie Carroll RN  Care Coordinator -   Phone:  [phone]  Fax: [phone]                Follow-ups after your visit        Your next 10 appointments already scheduled     Aug 07, 2018  3:00 PM CDT   Lab with UC LAB   [facility] ([facility])    [address]  1st Floor  [facility] [address] [phone]            Aug 07, 2018  4:00 PM CDT   (Arrive by 3:30 PM)   Return Visit with Christi Sun MD   [facility] ([facility])    [address]  [address]  [place] [facility] [address] [phone]              Future tests that were ordered for you today     Open Future Orders        Priority Expected Expires Ordered    MR Abdomen MRCP w/o & w Contrast Routine  4/16/2019 4/16/2018            Who to contact     If you have questions or need follow up information about today's clinic visit or your schedule please contact [facility] directly at [phone].  Normal or non-critical lab and imaging results will be communicated to you by [employer], letter or phone within 4 business days after the clinic has received the results. If you do not hear from us within 7 days, please contact the clinic through [employer] or phone. If you have a critical or abnormal lab result, we will notify you by phone as soon as possible.  Submit refill requests through [email or web address] or call your pharmacy and they will forward the refill request to us. Please allow 3 business days for your refill to be completed.          Additional Information About Your Visit        [email or web address] Information     [email or web address] gives you secure access to your electronic health record. If you see a primary care provider, you can also send messages to your care team and make appointments. If you have questions, please call your primary care clinic.  If you do not have a primary care provider, please call [phone] and they will assist you.        Care EveryWhere ID     This is your Care EveryWhere ID. This could be used by  "other organizations to access your [facility] medical records  [id number]        Your Vitals Were     Pulse Temperature Respirations Height Pulse Oximetry BMI (Body Mass Index)    73 97  F (36.1  C) (Oral) 16 1.842 m (6' 0.52\") 99% 32.16 kg/m2       Blood Pressure from Last 3 Encounters:   04/16/18 138/72   04/05/18 113/58   03/29/18 132/73    Weight from Last 3 Encounters:   04/16/18 109.1 kg (240 lb 9.6 oz)   03/29/18 100.7 kg (222 lb)   03/20/18 108.6 kg (239 lb 8 oz)                 Today's Medication Changes          These changes are accurate as of 4/16/18  1:58 PM.  If you have any questions, ask your nurse or doctor.               These medicines have changed or have updated prescriptions.        Dose/Directions    allopurinol 100 MG tablet   Commonly known as:  ZYLOPRIM   This may have changed:    - how much to take  - when to take this   Used for:  Kidney replaced by transplant, Gout        Dose:  200 mg   Take 2 tablets by mouth daily.   Quantity:  180 tablet   Refills:  3                Primary Care Provider Office Phone # Fax #    Moris Diaz [phone]       [facility] [address]  [facility] [address]        Equal Access to Services     AZEEM JACOBSEN AH: Hadii av ku hadasho Soomaali, waaxda luqadaha, qaybta kaalmada adeegyada, waxay mooin hayterell perez. So [facility] [phone].    ATENCIÓN: Si habla español, tiene a carr disposición servicios gratuitos de asistencia lingüística. Llame al [phone].    We comply with applicable federal civil rights laws and [place] laws. We do not discriminate on the basis of race, color, national origin, age, disability, sex, sexual orientation, or gender identity.            Thank you!     Thank you for choosing [facility]CER [facility]  for your care. Our goal is always to provide you with excellent care. Hearing back from our patients is one way we can continue to improve our services. Please take a few " minutes to complete the written survey that you may receive in the mail after your visit with us. Thank you!             Your Updated Medication List - Protect others around you: Learn how to safely use, store and throw away your medicines at www.disposemymeds.org.          This list is accurate as of 4/16/18  1:58 PM.  Always use your most recent med list.                   Brand Name Dispense Instructions for use Diagnosis    allopurinol 100 MG tablet    ZYLOPRIM    180 tablet    Take 2 tablets by mouth daily.    Kidney replaced by transplant, Gout       ALPHAGAN OP      Apply 1 drop to eye 2 times daily        BABY ASPIRIN PO      Take 81 mg by mouth daily        BEVACizumab 400 MG/16ML injection    AVASTIN          cholecalciferol 5000 units Tabs tablet    vitamin D3    30 tablet    Take 1 tablet (5,000 Units) by mouth daily    Hyperparathyroidism due to renal insufficiency (H)       CRESTOR 5 MG tablet   Generic drug:  rosuvastatin      Take 20 mg by mouth daily        * cycloSPORINE modified 100 MG capsule    GENERIC EQUIVALENT    180 capsule    TAKE ONE CAPSULE BY MOUTH TWICE A DAY (TOTAL DOSE 125MG TWICE A DAY)    Kidney transplant recipient, Long-term use of immunosuppressant medication       * cycloSPORINE modified 25 MG capsule    GENERIC EQUIVALENT    180 capsule    TAKE ONE CAPSULE BY MOUTH TWICE A DAY (TOTAL DOSE 125MG TWIC A DAY)    Kidney transplant recipient, Long-term use of immunosuppressant medication       epoetin freya 34104 UNIT/ML injection    PROCRIT    4 mL    Inject 1 mL (10,000 Units) Subcutaneous once a week Please make sure Hgb has been checked within 4 days of dose, use as needed for Hgb<10.  If Hgb increases >1 point in 2 weeks, SYSTOLIC BP > 180 mmHg, OR Hgb >=10 g/dL, HOLD DOSE.  Per anemia protocol with Christi Sun MD.    CKD (chronic kidney disease) stage 4, GFR 15-29 ml/min (H)       fenofibrate 145 MG tablet      Take 145 mg by mouth daily        ferric carboxymaltose 50 MG/ML  injection    INJECTAFER    30 mL    Inject 15 mLs (750 mg) into the vein For 2 doses    CKD (chronic kidney disease) stage 4, GFR 15-29 ml/min (H), Anemia in stage 4 chronic kidney disease (H)       ISOSORBIDE MONONITRATE PO      Take 60 mg by mouth daily        loperamide 2 MG capsule    IMODIUM     Take 1 mg by mouth daily        losartan 50 MG tablet    COZAAR    90 tablet    Take 1 tablet (50 mg) by mouth daily    Kidney replaced by transplant       metoprolol tartrate 100 MG tablet    LOPRESSOR    60 tablet    Take 1 tablet by mouth 2 times daily.    Unspecified hypertensive kidney disease with chronic kidney disease stage I through stage IV, or unspecified(403.90), Kidney replaced by transplant       NIFEdipine ER 90 MG Tb24    ADALAT CC     Take 90 mg by mouth daily        NITROSTAT 0.4 MG sublingual tablet   Generic drug:  nitroGLYcerin      Place 0.4 mg under the tongue every 5 minutes as needed.        NONFORMULARY      2 times daily Focus Macula Pro:  Eye vitamin and mineral supplement A, C, E, Zinc, Copper        omega-3 acid ethyl esters 1 g capsule    Lovaza     Take 2 g by mouth        PLAVIX 75 MG tablet   Generic drug:  clopidogrel      Take 75 mg by mouth daily.        predniSONE 5 MG tablet    DELTASONE     Take 5 mg by mouth daily.        probenecid 500 MG tablet    BENEMID     Take 500 mg by mouth 2 times daily.        sulfamethoxazole-trimethoprim 400-80 MG per tablet    BACTRIM/SEPTRA     Take 1 tablet by mouth daily Take on every other day        TAMSULOSIN HCL PO      Take 0.4 mg by mouth daily        TYLENOL ARTHRITIS PAIN 650 MG CR tablet   Generic drug:  acetaminophen      Take 2 tablets by mouth 3 times daily.        * Notice:  This list has 2 medication(s) that are the same as other medications prescribed for you. Read the directions carefully, and ask your doctor or other care provider to review them with you.

## 2018-04-16 NOTE — NURSING NOTE
"Oncology Rooming Note    April 16, 2018 1:33 PM   Rory Bustamante is a 62 year old male who presents for:    Chief Complaint   Patient presents with     Oncology Clinic Visit     New-Pancreatic mass      Initial Vitals: /72 (BP Location: Right arm, Patient Position: Chair, Cuff Size: Adult Regular)  Pulse 73  Temp 97  F (36.1  C) (Oral)  Resp 16  Ht 1.842 m (6' 0.52\")  Wt 109.1 kg (240 lb 9.6 oz)  SpO2 99%  BMI 32.16 kg/m2 Estimated body mass index is 32.16 kg/(m^2) as calculated from the following:    Height as of this encounter: 1.842 m (6' 0.52\").    Weight as of this encounter: 109.1 kg (240 lb 9.6 oz). Body surface area is 2.36 meters squared.  No Pain (0) Comment: Data Unavailable   No LMP for male patient.  Allergies reviewed: YES  Medications reviewed: YES    Medications: Medication refills not needed today.  Pharmacy name entered into [facility]: [facility] - [address]    Clinical concerns: no new concerns.  Pt received flu shot elsewhere. See Immunizations     6 minutes for nursing intake (face to face time)     Kiersten Jarrell CMA                "

## 2018-04-16 NOTE — PATIENT INSTRUCTIONS
Below is a brief summary of your discussion and care plan from today's visit below.   ______________________________________________________________________    As discussed with Dr. Diaz we will proceed with the following:     - Referral to Dr. Goldy Manning for continue surveillance for IPMN (Pancreas cyst).     - We will arrange a repeat MRI prior to that appointment with Dr. Manning 6 months from now.   ______________________________________________________________________    It was a pleasure seeing you in clinic today - please be in touch if there are any further questions that arise following today's visit.  During business hours, you may reach my Clinic Coordinator at [phone].  For urgent/emergent questions after business hours, you may reach the on-call Surgery Resident by contacting the [facility]  at [phone].    Any benign/non-urgent test results are usually communicated via letter or [employer] message within 1-2 weeks after completion.  Urgent results (those that require a change in the previously-discussed care plan) are usually communicated via a phone call once available from our clinic staff to discuss the results and the next steps in your evaluation.    I recommend signing up for [employer] access if you have not already done so and are comfortable with using a computer.  This allows for online access to your lab results and also helps you communicate efficiently with my clinic should any questions arise in your care.      Sincerely,    Ann Marie Carroll RN  Care Coordinator -   Phone: [phone]  Fax: [phone]

## 2018-04-16 NOTE — LETTER
4/16/2018       RE: Rory Bustamante  [address]  [facility] [address]     Dear Colleague,    Thank you for referring your patient, Rory Bustamante, to the [facility]. Please see a copy of my visit note below.    SURGERY CONSULT NOTE    CHIEF COMPLAINT:   I saw Mr. Bustamante at Dr. 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 An EUS was completed on 2/28/18. Atleast 3 anechoic cystic lesions were found, one with communication to the pancreatic duct. FNA showed elevated CEA and amylase suggestive of IPMN. Unfortunately due to labeling error, it is unknown which sampled lesion contained the elevated CEA/amylase levels. He is doing well at home. He denies symptoms.   Past Medical History:   Diagnosis Date     Angina effort (H) 8/2016     Arthritis      BPH (benign prostatic hyperplasia)      CAD (coronary artery disease) July 2011    MI in July 2011, stent placed, on plavix and aspirin     Glaucoma      Gout     Uric acid as high as 11 previously, now on allopurinol and probenecid     HTN (hypertension)      Hypercholesteremia 2015     Hypertriglyceridemia 2015     Intraocular bleeding     previously treated [facility] Avastin     Macular degeneration      Neuropathy     Limited sensation bilateral knees to feet     Presence of stent in coronary artery August 2012     Proteinuria 11/2015    recurrent MPGN on biopsy     S/P kidney transplant     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  HLA identical transplant.  Biopsy in 4/2008 with recurrent MPGN type 2, mild interstitial fibrosis and tubular atrophy, CNI toxicity     Spinal stenosis 2013     Warts        Past Surgical History:   Procedure Laterality Date     C  "TOTAL KNEE ARTHROPLASTY  2015     TONSILLECTOMY       TRANSPLANT KIDNEY RECIPIENT LIVING RELATED  1989    HLA identical     VASECTOMY          reports that he has never smoked. He has never used smokeless tobacco. He reports that he does not drink alcohol or use illicit drugs.    FMH: family history includes CEREBROVASCULAR DISEASE in his brother; Cardiac Sudden Death (age of onset: 87) in his sister; Colon Cancer (age of onset: 78) in his mother; DIABETES in his brother and mother.  Allergies: NKA   Meds: Reviewed. On cyclsporin    Physical examination  /72 (BP Location: Right arm, Patient Position: Chair, Cuff Size: Adult Regular)  Pulse 73  Temp 97  F (36.1  C) (Oral)  Resp 16  Ht 1.842 m (6' 0.52\")  Wt 109.1 kg (240 lb 9.6 oz)  SpO2 99%  BMI 32.16 kg/m2  Body mass index is 32.16 kg/(m^2).  Deferred   Tests    MRI reviewed 3 cystic lesions noted at the body/tail of the pancreas, largest is 2.6 cm. No ductal dilation, mural nodules.    ASSESSMENT/PLAN  62 year old man with side branch IPMN, asymptomatic. No worrisome features on imaging ( size > 3 cm, mural nodule, pancreatic duct dilation, heterogeneous morphology). From a pancreas standpoint, there is no contraindication to listing for kidney transplant.  Recommend follow up MRI in 6 months to assess for enlargement. Also recommend follow up with Dr. Manning of [facility] at [facility].       I saw this patient and reviewed his films and pertinent findings. I spent 20 minutes with the patient and his wife, all of which was spent counseling.       Again, thank you for allowing me to participate in the care of your patient.      Sincerely,    Shaun Diaz MD      "

## 2018-04-16 NOTE — PROGRESS NOTES
SURGERY CONSULT NOTE    CHIEF COMPLAINT:   I saw Mr. Bustamante at Dr. 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 An EUS was completed on 2/28/18. Atleast 3 anechoic cystic lesions were found, one with communication to the pancreatic duct. FNA showed elevated CEA and amylase suggestive of IPMN. Unfortunately due to labeling error, it is unknown which sampled lesion contained the elevated CEA/amylase levels. He is doing well at home. He denies symptoms.   Past Medical History:   Diagnosis Date     Angina effort (H) 8/2016     Arthritis      BPH (benign prostatic hyperplasia)      CAD (coronary artery disease) July 2011    MI in July 2011, stent placed, on plavix and aspirin     Glaucoma      Gout     Uric acid as high as 11 previously, now on allopurinol and probenecid     HTN (hypertension)      Hypercholesteremia 2015     Hypertriglyceridemia 2015     Intraocular bleeding     previously treated [facility] Avastin     Macular degeneration      Neuropathy     Limited sensation bilateral knees to feet     Presence of stent in coronary artery August 2012     Proteinuria 11/2015    recurrent MPGN on biopsy     S/P kidney transplant     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  HLA identical transplant.  Biopsy in 4/2008 with recurrent MPGN type 2, mild interstitial fibrosis and tubular atrophy, CNI toxicity     Spinal stenosis 2013     Warts        Past Surgical History:   Procedure Laterality Date     C TOTAL KNEE ARTHROPLASTY  2015     TONSILLECTOMY       TRANSPLANT KIDNEY RECIPIENT LIVING RELATED  1989    HLA identical     VASECTOMY          reports that he has never smoked. He has never used smokeless tobacco. He reports that he  "does not drink alcohol or use illicit drugs.    FMH: family history includes CEREBROVASCULAR DISEASE in his brother; Cardiac Sudden Death (age of onset: 87) in his sister; Colon Cancer (age of onset: 78) in his mother; DIABETES in his brother and mother.  Allergies: NKA   Meds: Reviewed. On cyclsporin    Physical examination  /72 (BP Location: Right arm, Patient Position: Chair, Cuff Size: Adult Regular)  Pulse 73  Temp 97  F (36.1  C) (Oral)  Resp 16  Ht 1.842 m (6' 0.52\")  Wt 109.1 kg (240 lb 9.6 oz)  SpO2 99%  BMI 32.16 kg/m2  Body mass index is 32.16 kg/(m^2).  Deferred   Tests    MRI reviewed 3 cystic lesions noted at the body/tail of the pancreas, largest is 2.6 cm. No ductal dilation, mural nodules.    ASSESSMENT/PLAN  62 year old man with side branch IPMN, asymptomatic. No worrisome features on imaging ( size > 3 cm, mural nodule, pancreatic duct dilation, heterogeneous morphology). From a pancreas standpoint, there is no contraindication to listing for kidney transplant.  Recommend follow up MRI in 6 months to assess for enlargement. Also recommend follow up with Dr. Manning of GI at [facility].       I saw this patient and reviewed his films and pertinent findings. I spent 20 minutes with the patient and his wife, all of which was spent counseling.     "

## 2018-04-17 ENCOUNTER — TELEPHONE (OUTPATIENT)
Dept: ORTHOPEDICS | Facility: CLINIC | Age: 63
End: 2018-04-17

## 2018-04-17 NOTE — TELEPHONE ENCOUNTER
[facility] Call Center    Phone Message    May a detailed message be left on voicemail: yes    Reason for Call: Other: Pt requesting PT Order sent to [facility] in [place], Ph: [phone]. Pt don't know the fax. Pt would like to try this a few times before getting surgery.      Action Taken: Message routed to:  [facility] ([facility] Orthopedic

## 2018-04-17 NOTE — TELEPHONE ENCOUNTER
Patient calling back to schedule surgery for Dr Rucker, confirmed 6/11 for surgery, wants to try one other thing first before surgery. Scheduled his PAC appointment on 5/14 at 10.

## 2018-04-18 ENCOUNTER — TELEPHONE (OUTPATIENT)
Dept: PHARMACY | Facility: CLINIC | Age: 63
End: 2018-04-18

## 2018-04-18 LAB
HCT VFR BLD AUTO: 26.3 %
HEMOGLOBIN: 8.8 G/DL (ref 13.3–17.7)

## 2018-04-18 NOTE — TELEPHONE ENCOUNTER
Anemia Management Note  SUBJECTIVE/OBJECTIVE:  Referred by Dr. Christi Sun on 2018  Primary Diagnosis: Anemia in Chronic Kidney Disease (N18.3, D63.1)     Secondary Diagnosis:  Chronic Kidney Disease, Stage 3 (N18.3)   Hgb goal range:  9-10  Epo/Darbo: Procrit  10,000 units  once weekly for Hgb < 10  In clinic  RX/TX plans : 2019  Iron regimen:  Ferrous Sulfate 325mg QD  Labs : 2019  Recent MARY LOU use, transfusion, IV iron: IV iron scheduled 2018      No history of stroke, MI and blood clots, has HX cancer, stent for CAD   Contact:                      Ok to leave message regarding Medical/Scheduling and billing on cell per consent to communicate dated 2014                                      OK to speak with wife Flavia regarding Medical per consent to communicate dated 2014    Anemia Latest Ref Rng & Units 3/13/2018 3/20/2018 3/27/2018 3/29/2018 2018 2018 2018   MARY LOU Dose - 10,000 units - 10,000 units - 10,000 units - -   Hemoglobin 13.3 - 17.7 g/dL 8.9(A) 9.0(A) - - 7.6(A) - 8.8(A)   IV Iron Dose - - - - 750mg - 750mg -   TSAT % - - - - 33 - -   Ferritin ng/mL - - - - 852 - -     BP Readings from Last 3 Encounters:   18 138/72   18 113/58   18 132/73     Wt Readings from Last 2 Encounters:   18 240 lb 9.6 oz (109.1 kg)   18 222 lb (100.7 kg)     Received an documented outside lab results drawn on 18    ASSESSMENT:  Hgb:  Not at goal but rapid rise in hgb (>1pt/2 weeks) - procrit dose was held  TSat: at goal >30% Ferritin: At goal (>100ng/mL)    PLAN:  Held Procrit and RTC for hgb then procrit if needed in 1 week(s)  Referred to Grecia Carroll to determine if rapid increase was due to the IV iron infusion given on  and if the patient should receive the procrit dose this week    Left VM asking Rory to call back, want to assess for S/S blood loss, and follow up on last cardiology visit.  Rapid rise probably due  to IV iron, if already dosed in clinic OK.  04/20/2018 DENISE    Rory returned call- cardiology work up was negative.  Doing pool therapy for back PT, more physically active.  Denied S/S blood loss. 04/20/2018 DENISE    Orders needed to be renewed (for next follow-up date) in EPIC & Letters: None    Iron labs due:  5/3/18    Plan discussed with:  Left VM  Plan provided by:      Judy Ramírez CPhT  Anemia Clinic  [phone]    NEXT FOLLOW-UP DATE:  4/25/18    Anemia Management Service  Grecia Carroll,PharmD and Michelle Ramírez CPhT  Phone: [phone]  Fax: [phone]

## 2018-04-25 ENCOUNTER — TELEPHONE (OUTPATIENT)
Dept: PHARMACY | Facility: CLINIC | Age: 63
End: 2018-04-25

## 2018-04-25 ENCOUNTER — TRANSFERRED RECORDS (OUTPATIENT)
Dept: HEALTH INFORMATION MANAGEMENT | Facility: CLINIC | Age: 63
End: 2018-04-25

## 2018-04-25 LAB
HCT VFR BLD AUTO: 26.9 %
HEMOGLOBIN: 9.1 G/DL (ref 13.3–17.7)

## 2018-04-25 NOTE — TELEPHONE ENCOUNTER
Anemia Management Note  SUBJECTIVE/OBJECTIVE:  Referred by Dr. Christi Sun on 2018  Primary Diagnosis: Anemia in Chronic Kidney Disease (N18.3, D63.1)     Secondary Diagnosis:  Chronic Kidney Disease, Stage 3 (N18.3)   Hgb goal range:  9-10  Epo/Darbo: Procrit  10,000 units  once weekly for Hgb < 10  In clinic  RX/TX plans : 2019  Iron regimen:  Ferrous Sulfate 325mg QD  Labs : 2019  Recent MARY LOU use, transfusion, IV iron: IV iron scheduled 2018      No history of stroke, MI and blood clots, has HX cancer, stent for CAD   Contact:                      Ok to leave message regarding Medical/Scheduling and billing on cell per consent to communicate dated 2014                                      OK to speak with wife Flavia regarding Medical per consent to communicate dated 2014    Anemia Latest Ref Rng & Units 3/27/2018 3/29/2018 2018 2018 2018 2018 2018   MARY LOU Dose - 10,000 units - 10,000 units - - - -   Hemoglobin 13.3 - 17.7 g/dL - - 7.6(A) - 7.9(A) 8.8(A) 9.1(A)   IV Iron Dose - - 750mg - 750mg - - -   TSAT % - - 33 - - - -   Ferritin ng/mL - - 852 - - - -     BP Readings from Last 3 Encounters:   18 138/72   18 113/58   18 132/73     Wt Readings from Last 2 Encounters:   18 240 lb 9.6 oz (109.1 kg)   18 222 lb (100.7 kg)     Received and documented outside lab results drawn on 18    ASSESSMENT:  Hgb: At goal but rapid rise in hgb (>1pt/2 weeks) - Procrit was held per note on faxed results  TSat: Due for iron studies 4 weeks after last IV iron infusion Ferritin: Due for iron studies 4 weeks after last IV iron infusion    PLAN:  Held Procrit and RTC for hgb, ferritin and iron labs then aranesp if needed in 1 week(s)    Orders needed to be renewed (for next follow-up date) in EPIC: None    Iron labs due:  18    Plan discussed with:  Rory  Plan provided by:  Judy Ramírez CPhT  Anemia  [facility]  [phone]  Reviewed 04/26/2018 DENISE  NEXT FOLLOW-UP DATE:  5/2/18    Anemia Management Service  Grecia Carroll PharmD and Michelle Ramírez CPhT  Phone: [phone]  Fax: [phone]

## 2018-04-26 ENCOUNTER — TELEPHONE (OUTPATIENT)
Dept: ORTHOPEDICS | Facility: CLINIC | Age: 63
End: 2018-04-26

## 2018-04-26 NOTE — TELEPHONE ENCOUNTER
I called patient & relayed message below from .   Deanna Stallings RN.    ----- Message from Harshal Rucker MD sent at 4/20/2018  4:07 PM CDT -----  Regarding: RE: DEXA RESULT  Hi Deanna,    Spine and hips DEXA BMD scores are actually quite good.    No intervention needed at this time.  May proceed with scheduled surgery.    Harshal    ----- Message -----     From: Deanna Stallings RN     Sent: 4/18/2018   7:30 PM       To: Harshal Rucker MD  Subject: DEXA RESULT                                      Cachorro Gonzales,  Surgery  scheduled for 6-11-18.  Pt. Called about DEXA RESULT done 4-5-18.  Let me know. Thanks.   Deanna

## 2018-05-01 DIAGNOSIS — Z48.298 AFTERCARE FOLLOWING ORGAN TRANSPLANT: ICD-10-CM

## 2018-05-01 LAB
FERRITIN SERPL-MCNC: 581 NG/ML
HCT VFR BLD AUTO: 27.3 %
HEMOGLOBIN: 9.2 G/DL (ref 13.3–17.7)
IRON BINDING CAP: 352
IRON SATURATION INDEX: 34 %
IRON: 118 UG/DL

## 2018-05-01 PROCEDURE — 80158 DRUG ASSAY CYCLOSPORINE: CPT | Performed by: INTERNAL MEDICINE

## 2018-05-02 ENCOUNTER — TELEPHONE (OUTPATIENT)
Dept: PHARMACY | Facility: CLINIC | Age: 63
End: 2018-05-02

## 2018-05-02 NOTE — TELEPHONE ENCOUNTER
Anemia Management Note  SUBJECTIVE/OBJECTIVE:  Referred by Dr. Christi Sun on 2018  Primary Diagnosis: Anemia in Chronic Kidney Disease (N18.3, D63.1)     Secondary Diagnosis:  Chronic Kidney Disease, Stage 3 (N18.3)   Hgb goal range:  9-10  Epo/Darbo: Procrit  10,000 units  once weekly for Hgb < 10  In clinic  RX/TX plans : 2019  Iron regimen:  Ferrous Sulfate 325mg QD  Labs : 2019  Recent MARY LOU use, transfusion, IV iron: IV iron scheduled 2018    Anemia Latest Ref Rng & Units 2018 2018 2018 2018 2018 2018 5/3/2018   MARY LOU Dose - 10,000 units - - - - - 10,000 units   Hemoglobin 13.3 - 17.7 g/dL 7.6(A) - 7.9(A) 8.8(A) 9.1(A) 9.2(A) error(A)   IV Iron Dose - - 750mg - - - - -   TSAT % 33 - - - - 34 -   Ferritin ng/mL 852 - - - - 581 -           BP Readings from Last 3 Encounters:   18 138/72   18 113/58   18 132/73     Wt Readings from Last 2 Encounters:   18 240 lb 9.6 oz (109.1 kg)   18 222 lb (100.7 kg)       ASSESSMENT:  Hgb:At goal - recommend dose  TSat: at goal >30% Ferritin: At goal (>100ng/mL    PLAN:  Rory will RTC for a procrit dose on 5/3 and RTC for hgb then procrit if needed in 1 week(s)    Orders needed to be renewed (for next follow-up date) in EPIC: None    Iron labs due:  18    Plan discussed with:  Rory  Plan provided by:  Judy Ramírez CPhT  Anemia Clinic  [phone]    NEXT FOLLOW-UP DATE: 5/3 to document dose then 5/10/18  Reviewed 2018 [facility]  Anemia Management Service  Grecia Carroll,PharmD and Michelle Ramírez CPhT  Phone: [phone]  Fax: [phone]

## 2018-05-03 DIAGNOSIS — Z79.60 LONG-TERM USE OF IMMUNOSUPPRESSANT MEDICATION: ICD-10-CM

## 2018-05-03 DIAGNOSIS — Z94.0 KIDNEY TRANSPLANT RECIPIENT: ICD-10-CM

## 2018-05-03 LAB
CYCLOSPORINE BLD LC/MS/MS-MCNC: 44 UG/L (ref 50–400)
HCT VFR BLD AUTO: ABNORMAL %
HEMOGLOBIN: ABNORMAL G/DL (ref 13.3–17.7)
TME LAST DOSE: ABNORMAL H

## 2018-05-03 NOTE — TELEPHONE ENCOUNTER
Cyclosporine level 44, reported as 12-hour level  Goal level 50-75, now 28 years post kidney transplant  On waitlist for kidney retransplantation    PLAN:  Increase cyclosporine level from 125 mg twice daily to 150 mg twice daily  Check cyclosporine level 1-2 weeks after dose change    LPN TASK:  Call with instructions for dose change and blood test

## 2018-05-03 NOTE — LETTER
PHYSICIAN ORDERS      DATE & TIME ISSUED: May 3, 2018 5:02 PM  PATIENT NAME: Rory Bustamante   : 1955     [facility] MR# [if applicable]: [id number]     DIAGNOSIS:  Kidney transplant   ICD-9 CODE: Z94.0      Please repeat cyclosporine level in 1-2 weeks    Any questions please call: [phone] Option #5    Please fax these results to [phone].

## 2018-05-03 NOTE — TELEPHONE ENCOUNTER
Call placed to patient: Patient confirms current dose and verbalize understanding to increase CSA dose to 150 mg BID and repeat level in 1-2 weeks. Order/rx sent

## 2018-05-04 RX ORDER — CYCLOSPORINE 25 MG/1
50 CAPSULE, LIQUID FILLED ORAL 2 TIMES DAILY
Qty: 180 CAPSULE | Refills: 3 | Status: SHIPPED | OUTPATIENT
Start: 2018-05-04 | End: 2019-01-01

## 2018-05-04 RX ORDER — CYCLOSPORINE 100 MG/1
100 CAPSULE, LIQUID FILLED ORAL 2 TIMES DAILY
Qty: 180 CAPSULE | Refills: 3 | Status: SHIPPED | OUTPATIENT
Start: 2018-05-04 | End: 2019-01-01

## 2018-05-08 LAB — HEMOGLOBIN: 9.8 G/DL (ref 13.3–17.7)

## 2018-05-10 ENCOUNTER — TELEPHONE (OUTPATIENT)
Dept: PHARMACY | Facility: CLINIC | Age: 63
End: 2018-05-10

## 2018-05-10 NOTE — TELEPHONE ENCOUNTER
Anemia Management Note  SUBJECTIVE/OBJECTIVE:  Referred by Dr. Christi Sun on 2018  Primary Diagnosis: Anemia in Chronic Kidney Disease (N18.3, D63.1)     Secondary Diagnosis:  Chronic Kidney Disease, Stage 3 (N18.3)   Hgb goal range:  9-10  Epo/Darbo: Procrit  10,000 units  once weekly for Hgb < 10  In clinic  RX/TX plans : 2019  Iron regimen:  Ferrous Sulfate 325mg QD  Labs : 2019  Recent MARY LOU use, transfusion, IV iron: IV iron scheduled 2018    Anemia Latest Ref Rng & Units 2018 2018 2018 2018 2018 5/3/2018 2018   MARY LOU Dose - - - - - - 10,000 units 10,000 units   Hemoglobin 13.3 - 17.7 g/dL - 7.9(A) 8.8(A) 9.1(A) 9.2(A) error(A) 9.8(A)   IV Iron Dose - 750mg - - - - - -   TSAT % - - - - 34 - -   Ferritin ng/mL - - - - 581 - -     BP Readings from Last 3 Encounters:   18 138/72   18 113/58   18 132/73     Wt Readings from Last 2 Encounters:   18 240 lb 9.6 oz (109.1 kg)   18 222 lb (100.7 kg)       I spoke to Rory, he had labs drawn on 18.  Hgb was 9.8 so he did receive a procrit dose    ASSESSMENT:  Hgb:at goal - received dose in clinic - recommend continue current regimen  TSat: at goal >30% Ferritin: At goal (>100ng/mL)    PLAN:  RTC for hgb then procrit if needed in 1 week(s)    Orders needed to be renewed (for next follow-up date) in LETTERS - for external labs: None    Iron labs due:  18    Plan discussed with:  Rory  Plan provided by:  Judy Ramírez [facility]  Anemia Clinic  [phone]    NEXT FOLLOW-UP DATE:  18  Reviewed 2018 [facility]  Anemia Management Service  Mirza LozanoD and Michelle Ramírez CPhT  Phone: [phone]  Fax: [phone]

## 2018-05-14 ENCOUNTER — OFFICE VISIT (OUTPATIENT)
Dept: SURGERY | Facility: CLINIC | Age: 63
End: 2018-05-14
Payer: MEDICARE

## 2018-05-14 ENCOUNTER — ALLIED HEALTH/NURSE VISIT (OUTPATIENT)
Dept: SURGERY | Facility: CLINIC | Age: 63
End: 2018-05-14
Payer: MEDICARE

## 2018-05-14 ENCOUNTER — ANESTHESIA EVENT (OUTPATIENT)
Dept: SURGERY | Facility: CLINIC | Age: 63
DRG: 515 | End: 2018-05-14
Payer: MEDICARE

## 2018-05-14 ENCOUNTER — APPOINTMENT (OUTPATIENT)
Dept: SURGERY | Facility: CLINIC | Age: 63
End: 2018-05-14
Payer: MEDICARE

## 2018-05-14 VITALS
OXYGEN SATURATION: 97 % | HEIGHT: 73 IN | TEMPERATURE: 97.9 F | HEART RATE: 79 BPM | WEIGHT: 231.9 LBS | DIASTOLIC BLOOD PRESSURE: 78 MMHG | SYSTOLIC BLOOD PRESSURE: 171 MMHG | BODY MASS INDEX: 30.73 KG/M2 | RESPIRATION RATE: 18 BRPM

## 2018-05-14 DIAGNOSIS — Z01.818 PREOP EXAMINATION: Primary | ICD-10-CM

## 2018-05-14 DIAGNOSIS — Z94.0 S/P KIDNEY TRANSPLANT: ICD-10-CM

## 2018-05-14 DIAGNOSIS — N18.4 CKD (CHRONIC KIDNEY DISEASE) STAGE 4, GFR 15-29 ML/MIN (H): ICD-10-CM

## 2018-05-14 DIAGNOSIS — M48.062 SPINAL STENOSIS OF LUMBAR REGION WITH NEUROGENIC CLAUDICATION: ICD-10-CM

## 2018-05-14 LAB
ALBUMIN UR-MCNC: 100 MG/DL
APPEARANCE UR: CLEAR
BILIRUB UR QL STRIP: NEGATIVE
COLOR UR AUTO: ABNORMAL
GLUCOSE UR STRIP-MCNC: 50 MG/DL
HCT VFR BLD AUTO: 30.8 % (ref 40–53)
HGB BLD-MCNC: 10.6 G/DL (ref 13.3–17.7)
HGB UR QL STRIP: NEGATIVE
KETONES UR STRIP-MCNC: NEGATIVE MG/DL
LEUKOCYTE ESTERASE UR QL STRIP: NEGATIVE
MUCOUS THREADS #/AREA URNS LPF: PRESENT /LPF
NITRATE UR QL: NEGATIVE
PH UR STRIP: 5 PH (ref 5–7)
RBC #/AREA URNS AUTO: <1 /HPF (ref 0–2)
SOURCE: ABNORMAL
SP GR UR STRIP: 1.01 (ref 1–1.03)
UROBILINOGEN UR STRIP-MCNC: 0 MG/DL (ref 0–2)
VIT B12 SERPL-MCNC: 609 PG/ML (ref 193–986)
WBC #/AREA URNS AUTO: <1 /HPF (ref 0–5)

## 2018-05-14 NOTE — PATIENT INSTRUCTIONS
Preparing for Your Surgery      Name:  Rory Bsutamante   MRN:  [id number]   :  1955   Today's Date:  2018     Arriving for surgery:  Surgery date:  18  Arrival time:  5:30 am  Please come to:     [facility] [address]  [address] [place]  [address]    - parking is available in front of [facility] from 5:15AM to 8:00PM. If you prefer, park your car in the Green Lot.    -Proceed to the 3rd floor, check in at the Adult Surgery Waiting Lounge. [phone]    If an escort is needed stop at the Information Desk in the lobby. Inform the information person that you are here for surgery. An escort to the Adult Surgery Waiting Lounge will be provided.        What can I eat or drink?  -  You may have solid food or milk products until 8 hours prior to your surgery. (11:30 pm)  -  You may have water, apple juice or 7up/Sprite until 2 hours prior to your surgery. (5:30 am)    Which medicines can I take?       -  Stop vitamins and supplements one week prior to surgery.       -  Stop Plavix 5 days before surgery.       -  Follow instructions for holding Aspirin per surgeon and cardiologist.   -  Do NOT take these medications in the morning, the day of surgery: Losartan (Cozaar), Immodium    -  Please take these medications the day of surgery: all other prescription morning medications including eye drops      How do I prepare myself?  -  Take two showers: one the night before surgery; and one the morning of surgery.         Use Scrubcare or Hibiclens to wash from neck down.  You may use your own     shampoo and conditioner. No other hair products.   -  Do NOT use lotion, powder, deodorant, or antiperspirant the day of your surgery.  -  Do NOT wear any makeup, fingernail polish or jewelry.  -  Do not bring your own medications to the hospital, except for inhalers and eye drops.  -  Bring your ID and insurance card.    Questions or Concerns:  If you have  questions or concerns regarding the day of surgery, please call the Preoperative Assessment Center (PAC), Monday-Friday 7AM-7PM:  [phone].  After surgery please call your surgeons office.               AFTER YOUR SURGERY  Breathing exercises   Breathing exercises help you recover faster. Take deep breaths and let the air out slowly. This will:     Help you wake up after surgery.    Help prevent complications like pneumonia.  Preventing complications will help you go home sooner.   We may give you a breathing device (incentive spirometer) to encourage you to breathe deeply.   Nausea and vomiting   You may feel sick to your stomach after surgery; if so, let your nurse know.    Pain control:  After surgery, you may have pain. Our goal is to help you manage your pain. Pain medicine will help you feel comfortable enough to do activities that will help you heal.  These activities may include breathing exercises, walking and physical therapy.   To help your health care team treat your pain we will ask: 1) If you have pain  2) where it is located 3) describe your pain in your words  Methods of pain control include medications given by mouth, vein or by nerve block for some surgeries.  We may give you a pain control pump that will:  1) Deliver the medicine through a tube placed in your vein  2) Control the amount of medicine you receive  3) Allow you to push a button to deliver a dose of pain medicine  Sequential Compression Device (SCD) or Pneumo Boots:  You may need to wear SCD S on your legs or feet. These are wraps connected to a machine that pumps in air and releases it. The repeated pumping helps prevent blood clots from forming.

## 2018-05-14 NOTE — MR AVS SNAPSHOT
After Visit Summary   2018    Rory Bustamante    MRN: [id number]           Patient Information     Date Of Birth          1955        Visit Information        Provider Department      2018 11:00 AM Rn, [facility]        Care Instructions    Preparing for Your Surgery      Name:  Rory Bustamante   MRN:  [id number]   :  1955   Today's Date:  2018     Arriving for surgery:  Surgery date:  18  Arrival time:  5:30 am  Please come to:     [facility] [address]  [address] [place]  [address]    - parking is available in front of [facility] from 5:15AM to 8:00PM. If you prefer, park your car in the Green Lot.    -Proceed to the 3rd floor, check in at the Adult Surgery Waiting Lounge. [phone]    If an escort is needed stop at the Information Desk in the lobby. Inform the information person that you are here for surgery. An escort to the Adult Surgery Waiting Lounge will be provided.        What can I eat or drink?  -  You may have solid food or milk products until 8 hours prior to your surgery. (11:30 pm)  -  You may have water, apple juice or 7up/Sprite until 2 hours prior to your surgery. (5:30 am)    Which medicines can I take?       -  Stop vitamins and supplements one week prior to surgery.       -  Stop Plavix 5 days before surgery.       -  Follow instructions for holding Aspirin per surgeon and cardiologist.   -  Do NOT take these medications in the morning, the day of surgery: Losartan (Cozaar), Immodium    -  Please take these medications the day of surgery: all other prescription morning medications including eye drops      How do I prepare myself?  -  Take two showers: one the night before surgery; and one the morning of surgery.         Use Scrubcare or Hibiclens to wash from neck down.  You may use your own     shampoo and conditioner. No other hair products.   -  Do NOT  use lotion, powder, deodorant, or antiperspirant the day of your surgery.  -  Do NOT wear any makeup, fingernail polish or jewelry.  -  Do not bring your own medications to the hospital, except for inhalers and eye drops.  -  Bring your ID and insurance card.    Questions or Concerns:  If you have questions or concerns regarding the day of surgery, please call the Preoperative Assessment Center (PAC), Monday-Friday 7AM-7PM:  [phone].  After surgery please call your surgeons office.               AFTER YOUR SURGERY  Breathing exercises   Breathing exercises help you recover faster. Take deep breaths and let the air out slowly. This will:     Help you wake up after surgery.    Help prevent complications like pneumonia.  Preventing complications will help you go home sooner.   We may give you a breathing device (incentive spirometer) to encourage you to breathe deeply.   Nausea and vomiting   You may feel sick to your stomach after surgery; if so, let your nurse know.    Pain control:  After surgery, you may have pain. Our goal is to help you manage your pain. Pain medicine will help you feel comfortable enough to do activities that will help you heal.  These activities may include breathing exercises, walking and physical therapy.   To help your health care team treat your pain we will ask: 1) If you have pain  2) where it is located 3) describe your pain in your words  Methods of pain control include medications given by mouth, vein or by nerve block for some surgeries.  We may give you a pain control pump that will:  1) Deliver the medicine through a tube placed in your vein  2) Control the amount of medicine you receive  3) Allow you to push a button to deliver a dose of pain medicine  Sequential Compression Device (SCD) or Pneumo Boots:  You may need to wear SCD S on your legs or feet. These are wraps connected to a machine that pumps in air and releases it. The repeated pumping helps prevent blood clots from  forming.           Follow-ups after your visit        Your next 10 appointments already scheduled     May 14, 2018 11:00 AM CDT   (Arrive by 10:45 AM)   PAC RN ASSESSMENT with Gino Pac Rn   [facility] ([facility])    [address] [place] [address]  [address] [facility] [address]   [phone]            May 14, 2018 11:30 AM CDT   (Arrive by 11:15 AM)   PAC Anesthesia Consult with  Pac Anesthesiologist   [facility] ([facility])    [address] [place] [address]  [address] [facility] [address]   [phone]            Jun 11, 2018   Procedure with Harshal Rucker MD   [facility], Same Day Surgery (--)    [address]  [facility] [address]   [phone]            Jul 24, 2018  9:45 AM CDT   (Arrive by 9:15 AM)   RETURN SPINE with Harshal Rucker MD   [facility] ([facility])    [address] [place] [address]  [address] [facility] [address]   [phone]            Aug 07, 2018  3:00 PM CDT   Lab with GINO LAB   [facility] ([facility])    [address] [place] [id number] [phone]            Aug 07, 2018  4:00 PM CDT   (Arrive by 3:30 PM)   Return Visit with Christi Sun MD   [facility] ([facility])    [address] [place] [address]  [address]  [facility] [address] [phone]            Aug 30, 2018 10:30 AM CDT   (Arrive by 10:00 AM)   RETURN SPINE with Harshal uRcker MD   [facility] ([facility])    [address] [place] [address]   [phone]            Oct 15, 2018  9:00 AM CDT   MR ABDOMEN MRCP W/O & W CONTRAST with UUMR1   [facility], MRI ([facility])    [address] [address] [phone]            Take your medicines as usual, unless your doctor tells you not to. Bring a list of your current medicines to your exam (including vitamins, minerals and over-the-counter drugs). Also bring the results of similar scans you may have had.    You may or may not receive IV contrast for this exam pending the discretion of the Radiologist.   Do not eat or drink for 6 hours prior to exam.  The MRI machine uses a strong magnet. Please wear clothes without metal (snaps, zippers). A sweatsuit works well, or we may give you a hospital gown.  Please remove any body piercings and hair extensions before you arrive. You will also remove watches, jewelry, hairpins, wallets, dentures, partial dental plates and hearing aids. You may wear contact lenses, and you may be able to wear your rings. We have a safe place to keep your personal items, but it is safer to leave them at home.  **IMPORTANT** THE INSTRUCTIONS BELOW ARE ONLY FOR THOSE PATIENTS WHO HAVE BEEN PRESCRIBED SEDATION OR GENERAL ANESTHESIA DURING THEIR MRI PROCEDURE:  IF YOUR DOCTOR PRESCRIBED ORAL SEDATION (take medicine to help you relax during your exam):   You must get the medicine from your doctor (oral medication) before you arrive. Bring the medicine to the exam. Do not take it at home. You ll be told when to take it upon arriving for your exam.   Arrive one hour early. Bring someone who can take you home after the test. Your medicine will make you sleepy. After the exam, you may not drive, take a bus or take a taxi by yourself.  IF YOUR DOCTOR PRESCRIBED IV SEDATION:   Arrive one hour early. Bring someone who can take you home after the test. Your medicine will make you sleepy. After the exam, you may not drive, take a bus or take a taxi by yourself.   No eating 6 hours before your exam. You may have clear liquids up until 4 hours before your exam. (Clear liquids include water, clear tea, black coffee and fruit juice without pulp.)  IF YOUR DOCTOR PRESCRIBED  ANESTHESIA (be asleep for your exam):   Arrive 1 1/2 hours early. Bring someone who can take you home after the test. You may not drive, take a bus or take a taxi by yourself.   No eating 8 hours before your exam. You may have clear liquids up until 4 hours before your exam. (Clear liquids include water, clear tea, black coffee and fruit juice without pulp.)   You will spend four to five hours in the recovery room.  If you have any questions, please contact your Imaging Department exam site.            Oct 15, 2018 10:00 AM CDT   (Arrive by 9:45 AM)   New Patient Visit with Shade Manning MD   [facility] ([facility])    [address]  [address]  [facility] [address] [phone]              Future tests that were ordered for you today     Open Future Orders        Priority Expected Expires Ordered    UA reflex to Microscopic and Culture Routine 5/14/2018 6/13/2018 5/14/2018    ABO/Rh type and screen Routine 5/14/2018 6/13/2018 5/14/2018            Who to contact     Please call your clinic at [phone] to:    Ask questions about your health    Make or cancel appointments    Discuss your medicines    Learn about your test results    Speak to your doctor            Additional Information About Your Visit        [employer] Information     [employer] gives you secure access to your electronic health record. If you see a primary care provider, you can also send messages to your care team and make appointments. If you have questions, please call your primary care clinic.  If you do not have a primary care provider, please call [phone] and they will assist you.      [employer] is an electronic gateway that provides easy, online access to your medical records. With [employer], you can request a clinic appointment, read your test results, renew a prescription or communicate with your care team.     To access your existing account, please contact your [facility]  [facility] or call [phone] for assistance.        Care EveryWhere ID     This is your Care EveryWhere ID. This could be used by other organizations to access your [facility] medical records  [id number]         Blood Pressure from Last 3 Encounters:   05/14/18 171/78   04/16/18 138/72   04/05/18 113/58    Weight from Last 3 Encounters:   05/14/18 105.2 kg (231 lb 14.4 oz)   04/16/18 109.1 kg (240 lb 9.6 oz)   03/29/18 100.7 kg (222 lb)              Today, you had the following     No orders found for display         Today's Medication Changes          These changes are accurate as of 5/14/18 10:20 AM.  If you have any questions, ask your nurse or doctor.               These medicines have changed or have updated prescriptions.        Dose/Directions    allopurinol 100 MG tablet   Commonly known as:  ZYLOPRIM   This may have changed:    - how much to take  - when to take this   Used for:  Kidney replaced by transplant, Gout        Dose:  200 mg   Take 2 tablets by mouth daily.   Quantity:  180 tablet   Refills:  3                Primary Care Provider Office Phone # Fax #    Moris Diaz [phone]       [facility] [address]  [facility] [address]        Equal Access to Services     AZEEM JACOBSEN AH: Hadii av ku hadasho Soomaali, waaxda luqadaha, qaybta kaalmada adeegyada, waxay arsalan perez. So [facility] [phone].    ATENCIÓN: Si habla español, tiene a carr disposición servicios gratuitos de asistencia lingüística. Llame al [phone].    We comply with applicable federal civil rights laws and [place] laws. We do not discriminate on the basis of race, color, national origin, age, disability, sex, sexual orientation, or gender identity.            Thank you!     Thank you for choosing [facility]  for your care. Our goal is always to provide you with excellent care. Hearing back from our patients is one  way we can continue to improve our services. Please take a few minutes to complete the written survey that you may receive in the mail after your visit with us. Thank you!             Your Updated Medication List - Protect others around you: Learn how to safely use, store and throw away your medicines at www.disposemymeds.org.          This list is accurate as of 5/14/18 10:20 AM.  Always use your most recent med list.                   Brand Name Dispense Instructions for use Diagnosis    allopurinol 100 MG tablet    ZYLOPRIM    180 tablet    Take 2 tablets by mouth daily.    Kidney replaced by transplant, Gout       ALPHAGAN OP      Place 1 drop into the right eye 2 times daily        BABY ASPIRIN PO      Take 81 mg by mouth daily        BEVACizumab 400 MG/16ML injection    AVASTIN     Every 5 weeks        cholecalciferol 5000 units Tabs tablet    vitamin D3    30 tablet    Take 1 tablet (5,000 Units) by mouth daily    Hyperparathyroidism due to renal insufficiency (H)       CRESTOR PO      Take 20 mg by mouth every evening        * cycloSPORINE modified 25 MG capsule    GENERIC EQUIVALENT    180 capsule    Take 2 capsules (50 mg) by mouth 2 times daily Total dose 150 mg twice daily    Kidney transplant recipient, Long-term use of immunosuppressant medication       * cycloSPORINE modified 100 MG capsule    GENERIC EQUIVALENT    180 capsule    Take 1 capsule (100 mg) by mouth 2 times daily Total dose 150 mg twice daily    Kidney transplant recipient, Long-term use of immunosuppressant medication       epoetin freya 76977 UNIT/ML injection    PROCRIT    4 mL    Inject 1 mL (10,000 Units) Subcutaneous once a week Please make sure Hgb has been checked within 4 days of dose, use as needed for Hgb<10.  If Hgb increases >1 point in 2 weeks, SYSTOLIC BP > 180 mmHg, OR Hgb >=10 g/dL, HOLD DOSE.  Per anemia protocol with Christi Sun MD.    CKD (chronic kidney disease) stage 4, GFR 15-29 ml/min (H)       fenofibrate 145 MG  tablet      Take 145 mg by mouth daily        ISOSORBIDE MONONITRATE PO      Take 60 mg by mouth daily        loperamide 2 MG capsule    IMODIUM     Take 2 mg by mouth daily        losartan 50 MG tablet    COZAAR    90 tablet    Take 1 tablet (50 mg) by mouth daily    Kidney replaced by transplant       metoprolol tartrate 100 MG tablet    LOPRESSOR    60 tablet    Take 1 tablet by mouth 2 times daily.    Unspecified hypertensive kidney disease with chronic kidney disease stage I through stage IV, or unspecified(403.90), Kidney replaced by transplant       NIFEdipine ER 90 MG Tb24    ADALAT CC     Take 90 mg by mouth every evening        NITROSTAT 0.4 MG sublingual tablet   Generic drug:  nitroGLYcerin      Place 0.4 mg under the tongue every 5 minutes as needed.        NONFORMULARY      Take 1 tablet by mouth 2 times daily Focus Macula Pro:  Eye vitamin and mineral supplement A, C, E, Zinc, Copper        omega-3 acid ethyl esters 1 g capsule    Lovaza     Take 2 g by mouth 2 times daily        PANTOPRAZOLE SODIUM PO      Take 40 mg by mouth daily as needed for heartburn        PLAVIX 75 MG tablet   Generic drug:  clopidogrel      Take 75 mg by mouth daily.        predniSONE 5 MG tablet    DELTASONE     Take 5 mg by mouth daily (with lunch)        probenecid 500 MG tablet    BENEMID     Take 500 mg by mouth 2 times daily.        sulfamethoxazole-trimethoprim 400-80 MG per tablet    BACTRIM/SEPTRA     Take 1 tablet by mouth every other day        TAMSULOSIN HCL PO      Take 0.4 mg by mouth 2 times daily        TYLENOL ARTHRITIS PAIN 650 MG CR tablet   Generic drug:  acetaminophen      Take 2 tablets by mouth 3 times daily.        * Notice:  This list has 2 medication(s) that are the same as other medications prescribed for you. Read the directions carefully, and ask your doctor or other care provider to review them with you.

## 2018-05-14 NOTE — H&P
Pre-Operative H & P     CC:  Preoperative exam to assess for increased cardiopulmonary risk while undergoing surgery and anesthesia.    Date of Encounter: 5/14/2018  Primary Care Physician:  Moris Diaz  Reason for visit: Lumbar stenosis with neurogenic claudication [M48.062]  - Primary   HPI  Rory Bustamante is a 62 year old male who presents for pre-operative H & P in preparation for MIS laminectomies (R or L approach) L2-3,L3-4,L4-5 with Dr. Rucker on 6/11/18 at [facility]. History is obtained from the patient.   Patient who recently consulted with Dr. Rucker regarding a six month history of low back pain. He has tried conservative measures such as swimming for therapy and injection, but did not achieve lasting relief. He was counseled for above procedure.   Patient's medical history is complex with HLD, HTN, CAD, s/p MI and stent X2 to the RCA, last 2012, ESRD, s/p kidney transplant under evaluation for a new kidney but an admission for pancreatitis in February has interfered.   Past Medical History  Past Medical History:   Diagnosis Date     Angina effort (H) 8/2016     Arthritis      BPH (benign prostatic hyperplasia)      CAD (coronary artery disease) July 2011    MI in July 2011, stent placed, on plavix and aspirin     Glaucoma      Gout     Uric acid as high as 11 previously, now on allopurinol and probenecid     HTN (hypertension)      Hypercholesteremia 2015     Hypertriglyceridemia 2015     Intraocular bleeding     previously treated [facility] Avastin     Macular degeneration      Neuropathy     Limited sensation bilateral knees to feet     Presence of stent in coronary artery August 2012     Proteinuria 11/2015    recurrent MPGN on biopsy     S/P kidney transplant     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  HLA identical transplant.  Biopsy in 4/2008 with recurrent MPGN type 2, mild interstitial fibrosis and tubular atrophy, CNI toxicity     Spinal  stenosis 2013     Warts        Past Surgical History  Past Surgical History:   Procedure Laterality Date     C TOTAL KNEE ARTHROPLASTY  2015     TONSILLECTOMY       TRANSPLANT KIDNEY RECIPIENT LIVING RELATED  1989    HLA identical     VASECTOMY         Hx of Blood transfusions/reactions: Yes, in the past. No known reactions. Procrit weekly. Has received 2 recent iron infusions.    Hx of abnormal bleeding or anti-platelet use: ASA 81, Plavix 75 mg daily.    Menstrual history: No LMP for male patient.    Steroid use in the last year: Chronic Prednisone use    Personal or FH with difficulty with Anesthesia:  Urinary retention.    Prior to Admission Medications  Current Outpatient Prescriptions   Medication Sig Dispense Refill     acetaminophen (TYLENOL ARTHRITIS PAIN) 650 MG CR tablet Take 2 tablets by mouth 3 times daily.       allopurinol (ZYLOPRIM) 100 MG tablet Take 2 tablets by mouth daily. (Patient taking differently: Take 100 mg by mouth 2 times daily ) 180 tablet 3     BABY ASPIRIN PO Take 81 mg by mouth daily       BEVACizumab (AVASTIN) 400 MG/16ML injection Every 5 weeks       Brimonidine Tartrate (ALPHAGAN OP) Place 1 drop into the right eye 2 times daily        cholecalciferol (VITAMIN D3) 5000 UNITS TABS tablet Take 1 tablet (5,000 Units) by mouth daily 30 tablet 0     clopidogrel (PLAVIX) 75 MG tablet Take 75 mg by mouth daily.       cycloSPORINE modified (GENERIC EQUIVALENT) 100 MG capsule Take 1 capsule (100 mg) by mouth 2 times daily Total dose 150 mg twice daily 180 capsule 3     cycloSPORINE modified (GENERIC EQUIVALENT) 25 MG capsule Take 2 capsules (50 mg) by mouth 2 times daily Total dose 150 mg twice daily 180 capsule 3     epoetin freya (PROCRIT) 56666 UNIT/ML injection Inject 1 mL (10,000 Units) Subcutaneous once a week Please make sure Hgb has been checked within 4 days of dose, use as needed for Hgb<10.  If Hgb increases >1 point in 2 weeks, SYSTOLIC BP > 180 mmHg, OR Hgb >=10 g/dL, HOLD DOSE.   Per anemia protocol with Christi Sun MD. 4 mL 5     fenofibrate 145 MG tablet Take 145 mg by mouth daily       ISOSORBIDE MONONITRATE PO Take 60 mg by mouth daily        loperamide (IMODIUM) 2 MG capsule Take 2 mg by mouth daily        losartan (COZAAR) 50 MG tablet Take 1 tablet (50 mg) by mouth daily 90 tablet 3     metoprolol (LOPRESSOR) 100 MG tablet Take 1 tablet by mouth 2 times daily. 60 tablet 6     NIFEdipine ER (ADALAT CC) 90 MG TB24 Take 90 mg by mouth every evening        nitroGLYCERIN (NITROSTAT) 0.4 MG SL tablet Place 0.4 mg under the tongue every 5 minutes as needed.       NONFORMULARY Take 1 tablet by mouth 2 times daily Focus Macula Pro:  Eye vitamin and mineral supplement A, C, E, Zinc, Copper        omega-3 acid ethyl esters (LOVAZA) 1 G capsule Take 2 g by mouth 2 times daily        PANTOPRAZOLE SODIUM PO Take 40 mg by mouth daily as needed for heartburn       predniSONE (DELTASONE) 5 MG tablet Take 5 mg by mouth daily (with lunch)        probenecid (BENEMID) 500 MG tablet Take 500 mg by mouth 2 times daily.       Rosuvastatin Calcium (CRESTOR PO) Take 20 mg by mouth every evening       sulfamethoxazole-trimethoprim (BACTRIM,SEPTRA) 400-80 MG per tablet Take 1 tablet by mouth every other day        TAMSULOSIN HCL PO Take 0.4 mg by mouth 2 times daily          Allergies  Allergies   Allergen Reactions     Contrast Dye Other (See Comments) and Itching     Pt reports sneezing     Pravastatin Muscle Pain (Myalgia)     Simvastatin Muscle Pain (Myalgia)       Social History  Social History     Social History     Marital status:      Spouse name: N/A     Number of children: N/A     Years of education: N/A     Occupational History     Not on file.     Social History Main Topics     Smoking status: Never Smoker     Smokeless tobacco: Never Used     Alcohol use No     Drug use: No     Sexual activity: Not on file     Other Topics Concern     Not on file     Social History Narrative       Family  "History  Family History   Problem Relation Age of Onset     DIABETES Mother      Colon Cancer Mother 78     Cardiac Sudden Death Sister 87     DIABETES Brother      CEREBROVASCULAR DISEASE Brother        Review of Systems  ROS/MED HX  The complete review of systems is negative other than noted in the HPI or here.   ENT/Pulmonary:  - neg pulmonary ROS     Neurologic:   Neuropathy feet  (+)other neuro lumbar stenosis, neurogenic claudication    Cardiovascular:     (+) Dyslipidemia, hypertension--CAD, --stent,. Taking blood thinners : . . . :. . Previous cardiac testing date:results:date: results:ECG reviewed date:12/8/17 results:SR, LAD date: results:          METS/Exercise Tolerance:  3 - Able to walk 1-2 blocks without stopping   Hematologic:  - neg hematologic  ROS    Anemia   Musculoskeletal:   (+) arthritis, , , -       GI/Hepatic:  - neg GI/hepatic ROS       Renal/Genitourinary:     (+) chronic renal disease, type: ESRD, Pt does not require dialysis, Pt has history of transplant, date: 1989,       Endo:     (+) Chronic steroid usage for Post Transplant Immunosuppression Date most recently used: 5/14/18,.      Psychiatric:  - neg psychiatric ROS       Infectious Disease:  - neg infectious disease ROS       Malignancy:      - no malignancy   Other: Comment: Gout   (+) No chance of pregnancy C-spine cleared: N/A, H/O Chronic Pain,no other significant disability      Physical Exam      Airway   Mallampati: II  TM distance: >3 FB  Neck ROM: limited    Temp: 97.9  F (36.6  C) Temp src: Oral BP: 171/78 Pulse: 79   Resp: 18 SpO2: 97 %         231 lbs 14.4 oz  6' .52\"   Body mass index is 31 kg/(m^2).       Physical Exam  Constitutional: Awake, alert, cooperative, no apparent distress, and appears stated age.  Eyes: Pupils equal, round and reactive to light, extra ocular muscles intact, sclera clear except for right eye, s/p recent injection for macular degeneration, conjunctiva normal. Glasses on.   HENT: Normocephalic, " oral pharynx with moist mucus membranes, good dentition. No goiter appreciated.   Respiratory: Clear to auscultation bilaterally, no crackles or wheezing. No cough or obvious dyspnea.  Cardiovascular: Regular rate and rhythm, normal S1 and S2, and no murmur noted.  Carotids +2, no bruits. 1-2+ bilateral lower extremity edema. Palpable pulses to radial  DP and PT arteries.   GI: Normal bowel sounds, soft, non-distended, non-tender, no masses palpated, no hepatosplenomegaly.  Surgical scars: RLQ, well healed. Kidney transplanted RLQ.  Lymph/Hematologic: No cervical lymphadenopathy and no supraclavicular lymphadenopathy.  Genitourinary:  Deferred.   Skin: Warm and dry.  Multiple skin lesions, followed by Derm. Old graft site, left forearm.  Musculoskeletal: Full ROM of neck. There is no redness, warmth, or swelling of the joints. Gross motor strength is normal.    Neurologic: Awake, alert, oriented to name, place and time. Cranial nerves II-XII are grossly intact. Gait is mildly irregular.   Neuropsychiatric: Calm, cooperative. Normal affect.     Labs: (personally reviewed)  Lab Results   Component Value Date    WBC 7.0 02/06/2018     Lab Results   Component Value Date    RBC 2.27 02/06/2018     Lab Results   Component Value Date    HGB 10.6 05/14/2018     Lab Results   Component Value Date    HCT 30.8 05/14/2018     Lab Results   Component Value Date     02/06/2018     Lab Results   Component Value Date    MCH 36.1 02/06/2018     Lab Results   Component Value Date    MCHC 34.0 02/06/2018     Lab Results   Component Value Date    RDW 14.2 02/06/2018     Lab Results   Component Value Date     02/06/2018       Last Basic Metabolic Panel:  Lab Results   Component Value Date     02/06/2018      Lab Results   Component Value Date    POTASSIUM 4.6 02/06/2018     Lab Results   Component Value Date    CHLORIDE 103 02/06/2018     Lab Results   Component Value Date    TRISHA 9.4 02/06/2018     Lab Results    Component Value Date    CO2 20 02/06/2018     Lab Results   Component Value Date    BUN 51 02/06/2018     Lab Results   Component Value Date    CR 4.04 02/06/2018     Lab Results   Component Value Date     02/06/2018     Lab Results   Component Value Date    AST 22 02/06/2018     Lab Results   Component Value Date    ALT 21 02/06/2018     Lab Results   Component Value Date    BILICONJ 0.0 07/01/2009      Lab Results   Component Value Date    BILITOTAL 0.3 02/06/2018     Lab Results   Component Value Date    ALBUMIN 3.8 02/06/2018     Lab Results   Component Value Date    PROTTOTAL 7.9 02/06/2018      Lab Results   Component Value Date    ALKPHOS 43 02/06/2018 12/2017   INR 1.02  PTT 29  UA RESULTS: 5/14/18  Recent Labs   Lab Test  05/14/18   1133   COLOR  Straw   APPEARANCE  Clear   URINEGLC  50*   URINEBILI  Negative   URINEKETONE  Negative   SG  1.012   UBLD  Negative   URINEPH  5.0   PROTEIN  100*   NITRITE  Negative   LEUKEST  Negative   RBCU  <1   WBCU  <1     EKG: Personally reviewed 12/8/17 Sinus rhythm, left axis deviation  Cardiac echo:  8/28/17  Final Impressions:   1. Normal LV size, severely increased wall thickness, normal global systolic function with an estimated EF of 55 - 60%.   2. Mildly enlarged left atrium.   3. The mitral valve is sclerotic, mild mitral regurgitation.   4. The aortic sinus is dilated with a maximal diameter of 4.4 cm.  2012 Angiogram  Summary/Conclusions  PRESENTATION / INDICATIONS    Chest Pain: Abnormal Stress Echo    Coronary Artery Disease:  PCI RCA 7/26/2011  DIAGNOSTIC - CORONARY    Severe single vessel coronary disease in a right dominant system    The RCA has a diffuse moderately restenotic (60%) stent in it's distal segment from the 7/26/2011 procedure.    Culprit 99% stenosis in the Distal RCA with ASHLEY-I flow  LEFT VENTRICULAR FUNCTION    LV Pressure = 124/8.  INTERVENTION    Successful  2.5mm x 20mm Balloon and  3.5mm x 20mm Everolimus-Eluting Stent to  Distal RCA, post stenosis 0%  CASE NOTES    Comments: Unsuccesful attempt at restenting distal RCA restenotic ANTONIO secondary to inability to pass stent.  Further attemps limited by contrast and fluoroscopy time in patient with renal insufficiency and prior kidney transplant.  RECOMMENDATIONS & PLAN    Plavix for 1 year    Consider symptom guided PCI of the moderately diffuse restenotic distal RCA stent.  Xray spine 3/29/18  Impression:  1. Mild left convexed curvature of the thoracolumbar/lumbar spine.   2. Positive global sagittal imbalance.  3. Weight bearing axis as detailed above.  3/12/18 MRI Spine  Impression:   1. Multilevel lumbar spondylosis with degenerative retrolisthesis of  L5 on S1.  2. Moderate to advanced spinal canal stenosis L2-L4 and moderate  spinal canal stenosis at L4-5 with associated redundancy of the cauda  equina nerve roots.  3. Herniated nucleus pulposis with possible impingement of the exiting  right L3, exiting left L4, exiting right L5 and descending right S1  nerve roots.   4. Advanced right neural foraminal stenosis at L5-S1. Moderate to  advanced neural foraminal stenosis on the right at L3-4 and on the  left at L4-5.  5. Left L5 pedicle stress reaction.  Imaging and cardiac testing reviewed by this provider  Outside records reviewed from: Care Everywhere    ASSESSMENT and PLAN  Rory Bustamante is a 62 year old male scheduled to undergo MIS laminectomies (R or L approach) L2-3,L3-4,L4-5 with Dr. Rucker on 6/11/18. He has the following specific operative considerations:   - RCRI :6.6% risk of major adverse cardiac event.   - Anesthesia considerations:  Refer to PAC assessment in anesthesia records  - VTE risk: 1.8%  - JITENDRA # of risks 4/8 = Intermediate risk  - Risk of PONV score = 2.  If > 2, anti-emetic intervention recommended.     --Lumbar stenosis with low back pain. Above procedure now planned.    --HLD. Fenofibrate. Rosuvastatin. HTN. Will take Metoprolol and Nifedipine, but hold  Losartan. CAD, s/p MI and stents. Will take Isosorbide on DOS. ASA 81 mg and will remain on. Confirmed with Dr. Rucker. Will hold Plavix for 5 days prior to surgery. Testing above. Activity limited by pain and anemia, but has been improving. Follows closely with Jreemy Sage through [facility].   --Nonsmoker. No pulmonary symptoms.    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 Kidney now failing. Will take Cyclosporine, Prednisone, and Bactrim on DOS. Cr 4.15. GFR 18. Continues to make urine. History of urinary retention after surgery.    --Chronic Prednisone use. Final decisions regarding stress dose steroids by Anesthesia on DOS.    --New onset pancreatitis, likely secondary to hypertriglyceridemia in 2/2018.  Low attenuation mass seen in the pancreas CT. Consult with Dr. Diaz on 4/16/18 with no worrisome findings. Follow up will continue with GI. No recent symptoms.    --Chronic anemia, last Hgb 10.6. Follows with [facility]. Has received two iron infusions.   --Gout. Will take Probenecid on DOS.   --GERD. Protonix prn.   --Neuropathy of feet.   Type and screen drawn today.  Arrival time, NPO, shower and medication instructions provided by nursing staff today. Preparing For Your Surgery handout given.  Patient was discussed with Dr Stewart.    ELYSE Alvarado CNS  Preoperative Assessment Center  [facility]  Phone: [phone]  Fax: [phone]

## 2018-05-14 NOTE — ANESTHESIA PREPROCEDURE EVALUATION
Anesthesia Evaluation     . Pt has had prior anesthetic. Type: General and MAC    History of anesthetic complications    Urinary retention      ROS/MED HX    ENT/Pulmonary:  - neg pulmonary ROS   (+)tobacco use, , . .    Neurologic:     (+)neuropathy - feet, other neuro lumbar stenosis, neurogenic claudication    Cardiovascular:     (+) Dyslipidemia, hypertension--CAD, -past MI,-stent,last 2012  2 Drug Eluting Stent .. Taking blood thinners Pt has received instructions: Instructions Given to patient: Will hold Plavix 5 days, ASA 81 mg. . . :. . Previous cardiac testing Echodate:2017results:date: results:ECG reviewed date:12/8/17 results:SR, LADCath date: 2012 results:          METS/Exercise Tolerance:  3 - Able to walk 1-2 blocks without stopping   Hematologic:     (+) Anemia, History of Transfusion no previous transfusion reaction Other Hematologic Disorder-Procrit and 2 recent iron infusions      Musculoskeletal:   (+) arthritis, , , -       GI/Hepatic:     (+) GERD Asymptomatic on medication,       Renal/Genitourinary: Comment: To be listed for transplant again    (+) chronic renal disease, type: ESRD and CRI, Pt does not require dialysis, Pt has history of transplant, date: 1989,       Endo:     (+) Chronic steroid usage for Post Transplant Immunosuppression Date most recently used: 5/14/18,.      Psychiatric:  - neg psychiatric ROS       Infectious Disease:  - neg infectious disease ROS       Malignancy:   (+) Malignancy History of Skin  Skin CA Remission status post Surgery,         Other: Comment: Gout   (+) No chance of pregnancy C-spine cleared: N/A, H/O Chronic Pain,no other significant disability                    Physical Exam  Normal systems: dental    Airway   Mallampati: I  TM distance: >3 FB  Neck ROM: limited    Dental     Cardiovascular   Rhythm and rate: regular and normal      Pulmonary    breath sounds clear to auscultation    Other findings:  8/28/17    Final Impressions:     1. Normal LV  size, severely increased wall thickness, normal global systolic function with an estimated EF of 55 - 60%.     2. Mildly enlarged left atrium.     3. The mitral valve is sclerotic, mild mitral regurgitation.     4. The aortic sinus is dilated with a maximal diameter of 4.4 cm.      2012 Angiogram    Summary/Conclusions    PRESENTATION / INDICATIONS      Chest Pain: Abnormal Stress Echo      Coronary Artery Disease:  PCI RCA 7/26/2011    DIAGNOSTIC - CORONARY      Severe single vessel coronary disease in a right dominant system      The RCA has a diffuse moderately restenotic (60%) stent in it's distal segment from the 7/26/2011 procedure.      Culprit 99% stenosis in the Distal RCA with ASHLEY-I flow    LEFT VENTRICULAR FUNCTION      LV Pressure = 124/8.    INTERVENTION      Successful  2.5mm x 20mm Balloon and  3.5mm x 20mm Everolimus-Eluting Stent to Distal RCA, post stenosis 0%    CASE NOTES      Comments: Unsuccesful attempt at restenting distal RCA restenotic ANTONIO secondary to inability to pass stent.  Further attemps limited by contrast and fluoroscopy time in patient with renal insufficiency and prior kidney transplant.    RECOMMENDATIONS & PLAN      Plavix for 1 year      Consider symptom guided PCI of the moderately diffuse restenotic distal RCA stent.    For further details of assessment, testing, and physical exam please see H and P completed on same date.           PAC Discussion and Assessment    ASA Classification: 3  Case is suitable for: [facility]  Anesthetic techniques and relevant risks discussed: GA  Invasive monitoring and risk discussed: No  Types:   Possibility and Risk of blood transfusion discussed: Yes  NPO instructions given:   Additional anesthetic preparation and risks discussed:   Needs early admission to pre-op area:   Other:     PAC Resident/NP Anesthesia Assessment:  Rory Bustamante is a 62 year old male scheduled to undergo MIS laminectomies (R or L approach) L2-3,L3-4,L4-5 with   [facility] on 6/11/18. He has the following specific operative considerations:   - RCRI :6.6% risk of major adverse cardiac event.   - VTE risk: 1.8%  - JITENDRA # of risks 4/8 = Intermediate risk  - Risk of PONV score = 2.  If > 2, anti-emetic intervention recommended.     --Lumbar stenosis with low back pain. Above procedure now planned.    --HLD. Fenofibrate. Rosuvastatin. HTN. Will take Metoprolol and Nifedipine, but hold Losartan. CAD, s/p MI and stents. Will take Isosorbide on DOS. ASA 81 mg and will remain on. Confirmed with Dr. Rucker.  Will hold Plavix for 5 days prior to surgery. Testing above. Activity limited by pain and anemia, but has been improving. Patient follows regularly with Jeremy RAMOS through [facility].   --Nonsmoker. No pulmonary symptoms.    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 Kidney now failing. Will take Cyclosporine, Prednisone, and Bactrim on DOS. Cr 4.15. GFR 18. Continues to make urine. History of urinary retention after surgery.    --Chronic Prednisone use. Final decisions regarding stress dose steroids by Anesthesia on DOS.    --New onset pancreatitis, likely secondary to hypertriglyceridemia in 2/2018.  Low attenuation mass seen in the pancreas CT. Consult with Dr. Diaz on 4/16/18 with no worrisome findings. Follow up will continue with GI. No recent symptoms.    --Chronic anemia, last Hgb 10.6. Follows with [facility]. Has received two iron infusions.   --Gout. Will take Probenecid on DOS.   --GERD. Protonix prn.   --Neuropathy of feet.   Type and screen drawn today.  Patient was discussed with Dr Stewart.      Reviewed and Signed by PAC Mid-Level Provider/Resident  Mid-Level Provider/Resident: ELYSE Ragsdale, CNS  Date: 5/14/18  Time: 9:35am    Attending Anesthesiologist Anesthesia Assessment:  62 year old for minimally invasive lumbar fusion. He has known CAD, with stents in 2011, attempted to restent RCA, but  could not cross, has been managed with plavix and ASA as well as beta blockers since then. Followed closely by outside cardiologist; normal EF.  We will contact surgeon about staying on ASA (he will hold plavix.).     He had renal transplant 1989, it  Is now failing, with creatinine ~4 and associated anemia. He has been receiving epo and iron infusions, he says last hct around 10 - we will try to get those records. He does make urine, but fluid management will be an issue to address. We will have him hold is ACE so to avoid hypotension and need for fluids.    Patient/case discussed with RAY. No need to see patient. Patient is appropriate for the planned procedure without further work-up or medical management.      Reviewed and Signed by PAC Anesthesiologist  Anesthesiologist: giacomo  Date: 5/14/2018  Time:   Pass/Fail: Pass  Disposition:     PAC Pharmacist Assessment:        Pharmacist:   Date:   Time:      Anesthesia Plan      History & Physical Review  History and physical reviewed and following examination; no interval change.    ASA Status:  3 .    NPO Status:  > 8 hours and > 2 hours    Plan for General and ETT with Intravenous and Propofol induction. Maintenance will be Balanced.    PONV prophylaxis:  Ondansetron (or other 5HT-3)  Additional equipment: Videolaryngoscope and 2nd IV PT taking his metoprolol this AM in preop.  Use only NS.  Closely monitor fluid intake. I prefer pt not to receive greater than 600 ml NS if possible.  Hydrocortisone 100 mg IV in the OR after induction. Use cisatracurium for MRJimbo  Reviewed Ga risks.  All questions answered.  Pt agrees with plan and wishes to proceed.      Postoperative Care  Postoperative pain management:  IV analgesics and Oral pain medications.      Consents  Anesthetic plan, risks, benefits and alternatives discussed with:  Patient..                          .

## 2018-05-14 NOTE — MR AVS SNAPSHOT
After Visit Summary   5/14/2018    Rory Bustamante    MRN: [id number]           Patient Information     Date Of Birth          1955        Visit Information        Provider Department      5/14/2018 9:30 AM Pharmacist, Ann Machado [facility] Preoperative Assessment [place]        Today's Diagnoses     Preop examination    -  1       Follow-ups after your visit        Your next 10 appointments already scheduled     May 14, 2018 11:00 AM CDT   (Arrive by 10:45 AM)   PAC RN ASSESSMENT with Ann Pac Rn   [facility] ([facility])    [address]  4th Floor  [facility] [address]   [phone]            May 14, 2018 11:30 AM CDT   (Arrive by 11:15 AM)   PAC Anesthesia Consult with Ann Pac Anesthesiologist   [facility] Preoperative Assessment [place] ([facility])    [address]  [address] [facility] [address] [phone]            Jun 11, 2018   Procedure with Harshal Rucker MD   [facility], [place], Same Day Surgery (--)    [address] [address] [phone]            Jul 24, 2018  9:45 AM CDT   (Arrive by 9:15 AM)   RETURN SPINE with Harshal Rucker MD   [facility] Orthopaedic [facility] ([facility])    [address]  [address] [facility] [address] [phone]            Aug 07, 2018  3:00 PM CDT   Lab with ANN LAB   [facility] Lab ([facility])    [address]  1st Floor  [facility] [address] [phone]            Aug 07, 2018  4:00 PM CDT   (Arrive by 3:30 PM)   Return Visit with Christi Sun MD   [facility] Nephrology ([facility])    [address]  [address]  [facility] [address] [phone]            Aug 30, 2018 10:30 AM CDT   (Arrive by 10:00 AM)   RETURN SPINE with Harshal Rucker MD   [facility] Orthopaedic [facility] ([facility]  [place])    [address]  4th Floor  [facility] [address] [phone]            Oct 15, 2018  9:00 AM CDT   MR ABDOMEN MRCP W/O & W CONTRAST with UUMR1   [facility], [place], MRI ([facility], [facility])    [address] [address] [phone]           Take your medicines as usual, unless your doctor tells you not to. Bring a list of your current medicines to your exam (including vitamins, minerals and over-the-counter drugs). Also bring the results of similar scans you may have had.    You may or may not receive IV contrast for this exam pending the discretion of the Radiologist.   Do not eat or drink for 6 hours prior to exam.  The MRI machine uses a strong magnet. Please wear clothes without metal (snaps, zippers). A sweatsuit works well, or we may give you a hospital gown.  Please remove any body piercings and hair extensions before you arrive. You will also remove watches, jewelry, hairpins, wallets, dentures, partial dental plates and hearing aids. You may wear contact lenses, and you may be able to wear your rings. We have a safe place to keep your personal items, but it is safer to leave them at home.  **IMPORTANT** THE INSTRUCTIONS BELOW ARE ONLY FOR THOSE PATIENTS WHO HAVE BEEN PRESCRIBED SEDATION OR GENERAL ANESTHESIA DURING THEIR MRI PROCEDURE:  IF YOUR DOCTOR PRESCRIBED ORAL SEDATION (take medicine to help you relax during your exam):   You must get the medicine from your doctor (oral medication) before you arrive. Bring the medicine to the exam. Do not take it at home. You ll be told when to take it upon arriving for your exam.   Arrive one hour early. Bring someone who can take you home after the test. Your medicine will make you sleepy. After the exam, you may not drive, take a bus or take a taxi by yourself.  IF YOUR DOCTOR PRESCRIBED IV SEDATION:   Arrive one hour early. Bring someone who can take you home after the test.  Your medicine will make you sleepy. After the exam, you may not drive, take a bus or take a taxi by yourself.   No eating 6 hours before your exam. You may have clear liquids up until 4 hours before your exam. (Clear liquids include water, clear tea, black coffee and fruit juice without pulp.)  IF YOUR DOCTOR PRESCRIBED ANESTHESIA (be asleep for your exam):   Arrive 1 1/2 hours early. Bring someone who can take you home after the test. You may not drive, take a bus or take a taxi by yourself.   No eating 8 hours before your exam. You may have clear liquids up until 4 hours before your exam. (Clear liquids include water, clear tea, black coffee and fruit juice without pulp.)   You will spend four to five hours in the recovery room.  If you have any questions, please contact your Imaging Department exam site.            Oct 15, 2018 10:00 AM CDT   (Arrive by 9:45 AM)   New Patient Visit with Shade Manning MD   [facility] ([facility] [place])    [address] [place] [address]  [address] [place] [address] [phone]              Future tests that were ordered for you today     Open Future Orders        Priority Expected Expires Ordered    UA reflex to Microscopic and Culture Routine 5/14/2018 6/13/2018 5/14/2018    ABO/Rh type and screen Routine 5/14/2018 6/13/2018 5/14/2018            Who to contact     Please call your clinic at [phone] to:    Ask questions about your health    Make or cancel appointments    Discuss your medicines    Learn about your test results    Speak to your doctor            Additional Information About Your Visit        rateGeniushart Information     [employer] gives you secure access to your electronic health record. If you see a primary care provider, you can also send messages to your care team and make appointments. If you have questions, please call your primary care clinic.  If you do not have a primary care provider, please call [phone] and  they will assist you.      [employer] is an electronic gateway that provides easy, online access to your medical records. With [employer], you can request a clinic appointment, read your test results, renew a prescription or communicate with your care team.     To access your existing account, please contact your [facility] or call [phone] for assistance.        Care EveryWhere ID     This is your Care EveryWhere ID. This could be used by other organizations to access your [place] medical records  [id number]         Blood Pressure from Last 3 Encounters:   05/14/18 171/78   04/16/18 138/72   04/05/18 113/58    Weight from Last 3 Encounters:   05/14/18 105.2 kg (231 lb 14.4 oz)   04/16/18 109.1 kg (240 lb 9.6 oz)   03/29/18 100.7 kg (222 lb)              Today, you had the following     No orders found for display         Today's Medication Changes          These changes are accurate as of 5/14/18 10:08 AM.  If you have any questions, ask your nurse or doctor.               These medicines have changed or have updated prescriptions.        Dose/Directions    allopurinol 100 MG tablet   Commonly known as:  ZYLOPRIM   This may have changed:    - how much to take  - when to take this   Used for:  Kidney replaced by transplant, Gout        Dose:  200 mg   Take 2 tablets by mouth daily.   Quantity:  180 tablet   Refills:  3                Primary Care Provider Office Phone # Fax #    Moris Diaz [phone]       [facility] [address]  [facility] [place] [address]        Equal Access to Services     AZEEM JACOBSEN AH: Hadii aad ku hadasho Soomaali, waaxda luqadaha, qaybta kaalmada adeegyada, devin perez. So [facility] [phone].    ATENCIÓN: Si habla español, tiene a carr disposición servicios gratuitos de asistencia lingüística. Llame al [phone].    We comply with applicable federal civil rights laws and [place] laws. We do  not discriminate on the basis of race, color, national origin, age, disability, sex, sexual orientation, or gender identity.            Thank you!     Thank you for choosing [facility]OPER[facility]  for your care. Our goal is always to provide you with excellent care. Hearing back from our patients is one way we can continue to improve our services. Please take a few minutes to complete the written survey that you may receive in the mail after your visit with us. Thank you!             Your Updated Medication List - Protect others around you: Learn how to safely use, store and throw away your medicines at www.disposemymeds.org.          This list is accurate as of 5/14/18 10:08 AM.  Always use your most recent med list.                   Brand Name Dispense Instructions for use Diagnosis    allopurinol 100 MG tablet    ZYLOPRIM    180 tablet    Take 2 tablets by mouth daily.    Kidney replaced by transplant, Gout       ALPHAGAN OP      Place 1 drop into the right eye 2 times daily        BABY ASPIRIN PO      Take 81 mg by mouth daily        BEVACizumab 400 MG/16ML injection    AVASTIN     Every 5 weeks        cholecalciferol 5000 units Tabs tablet    vitamin D3    30 tablet    Take 1 tablet (5,000 Units) by mouth daily    Hyperparathyroidism due to renal insufficiency (H)       CRESTOR PO      Take 20 mg by mouth every evening        * cycloSPORINE modified 25 MG capsule    GENERIC EQUIVALENT    180 capsule    Take 2 capsules (50 mg) by mouth 2 times daily Total dose 150 mg twice daily    Kidney transplant recipient, Long-term use of immunosuppressant medication       * cycloSPORINE modified 100 MG capsule    GENERIC EQUIVALENT    180 capsule    Take 1 capsule (100 mg) by mouth 2 times daily Total dose 150 mg twice daily    Kidney transplant recipient, Long-term use of immunosuppressant medication       epoetin freya 57571 UNIT/ML injection    PROCRIT    4 mL    Inject 1 mL (10,000 Units) Subcutaneous  once a week Please make sure Hgb has been checked within 4 days of dose, use as needed for Hgb<10.  If Hgb increases >1 point in 2 weeks, SYSTOLIC BP > 180 mmHg, OR Hgb >=10 g/dL, HOLD DOSE.  Per anemia protocol with Christi Sun MD.    CKD (chronic kidney disease) stage 4, GFR 15-29 ml/min (H)       fenofibrate 145 MG tablet      Take 145 mg by mouth daily        ISOSORBIDE MONONITRATE PO      Take 60 mg by mouth daily        loperamide 2 MG capsule    IMODIUM     Take 2 mg by mouth daily        losartan 50 MG tablet    COZAAR    90 tablet    Take 1 tablet (50 mg) by mouth daily    Kidney replaced by transplant       metoprolol tartrate 100 MG tablet    LOPRESSOR    60 tablet    Take 1 tablet by mouth 2 times daily.    Unspecified hypertensive kidney disease with chronic kidney disease stage I through stage IV, or unspecified(403.90), Kidney replaced by transplant       NIFEdipine ER 90 MG Tb24    ADALAT CC     Take 90 mg by mouth every evening        NITROSTAT 0.4 MG sublingual tablet   Generic drug:  nitroGLYcerin      Place 0.4 mg under the tongue every 5 minutes as needed.        NONFORMULARY      Take 1 tablet by mouth 2 times daily Focus Macula Pro:  Eye vitamin and mineral supplement A, C, E, Zinc, Copper        omega-3 acid ethyl esters 1 g capsule    Lovaza     Take 2 g by mouth 2 times daily        PANTOPRAZOLE SODIUM PO      Take 40 mg by mouth daily as needed for heartburn        PLAVIX 75 MG tablet   Generic drug:  clopidogrel      Take 75 mg by mouth daily.        predniSONE 5 MG tablet    DELTASONE     Take 5 mg by mouth daily (with lunch)        probenecid 500 MG tablet    BENEMID     Take 500 mg by mouth 2 times daily.        sulfamethoxazole-trimethoprim 400-80 MG per tablet    BACTRIM/SEPTRA     Take 1 tablet by mouth every other day        TAMSULOSIN HCL PO      Take 0.4 mg by mouth 2 times daily        TYLENOL ARTHRITIS PAIN 650 MG CR tablet   Generic drug:  acetaminophen      Take 2 tablets  by mouth 3 times daily.        * Notice:  This list has 2 medication(s) that are the same as other medications prescribed for you. Read the directions carefully, and ask your doctor or other care provider to review them with you.

## 2018-05-14 NOTE — PROGRESS NOTES
Preoperative Assessment Center medication history for May 14, 2018 is complete.    See Epic admission navigator for prior to admission medications.   Operating room staff will still need to confirm medications and last dose information on day of surgery.     Medication history interview sources:  patient, patient's medication list.     Changes made to PTA medication list (reason)  Added: pantoprazole PRN  Deleted: ferrous carboxymaltose - had 2 injections, completed course no plans to resume at this time  Changed: bactrim sig, crestor dose, flomax dose, avastin sig    Additional medication history information (including reliability of information, actions taken by pharmacist):  -- No recent (within 30 days) course of antibiotics  -- No recent (within 30 days) course of steroids other than his usual daily prednisone dosing.   -- No recent (within 30 days) chronic daily medications stopped   -- Patient declines being on any other prescription or over-the-counter medications    Prior to Admission medications    Medication Sig Last Dose Taking? Auth Provider   acetaminophen (TYLENOL ARTHRITIS PAIN) 650 MG CR tablet Take 2 tablets by mouth 3 times daily. Taking Yes Reported, Patient   allopurinol (ZYLOPRIM) 100 MG tablet Take 2 tablets by mouth daily.  Patient taking differently: Take 100 mg by mouth 2 times daily  Taking Yes Christi Sun MD   BABY ASPIRIN PO Take 81 mg by mouth daily Taking Yes Reported, Patient   BEVACizumab (AVASTIN) 400 MG/16ML injection Every 5 weeks Taking Yes Reported, Patient   Brimonidine Tartrate (ALPHAGAN OP) Place 1 drop into the right eye 2 times daily  Taking Yes Reported, Patient   clopidogrel (PLAVIX) 75 MG tablet Take 75 mg by mouth daily. Taking Yes Reported, Patient   cycloSPORINE modified (GENERIC EQUIVALENT) 100 MG capsule Take 1 capsule (100 mg) by mouth 2 times daily Total dose 150 mg twice daily Taking Yes Christi Sun MD   cycloSPORINE modified (GENERIC EQUIVALENT) 25  MG capsule Take 2 capsules (50 mg) by mouth 2 times daily Total dose 150 mg twice daily Taking Yes Christi Sun MD   epoetin freya (PROCRIT) 62816 UNIT/ML injection Inject 1 mL (10,000 Units) Subcutaneous once a week Please make sure Hgb has been checked within 4 days of dose, use as needed for Hgb<10.  If Hgb increases >1 point in 2 weeks, SYSTOLIC BP > 180 mmHg, OR Hgb >=10 g/dL, HOLD DOSE.  Per anemia protocol with Christi Sun MD. Taking Yes Christi Sun MD   fenofibrate 145 MG tablet Take 145 mg by mouth daily Taking Yes Reported, Patient   ISOSORBIDE MONONITRATE PO Take 60 mg by mouth daily  Taking Yes Reported, Patient   loperamide (IMODIUM) 2 MG capsule Take 2 mg by mouth daily  Taking Yes Reported, Patient   losartan (COZAAR) 50 MG tablet Take 1 tablet (50 mg) by mouth daily Taking Yes Christi Sun MD   metoprolol (LOPRESSOR) 100 MG tablet Take 1 tablet by mouth 2 times daily. Taking Yes Christi Sun MD   NIFEdipine ER (ADALAT CC) 90 MG TB24 Take 90 mg by mouth every evening  Taking Yes Reported, Patient   nitroGLYCERIN (NITROSTAT) 0.4 MG SL tablet Place 0.4 mg under the tongue every 5 minutes as needed. Taking Yes Reported, Patient   NONFORMULARY Take 1 tablet by mouth 2 times daily Focus Macula Pro:  Eye vitamin and mineral supplement A, C, E, Zinc, Copper  Taking Yes Reported, Patient   omega-3 acid ethyl esters (LOVAZA) 1 G capsule Take 2 g by mouth 2 times daily  Taking Yes Reported, Patient   PANTOPRAZOLE SODIUM PO Take 40 mg by mouth daily as needed for heartburn Taking Yes Unknown, Entered By History   predniSONE (DELTASONE) 5 MG tablet Take 5 mg by mouth daily (with lunch)  Taking Yes Reported, Patient   probenecid (BENEMID) 500 MG tablet Take 500 mg by mouth 2 times daily. Taking Yes Reported, Patient   Rosuvastatin Calcium (CRESTOR PO) Take 20 mg by mouth every evening Taking Yes Unknown, Entered By History   sulfamethoxazole-trimethoprim (BACTRIM,SEPTRA) 400-80 MG per tablet  Take 1 tablet by mouth every other day  Taking Yes Reported, Patient   TAMSULOSIN HCL PO Take 0.4 mg by mouth 2 times daily  Taking Yes Reported, Patient   cholecalciferol (VITAMIN D3) 5000 UNITS TABS tablet Take 1 tablet (5,000 Units) by mouth daily   Christi Sun MD       Medication history completed by: Omar Polk [facility]

## 2018-05-16 ENCOUNTER — TELEPHONE (OUTPATIENT)
Dept: PHARMACY | Facility: CLINIC | Age: 63
End: 2018-05-16

## 2018-05-16 NOTE — TELEPHONE ENCOUNTER
Anemia Management Note  SUBJECTIVE/OBJECTIVE:  Referred by Dr. Christi Sun on 2018  Primary Diagnosis: Anemia in Chronic Kidney Disease (N18.3, D63.1)     Secondary Diagnosis:  Chronic Kidney Disease, Stage 3 (N18.3)   Hgb goal range:  9-10  Epo/Darbo: Procrit  10,000 units  once weekly for Hgb < 10  In clinic  RX/TX plans : 2019  Iron regimen:  Ferrous Sulfate 325mg QD  Labs : 2019  Recent MARY LOU use, transfusion, IV iron: IV iron scheduled 2018    Anemia Latest Ref Rng & Units 2018 2018 2018 2018 5/3/2018 2018 2018   MARY LOU Dose - - - - - 10,000 units 10,000 units -   Hemoglobin 13.3 - 17.7 g/dL 7.9(A) 8.8(A) 9.1(A) 9.2(A) error(A) 9.8(A) 10.6(L)   IV Iron Dose - - - - - - - -   TSAT % - - - 34 - - -   Ferritin ng/mL - - - 581 - - -     BP Readings from Last 3 Encounters:   18 171/78   18 138/72   18 113/58     Wt Readings from Last 2 Encounters:   18 231 lb 14.4 oz (105.2 kg)   18 240 lb 9.6 oz (109.1 kg)           ASSESSMENT:  Hgb:Above goal - recommend hold dose  TSat: at goal >30% Ferritin: At goal (>100ng/mL)    PLAN:  Hold Procrit and RTC for hgb then procrit if needed in 1 week(s)    Orders needed to be renewed (for next follow-up date) in EPIC: None    Iron labs due:  18    Plan discussed with:  COLLEEN Valadez    Plan provided by:  Judy Ramírez CPhT  Anemia Clinic  [phone]    NEXT FOLLOW-UP DATE:  18  Reviewed 2018 [facility]  Anemia Management Service  Grecia Carroll,PharmD and Michelle Ramírez CPhT  Phone: [phone]  Fax: [phone]

## 2018-05-23 ENCOUNTER — TELEPHONE (OUTPATIENT)
Dept: PHARMACY | Facility: CLINIC | Age: 63
End: 2018-05-23

## 2018-05-23 LAB
HCT VFR BLD AUTO: 30.6 %
HEMOGLOBIN: 10.3 G/DL (ref 13.3–17.7)

## 2018-05-23 NOTE — TELEPHONE ENCOUNTER
Anemia Management Note  SUBJECTIVE/OBJECTIVE:  Referred by Dr. Christi Sun on 2018  Primary Diagnosis: Anemia in Chronic Kidney Disease (N18.3, D63.1)     Secondary Diagnosis:  Chronic Kidney Disease, Stage 3 (N18.3)   Hgb goal range:  9-10  Epo/Darbo: Procrit  10,000 units  once weekly for Hgb < 10  In clinic  RX/TX plans : 2019  Iron regimen:  Ferrous Sulfate 325mg QD  Labs : 2019  Recent MARY LOU use, transfusion, IV iron: IV iron scheduled 2018    Anemia Latest Ref Rng & Units 2018 2018 2018 5/3/2018 2018 2018 2018   MARY LOU Dose - - - - 10,000 units 10,000 units - -   Hemoglobin 13.3 - 17.7 g/dL 8.8(A) 9.1(A) 9.2(A) error(A) 9.8(A) 10.6(L) 10.3(A)   IV Iron Dose - - - - - - - -   TSAT % - - 34 - - - -   Ferritin ng/mL - - 581 - - - -     BP Readings from Last 3 Encounters:   18 171/78   18 138/72   18 113/58     Wt Readings from Last 2 Encounters:   18 231 lb 14.4 oz (105.2 kg)   18 240 lb 9.6 oz (109.1 kg)       Received and documented outside lab results drawn on 18    ASSESSMENT:  Hgb:Above goal - recommend hold dose  TSat: at goal >30% Ferritin: At goal (>100ng/mL)    PLAN:  Hold Procrit and RTC for hgb, ferritin and iron labs then procrit if needed in 1 week(s)    Orders needed to be renewed (for next follow-up date) in LETTERS - for external labs: None    Iron labs due:  18    Plan discussed with:  Rory  Plan provided by:  Judy Ramírez CPhT  Anemia Clinic  [phone]    NEXT FOLLOW-UP DATE:  18  Reviewed 2018 [facility]  Anemia Management Service  Grecia Carroll,PharmD and Michelle Ramírez CPhT  Phone: [phone]  Fax: [phone]

## 2018-05-30 ENCOUNTER — TELEPHONE (OUTPATIENT)
Dept: PHARMACY | Facility: CLINIC | Age: 63
End: 2018-05-30

## 2018-05-30 LAB
FERRITIN SERPL-MCNC: 388 NG/ML
HCT VFR BLD AUTO: 30.4 %
HEMOGLOBIN: 10.3 G/DL (ref 13.3–17.7)
IRON BINDING CAP: 338
IRON SATURATION INDEX: 27 %
IRON: 90 UG/DL

## 2018-05-30 NOTE — TELEPHONE ENCOUNTER
Anemia Management Note  SUBJECTIVE/OBJECTIVE:  Referred by Dr. Christi Sun on 2018  Primary Diagnosis: Anemia in Chronic Kidney Disease (N18.3, D63.1)     Secondary Diagnosis:  Chronic Kidney Disease, Stage 3 (N18.3)   Hgb goal range:  9-10  Epo/Darbo: Procrit  10,000 units  once weekly for Hgb < 10  In clinic  RX/TX plans : 2019  Iron regimen:  Ferrous Sulfate 325mg QD  Labs : 2019  Recent MARY LOU use, transfusion, IV iron: IV iron scheduled 2018    Anemia Latest Ref Rng & Units 2018 2018 5/3/2018 2018 2018 2018 2018   MARY LOU Dose - - - 10,000 units 10,000 units - - -   Hemoglobin 13.3 - 17.7 g/dL 9.1(A) 9.2(A) error(A) 9.8(A) 10.6(L) 10.3(A) 10.3(A)   IV Iron Dose - - - - - - - -   TSAT % - 34 - - - - 27   Ferritin ng/mL - 581 - - - - 388     BP Readings from Last 3 Encounters:   18 171/78   18 138/72   18 113/58     Wt Readings from Last 2 Encounters:   18 231 lb 14.4 oz (105.2 kg)   18 240 lb 9.6 oz (109.1 kg)       Received and documented outside lab results drawn on   Verified that Rory stopped taking the ferrous sulfate due to side affects, mostly diarrhea    ASSESSMENT:  Hgb:Above goal - recommend hold dose  TSat: not at goal of >30% Ferritin: At goal (>100ng/mL)  Referred to Grecia Carroll to determine if additional iron therapy is needed or monitor 1 more month  OK to monitor 1 more month for iron, or suggest sooner if Hgb starts to drop.  IV iron order updated in therapy plan, may need to fax to clinic closer to home at patient request if needed. 2018 DENISE  PLAN:  Hold Procrit and RTC for hgb then procrit if needed in 1 week(s)    Orders needed to be renewed (for next follow-up date) in EPIC: None    Iron labs due:  18    Plan discussed with:  Rory  Plan provided by:  Judy Ramírez [facility]  Anemia Clinic  [phone]    NEXT FOLLOW-UP DATE:  18  Reviewed 2018 [facility]  Anemia Management  Service  Grecia Carroll PharmD and Michelle Ramírez CPhT  Phone: [phone]  Fax: [phone]

## 2018-06-06 ENCOUNTER — TELEPHONE (OUTPATIENT)
Dept: PHARMACY | Facility: CLINIC | Age: 63
End: 2018-06-06

## 2018-06-06 LAB
HCT VFR BLD AUTO: 29.7 %
HEMOGLOBIN: 10.1 G/DL (ref 13.3–17.7)

## 2018-06-06 NOTE — TELEPHONE ENCOUNTER
Anemia Management Note  SUBJECTIVE/OBJECTIVE:  Referred by Dr. Christi Sun on 2018  Primary Diagnosis: Anemia in Chronic Kidney Disease (N18.3, D63.1)     Secondary Diagnosis:  Chronic Kidney Disease, Stage 3 (N18.3)   Hgb goal range:  9-10  Epo/Darbo: Procrit  10,000 units  once weekly for Hgb < 10  In clinic  RX/TX plans : 2019  Iron regimen:  Ferrous Sulfate 325mg QD  Labs : 2019  Recent MARY LOU use, transfusion, IV iron: IV iron scheduled 2018    Anemia Latest Ref Rng & Units 2018 5/3/2018 2018 2018 2018 2018 2018   MARY LOU Dose - - 10,000 units 10,000 units - - - -   Hemoglobin 13.3 - 17.7 g/dL 9.2(A) error(A) 9.8(A) 10.6(L) 10.3(A) 10.3(A) 10.1(A)   IV Iron Dose - - - - - - - -   TSAT % 34 - - - - 27 -   Ferritin ng/mL 581 - - - - 388 -     BP Readings from Last 3 Encounters:   18 171/78   18 138/72   18 113/58     Wt Readings from Last 2 Encounters:   18 231 lb 14.4 oz (105.2 kg)   18 240 lb 9.6 oz (109.1 kg)     Rory has back surgery scheduled for  at the  and will be in the hospital at least overnight  appt w/dr Sun and labs is scheduled for 18    ASSESSMENT:  Hgb:Above goal - recommend hold dose  TSat: not at goal of >30% Ferritin: At goal (>100ng/mL)    PLAN:  Hold Procrit and RTC for hgb then procrit if needed in 1 week - Check to see if CBC is drawn while in the hospital on   OK to monitor 1 more month for iron, or suggest sooner if Hgb starts to drop.  IV iron order updated in therapy plan, may need to fax to clinic closer to home at patient request if needed. 2018 DENISE    Orders needed to be renewed (for next follow-up date) in LETTERS - for external labs: None    Iron labs due:  18  Reviewed 2018 DENISE  Plan discussed with:  Rory  Plan provided by:  Judy Ramírez [facility]  Anemia Clinic  [phone]    NEXT FOLLOW-UP DATE:  18    Anemia Management Service  Grecia Carroll,MirzaD and  [facility]  Phone: [phone]  Fax: [phone]

## 2018-06-11 ENCOUNTER — HOSPITAL ENCOUNTER (INPATIENT)
Facility: CLINIC | Age: 63
LOS: 6 days | Discharge: HOME HEALTH | DRG: 515 | End: 2018-06-17
Attending: ORTHOPAEDIC SURGERY | Admitting: ORTHOPAEDIC SURGERY
Payer: MEDICARE

## 2018-06-11 ENCOUNTER — ANESTHESIA (OUTPATIENT)
Dept: SURGERY | Facility: CLINIC | Age: 63
DRG: 515 | End: 2018-06-11
Payer: MEDICARE

## 2018-06-11 ENCOUNTER — APPOINTMENT (OUTPATIENT)
Dept: GENERAL RADIOLOGY | Facility: CLINIC | Age: 63
DRG: 515 | End: 2018-06-11
Attending: ORTHOPAEDIC SURGERY
Payer: MEDICARE

## 2018-06-11 DIAGNOSIS — I48.0 PAROXYSMAL ATRIAL FIBRILLATION (H): ICD-10-CM

## 2018-06-11 DIAGNOSIS — M48.062 SPINAL STENOSIS OF LUMBAR REGION WITH NEUROGENIC CLAUDICATION: Primary | ICD-10-CM

## 2018-06-11 DIAGNOSIS — N40.0 BENIGN PROSTATIC HYPERPLASIA, UNSPECIFIED WHETHER LOWER URINARY TRACT SYMPTOMS PRESENT: ICD-10-CM

## 2018-06-11 DIAGNOSIS — Z94.0 KIDNEY REPLACED BY TRANSPLANT: ICD-10-CM

## 2018-06-11 DIAGNOSIS — I48.21 PERMANENT ATRIAL FIBRILLATION (H): ICD-10-CM

## 2018-06-11 PROBLEM — M48.061 LUMBAR STENOSIS: Status: ACTIVE | Noted: 2018-06-11

## 2018-06-11 PROBLEM — Z41.9 SURGERY, ELECTIVE: Status: ACTIVE | Noted: 2018-06-11

## 2018-06-11 LAB
ABO + RH BLD: NORMAL
ABO + RH BLD: NORMAL
ANION GAP SERPL CALCULATED.3IONS-SCNC: 13 MMOL/L (ref 3–14)
BLD GP AB SCN SERPL QL: NORMAL
BLOOD BANK CMNT PATIENT-IMP: NORMAL
BLOOD BANK CMNT PATIENT-IMP: NORMAL
BUN SERPL-MCNC: 73 MG/DL (ref 7–30)
CALCIUM SERPL-MCNC: 8.9 MG/DL (ref 8.5–10.1)
CHLORIDE SERPL-SCNC: 101 MMOL/L (ref 94–109)
CK SERPL-CCNC: 432 U/L (ref 30–300)
CO2 SERPL-SCNC: 19 MMOL/L (ref 20–32)
CREAT SERPL-MCNC: 4.3 MG/DL (ref 0.66–1.25)
CREAT SERPL-MCNC: 4.47 MG/DL (ref 0.66–1.25)
ERYTHROCYTE [DISTWIDTH] IN BLOOD BY AUTOMATED COUNT: 14.2 % (ref 10–15)
GFR SERPL CREATININE-BSD FRML MDRD: 14 ML/MIN/1.7M2
GFR SERPL CREATININE-BSD FRML MDRD: 14 ML/MIN/1.7M2
GLUCOSE SERPL-MCNC: 113 MG/DL (ref 70–99)
GLUCOSE SERPL-MCNC: 147 MG/DL (ref 70–99)
HCT VFR BLD AUTO: 29.1 % (ref 40–53)
HGB BLD-MCNC: 9.6 G/DL (ref 13.3–17.7)
INTERPRETATION ECG - MUSE: NORMAL
MCH RBC QN AUTO: 35.6 PG (ref 26.5–33)
MCHC RBC AUTO-ENTMCNC: 33 G/DL (ref 31.5–36.5)
MCV RBC AUTO: 108 FL (ref 78–100)
NT-PROBNP SERPL-MCNC: 606 PG/ML (ref 0–900)
PLATELET # BLD AUTO: 115 10E9/L (ref 150–450)
POTASSIUM SERPL-SCNC: 5 MMOL/L (ref 3.4–5.3)
POTASSIUM SERPL-SCNC: 5.3 MMOL/L (ref 3.4–5.3)
RBC # BLD AUTO: 2.7 10E12/L (ref 4.4–5.9)
SODIUM SERPL-SCNC: 133 MMOL/L (ref 133–144)
SPECIMEN EXP DATE BLD: NORMAL
TROPONIN I SERPL-MCNC: <0.015 UG/L (ref 0–0.04)
WBC # BLD AUTO: 11.6 10E9/L (ref 4–11)

## 2018-06-11 PROCEDURE — 84132 ASSAY OF SERUM POTASSIUM: CPT | Performed by: ANESTHESIOLOGY

## 2018-06-11 PROCEDURE — 25000131 ZZH RX MED GY IP 250 OP 636 PS 637: Mod: GY | Performed by: STUDENT IN AN ORGANIZED HEALTH CARE EDUCATION/TRAINING PROGRAM

## 2018-06-11 PROCEDURE — 27210794 ZZH OR GENERAL SUPPLY STERILE: Performed by: ORTHOPAEDIC SURGERY

## 2018-06-11 PROCEDURE — 25000125 ZZHC RX 250: Performed by: NURSE ANESTHETIST, CERTIFIED REGISTERED

## 2018-06-11 PROCEDURE — 25000128 H RX IP 250 OP 636

## 2018-06-11 PROCEDURE — 71000015 ZZH RECOVERY PHASE 1 LEVEL 2 EA ADDTL HR: Performed by: ORTHOPAEDIC SURGERY

## 2018-06-11 PROCEDURE — 25000128 H RX IP 250 OP 636: Performed by: NURSE ANESTHETIST, CERTIFIED REGISTERED

## 2018-06-11 PROCEDURE — 82550 ASSAY OF CK (CPK): CPT | Performed by: ANESTHESIOLOGY

## 2018-06-11 PROCEDURE — 40000065 ZZH STATISTIC EKG NON-CHARGEABLE

## 2018-06-11 PROCEDURE — G0378 HOSPITAL OBSERVATION PER HR: HCPCS

## 2018-06-11 PROCEDURE — 25000566 ZZH SEVOFLURANE, EA 15 MIN: Performed by: ORTHOPAEDIC SURGERY

## 2018-06-11 PROCEDURE — 25000301 ZZH OR RX SURGIFLO W/THROMBIN KIT 2ML 1991 OPNP: Performed by: ORTHOPAEDIC SURGERY

## 2018-06-11 PROCEDURE — 36415 COLL VENOUS BLD VENIPUNCTURE: CPT | Performed by: ANESTHESIOLOGY

## 2018-06-11 PROCEDURE — 82947 ASSAY GLUCOSE BLOOD QUANT: CPT | Performed by: ANESTHESIOLOGY

## 2018-06-11 PROCEDURE — 99225 ZZC SUBSEQUENT OBSERVATION CARE,LEVEL II: CPT | Performed by: INTERNAL MEDICINE

## 2018-06-11 PROCEDURE — 25000132 ZZH RX MED GY IP 250 OP 250 PS 637: Mod: GY | Performed by: STUDENT IN AN ORGANIZED HEALTH CARE EDUCATION/TRAINING PROGRAM

## 2018-06-11 PROCEDURE — 40000170 ZZH STATISTIC PRE-PROCEDURE ASSESSMENT II: Performed by: ORTHOPAEDIC SURGERY

## 2018-06-11 PROCEDURE — 82565 ASSAY OF CREATININE: CPT | Performed by: ANESTHESIOLOGY

## 2018-06-11 PROCEDURE — 37000009 ZZH ANESTHESIA TECHNICAL FEE, EACH ADDTL 15 MIN: Performed by: ORTHOPAEDIC SURGERY

## 2018-06-11 PROCEDURE — 25000125 ZZHC RX 250: Performed by: INTERNAL MEDICINE

## 2018-06-11 PROCEDURE — 25000128 H RX IP 250 OP 636: Performed by: ANESTHESIOLOGY

## 2018-06-11 PROCEDURE — 25000128 H RX IP 250 OP 636: Performed by: PHYSICIAN ASSISTANT

## 2018-06-11 PROCEDURE — 83880 ASSAY OF NATRIURETIC PEPTIDE: CPT | Performed by: ANESTHESIOLOGY

## 2018-06-11 PROCEDURE — 71000014 ZZH RECOVERY PHASE 1 LEVEL 2 FIRST HR: Performed by: ORTHOPAEDIC SURGERY

## 2018-06-11 PROCEDURE — 80048 BASIC METABOLIC PNL TOTAL CA: CPT | Performed by: ANESTHESIOLOGY

## 2018-06-11 PROCEDURE — 25000132 ZZH RX MED GY IP 250 OP 250 PS 637: Mod: GY | Performed by: ANESTHESIOLOGY

## 2018-06-11 PROCEDURE — 25000125 ZZHC RX 250: Performed by: ORTHOPAEDIC SURGERY

## 2018-06-11 PROCEDURE — 82552 ASSAY OF CPK IN BLOOD: CPT | Performed by: ANESTHESIOLOGY

## 2018-06-11 PROCEDURE — 25000128 H RX IP 250 OP 636: Performed by: STUDENT IN AN ORGANIZED HEALTH CARE EDUCATION/TRAINING PROGRAM

## 2018-06-11 PROCEDURE — 27210995 ZZH RX 272: Performed by: ORTHOPAEDIC SURGERY

## 2018-06-11 PROCEDURE — A9270 NON-COVERED ITEM OR SERVICE: HCPCS | Mod: GY | Performed by: STUDENT IN AN ORGANIZED HEALTH CARE EDUCATION/TRAINING PROGRAM

## 2018-06-11 PROCEDURE — 99207 ZZC CONSULT E&M CHANGED TO SUBSEQUENT LEVEL: CPT | Performed by: INTERNAL MEDICINE

## 2018-06-11 PROCEDURE — 36000066 ZZH SURGERY LEVEL 4 W FLUORO 1ST 30 MIN - UMMC: Performed by: ORTHOPAEDIC SURGERY

## 2018-06-11 PROCEDURE — 37000008 ZZH ANESTHESIA TECHNICAL FEE, 1ST 30 MIN: Performed by: ORTHOPAEDIC SURGERY

## 2018-06-11 PROCEDURE — 85027 COMPLETE CBC AUTOMATED: CPT | Performed by: ANESTHESIOLOGY

## 2018-06-11 PROCEDURE — C9399 UNCLASSIFIED DRUGS OR BIOLOG: HCPCS | Performed by: NURSE ANESTHETIST, CERTIFIED REGISTERED

## 2018-06-11 PROCEDURE — 84484 ASSAY OF TROPONIN QUANT: CPT | Performed by: ANESTHESIOLOGY

## 2018-06-11 PROCEDURE — 36000064 ZZH SURGERY LEVEL 4 EA 15 ADDTL MIN - UMMC: Performed by: ORTHOPAEDIC SURGERY

## 2018-06-11 PROCEDURE — 40000278 XR SURGERY CARM FLUORO LESS THAN 5 MIN: Mod: TC

## 2018-06-11 RX ORDER — NITROGLYCERIN 0.4 MG/1
0.4 TABLET SUBLINGUAL EVERY 5 MIN PRN
Status: DISCONTINUED | OUTPATIENT
Start: 2018-06-11 | End: 2018-06-17 | Stop reason: HOSPADM

## 2018-06-11 RX ORDER — LIDOCAINE HYDROCHLORIDE 20 MG/ML
INJECTION, SOLUTION INFILTRATION; PERINEURAL PRN
Status: DISCONTINUED | OUTPATIENT
Start: 2018-06-11 | End: 2018-06-11

## 2018-06-11 RX ORDER — ROSUVASTATIN CALCIUM 20 MG/1
20 TABLET, COATED ORAL EVERY EVENING
Status: DISCONTINUED | OUTPATIENT
Start: 2018-06-11 | End: 2018-06-17 | Stop reason: HOSPADM

## 2018-06-11 RX ORDER — NIFEDIPINE 90 MG/1
90 TABLET, EXTENDED RELEASE ORAL DAILY
Status: DISCONTINUED | OUTPATIENT
Start: 2018-06-11 | End: 2018-06-17 | Stop reason: HOSPADM

## 2018-06-11 RX ORDER — MEPERIDINE HYDROCHLORIDE 25 MG/ML
12.5 INJECTION INTRAMUSCULAR; INTRAVENOUS; SUBCUTANEOUS
Status: DISCONTINUED | OUTPATIENT
Start: 2018-06-11 | End: 2018-06-11 | Stop reason: HOSPADM

## 2018-06-11 RX ORDER — CEFAZOLIN SODIUM 1 G/3ML
1 INJECTION, POWDER, FOR SOLUTION INTRAMUSCULAR; INTRAVENOUS SEE ADMIN INSTRUCTIONS
Status: DISCONTINUED | OUTPATIENT
Start: 2018-06-11 | End: 2018-06-11 | Stop reason: HOSPADM

## 2018-06-11 RX ORDER — ASPIRIN 81 MG/1
81 TABLET, CHEWABLE ORAL
Status: DISCONTINUED | OUTPATIENT
Start: 2018-06-11 | End: 2018-06-13

## 2018-06-11 RX ORDER — ONDANSETRON 2 MG/ML
4 INJECTION INTRAMUSCULAR; INTRAVENOUS EVERY 30 MIN PRN
Status: DISCONTINUED | OUTPATIENT
Start: 2018-06-11 | End: 2018-06-11 | Stop reason: HOSPADM

## 2018-06-11 RX ORDER — LIDOCAINE 40 MG/G
CREAM TOPICAL
Status: DISCONTINUED | OUTPATIENT
Start: 2018-06-11 | End: 2018-06-11 | Stop reason: HOSPADM

## 2018-06-11 RX ORDER — ASPIRIN 81 MG/1
81 TABLET, CHEWABLE ORAL DAILY
Status: DISCONTINUED | OUTPATIENT
Start: 2018-06-11 | End: 2018-06-11

## 2018-06-11 RX ORDER — AMOXICILLIN 250 MG
1 CAPSULE ORAL 2 TIMES DAILY
Status: DISCONTINUED | OUTPATIENT
Start: 2018-06-11 | End: 2018-06-17 | Stop reason: HOSPADM

## 2018-06-11 RX ORDER — CYCLOBENZAPRINE HCL 10 MG
10 TABLET ORAL 3 TIMES DAILY PRN
Status: DISCONTINUED | OUTPATIENT
Start: 2018-06-11 | End: 2018-06-17 | Stop reason: HOSPADM

## 2018-06-11 RX ORDER — VIT C/E/ZN/COPPR/LUTEIN/ZEAXAN 60 MG-6 MG
1 CAPSULE ORAL 2 TIMES DAILY
Status: DISCONTINUED | OUTPATIENT
Start: 2018-06-11 | End: 2018-06-17 | Stop reason: HOSPADM

## 2018-06-11 RX ORDER — SODIUM CHLORIDE, SODIUM LACTATE, POTASSIUM CHLORIDE, CALCIUM CHLORIDE 600; 310; 30; 20 MG/100ML; MG/100ML; MG/100ML; MG/100ML
INJECTION, SOLUTION INTRAVENOUS CONTINUOUS
Status: DISCONTINUED | OUTPATIENT
Start: 2018-06-11 | End: 2018-06-11 | Stop reason: HOSPADM

## 2018-06-11 RX ORDER — BRIMONIDINE TARTRATE 2 MG/ML
1 SOLUTION/ DROPS OPHTHALMIC 2 TIMES DAILY
Status: DISCONTINUED | OUTPATIENT
Start: 2018-06-11 | End: 2018-06-17 | Stop reason: HOSPADM

## 2018-06-11 RX ORDER — FENTANYL CITRATE 50 UG/ML
INJECTION, SOLUTION INTRAMUSCULAR; INTRAVENOUS PRN
Status: DISCONTINUED | OUTPATIENT
Start: 2018-06-11 | End: 2018-06-11

## 2018-06-11 RX ORDER — ONDANSETRON 4 MG/1
4 TABLET, ORALLY DISINTEGRATING ORAL EVERY 30 MIN PRN
Status: DISCONTINUED | OUTPATIENT
Start: 2018-06-11 | End: 2018-06-11 | Stop reason: HOSPADM

## 2018-06-11 RX ORDER — BUPIVACAINE HYDROCHLORIDE AND EPINEPHRINE 2.5; 5 MG/ML; UG/ML
INJECTION, SOLUTION INFILTRATION; PERINEURAL PRN
Status: DISCONTINUED | OUTPATIENT
Start: 2018-06-11 | End: 2018-06-11 | Stop reason: HOSPADM

## 2018-06-11 RX ORDER — FENTANYL CITRATE 50 UG/ML
25-50 INJECTION, SOLUTION INTRAMUSCULAR; INTRAVENOUS
Status: DISCONTINUED | OUTPATIENT
Start: 2018-06-11 | End: 2018-06-11 | Stop reason: HOSPADM

## 2018-06-11 RX ORDER — EPHEDRINE SULFATE 50 MG/ML
INJECTION, SOLUTION INTRAMUSCULAR; INTRAVENOUS; SUBCUTANEOUS PRN
Status: DISCONTINUED | OUTPATIENT
Start: 2018-06-11 | End: 2018-06-11

## 2018-06-11 RX ORDER — METOPROLOL TARTRATE 100 MG
100 TABLET ORAL 2 TIMES DAILY
Status: DISCONTINUED | OUTPATIENT
Start: 2018-06-11 | End: 2018-06-17 | Stop reason: HOSPADM

## 2018-06-11 RX ORDER — METOPROLOL TARTRATE 50 MG
100 TABLET ORAL 2 TIMES DAILY
Status: DISCONTINUED | OUTPATIENT
Start: 2018-06-11 | End: 2018-06-11 | Stop reason: HOSPADM

## 2018-06-11 RX ORDER — ISOSORBIDE MONONITRATE 20 MG/1
60 TABLET ORAL DAILY
Status: DISCONTINUED | OUTPATIENT
Start: 2018-06-11 | End: 2018-06-11

## 2018-06-11 RX ORDER — ACETAMINOPHEN 325 MG/1
975 TABLET ORAL EVERY 8 HOURS
Status: DISPENSED | OUTPATIENT
Start: 2018-06-11 | End: 2018-06-14

## 2018-06-11 RX ORDER — OMEGA-3-ACID ETHYL ESTERS 1 G/1
2 CAPSULE, LIQUID FILLED ORAL 2 TIMES DAILY
Status: DISCONTINUED | OUTPATIENT
Start: 2018-06-11 | End: 2018-06-17 | Stop reason: HOSPADM

## 2018-06-11 RX ORDER — PREDNISONE 5 MG/1
5 TABLET ORAL
Status: DISCONTINUED | OUTPATIENT
Start: 2018-06-12 | End: 2018-06-17 | Stop reason: HOSPADM

## 2018-06-11 RX ORDER — HYDROMORPHONE HYDROCHLORIDE 1 MG/ML
.3-.5 INJECTION, SOLUTION INTRAMUSCULAR; INTRAVENOUS; SUBCUTANEOUS
Status: DISCONTINUED | OUTPATIENT
Start: 2018-06-11 | End: 2018-06-17 | Stop reason: HOSPADM

## 2018-06-11 RX ORDER — SODIUM CHLORIDE 9 MG/ML
INJECTION, SOLUTION INTRAVENOUS CONTINUOUS PRN
Status: DISCONTINUED | OUTPATIENT
Start: 2018-06-11 | End: 2018-06-11

## 2018-06-11 RX ORDER — SULFAMETHOXAZOLE AND TRIMETHOPRIM 400; 80 MG/1; MG/1
1 TABLET ORAL EVERY OTHER DAY
Status: DISCONTINUED | OUTPATIENT
Start: 2018-06-12 | End: 2018-06-17 | Stop reason: HOSPADM

## 2018-06-11 RX ORDER — LOPERAMIDE HCL 2 MG
2 CAPSULE ORAL DAILY
Status: DISCONTINUED | OUTPATIENT
Start: 2018-06-11 | End: 2018-06-17 | Stop reason: HOSPADM

## 2018-06-11 RX ORDER — CEFAZOLIN SODIUM 2 G/100ML
2 INJECTION, SOLUTION INTRAVENOUS
Status: DISCONTINUED | OUTPATIENT
Start: 2018-06-11 | End: 2018-06-11 | Stop reason: HOSPADM

## 2018-06-11 RX ORDER — NALOXONE HYDROCHLORIDE 0.4 MG/ML
.1-.4 INJECTION, SOLUTION INTRAMUSCULAR; INTRAVENOUS; SUBCUTANEOUS
Status: DISCONTINUED | OUTPATIENT
Start: 2018-06-11 | End: 2018-06-14

## 2018-06-11 RX ORDER — PANTOPRAZOLE SODIUM 40 MG/1
40 TABLET, DELAYED RELEASE ORAL DAILY PRN
Status: DISCONTINUED | OUTPATIENT
Start: 2018-06-11 | End: 2018-06-13

## 2018-06-11 RX ORDER — ISOSORBIDE MONONITRATE 60 MG/1
60 TABLET, EXTENDED RELEASE ORAL DAILY
Status: DISCONTINUED | OUTPATIENT
Start: 2018-06-11 | End: 2018-06-17 | Stop reason: HOSPADM

## 2018-06-11 RX ORDER — PROBENECID 500 MG/1
500 TABLET, FILM COATED ORAL 2 TIMES DAILY
Status: DISCONTINUED | OUTPATIENT
Start: 2018-06-11 | End: 2018-06-17 | Stop reason: HOSPADM

## 2018-06-11 RX ORDER — METOPROLOL TARTRATE 1 MG/ML
1-2 INJECTION, SOLUTION INTRAVENOUS EVERY 5 MIN PRN
Status: DISCONTINUED | OUTPATIENT
Start: 2018-06-11 | End: 2018-06-11 | Stop reason: HOSPADM

## 2018-06-11 RX ORDER — HYDROMORPHONE HYDROCHLORIDE 1 MG/ML
.3-.5 INJECTION, SOLUTION INTRAMUSCULAR; INTRAVENOUS; SUBCUTANEOUS EVERY 10 MIN PRN
Status: DISCONTINUED | OUTPATIENT
Start: 2018-06-11 | End: 2018-06-11 | Stop reason: HOSPADM

## 2018-06-11 RX ORDER — CEFAZOLIN SODIUM 1 G/3ML
1 INJECTION, POWDER, FOR SOLUTION INTRAMUSCULAR; INTRAVENOUS EVERY 12 HOURS
Status: COMPLETED | OUTPATIENT
Start: 2018-06-11 | End: 2018-06-12

## 2018-06-11 RX ORDER — TAMSULOSIN HYDROCHLORIDE 0.4 MG/1
0.4 CAPSULE ORAL 2 TIMES DAILY
Status: DISCONTINUED | OUTPATIENT
Start: 2018-06-11 | End: 2018-06-17 | Stop reason: HOSPADM

## 2018-06-11 RX ORDER — SODIUM CHLORIDE 9 MG/ML
INJECTION, SOLUTION INTRAVENOUS
Status: COMPLETED
Start: 2018-06-11 | End: 2018-06-11

## 2018-06-11 RX ORDER — FENOFIBRATE 145 MG/1
145 TABLET, COATED ORAL
Status: DISCONTINUED | OUTPATIENT
Start: 2018-06-11 | End: 2018-06-17 | Stop reason: HOSPADM

## 2018-06-11 RX ORDER — HYDROXYZINE HYDROCHLORIDE 25 MG/1
25 TABLET, FILM COATED ORAL EVERY 6 HOURS PRN
Status: DISCONTINUED | OUTPATIENT
Start: 2018-06-11 | End: 2018-06-17 | Stop reason: HOSPADM

## 2018-06-11 RX ORDER — AMOXICILLIN 250 MG
2 CAPSULE ORAL 2 TIMES DAILY
Status: DISCONTINUED | OUTPATIENT
Start: 2018-06-11 | End: 2018-06-17 | Stop reason: HOSPADM

## 2018-06-11 RX ORDER — PROCHLORPERAZINE MALEATE 10 MG
10 TABLET ORAL EVERY 6 HOURS PRN
Status: DISCONTINUED | OUTPATIENT
Start: 2018-06-11 | End: 2018-06-17 | Stop reason: HOSPADM

## 2018-06-11 RX ORDER — ALLOPURINOL 100 MG/1
100 TABLET ORAL 2 TIMES DAILY
Status: DISCONTINUED | OUTPATIENT
Start: 2018-06-11 | End: 2018-06-13

## 2018-06-11 RX ORDER — MAGNESIUM HYDROXIDE 1200 MG/15ML
LIQUID ORAL PRN
Status: DISCONTINUED | OUTPATIENT
Start: 2018-06-11 | End: 2018-06-11 | Stop reason: HOSPADM

## 2018-06-11 RX ORDER — ONDANSETRON 2 MG/ML
INJECTION INTRAMUSCULAR; INTRAVENOUS PRN
Status: DISCONTINUED | OUTPATIENT
Start: 2018-06-11 | End: 2018-06-11

## 2018-06-11 RX ORDER — ONDANSETRON 2 MG/ML
4 INJECTION INTRAMUSCULAR; INTRAVENOUS EVERY 6 HOURS PRN
Status: DISCONTINUED | OUTPATIENT
Start: 2018-06-11 | End: 2018-06-17 | Stop reason: HOSPADM

## 2018-06-11 RX ORDER — NALOXONE HYDROCHLORIDE 0.4 MG/ML
.1-.4 INJECTION, SOLUTION INTRAMUSCULAR; INTRAVENOUS; SUBCUTANEOUS
Status: DISCONTINUED | OUTPATIENT
Start: 2018-06-11 | End: 2018-06-11 | Stop reason: HOSPADM

## 2018-06-11 RX ORDER — CYCLOSPORINE 25 MG/1
100 CAPSULE, LIQUID FILLED ORAL 2 TIMES DAILY
Status: DISCONTINUED | OUTPATIENT
Start: 2018-06-11 | End: 2018-06-14

## 2018-06-11 RX ORDER — LIDOCAINE 40 MG/G
CREAM TOPICAL
Status: DISCONTINUED | OUTPATIENT
Start: 2018-06-11 | End: 2018-06-17 | Stop reason: HOSPADM

## 2018-06-11 RX ORDER — OXYCODONE HYDROCHLORIDE 5 MG/1
5-10 TABLET ORAL EVERY 4 HOURS PRN
Status: DISCONTINUED | OUTPATIENT
Start: 2018-06-11 | End: 2018-06-17 | Stop reason: HOSPADM

## 2018-06-11 RX ORDER — ONDANSETRON 4 MG/1
4 TABLET, ORALLY DISINTEGRATING ORAL EVERY 6 HOURS PRN
Status: DISCONTINUED | OUTPATIENT
Start: 2018-06-11 | End: 2018-06-17 | Stop reason: HOSPADM

## 2018-06-11 RX ORDER — CYCLOSPORINE 25 MG/1
50 CAPSULE, LIQUID FILLED ORAL 2 TIMES DAILY
Status: DISCONTINUED | OUTPATIENT
Start: 2018-06-11 | End: 2018-06-14

## 2018-06-11 RX ORDER — FENOFIBRATE 145 MG/1
145 TABLET, COATED ORAL DAILY
Status: DISCONTINUED | OUTPATIENT
Start: 2018-06-11 | End: 2018-06-11

## 2018-06-11 RX ORDER — ACETAMINOPHEN 325 MG/1
650 TABLET ORAL EVERY 4 HOURS PRN
Status: DISCONTINUED | OUTPATIENT
Start: 2018-06-14 | End: 2018-06-17 | Stop reason: HOSPADM

## 2018-06-11 RX ORDER — PROPOFOL 10 MG/ML
INJECTION, EMULSION INTRAVENOUS PRN
Status: DISCONTINUED | OUTPATIENT
Start: 2018-06-11 | End: 2018-06-11

## 2018-06-11 RX ADMIN — CYCLOBENZAPRINE HYDROCHLORIDE 10 MG: 10 TABLET, FILM COATED ORAL at 18:14

## 2018-06-11 RX ADMIN — CYCLOBENZAPRINE HYDROCHLORIDE 10 MG: 10 TABLET, FILM COATED ORAL at 22:58

## 2018-06-11 RX ADMIN — MIDAZOLAM 2 MG: 1 INJECTION INTRAMUSCULAR; INTRAVENOUS at 07:34

## 2018-06-11 RX ADMIN — CEFAZOLIN 1 G: 1 INJECTION, POWDER, FOR SOLUTION INTRAMUSCULAR; INTRAVENOUS at 10:03

## 2018-06-11 RX ADMIN — ONDANSETRON 4 MG: 2 INJECTION INTRAMUSCULAR; INTRAVENOUS at 13:44

## 2018-06-11 RX ADMIN — SODIUM CHLORIDE 10 ML: 9 INJECTION, SOLUTION INTRAVENOUS at 20:57

## 2018-06-11 RX ADMIN — CISATRACURIUM BESYLATE 2 MG: 2 INJECTION INTRAVENOUS at 11:24

## 2018-06-11 RX ADMIN — ALLOPURINOL 100 MG: 100 TABLET ORAL at 18:17

## 2018-06-11 RX ADMIN — CEFAZOLIN SODIUM 1 G: 1 INJECTION, POWDER, FOR SOLUTION INTRAMUSCULAR; INTRAVENOUS at 20:57

## 2018-06-11 RX ADMIN — CISATRACURIUM BESYLATE 1 MG: 2 INJECTION INTRAVENOUS at 10:46

## 2018-06-11 RX ADMIN — BRIMONIDINE TARTRATE 1 DROP: 2 SOLUTION OPHTHALMIC at 20:59

## 2018-06-11 RX ADMIN — LIDOCAINE HYDROCHLORIDE 40 MG: 20 INJECTION, SOLUTION INFILTRATION; PERINEURAL at 07:46

## 2018-06-11 RX ADMIN — ROSUVASTATIN CALCIUM 20 MG: 20 TABLET, FILM COATED ORAL at 18:18

## 2018-06-11 RX ADMIN — METOPROLOL TARTRATE 100 MG: 50 TABLET, FILM COATED ORAL at 07:13

## 2018-06-11 RX ADMIN — PANTOPRAZOLE SODIUM 40 MG: 40 TABLET, DELAYED RELEASE ORAL at 18:14

## 2018-06-11 RX ADMIN — PROCHLORPERAZINE EDISYLATE 2.5 MG: 5 INJECTION INTRAMUSCULAR; INTRAVENOUS at 14:07

## 2018-06-11 RX ADMIN — TAMSULOSIN HYDROCHLORIDE 0.4 MG: 0.4 CAPSULE ORAL at 18:17

## 2018-06-11 RX ADMIN — NIFEDIPINE 30 MG: 90 TABLET, FILM COATED, EXTENDED RELEASE ORAL at 18:15

## 2018-06-11 RX ADMIN — FENTANYL CITRATE 50 MCG: 50 INJECTION, SOLUTION INTRAMUSCULAR; INTRAVENOUS at 07:46

## 2018-06-11 RX ADMIN — Medication 1 CAPSULE: at 18:18

## 2018-06-11 RX ADMIN — HYDROCORTISONE SODIUM SUCCINATE 100 MG: 100 INJECTION, POWDER, FOR SOLUTION INTRAMUSCULAR; INTRAVENOUS at 08:09

## 2018-06-11 RX ADMIN — PHENYLEPHRINE HYDROCHLORIDE 100 MCG: 10 INJECTION, SOLUTION INTRAMUSCULAR; INTRAVENOUS; SUBCUTANEOUS at 08:57

## 2018-06-11 RX ADMIN — PHENYLEPHRINE HYDROCHLORIDE 100 MCG: 10 INJECTION, SOLUTION INTRAMUSCULAR; INTRAVENOUS; SUBCUTANEOUS at 08:45

## 2018-06-11 RX ADMIN — CISATRACURIUM BESYLATE 2 MG: 2 INJECTION INTRAVENOUS at 10:02

## 2018-06-11 RX ADMIN — PHENYLEPHRINE HYDROCHLORIDE 100 MCG: 10 INJECTION, SOLUTION INTRAMUSCULAR; INTRAVENOUS; SUBCUTANEOUS at 08:55

## 2018-06-11 RX ADMIN — CYCLOSPORINE 100 MG: 100 CAPSULE, LIQUID FILLED ORAL at 20:58

## 2018-06-11 RX ADMIN — PHENYLEPHRINE HYDROCHLORIDE 50 MCG: 10 INJECTION, SOLUTION INTRAMUSCULAR; INTRAVENOUS; SUBCUTANEOUS at 08:40

## 2018-06-11 RX ADMIN — PROPOFOL 200 MG: 10 INJECTION, EMULSION INTRAVENOUS at 07:46

## 2018-06-11 RX ADMIN — CISATRACURIUM BESYLATE 3 MG: 2 INJECTION INTRAVENOUS at 09:22

## 2018-06-11 RX ADMIN — PHENYLEPHRINE HYDROCHLORIDE 0.3 MCG/KG/MIN: 10 INJECTION, SOLUTION INTRAMUSCULAR; INTRAVENOUS; SUBCUTANEOUS at 08:38

## 2018-06-11 RX ADMIN — CEFAZOLIN 2 G: 1 INJECTION, POWDER, FOR SOLUTION INTRAMUSCULAR; INTRAVENOUS at 08:03

## 2018-06-11 RX ADMIN — METOPROLOL TARTRATE 100 MG: 100 TABLET, FILM COATED ORAL at 20:58

## 2018-06-11 RX ADMIN — Medication 10 MG: at 08:24

## 2018-06-11 RX ADMIN — OXYCODONE HYDROCHLORIDE 5 MG: 5 TABLET ORAL at 22:58

## 2018-06-11 RX ADMIN — FENTANYL CITRATE 150 MCG: 50 INJECTION, SOLUTION INTRAMUSCULAR; INTRAVENOUS at 08:19

## 2018-06-11 RX ADMIN — OMEGA-3-ACID ETHYL ESTERS 2 G: 900 CAPSULE, LIQUID FILLED ORAL at 20:58

## 2018-06-11 RX ADMIN — ONDANSETRON 4 MG: 2 INJECTION INTRAMUSCULAR; INTRAVENOUS at 11:45

## 2018-06-11 RX ADMIN — ROCURONIUM BROMIDE 50 MG: 10 INJECTION INTRAVENOUS at 07:46

## 2018-06-11 RX ADMIN — OXYCODONE HYDROCHLORIDE 5 MG: 5 TABLET ORAL at 18:13

## 2018-06-11 RX ADMIN — SUGAMMADEX 200 MG: 100 INJECTION, SOLUTION INTRAVENOUS at 12:02

## 2018-06-11 RX ADMIN — PHENYLEPHRINE HYDROCHLORIDE 100 MCG: 10 INJECTION, SOLUTION INTRAMUSCULAR; INTRAVENOUS; SUBCUTANEOUS at 09:00

## 2018-06-11 RX ADMIN — PROBENECID 500 MG: 500 TABLET, FILM COATED ORAL at 20:58

## 2018-06-11 RX ADMIN — SODIUM CHLORIDE: 9 INJECTION, SOLUTION INTRAVENOUS at 06:45

## 2018-06-11 RX ADMIN — CISATRACURIUM BESYLATE 8 MG: 2 INJECTION INTRAVENOUS at 08:18

## 2018-06-11 RX ADMIN — FENTANYL CITRATE 50 MCG: 50 INJECTION, SOLUTION INTRAMUSCULAR; INTRAVENOUS at 11:30

## 2018-06-11 RX ADMIN — CYCLOSPORINE 50 MG: 25 CAPSULE, LIQUID FILLED ORAL at 20:58

## 2018-06-11 RX ADMIN — ACETAMINOPHEN 975 MG: 325 TABLET, FILM COATED ORAL at 18:13

## 2018-06-11 ASSESSMENT — PAIN DESCRIPTION - DESCRIPTORS
DESCRIPTORS: ACHING
DESCRIPTORS: ACHING

## 2018-06-11 ASSESSMENT — LIFESTYLE VARIABLES: TOBACCO_USE: 1

## 2018-06-11 NOTE — BRIEF OP NOTE
[facility] Orthopedic Brief Operative Note      Patient name: Rory Bustamante  Patient MRN: [id number]  Date of procedure: June 11, 2018    Pre-operative diagnosis:  Stenosis  Post-operative diagnosis:  Same  Procedure(s):  LAMINECTOMY LUMBAR MINIMALLY INVASIVE THREE+ LEVELS      Anaesthesia: Gen  Surgeon: Dr. Rucker  Assist: Shaun Leonard MD  EBL: 325 mL  Findings: as anticipated  Complications: none   Specimens:   * No specimens in log *    Pain: Scheduled Tylenol, Dilaudid PCA, oxycodone PRN, valium PRN.    Activity: No lifting >10 lbs. No excessive twisting or bending. Start physical therapy POD#1  Wound care: Dressing change as needed per Ortho  [place] out   Drains: Document output per shift  Brace:  N/A  Abx: Ancef x 24 hours post op for surgical ppx  Diet: Clear liquids and ADAT  DVT ppx: Mechanical only  Radiographs:  none  Disposition:  Admit to Ortho Spine for observation (drain output monitoring).  Expect d/c in 1 day    Shaun Leonard MD  PGY-4 Orthopaedic Surgery  [phone]

## 2018-06-11 NOTE — OR NURSING
Dr. Blank to complete anesthesia sign out. Okay for floor transport with telemetry monitor for stay.

## 2018-06-11 NOTE — OR NURSING
PACU to Inpatient Nursing Handoff    Patient Rory Bustamante is a 62 year old male who speaks English.   Procedure Procedure(s):  Minimally Invasive Surgery Laminectomies Lumbar 2-3,Lumbar 3-4,Lumbar 4-5 - Wound Class: I-Clean   Surgeon(s) Primary: Harshal Rucker MD  Resident - Assisting: Shaun Leonard MD     Allergies   Allergen Reactions     Contrast Dye Other (See Comments) and Itching     Pt reports sneezing     Pravastatin Muscle Pain (Myalgia)     Simvastatin Muscle Pain (Myalgia)       Isolation  [unfilled]     Past Medical History   has a past medical history of Angina effort (H) (8/2016); Arthritis; BPH (benign prostatic hyperplasia); CAD (coronary artery disease) (July 2011); Glaucoma; Gout; HTN (hypertension); Hypercholesteremia (2015); Hypertriglyceridemia (2015); Intraocular bleeding; Macular degeneration; Neuropathy; Presence of stent in coronary artery (August 2012); Proteinuria (11/2015); S/P kidney transplant; Spinal stenosis (2013); and Warts.    Anesthesia General   Dermatome Level     Preop Meds metoprolol 100 mg oral - time given: 0713   Nerve block Not applicable   Intraop Meds fentanyl (Sublimaze): 250 mcg total  ondansetron (Zofran): last given at 1145   Local Meds Yes   Antibiotics cefazolin (Ancef) - last given at 1003     Pain Patient Currently in Pain: sleeping: patient not able to self report   PACU meds  ondansetron (Zofran): 4 mg (total dose) last given at 1344    Gave 2.5mg of compazine at 1407   PCA / epidural No   Capnography Respiratory Monitoring (EtCO2): 33 mmHg   Telemetry ECG Rhythm: Sinus rhythm;First degree AV block   Inpatient Telemetry Monitor Ordered? Yes, placed by ARMIDA Blank due to heart history of patient and kidney history        Labs Glucose Lab Results   Component Value Date     06/11/2018       Hgb Lab Results   Component Value Date    HGB 9.6 06/11/2018       INR Lab Results   Component Value Date    INR 1.02 12/08/2017      PACU Imaging Not  applicable     Wound/Incision Wound 12/08/17 Left;Lateral Foot Other (comment) lateral plantar surface callus (Active)   Site Assessment Clean, dry, intact 12/8/2017 11:00 AM   Iram-wound Assessment WDL;Other (Comment) 12/8/2017 11:00 AM   Drainage Amount None 12/8/2017 11:00 AM   Dressing Open to air 12/8/2017 11:00 AM   Number of days:185       Wound 12/08/17 Head Abrasion(s) scalp abrasion (Active)   Site Assessment Scabbing 12/8/2017 11:00 AM   Iram-wound Assessment WDL 12/8/2017 11:00 AM   Drainage Amount None 12/8/2017 11:00 AM   Dressing Open to air 12/8/2017 11:00 AM   Number of days:185       Incision/Surgical Site 06/11/18 Back (Active)   Incision Assessment UTV 6/11/2018  1:30 PM   Iram-Incision Assessment UTV 6/11/2018  1:30 PM   Closure KHADRA 6/11/2018  1:30 PM   Incision Drainage Amount UTV 6/11/2018  1:30 PM   Drainage Description UTV 6/11/2018  1:30 PM   Dressing Intervention Clean, dry, intact 6/11/2018  1:30 PM   Number of days:0      CMS Peripheral Neurovascular WDL:  WDL except;edema (Group) (06/11/18 1217)  All Extremities Temperature: warm (06/11/18 1300)  All Extremities Color: no discoloration (06/11/18 1300)  All Extremities Sensation: numbness present (numbness bilateral legs from knees to toes) (06/11/18 0614)  LLE Sensation: numbness present (06/11/18 1300)  RLE Sensation: numbness present (06/11/18 1300)   Equipment capnography machine   Other LDA       IV Access Peripheral IV 06/11/18 Right Hand (Active)   Site Assessment WDL 6/11/2018  1:30 PM   Line Status Saline locked 6/11/2018  1:30 PM   Phlebitis Scale 0-->no symptoms 6/11/2018  1:30 PM   Infiltration Scale 0 6/11/2018  1:30 PM   Infiltration Site Treatment Method  None 6/11/2018  7:01 AM   Extravasation? No 6/11/2018  7:01 AM   Dressing Intervention New dressing  6/11/2018  7:01 AM   Number of days:0      Blood Products Not applicable  mL   Intake/Output Date 06/11/18 0700 - 06/12/18 0659   Shift 9016-0523 0571-7675  0560-3755 24 Hour Total   I  N  T  A  K  E   P.O. 5   5    I.V. 570   570    Shift Total  (mL/kg) 575  (5.4)   575  (5.4)   O  U  T  P  U  T   Urine 390   390    Blood 335   335    Shift Total  (mL/kg) 725  (6.81)   725  (6.81)   Weight (kg) 106.4 106.4 106.4 106.4        Drains / Rudolph Closed/Suction Drain 1 Right Back Accordion 10 Nepali (Active)   Site Description UTV 6/11/2018  1:30 PM   Dressing Status Normal: Clean, Dry & Intact 6/11/2018  1:30 PM   Drainage Appearance Bloody/Bright Red;Normal 6/11/2018  1:30 PM   Status To bulb suction 6/11/2018  1:30 PM   Number of days:0      Time of void PreOp Void Prior to Procedure: 0550 (06/11/18 0614)    PostOp  needs to void, rudolph d/c intra op at 1200    Diapered? No   Bladder Scan     PO 5 mL (ice chips) (06/11/18 1330)  nausea and emesis     Vitals    B/P: 146/84  T: 97.7  F (36.5  C)    Temp src: Oral  P:  Pulse: 73 (06/11/18 0542)    Heart Rate: 83 (06/11/18 1350)     R: 12  O2:  SpO2: 98 %    O2 Device: Nasal cannula (06/11/18 1345)    Oxygen Delivery: 3 LPM (06/11/18 1345)         Family/support present significant other   Patient belongings Patient Belongings: clothing;shoes;tote (wallet to wife)  Disposition of Belongings: Other (see comment) (labeled and secured)   Patient transported on cart and air mat   DC meds/scripts (obs/outpt) Not applicable   Inpatient Pain Meds Released? Yes       Special needs/considerations labs in am, Cr elevated due to history ESRD   Tasks needing completion Neuro checks & pulses       Arvind Dooley RN  [facility] [id number]

## 2018-06-11 NOTE — IP AVS SNAPSHOT
STOP!!! DO NOT PRINT OR REFERENCE THIS AVS!!!  AVS displayed here is NOT the version that was given to the patient at discharge.  GO TO CHART REVIEW to print or review a copy of the AVS that was frozen/printed at time of discharge.                           MRN:[id number]                      After Visit Summary   6/11/2018    Rory Bustamante    MRN: [id number]           Thank you!     Thank you for choosing [facility] for your care. Our goal is always to provide you with excellent care. Hearing back from our patients is one way we can continue to improve our services. Please take a few minutes to complete the written survey that you may receive in the mail after you visit with us. Thank you!        Patient Information     Date Of Birth          1955        Designated Caregiver       Most Recent Value    Caregiver    Will someone help with your care after discharge? yes    Name of designated caregiver Kajal    Phone number of caregiver [phone]    Caregiver address same address as pt's      About your hospital stay     You were admitted on:  June 11, 2018 You last received care in the:  [address] [facility]    You were discharged on:  June 17, 2018        Reason for your hospital stay       Spine surgery with Dr. Van            Reason for your hospital stay       Lumbar spine laminectomy  Atrial flutter  Pancreatitis  CKD  Volume overload                  Who to Call     For medical emergencies, please call 911.  For non-urgent questions about your medical care, please call your primary care provider or clinic, [phone]  For questions related to your surgery, please call your surgery clinic        Attending Provider     Provider Specialty    Harshal Rucker MD Orthopaedic Surgery    Donnie Rosen MD Cardiology       Primary Care Provider Office Phone # Fax #    Moris Diaz [phone]      After Care Instructions     Activity       Your activity upon discharge: no  excessive bending, twisting, or lifting greater than 10 pounds.            Activity       Your activity upon discharge: activity as tolerated            Diet       Follow this diet upon discharge: Regular            Diet       Follow this diet upon discharge: Orders Placed This Encounter      Diet      Advance Diet as Tolerated: Regular Diet Adult            Discharge Instructions       Postoperative Instructions - Spinal surgery        Dr. Harshal Rucker  [place] [address] [address]     You have had a spinal surgery. You have a dressing on your incision which can be worn for 3-5 days, and it can be worn in the shower. Change every 1-2 days as needed if wet/dirty, and keep the wound covered for the first 7-10 days.  After this, you do not need a dressing. Do not soak in the bath, and no pools, hot tubs, or lakes for 6 weeks.     For postoperative pain control, I have prescribed a narcotic medication.  This should be taken for the first few weeks following surgery, but as soon as you are able, transition to an over-the-counter type medication such as Tylenol.  You may not take non-steroidal anti-inflammatory medications (eg: Advil, Ibuprofen, Aleve, others) for the first 3 months after surgery as it interferes with bone healing. While taking the narcotic medication, there are several precautions that you must adhere to. These medications have numerous side effects including nausea, constipation, and drowsiness. If you experience nausea, this may be relieved by taking the medication with food or a light meal. To avoid constipation, please use an over-the-counter stool softener or drink lots of water and eat fruits and vegetables. Avoid operating heavy machinery or driving an automobile while on narcotic medications.      You will be seen in the clinic at 6 weeks following surgery. You will not need to attend physical therapy during this time. You can focus on  cardiovascular fitness by walking as much as you can tolerate. Avoid bending, lifting, and twisting. Your weight lifting restriction is 10 pounds until your first follow-up appointment.      When you get home, you may resume your normal diet as tolerated. You may not be very hungry but try to eat small healthy meals to help you heal. Remember to drink plenty of water/fluids to help keep you hydrated.    Please call or return if you experience the following:  Fevers (temperature greater than 100.4 degrees Fahrenheit)  Pain that is getting worse or does not respond to pain medications  Drainage from your wound  Increasing redness about the wound  Any other worrisome symptoms    You may reach the clinic by dialing [phone].            Discharge Instructions       Please take your medications as prescribed. Stop taking your plavix as we have started you on Warfarin (Coumadin) because of the Atrial flutter you had. You will need to establish care with an anticoagulation clinic where you can get your INR checked frequently. You should see your cardiologist in clinic in the next month or so, because you will need a stress test outpatient. You will also need to see an Endocrinologist outpatient because of your pancreatitis (hypertriglyceridemia may be causing it). You need to follow up with your Nephrologist to discuss your worsening kidney function and you will need a follow up MRI in October, six months after the last one you had.    Home RN and PT has been ordered for you.                  Follow-up Appointments     Adult [facility] Follow-up and recommended labs and tests       Follow up with Dr. Rucker in clinic in 6 weeks as previously scheduled (7/24/18).    Appointments on [place] and/or [facility] (with [facility] or [facility] provider or service). Call [phone] if you have questions.            Adult [facility] Follow-up and recommended labs and tests       Follow up with Anticoagulation clinic within 1-2 days   to evaluate medication change. The following labs/tests are recommended: INR.  Follow up with primary care provider, SERAFIN SHABAZZ, within 7 days for hospital follow- up.  The following labs/tests are recommended: CBC, INR, BMP.    Follow up with Endocrinology clinic for hypertriglyceridemia, at [facility] within 2-4 weeks regarding new diagnosis of pancreatitis in the setting of elevated triglycerides. The following labs/tests are recommended: None  Follow up with Nephrology al, at [facility] within 2-4 weeks  regarding new diagnosis. The following labs/tests are recommended: BMP..    Appointments on [place] and/or [facility] (with [facility] or [facility] provider or service). Call [phone] if you haven't heard regarding these appointments within 7 days of discharge.            Follow Up and recommended labs and tests       Follow up with your cardiologist, at [facility] , within 1 month for hospital follow up. The following labs/tests are recommended: Stress test.    Get your INR level checked at the hospital near you. Goal INR is between 2 - 2.5. You should establish care with a convenient anticoagulation clinic near you.                  Your next 10 appointments already scheduled     Jul 17, 2018 12:00 PM CDT   Lab with [facility]    [facility] ([facility] [place])    [address]  1st Floor  [facility] [id number] [phone]            Jul 17, 2018  1:05 PM CDT   (Arrive by 12:35 PM)   Return Visit with Christi Sun MD   [facility] ([facility] [place])    [address]  [address]  [facility] [id number] [phone]            Jul 24, 2018  9:45 AM CDT   (Arrive by 9:15 AM)   RETURN SPINE with Harshal Rucker MD   [facility] ([facility] [place])    [address]  4th Floor  [facility] [id number] [phone]            Aug 07, 2018  3:00 PM CDT   Lab with [facility]    [facility] [place])    [address]  1st Floor  [facility] [address]   [phone]            Aug 07, 2018  4:00 PM CDT   (Arrive by 3:30 PM)   Return Visit with Christi Sun MD   [facility]phrology ([facility])    [address]  [address]  [facility] [address]   [phone]            Aug 30, 2018 10:30 AM CDT   (Arrive by 10:00 AM)   RETURN SPINE with Harshal Rucker MD   [facility] ([facility])    [address]  4th Floor  [facility] [address]   [phone]            Oct 15, 2018  9:00 AM CDT   MR ABDOMEN MRCP W/O & W CONTRAST with UUMR1   [facility] [place], MRI ([facility])    [address] [address]   [phone]           Take your medicines as usual, unless your doctor tells you not to. Bring a list of your current medicines to your exam (including vitamins, minerals and over-the-counter drugs). Also bring the results of similar scans you may have had.    You may or may not receive IV contrast for this exam pending the discretion of the Radiologist.   Do not eat or drink for 6 hours prior to exam.  The MRI machine uses a strong magnet. Please wear clothes without metal (snaps, zippers). A sweatsuit works well, or we may give you a hospital gown.  Please remove any body piercings and hair extensions before you arrive. You will also remove watches, jewelry, hairpins, wallets, dentures, partial dental plates and hearing aids. You may wear contact lenses, and you may be able to wear your rings. We have a safe place to keep your personal items, but it is safer to leave them at home.  **IMPORTANT** THE INSTRUCTIONS BELOW ARE ONLY FOR THOSE PATIENTS WHO HAVE BEEN PRESCRIBED SEDATION OR GENERAL ANESTHESIA DURING THEIR MRI PROCEDURE:  IF YOUR DOCTOR PRESCRIBED ORAL SEDATION (take medicine to help you relax during your  exam):   You must get the medicine from your doctor (oral medication) before you arrive. Bring the medicine to the exam. Do not take it at home. You ll be told when to take it upon arriving for your exam.   Arrive one hour early. Bring someone who can take you home after the test. Your medicine will make you sleepy. After the exam, you may not drive, take a bus or take a taxi by yourself.  IF YOUR DOCTOR PRESCRIBED IV SEDATION:   Arrive one hour early. Bring someone who can take you home after the test. Your medicine will make you sleepy. After the exam, you may not drive, take a bus or take a taxi by yourself.   No eating 6 hours before your exam. You may have clear liquids up until 4 hours before your exam. (Clear liquids include water, clear tea, black coffee and fruit juice without pulp.)  IF YOUR DOCTOR PRESCRIBED ANESTHESIA (be asleep for your exam):   Arrive 1 1/2 hours early. Bring someone who can take you home after the test. You may not drive, take a bus or take a taxi by yourself.   No eating 8 hours before your exam. You may have clear liquids up until 4 hours before your exam. (Clear liquids include water, clear tea, black coffee and fruit juice without pulp.)   You will spend four to five hours in the recovery room.  If you have any questions, please contact your Imaging Department exam site.            Oct 15, 2018 10:00 AM CDT   (Arrive by 9:45 AM)   New Patient Visit with Shade Manning MD   [facility] ([facility])    [address] [place] [address]  [address]  [facility] [address] [phone]              Additional Services     Home Care PT Referral for Hospital Discharge       PT to eval and treat    Your provider has ordered home care - physical therapy. If you have not been contacted within 2 days of your discharge please call the department phone number listed on the top of this document.            Home care nursing referral       RN skilled  nursing visit. RN to help with medications and blood draws    Your provider has ordered home care nursing services. If you have not been contacted within 2 days of your discharge please call the inpatient department phone number at [phone] .            INR Clinic Referral       [facility]    Indication: Atrial Fibrillation  Therapy Goal: INR 2-3  Patient plans to have INR's drawn at the [facility]                  Future tests that were ordered for you     Assign Questionnaire Series to Patient                 Warfarin Instruction     You have started taking a medicine called warfarin. This is a blood-thinning medicine (anticoagulant). It helps prevent and treat blood clots.      Before leaving the hospital, make sure you know how much to take and how long to take it.      You will need regular blood tests to make sure your blood is clotting safely. It is very important to see your doctor for regular blood tests.    Talk to your doctor before taking any new medicine (this includes over-the-counter drugs and herbal products). Many medicines can interact with warfarin. This may cause more bleeding or too much clotting.     Eating a lot of vitamin K--found in green, leafy vegetables--can change the way warfarin works in your body. Do NOT avoid these foods. Instead, try to eat the same amount each day.     Bleeding is the most common side-effect of warfarin. You may notice bleeding gums, a bloody nose, bruises and bleeding longer when you cut yourself. See a doctor at once if:   o You cough up blood  o You find blood in your stool (poop)  o You have a deep cut, or a cut that bleeds longer than 10 minutes   o You have a bad cut, hard fall, accident or hit your head (go to urgent care or the emergency room).    For women who can get pregnant: This medicine can harm an unborn baby. Be very careful not to get pregnant while taking this medicine. If you think you  "might be pregnant, call your doctor right away.    For more information, read \"Guide to Warfarin Therapy,  the booklet you received in the hospital.        Pending Results     Date and Time Order Name Status Description    6/16/2018 1058 EKG 12-lead, tracing only Preliminary     6/13/2018 2135 Blood culture Preliminary     6/13/2018 2135 Blood culture Preliminary     6/13/2018 2126 Troponin I In process     6/11/2018 1242 EKG 12-lead, complete Preliminary             Statement of Approval     Ordered          06/17/18 1412  I have reviewed and agree with all the recommendations and orders detailed in this document.  EFFECTIVE NOW     Approved and electronically signed by:  Cleo Vargas MD             Admission Information     Date & Time Department Dept. Phone    6/11/2018 [address] [facility] [phone]      Your Vitals Were     Blood Pressure Pulse Temperature Respirations Height Weight    133/83 (BP Location: Right arm) 112 98.6  F (37  C) (Oral) 14 1.854 m (6' 1\") 106.9 kg (235 lb 9.6 oz)    Pulse Oximetry BMI (Body Mass Index)                92% 31.08 kg/m2          [employer] Information     "[employer]" gives you secure access to your electronic health record. If you see a primary care provider, you can also send messages to your care team and make appointments. If you have questions, please call your primary care clinic.  If you do not have a primary care provider, please call [phone] and they will assist you.        Care EveryWhere ID     This is your Care EveryWhere ID. This could be used by other organizations to access your [employer] medical records  [id number]        Equal Access to Services     FIDELIA JACOBSEN : Hadii av Doss, wanguyenda tuanadaha, qaybta kaaldevin bustos. So [facility] [phone].    ATENCIÓN: Si habla español, tiene a carr disposición servicios gratuitos de asistencia lingüística. Llame al [phone].    We comply with " applicable federal civil rights laws and [place] laws. We do not discriminate on the basis of race, color, national origin, age, disability, sex, sexual orientation, or gender identity.               Review of your medicines      START taking        Dose / Directions    oxyCODONE IR 5 MG tablet   Commonly known as:  ROXICODONE   Used for:  Spinal stenosis of lumbar region with neurogenic claudication        Dose:  5-10 mg   Take 1-2 tablets (5-10 mg) by mouth every 4 hours as needed for other (pain control or improvement in physical function. Hold dose for analgesic side effects.)   Quantity:  80 tablet   Refills:  0       warfarin 5 MG tablet   Commonly known as:  COUMADIN   Used for:  Paroxysmal atrial fibrillation (H)        Dose:  5 mg   Take 1 tablet (5 mg) by mouth daily   Quantity:  30 tablet   Refills:  1         CONTINUE these medicines which may have CHANGED, or have new prescriptions. If we are uncertain of the size of tablets/capsules you have at home, strength may be listed as something that might have changed.        Dose / Directions    allopurinol 100 MG tablet   Commonly known as:  ZYLOPRIM   This may have changed:    - how much to take  - when to take this   Used for:  Kidney replaced by transplant, Gout        Dose:  200 mg   Take 2 tablets by mouth daily.   Quantity:  180 tablet   Refills:  3       losartan 50 MG tablet   Commonly known as:  COZAAR   This may have changed:  how much to take   Used for:  Kidney replaced by transplant        Dose:  25 mg   Take 0.5 tablets (25 mg) by mouth daily   Quantity:  90 tablet   Refills:  3       tamsulosin 0.4 MG capsule   Commonly known as:  FLOMAX   This may have changed:    - medication strength  - when to take this   Used for:  Benign prostatic hyperplasia, unspecified whether lower urinary tract symptoms present        Dose:  0.4 mg   Take 1 capsule (0.4 mg) by mouth daily   Quantity:  60 capsule   Refills:  1         CONTINUE these medicines which  have NOT CHANGED        Dose / Directions    ALPHAGAN OP        Dose:  1 drop   Place 1 drop into the right eye 2 times daily   Refills:  0       BABY ASPIRIN PO        Dose:  81 mg   Take 81 mg by mouth daily   Refills:  0       BEVACizumab 400 MG/16ML injection   Commonly known as:  AVASTIN        Every 5 weeks   Refills:  0       cholecalciferol 5000 units Tabs tablet   Commonly known as:  vitamin D3   Used for:  Hyperparathyroidism due to renal insufficiency (H)        Dose:  5000 Units   Take 1 tablet (5,000 Units) by mouth daily   Quantity:  30 tablet   Refills:  0       CRESTOR PO        Dose:  20 mg   Take 20 mg by mouth every evening   Refills:  0       * cycloSPORINE modified 25 MG capsule   Commonly known as:  GENERIC EQUIVALENT   Used for:  Kidney transplant recipient, Long-term use of immunosuppressant medication        Dose:  50 mg   Take 2 capsules (50 mg) by mouth 2 times daily Total dose 150 mg twice daily   Quantity:  180 capsule   Refills:  3       * cycloSPORINE modified 100 MG capsule   Commonly known as:  GENERIC EQUIVALENT   Used for:  Kidney transplant recipient, Long-term use of immunosuppressant medication        Dose:  100 mg   Take 1 capsule (100 mg) by mouth 2 times daily Total dose 150 mg twice daily   Quantity:  180 capsule   Refills:  3       epoetin freya 58604 UNIT/ML injection   Commonly known as:  PROCRIT   Used for:  CKD (chronic kidney disease) stage 4, GFR 15-29 ml/min (H)        Dose:  94075 Units   Inject 1 mL (10,000 Units) Subcutaneous once a week Please make sure Hgb has been checked within 4 days of dose, use as needed for Hgb<10.  If Hgb increases >1 point in 2 weeks, SYSTOLIC BP > 180 mmHg, OR Hgb >=10 g/dL, HOLD DOSE.  Per anemia protocol with Christi Sun MD.   Quantity:  4 mL   Refills:  5       fenofibrate 145 MG tablet        Dose:  145 mg   Take 145 mg by mouth daily   Refills:  0       ISOSORBIDE MONONITRATE PO        Dose:  60 mg   Take 60 mg by mouth daily    Refills:  0       loperamide 2 MG capsule   Commonly known as:  IMODIUM        Dose:  2 mg   Take 2 mg by mouth daily   Refills:  0       metoprolol tartrate 100 MG tablet   Commonly known as:  LOPRESSOR   Used for:  Unspecified hypertensive kidney disease with chronic kidney disease stage I through stage IV, or unspecified(403.90), Kidney replaced by transplant        Dose:  100 mg   Take 1 tablet by mouth 2 times daily.   Quantity:  60 tablet   Refills:  6       NIFEdipine ER 90 MG Tb24   Commonly known as:  ADALAT CC        Dose:  90 mg   Take 90 mg by mouth daily   Refills:  0       NITROSTAT 0.4 MG sublingual tablet   Generic drug:  nitroGLYcerin        Dose:  0.4 mg   Place 0.4 mg under the tongue every 5 minutes as needed.   Refills:  0       NONFORMULARY        Dose:  1 tablet   Take 1 tablet by mouth 2 times daily Focus Macula Pro:  Eye vitamin and mineral supplement A, C, E, Zinc, Copper   Refills:  0       omega-3 acid ethyl esters 1 g capsule   Commonly known as:  Lovaza        Dose:  2 g   Take 2 g by mouth 2 times daily   Refills:  0       PANTOPRAZOLE SODIUM PO        Dose:  40 mg   Take 40 mg by mouth daily as needed for heartburn   Refills:  0       predniSONE 5 MG tablet   Commonly known as:  DELTASONE        Dose:  5 mg   Take 5 mg by mouth daily (with lunch)   Refills:  0       probenecid 500 MG tablet   Commonly known as:  BENEMID        Dose:  500 mg   Take 500 mg by mouth 2 times daily.   Refills:  0       sulfamethoxazole-trimethoprim 400-80 MG per tablet   Commonly known as:  BACTRIM/SEPTRA        Dose:  1 tablet   Take 1 tablet by mouth every other day   Refills:  0       TYLENOL ARTHRITIS PAIN 650 MG CR tablet   Generic drug:  acetaminophen        Dose:  2 tablet   Take 2 tablets by mouth 3 times daily.   Refills:  0       * Notice:  This list has 2 medication(s) that are the same as other medications prescribed for you. Read the directions carefully, and ask your doctor or other care  provider to review them with you.      STOP taking     PLAVIX 75 MG tablet   Generic drug:  clopidogrel                Where to get your medicines      These medications were sent to [facility] - [place], [place] - [address]  [address], [facility] [address]     Phone:  [phone]     losartan 50 MG tablet    tamsulosin 0.4 MG capsule    warfarin 5 MG tablet         Some of these will need a paper prescription and others can be bought over the counter. Ask your nurse if you have questions.     Bring a paper prescription for each of these medications     oxyCODONE IR 5 MG tablet                Protect others around you: Learn how to safely use, store and throw away your medicines at www.disposemymeds.org.        Information about OPIOIDS     PRESCRIPTION OPIOIDS: WHAT YOU NEED TO KNOW   We gave you an opioid (narcotic) pain medicine. It is important to manage your pain, but opioids are not always the best choice. You should first try all the other options your care team gave you. Take this medicine for as short a time (and as few doses) as possible.     These medicines have risks:    DO NOT drive when on new or higher doses of pain medicine. These medicines can affect your alertness and reaction times, and you could be arrested for driving under the influence (DUI). If you need to use opioids long-term, talk to your care team about driving.    DO NOT operate heave machinery    DO NOT do any other dangerous activities while taking these medicines.     DO NOT drink any alcohol while taking these medicines.      If the opioid prescribed includes acetaminophen, DO NOT take with any other medicines that contain acetaminophen. Read all labels carefully. Look for the word  acetaminophen  or  Tylenol.  Ask your pharmacist if you have questions or are unsure.    You can get addicted to pain medicines, especially if you have a history of addiction (chemical, alcohol or substance  dependence). Talk to your care team about ways to reduce this risk.    Store your pills in a secure place, locked if possible. We will not replace any lost or stolen medicine. If you don t finish your medicine, please throw away (dispose) as directed by your pharmacist. The [place] [employer] has more information about safe disposal: https://www.pca.[facility].[place]/living-green/managing-unwanted-medications.     All opioids tend to cause constipation. Drink plenty of water and eat foods that have a lot of fiber, such as fruits, vegetables, prune juice, apple juice and high-fiber cereal. Take a laxative (Miralax, milk of magnesia, Colace, Senna) if you don t move your bowels at least every other day.              Medication List: This is a list of all your medications and when to take them. Check marks below indicate your daily home schedule. Keep this list as a reference.      Medications           Morning Afternoon Evening Bedtime As Needed    allopurinol 100 MG tablet   Commonly known as:  ZYLOPRIM   Take 2 tablets by mouth daily.   Last time this was given:  100 mg on 6/17/2018  9:44 AM                                   ALPHAGAN OP   Place 1 drop into the right eye 2 times daily   Last time this was given:  1 drop on 6/17/2018  9:44 AM                                      BABY ASPIRIN PO   Take 81 mg by mouth daily   Last time this was given:  81 mg on 6/17/2018  9:43 AM                                   BEVACizumab 400 MG/16ML injection   Commonly known as:  AVASTIN   Every 5 weeks                                cholecalciferol 5000 units Tabs tablet   Commonly known as:  vitamin D3   Take 1 tablet (5,000 Units) by mouth daily                                   CRESTOR PO   Take 20 mg by mouth every evening   Last time this was given:  20 mg on 6/16/2018  7:39 PM                                   * cycloSPORINE modified 25 MG capsule   Commonly known as:  GENERIC EQUIVALENT   Take 2 capsules (50 mg)  by mouth 2 times daily Total dose 150 mg twice daily   Last time this was given:  125 mg on 6/17/2018  9:43 AM                                      * cycloSPORINE modified 100 MG capsule   Commonly known as:  GENERIC EQUIVALENT   Take 1 capsule (100 mg) by mouth 2 times daily Total dose 150 mg twice daily   Last time this was given:  125 mg on 6/17/2018  9:43 AM                                      epoetin freya 87505 UNIT/ML injection   Commonly known as:  PROCRIT   Inject 1 mL (10,000 Units) Subcutaneous once a week Please make sure Hgb has been checked within 4 days of dose, use as needed for Hgb<10.  If Hgb increases >1 point in 2 weeks, SYSTOLIC BP > 180 mmHg, OR Hgb >=10 g/dL, HOLD DOSE.  Per anemia protocol with Christi Sun MD.   Last time this was given:  10,000 Units on 6/17/2018  1:06 PM                                fenofibrate 145 MG tablet   Take 145 mg by mouth daily                                   ISOSORBIDE MONONITRATE PO   Take 60 mg by mouth daily                                   loperamide 2 MG capsule   Commonly known as:  IMODIUM   Take 2 mg by mouth daily   Last time this was given:  2 mg on 6/17/2018  9:45 AM                                   losartan 50 MG tablet   Commonly known as:  COZAAR   Take 0.5 tablets (25 mg) by mouth daily                                metoprolol tartrate 100 MG tablet   Commonly known as:  LOPRESSOR   Take 1 tablet by mouth 2 times daily.   Last time this was given:  100 mg on 6/17/2018  9:45 AM                                      NIFEdipine ER 90 MG Tb24   Commonly known as:  ADALAT CC   Take 90 mg by mouth daily   Last time this was given:  90 mg on 6/17/2018  9:46 AM                                   NITROSTAT 0.4 MG sublingual tablet   Place 0.4 mg under the tongue every 5 minutes as needed.   Generic drug:  nitroGLYcerin                                NONFORMULARY   Take 1 tablet by mouth 2 times daily Focus Macula Pro:  Eye vitamin and mineral  supplement A, C, E, Zinc, Copper                                omega-3 acid ethyl esters 1 g capsule   Commonly known as:  Lovaza   Take 2 g by mouth 2 times daily   Last time this was given:  2 g on 6/15/2018  8:48 PM                                      oxyCODONE IR 5 MG tablet   Commonly known as:  ROXICODONE   Take 1-2 tablets (5-10 mg) by mouth every 4 hours as needed for other (pain control or improvement in physical function. Hold dose for analgesic side effects.)   Last time this was given:  5 mg on 6/13/2018 10:34 AM                                   PANTOPRAZOLE SODIUM PO   Take 40 mg by mouth daily as needed for heartburn   Last time this was given:  40 mg on 6/17/2018  9:46 AM                                predniSONE 5 MG tablet   Commonly known as:  DELTASONE   Take 5 mg by mouth daily (with lunch)   Last time this was given:  5 mg on 6/17/2018 11:06 AM                                   probenecid 500 MG tablet   Commonly known as:  BENEMID   Take 500 mg by mouth 2 times daily.   Last time this was given:  500 mg on 6/17/2018 11:05 AM                                      sulfamethoxazole-trimethoprim 400-80 MG per tablet   Commonly known as:  BACTRIM/SEPTRA   Take 1 tablet by mouth every other day   Last time this was given:  1 tablet on 6/16/2018  8:06 AM                                tamsulosin 0.4 MG capsule   Commonly known as:  FLOMAX   Take 1 capsule (0.4 mg) by mouth daily   Last time this was given:  0.4 mg on 6/17/2018 11:05 AM                                TYLENOL ARTHRITIS PAIN 650 MG CR tablet   Take 2 tablets by mouth 3 times daily.   Generic drug:  acetaminophen                                      warfarin 5 MG tablet   Commonly known as:  COUMADIN   Take 1 tablet (5 mg) by mouth daily   Last time this was given:  5 mg on 6/16/2018  7:39 PM                                * Notice:  This list has 2 medication(s) that are the same as other medications prescribed for you. Read the  directions carefully, and ask your doctor or other care provider to review them with you.

## 2018-06-11 NOTE — IP AVS SNAPSHOT
[address] [address]    Phone:  [phone]                                       After Visit Summary   6/11/2018    Rory Bustamante    MRN: [id number]           After Visit Summary Signature Page     I have received my discharge instructions, and my questions have been answered. I have discussed any challenges I see with this plan with the nurse or doctor.    ..........................................................................................................................................  Patient/Patient Representative Signature      ..........................................................................................................................................  Patient Representative Print Name and Relationship to Patient    ..................................................               ................................................  Date                                            Time    ..........................................................................................................................................  Reviewed by Signature/Title    ...................................................              ..............................................  Date                                                            Time

## 2018-06-11 NOTE — ANESTHESIA POSTPROCEDURE EVALUATION
Patient: Rory Bustamante    Procedure(s):  Minimally Invasive Surgery Laminectomies Lumbar 2-3,Lumbar 3-4,Lumbar 4-5 - Wound Class: I-Clean    Diagnosis:Stenosis  Diagnosis Additional Information: No value filed.    Anesthesia Type:  General, ETT    Note:  Anesthesia Post Evaluation    Patient location during evaluation: PACU and Bedside  Patient participation: Able to fully participate in evaluation  Level of consciousness: awake and alert  Pain management: adequate  Airway patency: patent  Cardiovascular status: acceptable  Respiratory status: acceptable  Hydration status: balanced  PONV: none     Anesthetic complications: None    Comments: Pt arrived from the OR diaphoretic and N/V.  Pt with h/o renal tx and CAD s/p total 2 stents on 2011/2012.  VSS.  Stat EKG new first degree AV block.  , normal range.  Troponin normal.   Pt does not have chest pain.  All lung fields clear to auscultation.  Pt had not reported h/o PONV preop, he does have trouble with PONV.  I consulted Dr. Burrows on the [facility] hospitalist team.  Pt is stable enough to go to the floor. He is going to be on telemetry on 10A.  Intraop pt had a 320 ml blood loss, received 600 ml of NS fluid, urine output of 380 mls.  Postop K, CR and BUN minimally changed.  Per the pt's wife the pt is minimally compliant with nephrology follow up appts.  I talked to his nephrologist, Christi Sun MD on the [facility].  His next appt was to be in Aug.  Her office is setting up an appt with the pt in the next two weeks.  Dr. Rucker is aware of the pt's condition and agrees with my plan.          Last vitals:  Vitals:    06/11/18 1415 06/11/18 1430 06/11/18 1445   BP: 151/89 (!) 159/93 157/90   Pulse:      Resp: 11 11 13   Temp:  36.4  C (97.5  F)    SpO2: 96% 98% 98%         Electronically Signed By: Melodie Blank MD  June 11, 2018  2:52 PM

## 2018-06-11 NOTE — CONSULTS
HOSPITALIST CONSULTATION     REQUESTING PHYSICIAN: Harshal Rucker MD    REASON FOR CONSULTATION: Evaluation, Recommendations and co management of Medical Comorbidities.     ASSESSMENT & PLAN :     Rory Bustamante is a 62 year old male admitted on 6/11/2018 following procedure, s/p     Pre-operative diagnosis:  Stenosis  Procedure(s):  LAMINECTOMY LUMBAR MINIMALLY INVASIVE THREE+ LEVELS      Anaesthesia: Gen  Surgeon: Dr. Rucker  Assist: Shaun Leonard MD  EBL: 325 mL  Complications: none       ROS/MED HX     ENT/Pulmonary:  - neg pulmonary ROS   (+)tobacco use, , . .    Neurologic:     (+)neuropathy - feet, other neuro lumbar stenosis, neurogenic claudication    Cardiovascular:     (+) Dyslipidemia, hypertension--CAD, -past MI,-stent,last 2012  2 Drug Eluting Stent .. Taking blood thinners Pt has received instructions: Instructions Given to patient: Will hold Plavix 5 days, ASA 81 mg. . . :. . Previous cardiac testing Echodate:2017results:date: results:ECG reviewed date:12/8/17 results:SR, LADCath date: 2012 results:        METS/Exercise Tolerance:  3 - Able to walk 1-2 blocks without stopping   Hematologic:     (+) Anemia, History of Transfusion no previous transfusion reaction Other Hematologic Disorder-Procrit and 2 recent iron infusions    Musculoskeletal:   (+) arthritis, , , -     GI/Hepatic:     (+) GERD Asymptomatic on medication,     Renal/Genitourinary: Comment: To be listed for transplant again    (+) chronic renal disease, type: ESRD and CRI, Pt does not require dialysis, Pt has history of transplant, date: 1989,     Endo:     (+) Chronic steroid usage for Post Transplant Immunosuppression Date most recently used: 5/14/18,.    Psychiatric:  - neg psychiatric ROS     Infectious Disease:  - neg infectious disease ROS     Malignancy:   (+) Malignancy History of Skin  Skin CA Remission status post Surgery,       Other: Comment: Gout   (+) No chance of pregnancy  C-spine cleared: N/A, H/O Chronic Pain,no other significant disability                PMH, Pre Op eval reviewed. At current feels bit cold, otherwise no other concern  Feels tired and does not want to talk much.     # CVS;   --HLD. Fenofibrate (hold for his renal function, as suggested by Pharm D). Rosuvastatin.   -- HTN.  Continue  Metoprolol and Nifedipine, but hold Losartan.   -- CAD, s/p MI and stents. Ct  Isosorbide . Per Pre OP: ASA 81 mg and will remain on. Confirmed with Dr. Rucker. held Plavix for 5 days prior to surgery.  Resume asap safe from surgical stand point.    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 Kidney now failing.   Continue  Cyclosporine, Prednisone, and Bactrim . Pre Op;  Cr 4.15. GFR 18. Good urine output.  History of urinary retention after surgery. Monitor PVR. Ct Tamsulosin.    --Chronic Prednisone use. Ct pta prednisone. Consider stress dosing for any e/o hypotension or adrenal insuff.     -- h/o pancreatitis, likely secondary to hypertriglyceridemia in 2/2018.  Low attenuation mass seen in the pancreas CT. Consult with Dr. Diaz on 4/16/18 with no worrisome findings. Follow up with  GI. Asymptomatic at current.   --Chronic anemia, pre op Hgb 10.6. fu Hgb, transfuse for <7.0.                           --Gout. ct  allopurinol. Probenecid.    --GERD. Protonix prn.    # Orthopedics: POD 0  Hemodynamics: stable.   continue on gentle IV fluids, until adequate PO.   Monitor hgb for anemia of acute blood loss. Transfuse for hgb <7.0    Analgesia: Per Orthopedic team.   DVT prophylaxis:- Per orthopedic team  Activity per orthopedic team.   Antibiotics and wound care as per primary team.   Encourage Incentive spirometry to prevent atelectasis   Minimize use of narcotics as able. Consider bowel regimen with narcotics.       Thank you for letting us get involved in care of Mr Bustamante  Please page with any questions.      Abrahan Schumacher MD    Hospitalist (Internal Medicine)  [facility]  Pager: [phone].     CHIEF COMPLAINT: Chills.     HISTORY OF PRESENT ILLNESS: Obtained from the patient and chart review including Pre op evaluation, procedure note.    62 year old year old male  with above discussed medical problems s/p above procedure admitted on 6/11/2018  for post op care and monitoring  (for further details for indication of surgery and operative note, please refer to Harshal Rucker MD note). Medicine consulted to evaluate, recommend and/or co manage medical co morbidities.   No documented hypotension, hypoxemia or other significant complications intra or post operative.   Currently: Incisional Pain controlled. Denies any chest pain, shortness of breath or LH or palpitations. Denies any nausea, vomiting or pain abdomen. No fever. Denies any dysuria.  Denies any new rash.   Medical issues as discussed above.   Denies any other medical concern.     PAST MEDICAL HISTORY:   Past Medical History:   Diagnosis Date     Angina effort (H) 8/2016     Arthritis      BPH (benign prostatic hyperplasia)      CAD (coronary artery disease) July 2011    MI in July 2011, stent placed, on plavix and aspirin     Glaucoma      Gout     Uric acid as high as 11 previously, now on allopurinol and probenecid     HTN (hypertension)      Hypercholesteremia 2015     Hypertriglyceridemia 2015     Intraocular bleeding     previously treated [facility] Avastin     Macular degeneration      Neuropathy     Limited sensation bilateral knees to feet     Presence of stent in coronary artery August 2012     Proteinuria 11/2015    recurrent MPGN on biopsy     S/P kidney transplant     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  HLA identical transplant.  Biopsy in 4/2008 with recurrent MPGN type 2, mild interstitial fibrosis and tubular atrophy, CNI toxicity     Spinal stenosis 2013     Warts        PAST SURGICAL HISTORY:   Past Surgical History:   Procedure Laterality Date      C TOTAL KNEE ARTHROPLASTY  2015     TONSILLECTOMY       TRANSPLANT KIDNEY RECIPIENT LIVING RELATED  1989    HLA identical     VASECTOMY         FH: reviewed.     Family History   Problem Relation Age of Onset     DIABETES Mother      Colon Cancer Mother 78     Cardiac Sudden Death Sister 87     DIABETES Brother      CEREBROVASCULAR DISEASE Brother         SH: reviewed.     Social History     Social History     Marital status:      Spouse name: N/A     Number of children: N/A     Years of education: N/A     Social History Main Topics     Smoking status: Never Smoker     Smokeless tobacco: Never Used     Alcohol use No     Drug use: No     Sexual activity: Not Asked     Other Topics Concern     None     Social History Narrative       ALLERGIES:   Allergies   Allergen Reactions     Contrast Dye Other (See Comments) and Itching     Pt reports sneezing     Pravastatin Muscle Pain (Myalgia)     Simvastatin Muscle Pain (Myalgia)         HOME MEDICATIONS:     Prior to Admission medications    Medication Sig Start Date End Date Taking? Authorizing Provider   acetaminophen (TYLENOL ARTHRITIS PAIN) 650 MG CR tablet Take 2 tablets by mouth 3 times daily.   Yes Reported, Patient   allopurinol (ZYLOPRIM) 100 MG tablet Take 2 tablets by mouth daily.  Patient taking differently: Take 100 mg by mouth 2 times daily  1/14/13  Yes Christi Sun MD   Brimonidine Tartrate (ALPHAGAN OP) Place 1 drop into the right eye 2 times daily    Yes Reported, Patient   cycloSPORINE modified (GENERIC EQUIVALENT) 100 MG capsule Take 1 capsule (100 mg) by mouth 2 times daily Total dose 150 mg twice daily 5/4/18  Yes Christi Sun MD   cycloSPORINE modified (GENERIC EQUIVALENT) 25 MG capsule Take 2 capsules (50 mg) by mouth 2 times daily Total dose 150 mg twice daily 5/4/18  Yes Christi Sun MD   epoetin freya (PROCRIT) 53818 UNIT/ML injection Inject 1 mL (10,000 Units) Subcutaneous once a week Please make sure Hgb has been checked  within 4 days of dose, use as needed for Hgb<10.  If Hgb increases >1 point in 2 weeks, SYSTOLIC BP > 180 mmHg, OR Hgb >=10 g/dL, HOLD DOSE.  Per anemia protocol with Christi Sun MD. 2/9/18  Yes Christi Sun MD   fenofibrate 145 MG tablet Take 145 mg by mouth daily   Yes Reported, Patient   ISOSORBIDE MONONITRATE PO Take 60 mg by mouth daily    Yes Reported, Patient   loperamide (IMODIUM) 2 MG capsule Take 2 mg by mouth daily    Yes Reported, Patient   losartan (COZAAR) 50 MG tablet Take 1 tablet (50 mg) by mouth daily 7/18/17  Yes Christi Sun MD   metoprolol (LOPRESSOR) 100 MG tablet Take 1 tablet by mouth 2 times daily. 6/13/12  Yes Christi Sun MD   NIFEdipine ER (ADALAT CC) 90 MG TB24 Take 90 mg by mouth daily    Yes Reported, Patient   NONFORMULARY Take 1 tablet by mouth 2 times daily Focus Macula Pro:  Eye vitamin and mineral supplement A, C, E, Zinc, Copper    Yes Reported, Patient   omega-3 acid ethyl esters (LOVAZA) 1 G capsule Take 2 g by mouth 2 times daily  11/22/17  Yes Reported, Patient   PANTOPRAZOLE SODIUM PO Take 40 mg by mouth daily as needed for heartburn   Yes Unknown, Entered By History   predniSONE (DELTASONE) 5 MG tablet Take 5 mg by mouth daily (with lunch)    Yes Reported, Patient   probenecid (BENEMID) 500 MG tablet Take 500 mg by mouth 2 times daily.   Yes Reported, Patient   Rosuvastatin Calcium (CRESTOR PO) Take 20 mg by mouth every evening   Yes Unknown, Entered By History   sulfamethoxazole-trimethoprim (BACTRIM,SEPTRA) 400-80 MG per tablet Take 1 tablet by mouth every other day    Yes Reported, Patient   TAMSULOSIN HCL PO Take 0.4 mg by mouth 2 times daily    Yes Reported, Patient   BABY ASPIRIN PO Take 81 mg by mouth daily    Reported, Patient   BEVACizumab (AVASTIN) 400 MG/16ML injection Every 5 weeks 10/6/16   Reported, Patient   cholecalciferol (VITAMIN D3) 5000 UNITS TABS tablet Take 1 tablet (5,000 Units) by mouth daily 11/17/15   Christi Sun MD    clopidogrel (PLAVIX) 75 MG tablet Take 75 mg by mouth daily.    Reported, Patient   nitroGLYCERIN (NITROSTAT) 0.4 MG SL tablet Place 0.4 mg under the tongue every 5 minutes as needed.    Reported, Patient       CURRENT MEDICATIONS:    Current Facility-Administered Medications   Medication     [START ON 6/14/2018] acetaminophen (TYLENOL) tablet 650 mg     acetaminophen (TYLENOL) tablet 975 mg     allopurinol (ZYLOPRIM) tablet 100 mg     aspirin chewable tablet 81 mg     ceFAZolin (ANCEF) 1 g vial to attach to  ml bag for ADULT or 50 ml bag for PEDS     [START ON 6/12/2018] cholecalciferol (vitamin D3) capsule CAPS 5,000 Units     cyclobenzaprine (FLEXERIL) tablet 10 mg     cycloSPORINE modified (GENERIC EQUIVALENT) capsule 100 mg     cycloSPORINE modified (GENERIC EQUIVALENT) capsule 50 mg     fenofibrate tablet 145 mg     HYDROmorphone (PF) (DILAUDID) injection 0.3-0.5 mg     hydrOXYzine (ATARAX) tablet 25 mg     isosorbide mononitrate (ISMO/MONOKET) tablet 60 mg     lidocaine (LMX4) kit     lidocaine 1 % 1 mL     loperamide (IMODIUM) capsule 2 mg     [START ON 6/12/2018] melatonin tablet 1 mg     metoprolol tartrate (LOPRESSOR) tablet 100 mg     naloxone (NARCAN) injection 0.1-0.4 mg     NIFEdipine ER osmotic (PROCARDIA XL) 24 hr tablet 90 mg     nitroGLYcerin (NITROSTAT) sublingual tablet 0.4 mg     NONFORMULARY 1 tablet     omega-3 acid ethyl esters (Lovaza) capsule 2 g     ondansetron (ZOFRAN-ODT) ODT tab 4 mg    Or     ondansetron (ZOFRAN) injection 4 mg     oxyCODONE IR (ROXICODONE) tablet 5-10 mg     pantoprazole (PROTONIX) EC tablet 40 mg     [START ON 6/12/2018] predniSONE (DELTASONE) tablet 5 mg     probenecid (BENEMID) tablet 500 mg     prochlorperazine (COMPAZINE) injection 10 mg    Or     prochlorperazine (COMPAZINE) tablet 10 mg     rosuvastatin (CRESTOR) tablet 20 mg     senna-docusate (SENOKOT-S;PERICOLACE) 8.6-50 MG per tablet 1 tablet    Or     senna-docusate (SENOKOT-S;PERICOLACE) 8.6-50  "MG per tablet 2 tablet     sodium chloride (PF) 0.9% PF flush 3 mL     sodium chloride (PF) 0.9% PF flush 3 mL     [START ON 2018] sulfamethoxazole-trimethoprim (BACTRIM/SEPTRA) 400-80 MG per tablet 1 tablet     tamsulosin (FLOMAX) capsule 0.4 mg         ROS: 10 point ROS neg other than the symptoms noted above in the HPI.    PHYSICAL EXAMINATION:     /89  Pulse 73  Temp 97.5  F (36.4  C) (Oral)  Resp 10  Ht 1.854 m (6' 1\")  Wt 106.4 kg (234 lb 9.1 oz)  SpO2 98%  BMI 30.95 kg/m2  Temp (24hrs), Av.9  F (36.6  C), Min:97.5  F (36.4  C), Max:98.6  F (37  C)      BMI= Body mass index is 30.95 kg/(m^2).      Intake/Output Summary (Last 24 hours) at 18 1642  Last data filed at 18 1400   Gross per 24 hour   Intake              585 ml   Output              725 ml   Net             -140 ml       General: Alert, interactive, tired.   HEENT: AT/NC. PERRLA. Anicteric.Moist MM.    Neck: Supple. No JVD. No Lymphadenopathy.  Heart/CVS: Normal S1 and S2. Regular. No m/r/g .   Chest/Respi: Non labored breathing. CTA BL.   Abdomen/GI: Soft, non distended, non tender.  Extremities/MSK: Distal pulses 2+, well perfused.bl le edema.  Rest per ortho.   Neuro: Alert and oriented x4. Cranial nerves II-XII are grossly intact.    Skin:  No new rash over exposed areas.       LABORATORY DATA: reviewed.     Recent Results (from the past 24 hour(s))   Creatinine    Collection Time: 18  6:09 AM   Result Value Ref Range    Creatinine 4.30 (H) 0.66 - 1.25 mg/dL    GFR Estimate 14 (L) >60 mL/min/1.7m2    GFR Estimate If Black 17 (L) >60 mL/min/1.7m2   Potassium    Collection Time: 18  6:09 AM   Result Value Ref Range    Potassium 5.0 3.4 - 5.3 mmol/L   Glucose    Collection Time: 18  6:09 AM   Result Value Ref Range    Glucose 113 (H) 70 - 99 mg/dL   EKG 12-lead, complete    Collection Time: 18 12:50 PM   Result Value Ref Range    Interpretation ECG Click View Image link to view waveform and " result    Basic metabolic panel    Collection Time: 06/11/18  1:18 PM   Result Value Ref Range    Sodium 133 133 - 144 mmol/L    Potassium 5.3 3.4 - 5.3 mmol/L    Chloride 101 94 - 109 mmol/L    Carbon Dioxide 19 (L) 20 - 32 mmol/L    Anion Gap 13 3 - 14 mmol/L    Glucose 147 (H) 70 - 99 mg/dL    Urea Nitrogen 73 (H) 7 - 30 mg/dL    Creatinine 4.47 (H) 0.66 - 1.25 mg/dL    GFR Estimate 14 (L) >60 mL/min/1.7m2    GFR Estimate If Black 16 (L) >60 mL/min/1.7m2    Calcium 8.9 8.5 - 10.1 mg/dL   CBC with platelets    Collection Time: 06/11/18  1:18 PM   Result Value Ref Range    WBC 11.6 (H) 4.0 - 11.0 10e9/L    RBC Count 2.70 (L) 4.4 - 5.9 10e12/L    Hemoglobin 9.6 (L) 13.3 - 17.7 g/dL    Hematocrit 29.1 (L) 40.0 - 53.0 %     (H) 78 - 100 fl    MCH 35.6 (H) 26.5 - 33.0 pg    MCHC 33.0 31.5 - 36.5 g/dL    RDW 14.2 10.0 - 15.0 %    Platelet Count 115 (L) 150 - 450 10e9/L   Troponin I    Collection Time: 06/11/18  1:18 PM   Result Value Ref Range    Troponin I ES <0.015 0.000 - 0.045 ug/L   CK total    Collection Time: 06/11/18  1:18 PM   Result Value Ref Range    CK Total 432 (H) 30 - 300 U/L   Nt probnp inpatient    Collection Time: 06/11/18  1:18 PM   Result Value Ref Range    N-Terminal Pro BNP Inpatient 606 0 - 900 pg/mL       Recent Results (from the past 24 hour(s))   XR Surgery JERRY L/T 5 Min Fluoro    Narrative    Exam: Intraoperative lumbar spine radiograph  6/11/2018 8:29 AM      History: Intraoperative spinal enumeration check    Comparison: Complete spine radiograph 3/29/2018, lumbar spine MRI  3/12/2018    Findings/    Impression    Impression: Single lateral fluoroscopic image obtained at 0827 hours  on 6/11/2018 demonstrates [employer] tube located at the level of the mid  L4 vertebral body.     I have personally reviewed the examination and initial interpretation  and I agree with the findings.    MD Abrahan QUINTERO MD

## 2018-06-11 NOTE — ANESTHESIA CARE TRANSFER NOTE
Patient: Rory Bustamante    Procedure(s):  Minimally Invasive Surgery Laminectomies Lumbar 2-3,Lumbar 3-4,Lumbar 4-5 - Wound Class: I-Clean    Diagnosis: Stenosis  Diagnosis Additional Information: No value filed.    Anesthesia Type:   General, ETT     Note:  Airway :Face Mask  Patient transferred to:PACU  Handoff Report: Identifed the Patient, Identified the Reponsible Provider, Reviewed the pertinent medical history, Discussed the surgical course, Reviewed Intra-OP anesthesia mangement and issues during anesthesia, Set expectations for post-procedure period and Allowed opportunity for questions and acknowledgement of understanding      Vitals: (Last set prior to Anesthesia Care Transfer)    CRNA VITALS  6/11/2018 1143 - 6/11/2018 1221      6/11/2018             Pulse: 97    SpO2: 97 %    Resp Rate (observed): 12                Electronically Signed By: ELYSE Lagos CRNA  June 11, 2018  12:21 PM

## 2018-06-11 NOTE — OR NURSING
Dr. Blank at bedside to read EKG. No treatment of hypertension at this time, within 20% of pre op blood pressures. Awaiting lab draws.

## 2018-06-11 NOTE — PHARMACY
Prescriber Notification Note    The pharmacist has communicated with this patient's provider regarding a concern or therapy recommendation.    Notified Person: Dr. Leonard  Date/Time of Notification: 6/11/18@4066  Interaction: phone  Concern/Recommendation: Aspirin 81 mg po daily ordered starting today. Want to clarify if it should be restarted tonight. Pt has not been taking it since 6/3/18 per medication reconciliation. Went to talk to pt and he was sleeping.     Comments/Additional Details: TORB received to hold Aspirin for now.

## 2018-06-12 ENCOUNTER — APPOINTMENT (OUTPATIENT)
Dept: PHYSICAL THERAPY | Facility: CLINIC | Age: 63
DRG: 515 | End: 2018-06-12
Attending: ORTHOPAEDIC SURGERY
Payer: MEDICARE

## 2018-06-12 LAB
ANION GAP SERPL CALCULATED.3IONS-SCNC: 13 MMOL/L (ref 3–14)
BUN SERPL-MCNC: 72 MG/DL (ref 7–30)
CALCIUM SERPL-MCNC: 8.6 MG/DL (ref 8.5–10.1)
CHLORIDE SERPL-SCNC: 104 MMOL/L (ref 94–109)
CO2 SERPL-SCNC: 21 MMOL/L (ref 20–32)
CREAT SERPL-MCNC: 4.66 MG/DL (ref 0.66–1.25)
GFR SERPL CREATININE-BSD FRML MDRD: 13 ML/MIN/1.7M2
GLUCOSE SERPL-MCNC: 101 MG/DL (ref 70–99)
HGB BLD-MCNC: 9.4 G/DL (ref 13.3–17.7)
LIPASE SERPL-CCNC: 4647 U/L (ref 73–393)
POTASSIUM SERPL-SCNC: 5 MMOL/L (ref 3.4–5.3)
SODIUM SERPL-SCNC: 138 MMOL/L (ref 133–144)
TRIGL SERPL-MCNC: 883 MG/DL
WBC # BLD AUTO: 9.2 10E9/L (ref 4–11)

## 2018-06-12 PROCEDURE — A9270 NON-COVERED ITEM OR SERVICE: HCPCS | Mod: GY | Performed by: STUDENT IN AN ORGANIZED HEALTH CARE EDUCATION/TRAINING PROGRAM

## 2018-06-12 PROCEDURE — 36415 COLL VENOUS BLD VENIPUNCTURE: CPT | Performed by: STUDENT IN AN ORGANIZED HEALTH CARE EDUCATION/TRAINING PROGRAM

## 2018-06-12 PROCEDURE — 01NB0ZZ RELEASE LUMBAR NERVE, OPEN APPROACH: ICD-10-PCS | Performed by: ORTHOPAEDIC SURGERY

## 2018-06-12 PROCEDURE — G0378 HOSPITAL OBSERVATION PER HR: HCPCS

## 2018-06-12 PROCEDURE — 25000131 ZZH RX MED GY IP 250 OP 636 PS 637: Mod: GY | Performed by: STUDENT IN AN ORGANIZED HEALTH CARE EDUCATION/TRAINING PROGRAM

## 2018-06-12 PROCEDURE — 84478 ASSAY OF TRIGLYCERIDES: CPT | Performed by: STUDENT IN AN ORGANIZED HEALTH CARE EDUCATION/TRAINING PROGRAM

## 2018-06-12 PROCEDURE — 80048 BASIC METABOLIC PNL TOTAL CA: CPT | Performed by: STUDENT IN AN ORGANIZED HEALTH CARE EDUCATION/TRAINING PROGRAM

## 2018-06-12 PROCEDURE — 99232 SBSQ HOSP IP/OBS MODERATE 35: CPT | Performed by: INTERNAL MEDICINE

## 2018-06-12 PROCEDURE — 84484 ASSAY OF TROPONIN QUANT: CPT | Performed by: STUDENT IN AN ORGANIZED HEALTH CARE EDUCATION/TRAINING PROGRAM

## 2018-06-12 PROCEDURE — 85018 HEMOGLOBIN: CPT | Performed by: STUDENT IN AN ORGANIZED HEALTH CARE EDUCATION/TRAINING PROGRAM

## 2018-06-12 PROCEDURE — 25000128 H RX IP 250 OP 636: Performed by: STUDENT IN AN ORGANIZED HEALTH CARE EDUCATION/TRAINING PROGRAM

## 2018-06-12 PROCEDURE — 12000008 ZZH R&B INTERMEDIATE UMMC

## 2018-06-12 PROCEDURE — 97161 PT EVAL LOW COMPLEX 20 MIN: CPT | Mod: GP | Performed by: PHYSICAL THERAPIST

## 2018-06-12 PROCEDURE — 83690 ASSAY OF LIPASE: CPT | Performed by: STUDENT IN AN ORGANIZED HEALTH CARE EDUCATION/TRAINING PROGRAM

## 2018-06-12 PROCEDURE — 99207 ZZC CDG-MDM COMPONENT: MEETS LOW - DOWN CODED: CPT | Performed by: INTERNAL MEDICINE

## 2018-06-12 PROCEDURE — 40000193 ZZH STATISTIC PT WARD VISIT: Performed by: PHYSICAL THERAPIST

## 2018-06-12 PROCEDURE — 97530 THERAPEUTIC ACTIVITIES: CPT | Mod: GP | Performed by: PHYSICAL THERAPIST

## 2018-06-12 PROCEDURE — 96374 THER/PROPH/DIAG INJ IV PUSH: CPT

## 2018-06-12 PROCEDURE — 85048 AUTOMATED LEUKOCYTE COUNT: CPT | Performed by: STUDENT IN AN ORGANIZED HEALTH CARE EDUCATION/TRAINING PROGRAM

## 2018-06-12 PROCEDURE — 25000132 ZZH RX MED GY IP 250 OP 250 PS 637: Mod: GY | Performed by: STUDENT IN AN ORGANIZED HEALTH CARE EDUCATION/TRAINING PROGRAM

## 2018-06-12 PROCEDURE — 25000128 H RX IP 250 OP 636: Performed by: INTERNAL MEDICINE

## 2018-06-12 PROCEDURE — 25000125 ZZHC RX 250: Performed by: STUDENT IN AN ORGANIZED HEALTH CARE EDUCATION/TRAINING PROGRAM

## 2018-06-12 RX ORDER — SODIUM CHLORIDE, SODIUM LACTATE, POTASSIUM CHLORIDE, CALCIUM CHLORIDE 600; 310; 30; 20 MG/100ML; MG/100ML; MG/100ML; MG/100ML
INJECTION, SOLUTION INTRAVENOUS CONTINUOUS
Status: DISCONTINUED | OUTPATIENT
Start: 2018-06-12 | End: 2018-06-13

## 2018-06-12 RX ORDER — OXYCODONE HYDROCHLORIDE 5 MG/1
5-10 TABLET ORAL EVERY 4 HOURS PRN
Qty: 80 TABLET | Refills: 0 | Status: SHIPPED | OUTPATIENT
Start: 2018-06-12 | End: 2018-08-30

## 2018-06-12 RX ADMIN — NIFEDIPINE 90 MG: 90 TABLET, FILM COATED, EXTENDED RELEASE ORAL at 08:30

## 2018-06-12 RX ADMIN — OXYCODONE HYDROCHLORIDE 5 MG: 5 TABLET ORAL at 08:27

## 2018-06-12 RX ADMIN — BRIMONIDINE TARTRATE 1 DROP: 2 SOLUTION OPHTHALMIC at 08:27

## 2018-06-12 RX ADMIN — SENNOSIDES AND DOCUSATE SODIUM 2 TABLET: 8.6; 5 TABLET ORAL at 08:31

## 2018-06-12 RX ADMIN — SODIUM CHLORIDE, POTASSIUM CHLORIDE, SODIUM LACTATE AND CALCIUM CHLORIDE: 600; 310; 30; 20 INJECTION, SOLUTION INTRAVENOUS at 17:07

## 2018-06-12 RX ADMIN — OMEGA-3-ACID ETHYL ESTERS 2 G: 900 CAPSULE, LIQUID FILLED ORAL at 08:29

## 2018-06-12 RX ADMIN — METOPROLOL TARTRATE 100 MG: 100 TABLET, FILM COATED ORAL at 20:50

## 2018-06-12 RX ADMIN — CYCLOSPORINE 50 MG: 25 CAPSULE, LIQUID FILLED ORAL at 20:57

## 2018-06-12 RX ADMIN — PROBENECID 500 MG: 500 TABLET, FILM COATED ORAL at 08:28

## 2018-06-12 RX ADMIN — PROBENECID 500 MG: 500 TABLET, FILM COATED ORAL at 20:52

## 2018-06-12 RX ADMIN — PREDNISONE 5 MG: 5 TABLET ORAL at 12:31

## 2018-06-12 RX ADMIN — ROSUVASTATIN CALCIUM 20 MG: 20 TABLET, FILM COATED ORAL at 20:50

## 2018-06-12 RX ADMIN — SULFAMETHOXAZOLE AND TRIMETHOPRIM 1 TABLET: 400; 80 TABLET ORAL at 08:30

## 2018-06-12 RX ADMIN — ACETAMINOPHEN 975 MG: 325 TABLET, FILM COATED ORAL at 22:43

## 2018-06-12 RX ADMIN — ONDANSETRON 4 MG: 2 INJECTION INTRAMUSCULAR; INTRAVENOUS at 12:53

## 2018-06-12 RX ADMIN — Medication 1 CAPSULE: at 20:50

## 2018-06-12 RX ADMIN — ALLOPURINOL 100 MG: 100 TABLET ORAL at 20:50

## 2018-06-12 RX ADMIN — SENNOSIDES AND DOCUSATE SODIUM 2 TABLET: 8.6; 5 TABLET ORAL at 20:51

## 2018-06-12 RX ADMIN — ALLOPURINOL 100 MG: 100 TABLET ORAL at 08:38

## 2018-06-12 RX ADMIN — ISOSORBIDE MONONITRATE 60 MG: 60 TABLET, EXTENDED RELEASE ORAL at 08:30

## 2018-06-12 RX ADMIN — CHOLECALCIFEROL CAP 125 MCG (5000 UNIT) 5000 UNITS: 125 CAP at 08:29

## 2018-06-12 RX ADMIN — CYCLOSPORINE 100 MG: 100 CAPSULE, LIQUID FILLED ORAL at 20:50

## 2018-06-12 RX ADMIN — METOPROLOL TARTRATE 100 MG: 100 TABLET, FILM COATED ORAL at 08:31

## 2018-06-12 RX ADMIN — ACETAMINOPHEN 975 MG: 325 TABLET, FILM COATED ORAL at 02:31

## 2018-06-12 RX ADMIN — ACETAMINOPHEN 975 MG: 325 TABLET, FILM COATED ORAL at 14:43

## 2018-06-12 RX ADMIN — CYCLOSPORINE 50 MG: 25 CAPSULE, LIQUID FILLED ORAL at 08:28

## 2018-06-12 RX ADMIN — TAMSULOSIN HYDROCHLORIDE 0.4 MG: 0.4 CAPSULE ORAL at 20:51

## 2018-06-12 RX ADMIN — CEFAZOLIN SODIUM 1 G: 1 INJECTION, POWDER, FOR SOLUTION INTRAMUSCULAR; INTRAVENOUS at 08:46

## 2018-06-12 RX ADMIN — CYCLOSPORINE 100 MG: 100 CAPSULE, LIQUID FILLED ORAL at 08:28

## 2018-06-12 RX ADMIN — OMEGA-3-ACID ETHYL ESTERS 2 G: 900 CAPSULE, LIQUID FILLED ORAL at 20:50

## 2018-06-12 RX ADMIN — Medication 1 CAPSULE: at 08:29

## 2018-06-12 RX ADMIN — BRIMONIDINE TARTRATE 1 DROP: 2 SOLUTION OPHTHALMIC at 20:50

## 2018-06-12 RX ADMIN — TAMSULOSIN HYDROCHLORIDE 0.4 MG: 0.4 CAPSULE ORAL at 08:29

## 2018-06-12 ASSESSMENT — ACTIVITIES OF DAILY LIVING (ADL)
ADLS_ACUITY_SCORE: 11
ADLS_ACUITY_SCORE: 11

## 2018-06-12 NOTE — PLAN OF CARE
Problem: Patient Care Overview  Goal: Plan of Care/Patient Progress Review  Outcome: No Change  Pt arrived on unit from PACU at 15:45.  Alert and oriented x4.  Admitted to Observation status but later changed to outpatient status.  Emesis x 1 on arrival.  Later tolerates clear liquid well.  No more N/V  3+ edema  In ankles, TEDs and [place] boots on.  CMS & neuros intact  except  numbness in BLE ( below knee) --- baseline neuropathy.  Able to dangle and stood up at the edge of bed with assist of one,  Tried to void urine  But unable to start.  Bladder scan > 400ml,  Straight cath done and 1000ml urine out.  HemoVac 40ml output since surgery.  IV abx given and IV SL in between.  Uses IS with coughing.  Lungs clear.  Last BM yesterday  No chest pain or SOB. Tele ---SR with 1 degree AV block.  SpO2 mid 90's on RA. BP elevated , afebrile. capno on --WNL  Back pain well managed  : oxycodone 5mg q4hrs, Flexeril 10mg prn, scheduled Tylenol and ice pack.  Will continue to monitor 1-2 hrs .

## 2018-06-12 NOTE — PROGRESS NOTES
"Patient seen and examined     S- feels tired, c/o abdominal pain- diffuse as last pancreatitis episode. No vomiting. Has not passed gas yet    4 point ROS done and neg unless mentioned     O-   /83 (BP Location: Left arm)  Pulse 88  Temp 100.5  F (38.1  C) (Oral)  Resp 18  Ht 1.854 m (6' 1\")  Wt 106.4 kg (234 lb 9.1 oz)  SpO2 93%  BMI 30.95 kg/m2   Gen- pleasant   HEENT- NAD, MITCH, MMM  Neck- supple  CVS- I+II+ no m/r/g  RS- CTAB  Abdo- soft, diffuse mild tenderness . No g/r/r   Ext-  edema   MSk- as per ortho     LABS:   BMP  Recent Labs  Lab 06/12/18  0615 06/11/18  1318 06/11/18  0609    133  --    POTASSIUM 5.0 5.3 5.0   CHLORIDE 104 101  --    TRISHA 8.6 8.9  --    CO2 21 19*  --    BUN 72* 73*  --    CR 4.66* 4.47* 4.30*   * 147* 113*     CBC  Recent Labs  Lab 06/12/18  0615 06/11/18  1318 06/06/18  0935   WBC 9.2 11.6*  --    RBC  --  2.70*  --    HGB 9.4* 9.6* 10.1*   HCT  --  29.1* 29.7   MCV  --  108*  --    MCH  --  35.6*  --    MCHC  --  33.0  --    RDW  --  14.2  --    PLT  --  115*  --        PANC  Recent Labs  Lab 06/12/18  0615   LIPASE 4647*     A/P:  # CVS;   --HLD. Fenofibrate (hold for his renal function, as suggested by Pharm D). Rosuvastatin.   -- HTN.  Continue  Metoprolol and Nifedipine, but hold Losartan.   -- CAD, s/p MI and stents. Ct  Isosorbide . Per Pre OP: ASA 81 mg and will remain on. Confirmed with Dr. Rucker. Ortho \"may be restarted 5 days post op\"    --ESRD, due to membranoproliferative glomerulonephritis s/p kidney transplant in 1999.   Continue  Cyclosporine, Prednisone, and Bactrim . Pre Op;  Cr 4.15. GFR 18. Good urine output.  History of urinary retention after surgery. Monitor PVR. Ct Tamsulosin.    --Chronic Prednisone use. Ct pta prednisone.    -- A/C Pancreatitis: h/o pancreatitis, likely secondary to hypertriglyceridemia in 2/2018.  Low attenuation mass seen in the pancreas CT. Consult with Dr. Diaz on 4/16/18 with no worrisome findings.  * C/O " pain and lipase elevated : - pain Mx  - slow IVF and clear fluids only if tolerates  - recheck TG                       --Gout. HOLD  allopurinol. Probenecid.    --GERD. Protonix prn.     # Orthopedics:  s/p L2-L5 MIS laminectomy on 6/11/18 w Dr. Flynn  - further as per ortho    D/W SW, RN, CC, Ortho     Nikolay Littlejohn MD (Pager- [phone])   Internal Medicine/ Hospitalist

## 2018-06-12 NOTE — DISCHARGE SUMMARY
Orthopaedic Surgery Discharge Summary    Date of Admission: 6/11/2018  Date of Discharge: 06/17/18  Attending Physician: Dr. Rucker    Admission Diagnosis:  Stenosis  Lumbar stenosis  Surgery, elective  Surgery, elective    Discharge Diagnosis:  same    Procedures Performed:  L2-5 MIS laminectomy and foraminotomy    Brief History of Present Illness:  Patient had long standing lumbar pathology with resulting neurogenic claudication.  A long discussion was held with the patient regarding surgical and non-op options, given the complexity of his claudication and deformity.  After a full discussion he wished to undergo decompression without fusion, as detailed above.    Brief Hospital Course:  The patient was admitted following the above mentioned procedure.  The patient experienced pancreatitis, an NSTEMI, and afib/flutter postop, and was transferred to [place] cardiology for workup and cares.  He did not require any cardiac catheterization.  Given his new arrhythima, he was changed to warfarin as an anticoagulant, and plavix was stopped.  The patient was seen by physical therapy while in the hospital, received 24 hours of post-operative antibiotics and was placed on warfarin for arrhythmia, which also functions as DVT ppx.      On POD#6 the patient was cleared for discharge to home.  The patient was tolerating a regular diet, voiding, independently ambulatory and had pain control with oral pain medications.      Discharge medications, instructions and follow-up as below.     Rory Bustamante   Home Medication Instructions RAMANDEEP:[id number]    Printed on:06/12/18 1023   Medication Information                      acetaminophen (TYLENOL ARTHRITIS PAIN) 650 MG CR tablet  Take 2 tablets by mouth 3 times daily.             allopurinol (ZYLOPRIM) 100 MG tablet  Take 2 tablets by mouth daily.             BABY ASPIRIN PO  Take 81 mg by mouth daily             BEVACizumab (AVASTIN) 400 MG/16ML injection  Every 5 weeks              Brimonidine Tartrate (ALPHAGAN OP)  Place 1 drop into the right eye 2 times daily              cholecalciferol (VITAMIN D3) 5000 UNITS TABS tablet  Take 1 tablet (5,000 Units) by mouth daily             clopidogrel (PLAVIX) 75 MG tablet  Take 75 mg by mouth daily.             cycloSPORINE modified (GENERIC EQUIVALENT) 100 MG capsule  Take 1 capsule (100 mg) by mouth 2 times daily Total dose 150 mg twice daily             cycloSPORINE modified (GENERIC EQUIVALENT) 25 MG capsule  Take 2 capsules (50 mg) by mouth 2 times daily Total dose 150 mg twice daily             epoetin freya (PROCRIT) 05661 UNIT/ML injection  Inject 1 mL (10,000 Units) Subcutaneous once a week Please make sure Hgb has been checked within 4 days of dose, use as needed for Hgb<10.  If Hgb increases >1 point in 2 weeks, SYSTOLIC BP > 180 mmHg, OR Hgb >=10 g/dL, HOLD DOSE.  Per anemia protocol with Christi Sun MD.             fenofibrate 145 MG tablet  Take 145 mg by mouth daily             ISOSORBIDE MONONITRATE PO  Take 60 mg by mouth daily              loperamide (IMODIUM) 2 MG capsule  Take 2 mg by mouth daily              losartan (COZAAR) 50 MG tablet  Take 1 tablet (50 mg) by mouth daily             metoprolol (LOPRESSOR) 100 MG tablet  Take 1 tablet by mouth 2 times daily.             NIFEdipine ER (ADALAT CC) 90 MG TB24  Take 90 mg by mouth daily              nitroGLYCERIN (NITROSTAT) 0.4 MG SL tablet  Place 0.4 mg under the tongue every 5 minutes as needed.             NONFORMULARY  Take 1 tablet by mouth 2 times daily Focus Macula Pro:  Eye vitamin and mineral supplement A, C, E, Zinc, Copper              omega-3 acid ethyl esters (LOVAZA) 1 G capsule  Take 2 g by mouth 2 times daily              oxyCODONE IR (ROXICODONE) 5 MG tablet  Take 1-2 tablets (5-10 mg) by mouth every 4 hours as needed for other (pain control or improvement in physical function. Hold dose for analgesic side effects.)             PANTOPRAZOLE SODIUM PO  Take 40  mg by mouth daily as needed for heartburn             predniSONE (DELTASONE) 5 MG tablet  Take 5 mg by mouth daily (with lunch)              probenecid (BENEMID) 500 MG tablet  Take 500 mg by mouth 2 times daily.             Rosuvastatin Calcium (CRESTOR PO)  Take 20 mg by mouth every evening             sulfamethoxazole-trimethoprim (BACTRIM,SEPTRA) 400-80 MG per tablet  Take 1 tablet by mouth every other day              TAMSULOSIN HCL PO  Take 0.4 mg by mouth 2 times daily                    Discharge Procedure Orders  Reason for your hospital stay   Order Comments: Spine surgery with Dr. Van     Adult [facility] Follow-up and recommended labs and tests   Order Comments: Follow up with Dr. Rucker in clinic in 6 weeks as previously scheduled (7/24/18).    Appointments on [place] and/or [facility] (with [facility] or [facility] provider or service). Call [phone] if you have questions.     Activity   Order Comments: Your activity upon discharge: no excessive bending, twisting, or lifting greater than 10 pounds.   Order Specific Question Answer Comments   Is discharge order? Yes      Discharge Instructions   Order Comments: Postoperative Instructions - Spinal surgery   Dr. Harshal Rucker  [address] [address]     You have had a spinal surgery. You have a dressing on your incision which can be worn for 3-5 days, and it can be worn in the shower. Change every 1-2 days as needed if wet/dirty, and keep the wound covered for the first 7-10 days.  After this, you do not need a dressing. Do not soak in the bath, and no pools, hot tubs, or lakes for 6 weeks.     For postoperative pain control, I have prescribed a narcotic medication.  This should be taken for the first few weeks following surgery, but as soon as you are able, transition to an over-the-counter type medication such as Tylenol.  You may not take non-steroidal anti-inflammatory medications (eg: Advil,  Ibuprofen, Aleve, others) for the first 3 months after surgery as it interferes with bone healing. While taking the narcotic medication, there are several precautions that you must adhere to. These medications have numerous side effects including nausea, constipation, and drowsiness. If you experience nausea, this may be relieved by taking the medication with food or a light meal. To avoid constipation, please use an over-the-counter stool softener or drink lots of water and eat fruits and vegetables. Avoid operating heavy machinery or driving an automobile while on narcotic medications.      You will be seen in the clinic at 6 weeks following surgery. You will not need to attend physical therapy during this time. You can focus on cardiovascular fitness by walking as much as you can tolerate. Avoid bending, lifting, and twisting. Your weight lifting restriction is 10 pounds until your first follow-up appointment.      When you get home, you may resume your normal diet as tolerated. You may not be very hungry but try to eat small healthy meals to help you heal. Remember to drink plenty of water/fluids to help keep you hydrated.    Please call or return if you experience the following:  Fevers (temperature greater than 100.4 degrees Fahrenheit)  Pain that is getting worse or does not respond to pain medications  Drainage from your wound  Increasing redness about the wound  Any other worrisome symptoms    You may reach the clinic by dialing [phone].     Full Code     Diet   Order Comments: Follow this diet upon discharge: Regular   Order Specific Question Answer Comments   Is discharge order? Yes      Assign Questionnaire Series to Patient           Shaun Leonard MD  PGY-4, Orthopaedic Surgery  [phone]

## 2018-06-12 NOTE — PLAN OF CARE
Problem: Patient Care Overview  Goal: Plan of Care/Patient Progress Review  Outcome: No Change  Patient A & O x 4. Neuros and CMS baseline for patient with numbness/tingling in BLE. VSS except Temp. 101 oral, SpO2 92 on room, Capno IPI 9, EtCO2 38 (see flowsheet), MD aware. LS clear, BS + x 4, patient passing flatus. Patient is having urinary retention and was last bladder scanned for 545 ml of urine. Patient states pain is comfortably manageable. Pain meds given as ordered and PRN, ice packs used for comfort. Surgical site has a scant amount of dried drainage on dressing, which is marked. Hemovac patent with 70 ml of bloody drainage this shift. Patient is on continuous telemetry and has been in SR with a 1st degree AV block. Right hand PIV saline locked. Patient on a full liquid diet and tolerating it well. BG was 101 at 0615. Patient up ad sabi with assist of 1 and walker in room. Hgb 9.4, K+ 5.0, Creatinine 4.66, GFR 13, Lipase 4647 this AM, MD aware. GI Pancreaticobiliary and Nephrology consults ordered. Patient able to make needs known. Call light within reach. Will continue with POC.

## 2018-06-12 NOTE — PLAN OF CARE
Problem: Patient Care Overview  Goal: Plan of Care/Patient Progress Review  Discharge Planner PT   Patient plan for discharge: Rehab  Current status: Pt tx sup<>sit at EOB with mod A x 2. Pt tx sit<>stand with mod A x 2 and FWW. Pt took a few steps along EOB with min A x 2 and FWW. Pt feeling very weak and fatigued with activity. Educated on spinal precautions, therapy role and POC, encouraged sitting up 2-3x/day.   Barriers to return to prior living situation: Medical status, level of assist, pt IND at baseline and has 6 stairs to access apartment  Recommendations for discharge: TCU  Rationale for recommendations: Pt requiring significant assist for mobility. Would benefit from rehab stay to progress IND with bed mobility, transfers and amb within precautions.       Entered by: Belinda Robison 06/12/2018 10:59 AM

## 2018-06-12 NOTE — PLAN OF CARE
Pt on Plavix  75 mg  pre operatively. It is currently on hold. It may be restarted 5 days post op. However if there is an urgent cardiology need may start sooner.

## 2018-06-12 NOTE — TELEPHONE ENCOUNTER
Follow-up with anemia management service:    Patient is in the hospital - Back surgery 6/11  Admitted 6/11/18    Anemia Latest Ref Rng & Units 5/3/2018 5/8/2018 5/14/2018 5/23/2018 5/30/2018 6/6/2018 6/11/2018   MARY LOU Dose - 10,000 units 10,000 units - - - - -   Hemoglobin 13.3 - 17.7 g/dL error(A) 9.8(A) 10.6(L) 10.3(A) 10.3(A) 10.1(A) 9.6(L)   IV Iron Dose - - - - - - - -   TSAT % - - - - 27 - -   Ferritin ng/mL - - - - 388 - -         Follow-up call date: 6/13/18    [facility]    Anemia Management Service  Grecia Carroll,PharmD and Michelle Ramírez CPhT  Phone: [phone]  Fax: [phone]

## 2018-06-12 NOTE — OP NOTE
Date of Service:  6/11/2018       Surgeon:  Harshal Rucker MD   Assistant:  Shaun Leonard MD PGY-4      Preoperative Diagnosis:     1.  Multilevel lumbar stenosis L1-2,L2-3,L3-4,L4-5,L5-S1, with neurogenic claudication   2.  Flatback syndrome / global positive sagittal malalignment, with significant PI-LL mismatch (25 degrees), pelvic retroversion (PT 25 deg) and SVA +8.2cm.  3.  Multiple medical co-morbiditis, including ESRD with hx of kidney transplant, chronic prednisone intake, previous MI, CAD, on plavix.      Postoperative Diagnosis:     Same      Procedures:   1.  MIS laminectomies (Right approach), with bilateral medial facetectomies and foraminotomies, with decompression of bilateral traversing and exiting nerve roots - L2-3,L3-4 and L4-5.  2.  Use of operating microscope.      Anesthesia:  General endotracheal.   Local anesthetic:  0.25% marcaine + epinephrine = 60 cc.  EBL:  325 cc.  Complications:  None apparent.   Implants / Equipment used:  [employer] MIS system: L2-3,L3-4 and L4-5 = 5 cm x 20 mm bevelled tubular retractor.      Indications:  62 year old male with chronic symptoms of bilateral buttock and hip pain, with neurogenic claudication pattern.  Imaging revealed severe multilevel spinal stenosis.  Also with flatback deformity; PI-LL mismatch of 25 degrees.  Tried nonoperative treatment, continues to have significant disabling symptoms.  I thus offered surgery in form of multilevel decompressive laminectomies, MIS approach.  Consented after thorough discussion of the rationale, risks, benefits and alternatives.        Details:  Properly identified in preop, site marked, consent signed.  Wheeled to OR.  Brief earlier performed.  General anesthesia administered.  2 gm IV ancef given, repeated 1gm every 2 hrs intraop.  Morillo inserted.  Positioned prone on Trios 4poster.  Lumbar region squared off, prepped with PCMX scrub, Chloraprep, draped in sterile fashion.  Surgical timeout  performed.      Inserted G22 spinal needle at approx left L3-4 level.  Lateral C-arm image showed needle position.  Fritz planned skin incision to incorporate L2-L5 levels.  ~7cm midline skin incision made.  I incised the fascia just to right of spinous process.  Sequential dilation performed thru muscle, tubular retractor inserted down to R hemilamina, anchored to bed using snake arm.  Soft tissue cleared off bone.  Lateral image showed retractor at L3-4; verified by radiologist.  Confirmatory timeout performed.  Microscope brought in.  Right L3-4 hemilaminectomy performed using MIS tucker completed with kerrisons.  Ligamentum flavum removed exposing underlying dural sac.  Medial facetectomy performed by resecting medial aspects of descending and ascending processes; carried flush to pedicle.  Foraminotomy performed using curved 2mm kerrison.  This completed our right sided decompression.  Retractor and bed tilted to left.  Undercut the spinous process and left lamina using tucker.  Removed contralateral ligamentum flavum, performed left medial facetectomy.  Foramen palpated, noted narrowing.  Foraminotomy performed using curved kerrison.  Hemostatis observed using surgiflo and cottonoid.  Anesthetic injected onto epidural space.  Retractor removed.      Fascial incision extended caudad to ~L4-5.  Sequential dilation performed, tubular retractor placed, anchored to bed using snake arm.  Soft tissue cleared off bone, angled curette placed beneath presumed L4 lamina.  Lateral image showed correct L4-5 level; confirmatory timeout performed.  Right hemilaminectomy performed using MIS tucker completed with kerrisons.  Ligamentum flavum removed exposing underlying dural sac.  Medial facetectomy performed by resecting medial aspects of descending and ascending processes; carried flush to medial pedicle.  Foraminotomy performed using curved 2mm kerrison.  This completed right sided decompression.  Retractor and bed tilted to  left.  Undercut the spinous process and left lamina using tucker.  Removed contralateral ligamentum flavum, performed left medial facetectomy.  Palpated left foramen, noted very tight.  Foraminotomy performed; difficult because of severe foraminal stenosis, took more time than usual, but was able to gradually get good decompression, confirmed with Lewis probe.  Hemostasis observed using surgiflo and cottonoids.  Anesthetic injected.  Retractor removed.      Fascial incision extended cephalad to include L2-3.  Sequential dilation performed, tubular retractor placed, anchored to bed using snake arm.  Soft tissue cleared off bone, angled curette placed beneath L2 lamina.  Lateral image showed correct L2-3 level; confirmatory timeout performed.  Right L2-3 hemilaminectomy performed using MIS tucker completed with kerrisons.  Ligamentum flavum removed exposing underlying dural sac.  Medial facetectomy performed by resecting medial aspects of descending and ascending processes; carried flush to medial pedicle.  Foraminotomy performed using curved 2mm kerrison.  This completed right sided decompression.  Retractor and bed tilted to left.  Undercut the spinous process and left lamina using tucker.  Removed contralateral ligamentum flavum, performed left medial facetectomy.  Palpated left foramen, mild narrowing; foraminotomy performed using curved kerrison.  Hemostasis observed using surgiflo and cottonoids.  Anesthetic injected.  Microscope and retractor removed.      Irrigation performed, hemostasis observed.  Deep medium Hemovac drain placed.  Closure performed: fascia with running vicryl 0, deep subq with running vicryl 0, superficial subq with vicryl 2-0, skin with monocryl 3-0.  Local anesthetic injected.  Dermabond and sterile dressings applied.  Patient tolerated procedure well, turned supine, taken off general anesthetic, transferred to recovery in satisfactory condition.      Postop:  Because of multiple medical  co-morbidities, multilevel nature of pathology and thus of surgery, and prolonged surgical and anesthesia time - deemed likely to need at least overnight stay.  Because of multilevel surgery, will also need 24 hours IV antibiotic coverage.  Because of bleeding tendency; on Plavix until few days ago, will also need close drain output monitoring.    At PACU, was noted to be diaphoretic, with nausea and vomiting; thus cannot go home.  Will place in observation.  Will ask Hospitalist service for medical co-management.    Lifting restriction 10 lbs, no excessive bending/twisting of back.  No need for x-rays prior to d/c.  RTC 6 wks with EOS lumbar ap-lat standing x-rays (incl fem oral heads).

## 2018-06-12 NOTE — PROGRESS NOTES
"Orthopedic Surgery Progress Note    Subjective:   NAEO.  Pain well controlled.  (-)cp/sob, (+) somewhat neuseated, having stomach discomfort. (+)tolerating PO, requiring cath for voiding.    Exam:  /60 (BP Location: Left arm)  Pulse 88  Temp 99.2  F (37.3  C) (Oral)  Resp 15  Ht 1.854 m (6' 1\")  Wt 106.4 kg (234 lb 9.1 oz)  SpO2 94%  BMI 30.95 kg/m2  Gen: Awake, alert, NAD  Resp: breathing equal and non-labored  Extremities:  RLE:   No obvious deformity, skin intact   SLR negative   SILT L2-S1 distributions, slightly diminished in L4   Fires Hip flexor, quad, TA, EHL, FHL, Gsc 5/5 strength   DP 2+  LLE:   No obvious deformity, skin intact   SLR negative   SILT L2-S1 distributions, slightly diminished in L4   Fires Hip flexor, quad, TA, EHL, FHL, Gsc 5/5 strength   DP 2+      Drain: 40 / 0    Labs:    Recent Labs  Lab 06/12/18 0615 06/11/18  1318 06/06/18  0935   WBC  --  11.6*  --    HGB 9.4* 9.6* 10.1*   PLT  --  115*  --        Recent Labs  Lab 06/12/18 0615 06/11/18  1318 06/11/18  0609    133  --    POTASSIUM 5.0 5.3 5.0   CHLORIDE 104 101  --    CO2 21 19*  --    BUN 72* 73*  --    CR 4.66* 4.47* 4.30*   * 147* 113*     No lab results found in last 7 days.      Assessment:   62 year old male s/p L2-L5 MIS laminectomy on 6/11/18 w Dr. Flynn.  Having stomach discomfort and urinary retention post-op.    Plan:  Pain: Scheduled Tylenol, Dilaudid PCA, oxycodone PRN, valium PRN.    Activity: No lifting >10 lbs. No excessive twisting or bending. Start physical therapy POD#1  Wound care: Dressing change as needed per Ortho  Morillo out   Drains: Document output per shift  Brace:  N/A  Abx: Ancef x 24 hours post op for surgical ppx  Diet: Clear liquids and ADAT  DVT ppx: Mechanical only.  Plan to restart ASA 81 after 7 days.  Restart Plavix after 5 days.  If these are more urgently needed for cardiologic protection, ok to restart sooner per IM/card recs.  Radiographs:  none  Disposition: "  Admit to Ortho Spine for observation (drain output monitoring).  Expect d/c in 1 day         Shaun Leonard MD  PGY-4, Orthopaedic Surgery  [phone]

## 2018-06-12 NOTE — PLAN OF CARE
Problem: Patient Care Overview  Goal: Plan of Care/Patient Progress Review  Outcome: No Change  Note for the night shift.  D: Pt sleeping in between checks. Has baseline numbness and tingling in his LE's. Unchanged since surgery.  Jenae CDI. At 0115 turned pt and had a 100cc emeisis. Feeling better after the emesis. No Zofran given. Able to take his Tylenol at 0200 without problems. Temp OK. Alert and oriented. Lungs clear. HV intact. Sinus without ectopics. First degree heart block. AT about 0500, dangled at the bedside, weak but did OK. No nausea. Tried but unable to void. Was straight cathed for 800. Denies need for pain meds. C/O lower abdominal pain that feels like the pain he had when he had pancreatitis. No reddness on his chest.   Call light with in reach.Able to make needs known. A:  Doing OK. Is weak. Not ready to go home yet this morning. P: Monitor closely.  Supportive cares. Medicate for pain as needed.Encourage activity today.

## 2018-06-12 NOTE — PROGRESS NOTES
" 06/12/18 1104   Quick Adds   Type of Visit Initial PT Evaluation   Living Environment   Lives With spouse;child(ritchie), adult   Living Arrangements apartment   Home Accessibility stairs to enter home   Number of Stairs to Enter Home 6   Stair Railings at Home (1 HR)   Transportation Available family or friend will provide   Living Environment Comment Pt lives in apartment with all needs on same level once inside. 6 stairs to enter, handrail present. Pt owns FWW and crutches, though was not using either at baseline. Has grab bars in shower, denies shower chair or other adaptive equipment.   Self-Care   Usual Activity Tolerance moderate   Current Activity Tolerance poor   Regular Exercise no   Equipment Currently Used at Home grab bar   Activity/Exercise/Self-Care Comment Pt IND with mobility and ADLs at baseline, reports declined activity tolerance overall and staying mostly within the home.    Functional Level Prior   Ambulation 0-->independent   Transferring 0-->independent   Toileting 0-->independent   Bathing 0-->independent   Dressing 0-->independent   Eating 0-->independent   Communication 0-->understands/communicates without difficulty   Swallowing 0-->swallows foods/liquids without difficulty   Cognition 0 - no cognition issues reported   Fall history within last six months no   Which of the above functional risks had a recent onset or change? ambulation;transferring;toileting;bathing;dressing   Prior Functional Level Comment Pt IND with household mobility at baseline.    General Information   Onset of Illness/Injury or Date of Surgery - Date 06/11/18   Referring Physician Shaun Leonard MD   Patient/Family Goals Statement None stated   Pertinent History of Current Problem (include personal factors and/or comorbidities that impact the POC) Per chart review, pt is a \"62 year old male s/p L2-L5 MIS laminectomy on 6/11/18 w Dr. Flynn\"   Precautions/Limitations fall precautions;spinal precautions "   Weight-Bearing Status - LLE weight-bearing as tolerated   Weight-Bearing Status - RLE weight-bearing as tolerated   Heart Disease Risk Factors Lack of physical activity;Overweight;Stress   General Observations IV, drain   General Info Comments Activity orders: ambulate   Cognitive Status Examination   Orientation orientation to person, place and time   Level of Consciousness alert   Follows Commands and Answers Questions 100% of the time   Personal Safety and Judgment intact   Memory intact   Pain Assessment   Patient Currently in Pain Yes, see Vital Sign flowsheet   Integumentary/Edema   Integumentary/Edema no deficits were identifed   Posture    Posture Forward head position;Protracted shoulders   Range of Motion (ROM)   ROM Comment WFL per functional mobility assessment   Strength   Strength Comments Post-operative weakness at BLE and UE, educated on no lifting > 10# d/t spinal prec. Demonstrates quad/HS/tib ant/glut activation in supine, Ax2 to stand.   Bed Mobility   Bed Mobility Comments Mod A x 2 sit>supine   Transfer Skills   Transfer Comments Pt tx sit<>stand with FWW and mod A x 2   Gait   Gait Comments Pt stepped laterally along EOB with min A  x 2 and FWW   Balance   Balance Comments Fair. Pt feeling unsteady in standing, required CGA x 2 and FWW for stability.   Sensory Examination   Sensory Perception Comments Baseline neuropathy at feet   General Therapy Interventions   Planned Therapy Interventions balance training;bed mobility training;gait training;neuromuscular re-education;strengthening;transfer training;risk factor education;home program guidelines;progressive activity/exercise   Clinical Impression   Criteria for Skilled Therapeutic Intervention yes, treatment indicated   PT Diagnosis Impaired functional mobility   Influenced by the following impairments decreased strength, stability, activity tolerance, pain, precautions   Functional limitations due to impairments Bed mobility, transfers,  "amb, stairs   Clinical Presentation Evolving/Changing   Clinical Presentation Rationale Pt alert and oriented, limited in activity d/t weakness and pain. Pt has good support at home, however needs to be IND with stairs and amb prior to return.   Clinical Decision Making (Complexity) Low complexity   Therapy Frequency` daily   Predicted Duration of Therapy Intervention (days/wks) 1 week   Anticipated Equipment Needs at Discharge shower chair;reacher   Anticipated Discharge Disposition Transitional Care Facility;Home with Home Therapy   Risk & Benefits of therapy have been explained Yes   Patient, Family & other staff in agreement with plan of care Yes   [facility] TM \"6 Clicks\"   2016, Trustees of [facility], under license to [employer].  All rights reserved.   6 Clicks Short Forms Basic Mobility Inpatient Short Form   [facility]  \"6 Clicks\" V.2 Basic Mobility Inpatient Short Form   1. Turning from your back to your side while in a flat bed without using bedrails? 3 - A Little   2. Moving from lying on your back to sitting on the side of a flat bed without using bedrails? 2 - A Lot   3. Moving to and from a bed to a chair (including a wheelchair)? 2 - A Lot   4. Standing up from a chair using your arms (e.g., wheelchair, or bedside chair)? 2 - A Lot   5. To walk in hospital room? 2 - A Lot   6. Climbing 3-5 steps with a railing? 2 - A Lot   Basic Mobility Raw Score (Score out of 24.Lower scores equate to lower levels of function) 13   Total Evaluation Time   Total Evaluation Time (Minutes) 6     "

## 2018-06-12 NOTE — PLAN OF CARE
Problem: Patient Care Overview  Goal: Plan of Care/Patient Progress Review  OT: Patient is observation status, PT following for mobility.  Will hold OT at this time as rehab recommended.

## 2018-06-13 ENCOUNTER — APPOINTMENT (OUTPATIENT)
Dept: ULTRASOUND IMAGING | Facility: CLINIC | Age: 63
DRG: 515 | End: 2018-06-13
Attending: ORTHOPAEDIC SURGERY
Payer: MEDICARE

## 2018-06-13 ENCOUNTER — APPOINTMENT (OUTPATIENT)
Dept: GENERAL RADIOLOGY | Facility: CLINIC | Age: 63
DRG: 515 | End: 2018-06-13
Attending: INTERNAL MEDICINE
Payer: MEDICARE

## 2018-06-13 PROBLEM — I48.91 ATRIAL FIBRILLATION (H): Status: ACTIVE | Noted: 2018-06-13

## 2018-06-13 LAB
ALBUMIN SERPL-MCNC: 2.8 G/DL (ref 3.4–5)
ALBUMIN UR-MCNC: 30 MG/DL
ALP SERPL-CCNC: 35 U/L (ref 40–150)
ALT SERPL W P-5'-P-CCNC: 7 U/L (ref 0–70)
AMORPH CRY #/AREA URNS HPF: ABNORMAL /HPF
ANION GAP SERPL CALCULATED.3IONS-SCNC: 10 MMOL/L (ref 3–14)
APPEARANCE UR: ABNORMAL
AST SERPL W P-5'-P-CCNC: 37 U/L (ref 0–45)
BACTERIA #/AREA URNS HPF: ABNORMAL /HPF
BASE DEFICIT BLDV-SCNC: 4.5 MMOL/L
BASOPHILS # BLD AUTO: 0 10E9/L (ref 0–0.2)
BASOPHILS NFR BLD AUTO: 0.2 %
BILIRUB SERPL-MCNC: 0.3 MG/DL (ref 0.2–1.3)
BILIRUB UR QL STRIP: NEGATIVE
BUN SERPL-MCNC: 63 MG/DL (ref 7–30)
CA-I BLD-MCNC: 4.8 MG/DL (ref 4.4–5.2)
CALCIUM SERPL-MCNC: 9 MG/DL (ref 8.5–10.1)
CHLORIDE SERPL-SCNC: 102 MMOL/L (ref 94–109)
CK BB CFR SERPL ELPH: 0 % (ref 0–0)
CK MACRO1 CFR SERPL: 0 % (ref 0–0)
CK MACRO2 CFR SERPL: 0 % (ref 0–0)
CK MB CFR SERPL ELPH: 0 % (ref 0–4)
CK MM CFR SERPL ELPH: 100 % (ref 96–100)
CK SERPL-CCNC: 430 U/L (ref 20–200)
CO2 SERPL-SCNC: 22 MMOL/L (ref 20–32)
COLOR UR AUTO: YELLOW
CREAT SERPL-MCNC: 4.64 MG/DL (ref 0.66–1.25)
DIFFERENTIAL METHOD BLD: ABNORMAL
EOSINOPHIL # BLD AUTO: 0.2 10E9/L (ref 0–0.7)
EOSINOPHIL NFR BLD AUTO: 1.6 %
ERYTHROCYTE [DISTWIDTH] IN BLOOD BY AUTOMATED COUNT: 13.8 % (ref 10–15)
ERYTHROCYTE [DISTWIDTH] IN BLOOD BY AUTOMATED COUNT: 14 % (ref 10–15)
GFR SERPL CREATININE-BSD FRML MDRD: 13 ML/MIN/1.7M2
GLUCOSE SERPL-MCNC: 141 MG/DL (ref 70–99)
GLUCOSE UR STRIP-MCNC: 30 MG/DL
HCO3 BLDV-SCNC: 21 MMOL/L (ref 21–28)
HCT VFR BLD AUTO: 25.2 % (ref 40–53)
HCT VFR BLD AUTO: 26.4 % (ref 40–53)
HGB BLD-MCNC: 8.4 G/DL (ref 13.3–17.7)
HGB BLD-MCNC: 8.8 G/DL (ref 13.3–17.7)
HGB UR QL STRIP: ABNORMAL
HYALINE CASTS #/AREA URNS LPF: 1 /LPF (ref 0–2)
IMM GRANULOCYTES # BLD: 0.1 10E9/L (ref 0–0.4)
IMM GRANULOCYTES NFR BLD: 0.4 %
KETONES UR STRIP-MCNC: NEGATIVE MG/DL
LACTATE BLD-SCNC: 0.8 MMOL/L (ref 0.7–2)
LACTATE BLD-SCNC: 1.1 MMOL/L (ref 0.7–2)
LACTATE BLD-SCNC: 2.2 MMOL/L (ref 0.7–2)
LEUKOCYTE ESTERASE UR QL STRIP: ABNORMAL
LIPASE SERPL-CCNC: 2413 U/L (ref 73–393)
LYMPHOCYTES # BLD AUTO: 0.8 10E9/L (ref 0.8–5.3)
LYMPHOCYTES NFR BLD AUTO: 6.1 %
MAGNESIUM SERPL-MCNC: 1.4 MG/DL (ref 1.6–2.3)
MAGNESIUM SERPL-MCNC: 1.8 MG/DL (ref 1.6–2.3)
MCH RBC QN AUTO: 35.1 PG (ref 26.5–33)
MCH RBC QN AUTO: 35.3 PG (ref 26.5–33)
MCHC RBC AUTO-ENTMCNC: 33.3 G/DL (ref 31.5–36.5)
MCHC RBC AUTO-ENTMCNC: 33.3 G/DL (ref 31.5–36.5)
MCV RBC AUTO: 105 FL (ref 78–100)
MCV RBC AUTO: 106 FL (ref 78–100)
MONOCYTES # BLD AUTO: 1.3 10E9/L (ref 0–1.3)
MONOCYTES NFR BLD AUTO: 9.8 %
MUCOUS THREADS #/AREA URNS LPF: PRESENT /LPF
NEUTROPHILS # BLD AUTO: 10.5 10E9/L (ref 1.6–8.3)
NEUTROPHILS NFR BLD AUTO: 81.9 %
NITRATE UR QL: NEGATIVE
NRBC # BLD AUTO: 0 10*3/UL
NRBC BLD AUTO-RTO: 0 /100
NT-PROBNP SERPL-MCNC: 1703 PG/ML (ref 0–900)
O2/TOTAL GAS SETTING VFR VENT: ABNORMAL %
PCO2 BLDV: 39 MM HG (ref 40–50)
PH BLDV: 7.34 PH (ref 7.32–7.43)
PH UR STRIP: 5 PH (ref 5–7)
PHOSPHATE SERPL-MCNC: 5.2 MG/DL (ref 2.5–4.5)
PLATELET # BLD AUTO: 87 10E9/L (ref 150–450)
PLATELET # BLD AUTO: 97 10E9/L (ref 150–450)
PO2 BLDV: 41 MM HG (ref 25–47)
POTASSIUM SERPL-SCNC: 4.5 MMOL/L (ref 3.4–5.3)
PROCALCITONIN SERPL-MCNC: 1.6 NG/ML
PROT SERPL-MCNC: 6.5 G/DL (ref 6.8–8.8)
RBC # BLD AUTO: 2.39 10E12/L (ref 4.4–5.9)
RBC # BLD AUTO: 2.49 10E12/L (ref 4.4–5.9)
RBC #/AREA URNS AUTO: 2 /HPF (ref 0–2)
SODIUM SERPL-SCNC: 134 MMOL/L (ref 133–144)
SOURCE: ABNORMAL
SP GR UR STRIP: 1.01 (ref 1–1.03)
SQUAMOUS #/AREA URNS AUTO: 1 /HPF (ref 0–1)
TROPONIN I SERPL-MCNC: 1.68 UG/L (ref 0–0.04)
TROPONIN I SERPL-MCNC: 2.52 UG/L (ref 0–0.04)
TROPONIN I SERPL-MCNC: 2.52 UG/L (ref 0–0.04)
UROBILINOGEN UR STRIP-MCNC: NORMAL MG/DL (ref 0–2)
WBC # BLD AUTO: 12.7 10E9/L (ref 4–11)
WBC # BLD AUTO: 12.8 10E9/L (ref 4–11)
WBC #/AREA URNS AUTO: 3 /HPF (ref 0–5)

## 2018-06-13 PROCEDURE — 25000132 ZZH RX MED GY IP 250 OP 250 PS 637: Mod: GY | Performed by: STUDENT IN AN ORGANIZED HEALTH CARE EDUCATION/TRAINING PROGRAM

## 2018-06-13 PROCEDURE — 84100 ASSAY OF PHOSPHORUS: CPT | Performed by: INTERNAL MEDICINE

## 2018-06-13 PROCEDURE — 25000131 ZZH RX MED GY IP 250 OP 636 PS 637: Mod: GY | Performed by: STUDENT IN AN ORGANIZED HEALTH CARE EDUCATION/TRAINING PROGRAM

## 2018-06-13 PROCEDURE — 21400006 ZZH R&B CCU INTERMEDIATE UMMC

## 2018-06-13 PROCEDURE — 83880 ASSAY OF NATRIURETIC PEPTIDE: CPT | Performed by: STUDENT IN AN ORGANIZED HEALTH CARE EDUCATION/TRAINING PROGRAM

## 2018-06-13 PROCEDURE — 81001 URINALYSIS AUTO W/SCOPE: CPT | Performed by: STUDENT IN AN ORGANIZED HEALTH CARE EDUCATION/TRAINING PROGRAM

## 2018-06-13 PROCEDURE — 85027 COMPLETE CBC AUTOMATED: CPT | Performed by: INTERNAL MEDICINE

## 2018-06-13 PROCEDURE — 83880 ASSAY OF NATRIURETIC PEPTIDE: CPT | Performed by: INTERNAL MEDICINE

## 2018-06-13 PROCEDURE — 93005 ELECTROCARDIOGRAM TRACING: CPT

## 2018-06-13 PROCEDURE — 25000132 ZZH RX MED GY IP 250 OP 250 PS 637: Mod: GY | Performed by: INTERNAL MEDICINE

## 2018-06-13 PROCEDURE — 93970 EXTREMITY STUDY: CPT

## 2018-06-13 PROCEDURE — 25000125 ZZHC RX 250: Performed by: INTERNAL MEDICINE

## 2018-06-13 PROCEDURE — 36415 COLL VENOUS BLD VENIPUNCTURE: CPT | Performed by: STUDENT IN AN ORGANIZED HEALTH CARE EDUCATION/TRAINING PROGRAM

## 2018-06-13 PROCEDURE — 85025 COMPLETE CBC W/AUTO DIFF WBC: CPT | Performed by: STUDENT IN AN ORGANIZED HEALTH CARE EDUCATION/TRAINING PROGRAM

## 2018-06-13 PROCEDURE — 25000128 H RX IP 250 OP 636: Performed by: INTERNAL MEDICINE

## 2018-06-13 PROCEDURE — 82330 ASSAY OF CALCIUM: CPT | Performed by: INTERNAL MEDICINE

## 2018-06-13 PROCEDURE — 83735 ASSAY OF MAGNESIUM: CPT | Performed by: INTERNAL MEDICINE

## 2018-06-13 PROCEDURE — A9270 NON-COVERED ITEM OR SERVICE: HCPCS | Mod: GY | Performed by: STUDENT IN AN ORGANIZED HEALTH CARE EDUCATION/TRAINING PROGRAM

## 2018-06-13 PROCEDURE — 36415 COLL VENOUS BLD VENIPUNCTURE: CPT | Performed by: INTERNAL MEDICINE

## 2018-06-13 PROCEDURE — 82803 BLOOD GASES ANY COMBINATION: CPT | Performed by: INTERNAL MEDICINE

## 2018-06-13 PROCEDURE — 84484 ASSAY OF TROPONIN QUANT: CPT | Performed by: INTERNAL MEDICINE

## 2018-06-13 PROCEDURE — A9270 NON-COVERED ITEM OR SERVICE: HCPCS | Mod: GY | Performed by: INTERNAL MEDICINE

## 2018-06-13 PROCEDURE — 93010 ELECTROCARDIOGRAM REPORT: CPT | Performed by: INTERNAL MEDICINE

## 2018-06-13 PROCEDURE — 83690 ASSAY OF LIPASE: CPT | Performed by: INTERNAL MEDICINE

## 2018-06-13 PROCEDURE — 80053 COMPREHEN METABOLIC PANEL: CPT | Performed by: INTERNAL MEDICINE

## 2018-06-13 PROCEDURE — 83605 ASSAY OF LACTIC ACID: CPT | Performed by: INTERNAL MEDICINE

## 2018-06-13 PROCEDURE — 71046 X-RAY EXAM CHEST 2 VIEWS: CPT

## 2018-06-13 PROCEDURE — 84145 PROCALCITONIN (PCT): CPT | Performed by: INTERNAL MEDICINE

## 2018-06-13 PROCEDURE — 99233 SBSQ HOSP IP/OBS HIGH 50: CPT | Performed by: INTERNAL MEDICINE

## 2018-06-13 PROCEDURE — 87040 BLOOD CULTURE FOR BACTERIA: CPT | Performed by: STUDENT IN AN ORGANIZED HEALTH CARE EDUCATION/TRAINING PROGRAM

## 2018-06-13 PROCEDURE — 25000125 ZZHC RX 250: Performed by: STUDENT IN AN ORGANIZED HEALTH CARE EDUCATION/TRAINING PROGRAM

## 2018-06-13 PROCEDURE — 25000128 H RX IP 250 OP 636: Performed by: STUDENT IN AN ORGANIZED HEALTH CARE EDUCATION/TRAINING PROGRAM

## 2018-06-13 PROCEDURE — 93010 ELECTROCARDIOGRAM REPORT: CPT | Mod: 76 | Performed by: INTERNAL MEDICINE

## 2018-06-13 PROCEDURE — 83605 ASSAY OF LACTIC ACID: CPT | Performed by: STUDENT IN AN ORGANIZED HEALTH CARE EDUCATION/TRAINING PROGRAM

## 2018-06-13 RX ORDER — ALLOPURINOL 100 MG/1
100 TABLET ORAL DAILY
Status: DISCONTINUED | OUTPATIENT
Start: 2018-06-14 | End: 2018-06-17 | Stop reason: HOSPADM

## 2018-06-13 RX ORDER — ALUMINA, MAGNESIA, AND SIMETHICONE 2400; 2400; 240 MG/30ML; MG/30ML; MG/30ML
30 SUSPENSION ORAL EVERY 8 HOURS PRN
Status: DISCONTINUED | OUTPATIENT
Start: 2018-06-13 | End: 2018-06-17 | Stop reason: HOSPADM

## 2018-06-13 RX ORDER — METOPROLOL TARTRATE 1 MG/ML
5 INJECTION, SOLUTION INTRAVENOUS EVERY 5 MIN PRN
Status: DISCONTINUED | OUTPATIENT
Start: 2018-06-13 | End: 2018-06-17 | Stop reason: HOSPADM

## 2018-06-13 RX ORDER — PIPERACILLIN SODIUM, TAZOBACTAM SODIUM 4; .5 G/20ML; G/20ML
4.5 INJECTION, POWDER, LYOPHILIZED, FOR SOLUTION INTRAVENOUS EVERY 6 HOURS
Status: DISCONTINUED | OUTPATIENT
Start: 2018-06-13 | End: 2018-06-13 | Stop reason: DRUGHIGH

## 2018-06-13 RX ORDER — MAGNESIUM SULFATE HEPTAHYDRATE 40 MG/ML
4 INJECTION, SOLUTION INTRAVENOUS EVERY 4 HOURS PRN
Status: DISCONTINUED | OUTPATIENT
Start: 2018-06-13 | End: 2018-06-17 | Stop reason: HOSPADM

## 2018-06-13 RX ORDER — METOPROLOL TARTRATE 1 MG/ML
5 INJECTION, SOLUTION INTRAVENOUS EVERY 5 MIN PRN
Status: DISCONTINUED | OUTPATIENT
Start: 2018-06-13 | End: 2018-06-13

## 2018-06-13 RX ORDER — HEPARIN SODIUM 5000 [USP'U]/.5ML
5000 INJECTION, SOLUTION INTRAVENOUS; SUBCUTANEOUS 2 TIMES DAILY
Status: DISCONTINUED | OUTPATIENT
Start: 2018-06-13 | End: 2018-06-14

## 2018-06-13 RX ORDER — NALOXONE HYDROCHLORIDE 0.4 MG/ML
.1-.4 INJECTION, SOLUTION INTRAMUSCULAR; INTRAVENOUS; SUBCUTANEOUS
Status: DISCONTINUED | OUTPATIENT
Start: 2018-06-13 | End: 2018-06-17 | Stop reason: HOSPADM

## 2018-06-13 RX ORDER — ASPIRIN 81 MG/1
324 TABLET, CHEWABLE ORAL ONCE
Status: COMPLETED | OUTPATIENT
Start: 2018-06-13 | End: 2018-06-13

## 2018-06-13 RX ORDER — ALLOPURINOL 100 MG/1
100 TABLET ORAL
Status: DISCONTINUED | OUTPATIENT
Start: 2018-06-13 | End: 2018-06-13

## 2018-06-13 RX ORDER — PANTOPRAZOLE SODIUM 40 MG/1
40 TABLET, DELAYED RELEASE ORAL
Status: DISCONTINUED | OUTPATIENT
Start: 2018-06-14 | End: 2018-06-17 | Stop reason: HOSPADM

## 2018-06-13 RX ORDER — ASPIRIN 81 MG/1
81 TABLET, CHEWABLE ORAL DAILY
Status: DISCONTINUED | OUTPATIENT
Start: 2018-06-14 | End: 2018-06-17 | Stop reason: HOSPADM

## 2018-06-13 RX ORDER — PIPERACILLIN SODIUM, TAZOBACTAM SODIUM 3; .375 G/15ML; G/15ML
3.38 INJECTION, POWDER, LYOPHILIZED, FOR SOLUTION INTRAVENOUS EVERY 6 HOURS
Status: DISCONTINUED | OUTPATIENT
Start: 2018-06-13 | End: 2018-06-14

## 2018-06-13 RX ADMIN — ASPIRIN 81 MG CHEWABLE TABLET 324 MG: 81 TABLET CHEWABLE at 21:09

## 2018-06-13 RX ADMIN — METOPROLOL TARTRATE 5 MG: 5 INJECTION INTRAVENOUS at 13:28

## 2018-06-13 RX ADMIN — PROBENECID 500 MG: 500 TABLET, FILM COATED ORAL at 21:37

## 2018-06-13 RX ADMIN — PROBENECID 500 MG: 500 TABLET, FILM COATED ORAL at 09:42

## 2018-06-13 RX ADMIN — METOPROLOL TARTRATE 5 MG: 5 INJECTION INTRAVENOUS at 21:00

## 2018-06-13 RX ADMIN — ISOSORBIDE MONONITRATE 60 MG: 60 TABLET, EXTENDED RELEASE ORAL at 09:44

## 2018-06-13 RX ADMIN — OXYCODONE HYDROCHLORIDE 5 MG: 5 TABLET ORAL at 10:34

## 2018-06-13 RX ADMIN — TAMSULOSIN HYDROCHLORIDE 0.4 MG: 0.4 CAPSULE ORAL at 21:10

## 2018-06-13 RX ADMIN — MAGNESIUM SULFATE IN WATER 4 G: 40 INJECTION, SOLUTION INTRAVENOUS at 11:29

## 2018-06-13 RX ADMIN — SENNOSIDES AND DOCUSATE SODIUM 2 TABLET: 8.6; 5 TABLET ORAL at 09:41

## 2018-06-13 RX ADMIN — ACETAMINOPHEN 975 MG: 325 TABLET, FILM COATED ORAL at 22:45

## 2018-06-13 RX ADMIN — METOPROLOL TARTRATE 100 MG: 100 TABLET, FILM COATED ORAL at 09:42

## 2018-06-13 RX ADMIN — PIPERACILLIN SODIUM AND TAZOBACTAM SODIUM 3.38 G: 3; .375 INJECTION, POWDER, LYOPHILIZED, FOR SOLUTION INTRAVENOUS at 13:45

## 2018-06-13 RX ADMIN — CYCLOSPORINE 50 MG: 25 CAPSULE, LIQUID FILLED ORAL at 21:10

## 2018-06-13 RX ADMIN — CYCLOSPORINE 100 MG: 100 CAPSULE, LIQUID FILLED ORAL at 09:44

## 2018-06-13 RX ADMIN — ONDANSETRON 4 MG: 2 INJECTION INTRAMUSCULAR; INTRAVENOUS at 07:41

## 2018-06-13 RX ADMIN — BRIMONIDINE TARTRATE 1 DROP: 2 SOLUTION OPHTHALMIC at 09:40

## 2018-06-13 RX ADMIN — ACETAMINOPHEN 975 MG: 325 TABLET, FILM COATED ORAL at 10:34

## 2018-06-13 RX ADMIN — ALUMINUM HYDROXIDE, MAGNESIUM HYDROXIDE, AND DIMETHICONE 30 ML: 400; 400; 40 SUSPENSION ORAL at 21:09

## 2018-06-13 RX ADMIN — OMEGA-3-ACID ETHYL ESTERS 2 G: 900 CAPSULE, LIQUID FILLED ORAL at 21:37

## 2018-06-13 RX ADMIN — SODIUM CHLORIDE, POTASSIUM CHLORIDE, SODIUM LACTATE AND CALCIUM CHLORIDE: 600; 310; 30; 20 INJECTION, SOLUTION INTRAVENOUS at 11:29

## 2018-06-13 RX ADMIN — PIPERACILLIN SODIUM AND TAZOBACTAM SODIUM 3.38 G: 3; .375 INJECTION, POWDER, LYOPHILIZED, FOR SOLUTION INTRAVENOUS at 21:17

## 2018-06-13 RX ADMIN — ACETAMINOPHEN 975 MG: 325 TABLET, FILM COATED ORAL at 14:46

## 2018-06-13 RX ADMIN — OMEGA-3-ACID ETHYL ESTERS 2 G: 900 CAPSULE, LIQUID FILLED ORAL at 09:42

## 2018-06-13 RX ADMIN — METOPROLOL TARTRATE 5 MG: 5 INJECTION INTRAVENOUS at 13:29

## 2018-06-13 RX ADMIN — ROSUVASTATIN CALCIUM 20 MG: 20 TABLET, FILM COATED ORAL at 21:10

## 2018-06-13 RX ADMIN — CYCLOSPORINE 50 MG: 25 CAPSULE, LIQUID FILLED ORAL at 09:44

## 2018-06-13 RX ADMIN — BRIMONIDINE TARTRATE 1 DROP: 2 SOLUTION OPHTHALMIC at 22:45

## 2018-06-13 RX ADMIN — NIFEDIPINE 90 MG: 90 TABLET, FILM COATED, EXTENDED RELEASE ORAL at 09:45

## 2018-06-13 RX ADMIN — TAMSULOSIN HYDROCHLORIDE 0.4 MG: 0.4 CAPSULE ORAL at 09:42

## 2018-06-13 RX ADMIN — PREDNISONE 5 MG: 5 TABLET ORAL at 11:29

## 2018-06-13 RX ADMIN — CYCLOSPORINE 100 MG: 100 CAPSULE, LIQUID FILLED ORAL at 21:09

## 2018-06-13 RX ADMIN — CHOLECALCIFEROL CAP 125 MCG (5000 UNIT) 5000 UNITS: 125 CAP at 09:42

## 2018-06-13 RX ADMIN — Medication 1 CAPSULE: at 21:10

## 2018-06-13 RX ADMIN — Medication 1 CAPSULE: at 09:41

## 2018-06-13 RX ADMIN — METOPROLOL TARTRATE 100 MG: 100 TABLET, FILM COATED ORAL at 21:10

## 2018-06-13 ASSESSMENT — ACTIVITIES OF DAILY LIVING (ADL)
ADLS_ACUITY_SCORE: 12
ADLS_ACUITY_SCORE: 12
ADLS_ACUITY_SCORE: 11

## 2018-06-13 NOTE — H&P
"         History and Physical  Rory Bustamante MRN: [id number]  Age: 62 year old, : 1955  Primary care provider: Moris Diaz           Chief Complaint:     A.fib with RVR          History of Present Illness:     HISTORY OF PRESENT ILLNESS:  Rory Bustamante is a 62 year old with PMH of CAD , NSTEMI s/p PCI  (RCA),  ESRD s/p kidney transplant , recent episode of Pancreatitis who is being transferred from [facility] after POD 2 after laminectomy for elevated Troponin and A. Flutter.     Patient was admitted on 11 for laminectomy. POD #1 patient developed abdominal and chest pain \"I felt crummy\" . He reports sharp abdominal/chest pain that radiated across the chest wall. Blood worked showed elevated Lipase 4674 . GI were consulted who recommended conservative management. On day #2 Post op patient developed new O2 requirement and A. Flutter -  EKG showed T wave inversion in leads AVL , V2 . Troponin was 2.524 and patient was transferred here for further evaluation.     Upon arrival patient was in A.flutter with adequate RC - HR 90~s , /61, Spo2 93% on 4 L . Labs were pertinent for WBC 12.7 , Hgb 8.8, Troponin 1.6 (from 2.5) , BNP 1703 . EKG showed A.flutter with resolution of Twave inversion. CXR showed pulmonary congestion and bilateral pleural effusions.           Past Medical History:     Reviewed with patient on 2018   Past Medical History:   Diagnosis Date     Angina effort (H) 2016     Arthritis      BPH (benign prostatic hyperplasia)      CAD (coronary artery disease) 2011    MI in 2011, stent placed, on plavix and aspirin     Glaucoma      Gout     Uric acid as high as 11 previously, now on allopurinol and probenecid     HTN (hypertension)      Hypercholesteremia      Hypertriglyceridemia      Intraocular bleeding     previously treated [facility] Avastin     Macular degeneration      Neuropathy     Limited sensation bilateral knees to feet     Presence of stent in " coronary artery August 2012     Proteinuria 11/2015    recurrent MPGN on biopsy     S/P kidney transplant     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  HLA identical transplant.  Biopsy in 4/2008 with recurrent MPGN type 2, mild interstitial fibrosis and tubular atrophy, CNI toxicity     Spinal stenosis 2013     Warts        Past Surgical History:   Procedure Laterality Date     C TOTAL KNEE ARTHROPLASTY  2015     LAMINECTOMY LUMBAR MINIMALLY INVASIVE THREE+ LEVELS N/A 6/11/2018    Procedure: LAMINECTOMY LUMBAR MINIMALLY INVASIVE THREE+ LEVELS;  Minimally Invasive Surgery Laminectomies Lumbar 2-3,Lumbar 3-4,Lumbar 4-5;  Surgeon: Harshal Rucker MD;  Location: [facility] OR     TONSILLECTOMY       TRANSPLANT KIDNEY RECIPIENT LIVING RELATED  1989    HLA identical     VASECTOMY                 Social History:     Social History   Substance Use Topics     Smoking status: Never Smoker     Smokeless tobacco: Never Used     Alcohol use No              Family History:     Family History   Problem Relation Age of Onset     DIABETES Mother      Colon Cancer Mother 78     Cardiac Sudden Death Sister 87     DIABETES Brother      CEREBROVASCULAR DISEASE Brother              Allergies:     All allergies reviewed and addressed  Allergies   Allergen Reactions     Contrast Dye Other (See Comments) and Itching     Pt reports sneezing     Pravastatin Muscle Pain (Myalgia)     Simvastatin Muscle Pain (Myalgia)            ROS : 10 point review of systems was performed and was neagtive other than HPI         Medications:     PTA Meds  Prior to Admission medications    Medication Sig Last Dose Taking? Auth Provider   acetaminophen (TYLENOL ARTHRITIS PAIN) 650 MG CR tablet Take 2 tablets by mouth 3 times daily. 6/11/2018 at 0400 Yes Reported, Patient   allopurinol (ZYLOPRIM) 100 MG tablet Take 2 tablets by mouth daily.  Patient taking differently: Take 100 mg by mouth 2 times daily  6/10/2018 at 2000 Yes Christi Sun MD    Brimonidine Tartrate (ALPHAGAN OP) Place 1 drop into the right eye 2 times daily  6/10/2018 at 1600 Yes Reported, Patient   cycloSPORINE modified (GENERIC EQUIVALENT) 100 MG capsule Take 1 capsule (100 mg) by mouth 2 times daily Total dose 150 mg twice daily 6/10/2018 at 2000 Yes Christi Sun MD   cycloSPORINE modified (GENERIC EQUIVALENT) 25 MG capsule Take 2 capsules (50 mg) by mouth 2 times daily Total dose 150 mg twice daily 6/10/2018 at 2000 Yes Christi Sun MD   epoetin freya (PROCRIT) 25187 UNIT/ML injection Inject 1 mL (10,000 Units) Subcutaneous once a week Please make sure Hgb has been checked within 4 days of dose, use as needed for Hgb<10.  If Hgb increases >1 point in 2 weeks, SYSTOLIC BP > 180 mmHg, OR Hgb >=10 g/dL, HOLD DOSE.  Per anemia protocol with Christi Sun MD. Past Month at Unknown time Yes Christi Sun MD   fenofibrate 145 MG tablet Take 145 mg by mouth daily 6/10/2018 at 0800 Yes Reported, Patient   ISOSORBIDE MONONITRATE PO Take 60 mg by mouth daily  6/10/2018 at 0800 Yes Reported, Patient   loperamide (IMODIUM) 2 MG capsule Take 2 mg by mouth daily  6/10/2018 at 0800 Yes Reported, Patient   losartan (COZAAR) 50 MG tablet Take 1 tablet (50 mg) by mouth daily 6/10/2018 at 0800 Yes Christi Sun MD   metoprolol (LOPRESSOR) 100 MG tablet Take 1 tablet by mouth 2 times daily. 6/10/2018 at 2000 Yes Christi Sun MD   NIFEdipine ER (ADALAT CC) 90 MG TB24 Take 90 mg by mouth daily  6/10/2018 at 0800 Yes Reported, Patient   NONFORMULARY Take 1 tablet by mouth 2 times daily Focus Macula Pro:  Eye vitamin and mineral supplement A, C, E, Zinc, Copper  6/10/2018 at 2000 Yes Reported, Patient   omega-3 acid ethyl esters (LOVAZA) 1 G capsule Take 2 g by mouth 2 times daily  6/10/2018 at 2100 Yes Reported, Patient   oxyCODONE IR (ROXICODONE) 5 MG tablet Take 1-2 tablets (5-10 mg) by mouth every 4 hours as needed for other (pain control or improvement in physical function. Hold  dose for analgesic side effects.)  Yes Harshal Rucker MD   PANTOPRAZOLE SODIUM PO Take 40 mg by mouth daily as needed for heartburn Past Week at Unknown time Yes Unknown, Entered By History   predniSONE (DELTASONE) 5 MG tablet Take 5 mg by mouth daily (with lunch)  6/10/2018 at 2000 Yes Reported, Patient   probenecid (BENEMID) 500 MG tablet Take 500 mg by mouth 2 times daily. 6/10/2018 at 2000 Yes Reported, Patient   Rosuvastatin Calcium (CRESTOR PO) Take 20 mg by mouth every evening 6/10/2018 at 2100 Yes Unknown, Entered By History   sulfamethoxazole-trimethoprim (BACTRIM,SEPTRA) 400-80 MG per tablet Take 1 tablet by mouth every other day  6/10/2018 at 2000 Yes Reported, Patient   TAMSULOSIN HCL PO Take 0.4 mg by mouth 2 times daily  6/11/2018 at 0400 Yes Reported, Patient   BABY ASPIRIN PO Take 81 mg by mouth daily 6/3/2018  Reported, Patient   BEVACizumab (AVASTIN) 400 MG/16ML injection Every 5 weeks More than a month at Unknown time  Reported, Patient   cholecalciferol (VITAMIN D3) 5000 UNITS TABS tablet Take 1 tablet (5,000 Units) by mouth daily More than a month at Unknown time  Christi Sun MD   clopidogrel (PLAVIX) 75 MG tablet Take 75 mg by mouth daily. 6/3/2018  Reported, Patient   nitroGLYCERIN (NITROSTAT) 0.4 MG SL tablet Place 0.4 mg under the tongue every 5 minutes as needed. More than a month at Unknown time  Reported, Patient      Current Meds    acetaminophen  975 mg Oral Q8H     brimonidine  1 drop Right Eye BID     cholecalciferol  5,000 Units Oral Daily     cycloSPORINE modified  100 mg Oral BID     cycloSPORINE modified  50 mg Oral BID     isosorbide mononitrate  60 mg Oral Daily     loperamide  2 mg Oral Daily     metoprolol tartrate  100 mg Oral BID     multivitamin  with lutein  1 capsule Oral BID     NIFEdipine ER  90 mg Oral Daily     omega-3 acid ethyl esters  2 g Oral BID     piperacillin-tazobactam  3.375 g Intravenous Q6H     predniSONE  5 mg Oral Daily with lunch      probenecid  500 mg Oral BID     rosuvastatin (CRESTOR) tablet 20 mg  20 mg Oral QPM     senna-docusate  1 tablet Oral BID    Or     senna-docusate  2 tablet Oral BID     sodium chloride (PF)  3 mL Intracatheter Q8H     sulfamethoxazole-trimethoprim  1 tablet Oral Every Other Day     tamsulosin (FLOMAX) capsule 0.4 mg  0.4 mg Oral BID     Infusion Meds      ALLERGIES:    Allergies   Allergen Reactions     Contrast Dye Other (See Comments) and Itching     Pt reports sneezing     Pravastatin Muscle Pain (Myalgia)     Simvastatin Muscle Pain (Myalgia)                     Physical Exam:     B/P: 111/63, T: 97.6, P: 97, R: 18    Wt Readings from Last 4 Encounters:   06/11/18 106.4 kg (234 lb 9.1 oz)   05/14/18 105.2 kg (231 lb 14.4 oz)   04/16/18 109.1 kg (240 lb 9.6 oz)   03/29/18 100.7 kg (222 lb)         Intake/Output Summary (Last 24 hours) at 06/13/18 1739  Last data filed at 06/13/18 1457   Gross per 24 hour   Intake                0 ml   Output             2472 ml   Net            -2472 ml       General: NAD, resting comfortably on exam, appears stated age,pleasant and cooperative, AAOx3.  HEENT: NC/AT, well injected Conjunctiva, anicteric sclera, EOMI; PERRL, MMM; pharynx negative for exudate or thrush;   Neck: Trachea midline; supple, good tone; full ROM; negative for Lymphoadenopathy, and JVD  Chest: CTAB B/L ; no Wheezes/Rales   CV: RRR; nl S1/S2, no murmurs/rubs  Abd: Soft, NT/ND, no HSM, no masses, (+) BS; no CVA tenderness  Ext: Warm/well perfused; no Cyanosis/Clubbing /Edema.  Skin: Clear; unbroken; no new rashes, hypo/hyperpigmented areas, nl turgor.  : no vaginal (penile) discharge, no rash/lesions; no testicular masses, no inguinal hernia  Rectal: Good sphincter tone; prostate not enlarged; brown heme (-) stool; (-) polyps/masses  Neuro: - MS: AAO x3 - CN: CN II-XII grossly intact - Motor: 5/5 AE and LE Sensory: Grossly intact andequal to light touch, pin prick    Lines    Drips          Data:        DIAGNOSTIC STUDIES  ROUTINE ICU LABS (Last four results)  CMP  Recent Labs  Lab 06/13/18  1221 06/13/18  0617 06/12/18  0615 06/11/18  1318 06/11/18  0609   NA  --  134 138 133  --    POTASSIUM  --  4.5 5.0 5.3 5.0   CHLORIDE  --  102 104 101  --    CO2  --  22 21 19*  --    ANIONGAP  --  10 13 13  --    GLC  --  141* 101* 147* 113*   BUN  --  63* 72* 73*  --    CR  --  4.64* 4.66* 4.47* 4.30*   GFRESTIMATED  --  13* 13* 14* 14*   GFRESTBLACK  --  16* 16* 16* 17*   TRISHA  --  9.0 8.6 8.9  --    MAG 1.8 1.4*  --   --   --    PHOS  --  5.2*  --   --   --    PROTTOTAL  --  6.5*  --   --   --    ALBUMIN  --  2.8*  --   --   --    BILITOTAL  --  0.3  --   --   --    ALKPHOS  --  35*  --   --   --    AST  --  37  --   --   --    ALT  --  7  --   --   --      CBC  Recent Labs  Lab 06/13/18  0617 06/12/18  0615 06/11/18  1318   WBC 12.7* 9.2 11.6*   RBC 2.49*  --  2.70*   HGB 8.8* 9.4* 9.6*   HCT 26.4*  --  29.1*   *  --  108*   MCH 35.3*  --  35.6*   MCHC 33.3  --  33.0   RDW 14.0  --  14.2   PLT 97*  --  115*     INRNo lab results found in last 7 days.  Arterial Blood Gas  Recent Labs  Lab 06/13/18  1356   O2PER 5%       MICROBIOLOGY  Blood culture 6/13 Pending     IMAGING  CXR:   EKG: A.flutter 4:1 -    Echo 8/17:   1. Normal LV size, severely increased wall thickness, normal global systolic function with an estimated EF of 55 - 60%.   2. Mildly enlarged left atrium.   3. The mitral valve is sclerotic, mild mitral regurgitation.   4. The aortic sinus is dilated with a maximal diameter of 4.4 cm.    Angiography 2012:  PRESENTATION / INDICATIONS    Non-STEMI  DIAGNOSTIC SUMMARY    The LMCA is free of significant disease.    The LAD has mild luminal irregularities.    The Circumflex has mild luminal irregularities.    The Ramus has mild luminal irregularities.    The RCA is dominant.  95% stenosis in the Distal RCA  INTERVENTION    3mm x 20mm PTCA Balloon and  4mm x 28mm PROMUS Stent to Distal   RCA, post  stenosis 0% (tiny embolus to the very distal PDA,   asymptomatic)  RECOMMENDATIONS & PLAN    Non-STEMI due to critical RCA lesion.  Successful PCI with ANTOINO.    Plavix for 1 year minimum - Recommend 2 years or longer if   tolerating    Optimize risk factors and medications      Assessment and Plan:     Rory Bustamante is a 62 year old with PMH of CAD , NSTEMI s/p PCI 2011 (RCA),  ESRD s/p kidney transplant , recent episode of Pancreatitis who is being transferred from [facility] after POD 2 after laminectomy for elevated Troponin and A. Flutter.     #NSTEMI (Type I vs Type II)  Patient chest pain free on arrival. His story is more c/w pleuritic pain rather than chest pain. This could be Type II NSTEMI in the setting of acute pancreatitis and new A.flutter (see below). However ACS is still possible.   Given that he is post Op day #2 with high risk of bleeding , down trending troponin and no active chest pain / EKG changes will hold off heparin gtt.     - Aspirin 325 mg qday  - TTE in the AM to evaluate for new WMA   - Hold heparin for now  - Troponin down trending   - NPO after midnight ( for potential procedure)     #Atrial Flutter  New A.flutter post operative along with new pleuritic chest pain, hypoxia is concerning for post operative PE.   Given CKD III will hold off CTA , will order LE US and V/Q scan for the AM.     - US LE (negative for DVT)  - V/Q scan in the AM   - Metoprolol 100 mg BID for rate control  - CHADSVASC = 2   - Holding off heparin for now   - Consider ARA/Cardioversion once more stable     # Acute Hypoxic respiratory failure  DDx PE , pulmonary edema (has congestion on CXR , high BNP), Post operative atelectasis , HAP.     - V/Q scan in the AM  - TTE in the AM   - Will hold off diuresis for now ---> consider in the AM   - Sputum culture    #Acute pancreatitis - BISAP 3   Being evaluated by GI - no gallstones on prior EUS . Either 2/2 Hyper Tg or cyclosporine.   Lactate normalized - adequate UOP  / BP - will do gentle IV hydration (given CKD , potential Pulmonary edema).     - LR @75 ml/hr (total 500 cc)  - GI consult in place appreciate input   - if not tolerating diet by tomorrow, recommend NJ for TF / per GI     #Chills   #Leukocytosis   Patient with rigors in exam room . Leukocytosis is likely secondary to pancreatitis however cant r/o hospital acquired infection (HAP, UTI).   - UA/Blood cultures / Sputum Cx  - He was transferred on Zosyn (I am not sure why he was on it but will continue until cultures are negative given that he is rigoring)     #Acute on chronic Anemia  #Thrombocytopenia  Could be consumptive post op.   - Continue to trend  - Peripheral blood smear     #CAD  #NSTEMI s/p PCI Rcx 2011   - Aspirin 81 mg qday / Holding plavix post op (start post op day 5 per ortho)  - Metoprolol 100 mg BID   - ACE: Hold Losartan 50 mg qday  - Isordil 60 mg qday     #Lactic acidosis , resolved   Likely related to pancreatitis. IVF as above.     #HLD/H.Tg  - Rosuvastatin 20 mg qday  - Fenofibrate (on hold - start once able)     #HTN  - Metoprolol 100 mg BID   - Nifedipine 90 mg qday   - Hold Losartan 50 mg qday     #ESRD s/p KT 1999   - Cyclosporine 150 mg BID   - Prednisone 5 mg qday  - Bactrim three times a week for PJP PPX     #Gout   - Probenecid 500 mg BID  - Resume Allopurinol @100 mg qday (home dose 100 mg BID)     Prophylaxis:  DVT: Heparin Sub Q BID GI: on PPI   Family: not updated  Disposition: Home once stable   Code Status: Full    Patient to be staffed in the AM.     Sabino Jacobs MD  Internal Medicine, PGY-2  Pager [phone]    Staff Physician Comments:     I personally interviewed and examined Rory Bustamante on 6/14/18, and agree with cardiology fellows/residents assessment and plan as documented above. Please see progress note dated 6/15/18       Sarahy Zheng MD     Division of Cardiology  [facility] [address] [place] [address]    Clinic nurse:  Manjula Ivey LPN   Nurse Care Coordinator- Heart Care   [phone] option 1, than option 3    [phone] Appointments  [phone] Fax  [phone] After hours

## 2018-06-13 NOTE — PROGRESS NOTES
"Patient seen and examined     S- some improvement compared to yesterday. Noted to have mild inc in sob last night, now better  vomited once.   Passing flatus    4 point ROS done and neg unless mentioned     O-   /82 (BP Location: Left arm)  Pulse 95  Temp 97.5  F (36.4  C) (Oral)  Resp 18  Ht 1.854 m (6' 1\")  Wt 106.4 kg (234 lb 9.1 oz)  SpO2 93%  BMI 30.95 kg/m2   Gen- pleasant   HEENT- NAD, MITCH, MMM  Neck- supple  CVS- I+II+ no m/r/g  RS- CTAB  Abdo- soft, diffuse mild tenderness (Better than yesterday) . No g/r/r   Ext-  edema   MSk- as per ortho   Catheter    Intake/Output Summary (Last 24 hours) at 06/13/18 0820  Last data filed at 06/13/18 0700   Gross per 24 hour   Intake                0 ml   Output             2642 ml   Net            -2642 ml     Vitals:    06/11/18 0542   Weight: 106.4 kg (234 lb 9.1 oz)     LABS:   BMP    Recent Labs  Lab 06/13/18  0617 06/12/18  0615 06/11/18  1318 06/11/18  0609    138 133  --    POTASSIUM 4.5 5.0 5.3 5.0   CHLORIDE 102 104 101  --    TRISHA 9.0 8.6 8.9  --    CO2 22 21 19*  --    BUN 63* 72* 73*  --    CR 4.64* 4.66* 4.47* 4.30*   * 101* 147* 113*     CBC    Recent Labs  Lab 06/13/18  0617 06/12/18  0615 06/11/18  1318 06/06/18  0935   WBC 12.7* 9.2 11.6*  --    RBC 2.49*  --  2.70*  --    HGB 8.8* 9.4* 9.6* 10.1*   HCT 26.4*  --  29.1* 29.7   *  --  108*  --    MCH 35.3*  --  35.6*  --    MCHC 33.3  --  33.0  --    RDW 14.0  --  14.2  --    PLT 97*  --  115*  --        PANC    Recent Labs  Lab 06/13/18  0617 06/12/18  0615   LIPASE 2413* 4647*   CXR reviewed 6/13    A/P:  # CVS;   --HLD. Fenofibrate (hold for his renal function, as suggested by Pharm D). Rosuvastatin.   -- HTN.  Continue  Metoprolol and Nifedipine, but hold Losartan.   -- CAD, s/p MI and stents. Ct  Isosorbide .Ct ASA. 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   Continue  " Cyclosporine, Prednisone, and Bactrim . Pre Op;  Cr 4.15. GFR 18. Good urine output.  History of urinary retention after surgery. Monitor PVR. Ct Tamsulosin.   Has catheter for now    --Chronic Prednisone use. Ct pta prednisone.  * D/W renal - no change for now, f/u with Dr diaz as op     -- A/C Pancreatitis: h/o pancreatitis, likely secondary to hypertriglyceridemia in 2/2018.  Low attenuation mass seen in the pancreas CT. Consult with Dr. Diaz on 4/16/18 with no worrisome findings.  *  pain Mx  - Ct slow IVF and clear fluids only if tolerates  - D/w Panc hannah team: ct current cares, add fenofibrate once able                      --Gout. HOLD  allopurinol. Probenecid.    --GERD. Protonix prn.     # Orthopedics:  s/p L2-L5 MIS laminectomy on 6/11/18 w Dr. Flynn  - further as per ortho    D/W SW, RN, CC, Ortho, GI    Nikolay Littlejohn MD (Pager- [phone])   Internal Medicine/ Hospitalist      ** Addendum: Patient seen multiple times due new development of AF w RVR this am. Unclear if any new CP - pain mainly in abdomen with some radiation to chest with worsening on deep breaths and movement and none when holds breath.   - Labs rechecked: replace Mag. Trial IV metoprolol   EKG- new AF w RVR , new t w inc lead II    Troponin 1.6     - d/w Cardiology Dr Rosen who kindly accepted patient for transfer to Cardiology service on [place]   - D/W Ortho- ok to resume plavix if warranted by cards (also ok for heparin gtt if cards wants it)

## 2018-06-13 NOTE — PLAN OF CARE
Problem: Patient Care Overview  Goal: Plan of Care/Patient Progress Review  Pt has been resting in bed. Reports discomfort in upper abdomen at the level of the diaphragm especially if taking a deep breath. Abdomen tender upon palpation. VSS. Tele: NSR with a 1st degree AV block.  Temp 99.8 this AM.  Dr. Schumacher ordered a chest x-ray due to pt having a more difficult time maintaining O2 sats above 90% despite increasing O2 to 3L / NC and encouraging use of IS. No SOB or chest pain noted or reported Lungs sound diminished at the bases. . Morillo catheter patent with good urine output. Offered pain meds overnight, but pt declines need for any right now. Pain in spine is tolerable, abdominal discomfort has been more noticeable. Has been taking scheduled tylenol. Dressing to lumbar spine c/d/i. H-vac patent. CMS and neuro's are baseline with numbness in bilateral feet. Pt also reported left inner calf pain when repositioning pneumo cuffs and paloma stockings. Pt reports having similar pain before.  No new orders from Dr. Schumacher. Cont to assess.     0730 pt had a 200 cc emesis, Zofran given pt reported becoming nauseated with going down for xray and getting back to bed. Sats are now 94% on 2 L O2/NC.

## 2018-06-13 NOTE — CONSULTS
GASTROENTEROLOGY CONSULTATION    Date of Admission:  6/11/2018          ASSESSMENT AND RECOMMENDATIONS:   Assessment:  62 year old male with a CAD, HTN, MPGN s/p kidney transplant c/b recurrent ESRD, h/o acute pancreatitis 2/2018 thought to be 2/2 hypertriglyceridemia, in addition to side branch IPMN who presents for recurrent acute pancreatitis on POD 1 after laminectomy.     May be due to hypertriglyceridemia (), also got propofol. No gallstones or sludge on past EUS. Also on cyclosporine for transplant. BISAP 3, although this is influenced by CKD.     No imaging yet to evaluate for complications, would only perform if not improving symptomatically- could be CT without contrast to start.    He previously underwent evaluation for side-branch IPMN with MRI and EUS- no features indicative of high malignancy risk (<3cm, no mural nodules, duct dilation, etc). He was also seen by surgical oncology. Recommended follow up in 6 months (of last MRI).      Recommendations  - this is a chart review consult only- please call if in-person consult is deemed necessary  - IVF as able   - pain control per primary team  - if not tolerating diet by tomorrow, recommend [place] for TF  - statin, fenofibrate   - outpatient endocrinology follow up for hypertriglyceridemia  - if having recurrent acute pancreatitis despite triglyceride control, may need to discuss changing cyclosporine  - outpatient follow up MRI as previously discussed    Gastroenterology outpatient follow up recommendations: appointment already scheduled with Dr Manning in October    Thank you for involving us in this patient's care. Please do not hesitate to contact the GI service with any questions or concerns.     Pt care plan discussed with Dr. Cohen, GI staff physician.    Hector Elias  -------------------------------------------------------------------------------------------------------------------          Chief Complaint:   We were asked by Dr Littlejohn  of internal medicine to evaluate this patient with pancreatitis.    History is obtained from the medical record.          History of Present Illness:   62 year old male with a CAD, HTN, MPGN s/p kidney transplant c/b recurrent ESRD, h/o acute pancreatitis 2/2018 thought to be 2/2 hypertriglyceridemia, in addition to side branch IPMN who presents for recurrent acute pancreatitis on POD 1 after laminectomy.     Admitted for elective laminectomy for symptomatic lumbar stenosis. On POD 1 developed abdominal pain, found to have lipase elevation. Last episode of pancreatitis was 2/2018, thought to be due to hypertriglyceridemia. Underwent workup with MRI, demonstrating cysts. EUS demonstrated 3 cysts, the largest of which was 22.5mm x 20 (others were 15 x 9, and 9). It is not clear which cyst correlated to which aspirate, but consistent with side branch IPMN. He was evaluated by Dr Diaz of hepatobiliary surgery with recommendation for follow up MRI in 6 months after initial.             Past Medical History:   Reviewed and edited as appropriate  Past Medical History:   Diagnosis Date     Angina effort (H) 8/2016     Arthritis      BPH (benign prostatic hyperplasia)      CAD (coronary artery disease) July 2011    MI in July 2011, stent placed, on plavix and aspirin     Glaucoma      Gout     Uric acid as high as 11 previously, now on allopurinol and probenecid     HTN (hypertension)      Hypercholesteremia 2015     Hypertriglyceridemia 2015     Intraocular bleeding     previously treated wt Avastin     Macular degeneration      Neuropathy     Limited sensation bilateral knees to feet     Presence of stent in coronary artery August 2012     Proteinuria 11/2015    recurrent MPGN on biopsy     S/P kidney transplant     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  HLA identical transplant.  Biopsy in 4/2008 with recurrent MPGN type 2, mild interstitial fibrosis and tubular atrophy, CNI toxicity     Spinal stenosis 2013      Warts           Past Surgical History:   Reviewed and edited as appropriate   Past Surgical History:   Procedure Laterality Date     C TOTAL KNEE ARTHROPLASTY  2015     LAMINECTOMY LUMBAR MINIMALLY INVASIVE THREE+ LEVELS N/A 6/11/2018    Procedure: LAMINECTOMY LUMBAR MINIMALLY INVASIVE THREE+ LEVELS;  Minimally Invasive Surgery Laminectomies Lumbar 2-3,Lumbar 3-4,Lumbar 4-5;  Surgeon: Harshal Rucker MD;  Location: [facility] OR     TONSILLECTOMY       TRANSPLANT KIDNEY RECIPIENT LIVING RELATED  1989    HLA identical     VASECTOMY              Previous Endoscopy:   EUS per scanned documents.          Social History:   Reviewed and edited as appropriate  Social History     Social History     Marital status:      Spouse name: N/A     Number of children: N/A     Years of education: N/A     Occupational History     Not on file.     Social History Main Topics     Smoking status: Never Smoker     Smokeless tobacco: Never Used     Alcohol use No     Drug use: No     Sexual activity: Not on file     Other Topics Concern     Not on file     Social History Narrative          Family History:   Reviewed and edited as appropriate  Family History   Problem Relation Age of Onset     DIABETES Mother      Colon Cancer Mother 78     Cardiac Sudden Death Sister 87     DIABETES Brother      CEREBROVASCULAR DISEASE Brother           Allergies:   Reviewed and edited as appropriate     Allergies   Allergen Reactions     Contrast Dye Other (See Comments) and Itching     Pt reports sneezing     Pravastatin Muscle Pain (Myalgia)     Simvastatin Muscle Pain (Myalgia)            Medications:     Current Facility-Administered Medications   Medication     [START ON 6/14/2018] acetaminophen (TYLENOL) tablet 650 mg     acetaminophen (TYLENOL) tablet 975 mg     allopurinol (ZYLOPRIM) tablet 100 mg     aspirin chewable tablet 81 mg     brimonidine (ALPHAGAN) 0.2 % ophthalmic solution 1 drop     cholecalciferol (vitamin D3) capsule CAPS  5,000 Units     cyclobenzaprine (FLEXERIL) tablet 10 mg     cycloSPORINE modified (GENERIC EQUIVALENT) capsule 100 mg     cycloSPORINE modified (GENERIC EQUIVALENT) capsule 50 mg     fenofibrate tablet 145 mg     HYDROmorphone (PF) (DILAUDID) injection 0.3-0.5 mg     hydrOXYzine (ATARAX) tablet 25 mg     isosorbide mononitrate (IMDUR) 24 hr tablet 60 mg     lactated ringers infusion     lidocaine (LMX4) kit     lidocaine 1 % 1 mL     loperamide (IMODIUM) capsule 2 mg     magnesium sulfate 2 g in NS intermittent infusion ([employer]ar[employer] or [facility] Cmpd)     magnesium sulfate 4 g in 100 mL sterile water (premade)     melatonin tablet 1 mg     metoprolol (LOPRESSOR) injection 5 mg     metoprolol tartrate (LOPRESSOR) tablet 100 mg     multivitamin  with lutein (OCUVITE WITH LTEIN) per capsule 1 capsule     naloxone (NARCAN) injection 0.1-0.4 mg     NIFEdipine ER osmotic (PROCARDIA XL) 24 hr tablet 90 mg     nitroGLYcerin (NITROSTAT) sublingual tablet 0.4 mg     omega-3 acid ethyl esters (Lovaza) capsule 2 g     ondansetron (ZOFRAN-ODT) ODT tab 4 mg    Or     ondansetron (ZOFRAN) injection 4 mg     oxyCODONE IR (ROXICODONE) tablet 5-10 mg     pantoprazole (PROTONIX) EC tablet 40 mg     predniSONE (DELTASONE) tablet 5 mg     probenecid (BENEMID) tablet 500 mg     prochlorperazine (COMPAZINE) injection 10 mg    Or     prochlorperazine (COMPAZINE) tablet 10 mg     rosuvastatin (CRESTOR) tablet 20 mg     senna-docusate (SENOKOT-S;PERICOLACE) 8.6-50 MG per tablet 1 tablet    Or     senna-docusate (SENOKOT-S;PERICOLACE) 8.6-50 MG per tablet 2 tablet     sodium chloride (PF) 0.9% PF flush 3 mL     sodium chloride (PF) 0.9% PF flush 3 mL     sulfamethoxazole-trimethoprim (BACTRIM/SEPTRA) 400-80 MG per tablet 1 tablet     tamsulosin (FLOMAX) capsule 0.4 mg             Review of Systems:   Unable to be obtained.          Physical Exam:   /82 (BP Location: Left arm)  Pulse 95  Temp 97.5  F (36.4  C) (Oral)  Resp 18  Ht 1.854 m (6'  "1\")  Wt 106.4 kg (234 lb 9.1 oz)  SpO2 93%  BMI 30.95 kg/m2  Wt:   Wt Readings from Last 2 Encounters:   06/11/18 106.4 kg (234 lb 9.1 oz)   05/14/18 105.2 kg (231 lb 14.4 oz)      Patient not examined.         Data:   Labs and imaging below were independently reviewed and interpreted    BMP  Recent Labs  Lab 06/13/18 0617 06/12/18  0615 06/11/18  1318 06/11/18  0609    138 133  --    POTASSIUM 4.5 5.0 5.3 5.0   CHLORIDE 102 104 101  --    TRISHA 9.0 8.6 8.9  --    CO2 22 21 19*  --    BUN 63* 72* 73*  --    CR 4.64* 4.66* 4.47* 4.30*   * 101* 147* 113*     CBC  Recent Labs  Lab 06/13/18 0617 06/12/18 0615 06/11/18  1318 06/06/18  0935   WBC 12.7* 9.2 11.6*  --    RBC 2.49*  --  2.70*  --    HGB 8.8* 9.4* 9.6* 10.1*   HCT 26.4*  --  29.1* 29.7   *  --  108*  --    MCH 35.3*  --  35.6*  --    MCHC 33.3  --  33.0  --    RDW 14.0  --  14.2  --    PLT 97*  --  115*  --      INRNo lab results found in last 7 days.  LFTs  Recent Labs  Lab 06/13/18  0617   ALKPHOS 35*   AST 37   ALT 7   BILITOTAL 0.3   PROTTOTAL 6.5*   ALBUMIN 2.8*      PANC  Recent Labs  Lab 06/13/18 0617 06/12/18  0615   LIPASE 2413* 4647*     Imaging: No pertinent imaging to review.  "

## 2018-06-13 NOTE — PROGRESS NOTES
Transfer  Transferred from: [facility]  Via: EMS  Reason for transfer:Pt needing heart specialty unit  Family: Aware of transfer  Belongings: Received with pt  Chart: Receivedwith pt  Medications: Meds from bin sent with pt  Report called from: [facility]

## 2018-06-13 NOTE — PROGRESS NOTES
Per RN, patient oxygen need slightly worsened 2->3L NC. w slight worsening sob. No other symptom reported.   CXR ordered.   Will fu.     Moonlighter.

## 2018-06-13 NOTE — PLAN OF CARE
Problem: Patient Care Overview  Goal: Plan of Care/Patient Progress Review  Outcome: Declining  Pt A&O, LS clear/diminished. BS active, BM this shift. Febrile 100.6 tylenol administered, O2 3-5L, used mask for mouth breathing episodes while sleeping. Tele patent, sinus tachycardic at start of shift, converted to Afib @1030, MD notified. -156, PRN IV metoprolol administered 2x. Tele converted again @1400 to A flutter. Elevated labs- troponin 1.683 & lactic acid 2.2. IV zosyn administered, IV mag replacement complete, IV LR continuous fluids dc'd. R PIV SL. CMS/neuros baseline. Hemovac removed. Morillo patent draining adequate amounts. Primapore drsh C/D/I. Clear liquid diet tolerated fair, poor appetite. PRN 5mg oxycodone administered for abdominal pain. TEDs being worn. Pt will be transferring to , report given to RN @6742.

## 2018-06-13 NOTE — PLAN OF CARE
Problem: Patient Care Overview  Goal: Plan of Care/Patient Progress Review  Outcome: Improving        VS:   VSS with a few episodes of transient tachycardia.  Patient had slight fever early in shift resolving back to normal later   Output:   Morillo draining david yellow urine.  No BM but patient did report passing gas   Activity:   Patient remained in bed, is able to assist with repositioning   Skin: WDL with exception of incisions/drains and some blanchable redness where he was laying/  Mild edema in BLE, no change.   Pain:   Patient reports abdominal pain likely 2/2 pancreatitis and denies incisional/surgical pain   Neuro/CMS:   Intact and at baseline with BLE numbness/tingling from knees down   Dressing(s):   Dressings are marked with no change   Diet:   Clear liquid; tolerating popsicles and water well.   LDA:   Hemovac bag was replaced because it was full of air, serosanguinous drainage, 12 mL out this shift. PIV infusing LR at 50   Equipment:   Walker when out of bed.   Plan:   Continue to monitor and follow plan of care.  Patient able to make needs known.   Additional Info:

## 2018-06-13 NOTE — PLAN OF CARE
Problem: Patient Care Overview  Goal: Plan of Care/Patient Progress Review  PT: Troponins elevated.  Pt on hold for now.

## 2018-06-13 NOTE — PLAN OF CARE
Problem: Patient Care Overview  Goal: Plan of Care/Patient Progress Review  Outcome: Declining  Notified with critical lab value, Troponin elevated at 2.524. MD notified. Will continue to monitor closely.

## 2018-06-13 NOTE — PLAN OF CARE
Problem: Patient Care Overview  Goal: Plan of Care/Patient Progress Review  Outcome: No Change  Pt A&Ox4, flat affect. VSS- O2 91% on 4L oxi-plus mask, HR 80-90's, currently in A-fib. Temp 97.6. Pt C/O of abdominal pain as well as some mild chest pain. Pt reports numbness/tinlging in BLE which is baseline per pt. Dressing CDI. Morillo patent and draining clear yellow urine. Pt transferred via stretcher to the [place] at approximately 1640.

## 2018-06-13 NOTE — PLAN OF CARE
Problem: Patient Care Overview  Goal: Plan of Care/Patient Progress Review  OT:  Per nursing, patient not appropriate for therapy at this time.  Will reschedule OT eval.

## 2018-06-13 NOTE — TELEPHONE ENCOUNTER
Follow-up with anemia management service:    Patient is still in the hospital - Back surgery   Admitted 18 -    Anemia Latest Ref Rng & Units 2018   MARY LOU Dose - - - - - - - -   Hemoglobin 13.3 - 17.7 g/dL 10.6(L) 10.3(A) 10.3(A) 10.1(A) 9.6(L) 9.4(L) 8.8(L)   IV Iron Dose - - - - - - - -   TSAT % - - 27 - - - -   Ferritin ng/mL - - 388 - - - -       Orders needed to be renewed (for next follow-up date) in EPIC: None   Med order expires: 19   Lab orders : 19    Follow-up call date: 18    Judy Ramírez [place]  [facility]  [phone]    Anemia Management Service  Grecia Carroll,Roque and Michelle Ramírez CPhT  Phone: [phone]  Fax: [phone]

## 2018-06-14 ENCOUNTER — APPOINTMENT (OUTPATIENT)
Dept: NUCLEAR MEDICINE | Facility: CLINIC | Age: 63
DRG: 515 | End: 2018-06-14
Attending: ORTHOPAEDIC SURGERY
Payer: MEDICARE

## 2018-06-14 ENCOUNTER — APPOINTMENT (OUTPATIENT)
Dept: PHYSICAL THERAPY | Facility: CLINIC | Age: 63
DRG: 515 | End: 2018-06-14
Attending: ORTHOPAEDIC SURGERY
Payer: MEDICARE

## 2018-06-14 ENCOUNTER — APPOINTMENT (OUTPATIENT)
Dept: CARDIOLOGY | Facility: CLINIC | Age: 63
DRG: 515 | End: 2018-06-14
Attending: ORTHOPAEDIC SURGERY
Payer: MEDICARE

## 2018-06-14 LAB
ALBUMIN SERPL-MCNC: 2.5 G/DL (ref 3.4–5)
ALP SERPL-CCNC: 35 U/L (ref 40–150)
ALT SERPL W P-5'-P-CCNC: 8 U/L (ref 0–70)
ANION GAP SERPL CALCULATED.3IONS-SCNC: 14 MMOL/L (ref 3–14)
AST SERPL W P-5'-P-CCNC: 34 U/L (ref 0–45)
BASOPHILS # BLD AUTO: 0 10E9/L (ref 0–0.2)
BASOPHILS NFR BLD AUTO: 0.1 %
BILIRUB SERPL-MCNC: 0.6 MG/DL (ref 0.2–1.3)
BUN SERPL-MCNC: 58 MG/DL (ref 7–30)
CALCIUM SERPL-MCNC: 8.8 MG/DL (ref 8.5–10.1)
CHLORIDE SERPL-SCNC: 100 MMOL/L (ref 94–109)
CO2 SERPL-SCNC: 20 MMOL/L (ref 20–32)
CREAT SERPL-MCNC: 5.11 MG/DL (ref 0.66–1.25)
CREAT UR-MCNC: 80 MG/DL
DIFFERENTIAL METHOD BLD: NORMAL
EOSINOPHIL # BLD AUTO: 0.2 10E9/L (ref 0–0.7)
EOSINOPHIL NFR BLD AUTO: 2.5 %
ERYTHROCYTE [DISTWIDTH] IN BLOOD BY AUTOMATED COUNT: 13.8 % (ref 10–15)
GFR SERPL CREATININE-BSD FRML MDRD: 12 ML/MIN/1.7M2
GLUCOSE SERPL-MCNC: 111 MG/DL (ref 70–99)
HCT VFR BLD AUTO: 23.5 % (ref 40–53)
HGB BLD-MCNC: 7.9 G/DL (ref 13.3–17.7)
IMM GRANULOCYTES # BLD: 0 10E9/L (ref 0–0.4)
IMM GRANULOCYTES NFR BLD: 0.3 %
INTERPRETATION ECG - MUSE: NORMAL
INTERPRETATION ECG - MUSE: NORMAL
LYMPHOCYTES # BLD AUTO: 1.1 10E9/L (ref 0.8–5.3)
LYMPHOCYTES NFR BLD AUTO: 11.5 %
MAGNESIUM SERPL-MCNC: 2.5 MG/DL (ref 1.6–2.3)
MCH RBC QN AUTO: 35.6 PG (ref 26.5–33)
MCHC RBC AUTO-ENTMCNC: 33.6 G/DL (ref 31.5–36.5)
MCV RBC AUTO: 106 FL (ref 78–100)
MONOCYTES # BLD AUTO: 0.6 10E9/L (ref 0–1.3)
MONOCYTES NFR BLD AUTO: 6.8 %
NEUTROPHILS # BLD AUTO: 7.3 10E9/L (ref 1.6–8.3)
NEUTROPHILS NFR BLD AUTO: 78.8 %
PLATELET # BLD AUTO: 85 10E9/L (ref 150–450)
POTASSIUM SERPL-SCNC: 4 MMOL/L (ref 3.4–5.3)
PROT SERPL-MCNC: 6.4 G/DL (ref 6.8–8.8)
PROT UR-MCNC: 1.08 G/L
PROT/CREAT 24H UR: 1.35 G/G CR (ref 0–0.2)
RBC # BLD AUTO: 2.22 10E12/L (ref 4.4–5.9)
RETICS # AUTO: 18.6 10E9/L (ref 25–95)
RETICS/RBC NFR AUTO: 0.8 % (ref 0.5–2)
SODIUM SERPL-SCNC: 134 MMOL/L (ref 133–144)
TRIGL SERPL-MCNC: 600 MG/DL
WBC # BLD AUTO: 9.5 10E9/L (ref 4–11)

## 2018-06-14 PROCEDURE — 25000132 ZZH RX MED GY IP 250 OP 250 PS 637: Mod: GY | Performed by: STUDENT IN AN ORGANIZED HEALTH CARE EDUCATION/TRAINING PROGRAM

## 2018-06-14 PROCEDURE — 00000402 ZZHCL STATISTIC TOTAL PROTEIN: Performed by: INTERNAL MEDICINE

## 2018-06-14 PROCEDURE — 25000128 H RX IP 250 OP 636: Performed by: INTERNAL MEDICINE

## 2018-06-14 PROCEDURE — 84156 ASSAY OF PROTEIN URINE: CPT | Performed by: INTERNAL MEDICINE

## 2018-06-14 PROCEDURE — A9270 NON-COVERED ITEM OR SERVICE: HCPCS | Mod: GY | Performed by: STUDENT IN AN ORGANIZED HEALTH CARE EDUCATION/TRAINING PROGRAM

## 2018-06-14 PROCEDURE — 93306 TTE W/DOPPLER COMPLETE: CPT

## 2018-06-14 PROCEDURE — 97530 THERAPEUTIC ACTIVITIES: CPT | Mod: GP

## 2018-06-14 PROCEDURE — 93010 ELECTROCARDIOGRAM REPORT: CPT | Performed by: INTERNAL MEDICINE

## 2018-06-14 PROCEDURE — 21400006 ZZH R&B CCU INTERMEDIATE UMMC

## 2018-06-14 PROCEDURE — 84478 ASSAY OF TRIGLYCERIDES: CPT | Performed by: INTERNAL MEDICINE

## 2018-06-14 PROCEDURE — 84478 ASSAY OF TRIGLYCERIDES: CPT | Performed by: STUDENT IN AN ORGANIZED HEALTH CARE EDUCATION/TRAINING PROGRAM

## 2018-06-14 PROCEDURE — 36415 COLL VENOUS BLD VENIPUNCTURE: CPT | Performed by: INTERNAL MEDICINE

## 2018-06-14 PROCEDURE — 85027 COMPLETE CBC AUTOMATED: CPT | Performed by: INTERNAL MEDICINE

## 2018-06-14 PROCEDURE — 84165 PROTEIN E-PHORESIS SERUM: CPT | Performed by: INTERNAL MEDICINE

## 2018-06-14 PROCEDURE — 40000611 ZZHCL STATISTIC MORPHOLOGY W/INTERP HEMEPATH TC 85060: Performed by: STUDENT IN AN ORGANIZED HEALTH CARE EDUCATION/TRAINING PROGRAM

## 2018-06-14 PROCEDURE — 93306 TTE W/DOPPLER COMPLETE: CPT | Mod: 26 | Performed by: INTERNAL MEDICINE

## 2018-06-14 PROCEDURE — 99223 1ST HOSP IP/OBS HIGH 75: CPT | Mod: 25 | Performed by: INTERNAL MEDICINE

## 2018-06-14 PROCEDURE — 34300033 ZZH RX 343: Performed by: INTERNAL MEDICINE

## 2018-06-14 PROCEDURE — 25000125 ZZHC RX 250: Performed by: STUDENT IN AN ORGANIZED HEALTH CARE EDUCATION/TRAINING PROGRAM

## 2018-06-14 PROCEDURE — A9567 TECHNETIUM TC-99M AEROSOL: HCPCS | Performed by: INTERNAL MEDICINE

## 2018-06-14 PROCEDURE — 25000128 H RX IP 250 OP 636: Performed by: STUDENT IN AN ORGANIZED HEALTH CARE EDUCATION/TRAINING PROGRAM

## 2018-06-14 PROCEDURE — 87493 C DIFF AMPLIFIED PROBE: CPT | Performed by: STUDENT IN AN ORGANIZED HEALTH CARE EDUCATION/TRAINING PROGRAM

## 2018-06-14 PROCEDURE — 80053 COMPREHEN METABOLIC PANEL: CPT | Performed by: STUDENT IN AN ORGANIZED HEALTH CARE EDUCATION/TRAINING PROGRAM

## 2018-06-14 PROCEDURE — 83735 ASSAY OF MAGNESIUM: CPT | Performed by: INTERNAL MEDICINE

## 2018-06-14 PROCEDURE — 40000193 ZZH STATISTIC PT WARD VISIT

## 2018-06-14 PROCEDURE — 85045 AUTOMATED RETICULOCYTE COUNT: CPT | Performed by: INTERNAL MEDICINE

## 2018-06-14 PROCEDURE — 25000131 ZZH RX MED GY IP 250 OP 636 PS 637: Mod: GY | Performed by: INTERNAL MEDICINE

## 2018-06-14 PROCEDURE — 93005 ELECTROCARDIOGRAM TRACING: CPT

## 2018-06-14 PROCEDURE — 85045 AUTOMATED RETICULOCYTE COUNT: CPT | Performed by: STUDENT IN AN ORGANIZED HEALTH CARE EDUCATION/TRAINING PROGRAM

## 2018-06-14 PROCEDURE — 25000131 ZZH RX MED GY IP 250 OP 636 PS 637: Mod: GY | Performed by: STUDENT IN AN ORGANIZED HEALTH CARE EDUCATION/TRAINING PROGRAM

## 2018-06-14 PROCEDURE — 78582 LUNG VENTILAT&PERFUS IMAGING: CPT

## 2018-06-14 PROCEDURE — A9540 TC99M MAA: HCPCS | Performed by: INTERNAL MEDICINE

## 2018-06-14 PROCEDURE — 85004 AUTOMATED DIFF WBC COUNT: CPT | Performed by: INTERNAL MEDICINE

## 2018-06-14 RX ORDER — PIPERACILLIN SODIUM, TAZOBACTAM SODIUM 2; .25 G/10ML; G/10ML
2.25 INJECTION, POWDER, LYOPHILIZED, FOR SOLUTION INTRAVENOUS EVERY 6 HOURS
Status: DISCONTINUED | OUTPATIENT
Start: 2018-06-14 | End: 2018-06-16

## 2018-06-14 RX ADMIN — NIFEDIPINE 90 MG: 90 TABLET, FILM COATED, EXTENDED RELEASE ORAL at 11:34

## 2018-06-14 RX ADMIN — PIPERACILLIN AND TAZOBACTAM 2.25 G: 2; .25 INJECTION, POWDER, LYOPHILIZED, FOR SOLUTION INTRAVENOUS; PARENTERAL at 23:06

## 2018-06-14 RX ADMIN — BRIMONIDINE TARTRATE 1 DROP: 2 SOLUTION OPHTHALMIC at 11:35

## 2018-06-14 RX ADMIN — HEPARIN SODIUM 5000 UNITS: 5000 INJECTION, SOLUTION INTRAVENOUS; SUBCUTANEOUS at 00:46

## 2018-06-14 RX ADMIN — Medication 1 CAPSULE: at 19:15

## 2018-06-14 RX ADMIN — PIPERACILLIN SODIUM AND TAZOBACTAM SODIUM 3.38 G: 3; .375 INJECTION, POWDER, LYOPHILIZED, FOR SOLUTION INTRAVENOUS at 11:54

## 2018-06-14 RX ADMIN — PIPERACILLIN AND TAZOBACTAM 2.25 G: 2; .25 INJECTION, POWDER, LYOPHILIZED, FOR SOLUTION INTRAVENOUS; PARENTERAL at 17:18

## 2018-06-14 RX ADMIN — BRIMONIDINE TARTRATE 1 DROP: 2 SOLUTION OPHTHALMIC at 19:17

## 2018-06-14 RX ADMIN — ACETAMINOPHEN 650 MG: 325 TABLET, FILM COATED ORAL at 21:33

## 2018-06-14 RX ADMIN — METOPROLOL TARTRATE 100 MG: 100 TABLET, FILM COATED ORAL at 19:15

## 2018-06-14 RX ADMIN — METOPROLOL TARTRATE 100 MG: 100 TABLET, FILM COATED ORAL at 11:34

## 2018-06-14 RX ADMIN — ROSUVASTATIN CALCIUM 20 MG: 20 TABLET, FILM COATED ORAL at 19:14

## 2018-06-14 RX ADMIN — PREDNISONE 5 MG: 5 TABLET ORAL at 11:59

## 2018-06-14 RX ADMIN — CYCLOSPORINE 150 MG: 100 CAPSULE, LIQUID FILLED ORAL at 19:20

## 2018-06-14 RX ADMIN — TAMSULOSIN HYDROCHLORIDE 0.4 MG: 0.4 CAPSULE ORAL at 19:15

## 2018-06-14 RX ADMIN — CHOLECALCIFEROL CAP 125 MCG (5000 UNIT) 5000 UNITS: 125 CAP at 11:33

## 2018-06-14 RX ADMIN — ISOSORBIDE MONONITRATE 60 MG: 60 TABLET, EXTENDED RELEASE ORAL at 11:33

## 2018-06-14 RX ADMIN — PANTOPRAZOLE SODIUM 40 MG: 40 TABLET, DELAYED RELEASE ORAL at 11:34

## 2018-06-14 RX ADMIN — SODIUM CHLORIDE, POTASSIUM CHLORIDE, SODIUM LACTATE AND CALCIUM CHLORIDE 500 ML: 600; 310; 30; 20 INJECTION, SOLUTION INTRAVENOUS at 00:46

## 2018-06-14 RX ADMIN — ASPIRIN 81 MG CHEWABLE TABLET 81 MG: 81 TABLET CHEWABLE at 11:31

## 2018-06-14 RX ADMIN — PIPERACILLIN SODIUM AND TAZOBACTAM SODIUM 3.38 G: 3; .375 INJECTION, POWDER, LYOPHILIZED, FOR SOLUTION INTRAVENOUS at 03:51

## 2018-06-14 RX ADMIN — CYCLOSPORINE 50 MG: 25 CAPSULE, LIQUID FILLED ORAL at 11:45

## 2018-06-14 RX ADMIN — SULFAMETHOXAZOLE AND TRIMETHOPRIM 1 TABLET: 400; 80 TABLET ORAL at 11:31

## 2018-06-14 RX ADMIN — ALLOPURINOL 100 MG: 100 TABLET ORAL at 11:34

## 2018-06-14 RX ADMIN — ACETAMINOPHEN 975 MG: 325 TABLET, FILM COATED ORAL at 11:32

## 2018-06-14 RX ADMIN — ONDANSETRON 4 MG: 2 INJECTION INTRAMUSCULAR; INTRAVENOUS at 14:03

## 2018-06-14 RX ADMIN — KIT FOR THE PREPARATION OF TECHNETIUM TC 99M PENTETATE 2 MILLICURIE: 20 INJECTION, POWDER, LYOPHILIZED, FOR SOLUTION INTRAVENOUS; RESPIRATORY (INHALATION) at 09:44

## 2018-06-14 RX ADMIN — HEPARIN SODIUM 5000 UNITS: 5000 INJECTION, SOLUTION INTRAVENOUS; SUBCUTANEOUS at 11:35

## 2018-06-14 RX ADMIN — PROBENECID 500 MG: 500 TABLET, FILM COATED ORAL at 11:34

## 2018-06-14 RX ADMIN — Medication 1 CAPSULE: at 11:31

## 2018-06-14 RX ADMIN — Medication 7.2 MILLICURIE: at 09:44

## 2018-06-14 RX ADMIN — CYCLOSPORINE 100 MG: 100 CAPSULE, LIQUID FILLED ORAL at 11:32

## 2018-06-14 RX ADMIN — TAMSULOSIN HYDROCHLORIDE 0.4 MG: 0.4 CAPSULE ORAL at 11:33

## 2018-06-14 ASSESSMENT — PAIN DESCRIPTION - DESCRIPTORS: DESCRIPTORS: DISCOMFORT

## 2018-06-14 NOTE — PLAN OF CARE
Problem: Patient Care Overview  Goal: Plan of Care/Patient Progress Review  OT:  Pt briefly seen by PT today.  Pt may only need one discipline to follow during hospital stay, so will hold evaluation until PT determines pt's need.

## 2018-06-14 NOTE — PROGRESS NOTES
"Orthopedic Surgery Progress Note    Subjective:   NAEO.  Pain well controlled.  (-)cp/sob.  Transferred to [place] for NSTEMI, not felt that heparin gtt was needed, Echo pending today.  Does feel improved today.  (+)tolerating liquid PO, rudolph in place.  No nausea.    Exam:  BP 94/63 (BP Location: Left arm, Cuff Size: Adult Large)  Pulse 89  Temp 97.9  F (36.6  C) (Oral)  Resp 18  Ht 1.854 m (6' 1\")  Wt 106.2 kg (234 lb 1.6 oz)  SpO2 92%  BMI 30.89 kg/m2  Gen: Awake, alert, NAD  Resp: breathing equal and non-labored  Extremities:  RLE:   No obvious deformity, skin intact   SLR negative   SILT L2-S1 distributions, stocking glove neuropathy   Fires Hip flexor, quad, TA, EHL, FHL, Gsc 5/5 strength   DP 2+  LLE:   No obvious deformity, skin intact   SLR negative   SILT L2-S1 distributions, stocking glove neuropathy   Fires Hip flexor, quad, TA, EHL, FHL, Gsc 5/5 strength   DP 2+      Drain: removed    Labs:    Recent Labs  Lab 06/14/18  0603 06/13/18  1903 06/13/18  0617 06/12/18  0615 06/11/18  1318   WBC 9.5 12.8* 12.7* 9.2 11.6*   HGB 7.9* 8.4* 8.8* 9.4* 9.6*   PLT 85* 87* 97*  --  115*       Recent Labs  Lab 06/14/18  0603 06/14/18  0005 06/13/18  1221 06/13/18  0617 06/12/18  0615 06/11/18  1318     --   --  134 138 133   POTASSIUM 4.0  --   --  4.5 5.0 5.3   CHLORIDE 100  --   --  102 104 101   CO2 20  --   --  22 21 19*   BUN 58*  --   --  63* 72* 73*   CR 5.11*  --   --  4.64* 4.66* 4.47*   *  --   --  141* 101* 147*   MAG  --  2.5* 1.8 1.4*  --   --    PHOS  --   --   --  5.2*  --   --      No lab results found in last 7 days.      Assessment:   62 year old male s/p L2-L5 MIS laminectomy on 6/11/18 w Dr. Flynn.  NSTEMI and pancreatitis post-op, transferred to [facility], workup ongoing but symptomatically improved.    Plan:  Pain: Scheduled Tylenol, Dilaudid IV PRN, oxycodone PRN, valium PRN.    Activity: No lifting >10 lbs. No excessive twisting or bending. Start physical therapy " POD#1  Wound care: Dressing change as needed per Ortho  Morillo out per protocol   Drains: Remove today  Brace:  N/A  Abx: Ancef periop complete, defer to medical teams.  Diet: Clear liquids and ADAT  DVT ppx: Mechanical alone is needed for surgical purposes.  Defer to IM/card recs for chemoprophylaxis.  Radiographs:  none  Disposition:  Pending medical improvement and progress w therapy.         Shaun Leonard MD  PGY-4, Orthopaedic Surgery  [phone]

## 2018-06-14 NOTE — PROGRESS NOTES
Attempted to meet with pt.  Pt being taken for VQ scan this morning.  Will try to see pt this afternoon as able.    TATIANA Chaudhary, APSW  6C Unit   Phone: [phone]  Pager: [phone]  Unit: [phone]

## 2018-06-14 NOTE — PROGRESS NOTES
D: Pt admitted 6/11 s/p laminectomy - came here with elevated trops and aflutter/afib w/ RVR. Pt now in SR.    I: Monitored vitals and assessed pt status.   Changed: n/a - dressing on back changed q1-2 days per ortho   Running: n/a - Zosyn q6h   PRN: Zofran q6h - Oxy if needed (didn't want any this shift)    A: A0x4. VSS on 2 L O2 NC. Afebrile. Urinating adequately - rudolph in for retention; rudolph cares completed. Pt has dressing on lower back - needs to be changed q1-2 days per ortho. It was changed last night - dressing is CDI with no drainage - to be changed tomorrow or prn. Pt up with 1-2 to chair this afternoon - pt weak while transferring and very shaky. Pt vomited this about 350 cc this afternoon. Full liquid diet ADAT. +BM. Poor appetite. Pt resting in bed throughout shift - up to chair for about an hour.    P: Continue to monitor Pt status and report changes to treatment team.

## 2018-06-14 NOTE — PROGRESS NOTES
GI Progress Note  6/14/2018     S: Abdominal pain much improved. Appetite improving. Transferred overnight for atrial fibrillation with RVR, rate is now controlled. Feels much better compared to 2 days ago. No fever. No N/V.     O:  B/P: 128/76, T: 98, P: 89, R: 18   Gen: NAD  HEENT: MMM  Neck: supple  CV: RRR  Pulm: non labored  Abd: soft, non tender, non distended  Skin: WWP  Neuro: AAOx3    Labs and imaging personally reviewed.   Lab Results   Component Value Date    AST 34 06/14/2018     Lab Results   Component Value Date    ALT 8 06/14/2018     Lab Results   Component Value Date    BILICONJ 0.0 07/01/2009      Lab Results   Component Value Date    BILITOTAL 0.6 06/14/2018     Lab Results   Component Value Date    ALBUMIN 2.5 06/14/2018     Lab Results   Component Value Date    PROTTOTAL 6.4 06/14/2018      Lab Results   Component Value Date    ALKPHOS 35 06/14/2018     Assessment and recommendations:   62 year old male with a CAD, HTN, MPGN s/p kidney transplant c/b recurrent ESRD, h/o acute pancreatitis 2/2018 thought to be 2/2 hypertriglyceridemia, in addition to side branch IPMN who presents for recurrent acute pancreatitis on POD 1 after laminectomy.     May be due to hypertriglyceridemia (), also got propofol. No gallstones or sludge on past EUS. Also on cyclosporine for transplant. BISAP 3, although this is influenced by CKD.      Improving clinically with improvement in abdominal pain and appetite.      He previously underwent evaluation for side-branch IPMN with MRI and EUS- no features indicative of high malignancy risk (<3cm, no mural nodules, duct dilation, etc). He was also seen by surgical oncology. Recommended follow up in 6 months (of last MRI).      - ADAT  - pain control per primary team  - statin, fenofibrate   - outpatient endocrinology follow up for hypertriglyceridemia  - if having recurrent acute pancreatitis despite triglyceride control, may need to discuss changing cyclosporine  -  outpatient follow up MRI as previously discussed/scheduled    Discussed with attending, Dr Cohen.     Hector Elias MD  GI Fellow  [phone]

## 2018-06-14 NOTE — PLAN OF CARE
Problem: Patient Care Overview  Goal: Plan of Care/Patient Progress Review  Outcome: No Change  D/I/A: Pt here s/p laminectomy c/b a-fib RVR. Tx'd from [facility] this evening. BP lower but stable. Lactate 2.2 earlier; MD ordered recheck. Pt requiring 4LO2 NC. Up w/ ax1-2, noted to feel weak while up. Dressing on back intact. Pt A&Ox4, SBA per report. UA sent per order.  P: Continue to monitor.

## 2018-06-14 NOTE — PLAN OF CARE
Problem: Patient Care Overview  Goal: Plan of Care/Patient Progress Review  Discharge Planner PT / 6C   Patient plan for discharge: Rehab.  Current status: RN naila session. Reviewed post-surgical precautions. Pt completes supine > sit with ModA. Pt transfers sit <> stand 2x with Janelle. Pt ambulates to/from bathroom (~10ft x 2) with 1 UE on IV pole for balance and close CGA. Fatigued upon completion.  Barriers to return to prior living situation: Medical status, level of assist, post-surgical precautions, pt IND at baseline and has 6 stairs to access apartment.  Recommendations for discharge: TCU.  Rationale for recommendations: Pt would benefit from rehab stay to progress IND with bed mobility, transfers, ambulation and stairs within precautions.

## 2018-06-14 NOTE — PLAN OF CARE
Problem: Patient Care Overview  Goal: Plan of Care/Patient Progress Review  Outcome: No Change  D: Pt with PMH of CAD , NSTEMI s/p PCI 2011 (RCA),  ESRD s/p kidney transplant , recent episode of Pancreatitis who is being transferred from [facility] after POD 2 after laminectomy for elevated Troponin and A fib RVR.   A/I: Pt is A&Ox4. Pt is able to make needs known and uses call light appropriately. Pt VSS on 2L O2 NC. Pt placed on oxymask overnight because pt appeared to be sleeping with mouth open and oxygen levels were dropping to 80's. Heart rhythm: A fib rates 's. Pt HR was sustaining at 120's to 130's and pt received scheduled PO Metoprolol and shortly after Metoprolol 5 mg IV. HR improved after Metoprolol IV and Pt converted to SR in 70's-80's at around 0030. RN updated on call MD. Pt is up with assist of 2. Pt is voiding adequate amounts via rudolph catheter that is in r/t retention. Pt reports no pain or SOB. BP's soft 's/60-80s. MAPs 70's-80's. Pt is NPO at 0000.  Dressing changed on back. Incision appears WNL. US of LE done on evenings, neg for DVT.  P: Order for sputum sample. Pt provided with cup, no sample obtained yet. Blood cultures collected, results pending. ARA, VQ scan ordered for AM, RN will continue to monitor and assess. RN will update team with any changes/concerns. Patricia Norris

## 2018-06-14 NOTE — PLAN OF CARE
Problem: Patient Care Overview  Goal: Plan of Care/Patient Progress Review  Outcome: No Change  D: Pt with PMH of CAD , NSTEMI s/p PCI 2011 (RCA),  ESRD s/p kidney transplant , recent episode of Pancreatitis who is being transferred from [facility] after POD 2 after laminectomy for elevated Troponin and A fib RVR.   A/I: Pt is A&Ox4. Pt is able to make needs known and uses call light appropriately. Pt VSS on 2L O2 NC. Pt placed on oxymask overnight because pt appeared to be sleeping with mouth open and oxygen levels were dropping to 80's. Heart rhythm: A fib rates 's. Pt HR was sustaining at 120's to 130's and pt received scheduled PO Metoprolol and shortly after Metoprolol 5 mg IV. HR improved after Metoprolol IV and Pt converted to SR in 70's-80's at around 0030. RN updated on call MD. Pt is up with assist of 2. Pt is voiding adequate amounts via rudolph catheter that is in r/t retention. Pt reports no pain or SOB. BP's soft 's/60-80s. MAPs 70's-80's. Pt is NPO at 0000.  Dressing changed on back. Incision appears WNL. US of LE done on evenings, neg for DVT.  P: Order for sputum sample. Pt provided with cup, no sample obtained yet. Blood cultures collected, results pending. ARA, VQ scan ordered for AM, RN will continue to monitor and assess. RN will update team with any changes/concerns. Patricia Norris

## 2018-06-15 LAB
ALBUMIN SERPL ELPH-MCNC: 3.8 G/DL (ref 3.7–5.1)
ALBUMIN SERPL-MCNC: 2.5 G/DL (ref 3.4–5)
ALPHA1 GLOB SERPL ELPH-MCNC: 0.6 G/DL (ref 0.2–0.4)
ALPHA2 GLOB SERPL ELPH-MCNC: 0.9 G/DL (ref 0.5–0.9)
ANION GAP SERPL CALCULATED.3IONS-SCNC: 9 MMOL/L (ref 3–14)
B-GLOBULIN SERPL ELPH-MCNC: 0.8 G/DL (ref 0.6–1)
BUN SERPL-MCNC: 57 MG/DL (ref 7–30)
C DIFF TOX B STL QL: NEGATIVE
CALCIUM SERPL-MCNC: 8.8 MG/DL (ref 8.5–10.1)
CHLORIDE SERPL-SCNC: 102 MMOL/L (ref 94–109)
CO2 SERPL-SCNC: 23 MMOL/L (ref 20–32)
COPATH REPORT: NORMAL
CREAT SERPL-MCNC: 4.7 MG/DL (ref 0.66–1.25)
CYCLOSPORINE BLD LC/MS/MS-MCNC: 99 UG/L (ref 50–400)
GAMMA GLOB SERPL ELPH-MCNC: 0.7 G/DL (ref 0.7–1.6)
GFR SERPL CREATININE-BSD FRML MDRD: 13 ML/MIN/1.7M2
GLUCOSE SERPL-MCNC: 98 MG/DL (ref 70–99)
IGA SERPL-MCNC: 206 MG/DL (ref 70–380)
IGG SERPL-MCNC: 711 MG/DL (ref 695–1620)
IGM SERPL-MCNC: 50 MG/DL (ref 60–265)
INTERPRETATION ECG - MUSE: NORMAL
INTERPRETATION ECG - MUSE: NORMAL
M PROTEIN SERPL ELPH-MCNC: 0 G/DL
PHOSPHATE SERPL-MCNC: 2.9 MG/DL (ref 2.5–4.5)
POTASSIUM SERPL-SCNC: 4.1 MMOL/L (ref 3.4–5.3)
PROT PATTERN SERPL ELPH-IMP: ABNORMAL
PROT PATTERN SERPL IFE-IMP: ABNORMAL
SODIUM SERPL-SCNC: 134 MMOL/L (ref 133–144)
SPECIMEN SOURCE: NORMAL
TME LAST DOSE: NORMAL H

## 2018-06-15 PROCEDURE — 82784 ASSAY IGA/IGD/IGG/IGM EACH: CPT | Performed by: INTERNAL MEDICINE

## 2018-06-15 PROCEDURE — 36415 COLL VENOUS BLD VENIPUNCTURE: CPT | Performed by: INTERNAL MEDICINE

## 2018-06-15 PROCEDURE — 25000132 ZZH RX MED GY IP 250 OP 250 PS 637: Mod: GY | Performed by: STUDENT IN AN ORGANIZED HEALTH CARE EDUCATION/TRAINING PROGRAM

## 2018-06-15 PROCEDURE — 21400006 ZZH R&B CCU INTERMEDIATE UMMC

## 2018-06-15 PROCEDURE — 86334 IMMUNOFIX E-PHORESIS SERUM: CPT | Performed by: INTERNAL MEDICINE

## 2018-06-15 PROCEDURE — 25000131 ZZH RX MED GY IP 250 OP 636 PS 637: Mod: GY | Performed by: INTERNAL MEDICINE

## 2018-06-15 PROCEDURE — 25000128 H RX IP 250 OP 636: Performed by: INTERNAL MEDICINE

## 2018-06-15 PROCEDURE — A9270 NON-COVERED ITEM OR SERVICE: HCPCS | Mod: GY | Performed by: STUDENT IN AN ORGANIZED HEALTH CARE EDUCATION/TRAINING PROGRAM

## 2018-06-15 PROCEDURE — 99232 SBSQ HOSP IP/OBS MODERATE 35: CPT | Mod: GC | Performed by: INTERNAL MEDICINE

## 2018-06-15 PROCEDURE — 25000125 ZZHC RX 250: Performed by: STUDENT IN AN ORGANIZED HEALTH CARE EDUCATION/TRAINING PROGRAM

## 2018-06-15 PROCEDURE — 93010 ELECTROCARDIOGRAM REPORT: CPT | Performed by: INTERNAL MEDICINE

## 2018-06-15 PROCEDURE — 93005 ELECTROCARDIOGRAM TRACING: CPT

## 2018-06-15 PROCEDURE — 80069 RENAL FUNCTION PANEL: CPT | Performed by: INTERNAL MEDICINE

## 2018-06-15 PROCEDURE — 80158 DRUG ASSAY CYCLOSPORINE: CPT | Performed by: INTERNAL MEDICINE

## 2018-06-15 RX ADMIN — METOPROLOL TARTRATE 100 MG: 100 TABLET, FILM COATED ORAL at 20:53

## 2018-06-15 RX ADMIN — PIPERACILLIN AND TAZOBACTAM 2.25 G: 2; .25 INJECTION, POWDER, LYOPHILIZED, FOR SOLUTION INTRAVENOUS; PARENTERAL at 12:11

## 2018-06-15 RX ADMIN — CYCLOSPORINE 125 MG: 100 CAPSULE, LIQUID FILLED ORAL at 18:59

## 2018-06-15 RX ADMIN — CYCLOSPORINE 150 MG: 100 CAPSULE, LIQUID FILLED ORAL at 08:44

## 2018-06-15 RX ADMIN — Medication 1 CAPSULE: at 20:49

## 2018-06-15 RX ADMIN — PIPERACILLIN AND TAZOBACTAM 2.25 G: 2; .25 INJECTION, POWDER, LYOPHILIZED, FOR SOLUTION INTRAVENOUS; PARENTERAL at 05:15

## 2018-06-15 RX ADMIN — ONDANSETRON 4 MG: 4 TABLET, ORALLY DISINTEGRATING ORAL at 09:21

## 2018-06-15 RX ADMIN — OMEGA-3-ACID ETHYL ESTERS 2 G: 900 CAPSULE, LIQUID FILLED ORAL at 08:44

## 2018-06-15 RX ADMIN — PREDNISONE 5 MG: 5 TABLET ORAL at 12:11

## 2018-06-15 RX ADMIN — PANTOPRAZOLE SODIUM 40 MG: 40 TABLET, DELAYED RELEASE ORAL at 08:45

## 2018-06-15 RX ADMIN — Medication 1 CAPSULE: at 08:48

## 2018-06-15 RX ADMIN — BRIMONIDINE TARTRATE 1 DROP: 2 SOLUTION OPHTHALMIC at 08:40

## 2018-06-15 RX ADMIN — TAMSULOSIN HYDROCHLORIDE 0.4 MG: 0.4 CAPSULE ORAL at 08:45

## 2018-06-15 RX ADMIN — PIPERACILLIN AND TAZOBACTAM 2.25 G: 2; .25 INJECTION, POWDER, LYOPHILIZED, FOR SOLUTION INTRAVENOUS; PARENTERAL at 23:35

## 2018-06-15 RX ADMIN — OMEGA-3-ACID ETHYL ESTERS 2 G: 900 CAPSULE, LIQUID FILLED ORAL at 20:48

## 2018-06-15 RX ADMIN — ALLOPURINOL 100 MG: 100 TABLET ORAL at 08:45

## 2018-06-15 RX ADMIN — ROSUVASTATIN CALCIUM 20 MG: 20 TABLET, FILM COATED ORAL at 20:50

## 2018-06-15 RX ADMIN — METOPROLOL TARTRATE 100 MG: 100 TABLET, FILM COATED ORAL at 08:39

## 2018-06-15 RX ADMIN — BRIMONIDINE TARTRATE 1 DROP: 2 SOLUTION OPHTHALMIC at 20:49

## 2018-06-15 RX ADMIN — TAMSULOSIN HYDROCHLORIDE 0.4 MG: 0.4 CAPSULE ORAL at 20:50

## 2018-06-15 RX ADMIN — PROBENECID 500 MG: 500 TABLET, FILM COATED ORAL at 20:49

## 2018-06-15 RX ADMIN — NIFEDIPINE 90 MG: 90 TABLET, FILM COATED, EXTENDED RELEASE ORAL at 08:48

## 2018-06-15 RX ADMIN — LOPERAMIDE HYDROCHLORIDE 2 MG: 2 CAPSULE ORAL at 08:43

## 2018-06-15 RX ADMIN — ASPIRIN 81 MG CHEWABLE TABLET 81 MG: 81 TABLET CHEWABLE at 08:39

## 2018-06-15 RX ADMIN — PIPERACILLIN AND TAZOBACTAM 2.25 G: 2; .25 INJECTION, POWDER, LYOPHILIZED, FOR SOLUTION INTRAVENOUS; PARENTERAL at 19:00

## 2018-06-15 RX ADMIN — ISOSORBIDE MONONITRATE 60 MG: 60 TABLET, EXTENDED RELEASE ORAL at 08:45

## 2018-06-15 ASSESSMENT — PAIN DESCRIPTION - DESCRIPTORS: DESCRIPTORS: DISCOMFORT

## 2018-06-15 NOTE — PROGRESS NOTES
Social Work: Assessment with Discharge Plan    Patient Name:  Jeanmarie Bustamante  :  1955  Age:  62 year old  MRN:  [id number]  Risk/Complexity Score:     Completed assessment with:  Pt    Presenting Information   Reason for Referral:  Discharge plan  Date of Intake:  2018  Referral Source:  Chart Review  Decision Maker:  Pt when able  Alternate Decision Maker:  Spouse is NOK decision maker.  Health Care Directive:  Provided education  Living Situation:  House  Previous Functional Status:  Independent  Patient and family understanding of hospitalization:  Pt states he was hospitalized for lumbar surgery and now has pancreatitis with Afib.  Cultural/Language/Spiritual Considerations:  No needs reported  Adjustment to Illness:  Pt adjusting well.    Physical Health  Reason for Admission:    1. Spinal stenosis of lumbar region with neurogenic claudication      Services Needed/Recommended:  TCU    Mental Health/Chemical Dependency  Diagnosis:  None reported,   Support/Services in Place:  None  Services Needed/Recommended:  None    Support System  Significant relationship at present time:  Spouse supportive of needs.  Family of origin is available for support:  Yes  Other support available:  Yes  Gaps in support system:  Pt considering TCU for rehab placement.  Patient is caregiver to:  None     Provider Information   Primary Care Physician:  Moris Diaz   [phone]   Clinic:  [facility] [address] [facility] [address] / NARESH VELAZQUEZ*      :  None    Financial   Income Source:  Pt does not endorse work related issues.  Financial Concerns:  None reported.  Insurance:    Payor/Plan Subscriber Name Rel Member # Group #   MEDICARE - MEDICARE JEANMARIE BUSTAMANTE  [id number]       ATTN CLAIMS, [address]   COMMER[place] - [place]* JEANMARIE BUSTAMANTE  [id number]      [address] [address]       Discharge Plan   Patient and family discharge goal:  TCU  Provided education on discharge plan:  YES  Patient agreeable  to discharge plan:  YES  A list of Medicare Certified Facilities was provided to the patient and/or family to encourage patient choice. Patient's choices for facility are:    Pt will review with spouse and SW to get referrals tomorrow.  Will NH provide Skilled rehabilitation or complex medical:  YES  General information regarding anticipated insurance coverage and possible out of pocket cost was discussed. Patient and patient's family are aware patient may incur the cost of transportation to the facility, pending insurance payment: YES  Barriers to discharge:  Medical stability    Discharge Recommendations   Anticipated Disposition:  Facility:  TCU  Transportation Needs:  Medical:  Wheelchair  Name of Transportation Company and Phone:  [employer] PH: [phone]. As needed.    Additional comments   SW met with pt to introduce self and role in consult.  Pt will be reviewing rehab referrals for placement and will notify SW tomorrow of choices.  SW to follow for discharge planning.    TATIANA Chaudhary, APSW  6C Unit   Phone: [phone]  Pager: [phone]  Unit: [phone]

## 2018-06-15 NOTE — PROGRESS NOTES
"Orthopedic Surgery Progress Note    Subjective:   NAEO.  Pain well controlled.  (-)cp/sob.  Transferred to [place] for NSTEMI, Echo showed no abnormalities, not started on heparin gtt.  Feels much improved today.  (+)tolerating liquid PO, rudolph in place.  No nausea.    Exam:  /78 (BP Location: Right arm)  Pulse 89  Temp 98.7  F (37.1  C) (Oral)  Resp 18  Ht 1.854 m (6' 1\")  Wt 106.2 kg (234 lb 1.6 oz)  SpO2 93%  BMI 30.89 kg/m2  Gen: Awake, alert, NAD  Resp: breathing equal and non-labored  Extremities:  RLE:   No obvious deformity, skin intact   SLR negative   SILT L2-S1 distributions, stocking glove neuropathy   Fires Hip flexor, quad, TA, EHL, FHL, Gsc 5/5 strength   DP 2+  LLE:   No obvious deformity, skin intact   SLR negative   SILT L2-S1 distributions, stocking glove neuropathy   Fires Hip flexor, quad, TA, EHL, FHL, Gsc 5/5 strength   DP 2+      Drain: removed    Labs:    Recent Labs  Lab 06/14/18  0603 06/13/18  1903 06/13/18  0617 06/12/18  0615 06/11/18  1318   WBC 9.5 12.8* 12.7* 9.2 11.6*   HGB 7.9* 8.4* 8.8* 9.4* 9.6*   PLT 85* 87* 97*  --  115*       Recent Labs  Lab 06/14/18  0603 06/14/18  0005 06/13/18  1221 06/13/18  0617 06/12/18  0615 06/11/18  1318     --   --  134 138 133   POTASSIUM 4.0  --   --  4.5 5.0 5.3   CHLORIDE 100  --   --  102 104 101   CO2 20  --   --  22 21 19*   BUN 58*  --   --  63* 72* 73*   CR 5.11*  --   --  4.64* 4.66* 4.47*   *  --   --  141* 101* 147*   MAG  --  2.5* 1.8 1.4*  --   --    PHOS  --   --   --  5.2*  --   --      No lab results found in last 7 days.      Assessment:   62 year old male s/p L2-L5 MIS laminectomy on 6/11/18 w Dr. Flynn.  NSTEMI and pancreatitis post-op, transferred to [place], workup benign and symptomatically improved.    Plan:  Pain: Scheduled Tylenol, Dilaudid IV PRN, oxycodone PRN, valium PRN.    Activity: No lifting >10 lbs. No excessive twisting or bending. Start physical therapy POD#1  Wound care: Dressing " change as needed per Ortho  [place]illo out per protocol   Drains: Removed  Brace:  N/A  Abx: Ancef periop complete, defer to medical teams.  Diet: Clear liquids and ADAT  DVT ppx: Mechanical alone is needed for surgical purposes.  Defer to IM/card recs for chemoprophylaxis.  Radiographs:  none  Disposition:  Pending medical improvement and progress w therapy.     Shaun Leonard MD  PGY-4, Orthopaedic Surgery  [phone]

## 2018-06-15 NOTE — PROGRESS NOTES
Brief Progress Note  6/15/2018     Doing well, no abdominal pain, appetite good. Recommend follow up as outpatient with MRI and GI clinic as previously outlined/scheduled. Will also need referral to endocrinology for treatment of hypertriglyceridemia.     GI will sign off. Please call with questions or concerns.     Discussed with attending, Dr Cohen.     Hector Elias MD  GI Fellow  [phone]

## 2018-06-15 NOTE — PROGRESS NOTES
CARDIOLOGY 1 SERVICE PROGRESS NOTE    Rory Bustamante ([id number]) admitted on 6/11/2018 06/14/2018    Changes today:  - Plan for outpatient stress test  - Can return to prior OSH/Medicine team here given resolution of cardiac related complaints.           Assessment and Plan:   Rory Bustamante is a 62 year old with PMH of CAD , NSTEMI s/p PCI 2011 (RCA),  ESRD s/p kidney transplant , recent episode of Pancreatitis who is being transferred from [facility] after POD 2 after laminectomy for elevated Troponin and A. Flutter.      #Chest pain  Appears to be more likely pleuritic pain rather than cardiac in origin.  Initial concern for NSTEMI vs PE vs symptomatic aflutter. Patient's troponin peaked at 2.5 and was downtrending by admission. No EKG changes concerning for Ischemia. V/Q scan ruled out PE. TTE w/ no wall motion abnormalities     - s/p Aspirin 325 mg qday  - Plan for outpatient stress test.       #Atrial Flutter (CHADSVASC = 2 ), resolved  New A.flutter likely post-op related. Spontaneously converted before AM.      - Metoprolol 100 mg BID for rate control  - Should be started on anticoagulation prior to d/c     #Acute Hypoxic respiratory failure  Unclear etiology. DDx on admission was PE vs Pulmonary edema (has congestion on CXR , high BNP and TTE with elevated estimated right atrial pressure of 15 and dilated IVC), Post operative atelectasis , HAP. Likely volume overload from pancreatitis related volume resuscitation. Will diurese in AM.     - Sputum culture  - d/c IVF and observe patient     #Acute pancreatitis - BISAP 3   Being evaluated by GI - no gallstones on prior EUS . Either 2/2 Hyper Tg or cyclosporine. Lactate normalized - adequate UOP / BP - will do gentle IV hydration (given CKD , potential Pulmonary edema).      - ADAT  - pain control w/ tylenol. Dilaudid for breakthrough pain if needed  - statin, fenofibrate   - outpatient endocrinology follow up for hypertriglyceridemia  - if having recurrent  acute pancreatitis despite triglyceride control, may need to discuss changing cyclosporine  - outpatient follow up MRI as previously discussed/scheduled     #Chills   #Leukocytosis   Patient with rigors in exam room however afebrile on arrival and all day. Leukocytosis is likely secondary to pancreatitis however cant r/o hospital acquired infection (HAP, UTI).     - UA/Blood cultures / Sputum Cx  - He was transferred on Zosyn possibly to treat HAP. Will transition to PO in AM and plan for 7-day total course of antibiotics      #Acute on chronic Anemia  #Thrombocytopenia  Could be consumptive post op.   - Continue to trend  - Peripheral blood smear      #CAD  #NSTEMI s/p PCI Rcx 2011   - Aspirin 81 mg qday / Holding plavix post op (start post op day 5 per ortho)  - Metoprolol 100 mg BID   - ACE: Hold Losartan 50 mg qday  - Isordil 60 mg qday      #Lactic acidosis , resolved   Likely related to pancreatitis. IVF as above.      #HLD/H.Tg  - Rosuvastatin 20 mg qday  - Fenofibrate (on hold - start once able)      #HTN  - Metoprolol 100 mg BID   - Nifedipine 90 mg qday   - Hold Losartan 50 mg qday      #ESRD s/p KT 1999   - Cyclosporine 150 mg BID   - Prednisone 5 mg qday  - Bactrim three times a week for PJP PPX      #Gout   - Probenecid 500 mg BID  - Resume Allopurinol @100 mg qday (home dose 100 mg BID)      Prophylaxis:  DVT: Heparin Sub Q BID GI: on PPI   Family: not updated  Disposition: Home once stable   Code Status: Full    FEN/Electrolytes: ADAT    Disposition: Anticipate return to prior OSH for further medical care in AM due to resolution of cardiac issues    Patient discussed with Dr. Zheng who agrees with the plan      e [facility]  Cardiology 1 service   PGY-2 Internal Medicine  Pager: [phone]    ==================================================================    Subjective:  No acute events overnight. No cardiorespiratory symptoms. Feels better.    Objective:  Most recent vital signs:  /78  "(BP Location: Right arm)  Pulse 89  Temp 98.7  F (37.1  C) (Oral)  Resp 18  Ht 1.854 m (6' 1\")  Wt 106.2 kg (234 lb 1.6 oz)  SpO2 93%  BMI 30.89 kg/m2  Temp:  [97.9  F (36.6  C)-98.7  F (37.1  C)] 98.7  F (37.1  C)  Heart Rate:  [] 84  Resp:  [16-18] 18  BP: ()/(63-80) 124/78  SpO2:  [90 %-98 %] 93 %  Wt Readings from Last 2 Encounters:   06/14/18 106.2 kg (234 lb 1.6 oz)   05/14/18 105.2 kg (231 lb 14.4 oz)       Intake/Output Summary (Last 24 hours) at 06/14/18 2151  Last data filed at 06/14/18 1724   Gross per 24 hour   Intake              720 ml   Output             1325 ml   Net             -605 ml       Physical exam:  General: NAD, resting comfortably on exam, appears stated age,pleasant and cooperative, AAOx3.  HEENT: NC/AT, well injected Conjunctiva, anicteric sclera, EOMI; PERRL, MMM; pharynx negative for exudate or thrush;   Neck: Trachea midline; supple, good tone; full ROM; negative for Lymphoadenopathy, and JVD  Chest: CTAB B/L ; no Wheezes/Rales   CV: RRR; nl S1/S2, no murmurs/rubs  Abd: Soft, NT/ND, no HSM, no masses, (+) BS; no CVA tenderness  Ext: Warm/well perfused; no Cyanosis/Clubbing /Edema.  Skin: Clear; unbroken; no new rashes, hypo/hyperpigmented areas, nl turgor.  : no vaginal (penile) discharge, no rash/lesions; no testicular masses, no inguinal hernia  Rectal: Good sphincter tone; prostate not enlarged; brown heme (-) stool; (-) polyps/masses  Neuro: - MS: AAO x3 - CN: CN II-XII grossly intact - Motor: 5/5 AE and LE Sensory: Grossly intact andequal to light touch, pin prick    Labs (Past three days):  Reviewed    Imaging/procedure results:   Reviewed    Staff Physician Comments:     I personally interviewed and examined Rory ARSALAN Bustamante today, and agree with cardiology fellows/residents assessment and plan as documented above. Please see H&P dated 6/13/18       Sarahy Zheng MD     Division of Cardiology  [facility] " [address] [address]    Clinic nurse:  Manjula Ivey LPN   Nurse Care Coordinator- Heart Care   [phone] option 1, than option 3    [phone] Appointments  [phone] Fax  [phone] After hours

## 2018-06-15 NOTE — PROGRESS NOTES
Social Work Services Progress Note    Hospital Day: 5  Date of Initial Social Work Evaluation:  6/14/18  Collaborated with:  Pt's spouse Kajal Bustamante (P: [phone]) via phone per pt's request.    Data:  Rory Bustamante is a 63 yo male admitted to [facility] 6/11/18.    Intervention:  SW following for DC plans.    Assessment:  Pt lives in an apt with his spouse Kajal. PT recommending TCU and pt and spouse are in agreement. Pt asked that his spouse help to choose referral choices. Pt's spouse has worked in SNFs for 17 years and requested referrals to the following facilities. Spouse's first choice is either [place] facility, second is [place], and third if [facility]. Pt has 9 siblings and one of them will transport pt at discharge.    Referrals sent via DC on Demand:  1. [facility] in [place] (P: [phone], F: [phone])  2. [facility] (P: [phone], F: [phone])  3. [facility] (P: [phone], F: [phone])  4. [facility] in [place] (P: [phone], F: [phone])    Plan:    Anticipated Disposition:  Possible DC 3-4 days pending medical stability. Referrals are out to TCUs; awaiting responses from admissions teams. Family will transport.    Barriers to d/c plan:  Medical stability and TCU placement.    Follow Up:  SW will continue to follow and assist as needed.    CHARY Flores, [facility]- assisting Pepper Tao on 6C  6B Intermediate Care Unit  Phone: [phone]  Pager: [phone]

## 2018-06-15 NOTE — PROGRESS NOTES
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  Not uremic today.  Pt plans to make appointment for fistula assessment.    -- does not need HD  -- agree with plan to hold off on coronary angio unless CP recurs  -- daily Cr. Today is 4.7 at recent baseline     # Immunosuppression:  currently on pred and  bid. Recent levels 40-70.  -- CSA level 99  -- decrease CSA to 125 bid. Will order  # Volume status:  appears euvolemic     # afib/flutter- back in a fib today with rapid response  -- further BRITO/Rx per cards    # Proteinuria: low level proteinuria from MPGN.  Has been on losartan for that. In addition he has MGUS. Kidney biopsy from 2015 was reviewed for LC deposition (none)  --  hold losartan for now b/o rising Cr  -- check SIEP and immunofixation (ordered)     # HBP: on 4 drugs at home.  BP now 140-150/70-90.  -- continue home BP meds except for losartan     # Volume status: appears euvolemic.       # Tremor and shakiness: hx muscle pain secondary to statins and is now on crestor.  CK elevated possibly from surgery. Last LDL in 8/2017 was 66  -- repeat lipids  -- weight risk vs benefits of statin in this pt  -- repeat CK 6/16 and consider decreasing dose of crestor if still high     # Pancreatitis:  Acute onset of sx post-op in pt with hx pancreatitis.  Lipase peak >4000.  Sx now gone. On conservative diet per GI.  Pt has hx hypertriglyceridemia which may be contributing.   -- recs per GI     # Anemia: pt is on 10,000 U epo weekly.  Hg today 7.9 likely from blood draws. Does have increased thrombosis risk with pos ACLA  -- hold epo in current post-op and S/P MI state     # Peripheral neuropathy:  Now S/P laminectomy, hopefully will see some improvement     # MBD: mildly high PTH (200 range) with hx low vit D (level 11) on replacement. Level was 24 in Feb. Phos 5.2  --  "DC vit D supplement         Recommendations were communicated to primary team via note     Christi Sun MD   [phone]     Interval History :   In the last 24 hours Rory Bustamante developed a fib with RVR at 7 a.m.  Still in it now but not on monitor.  Cr down to 4.7.  One episode of nausea today now resolved and pt is hungry.  No CP.  One loose stool after get ting senna so pt was moved to iso room    Review of Systems:   I reviewed the following systems:  GI: good appetite. One episode of nausea or vomiting or diarrhea.   Neuro:  no confusion  Constitutional:  no fever or chills  CV: no dyspnea or edema.  no chest pain.    Physical Exam:   I/O last 3 completed shifts:  In: 800 [P.O.:600; I.V.:200]  Out: 2450 [Urine:2050; Emesis/NG output:350; Drains:50]   /85  Pulse 115  Temp 98.2  F (36.8  C) (Oral)  Resp 18  Ht 1.854 m (6' 1\")  Wt 106.2 kg (234 lb 1.6 oz)  SpO2 93%  BMI 30.89 kg/m2     GENERAL APPEARANCE: healthy  EYES:  no scleral icterus, pupils equal  HENT: mouth without ulcers or lesions  PULM: lungs clear to auscultation  CV: irreg irregular rhythm, rate 110-120, no rub     -JVD no     -edema none   GI: soft, nontender, nondistended, bowel sounds are pos  INTEGUMENT: multiple skin tags, verrucous keratosis on arms and legs  NEURO:  no asterixis   Access none    Labs:   All labs reviewed by me  Electrolytes/Renal -   Recent Labs   Lab Test  06/15/18   1132  06/14/18   0603  06/14/18   0005  06/13/18   1221  06/13/18   0617   02/06/18   1221   NA  134  134   --    --   134   < >  133   POTASSIUM  4.1  4.0   --    --   4.5   < >  4.6   CHLORIDE  102  100   --    --   102   < >  103   CO2  23  20   --    --   22   < >  20   BUN  57*  58*   --    --   63*   < >  51*   CR  4.70*  5.11*   --    --   4.64*   < >  4.04*   GLC  98  111*   --    --   141*   < >  144*   TRISHA  8.8  8.8   --    --   9.0   < >  9.4   MAG   --    --   2.5*  1.8  1.4*   --    --    PHOS  2.9   --    --    --   5.2*   --   3.6 "    < > = values in this interval not displayed.       CBC -   Recent Labs   Lab Test  06/14/18   0603  06/13/18   1903  06/13/18   0617   WBC  9.5  12.8*  12.7*   HGB  7.9*  8.4*  8.8*   PLT  85*  87*  97*       LFTs -   Recent Labs   Lab Test  06/15/18   1132  06/14/18   0603  06/13/18   0617  02/06/18   1223   ALKPHOS   --   35*  35*  43   BILITOTAL   --   0.6  0.3  0.3   ALT   --   8  7  21   AST   --   34  37  22   PROTTOTAL   --   6.4*  6.5*  7.9   ALBUMIN  2.5*  2.5*  2.8*  3.8       Iron Panel -   Recent Labs   Lab Test  05/30/18   1100  05/01/18   0852  04/04/18   0728   IRON  90  118  130   IRONSAT  27  34  33   VALERIA  388  [phone]  852         Imaging:  All imaging studies reviewed by me.     Current Medications:    allopurinol  100 mg Oral Daily     aspirin  81 mg Oral Daily     brimonidine  1 drop Right Eye BID     cycloSPORINE modified  150 mg Oral BID     isosorbide mononitrate  60 mg Oral Daily     loperamide  2 mg Oral Daily     metoprolol tartrate  100 mg Oral BID     multivitamin  with lutein  1 capsule Oral BID     NIFEdipine ER  90 mg Oral Daily     omega-3 acid ethyl esters  2 g Oral BID     pantoprazole (PROTONIX) EC tablet 40 mg  40 mg Oral QAM AC     piperacillin-tazobactam  2.25 g Intravenous Q6H     predniSONE  5 mg Oral Daily with lunch     probenecid  500 mg Oral BID     rosuvastatin (CRESTOR) tablet 20 mg  20 mg Oral QPM     senna-docusate  1 tablet Oral BID    Or     senna-docusate  2 tablet Oral BID     sodium chloride (PF)  3 mL Intracatheter Q8H     sulfamethoxazole-trimethoprim  1 tablet Oral Every Other Day     tamsulosin (FLOMAX) capsule 0.4 mg  0.4 mg Oral BID       Christi Sun MD

## 2018-06-15 NOTE — PLAN OF CARE
Problem: Patient Care Overview  Goal: Individualization & Mutuality  Outcome: No Change    D. Pt transferred 6/11 from [facility] after POD 2 after laminectomy for elevated Troponin and A. Flutter. Pt is stable.  ALOx4. C/o LE discomfort and tolerable with Tylenol.  On 2-3 LNC O2 NC or Oxiplus mask to keep O2sat >92%.  DEVRIES. SR on tele. Advanced diet Full liquid det. Pt tolerated a full Liquid diet this evening.  Pt denied any N, V, or abdominal discomfort.  However pt had one medium size watery stool (team was notified. Put pt on Enteric isolation until r/o C-diff).  Pt  had two slices of toast with butter at HS and tolerated as well. Adequate UO via Morillo. Back incision covered with drsg. Pt up to the commode with assist x2.   I. Keeping a strict I and O's. Using IS with encouragement.   A. Pt is stable. No c/o N or V. One episode of med- size water bm.   P. Continue monitoring pt. send a stool sample when pt has BM.

## 2018-06-15 NOTE — PLAN OF CARE
"Problem: Patient Care Overview  Goal: Individualization & Mutuality  Outcome:unstable rhythm  D-pt slept intermittently, denied discomfort while in bed.  Patent rudolph with david urine output. Laminectomy site, C/D/I.   Pt called this RN to say,\" my heart is pounding.\" Pt was assessed, vitals checked, 12 lead EKG ordered, and the on-call Cards 1 physician notified.  Pt's irregular heart rate ranged from 120's to 140's, and gradually down to 100's - 110's.  This RN was called to say the pt had an emesis when leaving the room to contact the physician.  Pt declined Zofran when offered, due to lack of nausea.  Pt stated he felt he was \"gaging,\" and not having an emesis. Pt also indicated he felt better when his heart rate decreased to the lower 100's.  Pt was then asking to order food. Cards 1 was updated on the pt's status and stated they would assess the EKG and then see the pt.  I-monitored, assessed and addressed pt's status and comfort for changes. Contacted the physician with change in status  A-stable status, most of the night, until arrhythmias occurred.   P-Continue to monitor, assess and address pt's status and comfort for changes. Administer am antiarrythmic medications to assist rhythm stability.                                                                                                                                                                                                                                                                            "

## 2018-06-15 NOTE — PROGRESS NOTES
Social Work Services Progress Note     Hospital Day: 4  Date of Initial Social Work Evaluation:  6/14/18  Collaborated with:  Pt, Cards 1 team     Data: Pt is a 62 year old male being followed by SW for placement to TCU.     Intervention:  SW met with pt to gather referrals for TCU placement.  Pt's preference is the [place] area.  Pt asking that SW contact wife for preferences on placement as well.     - Referrals in Process:    None started yet.     Assessment: Pt adjusting appropriately to situation.     Plan:    Anticipated Disposition: Facility:  TCU needed    Barriers to d/c plan: Medical stability    Follow Up: SW to follow for weekend/Monday placement.     TATIANA Chaudhary, ZAINAW  6C Unit   Phone: [phone]  Pager: [phone]  Unit: [phone]    ___________________________________________________________________________________________________________________________________________________    Referrals Discontinued:  None    Community Case Management/Community Services in place:   None

## 2018-06-16 ENCOUNTER — APPOINTMENT (OUTPATIENT)
Dept: OCCUPATIONAL THERAPY | Facility: CLINIC | Age: 63
DRG: 515 | End: 2018-06-16
Attending: ORTHOPAEDIC SURGERY
Payer: MEDICARE

## 2018-06-16 LAB
ANION GAP SERPL CALCULATED.3IONS-SCNC: 12 MMOL/L (ref 3–14)
BUN SERPL-MCNC: 55 MG/DL (ref 7–30)
CALCIUM SERPL-MCNC: 9.2 MG/DL (ref 8.5–10.1)
CHLORIDE SERPL-SCNC: 103 MMOL/L (ref 94–109)
CHOLEST SERPL-MCNC: 158 MG/DL
CK SERPL-CCNC: 398 U/L (ref 30–300)
CO2 SERPL-SCNC: 19 MMOL/L (ref 20–32)
CREAT SERPL-MCNC: 4.78 MG/DL (ref 0.66–1.25)
GFR SERPL CREATININE-BSD FRML MDRD: 13 ML/MIN/1.7M2
GLUCOSE SERPL-MCNC: 99 MG/DL (ref 70–99)
HDLC SERPL-MCNC: 12 MG/DL
INR PPP: 1.41 (ref 0.86–1.14)
LDLC SERPL CALC-MCNC: ABNORMAL MG/DL
LDLC SERPL DIRECT ASSAY-MCNC: 97 MG/DL
NONHDLC SERPL-MCNC: 146 MG/DL
POTASSIUM SERPL-SCNC: 4.2 MMOL/L (ref 3.4–5.3)
SODIUM SERPL-SCNC: 134 MMOL/L (ref 133–144)
TRIGL SERPL-MCNC: 429 MG/DL

## 2018-06-16 PROCEDURE — 93010 ELECTROCARDIOGRAM REPORT: CPT | Performed by: INTERNAL MEDICINE

## 2018-06-16 PROCEDURE — 21400006 ZZH R&B CCU INTERMEDIATE UMMC

## 2018-06-16 PROCEDURE — 25000132 ZZH RX MED GY IP 250 OP 250 PS 637: Mod: GY | Performed by: INTERNAL MEDICINE

## 2018-06-16 PROCEDURE — 80048 BASIC METABOLIC PNL TOTAL CA: CPT | Performed by: INTERNAL MEDICINE

## 2018-06-16 PROCEDURE — 25000125 ZZHC RX 250: Performed by: STUDENT IN AN ORGANIZED HEALTH CARE EDUCATION/TRAINING PROGRAM

## 2018-06-16 PROCEDURE — 25000131 ZZH RX MED GY IP 250 OP 636 PS 637: Mod: GY | Performed by: INTERNAL MEDICINE

## 2018-06-16 PROCEDURE — 82550 ASSAY OF CK (CPK): CPT | Performed by: INTERNAL MEDICINE

## 2018-06-16 PROCEDURE — 85610 PROTHROMBIN TIME: CPT | Performed by: INTERNAL MEDICINE

## 2018-06-16 PROCEDURE — 40000141 ZZH STATISTIC PERIPHERAL IV START W/O US GUIDANCE

## 2018-06-16 PROCEDURE — 99232 SBSQ HOSP IP/OBS MODERATE 35: CPT | Mod: GC | Performed by: INTERNAL MEDICINE

## 2018-06-16 PROCEDURE — 25000132 ZZH RX MED GY IP 250 OP 250 PS 637: Mod: GY | Performed by: STUDENT IN AN ORGANIZED HEALTH CARE EDUCATION/TRAINING PROGRAM

## 2018-06-16 PROCEDURE — 93005 ELECTROCARDIOGRAM TRACING: CPT

## 2018-06-16 PROCEDURE — 36415 COLL VENOUS BLD VENIPUNCTURE: CPT | Performed by: INTERNAL MEDICINE

## 2018-06-16 PROCEDURE — 97535 SELF CARE MNGMENT TRAINING: CPT | Mod: GO

## 2018-06-16 PROCEDURE — A9270 NON-COVERED ITEM OR SERVICE: HCPCS | Mod: GY | Performed by: STUDENT IN AN ORGANIZED HEALTH CARE EDUCATION/TRAINING PROGRAM

## 2018-06-16 PROCEDURE — 83721 ASSAY OF BLOOD LIPOPROTEIN: CPT | Performed by: INTERNAL MEDICINE

## 2018-06-16 PROCEDURE — A9270 NON-COVERED ITEM OR SERVICE: HCPCS | Mod: GY | Performed by: INTERNAL MEDICINE

## 2018-06-16 PROCEDURE — 80048 BASIC METABOLIC PNL TOTAL CA: CPT | Performed by: STUDENT IN AN ORGANIZED HEALTH CARE EDUCATION/TRAINING PROGRAM

## 2018-06-16 PROCEDURE — 25000128 H RX IP 250 OP 636: Performed by: INTERNAL MEDICINE

## 2018-06-16 PROCEDURE — 80061 LIPID PANEL: CPT | Performed by: INTERNAL MEDICINE

## 2018-06-16 PROCEDURE — 97165 OT EVAL LOW COMPLEX 30 MIN: CPT | Mod: GO

## 2018-06-16 PROCEDURE — 40000133 ZZH STATISTIC OT WARD VISIT

## 2018-06-16 PROCEDURE — 97530 THERAPEUTIC ACTIVITIES: CPT | Mod: GO

## 2018-06-16 RX ORDER — CLOPIDOGREL BISULFATE 75 MG/1
75 TABLET ORAL DAILY
Status: DISCONTINUED | OUTPATIENT
Start: 2018-06-16 | End: 2018-06-16

## 2018-06-16 RX ORDER — WARFARIN SODIUM 5 MG/1
5 TABLET ORAL
Status: COMPLETED | OUTPATIENT
Start: 2018-06-16 | End: 2018-06-16

## 2018-06-16 RX ADMIN — METOPROLOL TARTRATE 100 MG: 100 TABLET, FILM COATED ORAL at 19:39

## 2018-06-16 RX ADMIN — PIPERACILLIN AND TAZOBACTAM 2.25 G: 2; .25 INJECTION, POWDER, LYOPHILIZED, FOR SOLUTION INTRAVENOUS; PARENTERAL at 05:09

## 2018-06-16 RX ADMIN — ASPIRIN 81 MG CHEWABLE TABLET 81 MG: 81 TABLET CHEWABLE at 08:06

## 2018-06-16 RX ADMIN — BRIMONIDINE TARTRATE 1 DROP: 2 SOLUTION OPHTHALMIC at 08:05

## 2018-06-16 RX ADMIN — PREDNISONE 5 MG: 5 TABLET ORAL at 12:54

## 2018-06-16 RX ADMIN — BRIMONIDINE TARTRATE 1 DROP: 2 SOLUTION OPHTHALMIC at 19:39

## 2018-06-16 RX ADMIN — PANTOPRAZOLE SODIUM 40 MG: 40 TABLET, DELAYED RELEASE ORAL at 08:06

## 2018-06-16 RX ADMIN — ALLOPURINOL 100 MG: 100 TABLET ORAL at 08:05

## 2018-06-16 RX ADMIN — TAMSULOSIN HYDROCHLORIDE 0.4 MG: 0.4 CAPSULE ORAL at 08:06

## 2018-06-16 RX ADMIN — Medication 1 CAPSULE: at 08:05

## 2018-06-16 RX ADMIN — CYCLOSPORINE 125 MG: 100 CAPSULE, LIQUID FILLED ORAL at 18:39

## 2018-06-16 RX ADMIN — ISOSORBIDE MONONITRATE 60 MG: 60 TABLET, EXTENDED RELEASE ORAL at 08:06

## 2018-06-16 RX ADMIN — SULFAMETHOXAZOLE AND TRIMETHOPRIM 1 TABLET: 400; 80 TABLET ORAL at 08:06

## 2018-06-16 RX ADMIN — CLOPIDOGREL 75 MG: 75 TABLET, FILM COATED ORAL at 09:43

## 2018-06-16 RX ADMIN — ROSUVASTATIN CALCIUM 20 MG: 20 TABLET, FILM COATED ORAL at 19:39

## 2018-06-16 RX ADMIN — NIFEDIPINE 90 MG: 90 TABLET, FILM COATED, EXTENDED RELEASE ORAL at 08:06

## 2018-06-16 RX ADMIN — METOPROLOL TARTRATE 100 MG: 100 TABLET, FILM COATED ORAL at 08:06

## 2018-06-16 RX ADMIN — WARFARIN SODIUM 5 MG: 5 TABLET ORAL at 19:39

## 2018-06-16 RX ADMIN — TAMSULOSIN HYDROCHLORIDE 0.4 MG: 0.4 CAPSULE ORAL at 19:39

## 2018-06-16 RX ADMIN — CYCLOSPORINE 125 MG: 100 CAPSULE, LIQUID FILLED ORAL at 08:06

## 2018-06-16 RX ADMIN — PROBENECID 500 MG: 500 TABLET, FILM COATED ORAL at 08:06

## 2018-06-16 RX ADMIN — Medication 1 CAPSULE: at 19:39

## 2018-06-16 RX ADMIN — PROBENECID 500 MG: 500 TABLET, FILM COATED ORAL at 20:59

## 2018-06-16 RX ADMIN — LOPERAMIDE HYDROCHLORIDE 2 MG: 2 CAPSULE ORAL at 08:06

## 2018-06-16 ASSESSMENT — ACTIVITIES OF DAILY LIVING (ADL): PREVIOUS_RESPONSIBILITIES: MEAL PREP;HOUSEKEEPING;LAUNDRY;MEDICATION MANAGEMENT;FINANCES;DRIVING

## 2018-06-16 ASSESSMENT — PAIN DESCRIPTION - DESCRIPTORS: DESCRIPTORS: DISCOMFORT

## 2018-06-16 NOTE — PLAN OF CARE
"Problem: Patient Care Overview  Goal: Plan of Care/Patient Progress Review  Outcome: Improving  /75 (BP Location: Right arm)  Pulse 112  Temp 98.2  F (36.8  C) (Oral)  Resp 18  Ht 1.854 m (6' 1\")  Wt 104.6 kg (230 lb 8 oz)  SpO2 98%  BMI 30.41 kg/m2    Pt following back surgery transferred to       "

## 2018-06-16 NOTE — PHARMACY-ANTICOAGULATION SERVICE
Clinical Pharmacy - Warfarin Dosing Consult     Pharmacy has been consulted to manage this patient s warfarin therapy.  Indication: Atrial Fibrillation  Therapy Goal: INR 2-3  Provider/Team: CVTS  OP Anticoag Clinic: N/A - new start  Warfarin Prior to Admission: No  Significant drug interactions: Bactrim (prior to admission)  Recent documented change in oral intake/nutrition: No  Dose Comments: 6/16 - will start conservatively at 5mg given elevated baseline INR, drug-drug interaction with PTA bactrim    INR   Date Value Ref Range Status   06/16/2018 1.41 (H) 0.86 - 1.14 Final   12/08/2017 1.02 0.86 - 1.14 Final       Recommend warfarin 5 mg today (see above for reasoning).  Pharmacy will monitor Rory Bustamante daily and order warfarin doses to achieve specified goal.      Please contact pharmacy as soon as possible if the warfarin needs to be held for a procedure or if the warfarin goals change.      Shanda Sparks, PharmD

## 2018-06-16 NOTE — PLAN OF CARE
"Problem: Patient Care Overview  Goal: Plan of Care/Patient Progress Review  /79 (BP Location: Right arm)  Pulse 112  Temp 98.8  F (37.1  C) (Oral)  Resp 16  Ht 1.854 m (6' 1\")  Wt 104.6 kg (230 lb 8 oz)  SpO2 96%  BMI 30.41 kg/m2    Pt progressing toward discharge.   Originally admitted for back surgery came to us following an acute pancreatitis, elevated troponin then afib.  Pt has improved and has been stable in sinus rhythm through the day, on room air in high 90's ambulating in halls with walker, still slow to rise initially getting out of bed.  Nausea has dissipated, appetite improved, pt has not been taking fish oil, thinking his stomach cannot handle yet.  Plan of care to discharge to TCU or possibly home, depending on progress and timing for TCU placement.  Home is with wife, who too has bad back, and home has six stairs, pt continuing to be seen for rehab therapy.  Warfarin teaching initiated with wife and pt, expected to initiate warfarin therapy per bedside report with Cards I, continue to monitor and notify team of changes.            "

## 2018-06-16 NOTE — PLAN OF CARE
Problem: Patient Care Overview  Goal: Plan of Care/Patient Progress Review  Discharge Planner OT   Patient plan for discharge: rehab  Current status: Min A bed mobility supine > sit EOB. Mod A LB dressing and min A for UB dressing within precautions. Completed standing grooming/bathing tasks with FWW, setup, and SBA. Pt completed ~375 ft with CGA/SBA and FWW, progress with sit <> stand from min A to CGA with VCs and FWW. VSS, pt on room air for ambulation, at lowest SpO2 92% pt asymptomatic. With rest breaks O2 on room air increased to %.  Barriers to return to prior living situation: decreased strength/activity tolerance, post-surgical precautions, impaired mobility, dependence for ADLs  Recommendations for discharge: TCU  Rationale for recommendations: Pt is currently below functional baseline for ADLs, transfers, and functional mobility, would benefit from continued therapy to address above deficits and maximize strength/independence in order to return to PLOF.       Entered by: Wendy Jasso 06/16/2018 9:44 AM

## 2018-06-16 NOTE — PROGRESS NOTES
"Orthopedic Surgery Progress Note    Subjective:   NAEO.  Pain well controlled.  (-)cp/sob.  Transferred to [facility] for NSTEMI, Echo showed no abnormalities, not started on heparin gtt.  Feels much improved today.  (+)tolerating liquid PO, rudolph in place.  No nausea.    Exam:  /71 (BP Location: Right arm)  Pulse 112  Temp 98.9  F (37.2  C) (Oral)  Resp 16  Ht 1.854 m (6' 1\")  Wt 104.6 kg (230 lb 8 oz)  SpO2 95%  BMI 30.41 kg/m2  Gen: Awake, alert, NAD  Resp: breathing equal and non-labored  Extremities:  RLE:   No obvious deformity, skin intact   SLR negative   SILT L2-S1 distributions, stocking glove neuropathy   Fires Hip flexor, quad, TA, EHL, FHL, Gsc 5/5 strength   DP 2+  LLE:   No obvious deformity, skin intact   SLR negative   SILT L2-S1 distributions, stocking glove neuropathy   Fires Hip flexor, quad, TA, EHL, FHL, Gsc 5/5 strength   DP 2+      Drain: removed    Labs:    Recent Labs  Lab 06/14/18  0603 06/13/18  1903 06/13/18  0617 06/12/18  0615 06/11/18  1318   WBC 9.5 12.8* 12.7* 9.2 11.6*   HGB 7.9* 8.4* 8.8* 9.4* 9.6*   PLT 85* 87* 97*  --  115*       Recent Labs  Lab 06/15/18  1132 06/14/18  0603 06/14/18  0005 06/13/18  1221 06/13/18  0617 06/12/18  0615    134  --   --  134 138   POTASSIUM 4.1 4.0  --   --  4.5 5.0   CHLORIDE 102 100  --   --  102 104   CO2 23 20  --   --  22 21   BUN 57* 58*  --   --  63* 72*   CR 4.70* 5.11*  --   --  4.64* 4.66*   GLC 98 111*  --   --  141* 101*   MAG  --   --  2.5* 1.8 1.4*  --    PHOS 2.9  --   --   --  5.2*  --      No lab results found in last 7 days.      Assessment:   62 year old male s/p L2-L5 MIS laminectomy on 6/11/18 w Dr. Rucker.  NSTEMI and pancreatitis post-op, transferred to [facility], workup benign and symptomatically improved.    Plan:  Pain: Scheduled Tylenol, Dilaudid IV PRN, oxycodone PRN, valium PRN.    Activity: No lifting >10 lbs. No excessive twisting or bending. Start physical therapy POD#1  Wound care: Dressing " change as needed per Ortho  Morillo out per protocol   Drains: Removed  Brace:  N/A  Abx: Ancef periop complete, defer to medical teams.  Diet: Clear liquids and ADAT  DVT ppx: Mechanical alone is needed for surgical purposes.  Defer to IM/card recs for chemoprophylaxis.  Radiographs:  none  Disposition:  Pending medical improvement and progress w therapy.     Shaun Leonard MD  PGY-4, Orthopaedic Surgery  [phone]

## 2018-06-16 NOTE — PROGRESS NOTES
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  Stable kidney function with creatinine ~ 4.8 today.  Not uremic today.  Pt plans to make appointment for fistula assessment.    -- no acute indication for dialysis  -- agree with plan to hold off on coronary angio unless CP recurs     # Immunosuppression - currently on pred and  bid. Recent levels 40-70.  -- CSA level 99    # HTN/Volume status - a bit variable BP.  Patient appears euvolemic.     # afib/flutter - back in a fib yesterday with rapid response, but self converted into sinus rhythm overnight  -- further BRITO/Rx per cards    # Proteinuria - low level proteinuria from MPGN.  Has been on losartan for that. In addition he has MGUS. Kidney biopsy from 2015 was reviewed for LC deposition (none)  -- hold losartan for now b/o rising Cr  -- check SIEP and immunofixation (ordered)     # Tremor and shakiness - hx muscle pain secondary to statins and is now on crestor.  CK elevated possibly from surgery, but slowly trending down. Last LDL in 8/2017 was 66  -- repeat lipids  -- weight risk vs benefits of statin in this pt     # Pancreatitis - acute onset of sx post-op in pt with hx pancreatitis.  Lipase peak >4000 and then trending down.  Sx now gone. On conservative diet per GI.  Pt has hx hypertriglyceridemia which may be contributing.   -- recs per GI     # Anemia - pt is on 10,000 U epo weekly.  Hgb 7.9 with last check a couple of days ago. Does have increased thrombosis risk with pos ACLA  -- hold epo in current post-op and S/P MI state     # Peripheral neuropathy - now S/P laminectomy, hopefully will see some improvement     # MBD: mildly high PTH (200 range) with hx low vit D (level 11) on replacement. Level was 24 in Feb. Phos 5.2  -- DC vit D supplement     Recommendations were communicated to primary team via this  "note.     Neo Modi MD     Interval History:   In the last 24 hours Rory Bustamante's kidney function is stable with creatinine ~ 4.8.  Good urine output.  Patient converted back into sinus rhythm overnight with controlled rate in 70s this morning.  He notes no chest pain or shortness of breath.  Patient reports feeling good ambulating around the unit.  No nausea or vomiting.  Some loose stools, which is his normal.  No fever, sweats or chills, but feels cold always.  No leg swelling.    Review of Systems:   4 point ROS was obtained and negative, except as noted in Interval History.    Physical Exam:   I/O last 3 completed shifts:  In: 1000 [P.O.:1000]  Out: 2900 [Urine:2900]   /85 (BP Location: Right arm)  Pulse 112  Temp 98.5  F (36.9  C) (Oral)  Resp 18  Ht 1.854 m (6' 1\")  Wt 104.6 kg (230 lb 8 oz)  SpO2 99%  BMI 30.41 kg/m2     GENERAL APPEARANCE: NAD, alert  HENT: mouth without ulcers or lesions  PULM: lungs clear to auscultation bilaterally  CV: regular rate and rhythm     - no LE edema bilaterally  GI: soft, nontender, nondistended, bowel sounds are positive  INTEGUMENT: multiple skin tags, verrucous keratosis on arms and legs  TX KIDNEY: normal  Access: none    Labs:   All labs reviewed by me  Electrolytes/Renal -   Recent Labs   Lab Test  06/16/18   0713  06/15/18   1132  06/14/18   0603  06/14/18   0005  06/13/18   1221  06/13/18   0617   02/06/18   1221   NA  134  134  134   --    --   134   < >  133   POTASSIUM  4.2  4.1  4.0   --    --   4.5   < >  4.6   CHLORIDE  103  102  100   --    --   102   < >  103   CO2  19*  23  20   --    --   22   < >  20   BUN  55*  57*  58*   --    --   63*   < >  51*   CR  4.78*  4.70*  5.11*   --    --   4.64*   < >  4.04*   GLC  99  98  111*   --    --   141*   < >  144*   TRISHA  9.2  8.8  8.8   --    --   9.0   < >  9.4   MAG   --    --    --   2.5*  1.8  1.4*   --    --    PHOS   --   2.9   --    --    --   5.2*   --   3.6    < > = values in this " interval not displayed.       CBC -   Recent Labs   Lab Test  06/14/18   0603  06/13/18   1903  06/13/18   0617   WBC  9.5  12.8*  12.7*   HGB  7.9*  8.4*  8.8*   PLT  85*  87*  97*       LFTs -   Recent Labs   Lab Test  06/15/18   1132  06/14/18   0603  06/13/18   0617  02/06/18   1223   ALKPHOS   --   35*  35*  43   BILITOTAL   --   0.6  0.3  0.3   ALT   --   8  7  21   AST   --   34  37  22   PROTTOTAL   --   6.4*  6.5*  7.9   ALBUMIN  2.5*  2.5*  2.8*  3.8       Iron Panel -   Recent Labs   Lab Test  05/30/18   1100  05/01/18   0852  04/04/18   0728   IRON  90  118  130   IRONSAT  27  34  33   VALERIA  [phone]  [phone]  [phone]     Imaging:  All imaging studies reviewed by me.     Current Medications:    allopurinol  100 mg Oral Daily     aspirin  81 mg Oral Daily     brimonidine  1 drop Right Eye BID     clopidogrel  75 mg Oral Daily     cycloSPORINE modified  125 mg Oral BID     isosorbide mononitrate  60 mg Oral Daily     loperamide  2 mg Oral Daily     metoprolol tartrate  100 mg Oral BID     multivitamin  with lutein  1 capsule Oral BID     NIFEdipine ER  90 mg Oral Daily     omega-3 acid ethyl esters  2 g Oral BID     pantoprazole (PROTONIX) EC tablet 40 mg  40 mg Oral QAM AC     predniSONE  5 mg Oral Daily with lunch     probenecid  500 mg Oral BID     rosuvastatin (CRESTOR) tablet 20 mg  20 mg Oral QPM     senna-docusate  1 tablet Oral BID    Or     senna-docusate  2 tablet Oral BID     sodium chloride (PF)  3 mL Intracatheter Q8H     sulfamethoxazole-trimethoprim  1 tablet Oral Every Other Day     tamsulosin (FLOMAX) capsule 0.4 mg  0.4 mg Oral BID       Neo Modi MD

## 2018-06-16 NOTE — PROGRESS NOTES
CARDIOLOGY 1 SERVICE PROGRESS NOTE    Rory Bustamante ([id number]) admitted on 6/11/2018 06/16/2018    Changes today:  - D/C PTA Plavix and commence on Warfarin  - INR check in AM.   - Will touch base with care coordination about placement needs  - Hold Coronary angiogram this hospitalization given peaked troponin, resolution of chest pain, TTE without wall motion abnormalities, worsening kidney function and increased bleeding risk from heparinizing patient in setting of recent orthopedic surgery  - Plan for 1 month follow up with OS Cardiologist. Will need stress test.  - D/C Zosyn  - [facility] on potential discharge reccs.  - Patient to follow up with Nephrology on outpatient basis for fistula placement, given worsening kidney function.         Assessment and Plan:   Rory Bustamante is a 62 year old with PMH of CAD , NSTEMI s/p PCI 2011 (RCA),  ESRD s/p kidney transplant , recent episode of Pancreatitis who is being transferred [facility] after POD 2 after laminectomy for elevated Troponin and A. Flutter.      #Chest pain  Appears to be more likely pleuritic pain rather than cardiac in origin.  Initial concern for NSTEMI vs PE vs symptomatic aflutter. Patient's troponin peaked at 2.5 and was downtrending by admission. No EKG changes concerning for Ischemia. V/Q scan ruled out PE. TTE w/ no wall motion abnormalities. At this point, seems more like symptomatic AFlutter     - s/p Aspirin 325 mg  - Continue ASA 81mg daily  - Hold Coronary angiogram this hospitalization given peaked troponin, resolution of chest pain, TTE without wall motion abnormalities, worsening kidney function and increased bleeding risk from heparinizing patient in setting of recent orthopedic surgery  - Plan for 1 month follow up with OS Cardiologist. Will need stress test.     #Atrial Flutter (CHADSVASC = 2), resolved  #Volume overload, resolved  #Acute Hypoxic respiratory failure, resolved  New A.flutter while on admission at  [place], likely post-op related. Spontaneously converted before 6/14 AM but then returned 6/15 PM. Patient is also volume overloaded from aggressive fluid resuscitation (has congestion on CXR , high BNP and TTE with elevated estimated right atrial pressure of 15 and dilated IVC, so could be contributing especially given new oxygen requirements by patient (no oxygen use at baseline).    - Autodiuresing fluid, so will hold off exacerbation   - Metoprolol 100 mg BID for rate control  - Warfarin for Anticoagulation  - INR checks    #Back pain s/p L2-L5 MIS laminectomy on 6/11/18 w Dr. Rucker  - [facility] on potential discharge reccs - Per Ortho, okay to d/c      #Acute pancreatitis - BISAP 3   Being evaluated by GI - no gallstones on prior EUS . Either 2/2 Hyper Tg or cyclosporine. Lactate normalized - adequate UOP / BP - will do gentle IV hydration (given CKD , potential Pulmonary edema).      - ADAT  - pain control w/ tylenol. Dilaudid for breakthrough pain if needed  - statin, fenofibrate   - outpatient endocrinology follow up for hypertriglyceridemia  - if having recurrent acute pancreatitis despite triglyceride control, may need to discuss changing cyclosporine  - outpatient follow up MRI as previously discussed - already scheduled per GI     #Chills   #Leukocytosis   Patient with rigors in exam room reportedly, although afebrile since arrival. Has leukocytosis likely secondary to pancreatitis. Initially concern from [place] was for hospital acquired infection (HAP, UTI). Less likely     - Follow UA/Blood cultures/Sputum Cx - NGTD  - He was transferred on Zon possibly to treat ELLA. D/C today      #Acute on chronic Anemia  #Thrombocytopenia  Could be consumptive post op.   - Continue to trend  - Peripheral blood smear      #CAD  #NSTEMI s/p PCI Rcx 2011   - Aspirin 81 mg qday  - D/C Plavix and start patient on warfarin as above.  - Metoprolol 100 mg BID   - ACE: Hold Losartan 50 mg qday  - Isordil 60 mg  "qday      #Lactic acidosis , resolved   Likely related to pancreatitis. IVF as above.      #HLD/H.Tg  - Rosuvastatin 20 mg qday  - Fenofibrate (on hold - start once able)      #HTN  - Metoprolol 100 mg BID   - Nifedipine 90 mg qday   - Hold Losartan 50 mg qday      #ESRD s/p KT 1999   - Cyclosporine 150 mg BID   - Prednisone 5 mg qday  - Bactrim three times a week for PJP PPX  - Patient to follow up with Nephrology on outpatient basis for fistula placement, given worsening kidney function.      #Gout   - Probenecid 500 mg BID  - Resume Allopurinol @100 mg qday (home dose 100 mg BID)      Prophylaxis:  DVT: Heparin Sub Q BID GI: on PPI   Family: not updated  Disposition: Home once stable   Code Status: Full    FEN/Electrolytes: ADAT    Disposition: Will touch base with care coordination about placement needs    Patient discussed with Dr. Salomon who agrees with the plan      Haley Vargas  Cardiology 1 service   PGY-2 Internal Medicine  Pager: [phone]    ==================================================================    Subjective:  No acute events overnight. No cardiorespiratory symptoms. Feels better. Able to tolerate diet now. Off oxygen but has concerns about going home as he has concerns over ADL    Objective:  Most recent vital signs:  /79 (BP Location: Right arm)  Pulse 112  Temp 98.8  F (37.1  C) (Oral)  Resp 16  Ht 1.854 m (6' 1\")  Wt 104.6 kg (230 lb 8 oz)  SpO2 96%  BMI 30.41 kg/m2  Temp:  [98.2  F (36.8  C)-100.1  F (37.8  C)] 98.8  F (37.1  C)  Heart Rate:  [] 91  Resp:  [16-18] 16  BP: (118-153)/(71-85) 141/79  SpO2:  [95 %-99 %] 96 %  Wt Readings from Last 2 Encounters:   06/16/18 104.6 kg (230 lb 8 oz)   05/14/18 105.2 kg (231 lb 14.4 oz)       Intake/Output Summary (Last 24 hours) at 06/14/18 2151  Last data filed at 06/14/18 1724   Gross per 24 hour   Intake              720 ml   Output             1325 ml   Net             -605 ml       Physical exam:  General: NAD, resting " comfortably on exam, appears stated age,pleasant and cooperative, AAOx3.  HEENT: NC/AT, well injected Conjunctiva, anicteric sclera, EOMI; PERRL, MMM; pharynx negative for exudate or thrush;   Neck: Trachea midline; supple, good tone; full ROM; negative for Lymphoadenopathy  Chest: CTAB B/L ; no Wheezes/Rales   CV: RRR; nl S1/S2, no murmurs/rubs  Abd: Soft, NT/ND, no HSM, no masses, (+) BS; no CVA tenderness  Ext: Warm/well perfused; no Cyanosis/Clubbing /Edema.  Skin: Clear; unbroken; no new rashes, hypo/hyperpigmented areas, nl turgor.  : no vaginal (penile) discharge, no rash/lesions; no testicular masses, no inguinal hernia  Rectal: Good sphincter tone; prostate not enlarged; brown heme (-) stool; (-) polyps/masses  Neuro: - MS: AAO x3 - CN: CN II-XII grossly intact - Motor: 5/5 AE and LE Sensory: Grossly intact andequal to light touch, pin prick    Labs (Past three days):  Reviewed    Imaging/procedure results:   Reviewed

## 2018-06-16 NOTE — PROGRESS NOTES
CARDIOLOGY 1 SERVICE PROGRESS NOTE    Rory Bustamante ([id number]) admitted on 6/11/2018  06/15/2018    Changes today:  - Patient autodiuresing from volume overload 2/2 enthusiastic resuscitation for pancreatitis. Will hold off diuresis at this time and monitor I/Os.   -          Assessment and Plan:   Rory Bustamante is a 62 year old with PMH of CAD , NSTEMI s/p PCI 2011 (RCA),  ESRD s/p kidney transplant , recent episode of Pancreatitis who is being transferred from [facility] after POD 2 after laminectomy for elevated Troponin and A. Flutter.      #Chest pain  Appears to be more likely pleuritic pain rather than cardiac in origin.  Initial concern for NSTEMI vs PE vs symptomatic aflutter. Patient's troponin peaked at 2.5 and was downtrending by admission. No EKG changes concerning for Ischemia. V/Q scan ruled out PE. TTE w/ no wall motion abnormalities. At this point, seems more like symptomatic AFlutter     - s/p Aspirin 325 mg qday  - Plan for outpatient stress test.       #Atrial Flutter (CHADSVASC = 2), resolved  #Volume overload  #Acute Hypoxic respiratory failure  New A.flutter while on admission at [place], likely post-op related. Spontaneously converted before 6/14 AM but then returned 6/15 PM. Patient is also volume overloaded from aggressive fluid resuscitation (has congestion on CXR , high BNP and TTE with elevated estimated right atrial pressure of 15 and dilated IVC, so could be contributing especially given new oxygen requirements by patient (no oxygen use at baseline).    - s/p sputum culture prior to transfer due to concern for HAP. Will follow results  - On antibiotics as below  - Autodiuresing fluid, so will hold off exacerbation   - Metoprolol 100 mg BID for rate control  - Will start on Apixaban in AM     #Acute pancreatitis - BISAP 3   Being evaluated by GI - no gallstones on prior EUS . Either 2/2 Hyper Tg or cyclosporine. Lactate normalized - adequate UOP / BP - will do gentle IV hydration  (given CKD , potential Pulmonary edema).      - ADAT  - pain control w/ tylenol. Dilaudid for breakthrough pain if needed  - statin, fenofibrate   - outpatient endocrinology follow up for hypertriglyceridemia  - if having recurrent acute pancreatitis despite triglyceride control, may need to discuss changing cyclosporine  - outpatient follow up MRI as previously discussed - already scheduled per GI     #Chills   #Leukocytosis   Patient with rigors in exam room reportedly, although afebrile since arrival. Has leukocytosis likely secondary to pancreatitis. Initially concern from [place] was for hospital acquired infection (HAP, UTI). Less likely     - Follow UA/Blood cultures/Sputum Cx - NGTD  - He was transferred on Zosyn possibly to treat ELLA. Will transition to PO in AM and plan for 7-day total course of antibiotics      #Acute on chronic Anemia  #Thrombocytopenia  Could be consumptive post op.   - Continue to trend  - Peripheral blood smear      #CAD  #NSTEMI s/p PCI Rcx 2011   - Aspirin 81 mg qday / Holding plavix post op (start post op day 5 per ortho)  - Metoprolol 100 mg BID   - ACE: Hold Losartan 50 mg qday  - Isordil 60 mg qday      #Lactic acidosis , resolved   Likely related to pancreatitis. IVF as above.      #HLD/H.Tg  - Rosuvastatin 20 mg qday  - Fenofibrate (on hold - start once able)      #HTN  - Metoprolol 100 mg BID   - Nifedipine 90 mg qday   - Hold Losartan 50 mg qday      #ESRD s/p KT 1999   - Cyclosporine 150 mg BID   - Prednisone 5 mg qday  - Bactrim three times a week for PJP PPX      #Gout   - Probenecid 500 mg BID  - Resume Allopurinol @100 mg qday (home dose 100 mg BID)      Prophylaxis:  DVT: Heparin Sub Q BID GI: on PPI   Family: not updated  Disposition: Home once stable   Code Status: Full    FEN/Electrolytes: ADAT    Disposition: Anticipate return to prior OSH for further medical care in AM due to resolution of cardiac issues    Patient discussed with Dr. Zheng who agrees with  "the plan      [facility]  Cardiology 1 service   PGY-2 Internal Medicine  Pager: [phone]    ==================================================================    Subjective:  No acute events overnight. No cardiorespiratory symptoms. Feels better. Able to tolerate diet now.    Objective:  Most recent vital signs:  /71 (BP Location: Right arm)  Pulse 112  Temp 98.9  F (37.2  C) (Oral)  Resp 16  Ht 1.854 m (6' 1\")  Wt 106.2 kg (234 lb 1.6 oz)  SpO2 95%  BMI 30.89 kg/m2  Temp:  [98.2  F (36.8  C)-100.2  F (37.9  C)] 98.9  F (37.2  C)  Pulse:  [] 112  Heart Rate:  [] 81  Resp:  [16-20] 16  BP: (118-152)/(71-94) 118/71  SpO2:  [88 %-100 %] 95 %  Wt Readings from Last 2 Encounters:   06/14/18 106.2 kg (234 lb 1.6 oz)   05/14/18 105.2 kg (231 lb 14.4 oz)       Intake/Output Summary (Last 24 hours) at 06/14/18 2151  Last data filed at 06/14/18 1724   Gross per 24 hour   Intake              720 ml   Output             1325 ml   Net             -605 ml       Physical exam:  General: NAD, resting comfortably on exam, appears stated age,pleasant and cooperative, AAOx3.  HEENT: NC/AT, well injected Conjunctiva, anicteric sclera, EOMI; PERRL, MMM; pharynx negative for exudate or thrush;   Neck: Trachea midline; supple, good tone; full ROM; negative for Lymphoadenopathy, and JVD  Chest: CTAB B/L ; no Wheezes/Rales   CV: RRR; nl S1/S2, no murmurs/rubs  Abd: Soft, NT/ND, no HSM, no masses, (+) BS; no CVA tenderness  Ext: Warm/well perfused; no Cyanosis/Clubbing /Edema.  Skin: Clear; unbroken; no new rashes, hypo/hyperpigmented areas, nl turgor.  : no vaginal (penile) discharge, no rash/lesions; no testicular masses, no inguinal hernia  Rectal: Good sphincter tone; prostate not enlarged; brown heme (-) stool; (-) polyps/masses  Neuro: - MS: AAO x3 - CN: CN II-XII grossly intact - Motor: 5/5 AE and LE Sensory: Grossly intact andequal to light touch, pin prick    Labs (Past three " days):  Reviewed    Imaging/procedure results:   Reviewed    Staff Physician Comments:     I personally interviewed and examined Rory ARRIAZA Dianna today, and agree with cardiology fellows/residents assessment and plan as documented above.        Sarahy Zheng MD     [place] [address]    Clinic nurse:  Manjula Ivey LPN   Nurse Care Coordinator- Heart Care   [phone] option 1, than option 3    [phone] Appointments  [phone] Fax  [phone] After hours

## 2018-06-16 NOTE — PLAN OF CARE
Problem: Patient Care Overview  Goal: Plan of Care/Patient Progress Review  Outcome: Improving    D: Pt admitted 6/11 from OSH with elevated troponin and Aflutter following laminectomy.  PMH: CAD, NSTEMI, ESRD s/p kidney txp, pancreatitis.     I/A: No acute events overnight. A&Ox4. Vital signs stable. O2 sats mid 90's on 2L NC. Patient converted from Afib to sinus rhythm at 21:51. HR 80's-90's. Denied any pain or SOB. L PIV running TKO with IV abx. Voiding with adequate UOP. Weight trending down. +BM. Advanced to regular diet, pt tolerated well. Up with assist of one. Appeared to rest comfortably between cares.     P: Continue to monitor. Notify Cards 1 with changes/concerns.

## 2018-06-16 NOTE — PROGRESS NOTES
06/16/18 1200   Quick Adds   Type of Visit Initial Occupational Therapy Evaluation   Living Environment   Lives With spouse;child(ritchie), adult   Living Arrangements apartment   Home Accessibility stairs to enter home   Number of Stairs to Enter Home 6   Stair Railings at Home (1 HR)   Transportation Available family or friend will provide   Living Environment Comment Pt lives in apartment with all needs on same level once inside. 6 stairs to enter, handrail present. Pt owns FWW and crutches, though was not using either at baseline. Has grab bars in shower, denies shower chair or other adaptive equipment.   Self-Care   Usual Activity Tolerance moderate   Regular Exercise no   Equipment Currently Used at Home grab bar;walker, standard   Activity/Exercise/Self-Care Comment Pt IND with mobility and ADLs at baseline, reports declined activity tolerance overall and staying mostly within the home.    Functional Level Prior   Ambulation 0-->independent   Transferring 0-->independent   Toileting 0-->independent   Bathing 0-->independent   Dressing 0-->independent   Eating 0-->independent   Communication 0-->understands/communicates without difficulty   Swallowing 0-->swallows foods/liquids without difficulty   Cognition 0 - no cognition issues reported   Fall history within last six months no   Which of the above functional risks had a recent onset or change? ambulation;transferring;toileting;bathing;dressing   Prior Functional Level Comment Pt IND with household mobility at baseline.    General Information   Onset of Illness/Injury or Date of Surgery - Date 06/11/18   Referring Physician Shaun Leonard MD   Patient/Family Goals Statement To return to full independence   Additional Occupational Profile Info/Pertinent History of Current Problem 62 year old male s/p L2-L5 MIS laminectomy on 6/11/18 w Dr. Flynn.  NSTEMI and pancreatitis post-op, transferred to [place], workup benign and symptomatically improved.    Precautions/Limitations fall precautions;spinal precautions   Weight-Bearing Status - LUE other (see comments)  (No greater than 10 pounds)   Weight-Bearing Status - RUE other (see comments)  (No greater than 10 pounds)   Weight-Bearing Status - LLE full weight-bearing   Weight-Bearing Status - RLE full weight-bearing   General Observations Pt supine upon arrival on 2L O2 for comfort   General Info Comments Ambulate   Cognitive Status Examination   Orientation orientation to person, place and time   Level of Consciousness alert   Able to Follow Commands WNL/WFL   Personal Safety (Cognitive) WNL/WFL   Memory intact   Attention No deficits were identified   Organization/Problem Solving No deficits were identified   Executive Function No deficits were identified   Visual Perception   Visual Perception No deficits were identified   Sensory Examination   Sensory Quick Adds Other (describe)   Sensory Comments Pt reports 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   Posture   Posture forward head position;kyphosis   Range of Motion (ROM)   ROM Quick Adds Other (describe)   ROM Comment WFL, limited due to posture since surgery   Strength   Manual Muscle Testing Quick Adds Other   Strength Comments Not formally tested due to precautions, pt with general weakness and deconditioning impacting independence and activity tolerance   Hand Strength   Hand Strength Comments WFL   Muscle Tone Assessment   Muscle Tone Quick Adds No deficits were identified   Coordination   Upper Extremity Coordination No deficits were identified   Mobility   Bed Mobility Comments Min A   Transfer Skill: Sit to Stand   Level of [place]: Sit/Stand contact guard   Physical Assist/Nonphysical Assist: Sit/Stand set-up  required;supervision   Transfer Skill: Sit to Stand full weight-bearing   Assistive Device for Transfer: Sit/Stand standard walker   Transfer Skill: Toilet Transfer   Level of [place]: Toilet stand-by assist   Physical Assist/Nonphysical Assist: Toilet set-up required   Weight-Bearing Restrictions: Toilet full weight-bearing   Assistive Device standard walker;grab bars   Bathing   Level of [place] stand-by assist   Physical Assist/Nonphysical Assist set-up required   Upper Body Dressing   Level of [place]: Dress Upper Body minimum assist (75% patients effort)   Physical Assist/Nonphysical Assist: Dress Upper Body set-up required   Lower Body Dressing   Level of [place]: Dress Lower Body moderate assist (50% patients effort)   Physical Assist/Nonphysical Assist: Dress Lower Body set-up required   Toileting   Level of [place]: Toilet independent   Grooming   Level of [place]: Grooming stand-by assist   Physical Assist/Nonphysical Assist: Grooming set-up required   Instrumental Activities of Daily Living (IADL)   Previous Responsibilities meal prep;housekeeping;laundry;medication management;finances;driving   IADL Comments Pt reports wife assisting with ADLs as needed   Activities of Daily Living Analysis   Impairments Contributing to Impaired Activities of Daily Living pain;balance impaired;post surgical precautions;strength decreased;postural control impaired   General Therapy Interventions   Planned Therapy Interventions ADL retraining;IADL retraining;strengthening;home program guidelines;progressive activity/exercise;risk factor education;ROM   Clinical Impression   Criteria for Skilled Therapeutic Interventions Met yes, treatment indicated   OT Diagnosis decreased independence in ADLs/IADLs   Influenced by the following impairments decreased strength/activity tolerance, post-surgical precautions, pain, decreased postural control   Assessment of Occupational Performance 3-5 Performance  "Deficits   Identified Performance Deficits bathing, dressing, toileting, household management   Clinical Decision Making (Complexity) Low complexity   Therapy Frequency other (see comments)  (6x/week)   Predicted Duration of Therapy Intervention (days/wks) 2 weeks   Anticipated Equipment Needs at Discharge (TBD)   Anticipated Discharge Disposition Transitional Care Facility   Risks and Benefits of Treatment have been explained. Yes   Patient, Family & other staff in agreement with plan of care Yes   [facility] TM \"6 Clicks\"   2016, Trustees of [facility], under license to [employer].  All rights reserved.   6 Clicks Short Forms Daily Activity Inpatient Short Form   [facility] AM-PAC  \"6 Clicks\" Daily Activity Inpatient Short Form   1. Putting on and taking off regular lower body clothing? 2 - A Lot   2. Bathing (including washing, rinsing, drying)? 2 - A Lot   3. Toileting, which includes using toilet, bedpan or urinal? 3 - A Little   4. Putting on and taking off regular upper body clothing? 3 - A Little   5. Taking care of personal grooming such as brushing teeth? 4 - None   6. Eating meals? 4 - None   Daily Activity Raw Score (Score out of 24.Lower scores equate to lower levels of function) 18   Total Evaluation Time   Total Evaluation Time (Minutes) 7     "

## 2018-06-16 NOTE — PLAN OF CARE
"Problem: Patient Care Overview  Goal: Plan of Care/Patient Progress Review  Outcome: Improving  /78 (BP Location: Right arm)  Pulse 112  Temp 100.2  F (37.9  C) (Oral)  Resp 20  Ht 1.854 m (6' 1\")  Wt 106.2 kg (234 lb 1.6 oz)  SpO2 97%  BMI 30.89 kg/m2    Pt transferred to  with symptomatic afib, following lopez ectomy.  Working toward fluid balance, continuing to monitor afib, and will access plan when in fluid balance.  Alert and oriented, -120's through the day.  Morillo removed, has voided once since removal.  Continue to monitor and notify Cards I of changes.         "

## 2018-06-17 ENCOUNTER — APPOINTMENT (OUTPATIENT)
Dept: PHYSICAL THERAPY | Facility: CLINIC | Age: 63
DRG: 515 | End: 2018-06-17
Attending: ORTHOPAEDIC SURGERY
Payer: MEDICARE

## 2018-06-17 VITALS
OXYGEN SATURATION: 92 % | BODY MASS INDEX: 31.23 KG/M2 | DIASTOLIC BLOOD PRESSURE: 83 MMHG | HEIGHT: 73 IN | SYSTOLIC BLOOD PRESSURE: 133 MMHG | WEIGHT: 235.6 LBS | RESPIRATION RATE: 14 BRPM | HEART RATE: 112 BPM | TEMPERATURE: 98.6 F

## 2018-06-17 LAB
ALBUMIN SERPL-MCNC: 2.8 G/DL (ref 3.4–5)
ALP SERPL-CCNC: 39 U/L (ref 40–150)
ALT SERPL W P-5'-P-CCNC: 12 U/L (ref 0–70)
ANION GAP SERPL CALCULATED.3IONS-SCNC: 12 MMOL/L (ref 3–14)
AST SERPL W P-5'-P-CCNC: 29 U/L (ref 0–45)
BILIRUB DIRECT SERPL-MCNC: 0.1 MG/DL (ref 0–0.2)
BILIRUB SERPL-MCNC: 0.3 MG/DL (ref 0.2–1.3)
BUN SERPL-MCNC: 57 MG/DL (ref 7–30)
CALCIUM SERPL-MCNC: 9.2 MG/DL (ref 8.5–10.1)
CHLORIDE SERPL-SCNC: 102 MMOL/L (ref 94–109)
CO2 SERPL-SCNC: 20 MMOL/L (ref 20–32)
CREAT SERPL-MCNC: 4.62 MG/DL (ref 0.66–1.25)
ERYTHROCYTE [DISTWIDTH] IN BLOOD BY AUTOMATED COUNT: 13.5 % (ref 10–15)
FOLATE SERPL-MCNC: 7 NG/ML
GFR SERPL CREATININE-BSD FRML MDRD: 13 ML/MIN/1.7M2
GLUCOSE SERPL-MCNC: 115 MG/DL (ref 70–99)
HCT VFR BLD AUTO: 23.2 % (ref 40–53)
HGB BLD-MCNC: 7.6 G/DL (ref 13.3–17.7)
INR PPP: 1.38 (ref 0.86–1.14)
LDH SERPL L TO P-CCNC: 243 U/L (ref 85–227)
MCH RBC QN AUTO: 34.4 PG (ref 26.5–33)
MCHC RBC AUTO-ENTMCNC: 32.8 G/DL (ref 31.5–36.5)
MCV RBC AUTO: 105 FL (ref 78–100)
PLATELET # BLD AUTO: 126 10E9/L (ref 150–450)
POTASSIUM SERPL-SCNC: 4.3 MMOL/L (ref 3.4–5.3)
PROT SERPL-MCNC: 7.6 G/DL (ref 6.8–8.8)
RBC # BLD AUTO: 2.21 10E12/L (ref 4.4–5.9)
SODIUM SERPL-SCNC: 135 MMOL/L (ref 133–144)
TSH SERPL DL<=0.005 MIU/L-ACNC: 2.66 MU/L (ref 0.4–4)
VIT B12 SERPL-MCNC: 562 PG/ML (ref 193–986)
WBC # BLD AUTO: 8.4 10E9/L (ref 4–11)

## 2018-06-17 PROCEDURE — 36415 COLL VENOUS BLD VENIPUNCTURE: CPT | Performed by: STUDENT IN AN ORGANIZED HEALTH CARE EDUCATION/TRAINING PROGRAM

## 2018-06-17 PROCEDURE — 84443 ASSAY THYROID STIM HORMONE: CPT | Performed by: STUDENT IN AN ORGANIZED HEALTH CARE EDUCATION/TRAINING PROGRAM

## 2018-06-17 PROCEDURE — 25000132 ZZH RX MED GY IP 250 OP 250 PS 637: Mod: GY | Performed by: STUDENT IN AN ORGANIZED HEALTH CARE EDUCATION/TRAINING PROGRAM

## 2018-06-17 PROCEDURE — 25000131 ZZH RX MED GY IP 250 OP 636 PS 637: Mod: GY | Performed by: INTERNAL MEDICINE

## 2018-06-17 PROCEDURE — 80076 HEPATIC FUNCTION PANEL: CPT | Performed by: STUDENT IN AN ORGANIZED HEALTH CARE EDUCATION/TRAINING PROGRAM

## 2018-06-17 PROCEDURE — 85610 PROTHROMBIN TIME: CPT | Performed by: STUDENT IN AN ORGANIZED HEALTH CARE EDUCATION/TRAINING PROGRAM

## 2018-06-17 PROCEDURE — 82607 VITAMIN B-12: CPT | Performed by: STUDENT IN AN ORGANIZED HEALTH CARE EDUCATION/TRAINING PROGRAM

## 2018-06-17 PROCEDURE — 80048 BASIC METABOLIC PNL TOTAL CA: CPT | Performed by: STUDENT IN AN ORGANIZED HEALTH CARE EDUCATION/TRAINING PROGRAM

## 2018-06-17 PROCEDURE — 83615 LACTATE (LD) (LDH) ENZYME: CPT | Performed by: STUDENT IN AN ORGANIZED HEALTH CARE EDUCATION/TRAINING PROGRAM

## 2018-06-17 PROCEDURE — 99238 HOSP IP/OBS DSCHRG MGMT 30/<: CPT | Mod: GC | Performed by: INTERNAL MEDICINE

## 2018-06-17 PROCEDURE — A9270 NON-COVERED ITEM OR SERVICE: HCPCS | Mod: GY | Performed by: STUDENT IN AN ORGANIZED HEALTH CARE EDUCATION/TRAINING PROGRAM

## 2018-06-17 PROCEDURE — 97116 GAIT TRAINING THERAPY: CPT | Mod: GP

## 2018-06-17 PROCEDURE — 83010 ASSAY OF HAPTOGLOBIN QUANT: CPT | Performed by: STUDENT IN AN ORGANIZED HEALTH CARE EDUCATION/TRAINING PROGRAM

## 2018-06-17 PROCEDURE — 97530 THERAPEUTIC ACTIVITIES: CPT | Mod: GP

## 2018-06-17 PROCEDURE — 25000125 ZZHC RX 250: Performed by: STUDENT IN AN ORGANIZED HEALTH CARE EDUCATION/TRAINING PROGRAM

## 2018-06-17 PROCEDURE — 40000193 ZZH STATISTIC PT WARD VISIT

## 2018-06-17 PROCEDURE — 85027 COMPLETE CBC AUTOMATED: CPT | Performed by: STUDENT IN AN ORGANIZED HEALTH CARE EDUCATION/TRAINING PROGRAM

## 2018-06-17 PROCEDURE — 82746 ASSAY OF FOLIC ACID SERUM: CPT | Performed by: STUDENT IN AN ORGANIZED HEALTH CARE EDUCATION/TRAINING PROGRAM

## 2018-06-17 PROCEDURE — 25000128 H RX IP 250 OP 636: Performed by: STUDENT IN AN ORGANIZED HEALTH CARE EDUCATION/TRAINING PROGRAM

## 2018-06-17 RX ORDER — LOSARTAN POTASSIUM 50 MG/1
25 TABLET ORAL DAILY
Qty: 90 TABLET | Refills: 3 | Status: SHIPPED | OUTPATIENT
Start: 2018-06-17 | End: 2018-01-01

## 2018-06-17 RX ORDER — WARFARIN SODIUM 5 MG/1
5 TABLET ORAL DAILY
Qty: 30 TABLET | Refills: 1 | Status: ON HOLD | OUTPATIENT
Start: 2018-06-17 | End: 2018-01-01

## 2018-06-17 RX ORDER — TAMSULOSIN HYDROCHLORIDE 0.4 MG/1
0.4 CAPSULE ORAL DAILY
Qty: 60 CAPSULE | Refills: 1 | Status: SHIPPED | OUTPATIENT
Start: 2018-06-17

## 2018-06-17 RX ORDER — WARFARIN SODIUM 7.5 MG/1
7.5 TABLET ORAL
Status: DISCONTINUED | OUTPATIENT
Start: 2018-06-17 | End: 2018-06-17 | Stop reason: HOSPADM

## 2018-06-17 RX ADMIN — PANTOPRAZOLE SODIUM 40 MG: 40 TABLET, DELAYED RELEASE ORAL at 09:46

## 2018-06-17 RX ADMIN — TAMSULOSIN HYDROCHLORIDE 0.4 MG: 0.4 CAPSULE ORAL at 11:05

## 2018-06-17 RX ADMIN — EPOETIN ALFA 10000 UNITS: 10000 SOLUTION INTRAVENOUS; SUBCUTANEOUS at 13:06

## 2018-06-17 RX ADMIN — Medication 1 CAPSULE: at 09:43

## 2018-06-17 RX ADMIN — ASPIRIN 81 MG CHEWABLE TABLET 81 MG: 81 TABLET CHEWABLE at 09:43

## 2018-06-17 RX ADMIN — LOPERAMIDE HYDROCHLORIDE 2 MG: 2 CAPSULE ORAL at 09:45

## 2018-06-17 RX ADMIN — BRIMONIDINE TARTRATE 1 DROP: 2 SOLUTION OPHTHALMIC at 09:44

## 2018-06-17 RX ADMIN — NIFEDIPINE 90 MG: 90 TABLET, FILM COATED, EXTENDED RELEASE ORAL at 09:46

## 2018-06-17 RX ADMIN — PREDNISONE 5 MG: 5 TABLET ORAL at 11:06

## 2018-06-17 RX ADMIN — ALLOPURINOL 100 MG: 100 TABLET ORAL at 09:44

## 2018-06-17 RX ADMIN — METOPROLOL TARTRATE 100 MG: 100 TABLET, FILM COATED ORAL at 09:45

## 2018-06-17 RX ADMIN — CYCLOSPORINE 125 MG: 100 CAPSULE, LIQUID FILLED ORAL at 09:43

## 2018-06-17 RX ADMIN — ISOSORBIDE MONONITRATE 60 MG: 60 TABLET, EXTENDED RELEASE ORAL at 09:44

## 2018-06-17 RX ADMIN — PROBENECID 500 MG: 500 TABLET, FILM COATED ORAL at 11:05

## 2018-06-17 NOTE — PROGRESS NOTES
"Orthopedic Surgery Progress Note    Subjective:   NAEO.  Pain well controlled.  (-)cp/sob, no new cardiac events.   (+)tolerating PO.  No nausea.    Exam:  /83 (BP Location: Right arm)  Pulse 112  Temp 98.6  F (37  C) (Oral)  Resp 14  Ht 1.854 m (6' 1\")  Wt 106.9 kg (235 lb 9.6 oz)  SpO2 92%  BMI 31.08 kg/m2  Gen: Awake, alert, NAD  Resp: breathing equal and non-labored  Extremities:  RLE:   No obvious deformity, skin intact   SLR negative   SILT L2-S1 distributions, stocking glove neuropathy   Fires Hip flexor, quad, TA, EHL, FHL, Gsc 5/5 strength   DP 2+  LLE:   No obvious deformity, skin intact   SLR negative   SILT L2-S1 distributions, stocking glove neuropathy   Fires Hip flexor, quad, TA, EHL, FHL, Gsc 5/5 strength   DP 2+      Drain: removed    Labs:    Recent Labs  Lab 06/14/18  0603 06/13/18  1903 06/13/18  0617 06/12/18  0615 06/11/18  1318   WBC 9.5 12.8* 12.7* 9.2 11.6*   HGB 7.9* 8.4* 8.8* 9.4* 9.6*   PLT 85* 87* 97*  --  115*       Recent Labs  Lab 06/16/18  0713 06/15/18  1132 06/14/18  0603 06/14/18  0005 06/13/18  1221 06/13/18  0617    134 134  --   --  134   POTASSIUM 4.2 4.1 4.0  --   --  4.5   CHLORIDE 103 102 100  --   --  102   CO2 19* 23 20  --   --  22   BUN 55* 57* 58*  --   --  63*   CR 4.78* 4.70* 5.11*  --   --  4.64*   GLC 99 98 111*  --   --  141*   MAG  --   --   --  2.5* 1.8 1.4*   PHOS  --  2.9  --   --   --  5.2*       Recent Labs  Lab 06/16/18  1736   INR 1.41*         Assessment:   62 year old male s/p L2-L5 MIS laminectomy on 6/11/18 w Dr. Rucker.  NSTEMI w afib/flutter and pancreatitis post-op, transferred to [place], workup benign and symptomatically improved.  Cards recommends stop plavix, start warfarin for arrhythmia.    Plan:  Pain: Scheduled Tylenol, Dilaudid IV PRN, oxycodone PRN, valium PRN.    Activity: No lifting >10 lbs. No excessive twisting or bending. Start physical therapy POD#1  Wound care: Dressing change as needed per Ortho  Morillo out " per protocol   Drains: Removed  Brace:  N/A  Abx: Ancef periop complete, defer to medical teams.  Diet: Clear liquids and ADAT  DVT ppx: Mechanical alone is needed for surgical purposes.  Defer to IM/card recs for chemoprophylaxis.  Radiographs:  none  Disposition:  Pending medical improvement and progress w therapy, to TCU vs home     Shaun Leonard MD  PGY-4, Orthopaedic Surgery  [phone]

## 2018-06-17 NOTE — DISCHARGE SUMMARY
Cardiology 1 Service Discharge Summary  Rory Bustamante MRN: [id number]  1955    Primary care provider: Moris Diaz  ___________________________________          Date of Admission:  6/11/2018  Date of Discharge:  6/17/2018  Admitting Physician:  Donnie Rosen MD  Discharge Physician:  Yordy Salomon MD  Discharging Service:  Cardiology 1 Service     Primary Provider: Moris Diaz    ---- FOLLOW UP --------  PCP  Anticoagulation clinic for INR checks (goal 2-2.5)  Home PT  Outpatient Nephrology service for fistula placement  Outpatient Cardiologist for stress test  Outpatient Orthopedic surgeon for follow up  Outpatient Gastroenterologist with follow up MRI in six months.  Outpatient Endocrinologist for hypertriglyceridemia  ---------------------------------------------------------------------------------------------------------------           Reason for Admission:   Low back pain          Discharge Diagnosis:   Lumbar pain s/p L2-5 MIS laminectomy and foraminotomy  Chest pain, likely symptomatic Aflutter  Atrial flutter ([id number])  Pancreatitis, unclear etiology  Acute hypoxic respiratory failure 2/2 volume overload  Acute on chronic anemia, likely 2/2 CKD  ESRD s/p KT 1999 with progression of kidney disease  Gout         Procedures & Significant Findings:     Lumbar pathology with resulting neurogenic claudications s/p L2-5 MIS laminectomy and foraminotomy on 6/11/2018  VQ scan 6/14 - Pulmonary emboli not present         Consultations:   Gastroenterology service  Nephrology service         Hospital Course by Problem:      Rory Bustamante is a 62 year old with PMH of CAD , NSTEMI s/p PCI 2011 (RCA),  ESRD s/p kidney transplant , recent episode of Pancreatitis who was transferred from [facility] on 6/14 where he was POD2 from a laminectomy on account of elevated Troponin and A. Flutter.       #Chest pain  After patient's arrival,  history appeared more consistent with pleuritic pain rather than cardiac. The Initial concerns were for NSTEMI vs PE vs symptomatic aflutter. Patient's troponin peaked at 2.5 and was downtrending by admission. No EKG changes concerning for Ischemia. V/Q scan ruled out PE. TTE w/ no wall motion abnormalities. We considered an angiogram but given above as well as bleeding risk from required heparinization and worsening renal failure on presentation, the decision was made to defer this and get an outpatient stress test at the discretion of his outpatient cardiologist. We discharged patient asymptomatically on [id number] with a plan to see outpatient cardiologist in 1 month.       #Atrial Flutter (CHADSVASC = 2), resolved  #Volume overload, resolved  #Acute Hypoxic respiratory failure, resolved  New A.flutter while on admission at [place], likely post-op and volume related. Spontaneously converted before 6/14 AM but then returned 6/15 PM. Patient is also volume overloaded from aggressive fluid resuscitation (had congestion on CXR , high BNP and TTE with elevated estimated right atrial pressure of 15 and dilated IVC, so thought to be contributing especially given new oxygen requirements by patient (no oxygen use at baseline). Patient did not need diuretics as he autodiresed. He was discharged on Metoprolol 100 mg BID for rate control and Warfarin for Anticoagulation with plan for outpatient INR checks as well and goal of 2-2.5 given risk of bleeding.     #Back pain s/p L2-L5 MIS laminectomy on 6/11/18 w Dr. Rucker  Well tolerated with no complications. More mobility after procedure. Post operative instructions and follow up plan provided by Orthopedics (see Discharge summary)        #Acute pancreatitis - BISAP 3   Patient developed abdominal pain on POD1 with elevated lipase. Gastroenterology consulted. Thought to be due to hypertriglyceridemia (), also got propofol during procedure. No gallstones or sludge on  past EUS but noted to be on cyclosporine for transplant. He previously underwent evaluation for side-branch IPMN with MRI and EUS with no features indicative of high malignancy risk (<3cm, no mural nodules, duct dilation, etc). He was also seen by surgical oncology. Patient was made NPO with vigorous fluid resuscitation and eventually was able to tolerate a diet with no complications. Patient was scheduled for an outpatient follow up with Endocrinology to further evaluate this hypertriglyceridemia. Discharged on statin, fenofibrate with plan to follow up with GI in October.       #Chills   #Leukocytosis   Patient with rigors in exam room reportedly, although afebrile while on Cardiology service. Leukocytosis likely secondary to pancreatitis. Initially concern from [facility] was for hospital acquired infection (HAP, UTI) and so was transferred here on Zosyn. This was discontinued without issue.    #Acute on chronic Anemia  #Thrombocytopenia  Secondary to renal failure. Was on weekly EPO but missed dose for a month. Hemoglobin began to trend downwards with no s/s suggestive of blood loss and no hemolysis seen on peripheral smear. Patient was given EPO prior to discharge with plan to have patient follow up with Nephrology outpatient.        #CAD  #NSTEMI s/p PCI Rcx 2011   Continued Aspirin 81 mg qday but discontinued Plavix due to commencement on Warfarin as above. Also on Metoprolol 100 mg BID, Losartan 25mg qday and Isordil 60 mg qday       #Lactic acidosis, resolved   Likely related to pancreatitis. IVF as above.       #ESRD s/p KT 1999   Creatinine markedly elevated and so Nephrology was consulted who recommended outpatient follow up to initiate fistula placement for dialysis start. While here, continued on immunosuppresive regimen of Cyclosporine 150mg BID, Prednisone 5mg qday and Bactrim 3x/ week for PJP prophylaxis.     #Gout   Continued Probenecid 500 mg BID and Allopurinol @100 mg 100 mg BID    Physical Exam  "on day of Discharge:  Blood pressure 133/83, pulse 112, temperature 98.6  F (37  C), temperature source Oral, resp. rate 14, height 1.854 m (6' 1\"), weight 106.9 kg (235 lb 9.6 oz), SpO2 92 %.  General: NAD, resting comfortably on exam, appears stated age,pleasant and cooperative, AAOx3.  HEENT: NC/AT, well injected Conjunctiva, anicteric sclera, EOMI; PERRL, MMM; pharynx negative for exudate or thrush;   Neck: Trachea midline; supple, good tone; full ROM; negative for Lymphoadenopathy  Chest: CTAB B/L ; no Wheezes/Rales   CV: RRR; nl S1/S2, no murmurs/rubs  Abd: Soft, NT/ND, no HSM, no masses, (+) BS; no CVA tenderness  Ext: Warm/well perfused; no Cyanosis/Clubbing /Edema.  Skin: Clear; unbroken; no new rashes, hypo/hyperpigmented areas, nl turgor.  : no vaginal (penile) discharge, no rash/lesions; no testicular masses, no inguinal hernia  Rectal: Good sphincter tone; prostate not enlarged; brown heme (-) stool; (-) polyps/masses  Neuro: - MS: AAO x3 - CN: CN II-XII grossly intact - Motor: 5/5 AE and LE Sensory: Grossly intact andequal to light touch, pin prick          Pending Results:   Home         Discharge Medications:     Discharge Medication List as of 6/17/2018  2:14 PM      START taking these medications    Details   oxyCODONE IR (ROXICODONE) 5 MG tablet Take 1-2 tablets (5-10 mg) by mouth every 4 hours as needed for other (pain control or improvement in physical function. Hold dose for analgesic side effects.), Disp-80 tablet, R-0, Local Print      warfarin (COUMADIN) 5 MG tablet Take 1 tablet (5 mg) by mouth daily, Disp-30 tablet, R-1, E-Prescribe         CONTINUE these medications which have CHANGED    Details   losartan (COZAAR) 50 MG tablet Take 0.5 tablets (25 mg) by mouth daily, Disp-90 tablet, R-3, E-Prescribe      tamsulosin (FLOMAX) 0.4 MG capsule Take 1 capsule (0.4 mg) by mouth daily, Disp-60 capsule, R-1, E-Prescribe         CONTINUE these medications which have NOT CHANGED    Details "   acetaminophen (TYLENOL ARTHRITIS PAIN) 650 MG CR tablet Take 2 tablets by mouth 3 times daily., Historical      allopurinol (ZYLOPRIM) 100 MG tablet Take 2 tablets by mouth daily., Disp-180 tablet, R-3, E-Prescribe      Brimonidine Tartrate (ALPHAGAN OP) Place 1 drop into the right eye 2 times daily , Historical      cycloSPORINE modified (GENERIC EQUIVALENT) 100 MG capsule Take 1 capsule (100 mg) by mouth 2 times daily Total dose 150 mg twice daily, Disp-180 capsule, R-3, E-Prescribe      cycloSPORINE modified (GENERIC EQUIVALENT) 25 MG capsule Take 2 capsules (50 mg) by mouth 2 times daily Total dose 150 mg twice daily, Disp-180 capsule, R-3, E-Prescribe      epoetin freya (PROCRIT) 70444 UNIT/ML injection Inject 1 mL (10,000 Units) Subcutaneous once a week Please make sure Hgb has been checked within 4 days of dose, use as needed for Hgb<10.  If Hgb increases >1 point in 2 weeks, SYSTOLIC BP > 180 mmHg, OR Hgb >=10 g/dL, HOLD DOSE.  Per anemia protocol wi  Christi Sun MD., Disp-4 mL, R-5, Local Print      fenofibrate 145 MG tablet Take 145 mg by mouth daily, Historical      ISOSORBIDE MONONITRATE PO Take 60 mg by mouth daily , Historical      loperamide (IMODIUM) 2 MG capsule Take 2 mg by mouth daily , Historical      metoprolol (LOPRESSOR) 100 MG tablet Take 1 tablet by mouth 2 times daily., Disp-60 tablet, R-6, E-Prescribe      NIFEdipine ER (ADALAT CC) 90 MG TB24 Take 90 mg by mouth daily , Historical      NONFORMULARY Take 1 tablet by mouth 2 times daily Focus Macula Pro:  Eye vitamin and mineral supplement A, C, E, Zinc, Copper , Historical      omega-3 acid ethyl esters (LOVAZA) 1 G capsule Take 2 g by mouth 2 times daily , Historical      PANTOPRAZOLE SODIUM PO Take 40 mg by mouth daily as needed for heartburn, Historical      predniSONE (DELTASONE) 5 MG tablet Take 5 mg by mouth daily (with lunch) , Historical      probenecid (BENEMID) 500 MG tablet Take 500 mg by mouth 2 times daily., Historical       Rosuvastatin Calcium (CRESTOR PO) Take 20 mg by mouth every evening, Historical      sulfamethoxazole-trimethoprim (BACTRIM,SEPTRA) 400-80 MG per tablet Take 1 tablet by mouth every other day , Historical      BABY ASPIRIN PO Take 81 mg by mouth daily, Historical      BEVACizumab (AVASTIN) 400 MG/16ML injection Every 5 weeks, Historical      cholecalciferol (VITAMIN D3) 5000 UNITS TABS tablet Take 1 tablet (5,000 Units) by mouth daily, Disp-30 tablet, R-0, Historical      nitroGLYCERIN (NITROSTAT) 0.4 MG SL tablet Place 0.4 mg under the tongue every 5 minutes as needed., Historical         STOP taking these medications       clopidogrel (PLAVIX) 75 MG tablet Comments:   Reason for Stopping:                    Discharge Instructions and Follow-Up:     Discharge Procedure Orders  Home care nursing referral   Referral Type: Home Health Therapies & Aides     Home Care PT Referral for Hospital Discharge   Referral Type: Home Health Therapies & Aides     INR Clinic Referral     Reason for your hospital stay   Order Comments: Spine surgery with Dr. Van     Adult [facility] Follow-up and recommended labs and tests   Order Comments: Follow up with Dr. Rucker in clinic in 6 weeks as previously scheduled (7/24/18).    Appointments on [place] and/or [facility] (with [facility] or [facility] provider or service). Call [phone] if you have questions.     Activity   Order Comments: Your activity upon discharge: no excessive bending, twisting, or lifting greater than 10 pounds.   Order Specific Question Answer Comments   Is discharge order? Yes      Discharge Instructions   Order Comments: Postoperative Instructions - Spinal surgery   Dr. Harshal Rucker  [address] [address]     You have had a spinal surgery. You have a dressing on your incision which can be worn for 3-5 days, and it can be worn in the shower. Change every 1-2 days as needed if wet/dirty, and keep the wound  covered for the first 7-10 days.  After this, you do not need a dressing. Do not soak in the bath, and no pools, hot tubs, or lakes for 6 weeks.     For postoperative pain control, I have prescribed a narcotic medication.  This should be taken for the first few weeks following surgery, but as soon as you are able, transition to an over-the-counter type medication such as Tylenol.  You may not take non-steroidal anti-inflammatory medications (eg: Advil, Ibuprofen, Aleve, others) for the first 3 months after surgery as it interferes with bone healing. While taking the narcotic medication, there are several precautions that you must adhere to. These medications have numerous side effects including nausea, constipation, and drowsiness. If you experience nausea, this may be relieved by taking the medication with food or a light meal. To avoid constipation, please use an over-the-counter stool softener or drink lots of water and eat fruits and vegetables. Avoid operating heavy machinery or driving an automobile while on narcotic medications.      You will be seen in the clinic at 6 weeks following surgery. You will not need to attend physical therapy during this time. You can focus on cardiovascular fitness by walking as much as you can tolerate. Avoid bending, lifting, and twisting. Your weight lifting restriction is 10 pounds until your first follow-up appointment.      When you get home, you may resume your normal diet as tolerated. You may not be very hungry but try to eat small healthy meals to help you heal. Remember to drink plenty of water/fluids to help keep you hydrated.    Please call or return if you experience the following:  Fevers (temperature greater than 100.4 degrees Fahrenheit)  Pain that is getting worse or does not respond to pain medications  Drainage from your wound  Increasing redness about the wound  Any other worrisome symptoms    You may reach the clinic by dialing [phone].     Reason for  your hospital stay   Order Comments: Lumbar spine laminectomy  Atrial flutter  Pancreatitis  CKD  Volume overload     Adult [facility] Follow-up and recommended labs and tests   Order Comments: Follow up with Anticoagulation clinic within 1-2 days  to evaluate medication change. The following labs/tests are recommended: INR.  Follow up with primary care provider, SERAFIN SHABAZZ, within 7 days for hospital follow- up.  The following labs/tests are recommended: CBC, INR, BMP.    Follow up with Endocrinology clinic for hypertriglyceridemia, at [facility] within 2-4 weeks regarding new diagnosis of pancreatitis in the setting of elevated triglycerides. The following labs/tests are recommended: None  Follow up with Nephrology serice, at [facility] within 2-4 weeks  regarding new diagnosis. The following labs/tests are recommended: BMP..    Appointments on [place] and/or [facility] (with [facility] or [facility] provider or service). Call [phone] if you haven't heard regarding these appointments within 7 days of discharge.     Follow Up and recommended labs and tests   Order Comments: Follow up with your cardiologist, at [facility] , within 1 month for hospital follow up. The following labs/tests are recommended: Stress test.    Get your INR level checked at the hospital near you. Goal INR is between 2 - 2.5. You should establish care with a convenient anticoagulation clinic near you.     Activity   Order Comments: Your activity upon discharge: activity as tolerated   Order Specific Question Answer Comments   Is discharge order? Yes      Discharge Instructions   Order Comments: Please take your medications as prescribed. Stop taking your plavix as we have started you on Warfarin (Coumadin) because of the Atrial flutter you had. You will need to establish care with an anticoagulation clinic where you can get your INR checked frequently. You should see your cardiologist in clinic in the next month or so, because you will need a  stress test outpatient. You will also need to see an Endocrinologist outpatient because of your pancreatitis (hypertriglyceridemia may be causing it). You need to follow up with your Nephrologist to discuss your worsening kidney function and you will need a follow up MRI in October, six months after the last one you had.    Home RN and PT has been ordered for you.     Full Code     Full Code     Diet   Order Comments: Follow this diet upon discharge: Regular   Order Specific Question Answer Comments   Is discharge order? Yes      Diet   Order Comments: Follow this diet upon discharge: Orders Placed This Encounter     Diet     Advance Diet as Tolerated: Regular Diet Adult   Order Specific Question Answer Comments   Is discharge order? Yes      Assign Questionnaire Series to Patient            Discharge Disposition:   Home with home PT         Condition on Discharge:   Discharge condition: Stable   Code status on discharge: Full Code        Date of service: 6/17/2018    The patient was discussed with Dr. Aime Vargas  Cardiology service  PGY-2 Internal Medicine  Pager: [phone]

## 2018-06-17 NOTE — PROGRESS NOTES
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  Stable to minimally improved kidney function with creatinine ~ 4.6 today.  No overt uremic symptoms.  Pt plans to make appointment for fistula assessment.    -- no acute indication for dialysis  -- agree with plan to hold off on coronary angio unless CP recurs     # Immunosuppression - currently on pred and  bid. Recent levels 40-70.  -- CSA level 99    # HTN/Volume status - okay BP, just above target.  Patient appears euvolemic.  - Okay to restart losartan at 25 mg nightly.     # afib/flutter - back in a fib yesterday with rapid response, but self converted into sinus rhythm overnight  -- further BRITO/Rx per cards    # Proteinuria - low level proteinuria from MPGN.  Has been on losartan for that. In addition he has MGUS. Kidney biopsy from 2015 was reviewed for LC deposition (none).  No monoclonal protein on SPEP and normal kappa/lamda ratio.     # Tremor and shakiness - hx muscle pain secondary to statins and is now on crestor.  CK elevated possibly from surgery, but slowly trending down. Last LDL in 8/2017 was 66  -- weight risk vs benefits of statin in this pt     # Pancreatitis - acute onset of sx post-op in pt with hx pancreatitis.  Lipase peak >4000 and then trending down.  Sx now gone. On conservative diet per GI.  Pt has hx hypertriglyceridemia which may be contributing.   -- recs per GI     # Anemia - pt is on 10,000 U epo weekly.  Hgb 7.9 with last check a couple of days ago. Does have increased thrombosis risk with pos ACLA  - Okay to give dose of Epo 10K units SQ     # Peripheral neuropathy - now S/P laminectomy, hopefully will see some improvement     # MBD: mildly high PTH (200 range) with hx low vit D (level 11) on replacement. Level was 24 in Feb. Phos 5.2  -- DC vit D supplement     Recommendations were  "communicated to primary team via this note.     Neo Modi MD     Interval History:   In the last 24 hours Rory Bustamante's kidney function is stable with creatinine ~ 4.6.  Good urine output.  Patient was up and moving around today.  He is anxious to go home.  No chest pain.  A little shortness of breath with exertion, but none at rest.  No fever, sweats or chills.  No nausea or vomiting.  Usual loose stools.  No leg swelling.    Review of Systems:   4 point ROS was obtained and negative, except as noted in Interval History.    Physical Exam:   I/O last 3 completed shifts:  In: 2140 [P.O.:2140]  Out: 1500 [Urine:1500]   /83 (BP Location: Right arm)  Pulse 112  Temp 98.6  F (37  C) (Oral)  Resp 14  Ht 1.854 m (6' 1\")  Wt 106.9 kg (235 lb 9.6 oz)  SpO2 92%  BMI 31.08 kg/m2     GENERAL APPEARANCE: NAD, alert  HENT: mouth without ulcers or lesions  PULM: lungs clear to auscultation bilaterally  CV: regular rate and rhythm     - trace LE edema bilaterally, R slightly > L  GI: soft, nontender, nondistended, bowel sounds are positive  INTEGUMENT: multiple skin tags, verrucous keratosis on arms and legs  TX KIDNEY: normal  Access: none    Labs:   All labs reviewed by me  Electrolytes/Renal -   Recent Labs   Lab Test  06/17/18   0633  06/16/18   0713  06/15/18   1132   06/14/18   0005  06/13/18   1221  06/13/18   0617   02/06/18   1221   NA  135  134  134   < >   --    --   134   < >  133   POTASSIUM  4.3  4.2  4.1   < >   --    --   4.5   < >  4.6   CHLORIDE  102  103  102   < >   --    --   102   < >  103   CO2  20  19*  23   < >   --    --   22   < >  20   BUN  57*  55*  57*   < >   --    --   63*   < >  51*   CR  4.62*  4.78*  4.70*   < >   --    --   4.64*   < >  4.04*   GLC  115*  99  98   < >   --    --   141*   < >  144*   TRISHA  9.2  9.2  8.8   < >   --    --   9.0   < >  9.4   MAG   --    --    --    --   2.5*  1.8  1.4*   --    --    PHOS   --    --   2.9   --    --    --   5.2*   --   3.6    < > = " values in this interval not displayed.       CBC -   Recent Labs   Lab Test  06/17/18   0837  06/14/18   0603  06/13/18   1903   WBC  8.4  9.5  12.8*   HGB  7.6*  7.9*  8.4*   PLT  126*  85*  87*       LFTs -   Recent Labs   Lab Test  06/17/18   0837  06/15/18   1132  06/14/18   0603  06/13/18   0617   ALKPHOS  39*   --   35*  35*   BILITOTAL  0.3   --   0.6  0.3   ALT  12   --   8  7   AST  29   --   34  37   PROTTOTAL  7.6   --   6.4*  6.5*   ALBUMIN  2.8*  2.5*  2.5*  2.8*       Iron Panel -   Recent Labs   Lab Test  05/30/18   1100  05/01/18   0852  04/04/18   0728   IRON  90  118  130   IRONSAT  27  34  33   VALERIA  [phone]  [phone]  [phone]     Imaging:  All imaging studies reviewed by me.     Current Medications:    allopurinol  100 mg Oral Daily     aspirin  81 mg Oral Daily     brimonidine  1 drop Right Eye BID     cycloSPORINE modified  125 mg Oral BID IS     isosorbide mononitrate  60 mg Oral Daily     loperamide  2 mg Oral Daily     metoprolol tartrate  100 mg Oral BID     multivitamin  with lutein  1 capsule Oral BID     NIFEdipine ER  90 mg Oral Daily     omega-3 acid ethyl esters  2 g Oral BID     pantoprazole (PROTONIX) EC tablet 40 mg  40 mg Oral QAM AC     predniSONE  5 mg Oral Daily with lunch     probenecid  500 mg Oral BID     rosuvastatin (CRESTOR) tablet 20 mg  20 mg Oral QPM     senna-docusate  1 tablet Oral BID    Or     senna-docusate  2 tablet Oral BID     sodium chloride (PF)  3 mL Intracatheter Q8H     sulfamethoxazole-trimethoprim  1 tablet Oral Every Other Day     tamsulosin (FLOMAX) capsule 0.4 mg  0.4 mg Oral BID     warfarin  7.5 mg Oral ONCE at 18:00       Warfarin Therapy Reminder       Neo Modi MD

## 2018-06-17 NOTE — PROGRESS NOTES
Care Coordinator - Discharge Planning    Admission Date/Time:  6/11/2018  Attending MD:  Donnie Rosen MD     Data  Date of initial CC assessment:  6/17/18  Chart reviewed, discussed with interdisciplinary team.   Patient was admitted for:   1. Spinal stenosis of lumbar region with neurogenic claudication    2. Paroxysmal atrial fibrillation (H)    3. Kidney replaced by transplant    4. Benign prostatic hyperplasia, unspecified whether lower urinary tract symptoms present         Assessment   Concerns with insurance coverage for discharge needs: None.  Current Living Situation: Patient lives with spouse.  Support System: Supportive and Involved  Services Involved: None  Transportation at Discharge: Family or friend will provide  Transportation to Medical Appointments:    - Not applicable   - Family will transport pt home  Barriers to Discharge: None     Notified by provider team that pt cleared to d/c home today.  Will need home health RN and PT set up.  Spoke with pt.  Introduced RN CC role.  Reviewed home care recommendation.  Per discussion with pt he plans to do outpatient PT at [facility] which [facility].   Per pt his wife will be able transport him to and from appointments.  Declines plan for home care.   Discussed coumadin management post hospitalization.  Per pt he will be able to have any labs drawn at [facility].  Pt noted no concerns with his ability to get to and from any lab appointments.   Referral placed for [facility] coumadin clinic.      Reviewed with provider team.      Addendum 6/18 10:50 a.m.:  Received call from [facility] inquiring about provider that will be managing patient coumadin.  Will primary cardiologist to sign off on the orders for monitoring.     Spoke with pt regarding post hospital coumadin management.  Patient primary cardiologist is through [facility] [phone].   Message left for RN requesting return call also indicating that pt will need INR draw either today or tomorrow as indicated on pt d/c instruction.  Pt did not that if INR is needed today he has transportation to get to the clinic.  Awaiting response.    Updated [facility].    11:30 a.m.:  Received return call from SHIMA Sanford with [facility].   Claribel confirmed that they will manage patient coumadin and will outreach to the patient to arrange INR draw.  Faxed discharge summary(Cardiology and Ortho) and final discharge orders to Claribel's attention [phone].      Coordination of Care and Referrals: Provided patient/family with options for Home Care.      Plan  Anticipated Discharge Date: 6/17/18  Anticipated Discharge Plan:  Home    Lorena Gomez RN BSN, PHN Weekend RN Care Coordinator   [email or web address]  Pager [phone]  6/17/2018 2:41 PM

## 2018-06-17 NOTE — PLAN OF CARE
Problem: Patient Care Overview  Goal: Plan of Care/Patient Progress Review  Discharge Planner PT / 6C   Patient plan for discharge: Home with assist from wife as needed + OP PT.  Current status: Pt completes supine <> sit from flat bed with SBA. Pt transfers sit <> stand with SBA. Pt ambulates a total of ~450ft with no AD + SBA - ambulates slowly with dec foot clearance though no overt LOB, takes occasional short standing breaks 2/2 fatigue. Pt ascends/descends 6 steps with single handrail + SBA. Pt encouraged to ambulate in hallways with staff 3-4x/day. VSS on RA.  Barriers to return to prior living situation: Medical status, post-surgical precautions, deconditioning.  Recommendations for discharge: Home with assist from wife as needed + OP PT once medically appropriate.

## 2018-06-17 NOTE — PLAN OF CARE
Problem: Patient Care Overview  Goal: Plan of Care/Patient Progress Review  Outcome: Improving    D: Pt admitted 6/11 from OSH with elevated troponin and Aflutter following laminectomy.  PMH: CAD, NSTEMI, ESRD s/p kidney txp, pancreatitis.     I/A: No acute events overnight. A&Ox4. Vital signs stable. O2 sats 90's on RA. Monitor shows sinus rhythm, HR 70's-90's. Denied any pain or SOB. Laminectomy incision C/D/I. Dressing changed. Voiding with adequate UOP. No BM overnight. Continues on regular diet, appetite improving. Up with assist of one and a walker. Ambulated the mazariegos x2 this shift. Appeared to rest comfortably between cares, making needs known.     P: Pending TCU placement. Continue to monitor. Notify Cards 1 with changes/concerns.

## 2018-06-18 ENCOUNTER — TELEPHONE (OUTPATIENT)
Dept: PHARMACY | Facility: CLINIC | Age: 63
End: 2018-06-18

## 2018-06-18 ENCOUNTER — CARE COORDINATION (OUTPATIENT)
Dept: CARE COORDINATION | Facility: CLINIC | Age: 63
End: 2018-06-18

## 2018-06-18 LAB
HAPTOGLOB SERPL-MCNC: 415 MG/DL (ref 15–200)
HEMOGLOBIN: 7.8 G/DL (ref 13.3–17.7)
INTERPRETATION ECG - MUSE: NORMAL

## 2018-06-18 NOTE — PLAN OF CARE
Problem: Patient Care Overview  Goal: Plan of Care/Patient Progress Review  Occupational Therapy Discharge Summary    Reason for therapy discharge:    Discharged to home with outpatient therapy.    Progress towards therapy goal(s). See goals on Care Plan in [facility] electronic health record for goal details.  Goals partially met.  Barriers to achieving goals:   discharge from facility.    Therapy recommendation(s): Per plan established by evaluating OT reccommending TCU, however pt d/c to home with RN and PT services as well as assistance from wife       Problem: Patient Care Overview  Goal: Plan of Care/Patient Progress Review  Occupational Therapy Discharge Summary    Reason for therapy discharge:    Discharged to home with outpatient therapy.    Progress towards therapy goal(s). See goals on Care Plan in [facility] electronic health record for goal details.  Goals partially met.  Barriers to achieving goals:   discharge from facility.    Therapy recommendation(s): Per plan established by evaluating OT reccommending TCU, however pt d/c to home with RN and PT services as well as assistance from wife

## 2018-06-18 NOTE — PLAN OF CARE
"Problem: Patient Care Overview  Goal: Plan of Care/Patient Progress Review  Outcome: Adequate for Discharge Date Met: 06/17/18  /83 (BP Location: Right arm)  Pulse 112  Temp 98.6  F (37  C) (Oral)  Resp 14  Ht 1.854 m (6' 1\")  Wt 106.9 kg (235 lb 9.6 oz)  SpO2 92%  BMI 31.08 kg/m2    Pt with continued improvement was discharged to home today.  Following a second day sustaining sinus rhythm, and a cleared assessment from PT on stairs.  Pt was able to ambulate in mazariegos without walker, and states feeling comfort with standing upright new since back surgery has been completed.  Pt discharged with brother and nephew to assist with transfer.  Wife of pt expressed concern for husbands ability to rehabilitate in home, with regard to motivation and space.  PT follow up was ordered, also home nursing support was ordered to support with transition to Warfarin.  Follow up plan of care reviewed with pt and wife (by phone).        "

## 2018-06-18 NOTE — TELEPHONE ENCOUNTER
Anemia Management Note  SUBJECTIVE/OBJECTIVE:  Referred by Dr. Christi Sun on 2018  Primary Diagnosis: Anemia in Chronic Kidney Disease (N18.3, D63.1)     Secondary Diagnosis:  Chronic Kidney Disease, Stage 3 (N18.3)   Hgb goal range:  9-10  Epo/Darbo: Procrit  10,000 units  once weekly for Hgb < 10  In clinic  RX/TX plans : 2019  Iron regimen:  Ferrous Sulfate 325mg QD  Labs : 2019  Recent MARY LOU use, transfusion, IV iron: IV iron scheduled 2018    Anemia Latest Ref Rng & Units 2018   MARY LOU Dose - - - - - 10,000 units - -   Hemoglobin 13.3 - 17.7 g/dL 9.4(L) 8.8(L) 8.4(L) 7.9(L) 7.6(L) 7.8(A) 8.8(A)   IV Iron Dose - - - - - - - -   TSAT % - - - - - - 28   Ferritin ng/mL - - - - - - 557           BP Readings from Last 3 Encounters:   18 133/83   18 171/78   18 138/72     Wt Readings from Last 2 Encounters:   18 235 lb 9.6 oz (106.9 kg)   18 231 lb 14.4 oz (105.2 kg)       Patient was recently in the hospital  Date of Admission:                                2018  Date of Discharge:                                2018    Per discharge summary:  #Acute on chronic Anemia #Thrombocytopenia  Secondary to renal failure. Was on weekly EPO but missed dose for a month. Hemoglobin began to trend downwards with no s/s suggestive of blood loss and no hemolysis seen on peripheral smear. Patient was given EPO prior to discharge with plan to have patient follow up with Nephrology outpatient.     Rory was started on warfarin and wants to coordinate his anemia labs/procrit doses with his INR'S,  He is thinking that he might be in for labs on  or early next week - Per verbal read back Tracy at [facility], Hgb 8.8 2018 DENISE    Patient has an appt w/Dr Sun and labs on 18    ASSESSMENT:  Hgb:  Not at goal - received dose in hospital prior to discharge - recommend continue current  regimen  TSat: not at goal of >30% Ferritin: At goal (>100ng/mL)    PLAN:  Hold procrit due to Hgb rise more than 1 unit in 1 week  RTC for hgb,then procrit if needed in 1 week(s)    Orders needed to be renewed (for next follow-up date) in EPIC: None    Iron labs due:  07/20/2018    Plan discussed with:  Rory  Plan provided by:  Judy Ramírez [facility]  Anemia Clinic  [phone]    NEXT FOLLOW-UP DATE:  6/29/18  reviewed 06/20/2018  DENISE Updated 06/22/2018 [facility]  Anemia Management Service  Grecia Carroll,PharmD and [facility]  Phone: [phone]  Fax: [phone]

## 2018-06-19 LAB
BACTERIA SPEC CULT: NO GROWTH
BACTERIA SPEC CULT: NO GROWTH
Lab: NORMAL
Lab: NORMAL
SPECIMEN SOURCE: NORMAL
SPECIMEN SOURCE: NORMAL

## 2018-06-22 LAB
FERRITIN SERPL-MCNC: 557 NG/ML
HEMOGLOBIN: 8.8 G/DL (ref 13.3–17.7)
IRON SATURATION INDEX: 28 %

## 2018-06-29 ENCOUNTER — TELEPHONE (OUTPATIENT)
Dept: PHARMACY | Facility: CLINIC | Age: 63
End: 2018-06-29

## 2018-06-29 LAB
HCT VFR BLD AUTO: 24 %
HEMOGLOBIN: 8 G/DL (ref 13.3–17.7)

## 2018-06-29 NOTE — TELEPHONE ENCOUNTER
Anemia Management Note  SUBJECTIVE/OBJECTIVE:  Referred by Dr. Christi Sun on 2018  Primary Diagnosis: Anemia in Chronic Kidney Disease (N18.3, D63.1)     Secondary Diagnosis:  Chronic Kidney Disease, Stage 3 (N18.3)   Hgb goal range:  9-10  Epo/Darbo: Procrit  10,000 units  once weekly for Hgb < 10  In clinic  RX/TX plans : 2019  Iron regimen:  Ferrous Sulfate 325mg QD  Labs : 2019  Recent MARY LOU use, transfusion, IV iron: IV iron scheduled 2018    Anemia Latest Ref Rng & Units 2018   MARY LOU Dose - - - - 10,000 units - - 10,000 units   Hemoglobin 13.3 - 17.7 g/dL 8.8(L) 8.4(L) 7.9(L) 7.6(L) 7.8(A) 8.8(A) 8.0(A)   IV Iron Dose - - - - - - - -   TSAT % - - - - - 28 -   Ferritin ng/mL - - - - - 557 -     BP Readings from Last 3 Encounters:   18 133/83   18 171/78   18 138/72     Wt Readings from Last 2 Encounters:   18 235 lb 9.6 oz (106.9 kg)   18 231 lb 14.4 oz (105.2 kg)       Received and documented outside lab results and procrit dose  Patient has an appt w/Dr Sun and labs on 18    ASSESSMENT:  Hgb: at goal - received dose in clinic - recommend continue current regimen  TSat: not at goal (>30%) but ferritin >500ng/mL.  IV iron not indicated at this time per anemia protocol. Ferritin: At goal (>100ng/mL)    PLAN:  Dosed with procrit and RTC for hgb then procrit if needed in 1 week(s)    Orders needed to be renewed (for next follow-up date) in LETTERS - for external labs: None    Iron labs due:  18    Plan discussed with:  Rory  Plan provided by:  Judy Ramírez [facility]  Anemia Clinic  [phone]    NEXT FOLLOW-UP DATE:  18    Anemia Management Service  Grecia Carroll PharmD and [facility]  Phone: [phone]  Fax: [phone]      
no rales/no rhonchi/clear to auscultation bilaterally/no wheezes

## 2018-07-02 ENCOUNTER — TRANSFERRED RECORDS (OUTPATIENT)
Dept: HEALTH INFORMATION MANAGEMENT | Facility: CLINIC | Age: 63
End: 2018-07-02

## 2018-07-03 ENCOUNTER — MEDICAL CORRESPONDENCE (OUTPATIENT)
Dept: HEALTH INFORMATION MANAGEMENT | Facility: CLINIC | Age: 63
End: 2018-07-03

## 2018-07-03 ENCOUNTER — TELEPHONE (OUTPATIENT)
Dept: TRANSPLANT | Facility: CLINIC | Age: 63
End: 2018-07-03

## 2018-07-03 NOTE — TELEPHONE ENCOUNTER
Patient Call: General    Reason for call: patient would like to get back on list or get reactive and wants to know what he needs to done.    Call back needed? Yes    Return Call Needed  Same as documented in contacts section  When to return call?: Greater than one day: Route standard priority

## 2018-07-06 ENCOUNTER — TELEPHONE (OUTPATIENT)
Dept: PHARMACY | Facility: CLINIC | Age: 63
End: 2018-07-06

## 2018-07-06 LAB
HCT VFR BLD AUTO: 26.2 %
HEMOGLOBIN: 8.8 G/DL (ref 13.3–17.7)

## 2018-07-06 NOTE — TELEPHONE ENCOUNTER
Anemia Management Note  SUBJECTIVE/OBJECTIVE:  Referred by Dr. Christi Sun on 2018  Primary Diagnosis: Anemia in Chronic Kidney Disease (N18.3, D63.1)     Secondary Diagnosis:  Chronic Kidney Disease, Stage 3 (N18.3)   Hgb goal range:  9-10  Epo/Darbo: Procrit  10,000 units  once weekly for Hgb < 10  In clinic  RX/TX plans : 2019  Iron regimen:  Ferrous Sulfate 325mg QD  Labs : 2019  Recent MARY LOU use, transfusion, IV iron: IV iron scheduled 2018    Anemia Latest Ref Rng & Units 2018   MARY LOU Dose - - - 10,000 units - - 10,000 units 10,000 units   Hemoglobin 13.3 - 17.7 g/dL 8.4(L) 7.9(L) 7.6(L) 7.8(A) 8.8(A) 8.0(A) 8.8(A)   IV Iron Dose - - - - - - - -   TSAT % - - - - 28 - -   Ferritin ng/mL - - - - 557 - -     BP Readings from Last 3 Encounters:   18 133/83   18 171/78   18 138/72     Wt Readings from Last 2 Encounters:   18 235 lb 9.6 oz (106.9 kg)   18 231 lb 14.4 oz (105.2 kg)     Received and documented outside lab results and procrit dose  Patient has an appt w/Dr Sun and labs on 18     ASSESSMENT:  Hgb:  Not at goal - received dose in clinic - recommend continue current regimen  TSat: not at goal (>30%) but ferritin >500ng/mL.  IV iron not indicated at this time per anemia protocol. Ferritin: At goal (>100ng/mL)    PLAN:  Dose with procrit and RTC for hgb then procrit if needed in 1 week(s)    Orders needed to be renewed (for next follow-up date) in EPIC: None    Iron labs due:  18    Plan discussed with:  No call made - next appt is scheduled  Plan provided by:  Judy Ramírez CPhT  Anemia Clinic  [phone]    NEXT FOLLOW-UP DATE:  18  Reviewed 2018 [facility]  Anemia Management Service  Grecia Carroll PharmD and Michelle Ramírez CPhT  Phone: [phone]  Fax: [phone]

## 2018-07-09 DIAGNOSIS — N18.4 CKD (CHRONIC KIDNEY DISEASE) STAGE 4, GFR 15-29 ML/MIN (H): Primary | ICD-10-CM

## 2018-07-09 NOTE — TELEPHONE ENCOUNTER
Returned Rory's call. He was confused since his last visit with Dr. Sun if February as to what he needs to become active again. He recently was hospitalized for laminectomy at the [facility]. His discharge instructions place him in a no lifitng over 10 pounds until 7/24/18, which is his 6 week post op. This appointment is with Dr. Rory Rucker, orthopedic surgeon here at the [facility].   He recently followed up with cardiology at [facility] at the [place] office, Dr. Painter, on July 2, 2018. Will request those records.   He has his 6 month appointment with Dr. Sun on 7/17/18. At this time he will ask her as to what he may need for her to agree to him being active again.   We will stay in touch.

## 2018-07-09 NOTE — TELEPHONE ENCOUNTER
FUTURE VISIT INFORMATION      FUTURE VISIT INFORMATION:    Date: 7/16/18    Time: 3:30    Location:   REFERRAL INFORMATION:    Referring provider:  SELF    Referring providers clinic:      Reason for visit/diagnosis: L hip pain, arthritis        RECORDS STATUS      ALL RECORDS INTERNAL

## 2018-07-11 DIAGNOSIS — M48.062 SPINAL STENOSIS OF LUMBAR REGION WITH NEUROGENIC CLAUDICATION: Primary | ICD-10-CM

## 2018-07-13 ENCOUNTER — MEDICAL CORRESPONDENCE (OUTPATIENT)
Dept: TRANSPLANT | Facility: CLINIC | Age: 63
End: 2018-07-13

## 2018-07-13 ENCOUNTER — TELEPHONE (OUTPATIENT)
Dept: TRANSPLANT | Facility: CLINIC | Age: 63
End: 2018-07-13

## 2018-07-13 ENCOUNTER — TELEPHONE (OUTPATIENT)
Dept: PHARMACY | Facility: CLINIC | Age: 63
End: 2018-07-13

## 2018-07-13 DIAGNOSIS — D63.1 ANEMIA IN STAGE 4 CHRONIC KIDNEY DISEASE (H): ICD-10-CM

## 2018-07-13 DIAGNOSIS — N18.4 CKD (CHRONIC KIDNEY DISEASE) STAGE 4, GFR 15-29 ML/MIN (H): ICD-10-CM

## 2018-07-13 DIAGNOSIS — N18.4 ANEMIA IN STAGE 4 CHRONIC KIDNEY DISEASE (H): ICD-10-CM

## 2018-07-13 LAB — HEMOGLOBIN: 8.6 G/DL (ref 13.3–17.7)

## 2018-07-13 NOTE — LETTER
July 13, 2018    Rory Bustamante  [address]  [place] [address]      Dear Mr. Bustamante,    This letter is sent to notify you that your listing status was changed from Inactive to Active on the kidney transplant waitlist at the [facility].  Your status was changed because he received cardiac clearance.    During this waiting period, we may still request periodic laboratory tests with your primary physician.  It will be your responsibility to remind your physician to forward your results to the Transplant Office.    We still need to be kept informed of any changes such as:    o changes in your insurance coverage  o change in your phone number, address or emergency contact  o significant changes in your health  o significant infections (such as pneumonia or abscesses)  o blood transfusions  o any condition which requires hospitalization or surgery    Sincerely,        Kidney Transplant Program    Enclosures: Telephone Contact List, Travel Resources, UNOS Letter and Waitlist Information Update    CC: Christi Sun MD

## 2018-07-13 NOTE — TELEPHONE ENCOUNTER
Anemia Management Note  SUBJECTIVE/OBJECTIVE:  Referred by Dr. Christi Sun on 2018  Primary Diagnosis: Anemia in Chronic Kidney Disease (N18.3, D63.1)     Secondary Diagnosis:  Chronic Kidney Disease, Stage 3 (N18.3)   Hgb goal range:  9-10  Epo/Darbo: Procrit  20,000 units  once weekly for Hgb < 10  In clinic  RX/TX plans : 2019  Iron regimen:  Ferrous Sulfate 325mg QD  Labs : 2019  Recent MARY LOU use, transfusion, IV iron: IV iron scheduled 2018    Anemia Latest Ref Rng & Units 2018   MARY LOU Dose - - 10,000 units - - 10,000 units 10,000 units 10,000 units   Hemoglobin 13.3 - 17.7 g/dL 7.9(L) 7.6(L) 7.8(A) 8.8(A) 8.0(A) 8.8(A) 8.6(A)   IV Iron Dose - - - - - - - -   TSAT % - - - 28 - - -   Ferritin ng/mL - - - 557 - - -     BP Readings from Last 3 Encounters:   18 133/83   18 171/78   18 138/72     Wt Readings from Last 2 Encounters:   18 235 lb 9.6 oz (106.9 kg)   18 231 lb 14.4 oz (105.2 kg)     Patient has an appt w/Dr Sun on 18  Received and documented outside lab results drawn on 18    ASSESSMENT:  Hgb: Not at goal - received dose in clinic - recommend continue current regimen  TSat: not at goal (>30%) but ferritin >500ng/mL.  IV iron not indicated at this time per anemia protocol. Ferritin: At goal (>100ng/mL)    PLAN:  Dosed with Procrit then,RTC for hgb, ferritin and iron labs then procrit if needed in 1 week  Referred to Grecia Carroll to determine if a dose increase is needed  Dose increased, now order sent to Dr. Sun to cosign and fax to clinic.    Orders needed to be renewed (for next follow-up date) in LETTERS AND EPIC: None    Iron labs due:  18    Plan discussed with:  Rory  Plan provided by:  Judy Ramírez [facility]  Anemia Clinic  [phone]    NEXT FOLLOW-UP DATE:  18    Anemia Management Service  Grecia Carroll,PharmD and [facility]  Phone:  [phone]  Fax: [phone]

## 2018-07-13 NOTE — LETTER
[facility]  Medication Monitoring Clinic         FAX             [phone]    July 16, 2018      Total Number of Pages:  _____  ____

## 2018-07-13 NOTE — TELEPHONE ENCOUNTER
Called to tell Rory we received final cardiac clearance today from the heart prevention clinic. He is active and will be due for return wait list appointments 2019.  He has plenty of kits and will head out to have an updated PRA/ALA drawn.

## 2018-07-16 ENCOUNTER — DOCUMENTATION ONLY (OUTPATIENT)
Dept: TRANSPLANT | Facility: CLINIC | Age: 63
End: 2018-07-16

## 2018-07-16 ENCOUNTER — PRE VISIT (OUTPATIENT)
Dept: ORTHOPEDICS | Facility: CLINIC | Age: 63
End: 2018-07-16

## 2018-07-16 ENCOUNTER — OFFICE VISIT (OUTPATIENT)
Dept: ORTHOPEDICS | Facility: CLINIC | Age: 63
End: 2018-07-16
Payer: MEDICARE

## 2018-07-16 ENCOUNTER — RADIANT APPOINTMENT (OUTPATIENT)
Dept: GENERAL RADIOLOGY | Facility: CLINIC | Age: 63
End: 2018-07-16
Attending: FAMILY MEDICINE
Payer: MEDICARE

## 2018-07-16 VITALS — BODY MASS INDEX: 31.41 KG/M2 | RESPIRATION RATE: 18 BRPM | HEIGHT: 73 IN | WEIGHT: 237 LBS

## 2018-07-16 DIAGNOSIS — M25.552 LEFT HIP PAIN: Primary | ICD-10-CM

## 2018-07-16 DIAGNOSIS — M25.552 LEFT HIP PAIN: ICD-10-CM

## 2018-07-16 RX ORDER — LIDOCAINE 50 MG/G
OINTMENT TOPICAL DAILY PRN
Qty: 50 G | Refills: 1 | Status: SHIPPED | OUTPATIENT
Start: 2018-07-16 | End: 2018-10-12

## 2018-07-16 NOTE — MR AVS SNAPSHOT
After Visit Summary   7/16/2018    Rory Bustamante    MRN: [id number]           Patient Information     Date Of Birth          1955        Visit Information        Provider Department      7/16/2018 3:30 PM Isai Hall MD [facility] Sports Medicine        Today's Diagnoses     Left hip pain    -  1       Follow-ups after your visit        Your next 10 appointments already scheduled     Jul 17, 2018 12:00 PM CDT   Lab with  LAB    Health Lab ([facility])    [address] [place] [address]  [address] [facility] [address]   [phone]            Jul 17, 2018  1:05 PM CDT   (Arrive by 12:35 PM)   Return Visit with Christi Sun MD   [facility] Nephrology ([facility])    [address] [place] [address]  [address]  [facility] [id number] [phone]            Jul 24, 2018  9:20 AM CDT   XR LUMBAR SPINE 2/3 VIEWS with [id number]   [facility] Xray ([facility])    [address] [place] [address]  [address] [facility] [id number] [phone]           Please bring a list of your current medicines to your exam. (Include vitamins, minerals and over-thecounter medicines.) Leave your valuables at home.  Tell your doctor if there is a chance you may be pregnant.  You do not need to do anything special for this exam.            Jul 24, 2018  9:45 AM CDT   (Arrive by 9:15 AM)   RETURN SPINE with Harshal Rucker MD   [facility] ([facility])    [address] [place] [address]  4th Floor  [facility] [address]   [phone]            Aug 07, 2018  3:00 PM CDT   Lab with UC LAB    Health Lab ([facility])    [address] [place] [address]  1st Floor  [facility] [address]   [phone]            Aug 07, 2018  4:00 PM CDT   (Arrive by 3:30 PM)   Return Visit with Christi Sun MD   [facility] Nephrology ([facility])    [address] [place] [address]  [address]  [facility] [address]   [phone]            Aug 30, 2018 10:30 AM CDT   (Arrive by 10:00 AM)   RETURN SPINE with Harshal Rucker MD   [facility] ([facility])    [address]  4th Floor  [facility] [address]   [phone]            Oct 15, 2018  9:00 AM CDT   MR ABDOMEN MRCP W/O & W CONTRAST with UUMR1   [facility] MRI ([facility])    [address] [facility] [address] [facility] [place] [facility] [address]   [phone]           Take your medicines as usual, unless your doctor tells you not to. Bring a list of your current medicines to your exam (including vitamins, minerals and over-the-counter drugs). Also bring the results of similar scans you may have had.    You may or may not receive IV contrast for this exam pending the discretion of the Radiologist.   Do not eat or drink for 6 hours prior to exam.  The MRI machine uses a strong magnet. Please wear clothes without metal (snaps, zippers). A sweatsuit works well, or we may give you a hospital gown.  Please remove any body piercings and hair extensions before you arrive. You will also remove watches, jewelry, hairpins, wallets, dentures, partial dental plates and hearing aids. You may wear contact lenses, and you may be able to wear your rings. We have a safe place to keep your personal items, but it is safer to leave them at home.  **IMPORTANT** THE INSTRUCTIONS BELOW ARE ONLY FOR THOSE PATIENTS WHO HAVE BEEN PRESCRIBED SEDATION OR GENERAL ANESTHESIA DURING THEIR MRI PROCEDURE:  IF YOUR DOCTOR PRESCRIBED ORAL SEDATION (take medicine to help you relax during your exam):   You must get the medicine from your doctor (oral medication) before you arrive. Bring the medicine to the exam. Do not take it at home. You ll be told when to take it upon arriving for your exam.   Arrive one hour early. Bring someone who can take you home after the test. Your medicine  will make you sleepy. After the exam, you may not drive, take a bus or take a taxi by yourself.  IF YOUR DOCTOR PRESCRIBED IV SEDATION:   Arrive one hour early. Bring someone who can take you home after the test. Your medicine will make you sleepy. After the exam, you may not drive, take a bus or take a taxi by yourself.   No eating 6 hours before your exam. You may have clear liquids up until 4 hours before your exam. (Clear liquids include water, clear tea, black coffee and fruit juice without pulp.)  IF YOUR DOCTOR PRESCRIBED ANESTHESIA (be asleep for your exam):   Arrive 1 1/2 hours early. Bring someone who can take you home after the test. You may not drive, take a bus or take a taxi by yourself.   No eating 8 hours before your exam. You may have clear liquids up until 4 hours before your exam. (Clear liquids include water, clear tea, black coffee and fruit juice without pulp.)   You will spend four to five hours in the recovery room.  If you have any questions, please contact your Imaging Department exam site.            Oct 15, 2018 10:00 AM CDT   (Arrive by 9:45 AM)   New Patient Visit with Shade Manning MD   [facility] ([facility])    [address] [place] [address]  [address] [place] [address] [phone]              Who to contact     Please call your clinic at [phone] to:    Ask questions about your health    Make or cancel appointments    Discuss your medicines    Learn about your test results    Speak to your doctor            Additional Information About Your Visit        [employer] Information     [email or web address] gives you secure access to your electronic health record. If you see a primary care provider, you can also send messages to your care team and make appointments. If you have questions, please call your primary care clinic.  If you do not have a primary care provider, please call [phone] and they will assist you.      [email or web address] is an  "electronic gateway that provides easy, online access to your medical records. With [employer], you can request a clinic appointment, read your test results, renew a prescription or communicate with your care team.     To access your existing account, please contact your [facility] or call [phone] for assistance.        Care EveryWhere ID     This is your Care EveryWhere ID. This could be used by other organizations to access your [place] medical records  [id number]        Your Vitals Were     Respirations Height BMI (Body Mass Index)             18 6' 1\" (1.854 m) 31.27 kg/m2          Blood Pressure from Last 3 Encounters:   06/16/18 133/83   05/14/18 171/78   04/16/18 138/72    Weight from Last 3 Encounters:   07/16/18 237 lb (107.5 kg)   06/17/18 235 lb 9.6 oz (106.9 kg)   05/14/18 231 lb 14.4 oz (105.2 kg)                 Today's Medication Changes          These changes are accurate as of 7/16/18  4:05 PM.  If you have any questions, ask your nurse or doctor.               Start taking these medicines.        Dose/Directions    lidocaine 5 % ointment   Commonly known as:  XYLOCAINE   Used for:  Left hip pain   Started by:  Isai Hall MD        Apply topically daily as needed for moderate pain   Quantity:  50 g   Refills:  1         These medicines have changed or have updated prescriptions.        Dose/Directions    allopurinol 100 MG tablet   Commonly known as:  ZYLOPRIM   This may have changed:    - how much to take  - when to take this   Used for:  Kidney replaced by transplant, Gout        Dose:  200 mg   Take 2 tablets by mouth daily.   Quantity:  180 tablet   Refills:  3            Where to get your medicines      Some of these will need a paper prescription and others can be bought over the counter.  Ask your nurse if you have questions.     Bring a paper prescription for each of these medications     lidocaine 5 % ointment                Primary Care " Provider Office Phone # Fax #    Moris Diaz [phone]       [facility] [address]  [facility] [address]        Equal Access to Services     DUGLASFIDELIA ANN-MARIE : Hadslim av ku darleneo Sorickyali, waaxda luqadaha, qaybta kaalmada adevirginiayada, devin treviñoterell ana. So [facility] [phone].    ATENCIÓN: Si habla español, tiene a carr disposición servicios gratuitos de asistencia lingüística. Llame al [phone].    We comply with applicable federal civil rights laws and [place] laws. We do not discriminate on the basis of race, color, national origin, age, disability, sex, sexual orientation, or gender identity.            Thank you!     Thank you for choosing [facility]  for your care. Our goal is always to provide you with excellent care. Hearing back from our patients is one way we can continue to improve our services. Please take a few minutes to complete the written survey that you may receive in the mail after your visit with us. Thank you!             Your Updated Medication List - Protect others around you: Learn how to safely use, store and throw away your medicines at www.disposemymeds.org.          This list is accurate as of 7/16/18  4:05 PM.  Always use your most recent med list.                   Brand Name Dispense Instructions for use Diagnosis    allopurinol 100 MG tablet    ZYLOPRIM    180 tablet    Take 2 tablets by mouth daily.    Kidney replaced by transplant, Gout       ALPHAGAN OP      Place 1 drop into the right eye 2 times daily        BABY ASPIRIN PO      Take 81 mg by mouth daily        BEVACizumab 400 MG/16ML injection    AVASTIN     Every 5 weeks        cholecalciferol 5000 units Tabs tablet    vitamin D3    30 tablet    Take 1 tablet (5,000 Units) by mouth daily    Hyperparathyroidism due to renal insufficiency (H)       CRESTOR PO      Take 20 mg by mouth every evening        * cycloSPORINE modified 25 MG capsule     GENERIC EQUIVALENT    180 capsule    Take 2 capsules (50 mg) by mouth 2 times daily Total dose 150 mg twice daily    Kidney transplant recipient, Long-term use of immunosuppressant medication       * cycloSPORINE modified 100 MG capsule    GENERIC EQUIVALENT    180 capsule    Take 1 capsule (100 mg) by mouth 2 times daily Total dose 150 mg twice daily    Kidney transplant recipient, Long-term use of immunosuppressant medication       epoetin freya 81370 UNIT/ML injection    PROCRIT    4 mL    Inject 1 mL (20,000 Units) Subcutaneous once a week As needed for hgb < 10    CKD (chronic kidney disease) stage 4, GFR 15-29 ml/min (H), Anemia in stage 4 chronic kidney disease (H)       fenofibrate 145 MG tablet      Take 145 mg by mouth daily        ISOSORBIDE MONONITRATE PO      Take 60 mg by mouth daily        lidocaine 5 % ointment    XYLOCAINE    50 g    Apply topically daily as needed for moderate pain    Left hip pain       loperamide 2 MG capsule    IMODIUM     Take 2 mg by mouth daily        losartan 50 MG tablet    COZAAR    90 tablet    Take 0.5 tablets (25 mg) by mouth daily    Kidney replaced by transplant       metoprolol tartrate 100 MG tablet    LOPRESSOR    60 tablet    Take 1 tablet by mouth 2 times daily.    Unspecified hypertensive kidney disease with chronic kidney disease stage I through stage IV, or unspecified(403.90), Kidney replaced by transplant       NIFEdipine ER 90 MG Tb24    ADALAT CC     Take 90 mg by mouth daily        NITROSTAT 0.4 MG sublingual tablet   Generic drug:  nitroGLYcerin      Place 0.4 mg under the tongue every 5 minutes as needed.        NONFORMULARY      Take 1 tablet by mouth 2 times daily Focus Macula Pro:  Eye vitamin and mineral supplement A, C, E, Zinc, Copper        omega-3 acid ethyl esters 1 g capsule    Lovaza     Take 2 g by mouth 2 times daily        oxyCODONE IR 5 MG tablet    ROXICODONE    80 tablet    Take 1-2 tablets (5-10 mg) by mouth every 4 hours as needed for  other (pain control or improvement in physical function. Hold dose for analgesic side effects.)    Spinal stenosis of lumbar region with neurogenic claudication       PANTOPRAZOLE SODIUM PO      Take 40 mg by mouth daily as needed for heartburn        predniSONE 5 MG tablet    DELTASONE     Take 5 mg by mouth daily (with lunch)        probenecid 500 MG tablet    BENEMID     Take 500 mg by mouth 2 times daily.        sulfamethoxazole-trimethoprim 400-80 MG per tablet    BACTRIM/SEPTRA     Take 1 tablet by mouth every other day        tamsulosin 0.4 MG capsule    FLOMAX    60 capsule    Take 1 capsule (0.4 mg) by mouth daily    Benign prostatic hyperplasia, unspecified whether lower urinary tract symptoms present       TYLENOL ARTHRITIS PAIN 650 MG CR tablet   Generic drug:  acetaminophen      Take 2 tablets by mouth 3 times daily.        warfarin 5 MG tablet    COUMADIN    30 tablet    Take 1 tablet (5 mg) by mouth daily    Paroxysmal atrial fibrillation (H)       * Notice:  This list has 2 medication(s) that are the same as other medications prescribed for you. Read the directions carefully, and ask your doctor or other care provider to review them with you.

## 2018-07-16 NOTE — LETTER
7/16/2018      RE: Rory Bustamante  [address]  [place] [address]        Subjective:   Rory Bustamante is a 62 year old male who presents left lateral hip pain.     He is s/p  MIS laminectomies (Right approach), with bilateral medial facetectomies and foraminotomies, with decompression of bilateral traversing and exiting nerve roots - L2-3,L3-4 and L4-5. 6/11/18 performed by Dr. Ruckre  No trauma. Pain is diffuse and lateral. No pain with laying on it.     Background:   Date of injury: NA   Duration of symptoms: 1 month  Mechanism of Injury: Insidious Onset; Unknown   Aggravating factors: walking, carrying objects   Relieving Factors: Tylenol arthritis   Prior Evaluation: Prior Physician Evalutation:      PAST MEDICAL, SOCIAL, SURGICAL AND FAMILY HISTORY: He  has a past medical history of Angina effort (H) (8/2016); Arthritis; BPH (benign prostatic hyperplasia); CAD (coronary artery disease) (July 2011); Glaucoma; Gout; HTN (hypertension); Hypercholesteremia (2015); Hypertriglyceridemia (2015); Intraocular bleeding; Macular degeneration; Neuropathy; Presence of stent in coronary artery (August 2012); Proteinuria (11/2015); S/P kidney transplant; Spinal stenosis (2013); and Warts.  He  has a past surgical history that includes Transplant kidney recipient living related (1989); TOTAL KNEE ARTHROPLASTY (2015); tonsillectomy; vasectomy; and Laminectomy lumbar minimally invasive three+ levels (N/A, 6/11/2018).  His family history includes Cardiac Sudden Death (age of onset: 87) in his sister; Cerebrovascular Disease in his brother; Colon Cancer (age of onset: 78) in his mother; Diabetes in his brother and mother.  He reports that he has never smoked. He has never used smokeless tobacco. He reports that he does not drink alcohol or use illicit drugs.    ALLERGIES: He is allergic to contrast dye; pravastatin; and simvastatin.    CURRENT MEDICATIONS: He has a current medication list which includes the  "following prescription(s): acetaminophen, allopurinol, aspirin, bevacizumab, brimonidine tartrate, cholecalciferol, cyclosporine modified, cyclosporine modified, epoetin freya, fenofibrate, isosorbide mononitrate, loperamide, losartan, metoprolol tartrate, nifedipine er, nitroglycerin, NONFORMULARY, omega-3 acid ethyl esters, oxycodone ir, pantoprazole sodium, prednisone, probenecid, rosuvastatin calcium, sulfamethoxazole-trimethoprim, tamsulosin, and warfarin.     REVIEW OF SYSTEMS: 3 point review of systems is negative except as noted above.     Exam:   Resp 18  Ht 6' 1\" (1.854 m)  Wt 237 lb (107.5 kg)  BMI 31.27 kg/m2    CONSTITUTIONAL: healthy, alert and no distress  HEAD: Normocephalic. No masses, lesions, tenderness or abnormalities  SKIN: no suspicious lesions or rashes  GAIT: normal  NEUROLOGIC: Non-focal  PSYCHIATRIC: affect normal/bright and mentation appears normal.    MUSCULOSKELETAL:   R hip IR/Er moves freely without pain , neg FAdIR, Neg Prieto.   nontender lateral hip or anterior hip including the ASIS, AIIS, adductor insertion, or pubic symphysis.  Poor strength with FF and abduction but no pain  nontender lateral hip at the adductor insertion  Tender at the low back and glut max.    XR AP pelvis and L frogleg, no acute changes no osteophyte, perhaps mild B joint space narroring   Assessment/Plan:   L lateral hip pain  --patient asked if injection could quickly help is pain get better. I am skeptical given his exam that FA or lateral hip injection will help. Also mild risk of infection or mild systemic steroid effect may not help his recent back surgery. Referred low back pain is another potential cause of pain  --we discussed options including PT and he states he is not yet cleared by his surgeon for this.  --rx for topical lidocaine ointment 5% daily as needed pain  F/u prn. If injection required by surgical team, I am happy to proceed.     Isai Hall MD CAQ    "

## 2018-07-16 NOTE — PROGRESS NOTES
Subjective:   Rory Bustamante is a 62 year old male who presents left lateral hip pain.     He is s/p  MIS laminectomies (Right approach), with bilateral medial facetectomies and foraminotomies, with decompression of bilateral traversing and exiting nerve roots - L2-3,L3-4 and L4-5. 6/11/18 performed by Dr. Rucker  No trauma. Pain is diffuse and lateral. No pain with laying on it.     Background:   Date of injury: NA   Duration of symptoms: 1 month  Mechanism of Injury: Insidious Onset; Unknown   Aggravating factors: walking, carrying objects   Relieving Factors: Tylenol arthritis   Prior Evaluation: Prior Physician Evalutation:      PAST MEDICAL, SOCIAL, SURGICAL AND FAMILY HISTORY: He  has a past medical history of Angina effort (H) (8/2016); Arthritis; BPH (benign prostatic hyperplasia); CAD (coronary artery disease) (July 2011); Glaucoma; Gout; HTN (hypertension); Hypercholesteremia (2015); Hypertriglyceridemia (2015); Intraocular bleeding; Macular degeneration; Neuropathy; Presence of stent in coronary artery (August 2012); Proteinuria (11/2015); S/P kidney transplant; Spinal stenosis (2013); and Warts.  He  has a past surgical history that includes Transplant kidney recipient living related (1989); TOTAL KNEE ARTHROPLASTY (2015); tonsillectomy; vasectomy; and Laminectomy lumbar minimally invasive three+ levels (N/A, 6/11/2018).  His family history includes Cardiac Sudden Death (age of onset: 87) in his sister; Cerebrovascular Disease in his brother; Colon Cancer (age of onset: 78) in his mother; Diabetes in his brother and mother.  He reports that he has never smoked. He has never used smokeless tobacco. He reports that he does not drink alcohol or use illicit drugs.    ALLERGIES: He is allergic to contrast dye; pravastatin; and simvastatin.    CURRENT MEDICATIONS: He has a current medication list which includes the following prescription(s): acetaminophen, allopurinol, aspirin, bevacizumab,  "brimonidine tartrate, cholecalciferol, cyclosporine modified, cyclosporine modified, epoetin freya, fenofibrate, isosorbide mononitrate, loperamide, losartan, metoprolol tartrate, nifedipine er, nitroglycerin, NONFORMULARY, omega-3 acid ethyl esters, oxycodone ir, pantoprazole sodium, prednisone, probenecid, rosuvastatin calcium, sulfamethoxazole-trimethoprim, tamsulosin, and warfarin.     REVIEW OF SYSTEMS: 3 point review of systems is negative except as noted above.     Exam:   Resp 18  Ht 6' 1\" (1.854 m)  Wt 237 lb (107.5 kg)  BMI 31.27 kg/m2    CONSTITUTIONAL: healthy, alert and no distress  HEAD: Normocephalic. No masses, lesions, tenderness or abnormalities  SKIN: no suspicious lesions or rashes  GAIT: normal  NEUROLOGIC: Non-focal  PSYCHIATRIC: affect normal/bright and mentation appears normal.    MUSCULOSKELETAL:   R hip IR/Er moves freely without pain , neg FAdIR, Neg Prieto.   nontender lateral hip or anterior hip including the ASIS, AIIS, adductor insertion, or pubic symphysis.  Poor strength with FF and abduction but no pain  nontender lateral hip at the adductor insertion  Tender at the low back and glut max.    XR AP pelvis and L frogleg, no acute changes no osteophyte, perhaps mild B joint space narroring   Assessment/Plan:   L lateral hip pain  --patient asked if injection could quickly help is pain get better. I am skeptical given his exam that FA or lateral hip injection will help. Also mild risk of infection or mild systemic steroid effect may not help his recent back surgery. Referred low back pain is another potential cause of pain  --we discussed options including PT and he states he is not yet cleared by his surgeon for this.  --rx for topical lidocaine ointment 5% daily as needed pain  F/u prn. If injection required by surgical team, I am happy to proceed.     Isai Hall MD CAQ    "

## 2018-07-17 ENCOUNTER — OFFICE VISIT (OUTPATIENT)
Dept: NEPHROLOGY | Facility: CLINIC | Age: 63
End: 2018-07-17
Attending: INTERNAL MEDICINE
Payer: MEDICARE

## 2018-07-17 ENCOUNTER — RESULTS ONLY (OUTPATIENT)
Dept: OTHER | Facility: CLINIC | Age: 63
End: 2018-07-17

## 2018-07-17 VITALS
HEIGHT: 73 IN | SYSTOLIC BLOOD PRESSURE: 119 MMHG | BODY MASS INDEX: 31.41 KG/M2 | WEIGHT: 237 LBS | HEART RATE: 73 BPM | DIASTOLIC BLOOD PRESSURE: 72 MMHG | TEMPERATURE: 97.6 F | OXYGEN SATURATION: 97 %

## 2018-07-17 DIAGNOSIS — I48.21 PERMANENT ATRIAL FIBRILLATION (H): ICD-10-CM

## 2018-07-17 DIAGNOSIS — N18.4 CKD (CHRONIC KIDNEY DISEASE) STAGE 4, GFR 15-29 ML/MIN (H): Primary | ICD-10-CM

## 2018-07-17 DIAGNOSIS — I10 ESSENTIAL HYPERTENSION: ICD-10-CM

## 2018-07-17 DIAGNOSIS — N18.4 CKD (CHRONIC KIDNEY DISEASE) STAGE 4, GFR 15-29 ML/MIN (H): ICD-10-CM

## 2018-07-17 DIAGNOSIS — Z94.0 S/P KIDNEY TRANSPLANT: ICD-10-CM

## 2018-07-17 DIAGNOSIS — Z76.82 AWAITING ORGAN TRANSPLANT: ICD-10-CM

## 2018-07-17 LAB
ALBUMIN SERPL-MCNC: 3.5 G/DL (ref 3.4–5)
ANION GAP SERPL CALCULATED.3IONS-SCNC: 10 MMOL/L (ref 3–14)
BUN SERPL-MCNC: 65 MG/DL (ref 7–30)
CALCIUM SERPL-MCNC: 9 MG/DL (ref 8.5–10.1)
CHLORIDE SERPL-SCNC: 101 MMOL/L (ref 94–109)
CO2 SERPL-SCNC: 21 MMOL/L (ref 20–32)
CREAT SERPL-MCNC: 4.58 MG/DL (ref 0.66–1.25)
FERRITIN SERPL-MCNC: 262 NG/ML (ref 26–388)
GFR SERPL CREATININE-BSD FRML MDRD: 13 ML/MIN/1.7M2
GLUCOSE SERPL-MCNC: 103 MG/DL (ref 70–99)
HCT VFR BLD AUTO: 27.1 % (ref 40–53)
HGB BLD-MCNC: 9 G/DL (ref 13.3–17.7)
IRON SATN MFR SERPL: 24 % (ref 15–46)
IRON SERPL-MCNC: 95 UG/DL (ref 35–180)
PHOSPHATE SERPL-MCNC: 3.7 MG/DL (ref 2.5–4.5)
POTASSIUM SERPL-SCNC: 4.5 MMOL/L (ref 3.4–5.3)
SODIUM SERPL-SCNC: 132 MMOL/L (ref 133–144)
TIBC SERPL-MCNC: 387 UG/DL (ref 240–430)

## 2018-07-17 PROCEDURE — 85014 HEMATOCRIT: CPT | Performed by: INTERNAL MEDICINE

## 2018-07-17 PROCEDURE — 36415 COLL VENOUS BLD VENIPUNCTURE: CPT | Performed by: INTERNAL MEDICINE

## 2018-07-17 PROCEDURE — 83540 ASSAY OF IRON: CPT | Performed by: INTERNAL MEDICINE

## 2018-07-17 PROCEDURE — G0463 HOSPITAL OUTPT CLINIC VISIT: HCPCS | Mod: ZF

## 2018-07-17 PROCEDURE — 82728 ASSAY OF FERRITIN: CPT | Performed by: INTERNAL MEDICINE

## 2018-07-17 PROCEDURE — 83550 IRON BINDING TEST: CPT | Performed by: INTERNAL MEDICINE

## 2018-07-17 PROCEDURE — 85018 HEMOGLOBIN: CPT | Performed by: INTERNAL MEDICINE

## 2018-07-17 PROCEDURE — 80069 RENAL FUNCTION PANEL: CPT | Performed by: INTERNAL MEDICINE

## 2018-07-17 ASSESSMENT — PAIN SCALES - GENERAL: PAINLEVEL: NO PAIN (0)

## 2018-07-17 NOTE — MR AVS SNAPSHOT
After Visit Summary   7/17/2018    Rory Bustamante    MRN: [id number]           Patient Information     Date Of Birth          1955        Visit Information        Provider Department      7/17/2018 1:05 PM Christi Sun MD [facility] Nephrology        Today's Diagnoses     CKD (chronic kidney disease) stage 4, GFR 15-29 ml/min (H)    -  1    S/P kidney transplant        Essential hypertension        Permanent atrial fibrillation (H)          Care Instructions    1.  Please ask your cardiologist if you need to be on coumadin  2.  Urologist re bladder stone and incomplete void  3.  Check BP more often at home.  Check it after you have taken the meds  4.  Kidney function is 13% of normal  5.  Decrease procardia to 60 mg daily  6.  Will set up fistula appointment  7.  Please see derm for clearance every six months (October)           Follow-ups after your visit        Your next 10 appointments already scheduled     Jul 24, 2018  9:20 AM CDT   XR LUMBAR SPINE 2/3 VIEWS with UCXR4   [facility] [place] [facility] ([facility])    [address] [place] [address] [phone]           Please bring a list of your current medicines to your exam. (Include vitamins, minerals and over-thecounter medicines.) Leave your valuables at home.  Tell your doctor if there is a chance you may be pregnant.  You do not need to do anything special for this exam.            Jul 24, 2018  9:45 AM CDT   (Arrive by 9:15 AM)   RETURN SPINE with Harshal Rucker MD   [facility] ([facility] [place])    [address] [place] [address] [phone]            Aug 11, 2018  9:00 AM CDT   (Arrive by 8:45 AM)   New Patient Visit with Meagan Story MD   [facility] ([facility])    [address] [place] [address]  [address]   [phone]            Aug 30, 2018 10:30 AM CDT   (Arrive by 10:00 AM)   RETURN SPINE with Harshal Rucker MD   [facility] ([facility] [place])    [address]  [address] [facility] [address]   [phone]            Oct 15, 2018  9:00 AM CDT   MR ABDOMEN MRCP W/O & W CONTRAST with UUMR1   [facility] [place], MRI ([facility])    [address] [facility] [address]   [phone]           Take your medicines as usual, unless your doctor tells you not to. Bring a list of your current medicines to your exam (including vitamins, minerals and over-the-counter drugs). Also bring the results of similar scans you may have had.    You may or may not receive IV contrast for this exam pending the discretion of the Radiologist.   Do not eat or drink for 6 hours prior to exam.  The MRI machine uses a strong magnet. Please wear clothes without metal (snaps, zippers). A sweatsuit works well, or we may give you a hospital gown.  Please remove any body piercings and hair extensions before you arrive. You will also remove watches, jewelry, hairpins, wallets, dentures, partial dental plates and hearing aids. You may wear contact lenses, and you may be able to wear your rings. We have a safe place to keep your personal items, but it is safer to leave them at home.  **IMPORTANT** THE INSTRUCTIONS BELOW ARE ONLY FOR THOSE PATIENTS WHO HAVE BEEN PRESCRIBED SEDATION OR GENERAL ANESTHESIA DURING THEIR MRI PROCEDURE:  IF YOUR DOCTOR PRESCRIBED ORAL SEDATION (take medicine to help you relax during your exam):   You must get the medicine from your doctor (oral medication) before you arrive. Bring the medicine to the exam. Do not take it at home. You ll be told when to take it upon arriving for your exam.   Arrive one hour early. Bring someone who can take you home after the test. Your medicine will make you  sleepy. After the exam, you may not drive, take a bus or take a taxi by yourself.  IF YOUR DOCTOR PRESCRIBED IV SEDATION:   Arrive one hour early. Bring someone who can take you home after the test. Your medicine will make you sleepy. After the exam, you may not drive, take a bus or take a taxi by yourself.   No eating 6 hours before your exam. You may have clear liquids up until 4 hours before your exam. (Clear liquids include water, clear tea, black coffee and fruit juice without pulp.)  IF YOUR DOCTOR PRESCRIBED ANESTHESIA (be asleep for your exam):   Arrive 1 1/2 hours early. Bring someone who can take you home after the test. You may not drive, take a bus or take a taxi by yourself.   No eating 8 hours before your exam. You may have clear liquids up until 4 hours before your exam. (Clear liquids include water, clear tea, black coffee and fruit juice without pulp.)   You will spend four to five hours in the recovery room.  If you have any questions, please contact your Imaging Department exam site.            Oct 15, 2018 10:00 AM CDT   (Arrive by 9:45 AM)   New Patient Visit with Shade Manning MD   [facility] ([facility])    [address] [facility]  [address] [place] [address] [phone]              Who to contact     If you have questions or need follow up information about today's clinic visit or your schedule please contact [facility] NEPHROLOGY directly at [phone].  Normal or non-critical lab and imaging results will be communicated to you by MyChart, letter or phone within 4 business days after the clinic has received the results. If you do not hear from us within 7 days, please contact the clinic through [employer] or phone. If you have a critical or abnormal lab result, we will notify you by phone as soon as possible.  Submit refill requests through [employer] or call your pharmacy and they will forward the refill request to us. Please allow 3  "business days for your refill to be completed.          Additional Information About Your Visit        MyChart Information     [employer] gives you secure access to your electronic health record. If you see a primary care provider, you can also send messages to your care team and make appointments. If you have questions, please call your primary care clinic.  If you do not have a primary care provider, please call [phone] and they will assist you.        Care EveryWhere ID     This is your Care EveryWhere ID. This could be used by other organizations to access your [place] medical records  [id number]        Your Vitals Were     Pulse Temperature Height Pulse Oximetry BMI (Body Mass Index)       73 97.6  F (36.4  C) (Oral) 1.854 m (6' 1\") 97% 31.27 kg/m2        Blood Pressure from Last 3 Encounters:   07/17/18 119/72   06/16/18 133/83   05/14/18 171/78    Weight from Last 3 Encounters:   07/17/18 107.5 kg (237 lb)   07/16/18 107.5 kg (237 lb)   06/17/18 106.9 kg (235 lb 9.6 oz)              Today, you had the following     No orders found for display         Today's Medication Changes          These changes are accurate as of 7/17/18  2:27 PM.  If you have any questions, ask your nurse or doctor.               These medicines have changed or have updated prescriptions.        Dose/Directions    allopurinol 100 MG tablet   Commonly known as:  ZYLOPRIM   This may have changed:    - how much to take  - when to take this   Used for:  Kidney replaced by transplant, Gout        Dose:  200 mg   Take 2 tablets by mouth daily.   Quantity:  180 tablet   Refills:  3                Primary Care Provider Office Phone # Fax #    Moris Diaz [phone]       [facility] [address] [facility] [address]  [facility] [address]        Equal Access to Services     AZEEM JACOBSEN AH: Agatha Doss, vero montoya, devin saunders. So wa " [phone].    ATENCIÓN: Si liliya taylor, tiene a carr disposición servicios gratuitos de asistencia lingüística. Larissa boogie [phone].    We comply with applicable federal civil rights laws and [place] laws. We do not discriminate on the basis of race, color, national origin, age, disability, sex, sexual orientation, or gender identity.            Thank you!     Thank you for choosing [facility]PHR[facility]  for your care. Our goal is always to provide you with excellent care. Hearing back from our patients is one way we can continue to improve our services. Please take a few minutes to complete the written survey that you may receive in the mail after your visit with us. Thank you!             Your Updated Medication List - Protect others around you: Learn how to safely use, store and throw away your medicines at www.disposemymeds.org.          This list is accurate as of 7/17/18  2:27 PM.  Always use your most recent med list.                   Brand Name Dispense Instructions for use Diagnosis    allopurinol 100 MG tablet    ZYLOPRIM    180 tablet    Take 2 tablets by mouth daily.    Kidney replaced by transplant, Gout       ALPHAGAN OP      Place 1 drop into the right eye 2 times daily        BABY ASPIRIN PO      Take 81 mg by mouth daily        BEVACizumab 400 MG/16ML injection    AVASTIN     Every 5 weeks        cholecalciferol 5000 units Tabs tablet    vitamin D3    30 tablet    Take 1 tablet (5,000 Units) by mouth daily    Hyperparathyroidism due to renal insufficiency (H)       CRESTOR PO      Take 20 mg by mouth every evening        * cycloSPORINE modified 25 MG capsule    GENERIC EQUIVALENT    180 capsule    Take 2 capsules (50 mg) by mouth 2 times daily Total dose 150 mg twice daily    Kidney transplant recipient, Long-term use of immunosuppressant medication       * cycloSPORINE modified 100 MG capsule    GENERIC EQUIVALENT    180 capsule    Take 1 capsule (100 mg) by mouth 2 times daily Total dose  150 mg twice daily    Kidney transplant recipient, Long-term use of immunosuppressant medication       epoetin freya 37989 UNIT/ML injection    PROCRIT    4 mL    Inject 1 mL (20,000 Units) Subcutaneous once a week As needed for hgb < 10    CKD (chronic kidney disease) stage 4, GFR 15-29 ml/min (H), Anemia in stage 4 chronic kidney disease (H)       fenofibrate 145 MG tablet      Take 145 mg by mouth daily        ISOSORBIDE MONONITRATE PO      Take 60 mg by mouth daily        lidocaine 5 % ointment    XYLOCAINE    50 g    Apply topically daily as needed for moderate pain    Left hip pain       loperamide 2 MG capsule    IMODIUM     Take 2 mg by mouth daily        losartan 50 MG tablet    COZAAR    90 tablet    Take 0.5 tablets (25 mg) by mouth daily    Kidney replaced by transplant       metoprolol tartrate 100 MG tablet    LOPRESSOR    60 tablet    Take 1 tablet by mouth 2 times daily.    Unspecified hypertensive kidney disease with chronic kidney disease stage I through stage IV, or unspecified(403.90), Kidney replaced by transplant       NIFEdipine ER 90 MG Tb24    ADALAT CC     Take 90 mg by mouth daily        NITROSTAT 0.4 MG sublingual tablet   Generic drug:  nitroGLYcerin      Place 0.4 mg under the tongue every 5 minutes as needed.        NONFORMULARY      Take 1 tablet by mouth 2 times daily Focus Macula Pro:  Eye vitamin and mineral supplement A, C, E, Zinc, Copper        omega-3 acid ethyl esters 1 g capsule    Lovaza     Take 2 g by mouth 2 times daily        oxyCODONE IR 5 MG tablet    ROXICODONE    80 tablet    Take 1-2 tablets (5-10 mg) by mouth every 4 hours as needed for other (pain control or improvement in physical function. Hold dose for analgesic side effects.)    Spinal stenosis of lumbar region with neurogenic claudication       PANTOPRAZOLE SODIUM PO      Take 40 mg by mouth daily as needed for heartburn        predniSONE 5 MG tablet    DELTASONE     Take 5 mg by mouth daily (with lunch)         probenecid 500 MG tablet    BENEMID     Take 500 mg by mouth 2 times daily.        sulfamethoxazole-trimethoprim 400-80 MG per tablet    BACTRIM/SEPTRA     Take 1 tablet by mouth every other day        tamsulosin 0.4 MG capsule    FLOMAX    60 capsule    Take 1 capsule (0.4 mg) by mouth daily    Benign prostatic hyperplasia, unspecified whether lower urinary tract symptoms present       TYLENOL ARTHRITIS PAIN 650 MG CR tablet   Generic drug:  acetaminophen      Take 2 tablets by mouth 3 times daily.        warfarin 5 MG tablet    COUMADIN    30 tablet    Take 1 tablet (5 mg) by mouth daily    Paroxysmal atrial fibrillation (H)       * Notice:  This list has 2 medication(s) that are the same as other medications prescribed for you. Read the directions carefully, and ask your doctor or other care provider to review them with you.

## 2018-07-17 NOTE — LETTER
7/17/2018      RE: Rory Bustamante  [address]  [facility] [address]       Service Date: 07/17/2018   REASON FOR THE VISIT: hospital follow up     HISTORY OF PRESENT ILLNESS:  Mr. 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  He was recently hospitalized for elective spinal fusion on 06/11/2018 which was complicated by a troponin elevation to 2.5, urinary retention, rapid atrial fib/flutter (he was started on Coumadin) and pancreatitis.       Today in clinic he is feeling well.  He no longer has chest pain or abdominal pain.  He has been seen by his outpatient cardiologist at [facility] and his need for warfarin was not addressed.  He is on a higher dose of statin.       He continues to have difficulty urinating.  He has a bladder stone which has not been evaluated (it shows up on CT scan).       His troponin peaked at 2.5 in the hospital.  This was considered an NSTEMI by Cardiology.  Because his GFR was 15, he was considered excessively high risk for coronary angiogram so did not have that test.  Right now he is not short of breath.  He feels tired.  He continues to have lower extremity weakness and is cleared to start physical therapy on 07/24 after seeing his surgeon.  Blood pressure at home has been in the 120s-130s, but he does not really check it more than once a day.  Blood pressure medication includes nifedipine 90 (he does not want to split this), losartan 25, metoprolol 100 b.i.d. and    Flomax 0.4.        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  He has had low level proteinuria since 2007, initially 0.3-0.5 since 2007 with one spike to 4 grams per gram in 05/2017.  Most recent level was 1.36 on 06/14/2018.  Current immunosuppression is cyclosporine 150 b.i.d. and CellCept 500 bid.  Baseline creatinine has been 3.5-4 for the past several months.  He was  referred to the fistula surgeon and has had vein mapping but has not yet made an appointment.       REVIEW OF SYSTEMS:  On comprehensive review of systems, he has low energy level, good appetite, no nausea, mucousy cough with nasal congestion, no recurrence of fast heart rate.  Back pain is improved, but he has ongoing weakness.  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       MEDICATIONS:  See below.      SOCIAL HISTORY:  He is disabled.  He lives with his wife.  He is not getting any exercise currently.      PHYSICAL EXAMINATION:   VITAL SIGNS:  Blood pressure 119/72, weight 273 pounds.   GENERAL:  He is well-developed, well-nourished.   SKIN:  He has extensive verrucous dermatitis.  No recent episodes of skin cancer.  He was seen by Dermatology last in April.   LUNGS:  Clear bilaterally.   CARDIAC:  Regular sinus rhythm.  No murmurs, rubs or gallops.   LOWER EXTREMITIES:  Bilateral peripheral edema.   NEUROLOGIC:  He is able to ambulate to the exam table.      LABORATORY TESTS:  Creatinine 4.6.  BUN 65.  Hemoglobin 9.  INR last checked 3.5.   Electrolytes/Renal -   Recent Labs   Lab Test  07/17/18   1218  06/17/18   0633  06/16/18   0713  06/15/18   1132   06/14/18   0005  06/13/18   1221  06/13/18   0617   NA  132*  135  134  134   < >   --    --   134   POTASSIUM  4.5  4.3  4.2  4.1   < >   --    --   4.5   CHLORIDE  101  102  103  102   < >   --    --   102   CO2  21  20  19*  23   < >   --    --   22   BUN  65*  57*  55*  57*   < >   --    --   63*   CR  4.58*  4.62*  4.78*  4.70*   < >   --    --   4.64*   GLC  103*  115*  99  98   < >   --    --   141*   TRISHA  9.0  9.2  9.2  8.8   < >   --    --   9.0   MAG   --    --    --    --    --   2.5*  1.8  1.4*   PHOS  3.7   --    --   2.9   --    --    --   5.2*    < > = values in this interval not displayed.       CBC -   Recent Labs   Lab Test  07/17/18   1218  07/13/18   0727  07/06/18   1030   06/17/18    0837  06/14/18   0603  06/13/18   1903   WBC   --    --    --    --   8.4  9.5  12.8*   HGB  9.0*  8.6*  8.8*   < >  7.6*  7.9*  8.4*   PLT   --    --    --    --   126*  85*  87*    < > = values in this interval not displayed.       LFTs -   Recent Labs   Lab Test  07/17/18   1218  06/17/18   0837  06/15/18   1132  06/14/18   0603  06/13/18   0617   ALKPHOS   --   39*   --   35*  35*   BILITOTAL   --   0.3   --   0.6  0.3   ALT   --   12   --   8  7   AST   --   29   --   34  37   PROTTOTAL   --   7.6   --   6.4*  6.5*   ALBUMIN  3.5  2.8*  2.5*  2.5*  2.8*       Coags -   Recent Labs   Lab Test  06/17/18   0633  06/16/18   1736  12/08/17   1104  08/03/17   1427   INR  1.38*  1.41*  1.02  0.99   PTT   --    --   29  28       Iron Panel -   Recent Labs   Lab Test  07/17/18   1218 06/22/18 05/30/18   1100  05/01/18   0852   IRON  95   --   90  118   IRONSAT  24  28  27  34   VALERIA  262  557  388  581       Endocrine -   Recent Labs   Lab Test  06/17/18   0837  12/08/17   1104  06/07/11   1347   A1C   --   4.8  5.5   TSH  2.66   --    --         IMPRESSION:   1.  Allograft function.  He is status post HLA identical transplant in 1989 with recurrent MPGN with poor graft function.  He is not frankly uremic.  He has not yet made an appointment for fistula placement and needs to go ahead with that.    2.  Coronary artery disease.  He had an NSTEMI in June.  He should be on hold for transplant for 3 months.  He did not have a coronary angiogram.  He has no chest pain currently and is followed by Cardiology.   3.  Atrial fibrillation.  He had an episode of atrial fib/flutter that resolved and was attributed to the postoperative state.  He was started on Coumadin and discharged with a plan to take this medication indefinitely.  We will need to have a plan for that since he is at high risk for bleeding problems.   4.  Proteinuria.  He does have recurrent MPGN.  He is on losartan, although the dose has been decreased because of  his increasing creatinine.    5.  Hypertension.  Blood pressure is good currently.  He needs to continue to monitor.    6.  Pancreatitis.  He is currently asymptomatic with resolution of his high lipase.   7.  Anemia.  He is followed in the EPO clinic and has a hemoglobin of 9.0.    8.  Peripheral neuropathy.  He is now status post laminectomy.  He has some improvement in his symptoms and is due to see his surgeon on 07/24.   9.  Hip pain.  He has right-sided hip pain.  He has bilateral changes consistent with possible aseptic necrosis of the femoral heads, but no symptoms on the left, so his hip pain may be due to referred back pain or some other cause.  10.  Bladder stone.  He has a bladder stone that still has not been evaluated, but it is visible on a CT scan.      PLAN:   1.  Referral to Urology for bladder stone evaluation and consideration of urinary hesitancy.    2.  No change in blood pressure meds.   3.  Note to Cardiology re continuation of warfarin (the patient also has an outpatient cardiologist).   4.  Decrease nifedipine to 60 mg because of the patient's symptoms of fatigue.  The patient is to monitor blood pressure when he becomes tired.  Will be followed by Melani Mehta RN  5.  Transplant status: on hold until bladder stone is evaluated and pt has official cardiology clearance.   6.  Appt for fistula creation      60 minutes spent with pt and wife, more than 50% on counseling    SARABJIT LANGSTON MD      Current Outpatient Prescriptions   Medication Sig Dispense Refill     acetaminophen (TYLENOL ARTHRITIS PAIN) 650 MG CR tablet Take 2 tablets by mouth 3 times daily.       allopurinol (ZYLOPRIM) 100 MG tablet Take 2 tablets by mouth daily. (Patient taking differently: Take 100 mg by mouth 2 times daily ) 180 tablet 3     BABY ASPIRIN PO Take 81 mg by mouth daily       BEVACizumab (AVASTIN) 400 MG/16ML injection Every 5 weeks       Brimonidine Tartrate (ALPHAGAN OP) Place 1 drop into the right eye 2  times daily        cholecalciferol (VITAMIN D3) 5000 UNITS TABS tablet Take 1 tablet (5,000 Units) by mouth daily 30 tablet 0     cycloSPORINE modified (GENERIC EQUIVALENT) 100 MG capsule Take 1 capsule (100 mg) by mouth 2 times daily Total dose 150 mg twice daily 180 capsule 3     cycloSPORINE modified (GENERIC EQUIVALENT) 25 MG capsule Take 2 capsules (50 mg) by mouth 2 times daily Total dose 150 mg twice daily 180 capsule 3     epoetin freya (PROCRIT) 57565 UNIT/ML injection Inject 1 mL (20,000 Units) Subcutaneous once a week As needed for hgb < 10 4 mL 5     fenofibrate 145 MG tablet Take 145 mg by mouth daily       ISOSORBIDE MONONITRATE PO Take 60 mg by mouth daily        lidocaine (XYLOCAINE) 5 % ointment Apply topically daily as needed for moderate pain 50 g 1     loperamide (IMODIUM) 2 MG capsule Take 2 mg by mouth daily        losartan (COZAAR) 50 MG tablet Take 0.5 tablets (25 mg) by mouth daily 90 tablet 3     metoprolol (LOPRESSOR) 100 MG tablet Take 1 tablet by mouth 2 times daily. 60 tablet 6     nitroGLYCERIN (NITROSTAT) 0.4 MG SL tablet Place 0.4 mg under the tongue every 5 minutes as needed.       NONFORMULARY Take 1 tablet by mouth 2 times daily Focus Macula Pro:  Eye vitamin and mineral supplement A, C, E, Zinc, Copper        omega-3 acid ethyl esters (LOVAZA) 1 G capsule Take 2 g by mouth 2 times daily        oxyCODONE IR (ROXICODONE) 5 MG tablet Take 1-2 tablets (5-10 mg) by mouth every 4 hours as needed for other (pain control or improvement in physical function. Hold dose for analgesic side effects.) 80 tablet 0     PANTOPRAZOLE SODIUM PO Take 40 mg by mouth daily as needed for heartburn       predniSONE (DELTASONE) 5 MG tablet Take 5 mg by mouth daily (with lunch)        probenecid (BENEMID) 500 MG tablet Take 500 mg by mouth 2 times daily.       Rosuvastatin Calcium (CRESTOR PO) Take 20 mg by mouth every evening       sulfamethoxazole-trimethoprim (BACTRIM,SEPTRA) 400-80 MG per tablet Take 1  tablet by mouth every other day        tamsulosin (FLOMAX) 0.4 MG capsule Take 1 capsule (0.4 mg) by mouth daily 60 capsule 1     warfarin (COUMADIN) 5 MG tablet Take 1 tablet (5 mg) by mouth daily 30 tablet 1     [DISCONTINUED] NIFEdipine ER (ADALAT CC) 90 MG TB24 Take 90 mg by mouth daily        NIFEdipine ER osmotic (PROCARDIA XL) 60 MG TB24 Take 1 tablet (60 mg) by mouth daily 90 tablet 3         Christi Sun MD           D: 2018   T: 2018   MT: LM      Name:     JEANMARIE GONZALEZ   MRN:      [id number]        Account:      [id number]   :      1955           Service Date: 2018      Document: [id number]

## 2018-07-17 NOTE — PATIENT INSTRUCTIONS
1.  Please ask your cardiologist if you need to be on coumadin  2.  Urologist re bladder stone and incomplete void  3.  Check BP more often at home.  Check it after you have taken the meds  4.  Kidney function is 13% of normal  5.  Decrease procardia to 60 mg daily  6.  Will set up fistula appointment  7.  Please see derm for clearance every six months (October)

## 2018-07-18 ENCOUNTER — TELEPHONE (OUTPATIENT)
Dept: TRANSPLANT | Facility: CLINIC | Age: 63
End: 2018-07-18

## 2018-07-18 DIAGNOSIS — N18.5 CHRONIC KIDNEY DISEASE, STAGE 5, KIDNEY FAILURE (H): Primary | ICD-10-CM

## 2018-07-18 DIAGNOSIS — Z45.2 ADJUSTMENT AND MANAGEMENT OF VASCULAR ACCESS DEVICE: ICD-10-CM

## 2018-07-18 DIAGNOSIS — T86.12 FAILED KIDNEY TRANSPLANT: ICD-10-CM

## 2018-07-18 LAB — PRA SINGLE ANTIGEN IGG ANTIBODY: NORMAL

## 2018-07-18 NOTE — PROGRESS NOTES
Service Date: 07/17/2018   REASON FOR THE VISIT: hospital follow up     HISTORY OF PRESENT ILLNESS:  Mr. 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  He was recently hospitalized for elective spinal fusion on 06/11/2018 which was complicated by a troponin elevation to 2.5, urinary retention, rapid atrial fib/flutter (he was started on Coumadin) and pancreatitis.       Today in clinic he is feeling well.  He no longer has chest pain or abdominal pain.  He has been seen by his outpatient cardiologist at [facility] and his need for warfarin was not addressed.  He is on a higher dose of statin.       He continues to have difficulty urinating.  He has a bladder stone which has not been evaluated (it shows up on CT scan).       His troponin peaked at 2.5 in the hospital.  This was considered an NSTEMI by Cardiology.  Because his GFR was 15, he was considered excessively high risk for coronary angiogram so did not have that test.  Right now he is not short of breath.  He feels tired.  He continues to have lower extremity weakness and is cleared to start physical therapy on 07/24 after seeing his surgeon.  Blood pressure at home has been in the 120s-130s, but he does not really check it more than once a day.  Blood pressure medication includes nifedipine 90 (he does not want to split this), losartan 25, metoprolol 100 b.i.d. and    Flomax 0.4.        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  He has had low level proteinuria since 2007, initially 0.3-0.5 since 2007 with one spike to 4 grams per gram in 05/2017.  Most recent level was 1.36 on 06/14/2018.  Current immunosuppression is cyclosporine 150 b.i.d. and CellCept 500 bid.  Baseline creatinine has been 3.5-4 for the past several months.  He was referred to the fistula surgeon and has had vein mapping but has not yet made an  appointment.       REVIEW OF SYSTEMS:  On comprehensive review of systems, he has low energy level, good appetite, no nausea, mucousy cough with nasal congestion, no recurrence of fast heart rate.  Back pain is improved, but he has ongoing weakness.  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       MEDICATIONS:  See below.      SOCIAL HISTORY:  He is disabled.  He lives with his wife.  He is not getting any exercise currently.      PHYSICAL EXAMINATION:   VITAL SIGNS:  Blood pressure 119/72, weight 273 pounds.   GENERAL:  He is well-developed, well-nourished.   SKIN:  He has extensive verrucous dermatitis.  No recent episodes of skin cancer.  He was seen by Dermatology last in April.   LUNGS:  Clear bilaterally.   CARDIAC:  Regular sinus rhythm.  No murmurs, rubs or gallops.   LOWER EXTREMITIES:  Bilateral peripheral edema.   NEUROLOGIC:  He is able to ambulate to the exam table.      LABORATORY TESTS:  Creatinine 4.6.  BUN 65.  Hemoglobin 9.  INR last checked 3.5.   Electrolytes/Renal -   Recent Labs   Lab Test  07/17/18   1218  06/17/18   0633  06/16/18   0713  06/15/18   1132   06/14/18   0005  06/13/18   1221  06/13/18   0617   NA  132*  135  134  134   < >   --    --   134   POTASSIUM  4.5  4.3  4.2  4.1   < >   --    --   4.5   CHLORIDE  101  102  103  102   < >   --    --   102   CO2  21  20  19*  23   < >   --    --   22   BUN  65*  57*  55*  57*   < >   --    --   63*   CR  4.58*  4.62*  4.78*  4.70*   < >   --    --   4.64*   GLC  103*  115*  99  98   < >   --    --   141*   TRISHA  9.0  9.2  9.2  8.8   < >   --    --   9.0   MAG   --    --    --    --    --   2.5*  1.8  1.4*   PHOS  3.7   --    --   2.9   --    --    --   5.2*    < > = values in this interval not displayed.       CBC -   Recent Labs   Lab Test  07/17/18   1218  07/13/18   0727  07/06/18   1030   06/17/18   0837  06/14/18   0603  06/13/18   1903   WBC   --    --    --    --   8.4  9.5   12.8*   HGB  9.0*  8.6*  8.8*   < >  7.6*  7.9*  8.4*   PLT   --    --    --    --   126*  85*  87*    < > = values in this interval not displayed.       LFTs -   Recent Labs   Lab Test  07/17/18   1218  06/17/18   0837  06/15/18   1132  06/14/18   0603  06/13/18   0617   ALKPHOS   --   39*   --   35*  35*   BILITOTAL   --   0.3   --   0.6  0.3   ALT   --   12   --   8  7   AST   --   29   --   34  37   PROTTOTAL   --   7.6   --   6.4*  6.5*   ALBUMIN  3.5  2.8*  2.5*  2.5*  2.8*       Coags -   Recent Labs   Lab Test  06/17/18   0633  06/16/18   1736  12/08/17   1104  08/03/17   1427   INR  1.38*  1.41*  1.02  0.99   PTT   --    --   29  28       Iron Panel -   Recent Labs   Lab Test  07/17/18   1218 06/22/18 05/30/18   1100  05/01/18   0852   IRON  95   --   90  118   IRONSAT  24  28  27  34   VALERIA  262  557  388  581       Endocrine -   Recent Labs   Lab Test  06/17/18   0837  12/08/17   1104  06/07/11   1347   A1C   --   4.8  5.5   TSH  2.66   --    --         IMPRESSION:   1.  Allograft function.  He is status post HLA identical transplant in 1989 with recurrent MPGN with poor graft function.  He is not frankly uremic.  He has not yet made an appointment for fistula placement and needs to go ahead with that.    2.  Coronary artery disease.  He had an NSTEMI in June.  He should be on hold for transplant for 3 months.  He did not have a coronary angiogram.  He has no chest pain currently and is followed by Cardiology.   3.  Atrial fibrillation.  He had an episode of atrial fib/flutter that resolved and was attributed to the postoperative state.  He was started on Coumadin and discharged with a plan to take this medication indefinitely.  We will need to have a plan for that since he is at high risk for bleeding problems.   4.  Proteinuria.  He does have recurrent MPGN.  He is on losartan, although the dose has been decreased because of his increasing creatinine.    5.  Hypertension.  Blood pressure is good  currently.  He needs to continue to monitor.    6.  Pancreatitis.  He is currently asymptomatic with resolution of his high lipase.   7.  Anemia.  He is followed in the EPO clinic and has a hemoglobin of 9.0.    8.  Peripheral neuropathy.  He is now status post laminectomy.  He has some improvement in his symptoms and is due to see his surgeon on 07/24.   9.  Hip pain.  He has right-sided hip pain.  He has bilateral changes consistent with possible aseptic necrosis of the femoral heads, but no symptoms on the left, so his hip pain may be due to referred back pain or some other cause.  10.  Bladder stone.  He has a bladder stone that still has not been evaluated, but it is visible on a CT scan.      PLAN:   1.  Referral to Urology for bladder stone evaluation and consideration of urinary hesitancy.    2.  No change in blood pressure meds.   3.  Note to Cardiology re continuation of warfarin (the patient also has an outpatient cardiologist).   4.  Decrease nifedipine to 60 mg because of the patient's symptoms of fatigue.  The patient is to monitor blood pressure when he becomes tired.  Will be followed by Melani Mehta RN  5.  Transplant status: on hold until bladder stone is evaluated and pt has official cardiology clearance.   6.  Appt for fistula creation      60 minutes spent with pt and wife, more than 50% on counseling    SARABJIT LANGSTON MD      Current Outpatient Prescriptions   Medication Sig Dispense Refill     acetaminophen (TYLENOL ARTHRITIS PAIN) 650 MG CR tablet Take 2 tablets by mouth 3 times daily.       allopurinol (ZYLOPRIM) 100 MG tablet Take 2 tablets by mouth daily. (Patient taking differently: Take 100 mg by mouth 2 times daily ) 180 tablet 3     BABY ASPIRIN PO Take 81 mg by mouth daily       BEVACizumab (AVASTIN) 400 MG/16ML injection Every 5 weeks       Brimonidine Tartrate (ALPHAGAN OP) Place 1 drop into the right eye 2 times daily        cholecalciferol (VITAMIN D3) 5000 UNITS TABS tablet  Take 1 tablet (5,000 Units) by mouth daily 30 tablet 0     cycloSPORINE modified (GENERIC EQUIVALENT) 100 MG capsule Take 1 capsule (100 mg) by mouth 2 times daily Total dose 150 mg twice daily 180 capsule 3     cycloSPORINE modified (GENERIC EQUIVALENT) 25 MG capsule Take 2 capsules (50 mg) by mouth 2 times daily Total dose 150 mg twice daily 180 capsule 3     epoetin freya (PROCRIT) 18563 UNIT/ML injection Inject 1 mL (20,000 Units) Subcutaneous once a week As needed for hgb < 10 4 mL 5     fenofibrate 145 MG tablet Take 145 mg by mouth daily       ISOSORBIDE MONONITRATE PO Take 60 mg by mouth daily        lidocaine (XYLOCAINE) 5 % ointment Apply topically daily as needed for moderate pain 50 g 1     loperamide (IMODIUM) 2 MG capsule Take 2 mg by mouth daily        losartan (COZAAR) 50 MG tablet Take 0.5 tablets (25 mg) by mouth daily 90 tablet 3     metoprolol (LOPRESSOR) 100 MG tablet Take 1 tablet by mouth 2 times daily. 60 tablet 6     nitroGLYCERIN (NITROSTAT) 0.4 MG SL tablet Place 0.4 mg under the tongue every 5 minutes as needed.       NONFORMULARY Take 1 tablet by mouth 2 times daily Focus Macula Pro:  Eye vitamin and mineral supplement A, C, E, Zinc, Copper        omega-3 acid ethyl esters (LOVAZA) 1 G capsule Take 2 g by mouth 2 times daily        oxyCODONE IR (ROXICODONE) 5 MG tablet Take 1-2 tablets (5-10 mg) by mouth every 4 hours as needed for other (pain control or improvement in physical function. Hold dose for analgesic side effects.) 80 tablet 0     PANTOPRAZOLE SODIUM PO Take 40 mg by mouth daily as needed for heartburn       predniSONE (DELTASONE) 5 MG tablet Take 5 mg by mouth daily (with lunch)        probenecid (BENEMID) 500 MG tablet Take 500 mg by mouth 2 times daily.       Rosuvastatin Calcium (CRESTOR PO) Take 20 mg by mouth every evening       sulfamethoxazole-trimethoprim (BACTRIM,SEPTRA) 400-80 MG per tablet Take 1 tablet by mouth every other day        tamsulosin (FLOMAX) 0.4 MG  capsule Take 1 capsule (0.4 mg) by mouth daily 60 capsule 1     warfarin (COUMADIN) 5 MG tablet Take 1 tablet (5 mg) by mouth daily 30 tablet 1     [DISCONTINUED] NIFEdipine ER (ADALAT CC) 90 MG TB24 Take 90 mg by mouth daily        NIFEdipine ER osmotic (PROCARDIA XL) 60 MG TB24 Take 1 tablet (60 mg) by mouth daily 90 tablet 3              D: 2018   T: 2018   MT: DP      Name:     JEANMARIE GONZALEZ   MRN:      [id number]        Account:      [id number]   :      1955           Service Date: 2018      Document: [id number]

## 2018-07-18 NOTE — TELEPHONE ENCOUNTER
Called Rory to talk about his recent appointment with Dr. Sun. I asked what he remembers of the conversation regarding his cardiology clearance. Does she want him to be seen here or at his [facility] location. He stated that she is concerned about him being on coumadin r/t his atrial fibrillation that happened during his recent back surgery hospital stay.   He has an appointment in October with his cardiologist. I think that is just fine as he is only beginning PT for his back July 24, 2018.   Dr. Sun has coordinated with Rory to see a urologist as well. Asked Rory to please reach out to me when he feels he has met all the criteria set out for him.   I will set my next review of him for October.

## 2018-07-18 NOTE — LETTER
July 18, 2018    Rory ARRIAZA Maas  [address]  [facility] [address]      Dear Mr. Bustamante,    This letter is sent to notify you that your listing status was changed from Active to Inactive on the kidney transplant waitlist at the [facility].  Your status was changed because you are in need of completing PT for your recent back surgery, see a urologist per Dr. Sun, and have your cardiology appointment in October to discuss your coumadin/warfarin medication treating the atrial fibrillation. Dr. Sun has requested you to be officially cleared by your cardiologist for transplant surgery.     During this waiting period, we may still request periodic laboratory tests with your primary physician.  It will be your responsibility to remind your physician to forward your results to the Transplant Office.    We still need to be kept informed of any changes such as:    o changes in your insurance coverage  o change in your phone number, address or emergency contact  o significant changes in your health  o significant infections (such as pneumonia or abscesses)  o blood transfusions  o any condition which requires hospitalization or surgery    Sincerely,        K[facility] Transplant Program    Enclosures: Waitlist Information Update    CC: Rylan Sun

## 2018-07-19 LAB
PROTOCOL CUTOFF: NORMAL
SA1 CELL: NORMAL
SA1 COMMENTS: NORMAL
SA1 HI RISK ABY: NORMAL
SA1 MOD RISK ABY: NORMAL
SA1 TEST METHOD: NORMAL
SA2 CELL: NORMAL
SA2 COMMENTS: NORMAL
SA2 HI RISK ABY UA: NORMAL
SA2 MOD RISK ABY: NORMAL
SA2 TEST METHOD: NORMAL
UNACCEPTABLE ANTIGEN: NORMAL

## 2018-07-20 ENCOUNTER — TELEPHONE (OUTPATIENT)
Dept: PHARMACY | Facility: CLINIC | Age: 63
End: 2018-07-20

## 2018-07-20 LAB
FERRITIN SERPL-MCNC: 270 NG/ML
HCT VFR BLD AUTO: 26.3 %
HEMOGLOBIN: 8.8 G/DL (ref 13.3–17.7)
IRON BINDING CAP: 365
IRON SATURATION INDEX: 19 %
IRON: 69 UG/DL

## 2018-07-20 NOTE — TELEPHONE ENCOUNTER
Anemia Management Note  SUBJECTIVE/OBJECTIVE:  Referred by Dr. Christi Sun on 2018  Primary Diagnosis: Anemia in Chronic Kidney Disease (N18.3, D63.1)     Secondary Diagnosis:  Chronic Kidney Disease, Stage 3 (N18.3)   Hgb goal range:  9-10  Epo/Darbo: Procrit  20,000 units  once weekly for Hgb < 10  In clinic  RX/TX plans : 2019  Iron regimen:  Ferrous Sulfate 325mg QD  Labs : 2019  Recent MARY LOU use, transfusion, IV iron: IV iron scheduled 2018    Anemia Latest Ref Rng & Units 2018   MARY LOU Dose - - - 10,000 units 10,000 units 10,000 units - 20,000 units   Hemoglobin 13.3 - 17.7 g/dL 7.8(A) 8.8(A) 8.0(A) 8.8(A) 8.6(A) 9.0(L) 8.8(A)   IV Iron Dose - - - - - - - -   TSAT % - 28 - - - 24 19   Ferritin ng/mL - 557 - - - 262 270       BP Readings from Last 3 Encounters:   18 119/72   18 133/83   18 171/78     Wt Readings from Last 2 Encounters:   18 237 lb (107.5 kg)   18 237 lb (107.5 kg)     Interested in IV iron, orders were placed in 2018, message sent to [facility] scheduling to coordinate with appts 2018 & 2018 per Rory's request.    Injectafer scheduled 2018 & 2018 DENISE    ASSESSMENT:  Hgb:Not at goal/Recent dose change  TSat: not at goal of >30% Ferritin: At goal (>100ng/mL)    PLAN:  Dose with procrit, RTC for hgb then procrit if needed in 1 week(s), RTC for IV iron in 1 & 2 week(s) and Check iron studies 4 week(s) after second infusion.    Orders needed to be renewed (for next follow-up date) in LETTERS - for external labs: None    Iron labs due: 4 weeks after second iron infusion = 2018      Plan discussed with:  Rory  Plan provided by:      NEXT FOLLOW-UP DATE:  2018    Anemia Management Service  Grecia Carroll PharmD and Michelle Ramírez CPhT  Phone: [phone]  Fax: [phone]

## 2018-07-24 ENCOUNTER — OFFICE VISIT (OUTPATIENT)
Dept: ORTHOPEDICS | Facility: CLINIC | Age: 63
End: 2018-07-24
Payer: MEDICARE

## 2018-07-24 ENCOUNTER — RADIANT APPOINTMENT (OUTPATIENT)
Dept: GENERAL RADIOLOGY | Facility: CLINIC | Age: 63
End: 2018-07-24
Attending: ORTHOPAEDIC SURGERY
Payer: MEDICARE

## 2018-07-24 VITALS — BODY MASS INDEX: 31.41 KG/M2 | HEIGHT: 73 IN | WEIGHT: 237 LBS

## 2018-07-24 DIAGNOSIS — M48.062 SPINAL STENOSIS OF LUMBAR REGION WITH NEUROGENIC CLAUDICATION: ICD-10-CM

## 2018-07-24 DIAGNOSIS — Z98.890 S/P SPINAL SURGERY: Primary | ICD-10-CM

## 2018-07-24 ASSESSMENT — ENCOUNTER SYMPTOMS
FATIGUE: 1
POLYDIPSIA: 1
NIGHT SWEATS: 0
POLYPHAGIA: 0
HEMATURIA: 0
ABDOMINAL PAIN: 0
LIGHT-HEADEDNESS: 1
MUSCLE WEAKNESS: 1
NAUSEA: 0
LEG PAIN: 0
MEMORY LOSS: 0
HYPOTENSION: 1
PALPITATIONS: 0
WEAKNESS: 1
BRUISES/BLEEDS EASILY: 1
VOMITING: 0
CHILLS: 0
ARTHRALGIAS: 0
EYE PAIN: 0
WEIGHT GAIN: 1
HYPERTENSION: 1
DIFFICULTY URINATING: 1
TREMORS: 0
ORTHOPNEA: 0
DOUBLE VISION: 0
FLANK PAIN: 0
RECTAL PAIN: 0
CONSTIPATION: 0
DECREASED APPETITE: 0
DYSURIA: 0
EYE WATERING: 0
SLEEP DISTURBANCES DUE TO BREATHING: 0
HALLUCINATIONS: 0
LOSS OF CONSCIOUSNESS: 0
NUMBNESS: 0
SWOLLEN GLANDS: 0
NECK PAIN: 0
DIARRHEA: 1
BLOOD IN STOOL: 0
BOWEL INCONTINENCE: 0
ALTERED TEMPERATURE REGULATION: 1
BACK PAIN: 0
MUSCLE CRAMPS: 1
WEIGHT LOSS: 0
JAUNDICE: 0
JOINT SWELLING: 0
SEIZURES: 0
FEVER: 0
MYALGIAS: 0
STIFFNESS: 0
DIZZINESS: 0
EYE REDNESS: 0
DISTURBANCES IN COORDINATION: 1
SYNCOPE: 0
PARALYSIS: 0
INCREASED ENERGY: 1
EXERCISE INTOLERANCE: 1
EYE IRRITATION: 0
HEARTBURN: 0
HEADACHES: 0
BLOATING: 0
TINGLING: 1
SPEECH CHANGE: 0

## 2018-07-24 NOTE — MR AVS SNAPSHOT
After Visit Summary   7/24/2018    Rory Bustamante    MRN: [id number]           Patient Information     Date Of Birth          1955        Visit Information        Provider Department      7/24/2018 10:45 AM Harshal Rucker MD [facility]        Today's Diagnoses     S/P spinal surgery    -  1       Follow-ups after your visit        Additional Services     PHYSICAL THERAPY REFERRAL (External-Prints)       Physical Therapy Referral    Eval and treat.  May consider pool therapy once surgical incision fully healed.                  Your next 10 appointments already scheduled     Aug 01, 2018 10:30 AM CDT   (Arrive by 10:15 AM)   Return Visit with Isai Hall MD   [facility] Medicine ([facility] [place])    [address]  5th Floor  [place] [id number] [phone]            Aug 01, 2018 12:00 PM CDT   Infusion 120 with UC SPEC INFUSION, UC 42 ATC   [facility] Specialty and Procedure ([facility] [place])    [address]  [address]  [place] [address] [phone]            Aug 06, 2018  2:00 PM CDT   US UPPER EXTREMITY VENOUS MAPPING BILATERAL with UCUSV1   [facility] US ([facility] [place])    [address]  1st Floor  [place] [id number] [phone]           Please bring a list of your medicines (including vitamins, minerals and over-the-counter drugs). Also, tell your doctor about any allergies you may have. Wear comfortable clothes and leave your valuables at home.  You do not need to do anything special to prepare for your exam.  Please call the Imaging Department at your exam site with any questions.            Aug 06, 2018  3:15 PM CDT   (Arrive by 3:00 PM)   Fistula Consult with Sydney Dawkins MD   [facility] Solid Organ Transplant ([facility] [place])    [address]  [address]  [place]  [address]   [phone]            Aug 08, 2018  4:00 PM CDT   Infusion 120 with UC SPEC INFUSION, UC 50 ATC   [facility] Specialty [facility] Procedure ([facility])    [address]  [address]  [place] [address]   [phone]            Aug 11, 2018  9:00 AM CDT   (Arrive by 8:45 AM)   New Patient Visit with Meagan Story MD   [facility] Urology and Inst for Prostate and Urologic Cancers ([facility])    [address]  4th Floor  [place] [address]   [phone]            Aug 30, 2018 10:30 AM CDT   (Arrive by 10:00 AM)   RETURN SPINE with Harshal Rucker MD   [facility] ([facility])    [address]  4th [place] [facility] [address]   [phone]            Oct 15, 2018  9:00 AM CDT   MR ABDOMEN MRCP W/O & W CONTRAST with UUMR1   [facility] [place] MRI ([facility])    [address] [address]   [phone]           Take your medicines as usual, unless your doctor tells you not to. Bring a list of your current medicines to your exam (including vitamins, minerals and over-the-counter drugs). Also bring the results of similar scans you may have had.    You may or may not receive IV contrast for this exam pending the discretion of the Radiologist.   Do not eat or drink for 6 hours prior to exam.  The MRI machine uses a strong magnet. Please wear clothes without metal (snaps, zippers). A sweatsuit works well, or we may give you a hospital gown.  Please remove any body piercings and hair extensions before you arrive. You will also remove watches, jewelry, hairpins, wallets, dentures, partial dental plates and hearing aids. You may wear contact lenses, and you may be able to wear your rings. We have a safe place to keep your personal items, but it is safer to leave them at home.   **IMPORTANT** THE INSTRUCTIONS BELOW ARE ONLY FOR THOSE PATIENTS WHO HAVE BEEN PRESCRIBED SEDATION OR GENERAL ANESTHESIA DURING THEIR MRI PROCEDURE:  IF YOUR DOCTOR PRESCRIBED ORAL SEDATION (take medicine to help you relax during your exam):   You must get the medicine from your doctor (oral medication) before you arrive. Bring the medicine to the exam. Do not take it at home. You ll be told when to take it upon arriving for your exam.   Arrive one hour early. Bring someone who can take you home after the test. Your medicine will make you sleepy. After the exam, you may not drive, take a bus or take a taxi by yourself.  IF YOUR DOCTOR PRESCRIBED IV SEDATION:   Arrive one hour early. Bring someone who can take you home after the test. Your medicine will make you sleepy. After the exam, you may not drive, take a bus or take a taxi by yourself.   No eating 6 hours before your exam. You may have clear liquids up until 4 hours before your exam. (Clear liquids include water, clear tea, black coffee and fruit juice without pulp.)  IF YOUR DOCTOR PRESCRIBED ANESTHESIA (be asleep for your exam):   Arrive 1 1/2 hours early. Bring someone who can take you home after the test. You may not drive, take a bus or take a taxi by yourself.   No eating 8 hours before your exam. You may have clear liquids up until 4 hours before your exam. (Clear liquids include water, clear tea, black coffee and fruit juice without pulp.)   You will spend four to five hours in the recovery room.  If you have any questions, please contact your Imaging Department exam site.              Who to contact     Please call your clinic at [phone] to:    Ask questions about your health    Make or cancel appointments    Discuss your medicines    Learn about your test results    Speak to your doctor            Additional Information About Your Visit        [employer] Information     [employer] gives you secure access to your electronic health record. If you see a  "primary care provider, you can also send messages to your care team and make appointments. If you have questions, please call your primary care clinic.  If you do not have a primary care provider, please call [phone] and they will assist you.      [employer] is an electronic gateway that provides easy, online access to your medical records. With [employer], you can request a clinic appointment, read your test results, renew a prescription or communicate with your care team.     To access your existing account, please contact your [facility] or call [phone] for assistance.        Care EveryWhere ID     This is your Care EveryWhere ID. This could be used by other organizations to access your [facility] medical records  [id number]        Your Vitals Were     Height BMI (Body Mass Index)                1.854 m (6' 1\") 31.27 kg/m2           Blood Pressure from Last 3 Encounters:   07/17/18 119/72   06/16/18 133/83   05/14/18 171/78    Weight from Last 3 Encounters:   07/24/18 107.5 kg (237 lb)   07/17/18 107.5 kg (237 lb)   07/16/18 107.5 kg (237 lb)              We Performed the Following     PHYSICAL THERAPY REFERRAL (External-Prints)          Today's Medication Changes          These changes are accurate as of 7/24/18 11:59 PM.  If you have any questions, ask your nurse or doctor.               These medicines have changed or have updated prescriptions.        Dose/Directions    allopurinol 100 MG tablet   Commonly known as:  ZYLOPRIM   This may have changed:    - how much to take  - when to take this   Used for:  Kidney replaced by transplant, Gout        Dose:  200 mg   Take 2 tablets by mouth daily.   Quantity:  180 tablet   Refills:  3                Primary Care Provider Office Phone # Fax #    Moris Diaz [phone]       [facility] [address]  [facility] [address]        Equal Access to Services     AZEEM JACOBSEN AH: Agatha simpson " martha Doss, wanguyenda lucynthiaadaha, qamargita kahua dixon, devin mooin hayaan waltervirginia mylesgurwinder laJose Mariaterell ana. So [facility] [phone].    ATENCIÓN: Si elviala brandon, tiene a carr disposición servicios gratuitos de asistencia lingüística. Larissa al [phone].    We comply with applicable federal civil rights laws and [place] laws. We do not discriminate on the basis of race, color, national origin, age, disability, sex, sexual orientation, or gender identity.            Thank you!     Thank you for choosing [facility]  for your care. Our goal is always to provide you with excellent care. Hearing back from our patients is one way we can continue to improve our services. Please take a few minutes to complete the written survey that you may receive in the mail after your visit with us. Thank you!             Your Updated Medication List - Protect others around you: Learn how to safely use, store and throw away your medicines at www.disposemymeds.org.          This list is accurate as of 7/24/18 11:59 PM.  Always use your most recent med list.                   Brand Name Dispense Instructions for use Diagnosis    allopurinol 100 MG tablet    ZYLOPRIM    180 tablet    Take 2 tablets by mouth daily.    Kidney replaced by transplant, Gout       ALPHAGAN OP      Place 1 drop into the right eye 2 times daily        BABY ASPIRIN PO      Take 81 mg by mouth daily        BEVACizumab 400 MG/16ML injection    AVASTIN     Every 5 weeks        cholecalciferol 5000 units Tabs tablet    vitamin D3    30 tablet    Take 1 tablet (5,000 Units) by mouth daily    Hyperparathyroidism due to renal insufficiency (H)       CRESTOR PO      Take 20 mg by mouth every evening        * cycloSPORINE modified 25 MG capsule    GENERIC EQUIVALENT    180 capsule    Take 2 capsules (50 mg) by mouth 2 times daily Total dose 150 mg twice daily    Kidney transplant recipient, Long-term use of immunosuppressant medication       * cycloSPORINE  modified 100 MG capsule    GENERIC EQUIVALENT    180 capsule    Take 1 capsule (100 mg) by mouth 2 times daily Total dose 150 mg twice daily    Kidney transplant recipient, Long-term use of immunosuppressant medication       epoetin freya 10303 UNIT/ML injection    PROCRIT    4 mL    Inject 1 mL (20,000 Units) Subcutaneous once a week As needed for hgb < 10    CKD (chronic kidney disease) stage 4, GFR 15-29 ml/min (H), Anemia in stage 4 chronic kidney disease (H)       fenofibrate 145 MG tablet      Take 145 mg by mouth daily        ISOSORBIDE MONONITRATE PO      Take 60 mg by mouth daily        lidocaine 5 % ointment    XYLOCAINE    50 g    Apply topically daily as needed for moderate pain    Left hip pain       loperamide 2 MG capsule    IMODIUM     Take 2 mg by mouth daily        losartan 50 MG tablet    COZAAR    90 tablet    Take 0.5 tablets (25 mg) by mouth daily    Kidney replaced by transplant       metoprolol tartrate 100 MG tablet    LOPRESSOR    60 tablet    Take 1 tablet by mouth 2 times daily.    Unspecified hypertensive kidney disease with chronic kidney disease stage I through stage IV, or unspecified(403.90), Kidney replaced by transplant       NIFEdipine ER osmotic 60 MG Tb24    PROCARDIA XL    90 tablet    Take 1 tablet (60 mg) by mouth daily    HTN (hypertension)       NITROSTAT 0.4 MG sublingual tablet   Generic drug:  nitroGLYcerin      Place 0.4 mg under the tongue every 5 minutes as needed.        NONFORMULARY      Take 1 tablet by mouth 2 times daily Focus Macula Pro:  Eye vitamin and mineral supplement A, C, E, Zinc, Copper        omega-3 acid ethyl esters 1 g capsule    Lovaza     Take 2 g by mouth 2 times daily        oxyCODONE IR 5 MG tablet    ROXICODONE    80 tablet    Take 1-2 tablets (5-10 mg) by mouth every 4 hours as needed for other (pain control or improvement in physical function. Hold dose for analgesic side effects.)    Spinal stenosis of lumbar region with neurogenic  claudication       PANTOPRAZOLE SODIUM PO      Take 40 mg by mouth daily as needed for heartburn        predniSONE 5 MG tablet    DELTASONE     Take 5 mg by mouth daily (with lunch)        probenecid 500 MG tablet    BENEMID     Take 500 mg by mouth 2 times daily.        sulfamethoxazole-trimethoprim 400-80 MG per tablet    BACTRIM/SEPTRA     Take 1 tablet by mouth every other day        tamsulosin 0.4 MG capsule    FLOMAX    60 capsule    Take 1 capsule (0.4 mg) by mouth daily    Benign prostatic hyperplasia, unspecified whether lower urinary tract symptoms present       TYLENOL ARTHRITIS PAIN 650 MG CR tablet   Generic drug:  acetaminophen      Take 2 tablets by mouth 3 times daily.        warfarin 5 MG tablet    COUMADIN    30 tablet    Take 1 tablet (5 mg) by mouth daily    Paroxysmal atrial fibrillation (H)       * Notice:  This list has 2 medication(s) that are the same as other medications prescribed for you. Read the directions carefully, and ask your doctor or other care provider to review them with you.

## 2018-07-24 NOTE — LETTER
"7/24/2018       RE: Rory Bustamante  [address]  [place] [facility] [address]     Dear Colleague,    Thank you for referring your patient, Rory Bustamante, to the [facility]. Please see a copy of my visit note below.    Reason for Visit:  Chief Complaint   Patient presents with     Surgical Followup     6 Week POP- DOS: 06/11/18 Minimally Invasive Surgery Laminectomies Lumbar 2-3,Lumbar 3-4,Lumbar 4-5       S>  60/m, 6 wks postop.  1st postop visit.    Unaccompanied.  Happy with surgery.  Preop sxs resolved.  Off pain meds; per patient, never really took them after discharge.    Reports some left lateral hip pain - well localized lateral aspect just below iliac crest.  Recently was seen by Dr. Hall, discussed poss hip injection (cannot tell if bursa or intra-articular), but deferred because too soon after spine surgery.    Also bilateral ulnar nerve sxs since surgery.  Per patient, has had longstanding neuropathies affecting both legs and occ'l fingers.      Oswestry (HIRAL) Questionnaire    OSWESTRY DISABILITY INDEX 7/24/2018   Count 9   Sum 13   Oswestry Score (%) 28.89   Some recent data might be hidden      Preop HIRAL 20%.      Visual Analog Pain Scale  Back Pain Scale 0-10: 0  Right leg pain: 0  Left leg pain: 0    PROMIS-10 Scores  Global Mental Health Score: (P) 17  Global Physical Health Score: (P) 12  PROMIS TOTAL - SUBSCORES: (P) 29    O>   Alert, oriented x 3, cooperative.  Not in CP distress.  Ht 1.854 m (6' 1\")  Wt 107.5 kg (237 lb)  BMI 31.27 kg/m2  Surgical incision well-healed, no sign of infection.  Ambulates independently.   Grossly neurologically intact.    Imaging:   EOS lumbar ap-lat shows post-laminectomy changes L2-3,L3-4,L4-5; no interval instability.    A>  - 6 wks postop; doing very well.    P>   Congratulated and reassured patient.  Ongoing sxs likely to improve over time.    - From spine standpoint, ok to undergo hip injection c/o " Dr. Hall.  - External PT order placed - will have PT at [facility].  - Asked re chiropractic tx; ok to undergo chiropractic.    Next appt scheduled at 8/30 for re-eval.  If much better, may call and cancel.      Harshal Rucker MD    Orthopaedic Spine Surgery  Dept Orthopaedic Surgery, [facility]  [phone] office, [phone] pager  [email or web address]

## 2018-07-24 NOTE — NURSING NOTE
"Reason For Visit:   Chief Complaint   Patient presents with     Surgical Followup     6 Week POP- DOS: 06/11/18 Minimally Invasive Surgery Laminectomies Lumbar 2-3,Lumbar 3-4,Lumbar 4-5       Primary MD: Moris Diaz  Ref. MD: Dr. Carbajal      ?  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 No.  Work status?  On disability.    Date of injury: N/A    Date of surgery: 06/11/18  Type of surgery: Minimally Invasive Surgery Laminectomies Lumbar 2-3,Lumbar 3-4,Lumbar 4-5    Smoker: No    Ht 1.854 m (6' 1\")  Wt 107.5 kg (237 lb)  BMI 31.27 kg/m2    Pain Assessment  Patient Currently in Pain: Denies    Oswestry (HIRAL) Questionnaire    OSWESTRY DISABILITY INDEX 7/24/2018   Count 9   Sum 13   Oswestry Score (%) 28.89   Some recent data might be hidden          Visual Analog Pain Scale  Back Pain Scale 0-10: 0  Right leg pain: 0  Left leg pain: 0    Promis 10 Assessment    PROMIS 10 7/24/2018   In general, would you say your health is: Good   In general, would you say your quality of life is: Very good   In general, how would you rate your physical health? Good   In general, how would you rate your mental health, including your mood and your ability to think? Very good   In general, how would you rate your satisfaction with your social activities and relationships? Very good   In general, please rate how well you carry out your usual social activities and roles Very good   To what extent are you able to carry out your everyday physical activities such as walking, climbing stairs, carrying groceries, or moving a chair? Moderately   How often have you been bothered by emotional problems such as feeling anxious, depressed or irritable? Never   How would you rate your fatigue on average? Severe   How would you rate your pain on average?   0 = No Pain  to  10 = Worst Imaginable Pain 3   In general, would you say your health is: 3   In general, would you say your quality of life is: 4   In general, how " would you rate your physical health? 3   In general, how would you rate your mental health, including your mood and your ability to think? 4   In general, how would you rate your satisfaction with your social activities and relationships? 4   In general, please rate how well you carry out your usual social activities and roles. (This includes activities at home, at work and in your community, and responsibilities as a parent, child, spouse, employee, friend, etc.) 4   To what extent are you able to carry out your everyday physical activities such as walking, climbing stairs, carrying groceries, or moving a chair? 3   In the past 7 days, how often have you been bothered by emotional problems such as feeling anxious, depressed, or irritable? 1   In the past 7 days, how would you rate your fatigue on average? 4   In the past 7 days, how would you rate your pain on average, where 0 means no pain, and 10 means worst imaginable pain? 3   Global Mental Health Score 17   Global Physical Health Score 12   PROMIS TOTAL - SUBSCORES 29   Some recent data might be hidden                Alejandro ALONSO, CMA

## 2018-07-25 ENCOUNTER — TELEPHONE (OUTPATIENT)
Dept: NEPHROLOGY | Facility: CLINIC | Age: 63
End: 2018-07-25

## 2018-07-25 NOTE — TELEPHONE ENCOUNTER
Spoke with patient. Explained vein mapping is needing to be repeated to monitor if any structural changes have occurred. He verbalized understanding.    Melani Mehta RN

## 2018-07-25 NOTE — TELEPHONE ENCOUNTER
[facility] Call Center    Phone Message    May a detailed message be left on voicemail: yes    Reason for Call: Other: Please follow up with pt, he would like to know if the vein mapping is necessary or not. Last mapping was done on 08/21/2017.     Action Taken: Message routed to:  [facility] ([facility]): bora garrett

## 2018-07-27 LAB
HCT VFR BLD AUTO: 26.7 %
HEMOGLOBIN: 8.9 G/DL (ref 13.3–17.7)

## 2018-07-28 ENCOUNTER — PRE VISIT (OUTPATIENT)
Dept: UROLOGY | Facility: CLINIC | Age: 63
End: 2018-07-28

## 2018-07-29 NOTE — PROGRESS NOTES
"Reason for Visit:  Chief Complaint   Patient presents with     Surgical Followup     6 Week POP- DOS: 06/11/18 Minimally Invasive Surgery Laminectomies Lumbar 2-3,Lumbar 3-4,Lumbar 4-5       S>  60/m, 6 wks postop.  1st postop visit.    Unaccompanied.  Happy with surgery.  Preop sxs resolved.  Off pain meds; per patient, never really took them after discharge.    Reports some left lateral hip pain - well localized lateral aspect just below iliac crest.  Recently was seen by Dr. Hall, discussed poss hip injection (cannot tell if bursa or intra-articular), but deferred because too soon after spine surgery.    Also bilateral ulnar nerve sxs since surgery.  Per patient, has had longstanding neuropathies affecting both legs and occ'l fingers.      Oswestry (HIRAL) Questionnaire    OSWESTRY DISABILITY INDEX 7/24/2018   Count 9   Sum 13   Oswestry Score (%) 28.89   Some recent data might be hidden      Preop HIRAL 20%.      Visual Analog Pain Scale  Back Pain Scale 0-10: 0  Right leg pain: 0  Left leg pain: 0    PROMIS-10 Scores  Global Mental Health Score: (P) 17  Global Physical Health Score: (P) 12  PROMIS TOTAL - SUBSCORES: (P) 29    O>   Alert, oriented x 3, cooperative.  Not in CP distress.  Ht 1.854 m (6' 1\")  Wt 107.5 kg (237 lb)  BMI 31.27 kg/m2  Surgical incision well-healed, no sign of infection.  Ambulates independently.   Grossly neurologically intact.    Imaging:   EOS lumbar ap-lat shows post-laminectomy changes L2-3,L3-4,L4-5; no interval instability.    A>  - 6 wks postop; doing very well.    P>   Congratulated and reassured patient.  Ongoing sxs likely to improve over time.    - From spine standpoint, ok to undergo hip injection c/o Dr. Hall.  - External PT order placed - will have PT at assisted [facility].  - Asked re chiropractic tx; ok to undergo chiropractic.    Next appt scheduled at 8/30 for re-eval.  If much better, may call and cancel.      Harshal Rucker MD  Associate " Professor  Orthopaedic Spine Surgery  Dept Orthopaedic Surgery, [facility]  [phone] office, [phone] pager  [email or web address]

## 2018-07-30 ENCOUNTER — TELEPHONE (OUTPATIENT)
Dept: PHARMACY | Facility: CLINIC | Age: 63
End: 2018-07-30

## 2018-07-30 NOTE — TELEPHONE ENCOUNTER
Anemia Management Note  SUBJECTIVE/OBJECTIVE:  Referred by Dr. Christi Sun on 2018  Primary Diagnosis: Anemia in Chronic Kidney Disease (N18.3, D63.1)     Secondary Diagnosis:  Chronic Kidney Disease, Stage 3 (N18.3)   Hgb goal range:  9-10  Epo/Darbo: Procrit  20,000 units  once weekly for Hgb < 10  In clinic  RX/TX plans : 2019  Iron regimen:  Ferrous Sulfate 325mg QD  Labs : 2019  Recent MARY LOU use, transfusion, IV iron: IV iron scheduled 2018    Anemia Latest Ref Rng & Units 2018   MARY LOU Dose - - 10,000 units 10,000 units 10,000 units - 20,000 units 20,000 units   Hemoglobin 13.3 - 17.7 g/dL 8.8(A) 8.0(A) 8.8(A) 8.6(A) 9.0(L) 8.8(A) 8.9(A)   IV Iron Dose - - - - - - - -   TSAT % 28 - - - 24 19 -   Ferritin ng/mL 557 - - - 262 270 -     BP Readings from Last 3 Encounters:   18 119/72   18 133/83   18 171/78     Wt Readings from Last 2 Encounters:   18 237 lb (107.5 kg)   18 237 lb (107.5 kg)     Injectafer scheduled 2018 & 2018 DENISE    ASSESSMENT:  Hgb:at goal - received dose in clinic - recommend continue current regimen  TSat: not at goal of >30% Ferritin: At goal (>100ng/mL)    PLAN:  Dosed with procrit and RTC for hgb then procrit if needed in 1 week(s)    Orders needed to be renewed (for next follow-up date) in LETTERS - for external labs: None    Iron labs due:  18    Plan discussed with:  Rory  Plan provided by:  [facility]  [facility] Clinic  [phone]    NEXT FOLLOW-UP DATE:  8/3/18  Reviewed 2018 DENISE  Anemia Management Service  Mirza LozanoD and Michelle Ramírez CPhT  Phone: [phone]  Fax: [phone]

## 2018-08-01 ENCOUNTER — INFUSION THERAPY VISIT (OUTPATIENT)
Dept: INFUSION THERAPY | Facility: CLINIC | Age: 63
End: 2018-08-01
Attending: INTERNAL MEDICINE
Payer: MEDICARE

## 2018-08-01 VITALS
DIASTOLIC BLOOD PRESSURE: 71 MMHG | SYSTOLIC BLOOD PRESSURE: 121 MMHG | OXYGEN SATURATION: 96 % | RESPIRATION RATE: 16 BRPM | HEIGHT: 73 IN | HEART RATE: 68 BPM | TEMPERATURE: 97.6 F

## 2018-08-01 DIAGNOSIS — D63.1 ANEMIA IN STAGE 4 CHRONIC KIDNEY DISEASE (H): Primary | ICD-10-CM

## 2018-08-01 DIAGNOSIS — N18.4 ANEMIA IN STAGE 4 CHRONIC KIDNEY DISEASE (H): Primary | ICD-10-CM

## 2018-08-01 DIAGNOSIS — N18.4 CKD (CHRONIC KIDNEY DISEASE) STAGE 4, GFR 15-29 ML/MIN (H): ICD-10-CM

## 2018-08-01 PROCEDURE — 25000128 H RX IP 250 OP 636: Mod: ZF | Performed by: INTERNAL MEDICINE

## 2018-08-01 PROCEDURE — 96365 THER/PROPH/DIAG IV INF INIT: CPT

## 2018-08-01 RX ADMIN — FERRIC CARBOXYMALTOSE INJECTION 750 MG: 50 INJECTION, SOLUTION INTRAVENOUS at 12:07

## 2018-08-01 ASSESSMENT — PAIN SCALES - GENERAL: PAINLEVEL: NO PAIN (0)

## 2018-08-01 NOTE — PROGRESS NOTES
Infusion Nursing Note:  oRry Bustamante presents today to [facility] for a injectafer infusion.  During today's [facility] appointment orders from Dr. Sun were completed.  Frequency: dose 1/2     Progress note:  ID verified by name and .  Assessment completed. Vitals were stable throughout time in [facility]. Patient education regarding infusion and possible side effects provided.  Patient verbalized understanding. yes    Premedications: were not ordered.    Infusion Rates: Infusion given over approximately 15 minutes. Pt monitored for 30 minutes post infusion. No signs/symptoms of reaction after.     Labs were not ordered for this appointment.    Vascular access: Peripheral IV placed today.    Treatment Conditions:   No treatment conditions.    Patient tolerated infusion well.    Discharge Plan:   Follow up plan of care with: Ongoing infusions at [facility]  Discharge instructions were reviewed with patient.  Patient/representative verbalized understanding of discharge instructions and all questions answered.    Patient discharged from [facility] in stable condition.    Rosemary Ramos RN       Administrations This Visit     ferric carboxymaltose (INJECTAFER) 750 mg in sodium chloride 0.9 % 100 mL intermittent infusion     Admin Date Action Dose Rate Route Administered By          2018 New Bag 750 mg 500 mL/hr Intravenous Rosemary Ramos RN

## 2018-08-01 NOTE — MR AVS SNAPSHOT
After Visit Summary   8/1/2018    Rory Bustamante    MRN: [id number]           Patient Information     Date Of Birth          1955        Visit Information        Provider Department      8/1/2018 12:00 PM UC 42 ATC; UC SPEC INFUSION [facility]        Today's Diagnoses     Anemia in stage 4 chronic kidney disease (H)    -  1    CKD (chronic kidney disease) stage 4, GFR 15-29 ml/min (H)           Follow-ups after your visit        Your next 10 appointments already scheduled     Aug 06, 2018  2:00 PM CDT   US UPPER EXTREMITY VENOUS MAPPING BILATERAL with UCUSV1   [facility] US ([facility])    [address]  1st Floor  [facility] [id number] [phone]           Please bring a list of your medicines (including vitamins, minerals and over-the-counter drugs). Also, tell your doctor about any allergies you may have. Wear comfortable clothes and leave your valuables at home.  You do not need to do anything special to prepare for your exam.  Please call the Imaging Department at your exam site with any questions.            Aug 06, 2018  3:15 PM CDT   (Arrive by 3:00 PM)   Fistula Consult with Sydney Dawkins MD   [facility] Solid Organ Transplant ([facility])    [address]  [address]  [facility] [place] [facility] [id number] [phone]            Aug 08, 2018  4:00 PM CDT   Infusion 120 with UC SPEC INFUSION, UC 50 ATC   [facility] Special[facility] Procedure ([facility])    [address]  [address]  [facility] [id number] [phone]            Aug 11, 2018  9:00 AM CDT   (Arrive by 8:45 AM)   New Patient Visit with Meagan Story MD   [facility] for Prostate and Urologic Cancers ([facility])    [address]  4th Floor  [place] [id number] [phone]             Aug 13, 2018  2:00 PM CDT   (Arrive by 1:45 PM)   Return Visit with Isai Hall MD   [facility] ([facility])    [address]  5th Floor  [facility] [id number] [phone]            Aug 30, 2018 10:30 AM CDT   (Arrive by 10:00 AM)   RETURN SPINE with Harshal Rucker MD   [facility] ([facility])    [address]  4th [facility] [address] [phone]            Oct 15, 2018  9:00 AM CDT   MR ABDOMEN MRCP W/O & W CONTRAST with UUMR1   [facility] [place] ([facility])    [address] [id number] [phone]           Take your medicines as usual, unless your doctor tells you not to. Bring a list of your current medicines to your exam (including vitamins, minerals and over-the-counter drugs). Also bring the results of similar scans you may have had.    You may or may not receive IV contrast for this exam pending the discretion of the Radiologist.   Do not eat or drink for 6 hours prior to exam.  The MRI machine uses a strong magnet. Please wear clothes without metal (snaps, zippers). A sweatsuit works well, or we may give you a hospital gown.  Please remove any body piercings and hair extensions before you arrive. You will also remove watches, jewelry, hairpins, wallets, dentures, partial dental plates and hearing aids. You may wear contact lenses, and you may be able to wear your rings. We have a safe place to keep your personal items, but it is safer to leave them at home.  **IMPORTANT** THE INSTRUCTIONS BELOW ARE ONLY FOR THOSE PATIENTS WHO HAVE BEEN PRESCRIBED SEDATION OR GENERAL ANESTHESIA DURING THEIR MRI PROCEDURE:  IF YOUR DOCTOR PRESCRIBED ORAL SEDATION (take medicine to help you relax during your exam):   You must get the medicine from your doctor (oral medication) before you arrive. Bring the  medicine to the exam. Do not take it at home. You ll be told when to take it upon arriving for your exam.   Arrive one hour early. Bring someone who can take you home after the test. Your medicine will make you sleepy. After the exam, you may not drive, take a bus or take a taxi by yourself.  IF YOUR DOCTOR PRESCRIBED IV SEDATION:   Arrive one hour early. Bring someone who can take you home after the test. Your medicine will make you sleepy. After the exam, you may not drive, take a bus or take a taxi by yourself.   No eating 6 hours before your exam. You may have clear liquids up until 4 hours before your exam. (Clear liquids include water, clear tea, black coffee and fruit juice without pulp.)  IF YOUR DOCTOR PRESCRIBED ANESTHESIA (be asleep for your exam):   Arrive 1 1/2 hours early. Bring someone who can take you home after the test. You may not drive, take a bus or take a taxi by yourself.   No eating 8 hours before your exam. You may have clear liquids up until 4 hours before your exam. (Clear liquids include water, clear tea, black coffee and fruit juice without pulp.)   You will spend four to five hours in the recovery room.  If you have any questions, please contact your Imaging Department exam site.            Oct 15, 2018 10:00 AM CDT   (Arrive by 9:45 AM)   New Patient Visit with Shade Manning MD   [facility] ([facility])    [address] [place] [address]  [address] [place] [address] [phone]              Who to contact     If you have questions or need follow up information about today's clinic visit or your schedule please contact [facility] directly at [phone].  Normal or non-critical lab and imaging results will be communicated to you by MyChart, letter or phone within 4 business days after the clinic has received the results. If you do not hear from us within 7 days, please contact the  "clinic through [employer] or phone. If you have a critical or abnormal lab result, we will notify you by phone as soon as possible.  Submit refill requests through [employer] or call your pharmacy and they will forward the refill request to us. Please allow 3 business days for your refill to be completed.          Additional Information About Your Visit        [employer] Information     [employer] gives you secure access to your electronic health record. If you see a primary care provider, you can also send messages to your care team and make appointments. If you have questions, please call your primary care clinic.  If you do not have a primary care provider, please call [phone] and they will assist you.        Care EveryWhere ID     This is your Care EveryWhere ID. This could be used by other organizations to access your [place] medical records  [id number]        Your Vitals Were     Pulse Temperature Respirations Height Pulse Oximetry       68 97.6  F (36.4  C) (Oral) 16 1.854 m (6' 1\") 96%        Blood Pressure from Last 3 Encounters:   08/01/18 121/71   07/17/18 119/72   06/16/18 133/83    Weight from Last 3 Encounters:   07/24/18 107.5 kg (237 lb)   07/17/18 107.5 kg (237 lb)   07/16/18 107.5 kg (237 lb)              Today, you had the following     No orders found for display         Today's Medication Changes          These changes are accurate as of 8/1/18  3:13 PM.  If you have any questions, ask your nurse or doctor.               These medicines have changed or have updated prescriptions.        Dose/Directions    allopurinol 100 MG tablet   Commonly known as:  ZYLOPRIM   This may have changed:    - how much to take  - when to take this   Used for:  Kidney replaced by transplant, Gout        Dose:  200 mg   Take 2 tablets by mouth daily.   Quantity:  180 tablet   Refills:  3                Primary Care Provider Office Phone # Fax #    Moris Diaz [phone]       [facility] [address]  [facility] [address]        Equal Access to Services     AZEEM JACOBSEN : Hadii av ku hadcollinsherrell Sorickyali, wanguyenda luqadaha, qamargita latarenettamarsha dixon, devin buenogriseliseo barriga . So [facility] [phone].    ATENCIÓN: Si habla español, tiene a carr disposición servicios gratuitos de asistencia lingüística. Llame al [phone].    We comply with applicable federal civil rights laws and [place] laws. We do not discriminate on the basis of race, color, national origin, age, disability, sex, sexual orientation, or gender identity.            Thank you!     Thank you for choosing [facility] SPECIALTY AND PROCEDURE  for your care. Our goal is always to provide you with excellent care. Hearing back from our patients is one way we can continue to improve our services. Please take a few minutes to complete the written survey that you may receive in the mail after your visit with us. Thank you!             Your Updated Medication List - Protect others around you: Learn how to safely use, store and throw away your medicines at www.disposemymeds.org.          This list is accurate as of 8/1/18  3:13 PM.  Always use your most recent med list.                   Brand Name Dispense Instructions for use Diagnosis    allopurinol 100 MG tablet    ZYLOPRIM    180 tablet    Take 2 tablets by mouth daily.    Kidney replaced by transplant, Gout       ALPHAGAN OP      Place 1 drop into the right eye 2 times daily        BABY ASPIRIN PO      Take 81 mg by mouth daily        BEVACizumab 400 MG/16ML injection    AVASTIN     Every 5 weeks        cholecalciferol 5000 units Tabs tablet    vitamin D3    30 tablet    Take 1 tablet (5,000 Units) by mouth daily    Hyperparathyroidism due to renal insufficiency (H)       CRESTOR PO      Take 20 mg by mouth every evening        * cycloSPORINE modified 25 MG capsule    GENERIC EQUIVALENT    180 capsule    Take 2 capsules (50 mg) by mouth 2 times  daily Total dose 150 mg twice daily    Kidney transplant recipient, Long-term use of immunosuppressant medication       * cycloSPORINE modified 100 MG capsule    GENERIC EQUIVALENT    180 capsule    Take 1 capsule (100 mg) by mouth 2 times daily Total dose 150 mg twice daily    Kidney transplant recipient, Long-term use of immunosuppressant medication       epoetin freya 77235 UNIT/ML injection    PROCRIT    4 mL    Inject 1 mL (20,000 Units) Subcutaneous once a week As needed for hgb < 10    CKD (chronic kidney disease) stage 4, GFR 15-29 ml/min (H), Anemia in stage 4 chronic kidney disease (H)       fenofibrate 145 MG tablet      Take 145 mg by mouth daily        ISOSORBIDE MONONITRATE PO      Take 60 mg by mouth daily        lidocaine 5 % ointment    XYLOCAINE    50 g    Apply topically daily as needed for moderate pain    Left hip pain       loperamide 2 MG capsule    IMODIUM     Take 2 mg by mouth daily        losartan 50 MG tablet    COZAAR    90 tablet    Take 0.5 tablets (25 mg) by mouth daily    Kidney replaced by transplant       metoprolol tartrate 100 MG tablet    LOPRESSOR    60 tablet    Take 1 tablet by mouth 2 times daily.    Unspecified hypertensive kidney disease with chronic kidney disease stage I through stage IV, or unspecified(403.90), Kidney replaced by transplant       NIFEdipine ER osmotic 60 MG Tb24    PROCARDIA XL    90 tablet    Take 1 tablet (60 mg) by mouth daily    HTN (hypertension)       NITROSTAT 0.4 MG sublingual tablet   Generic drug:  nitroGLYcerin      Place 0.4 mg under the tongue every 5 minutes as needed.        NONFORMULARY      Take 1 tablet by mouth 2 times daily Focus Macula Pro:  Eye vitamin and mineral supplement A, C, E, Zinc, Copper        omega-3 acid ethyl esters 1 g capsule    Lovaza     Take 2 g by mouth 2 times daily        oxyCODONE IR 5 MG tablet    ROXICODONE    80 tablet    Take 1-2 tablets (5-10 mg) by mouth every 4 hours as needed for other (pain control or  improvement in physical function. Hold dose for analgesic side effects.)    Spinal stenosis of lumbar region with neurogenic claudication       PANTOPRAZOLE SODIUM PO      Take 40 mg by mouth daily as needed for heartburn        predniSONE 5 MG tablet    DELTASONE     Take 5 mg by mouth daily (with lunch)        probenecid 500 MG tablet    BENEMID     Take 500 mg by mouth 2 times daily.        sulfamethoxazole-trimethoprim 400-80 MG per tablet    BACTRIM/SEPTRA     Take 1 tablet by mouth every other day        tamsulosin 0.4 MG capsule    FLOMAX    60 capsule    Take 1 capsule (0.4 mg) by mouth daily    Benign prostatic hyperplasia, unspecified whether lower urinary tract symptoms present       TYLENOL ARTHRITIS PAIN 650 MG CR tablet   Generic drug:  acetaminophen      Take 2 tablets by mouth 3 times daily.        warfarin 5 MG tablet    COUMADIN    30 tablet    Take 1 tablet (5 mg) by mouth daily    Paroxysmal atrial fibrillation (H)       * Notice:  This list has 2 medication(s) that are the same as other medications prescribed for you. Read the directions carefully, and ask your doctor or other care provider to review them with you.

## 2018-08-03 ENCOUNTER — TELEPHONE (OUTPATIENT)
Dept: PHARMACY | Facility: CLINIC | Age: 63
End: 2018-08-03

## 2018-08-03 LAB
HCT VFR BLD AUTO: 30.1 %
HEMOGLOBIN: 9.8 G/DL (ref 13.3–17.7)

## 2018-08-06 ENCOUNTER — RADIANT APPOINTMENT (OUTPATIENT)
Dept: ULTRASOUND IMAGING | Facility: CLINIC | Age: 63
End: 2018-08-06
Attending: CLINICAL NURSE SPECIALIST
Payer: MEDICARE

## 2018-08-06 ENCOUNTER — OFFICE VISIT (OUTPATIENT)
Dept: TRANSPLANT | Facility: CLINIC | Age: 63
End: 2018-08-06
Attending: SURGERY
Payer: MEDICARE

## 2018-08-06 VITALS
TEMPERATURE: 97.7 F | WEIGHT: 232.5 LBS | DIASTOLIC BLOOD PRESSURE: 83 MMHG | BODY MASS INDEX: 30.67 KG/M2 | SYSTOLIC BLOOD PRESSURE: 143 MMHG | HEART RATE: 70 BPM | OXYGEN SATURATION: 97 %

## 2018-08-06 DIAGNOSIS — Z45.2 ADJUSTMENT AND MANAGEMENT OF VASCULAR ACCESS DEVICE: ICD-10-CM

## 2018-08-06 DIAGNOSIS — N18.6 ENCOUNTER REGARDING VASCULAR ACCESS FOR DIALYSIS FOR ESRD (H): Primary | ICD-10-CM

## 2018-08-06 DIAGNOSIS — Z99.2 ENCOUNTER REGARDING VASCULAR ACCESS FOR DIALYSIS FOR ESRD (H): Primary | ICD-10-CM

## 2018-08-06 DIAGNOSIS — N18.5 CHRONIC KIDNEY DISEASE, STAGE 5, KIDNEY FAILURE (H): ICD-10-CM

## 2018-08-06 DIAGNOSIS — T86.12 FAILED KIDNEY TRANSPLANT: ICD-10-CM

## 2018-08-06 PROCEDURE — G0463 HOSPITAL OUTPT CLINIC VISIT: HCPCS | Mod: ZF

## 2018-08-06 ASSESSMENT — PAIN SCALES - GENERAL: PAINLEVEL: NO PAIN (0)

## 2018-08-06 NOTE — NURSING NOTE
Chief Complaint   Patient presents with     Consult For     Fistula     Blood pressure 143/83, pulse 70, temperature 97.7  F (36.5  C), weight 105.5 kg (232 lb 8 oz), SpO2 97 %.    Kendall Huitron, CMA

## 2018-08-06 NOTE — LETTER
8/6/2018       RE: Rory Bustamante  [address]  [facility] [address]     Dear Colleague,    Thank you for referring your patient, Rory Bustamante, to the [facility] SOLID ORGAN TRANSPLANT at [facility]. Please see a copy of my visit note below.    Dialysis Access Service  Consult Note    Referred by Dr. Sun for surgical consult of permanent dialysis access.    HPI: Mr. Bustamante is being seen today for placement of permanent dialysis access due to Chronic renal failure from Hypertension and kidney transplant failure.  He is right handed. Mr. Bustamante is not dialyzing at (Not currently on dialysis).      Past Surgical History:   Procedure Laterality Date     C TOTAL KNEE ARTHROPLASTY  2015     LAMINECTOMY LUMBAR MINIMALLY INVASIVE THREE+ LEVELS N/A 6/11/2018    Procedure: LAMINECTOMY LUMBAR MINIMALLY INVASIVE THREE+ LEVELS;  Minimally Invasive Surgery Laminectomies Lumbar 2-3,Lumbar 3-4,Lumbar 4-5;  Surgeon: Harshal Rucker MD;  Location: [facility]     TONSILLECTOMY       TRANSPLANT KIDNEY RECIPIENT LIVING RELATED  1989    HLA identical     VASECTOMY         Risk factors for vascular access are:     Hx of CVC    []    Comment:   Hx of PICC line         []    Comment:   Hx of Pacemaker    []    Comment:   History of failed access:  [x]      Left forearm AV fistula     SVC syndrome   []       Heart Failure    [x]    EF:    Periph arterial disease  []      Prior Fracture/Surgery  []    Location:   DVT    []    Location:  Diabetes   []         Neuropathy   []    Comment:   Anticoagulation:   [x]    Agent:    Coumadin  Anticoagulation contraindication: []    Details:                   Pediatric    []                           Hx of transplant   [x]   Comment:   12/13/1989  Current immunosuppression [x]   Comment:                                  PHYSICAL EXAM:  Temp:  [97.7  F (36.5  C)] 97.7  F (36.5  C)  Pulse:  [70] 70  BP: (143)/(83) 143/83  SpO2:  [97 %] 97 %  alert and  cooperative  EXTREMITY EXAM:   Vein Exam: Obvious suitable veins?    Left arm: YES   []           NO  []       Comment:    Right arm: YES   []           NO  []       Comment:   Arterial Exam:   Radial   L: 3+ R: 3+   Ulnar   L: 3+;R: 3+  Patent Palmar arch  L: YES   []  NO   []       R: YES   []   NO   []        Capillary refill:  L: <2 sec, R:<2 sec    Sensory exam:   Left hand: Normal   [x]       Abnormal   []     Comment:    Right hand: Normal   [x]       Abnormal   []     Comment:     Motor exam normal:   Left hand: Normal   [x]       Abnormal   []     Comment:     Right hand: Normal   [x]       Abnormal   []     Comment:                       Mapping Reviewed:  Target vein: 4.2 mm right cephalic vein  Target artery brachial artery at the distal upper arm  Vein mapping showed  Arterial evaluation (peak systolic velocities):   Right:  Innominate: 118 cm/sec  Subclavian: 104 cm/sec  Brachial: 91 cm/sec, 7.2 mm diameter  Radial: 61 cm/sec, 3.6 mm diameter  Ulnar: 54 cm/sec  Venous evaluation   Right Upper Extremity:    IJV: Thrombus: no, Phasic: Yes,  91 cm/sec   Innominate vein: Thrombus: no, Phasic: Yes, 73 cm/sec   SCV medial: Thrombus: no, Phasic: Yes, 63 cm/sec   SCV mid: Thrombus: no, Phasic: Yes, 83 cm/sec   SCV lateral: Thrombus: no, Phasic: Yes, 47 cm/sec   Axillary vein:Thrombus: no, Phasic: Yes,  72 cm/sec  Right cephalic vein:    Proximal humerus: Thrombus: no, Wall thickened: no,  5.7 mm   Mid humerus: Thrombus: no, Wall thickened: no,  5.4 mm   Distal humerus: Thrombus: no, Wall thickened: no,  6.0 mm   Antecubital fossa: Thrombus: no, Wall thickened: no,  4.2 mm   Proximal forearm: Thrombus: no, Wall thickened: no,  4.4 mm   Mid forearm: Thrombus: no, Wall thickened: no,  4.7 mm   Wrist: Thrombus: no, Wall thickened: no,  3.8 mm  Right basilic vein:   Proximal arm: Thrombus: no, Wall thickened: no,  3.6 mm   Mid arm: Thrombus: no, Wall thickened: no,  4.0 mm   Distal arm: Thrombus: no, Wall  thickened: no,  4.7 mm   Antecubital fossa: Thrombus: no, Wall thickened: no,  3.9 mm     Right brachial vein: Duplicated.      Proximal arm: Thrombus: no, Wall thickened: no,  2.9 mm, 7.6 mm   Mid arm: Thrombus: no, Wall thickened: no,  4.6 mm, 6.0 mm   Distal arm: Thrombus: no, Wall thickened: no,  3.5 mm, 4.8 mm   Antecubital fossa: Thrombus: no, Wall thickened: no,  3.2 mm, 3.4 mm    Assessment & Plan: Mr. Bustamante is a good candidate for placement of permanent dialysis access.  I would recommend right brachial artery to cephalic vein AV fistula creation under Local with MAC and Scalene Block.   PAC and Coumadin management by cardiology prior to surgery is recommended.    The surgical risks and benefits were reviewed and questions were answered. We discussed the day of surgery plan, anesthesia, postop care, risk of infection, numbness, injury to surrounding structures, bleeding, thrombosis, steal syndrome, possible need for future angioplasty or surgical revision, as well as nonmaturation or need for site abandonment. This was contrasted with morbidity and mortality risk of long-term catheter based hemodialysis access. The patient does wish to proceed with surgery for permanent access creation at this time. The patient was counselled to contact our nurse coordinator, ELYSE Moscoso CNS (Sum) at [phone] with any questions or concerns.  Thank you for the opportunity to participate in Mr. Bustamante's care.      COURTNEY Prather (Sum)  Dialysis access program   [facility]  Pager # [phone]    I have reviewed history, examined patient and discussed plan with the fellow/resident/RAY.  I concur with the findings in this note.       Risks of the surgical procedure including but not limited to the rare risk of mortality discussed in detail. Patient verbalized good understanding and had several pertinent questions which were answered satisfactorily.         TT: 35 min,   CT: 25  min.    .    Again, thank you for allowing me to participate in the care of your patient.      Sincerely,    Sydney Dawkins MD

## 2018-08-06 NOTE — MR AVS SNAPSHOT
After Visit Summary   8/6/2018    Rory Bustamante    MRN: [id number]           Patient Information     Date Of Birth          1955        Visit Information        Provider Department      8/6/2018 3:15 PM Sydney Dawkins MD [facility]        Today's Diagnoses     Encounter regarding vascular access for dialysis for ESRD (H)    -  1      Care Instructions    You will be scheduled right upper arm AV fistula surgery:  1. No blood draw and blood pressure from left arm  2. You will be scheduled an appointment with Pre op anesthesiology clinic within 30 days prior to surgery  3. You need a ride after surgery and someone stay with you overnight on the surgery day  4. You will receive a call from a  for your PAC and surgery appointments  5. Wash your entire right/left arm with antibiotics soap the night before and morning of surgery    If you have questions and concerns, please contact dialysis access program Jose VALDEZ (Sum) at [phone].  Thank you for choosing [facility] for your care.    Jose Car (Sum) MS, APRN, CNS  Dialysis access program  Phone # [phone]          Follow-ups after your visit        Your next 10 appointments already scheduled     Aug 30, 2018 10:30 AM CDT   (Arrive by 10:00 AM)   RETURN SPINE with Harshal Rucker MD   [facility] ([facility])    [address] [place] [address] [phone]            Aug 30, 2018 11:30 AM CDT   (Arrive by 11:15 AM)   PAC EVALUATION with Violeta Gregorio PA-C   [facility] ([facility])    [address] [place] [id number] [phone]            Aug 30, 2018 12:30 PM CDT   (Arrive by 12:15 PM)   PAC RN ASSESSMENT with Gino Pac Rn   [facility] ([facility])    [address] [place] [address]  [facility] [address]   [phone]            Aug 30, 2018  1:00 PM CDT   (Arrive by 12:45 PM)   PAC Anesthesia Consult with  Pac Anesthesiologist   [facility] ([place] [facility])    [address] [facility]  [address] [facility] [address]   [phone]            Sep 19, 2018   Procedure with Sydney Dawkins MD   [facility], [facility] Day Surgery (--)    [address] [facility] [address]   [phone]            Oct 03, 2018  1:00 PM CDT   (Arrive by 12:45 PM)   Post-Op with ELYSE Odonnell   [facility] ([place] [facility])    [address] [place] [address]  [address]  [facility] [address]   [phone]            Oct 12, 2018  8:15 AM CDT   (Arrive by 8:00 AM)   Cystoscopy with Meagan Story MD   [facility] ([place] [facility])    [address] [place] [address]  [address] [facility] [address]   [phone]            Oct 15, 2018  9:00 AM CDT   MR ABDOMEN MRCP W/O & W CONTRAST with UUMR1   [facility] MRI ([facility])    [address] [facility] [address] [phone]           Take your medicines as usual, unless your doctor tells you not to. Bring a list of your current medicines to your exam (including vitamins, minerals and over-the-counter drugs). Also bring the results of similar scans you may have had.    You may or may not receive IV contrast for this exam pending the discretion of the Radiologist.   Do not eat or drink for 6 hours prior to exam.  The MRI machine uses a strong magnet. Please wear clothes without metal (snaps, zippers). A sweatsuit works well, or we may give you a hospital gown.  Please remove any body piercings and hair extensions before you arrive. You will also remove watches, jewelry, hairpins, wallets, dentures, partial dental plates and  hearing aids. You may wear contact lenses, and you may be able to wear your rings. We have a safe place to keep your personal items, but it is safer to leave them at home.  **IMPORTANT** THE INSTRUCTIONS BELOW ARE ONLY FOR THOSE PATIENTS WHO HAVE BEEN PRESCRIBED SEDATION OR GENERAL ANESTHESIA DURING THEIR MRI PROCEDURE:  IF YOUR DOCTOR PRESCRIBED ORAL SEDATION (take medicine to help you relax during your exam):   You must get the medicine from your doctor (oral medication) before you arrive. Bring the medicine to the exam. Do not take it at home. You ll be told when to take it upon arriving for your exam.   Arrive one hour early. Bring someone who can take you home after the test. Your medicine will make you sleepy. After the exam, you may not drive, take a bus or take a taxi by yourself.  IF YOUR DOCTOR PRESCRIBED IV SEDATION:   Arrive one hour early. Bring someone who can take you home after the test. Your medicine will make you sleepy. After the exam, you may not drive, take a bus or take a taxi by yourself.   No eating 6 hours before your exam. You may have clear liquids up until 4 hours before your exam. (Clear liquids include water, clear tea, black coffee and fruit juice without pulp.)  IF YOUR DOCTOR PRESCRIBED ANESTHESIA (be asleep for your exam):   Arrive 1 1/2 hours early. Bring someone who can take you home after the test. You may not drive, take a bus or take a taxi by yourself.   No eating 8 hours before your exam. You may have clear liquids up until 4 hours before your exam. (Clear liquids include water, clear tea, black coffee and fruit juice without pulp.)   You will spend four to five hours in the recovery room.  If you have any questions, please contact your Imaging Department exam site.            Oct 15, 2018 10:00 AM CDT   (Arrive by 9:45 AM)   New Patient Visit with Shade Manning MD   [facility] ([facility])    [address] [place] [address]  [address]  [facility] [address] [phone]              Who to contact     If you have questions or need follow up information about today's clinic visit or your schedule please contact [facility] directly at [phone].  Normal or non-critical lab and imaging results will be communicated to you by PromoJamhart, letter or phone within 4 business days after the clinic has received the results. If you do not hear from us within 7 days, please contact the clinic through PromoJamhart or phone. If you have a critical or abnormal lab result, we will notify you by phone as soon as possible.  Submit refill requests through boosk or call your pharmacy and they will forward the refill request to us. Please allow 3 business days for your refill to be completed.          Additional Information About Your Visit        [email or web address] Information     boosk gives you secure access to your electronic health record. If you see a primary care provider, you can also send messages to your care team and make appointments. If you have questions, please call your primary care clinic.  If you do not have a primary care provider, please call [phone] and they will assist you.        Care EveryWhere ID     This is your Care EveryWhere ID. This could be used by other organizations to access your [facility] medical records  [id number]        Your Vitals Were     Pulse Temperature Pulse Oximetry BMI (Body Mass Index)          70 97.7  F (36.5  C) 97% 30.67 kg/m2         Blood Pressure from Last 3 Encounters:   08/11/18 147/82   08/08/18 138/78   08/06/18 143/83    Weight from Last 3 Encounters:   08/13/18 106.6 kg (235 lb)   08/11/18 106.6 kg (235 lb)   08/06/18 105.5 kg (232 lb 8 oz)              Today, you had the following     No orders found for display         Today's Medication Changes          These changes are accurate as of 8/6/18 11:59 PM.  If you have any questions, ask your nurse or doctor.               These  medicines have changed or have updated prescriptions.        Dose/Directions    allopurinol 100 MG tablet   Commonly known as:  ZYLOPRIM   This may have changed:    - how much to take  - when to take this   Used for:  Kidney replaced by transplant, Gout        Dose:  200 mg   Take 2 tablets by mouth daily.   Quantity:  180 tablet   Refills:  3                Primary Care Provider Office Phone # Fax #    Moris Diaz [phone]       [facility] [address] [facility] [address]  [facility] [address]        Equal Access to Services     FIDELIA [facility] : Hadii aad ku hadasho Soomaali, waaxda luqadaha, qaybta kaalmada adeegyada, waxay idiin hayaan adeeg kharash lamadiha . So [facility] [phone].    ATENCIÓN: Si habla español, tiene a carr disposición servicios gratuitos de asistencia lingüística. [facility] [phone].    We comply with applicable federal civil rights laws and [place] laws. We do not discriminate on the basis of race, color, national origin, age, disability, sex, sexual orientation, or gender identity.            Thank you!     Thank you for choosing [facility]  for your care. Our goal is always to provide you with excellent care. Hearing back from our patients is one way we can continue to improve our services. Please take a few minutes to complete the written survey that you may receive in the mail after your visit with us. Thank you!             Your Updated Medication List - Protect others around you: Learn how to safely use, store and throw away your medicines at www.disposemymeds.org.          This list is accurate as of 8/6/18 11:59 PM.  Always use your most recent med list.                   Brand Name Dispense Instructions for use Diagnosis    allopurinol 100 MG tablet    ZYLOPRIM    180 tablet    Take 2 tablets by mouth daily.    Kidney replaced by transplant, Gout       ALPHAGAN OP      Place 1 drop into the right eye 2 times daily        BABY ASPIRIN PO       Take 81 mg by mouth daily        BEVACizumab 400 MG/16ML injection    AVASTIN     Every 5 weeks        cholecalciferol 5000 units Tabs tablet    vitamin D3    30 tablet    Take 1 tablet (5,000 Units) by mouth daily    Hyperparathyroidism due to renal insufficiency (H)       CRESTOR PO      Take 20 mg by mouth every evening        * cycloSPORINE modified 25 MG capsule    GENERIC EQUIVALENT    180 capsule    Take 2 capsules (50 mg) by mouth 2 times daily Total dose 150 mg twice daily    Kidney transplant recipient, Long-term use of immunosuppressant medication       * cycloSPORINE modified 100 MG capsule    GENERIC EQUIVALENT    180 capsule    Take 1 capsule (100 mg) by mouth 2 times daily Total dose 150 mg twice daily    Kidney transplant recipient, Long-term use of immunosuppressant medication       epoetin freya 86505 UNIT/ML injection    PROCRIT    4 mL    Inject 1 mL (20,000 Units) Subcutaneous once a week As needed for hgb < 10    CKD (chronic kidney disease) stage 4, GFR 15-29 ml/min (H), Anemia in stage 4 chronic kidney disease (H)       fenofibrate 145 MG tablet      Take 145 mg by mouth daily        ISOSORBIDE MONONITRATE PO      Take 60 mg by mouth daily        lidocaine 5 % ointment    XYLOCAINE    50 g    Apply topically daily as needed for moderate pain    Left hip pain       loperamide 2 MG capsule    IMODIUM     Take 2 mg by mouth daily        losartan 50 MG tablet    COZAAR    90 tablet    Take 0.5 tablets (25 mg) by mouth daily    Kidney replaced by transplant       metoprolol tartrate 100 MG tablet    LOPRESSOR    60 tablet    Take 1 tablet by mouth 2 times daily.    Unspecified hypertensive kidney disease with chronic kidney disease stage I through stage IV, or unspecified(403.90), Kidney replaced by transplant       NIFEdipine ER osmotic 60 MG Tb24    PROCARDIA XL    90 tablet    Take 1 tablet (60 mg) by mouth daily    HTN (hypertension)       NITROSTAT 0.4 MG sublingual tablet   Generic drug:   nitroGLYcerin      Place 0.4 mg under the tongue every 5 minutes as needed.        NONFORMULARY      Take 1 tablet by mouth 2 times daily Focus Macula Pro:  Eye vitamin and mineral supplement A, C, E, Zinc, Copper        omega-3 acid ethyl esters 1 g capsule    Lovaza     Take 2 g by mouth 2 times daily        oxyCODONE IR 5 MG tablet    ROXICODONE    80 tablet    Take 1-2 tablets (5-10 mg) by mouth every 4 hours as needed for other (pain control or improvement in physical function. Hold dose for analgesic side effects.)    Spinal stenosis of lumbar region with neurogenic claudication       PANTOPRAZOLE SODIUM PO      Take 40 mg by mouth daily as needed for heartburn        predniSONE 5 MG tablet    DELTASONE     Take 5 mg by mouth daily (with lunch)        probenecid 500 MG tablet    BENEMID     Take 500 mg by mouth 2 times daily.        sulfamethoxazole-trimethoprim 400-80 MG per tablet    BACTRIM/SEPTRA     Take 1 tablet by mouth every other day        tamsulosin 0.4 MG capsule    FLOMAX    60 capsule    Take 1 capsule (0.4 mg) by mouth daily    Benign prostatic hyperplasia, unspecified whether lower urinary tract symptoms present       TYLENOL ARTHRITIS PAIN 650 MG CR tablet   Generic drug:  acetaminophen      Take 2 tablets by mouth 3 times daily.        warfarin 5 MG tablet    COUMADIN    30 tablet    Take 1 tablet (5 mg) by mouth daily    Paroxysmal atrial fibrillation (H)       * Notice:  This list has 2 medication(s) that are the same as other medications prescribed for you. Read the directions carefully, and ask your doctor or other care provider to review them with you.

## 2018-08-06 NOTE — PROGRESS NOTES
Dialysis Access Service  Consult Note    Referred by Dr. Sun for surgical consult of permanent dialysis access.    HPI: Mr. Bustamante is being seen today for placement of permanent dialysis access due to Chronic renal failure from Hypertension and kidney transplant failure.  He is right handed. Mr. Bustamante is not dialyzing at (Not currently on dialysis).      Past Surgical History:   Procedure Laterality Date     C TOTAL KNEE ARTHROPLASTY  2015     LAMINECTOMY LUMBAR MINIMALLY INVASIVE THREE+ LEVELS N/A 6/11/2018    Procedure: LAMINECTOMY LUMBAR MINIMALLY INVASIVE THREE+ LEVELS;  Minimally Invasive Surgery Laminectomies Lumbar 2-3,Lumbar 3-4,Lumbar 4-5;  Surgeon: Harshal Rucker MD;  Location: [facility]     TONSILLECTOMY       TRANSPLANT KIDNEY RECIPIENT LIVING RELATED  1989    HLA identical     VASECTOMY         Risk factors for vascular access are:     Hx of CVC    []    Comment:   Hx of PICC line         []    Comment:   Hx of Pacemaker    []    Comment:   History of failed access:  [x]      Left forearm AV fistula     SVC syndrome   []       Heart Failure    [x]    EF:    Periph arterial disease  []      Prior Fracture/Surgery  []    Location:   DVT    []    Location:  Diabetes   []         Neuropathy   []    Comment:   Anticoagulation:   [x]    Agent:    Coumadin  Anticoagulation contraindication: []    Details:                   Pediatric    []                           Hx of transplant   [x]   Comment:   12/13/1989  Current immunosuppression [x]   Comment:                                  PHYSICAL EXAM:  Temp:  [97.7  F (36.5  C)] 97.7  F (36.5  C)  Pulse:  [70] 70  BP: (143)/(83) 143/83  SpO2:  [97 %] 97 %  alert and cooperative  EXTREMITY EXAM:   Vein Exam: Obvious suitable veins?    Left arm: YES   []           NO  []       Comment:    Right arm: YES   []           NO  []       Comment:   Arterial Exam:   Radial   L: 3+ R: 3+   Ulnar   L: 3+;R: 3+  Patent Palmar arch  L: YES   []  NO   []       R: YES    []   NO   []        Capillary refill:  L: <2 sec, R:<2 sec    Sensory exam:   Left hand: Normal   [x]       Abnormal   []     Comment:    Right hand: Normal   [x]       Abnormal   []     Comment:     Motor exam normal:   Left hand: Normal   [x]       Abnormal   []     Comment:     Right hand: Normal   [x]       Abnormal   []     Comment:                       Mapping Reviewed:  Target vein: 4.2 mm right cephalic vein  Target artery brachial artery at the distal upper arm  Vein mapping showed  Arterial evaluation (peak systolic velocities):   Right:  Innominate: 118 cm/sec  Subclavian: 104 cm/sec  Brachial: 91 cm/sec, 7.2 mm diameter  Radial: 61 cm/sec, 3.6 mm diameter  Ulnar: 54 cm/sec  Venous evaluation   Right Upper Extremity:    IJV: Thrombus: no, Phasic: Yes,  91 cm/sec   Innominate vein: Thrombus: no, Phasic: Yes, 73 cm/sec   SCV medial: Thrombus: no, Phasic: Yes, 63 cm/sec   SCV mid: Thrombus: no, Phasic: Yes, 83 cm/sec   SCV lateral: Thrombus: no, Phasic: Yes, 47 cm/sec   Axillary vein:Thrombus: no, Phasic: Yes,  72 cm/sec  Right cephalic vein:    Proximal humerus: Thrombus: no, Wall thickened: no,  5.7 mm   Mid humerus: Thrombus: no, Wall thickened: no,  5.4 mm   Distal humerus: Thrombus: no, Wall thickened: no,  6.0 mm   Antecubital fossa: Thrombus: no, Wall thickened: no,  4.2 mm   Proximal forearm: Thrombus: no, Wall thickened: no,  4.4 mm   Mid forearm: Thrombus: no, Wall thickened: no,  4.7 mm   Wrist: Thrombus: no, Wall thickened: no,  3.8 mm  Right basilic vein:   Proximal arm: Thrombus: no, Wall thickened: no,  3.6 mm   Mid arm: Thrombus: no, Wall thickened: no,  4.0 mm   Distal arm: Thrombus: no, Wall thickened: no,  4.7 mm   Antecubital fossa: Thrombus: no, Wall thickened: no,  3.9 mm     Right brachial vein: Duplicated.      Proximal arm: Thrombus: no, Wall thickened: no,  2.9 mm, 7.6 mm   Mid arm: Thrombus: no, Wall thickened: no,  4.6 mm, 6.0 mm   Distal arm: Thrombus: no, Wall thickened: no,   3.5 mm, 4.8 mm   Antecubital fossa: Thrombus: no, Wall thickened: no,  3.2 mm, 3.4 mm    Assessment & Plan: Mr. Bustamante is a good candidate for placement of permanent dialysis access.  I would recommend right brachial artery to cephalic vein AV fistula creation under Local with MAC and Scalene Block.   PAC and Coumadin management by cardiology prior to surgery is recommended.    The surgical risks and benefits were reviewed and questions were answered. We discussed the day of surgery plan, anesthesia, postop care, risk of infection, numbness, injury to surrounding structures, bleeding, thrombosis, steal syndrome, possible need for future angioplasty or surgical revision, as well as nonmaturation or need for site abandonment. This was contrasted with morbidity and mortality risk of long-term catheter based hemodialysis access. The patient does wish to proceed with surgery for permanent access creation at this time. The patient was counselled to contact our nurse coordinator, ELYSE Moscoso CNS (Sum) at [phone] with any questions or concerns.  Thank you for the opportunity to participate in Mr. Bustamante's care.      COURTNEY Prather (Sum)  Dialysis access program   [facility]  Pager # [phone]    I have reviewed history, examined patient and discussed plan with the fellow/resident/RAY.  I concur with the findings in this note.       Risks of the surgical procedure including but not limited to the rare risk of mortality discussed in detail. Patient verbalized good understanding and had several pertinent questions which were answered satisfactorily.         TT: 35 min,   CT: 25 min.    .

## 2018-08-06 NOTE — LETTER
8/6/2018      RE: Rory Bustamante  [address]  [facility] [address]       Dialysis Access Service  Consult Note    Referred by Dr. Sun for surgical consult of permanent dialysis access.    HPI: Mr. Bustamante is being seen today for placement of permanent dialysis access due to Chronic renal failure from Hypertension and kidney transplant failure.  He is right handed. Mr. Bustamante is not dialyzing at (Not currently on dialysis).      Past Surgical History:   Procedure Laterality Date     C TOTAL KNEE ARTHROPLASTY  2015     LAMINECTOMY LUMBAR MINIMALLY INVASIVE THREE+ LEVELS N/A 6/11/2018    Procedure: LAMINECTOMY LUMBAR MINIMALLY INVASIVE THREE+ LEVELS;  Minimally Invasive Surgery Laminectomies Lumbar 2-3,Lumbar 3-4,Lumbar 4-5;  Surgeon: Harshal Rucker MD;  Location: [facility]     TONSILLECTOMY       TRANSPLANT KIDNEY RECIPIENT LIVING RELATED  1989    HLA identical     VASECTOMY         Risk factors for vascular access are:     Hx of CVC    []    Comment:   Hx of PICC line         []    Comment:   Hx of Pacemaker    []    Comment:   History of failed access:  [x]      Left forearm AV fistula     SVC syndrome   []       Heart Failure    [x]    EF:    Periph arterial disease  []      Prior Fracture/Surgery  []    Location:   DVT    []    Location:  Diabetes   []         Neuropathy   []    Comment:   Anticoagulation:   [x]    Agent:    Coumadin  Anticoagulation contraindication: []    Details:                   Pediatric    []                           Hx of transplant   [x]   Comment:   12/13/1989  Current immunosuppression [x]   Comment:                                  PHYSICAL EXAM:  Temp:  [97.7  F (36.5  C)] 97.7  F (36.5  C)  Pulse:  [70] 70  BP: (143)/(83) 143/83  SpO2:  [97 %] 97 %  alert and cooperative  EXTREMITY EXAM:   Vein Exam: Obvious suitable veins?    Left arm: YES   []           NO  []       Comment:    Right arm: YES   []           NO  []       Comment:   Arterial Exam:   Radial   L: 3+ R: 3+    Ulnar   L: 3+;R: 3+  Patent Palmar arch  L: YES   []  NO   []       R: YES   []   NO   []        Capillary refill:  L: <2 sec, R:<2 sec    Sensory exam:   Left hand: Normal   [x]       Abnormal   []     Comment:    Right hand: Normal   [x]       Abnormal   []     Comment:     Motor exam normal:   Left hand: Normal   [x]       Abnormal   []     Comment:     Right hand: Normal   [x]       Abnormal   []     Comment:                       Mapping Reviewed:  Target vein: 4.2 mm right cephalic vein  Target artery brachial artery at the distal upper arm  Vein mapping showed  Arterial evaluation (peak systolic velocities):   Right:  Innominate: 118 cm/sec  Subclavian: 104 cm/sec  Brachial: 91 cm/sec, 7.2 mm diameter  Radial: 61 cm/sec, 3.6 mm diameter  Ulnar: 54 cm/sec  Venous evaluation   Right Upper Extremity:    IJV: Thrombus: no, Phasic: Yes,  91 cm/sec   Innominate vein: Thrombus: no, Phasic: Yes, 73 cm/sec   SCV medial: Thrombus: no, Phasic: Yes, 63 cm/sec   SCV mid: Thrombus: no, Phasic: Yes, 83 cm/sec   SCV lateral: Thrombus: no, Phasic: Yes, 47 cm/sec   Axillary vein:Thrombus: no, Phasic: Yes,  72 cm/sec  Right cephalic vein:    Proximal humerus: Thrombus: no, Wall thickened: no,  5.7 mm   Mid humerus: Thrombus: no, Wall thickened: no,  5.4 mm   Distal humerus: Thrombus: no, Wall thickened: no,  6.0 mm   Antecubital fossa: Thrombus: no, Wall thickened: no,  4.2 mm   Proximal forearm: Thrombus: no, Wall thickened: no,  4.4 mm   Mid forearm: Thrombus: no, Wall thickened: no,  4.7 mm   Wrist: Thrombus: no, Wall thickened: no,  3.8 mm  Right basilic vein:   Proximal arm: Thrombus: no, Wall thickened: no,  3.6 mm   Mid arm: Thrombus: no, Wall thickened: no,  4.0 mm   Distal arm: Thrombus: no, Wall thickened: no,  4.7 mm   Antecubital fossa: Thrombus: no, Wall thickened: no,  3.9 mm     Right brachial vein: Duplicated.      Proximal arm: Thrombus: no, Wall thickened: no,  2.9 mm, 7.6 mm   Mid arm: Thrombus: no, Wall  thickened: no,  4.6 mm, 6.0 mm   Distal arm: Thrombus: no, Wall thickened: no,  3.5 mm, 4.8 mm   Antecubital fossa: Thrombus: no, Wall thickened: no,  3.2 mm, 3.4 mm    Assessment & Plan: Mr. Bustamante is a good candidate for placement of permanent dialysis access.  I would recommend right brachial artery to cephalic vein AV fistula creation under Local with MAC and Scalene Block.   PAC and Coumadin management by cardiology prior to surgery is recommended.    The surgical risks and benefits were reviewed and questions were answered. We discussed the day of surgery plan, anesthesia, postop care, risk of infection, numbness, injury to surrounding structures, bleeding, thrombosis, steal syndrome, possible need for future angioplasty or surgical revision, as well as nonmaturation or need for site abandonment. This was contrasted with morbidity and mortality risk of long-term catheter based hemodialysis access. The patient does wish to proceed with surgery for permanent access creation at this time. The patient was counselled to contact our nurse coordinator, ELYSE Moscoso CNS (Sum) at [phone] with any questions or concerns.  Thank you for the opportunity to participate in Mr. Bustamante's care.      COURTNEY Prather (Sum)  Dialysis access program   [facility]  Pager # [phone]    I have reviewed history, examined patient and discussed plan with the fellow/resident/RAY.  I concur with the findings in this note.       Risks of the surgical procedure including but not limited to the rare risk of mortality discussed in detail. Patient verbalized good understanding and had several pertinent questions which were answered satisfactorily.         TT: 35 min,   CT: 25 min.    .    Sydney Dawkins MD

## 2018-08-07 DIAGNOSIS — N18.5 CHRONIC KIDNEY DISEASE, STAGE 5, KIDNEY FAILURE (H): Primary | ICD-10-CM

## 2018-08-07 DIAGNOSIS — Z01.818 PRE-OP EVALUATION: ICD-10-CM

## 2018-08-07 DIAGNOSIS — T86.12 KIDNEY TRANSPLANT FAILURE: ICD-10-CM

## 2018-08-07 NOTE — PATIENT INSTRUCTIONS
You will be scheduled right upper arm AV fistula surgery:  1. No blood draw and blood pressure from left arm  2. You will be scheduled an appointment with Pre op anesthesiology clinic within 30 days prior to surgery  3. You need a ride after surgery and someone stay with you overnight on the surgery day  4. You will receive a call from a  for your PAC and surgery appointments  5. Wash your entire right/left arm with antibiotics soap the night before and morning of surgery    If you have questions and concerns, please contact dialysis access program Jose VALDEZ (Sum) at [phone].  Thank you for choosing [facility] for your care.    Jose Car (Sum) MS, APRN, CNS  Dialysis access program  Phone # [phone]

## 2018-08-08 ENCOUNTER — INFUSION THERAPY VISIT (OUTPATIENT)
Dept: INFUSION THERAPY | Facility: CLINIC | Age: 63
End: 2018-08-08
Attending: INTERNAL MEDICINE
Payer: MEDICARE

## 2018-08-08 VITALS
TEMPERATURE: 98 F | SYSTOLIC BLOOD PRESSURE: 138 MMHG | DIASTOLIC BLOOD PRESSURE: 78 MMHG | OXYGEN SATURATION: 97 % | HEART RATE: 78 BPM | RESPIRATION RATE: 18 BRPM

## 2018-08-08 DIAGNOSIS — N18.4 CKD (CHRONIC KIDNEY DISEASE) STAGE 4, GFR 15-29 ML/MIN (H): ICD-10-CM

## 2018-08-08 DIAGNOSIS — N18.4 ANEMIA IN STAGE 4 CHRONIC KIDNEY DISEASE (H): Primary | ICD-10-CM

## 2018-08-08 DIAGNOSIS — D63.1 ANEMIA IN STAGE 4 CHRONIC KIDNEY DISEASE (H): Primary | ICD-10-CM

## 2018-08-08 PROCEDURE — 25000128 H RX IP 250 OP 636: Mod: ZF | Performed by: INTERNAL MEDICINE

## 2018-08-08 PROCEDURE — 96365 THER/PROPH/DIAG IV INF INIT: CPT

## 2018-08-08 RX ADMIN — FERRIC CARBOXYMALTOSE INJECTION 750 MG: 50 INJECTION, SOLUTION INTRAVENOUS at 16:40

## 2018-08-08 NOTE — PROGRESS NOTES
Nursing Note  [facility]ory Bustamante [facility] for:   Chief Complaint   Patient presents with     Infusion     Injectafer     During today's [facility] appointment, orders from Dr. Sun were completed.  Frequency: today is dose 2 of 2 total.    Progress note:  Patient identification verified by name and date of birth.  Assessment completed.  Vitals recorded in Doc Flowsheets.  Patient was provided with education regarding infusion and possible side effects.  Patient verbalized understanding.      needed: No  Premedications: were not ordered.  Infusion Rates: infusion given over approximately 15 minutes.  Labs: were not ordered for this appointment.  Vascular access: peripheral IV placed today.  Treatment Conditions: patient monitored for 30 minutes after medication was infused.  Patient tolerated infusion: well.  Report given to Sarah RONQUILLO for remainder of visit.     Discharge Plan:   Patient declined AVS.  Follow up plan of care with: ordering provider  Discharge instructions were reviewed with patient.  Patient/representative verbalized understanding of discharge instructions and all questions answered.  [facility] discharged [facility] in stable condition.    Quincy Kaiser RN     Administrations This Visit     ferric carboxymaltose (INJECTAFER) 750 mg in sodium chloride 0.9 % 100 mL intermittent infusion     Admin Date Action Dose Route Administered By             08/08/2018 New Bag 750 mg Intravenous Quincy Kaiser RN                        /81 (BP Location: Right arm)  Pulse 78  Temp 98  F (36.7  C) (Oral)  Resp 18  SpO2 97%

## 2018-08-08 NOTE — MR AVS SNAPSHOT
After Visit Summary   8/8/2018    Rory Bustamante    MRN: [id number]           Patient Information     Date Of Birth          1955        Visit Information        Provider Department      8/8/2018 4:00 PM UC 50 ATC; UC SPEC INFUSION [facility] Specialty [facility] Procedure        Today's Diagnoses     Anemia in stage 4 chronic kidney disease (H)    -  1    CKD (chronic kidney disease) stage 4, GFR 15-29 ml/min (H)          Care Instructions    Dear Rory Bustamante    Thank you for choosing [facility] ([facility]) for your infusion.  The following information is a summary of our appointment as well as important reminders.          We look forward in seeing you on your next appointment here at [facility].  Please don t hesitate to call us at [phone] to reschedule any of your appointments or to speak with one of the [facility] registered nurses.  It was a pleasure taking care of you today.    Sincerely,    [facility]  [facility] Center  [address] [place] [address]  [address]  Phone:  [phone]            Follow-ups after your visit        Your next 10 appointments already scheduled     Aug 11, 2018  9:00 AM CDT   (Arrive by 8:45 AM)   New Patient Visit with Meagan Story MD   [facility] Urology and Inst for Prostate and Urologic Cancers ([facility])    [address] [place] [address]  4th [facility] [id number] [phone]            Aug 13, 2018  2:00 PM CDT   (Arrive by 1:45 PM)   Return Visit with Isai Hall MD   [facility] Sports Medicine ([facility])    [address] [place] [address]  5th [facility] [id number] [phone]            Aug 30, 2018 10:30 AM CDT   (Arrive by 10:00 AM)   RETURN SPINE with Harshal Rucker MD   [facility] ([facility])    [facility]  [facility] [address]  4th Floor  [facility] [address]   [phone]            Sep 19, 2018   Procedure with Sydney Dawkins MD   [facility] [place], Same Day Surgery (--)    [address] [facility] [address]   [phone]            Oct 03, 2018  1:00 PM CDT   (Arrive by 12:45 PM)   Post-Op with ELYSE Odonnell [facility] Solid Organ Transplant ([facility])    [address] [facility] [address]  [address]  [facility] [address]   [phone]            Oct 15, 2018  9:00 AM CDT   MR ABDOMEN MRCP W/O & W CONTRAST with UUMR1   [facility] [place] MRI ([facility])    [address] [facility] [address]   [phone]           Take your medicines as usual, unless your doctor tells you not to. Bring a list of your current medicines to your exam (including vitamins, minerals and over-the-counter drugs). Also bring the results of similar scans you may have had.    You may or may not receive IV contrast for this exam pending the discretion of the Radiologist.   Do not eat or drink for 6 hours prior to exam.  The MRI machine uses a strong magnet. Please wear clothes without metal (snaps, zippers). A sweatsuit works well, or we may give you a hospital gown.  Please remove any body piercings and hair extensions before you arrive. You will also remove watches, jewelry, hairpins, wallets, dentures, partial dental plates and hearing aids. You may wear contact lenses, and you may be able to wear your rings. We have a safe place to keep your personal items, but it is safer to leave them at home.  **IMPORTANT** THE INSTRUCTIONS BELOW ARE ONLY FOR THOSE PATIENTS WHO HAVE BEEN PRESCRIBED SEDATION OR GENERAL ANESTHESIA DURING THEIR MRI PROCEDURE:  IF YOUR DOCTOR PRESCRIBED ORAL SEDATION (take medicine to help you relax during your exam):   You must get the medicine from your doctor (oral medication) before you arrive. Bring the medicine to the exam.  Do not take it at home. You ll be told when to take it upon arriving for your exam.   Arrive one hour early. Bring someone who can take you home after the test. Your medicine will make you sleepy. After the exam, you may not drive, take a bus or take a taxi by yourself.  IF YOUR DOCTOR PRESCRIBED IV SEDATION:   Arrive one hour early. Bring someone who can take you home after the test. Your medicine will make you sleepy. After the exam, you may not drive, take a bus or take a taxi by yourself.   No eating 6 hours before your exam. You may have clear liquids up until 4 hours before your exam. (Clear liquids include water, clear tea, black coffee and fruit juice without pulp.)  IF YOUR DOCTOR PRESCRIBED ANESTHESIA (be asleep for your exam):   Arrive 1 1/2 hours early. Bring someone who can take you home after the test. You may not drive, take a bus or take a taxi by yourself.   No eating 8 hours before your exam. You may have clear liquids up until 4 hours before your exam. (Clear liquids include water, clear tea, black coffee and fruit juice without pulp.)   You will spend four to five hours in the recovery room.  If you have any questions, please contact your Imaging Department exam site.            Oct 15, 2018 10:00 AM CDT   (Arrive by 9:45 AM)   New Patient Visit with Shade Manning MD   [facility] ([facility])    [address] [place] [address]  [address] [place] [address] [phone]              Who to contact     If you have questions or need follow up information about today's clinic visit or your schedule please contact [facility] SPECIAL[facility] PROCEDURE directly at [phone].  Normal or non-critical lab and imaging results will be communicated to you by MyChart, letter or phone within 4 business days after the clinic has received the results. If you do not hear from us within 7 days, please contact the clinic through [employer] or  phone. If you have a critical or abnormal lab result, we will notify you by phone as soon as possible.  Submit refill requests through i2we or call your pharmacy and they will forward the refill request to us. Please allow 3 business days for your refill to be completed.          Additional Information About Your Visit        [employer] Information     [employer] gives you secure access to your electronic health record. If you see a primary care provider, you can also send messages to your care team and make appointments. If you have questions, please call your primary care clinic.  If you do not have a primary care provider, please call [phone] and they will assist you.        Care EveryWhere ID     This is your Care EveryWhere ID. This could be used by other organizations to access your [facility] medical records  [id number]        Your Vitals Were     Pulse Temperature Respirations Pulse Oximetry          78 98  F (36.7  C) (Oral) 18 97%         Blood Pressure from Last 3 Encounters:   08/08/18 138/78   08/06/18 143/83   08/01/18 121/71    Weight from Last 3 Encounters:   08/06/18 105.5 kg (232 lb 8 oz)   07/24/18 107.5 kg (237 lb)   07/17/18 107.5 kg (237 lb)              Today, you had the following     No orders found for display         Today's Medication Changes          These changes are accurate as of 8/8/18  5:46 PM.  If you have any questions, ask your nurse or doctor.               These medicines have changed or have updated prescriptions.        Dose/Directions    allopurinol 100 MG tablet   Commonly known as:  ZYLOPRIM   This may have changed:    - how much to take  - when to take this   Used for:  Kidney replaced by transplant, Gout        Dose:  200 mg   Take 2 tablets by mouth daily.   Quantity:  180 tablet   Refills:  3                Primary Care Provider Office Phone # Fax #    Moris Diaz [phone]       [facility] [address] [facility] [address]  [facility]  [address]        Equal Access to Services     [facility]: Hadii aad ku hadcollinsherrell Leali, wanguyenda luqjosephha, qamargita latarenettadevin harden. So [facility] [phone].    ATENCIÓN: Si habla español, tiene a carr disposición servicios gratuitos de asistencia lingüística. Lilliamame al [phone].    We comply with applicable federal civil rights laws and [place] laws. We do not discriminate on the basis of race, color, national origin, age, disability, sex, sexual orientation, or gender identity.            Thank you!     Thank you for choosing [facility] SPECIALTY AND PROCEDURE  for your care. Our goal is always to provide you with excellent care. Hearing back from our patients is one way we can continue to improve our services. Please take a few minutes to complete the written survey that you may receive in the mail after your visit with us. Thank you!             Your Updated Medication List - Protect others around you: Learn how to safely use, store and throw away your medicines at www.disposemymeds.org.          This list is accurate as of 8/8/18  5:46 PM.  Always use your most recent med list.                   Brand Name Dispense Instructions for use Diagnosis    allopurinol 100 MG tablet    ZYLOPRIM    180 tablet    Take 2 tablets by mouth daily.    Kidney replaced by transplant, Gout       ALPHAGAN OP      Place 1 drop into the right eye 2 times daily        BABY ASPIRIN PO      Take 81 mg by mouth daily        BEVACizumab 400 MG/16ML injection    AVASTIN     Every 5 weeks        cholecalciferol 5000 units Tabs tablet    vitamin D3    30 tablet    Take 1 tablet (5,000 Units) by mouth daily    Hyperparathyroidism due to renal insufficiency (H)       CRESTOR PO      Take 20 mg by mouth every evening        * cycloSPORINE modified 25 MG capsule    GENERIC EQUIVALENT    180 capsule    Take 2 capsules (50 mg) by mouth 2 times daily Total dose 150 mg twice daily     Kidney transplant recipient, Long-term use of immunosuppressant medication       * cycloSPORINE modified 100 MG capsule    GENERIC EQUIVALENT    180 capsule    Take 1 capsule (100 mg) by mouth 2 times daily Total dose 150 mg twice daily    Kidney transplant recipient, Long-term use of immunosuppressant medication       epoetin freya 02727 UNIT/ML injection    PROCRIT    4 mL    Inject 1 mL (20,000 Units) Subcutaneous once a week As needed for hgb < 10    CKD (chronic kidney disease) stage 4, GFR 15-29 ml/min (H), Anemia in stage 4 chronic kidney disease (H)       fenofibrate 145 MG tablet      Take 145 mg by mouth daily        ISOSORBIDE MONONITRATE PO      Take 60 mg by mouth daily        lidocaine 5 % ointment    XYLOCAINE    50 g    Apply topically daily as needed for moderate pain    Left hip pain       loperamide 2 MG capsule    IMODIUM     Take 2 mg by mouth daily        losartan 50 MG tablet    COZAAR    90 tablet    Take 0.5 tablets (25 mg) by mouth daily    Kidney replaced by transplant       metoprolol tartrate 100 MG tablet    LOPRESSOR    60 tablet    Take 1 tablet by mouth 2 times daily.    Unspecified hypertensive kidney disease with chronic kidney disease stage I through stage IV, or unspecified(403.90), Kidney replaced by transplant       NIFEdipine ER osmotic 60 MG Tb24    PROCARDIA XL    90 tablet    Take 1 tablet (60 mg) by mouth daily    HTN (hypertension)       NITROSTAT 0.4 MG sublingual tablet   Generic drug:  nitroGLYcerin      Place 0.4 mg under the tongue every 5 minutes as needed.        NONFORMULARY      Take 1 tablet by mouth 2 times daily Focus Macula Pro:  Eye vitamin and mineral supplement A, C, E, Zinc, Copper        omega-3 acid ethyl esters 1 g capsule    Lovaza     Take 2 g by mouth 2 times daily        oxyCODONE IR 5 MG tablet    ROXICODONE    80 tablet    Take 1-2 tablets (5-10 mg) by mouth every 4 hours as needed for other (pain control or improvement in physical function. Hold  dose for analgesic side effects.)    Spinal stenosis of lumbar region with neurogenic claudication       PANTOPRAZOLE SODIUM PO      Take 40 mg by mouth daily as needed for heartburn        predniSONE 5 MG tablet    DELTASONE     Take 5 mg by mouth daily (with lunch)        probenecid 500 MG tablet    BENEMID     Take 500 mg by mouth 2 times daily.        sulfamethoxazole-trimethoprim 400-80 MG per tablet    BACTRIM/SEPTRA     Take 1 tablet by mouth every other day        tamsulosin 0.4 MG capsule    FLOMAX    60 capsule    Take 1 capsule (0.4 mg) by mouth daily    Benign prostatic hyperplasia, unspecified whether lower urinary tract symptoms present       TYLENOL ARTHRITIS PAIN 650 MG CR tablet   Generic drug:  acetaminophen      Take 2 tablets by mouth 3 times daily.        warfarin 5 MG tablet    COUMADIN    30 tablet    Take 1 tablet (5 mg) by mouth daily    Paroxysmal atrial fibrillation (H)       * Notice:  This list has 2 medication(s) that are the same as other medications prescribed for you. Read the directions carefully, and ask your doctor or other care provider to review them with you.

## 2018-08-08 NOTE — PATIENT INSTRUCTIONS
Dear Rory Bustamante    Thank you for choosing [facility] ([facility]) for your infusion.  The following information is a summary of our appointment as well as important reminders.          We look forward in seeing you on your next appointment here at [facility].  Please don t hesitate to call us at [phone] to reschedule any of your appointments or to speak with one of the [facility] registered nurses.  It was a pleasure taking care of you today.    Sincerely,    [facility]  [facility]  [address] [place] [address]  [address]  Phone:  [phone]

## 2018-08-10 ENCOUNTER — TELEPHONE (OUTPATIENT)
Dept: PHARMACY | Facility: CLINIC | Age: 63
End: 2018-08-10

## 2018-08-10 LAB
HCT VFR BLD AUTO: 29.7 %
HEMOGLOBIN: 9.9 G/DL (ref 13.3–17.7)

## 2018-08-10 NOTE — TELEPHONE ENCOUNTER
Anemia Management Note  SUBJECTIVE/OBJECTIVE:  Referred by Dr. Christi Sun on 2018  Primary Diagnosis: Anemia in Chronic Kidney Disease (N18.3, D63.1)     Secondary Diagnosis:  Chronic Kidney Disease, Stage 3 (N18.3)   Hgb goal range:  9-10  Epo/Darbo: Procrit  20,000 units  once weekly for Hgb < 10  In clinic  RX/TX plans : 2019  Iron regimen:  Ferrous Sulfate 325mg QD  Labs : 2019  Recent MARY LOU use, transfusion, IV iron: IV iron scheduled 2018    Anemia Latest Ref Rng & Units 2018 2018 2018 2018 2018 8/3/2018 8/10/2018   MARY LOU Dose - 10,000 units - 20,000 units 20,000 units - 20,000 units 20,000 units   Hemoglobin 13.3 - 17.7 g/dL 8.6(A) 9.0(L) 8.8(A) 8.9(A) - 9.8(A) 9.9(A)   IV Iron Dose - - - - - 750mg - -   TSAT % - 24 19 - - - -   Ferritin ng/mL - 262 270 - - - -     BP Readings from Last 3 Encounters:   18 138/78   18 143/83   18 121/71     Wt Readings from Last 2 Encounters:   18 232 lb 8 oz (105.5 kg)   18 237 lb (107.5 kg)       Received and documented outside lab results and procrit dose from 8/10/18    ASSESSMENT:  Hgb:at goal - received dose in clinic - recommend continue current regimen  TSat: Due for iron studies 4 weeks after last IV iron infusion Ferritin: Due for iron studies 4 weeks after last IV iron infusion    PLAN:  Dose with aranesp and RTC for hgb then aranesp if needed in 1 week(s)    Orders needed to be renewed (for next follow-up date) in EPIC: None    Iron labs due:  18    Plan discussed with:  No call made  Plan provided by:  [facility]rez [facility]  Anemia Clinic  [phone]    NEXT FOLLOW-UP DATE:  18  Reviewed 08/10/2018  [facility]  Anemia Management Service  Grecia Carroll PharmD and Michelle Ramírez CPhT  Phone: [phone]  Fax: [phone]

## 2018-08-11 ENCOUNTER — OFFICE VISIT (OUTPATIENT)
Dept: UROLOGY | Facility: CLINIC | Age: 63
End: 2018-08-11
Payer: MEDICARE

## 2018-08-11 VITALS
WEIGHT: 235 LBS | HEART RATE: 77 BPM | HEIGHT: 73 IN | BODY MASS INDEX: 31.14 KG/M2 | SYSTOLIC BLOOD PRESSURE: 147 MMHG | DIASTOLIC BLOOD PRESSURE: 82 MMHG

## 2018-08-11 DIAGNOSIS — R39.198 SLOW URINARY STREAM: ICD-10-CM

## 2018-08-11 DIAGNOSIS — N21.0 BLADDER STONE: Primary | ICD-10-CM

## 2018-08-11 ASSESSMENT — ENCOUNTER SYMPTOMS
DIFFICULTY URINATING: 0
DISTURBANCES IN COORDINATION: 1
HEADACHES: 0
JAUNDICE: 0
HEARTBURN: 1
DIZZINESS: 0
BOWEL INCONTINENCE: 1
SEIZURES: 0
HYPERTENSION: 1
HYPOTENSION: 1
CONSTIPATION: 0
NECK MASS: 0
DIARRHEA: 0
BLOOD IN STOOL: 0
LEG PAIN: 0
NUMBNESS: 1
MEMORY LOSS: 0
SLEEP DISTURBANCES DUE TO BREATHING: 0
ORTHOPNEA: 0
SINUS PAIN: 0
SINUS CONGESTION: 1
PARALYSIS: 0
HOARSE VOICE: 0
EXERCISE INTOLERANCE: 1
SMELL DISTURBANCE: 0
EYE WATERING: 0
TROUBLE SWALLOWING: 0
VOMITING: 0
EYE IRRITATION: 0
SYNCOPE: 0
DOUBLE VISION: 0
LIGHT-HEADEDNESS: 1
NAUSEA: 0
RECTAL PAIN: 0
BLOATING: 0
SWOLLEN GLANDS: 0
BRUISES/BLEEDS EASILY: 0
EYE PAIN: 0
WEAKNESS: 1
FLANK PAIN: 0
TINGLING: 1
EYE REDNESS: 0
ABDOMINAL PAIN: 0
LOSS OF CONSCIOUSNESS: 0
SPEECH CHANGE: 0
PALPITATIONS: 0
TASTE DISTURBANCE: 0
SORE THROAT: 0
TREMORS: 0
DYSURIA: 0
HEMATURIA: 0

## 2018-08-11 ASSESSMENT — PAIN SCALES - GENERAL: PAINLEVEL: NO PAIN (0)

## 2018-08-11 NOTE — PROGRESS NOTES
It was my pleasure to see Rory Bsutamante a 62 year old year old male today. Patient was seen in consultation for lower urinary tract symptoms and bladder stone.    HPI:     Patient presents for evaluation and management of bladder stone and lower urinary tract symptoms.      Patient is s/p transplant in 1989 but now with decreasing GFR. Will be listed for transplant.   Had CT scan at [facility] that showed an 1.2cm bladder stone near the insertion of the transplant ureter.     Patient also notes lower urinary tract symptoms as below despite taking tamsulosin.       Hesitancy   Slowed stream occasionally   Occasional sensation of incomplete emptying   Sometimes needs to sit to urinate due to spraying stream   No hematuria   No dysuria   No straining to void   No urgency   No UUI    Had episode of urinary retention after knee replacement. Had slowed stream prior but then developed AUR. Started tamsulosin after this. Has been taking since 2015.     Also had urinary retention after back surgery.          Past Medical History:   Diagnosis Date     Angina effort (H) 8/2016     Arthritis      BPH (benign prostatic hyperplasia)      CAD (coronary artery disease) July 2011    MI in July 2011, stent placed, on plavix and aspirin     Glaucoma      Gout     Uric acid as high as 11 previously, now on allopurinol and probenecid     HTN (hypertension)      Hypercholesteremia 2015     Hypertriglyceridemia 2015     Intraocular bleeding     previously treated [facility] Avastin     Macular degeneration      Neuropathy     Limited sensation bilateral knees to feet     Presence of stent in coronary artery August 2012     Proteinuria 11/2015    recurrent MPGN on biopsy     S/P kidney transplant     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  HLA identical transplant.  Biopsy in 4/2008 with recurrent MPGN type 2, mild interstitial fibrosis and tubular atrophy, CNI toxicity     Spinal stenosis 2013     Warts        Past Surgical  History:   Procedure Laterality Date     C TOTAL KNEE ARTHROPLASTY  2015     LAMINECTOMY LUMBAR MINIMALLY INVASIVE THREE+ LEVELS N/A 6/11/2018    Procedure: LAMINECTOMY LUMBAR MINIMALLY INVASIVE THREE+ LEVELS;  Minimally Invasive Surgery Laminectomies Lumbar 2-3,Lumbar 3-4,Lumbar 4-5;  Surgeon: Hasrhal Rucker MD;  Location: [facility] OR     TONSILLECTOMY       TRANSPLANT KIDNEY RECIPIENT LIVING RELATED  1989    HLA identical     VASECTOMY         Family History   Problem Relation Age of Onset     Diabetes Mother      Colon Cancer Mother 78     Cardiac Sudden Death Sister 87     Diabetes Brother      Cerebrovascular Disease Brother        Current Outpatient Prescriptions   Medication Sig Dispense Refill     acetaminophen (TYLENOL ARTHRITIS PAIN) 650 MG CR tablet Take 2 tablets by mouth 3 times daily.       allopurinol (ZYLOPRIM) 100 MG tablet Take 2 tablets by mouth daily. (Patient taking differently: Take 100 mg by mouth 2 times daily ) 180 tablet 3     BABY ASPIRIN PO Take 81 mg by mouth daily       BEVACizumab (AVASTIN) 400 MG/16ML injection Every 5 weeks       Brimonidine Tartrate (ALPHAGAN OP) Place 1 drop into the right eye 2 times daily        cholecalciferol (VITAMIN D3) 5000 UNITS TABS tablet Take 1 tablet (5,000 Units) by mouth daily 30 tablet 0     cycloSPORINE modified (GENERIC EQUIVALENT) 100 MG capsule Take 1 capsule (100 mg) by mouth 2 times daily Total dose 150 mg twice daily 180 capsule 3     cycloSPORINE modified (GENERIC EQUIVALENT) 25 MG capsule Take 2 capsules (50 mg) by mouth 2 times daily Total dose 150 mg twice daily 180 capsule 3     epoetin freya (PROCRIT) 11368 UNIT/ML injection Inject 1 mL (20,000 Units) Subcutaneous once a week As needed for hgb < 10 4 mL 5     fenofibrate 145 MG tablet Take 145 mg by mouth daily       ISOSORBIDE MONONITRATE PO Take 60 mg by mouth daily        lidocaine (XYLOCAINE) 5 % ointment Apply topically daily as needed for moderate pain 50 g 1     loperamide  (IMODIUM) 2 MG capsule Take 2 mg by mouth daily        losartan (COZAAR) 50 MG tablet Take 0.5 tablets (25 mg) by mouth daily 90 tablet 3     metoprolol (LOPRESSOR) 100 MG tablet Take 1 tablet by mouth 2 times daily. 60 tablet 6     NIFEdipine ER osmotic (PROCARDIA XL) 60 MG TB24 Take 1 tablet (60 mg) by mouth daily 90 tablet 3     nitroGLYCERIN (NITROSTAT) 0.4 MG SL tablet Place 0.4 mg under the tongue every 5 minutes as needed.       NONFORMULARY Take 1 tablet by mouth 2 times daily Focus Macula Pro:  Eye vitamin and mineral supplement A, C, E, Zinc, Copper        omega-3 acid ethyl esters (LOVAZA) 1 G capsule Take 2 g by mouth 2 times daily        oxyCODONE IR (ROXICODONE) 5 MG tablet Take 1-2 tablets (5-10 mg) by mouth every 4 hours as needed for other (pain control or improvement in physical function. Hold dose for analgesic side effects.) 80 tablet 0     PANTOPRAZOLE SODIUM PO Take 40 mg by mouth daily as needed for heartburn       predniSONE (DELTASONE) 5 MG tablet Take 5 mg by mouth daily (with lunch)        probenecid (BENEMID) 500 MG tablet Take 500 mg by mouth 2 times daily.       Rosuvastatin Calcium (CRESTOR PO) Take 20 mg by mouth every evening       sulfamethoxazole-trimethoprim (BACTRIM,SEPTRA) 400-80 MG per tablet Take 1 tablet by mouth every other day        tamsulosin (FLOMAX) 0.4 MG capsule Take 1 capsule (0.4 mg) by mouth daily 60 capsule 1     warfarin (COUMADIN) 5 MG tablet Take 1 tablet (5 mg) by mouth daily 30 tablet 1       ALLERGIES: Contrast dye; Pravastatin; and Simvastatin      REVIEW OF SYSTEMS:  Skin: negative  Eyes: negative  Ears/Nose/Throat: negative  Respiratory: No shortness of breath, dyspnea on exertion, cough, or hemoptysis  Cardiovascular: negative  Gastrointestinal: negative  Genitourinary: as above  Musculoskeletal: negative  Neurologic: negative  Psychiatric: negative  Hematologic/Lymphatic/Immunologic: negative  Endocrine: negative      GENERAL PHYSICAL EXAM:   Vitals: BP  "147/82  Pulse 77  Ht 1.854 m (6' 1\")  Wt 106.6 kg (235 lb)  BMI 31 kg/m2  Body mass index is 31 kg/(m^2).  Constitutional: healthy, alert and no distress  Head: Normocephalic. No masses, lesions, tenderness or abnormalities  Neck: Neck supple. No adenopathy. Thyroid symmetric, normal size,, Carotids without bruits.  ENT: ENT exam normal, no neck nodes or sinus tenderness  Cardiovascular: negative, PMI normal. No lifts, heaves, or thrills. RRR. No murmurs, clicks gallops or rub  Respiratory: negative, Percussion normal. Good diaphragmatic excursion. Lungs clear  Gastrointestinal: Abdomen soft, non-tender. BS normal. No masses, organomegaly  Musculoskeletal: extremities normal- no gross deformities noted, gait normal and normal muscle tone  Skin: no suspicious lesions or rashes  Neurologic: Gait normal. Reflexes normal and symmetric. Sensation grossly WNL.  Psychiatric: affect normal/bright and mentation appears normal.  Hematologic/Lymphatic/Immunologic: normal ant/post cervical, axillary, supraclavicular and inguinal nodes     EXAM:   Left Flank: negative  Right Flank: negative  Inguinal Area: normal        Urinary Flow Rate  Peak Flow: 9.6 mL/s  Average Flow: 4.4 mL/s  Voided (mL): 158 mL  Residual Volume by Ultrasound: 36 mL  Character of Curve: Bell Shape    RADIOLOGY: The following tests were reviewed: Need to complete the following Radiology exams prior to the office appointment:  LABS: The last test results for Needs to complete the necessary tests prior to appointment: were reviewed.     ASSESSMENT: 61 y/o M with history of renal transplant now with bladder stone and lower urinary tract symptoms despite tamsulosin use     PLAN:   Schedule for cystoscopy next available to evaluate prostate and bladder stone       I spent over 30 minutes with the patient.  Over half this time was spent on counseling for lower urinary tract symptoms and bladder stone.          Answers for HPI/ROS submitted by the patient " on 8/11/2018   General Symptoms: No  Skin Symptoms: No  HENT Symptoms: Yes  EYE SYMPTOMS: Yes  HEART SYMPTOMS: Yes  LUNG SYMPTOMS: No  INTESTINAL SYMPTOMS: Yes  URINARY SYMPTOMS: Yes  REPRODUCTIVE SYMPTOMS: Yes  SKELETAL SYMPTOMS: No  BLOOD SYMPTOMS: Yes  NERVOUS SYSTEM SYMPTOMS: Yes  MENTAL HEALTH SYMPTOMS: No  Ear pain: No  Ear discharge: No  Hearing loss: No  Tinnitus: Yes  Nosebleeds: No  Congestion: Yes  Sinus pain: No  Trouble swallowing: No   Voice hoarseness: No  Mouth sores: No  Sore throat: No  Tooth pain: No  Gum tenderness: Yes  Bleeding gums: No  Change in taste: No  Change in sense of smell: No  Dry mouth: Yes  Hearing aid used: No  Neck lump: No  Eye pain: No  Vision loss: No  Dry eyes: No  Watery eyes: No  Eye bulging: No  Double vision: No  Flashing of lights: No  Spots: No  Floaters: Yes  Redness: No  Crossed eyes: No  Tunnel Vision: No  Yellowing of eyes: No  Eye irritation: No  Chest pain or pressure: No  Fast or irregular heartbeat: No  Pain in legs with walking: No  Trouble breathing while lying down: No  Fingers or toes appear blue: No  High blood pressure: Yes  Low blood pressure: Yes  Fainting: No  Murmurs: No  Pacemaker: No  Varicose veins: No  Edema or swelling: Yes  Wake up at night with shortness of breath: No  Light-headedness: Yes  Exercise intolerance: Yes  Heart burn or indigestion: Yes  Nausea: No  Vomiting: No  Abdominal pain: No  Bloating: No  Constipation: No  Diarrhea: No  Blood in stool: No  Black stools: No  Rectal or Anal pain: No  Fecal incontinence: Yes  Yellowing of skin or eyes: No  Vomit with blood: No  Change in stools: Yes  Trouble holding urine or incontinence: No  Pain or burning: No  Trouble starting or stopping: Yes  Increased frequency of urination: No  Blood in urine: No  Decreased frequency of urination: No  Frequent nighttime urination: No  Flank pain: No  Difficulty emptying bladder: No  Anemia: Yes  Swollen glands: No  Easy bleeding or bruising: No  Trouble  with coordination: Yes  Dizziness or trouble with balance: No  Fainting or black-out spells: No  Memory loss: No  Headache: No  Seizures: No  Speech problems: No  Tingling: Yes  Tremor: No  Weakness: Yes  Difficulty walking: No  Paralysis: No  Numbness: Yes  Scrotal pain or swelling: No  Erectile dysfunction: Yes  Penile discharge: No  Genital ulcers: No  Reduced libido: Yes

## 2018-08-11 NOTE — MR AVS SNAPSHOT
After Visit Summary   8/11/2018    Rory Bustamante    MRN: [id number]           Patient Information     Date Of Birth          1955        Visit Information        Provider Department      8/11/2018 9:00 AM Meagan Story MD [facility] for Prostate and Urologic Cancers         Follow-ups after your visit        Your next 10 appointments already scheduled     Aug 13, 2018  2:00 PM CDT   (Arrive by 1:45 PM)   Return Visit with Isai Hall MD   [facility] ([facility])    [address] [place] [address]  5th [facility] [address]   [phone]            Aug 30, 2018 10:30 AM CDT   (Arrive by 10:00 AM)   RETURN SPINE with Harshal Rucker MD   [facility] ([facility])    [address] [place] [address]   [phone]            Aug 30, 2018 11:30 AM CDT   (Arrive by 11:15 AM)   PAC EVALUATION with Violeta Gregorio PA-C   [facility] [place] ([facility])    [address] [place] [address]   [phone]            Aug 30, 2018 12:30 PM CDT   (Arrive by 12:15 PM)   PAC RN ASSESSMENT with  Pac Rn   [facility] [place] ([facility])    [address] [place] [address]   [phone]            Aug 30, 2018  1:00 PM CDT   (Arrive by 12:45 PM)   PAC Anesthesia Consult with  Pac Anesthesiologist   [facility] [place] ([facility])    [address] [place] [address]   [phone]            Sep 19, 2018   Procedure with Sydney Dawkins MD   [facility], [place], Same Day Surgery (--)    [address] [facility] [address]   [phone]            Oct 03, 2018  1:00 PM CDT   (Arrive by 12:45 PM)   Post-Op with ELYSE Odonnell [facility]  Solid Organ Transplant ([facility])    [address]  [address]  [facility] [address]   [phone]            Oct 12, 2018  8:15 AM CDT   (Arrive by 8:00 AM)   Cystoscopy with Meagan Story MD   [facility] for Prostate and Urologic Cancers ([facility])    [address]  4th Floor  [facility] [address]   [phone]            Oct 15, 2018  9:00 AM CDT   MR ABDOMEN MRCP W/O & W CONTRAST with UUMR1   [facility], [place], MRI ([facility])    [address] [address] [phone]           Take your medicines as usual, unless your doctor tells you not to. Bring a list of your current medicines to your exam (including vitamins, minerals and over-the-counter drugs). Also bring the results of similar scans you may have had.    You may or may not receive IV contrast for this exam pending the discretion of the Radiologist.   Do not eat or drink for 6 hours prior to exam.  The MRI machine uses a strong magnet. Please wear clothes without metal (snaps, zippers). A sweatsuit works well, or we may give you a hospital gown.  Please remove any body piercings and hair extensions before you arrive. You will also remove watches, jewelry, hairpins, wallets, dentures, partial dental plates and hearing aids. You may wear contact lenses, and you may be able to wear your rings. We have a safe place to keep your personal items, but it is safer to leave them at home.  **IMPORTANT** THE INSTRUCTIONS BELOW ARE ONLY FOR THOSE PATIENTS WHO HAVE BEEN PRESCRIBED SEDATION OR GENERAL ANESTHESIA DURING THEIR MRI PROCEDURE:  IF YOUR DOCTOR PRESCRIBED ORAL SEDATION (take medicine to help you relax during your exam):   You must get the medicine from your doctor (oral medication) before you arrive. Bring the medicine to the exam. Do not take it at home. You ll be told when to take  it upon arriving for your exam.   Arrive one hour early. Bring someone who can take you home after the test. Your medicine will make you sleepy. After the exam, you may not drive, take a bus or take a taxi by yourself.  IF YOUR DOCTOR PRESCRIBED IV SEDATION:   Arrive one hour early. Bring someone who can take you home after the test. Your medicine will make you sleepy. After the exam, you may not drive, take a bus or take a taxi by yourself.   No eating 6 hours before your exam. You may have clear liquids up until 4 hours before your exam. (Clear liquids include water, clear tea, black coffee and fruit juice without pulp.)  IF YOUR DOCTOR PRESCRIBED ANESTHESIA (be asleep for your exam):   Arrive 1 1/2 hours early. Bring someone who can take you home after the test. You may not drive, take a bus or take a taxi by yourself.   No eating 8 hours before your exam. You may have clear liquids up until 4 hours before your exam. (Clear liquids include water, clear tea, black coffee and fruit juice without pulp.)   You will spend four to five hours in the recovery room.  If you have any questions, please contact your Imaging Department exam site.            Oct 15, 2018 10:00 AM CDT   (Arrive by 9:45 AM)   New Patient Visit with Shade Manning MD   [facility] ([facility])    [address]  [address]  [facility] [address] [phone]              Who to contact     Please call your clinic at [phone] to:    Ask questions about your health    Make or cancel appointments    Discuss your medicines    Learn about your test results    Speak to your doctor            Additional Information About Your Visit        MyChart Information     [employer] gives you secure access to your electronic health record. If you see a primary care provider, you can also send messages to your care team and make appointments. If you have questions, please call your primary care clinic.   "If you do not have a primary care provider, please call [phone] and they will assist you.      [employer] is an electronic gateway that provides easy, online access to your medical records. With [employer], you can request a clinic appointment, read your test results, renew a prescription or communicate with your care team.     To access your existing account, please contact your [facility] or call [phone] for assistance.        Care EveryWhere ID     This is your Care EveryWhere ID. This could be used by other organizations to access your [facility] medical records  [id number]        Your Vitals Were     Pulse Height BMI (Body Mass Index)             77 1.854 m (6' 1\") 31 kg/m2          Blood Pressure from Last 3 Encounters:   08/11/18 147/82   08/08/18 138/78   08/06/18 143/83    Weight from Last 3 Encounters:   08/11/18 106.6 kg (235 lb)   08/06/18 105.5 kg (232 lb 8 oz)   07/24/18 107.5 kg (237 lb)              Today, you had the following     No orders found for display         Today's Medication Changes          These changes are accurate as of 8/11/18 10:08 AM.  If you have any questions, ask your nurse or doctor.               These medicines have changed or have updated prescriptions.        Dose/Directions    allopurinol 100 MG tablet   Commonly known as:  ZYLOPRIM   This may have changed:    - how much to take  - when to take this   Used for:  Kidney replaced by transplant, Gout        Dose:  200 mg   Take 2 tablets by mouth daily.   Quantity:  180 tablet   Refills:  3                Primary Care Provider Office Phone # Fax #    Moris Diaz [phone]       [facility] [address]  [facility] [address]        Equal Access to Services     FIDELIA JACOBSEN : Hadii av Doss, wanguyenda lindsay, qaybta devin hudson. So [facility] [phone].    ATENCIÓN: Si rod etienne " carr disposición servicios gratuitos de asistencia lingüística. Larissa boogie [phone].    We comply with applicable federal civil rights laws and [place] laws. We do not discriminate on the basis of race, color, national origin, age, disability, sex, sexual orientation, or gender identity.            Thank you!     Thank you for choosing [facility] UROLOGY AND [facility] FOR PROSTATE AND UROLOGIC CANCERS  for your care. Our goal is always to provide you with excellent care. Hearing back from our patients is one way we can continue to improve our services. Please take a few minutes to complete the written survey that you may receive in the mail after your visit with us. Thank you!             Your Updated Medication List - Protect others around you: Learn how to safely use, store and throw away your medicines at www.disposemymeds.org.          This list is accurate as of 8/11/18 10:08 AM.  Always use your most recent med list.                   Brand Name Dispense Instructions for use Diagnosis    allopurinol 100 MG tablet    ZYLOPRIM    180 tablet    Take 2 tablets by mouth daily.    Kidney replaced by transplant, Gout       ALPHAGAN OP      Place 1 drop into the right eye 2 times daily        BABY ASPIRIN PO      Take 81 mg by mouth daily        BEVACizumab 400 MG/16ML injection    AVASTIN     Every 5 weeks        cholecalciferol 5000 units Tabs tablet    vitamin D3    30 tablet    Take 1 tablet (5,000 Units) by mouth daily    Hyperparathyroidism due to renal insufficiency (H)       CRESTOR PO      Take 20 mg by mouth every evening        * cycloSPORINE modified 25 MG capsule    GENERIC EQUIVALENT    180 capsule    Take 2 capsules (50 mg) by mouth 2 times daily Total dose 150 mg twice daily    Kidney transplant recipient, Long-term use of immunosuppressant medication       * cycloSPORINE modified 100 MG capsule    GENERIC EQUIVALENT    180 capsule    Take 1 capsule (100 mg) by mouth 2 times daily Total dose 150 mg twice  daily    Kidney transplant recipient, Long-term use of immunosuppressant medication       epoetin freya 66869 UNIT/ML injection    PROCRIT    4 mL    Inject 1 mL (20,000 Units) Subcutaneous once a week As needed for hgb < 10    CKD (chronic kidney disease) stage 4, GFR 15-29 ml/min (H), Anemia in stage 4 chronic kidney disease (H)       fenofibrate 145 MG tablet      Take 145 mg by mouth daily        ISOSORBIDE MONONITRATE PO      Take 60 mg by mouth daily        lidocaine 5 % ointment    XYLOCAINE    50 g    Apply topically daily as needed for moderate pain    Left hip pain       loperamide 2 MG capsule    IMODIUM     Take 2 mg by mouth daily        losartan 50 MG tablet    COZAAR    90 tablet    Take 0.5 tablets (25 mg) by mouth daily    Kidney replaced by transplant       metoprolol tartrate 100 MG tablet    LOPRESSOR    60 tablet    Take 1 tablet by mouth 2 times daily.    Unspecified hypertensive kidney disease with chronic kidney disease stage I through stage IV, or unspecified(403.90), Kidney replaced by transplant       NIFEdipine ER osmotic 60 MG Tb24    PROCARDIA XL    90 tablet    Take 1 tablet (60 mg) by mouth daily    HTN (hypertension)       NITROSTAT 0.4 MG sublingual tablet   Generic drug:  nitroGLYcerin      Place 0.4 mg under the tongue every 5 minutes as needed.        NONFORMULARY      Take 1 tablet by mouth 2 times daily Focus Macula Pro:  Eye vitamin and mineral supplement A, C, E, Zinc, Copper        omega-3 acid ethyl esters 1 g capsule    Lovaza     Take 2 g by mouth 2 times daily        oxyCODONE IR 5 MG tablet    ROXICODONE    80 tablet    Take 1-2 tablets (5-10 mg) by mouth every 4 hours as needed for other (pain control or improvement in physical function. Hold dose for analgesic side effects.)    Spinal stenosis of lumbar region with neurogenic claudication       PANTOPRAZOLE SODIUM PO      Take 40 mg by mouth daily as needed for heartburn        predniSONE 5 MG tablet    DELTASONE      Take 5 mg by mouth daily (with lunch)        probenecid 500 MG tablet    BENEMID     Take 500 mg by mouth 2 times daily.        sulfamethoxazole-trimethoprim 400-80 MG per tablet    BACTRIM/SEPTRA     Take 1 tablet by mouth every other day        tamsulosin 0.4 MG capsule    FLOMAX    60 capsule    Take 1 capsule (0.4 mg) by mouth daily    Benign prostatic hyperplasia, unspecified whether lower urinary tract symptoms present       TYLENOL ARTHRITIS PAIN 650 MG CR tablet   Generic drug:  acetaminophen      Take 2 tablets by mouth 3 times daily.        warfarin 5 MG tablet    COUMADIN    30 tablet    Take 1 tablet (5 mg) by mouth daily    Paroxysmal atrial fibrillation (H)       * Notice:  This list has 2 medication(s) that are the same as other medications prescribed for you. Read the directions carefully, and ask your doctor or other care provider to review them with you.

## 2018-08-11 NOTE — LETTER
8/11/2018       RE: Rory Bustamante  [address]  [facility] [address]     Dear Colleague,    Thank you for referring your patient, Rory Bustamante, to the [facility] UROLOGY AND INST FOR PROSTATE AND UROLOGIC CANCERS at [facility]. Please see a copy of my visit note below.    It was my pleasure to see Rory Bustamante a 62 year old year old male today. Patient was seen in consultation for lower urinary tract symptoms and bladder stone.    HPI:     Patient presents for evaluation and management of bladder stone and lower urinary tract symptoms.      Patient is s/p transplant in 1989 but now with decreasing GFR. Will be listed for transplant.   Had CT scan at [facility] that showed an 1.2cm bladder stone near the insertion of the transplant ureter.     Patient also notes lower urinary tract symptoms as below despite taking tamsulosin.       Hesitancy   Slowed stream occasionally   Occasional sensation of incomplete emptying   Sometimes needs to sit to urinate due to spraying stream   No hematuria   No dysuria   No straining to void   No urgency   No UUI    Had episode of urinary retention after knee replacement. Had slowed stream prior but then developed AUR. Started tamsulosin after this. Has been taking since 2015.     Also had urinary retention after back surgery.          Past Medical History:   Diagnosis Date     Angina effort (H) 8/2016     Arthritis      BPH (benign prostatic hyperplasia)      CAD (coronary artery disease) July 2011    MI in July 2011, stent placed, on plavix and aspirin     Glaucoma      Gout     Uric acid as high as 11 previously, now on allopurinol and probenecid     HTN (hypertension)      Hypercholesteremia 2015     Hypertriglyceridemia 2015     Intraocular bleeding     previously treated 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  HLA identical transplant.  Biopsy in 4/2008 with recurrent MPGN type 2, mild interstitial fibrosis and tubular atrophy, CNI toxicity     Spinal stenosis 2013     Warts        Past Surgical History:   Procedure Laterality Date     C TOTAL KNEE ARTHROPLASTY  2015     LAMINECTOMY LUMBAR MINIMALLY INVASIVE THREE+ LEVELS N/A 6/11/2018    Procedure: LAMINECTOMY LUMBAR MINIMALLY INVASIVE THREE+ LEVELS;  Minimally Invasive Surgery Laminectomies Lumbar 2-3,Lumbar 3-4,Lumbar 4-5;  Surgeon: Harshal Rucker MD;  Location: [facility]     TONSILLECTOMY       TRANSPLANT KIDNEY RECIPIENT LIVING RELATED  1989    HLA identical     VASECTOMY         Family History   Problem Relation Age of Onset     Diabetes Mother      Colon Cancer Mother 78     Cardiac Sudden Death Sister 87     Diabetes Brother      Cerebrovascular Disease Brother        Current Outpatient Prescriptions   Medication Sig Dispense Refill     acetaminophen (TYLENOL ARTHRITIS PAIN) 650 MG CR tablet Take 2 tablets by mouth 3 times daily.       allopurinol (ZYLOPRIM) 100 MG tablet Take 2 tablets by mouth daily. (Patient taking differently: Take 100 mg by mouth 2 times daily ) 180 tablet 3     BABY ASPIRIN PO Take 81 mg by mouth daily       BEVACizumab (AVASTIN) 400 MG/16ML injection Every 5 weeks       Brimonidine Tartrate (ALPHAGAN OP) Place 1 drop into the right eye 2 times daily        cholecalciferol (VITAMIN D3) 5000 UNITS TABS tablet Take 1 tablet (5,000 Units) by mouth daily 30 tablet 0     cycloSPORINE modified (GENERIC EQUIVALENT) 100 MG capsule Take 1 capsule (100 mg) by mouth 2 times daily Total dose 150 mg twice daily 180 capsule 3     cycloSPORINE modified (GENERIC EQUIVALENT) 25 MG capsule Take 2 capsules (50 mg) by mouth 2 times daily Total dose 150 mg twice daily 180 capsule 3     epoetin freya (PROCRIT) 59679 UNIT/ML  "injection Inject 1 mL (20,000 Units) Subcutaneous once a week As needed for hgb < 10 4 mL 5     fenofibrate 145 MG tablet Take 145 mg by mouth daily       ISOSORBIDE MONONITRATE PO Take 60 mg by mouth daily        lidocaine (XYLOCAINE) 5 % ointment Apply topically daily as needed for moderate pain 50 g 1     loperamide (IMODIUM) 2 MG capsule Take 2 mg by mouth daily        losartan (COZAAR) 50 MG tablet Take 0.5 tablets (25 mg) by mouth daily 90 tablet 3     metoprolol (LOPRESSOR) 100 MG tablet Take 1 tablet by mouth 2 times daily. 60 tablet 6     NIFEdipine ER osmotic (PROCARDIA XL) 60 MG TB24 Take 1 tablet (60 mg) by mouth daily 90 tablet 3     nitroGLYCERIN (NITROSTAT) 0.4 MG SL tablet Place 0.4 mg under the tongue every 5 minutes as needed.       NONFORMULARY Take 1 tablet by mouth 2 times daily Focus Macula Pro:  Eye vitamin and mineral supplement A, C, E, Zinc, Copper        omega-3 acid ethyl esters (LOVAZA) 1 G capsule Take 2 g by mouth 2 times daily        oxyCODONE IR (ROXICODONE) 5 MG tablet Take 1-2 tablets (5-10 mg) by mouth every 4 hours as needed for other (pain control or improvement in physical function. Hold dose for analgesic side effects.) 80 tablet 0     PANTOPRAZOLE SODIUM PO Take 40 mg by mouth daily as needed for heartburn       predniSONE (DELTASONE) 5 MG tablet Take 5 mg by mouth daily (with lunch)        probenecid (BENEMID) 500 MG tablet Take 500 mg by mouth 2 times daily.       Rosuvastatin Calcium (CRESTOR PO) Take 20 mg by mouth every evening       sulfamethoxazole-trimethoprim (BACTRIM,SEPTRA) 400-80 MG per tablet Take 1 tablet by mouth every other day        tamsulosin (FLOMAX) 0.4 MG capsule Take 1 capsule (0.4 mg) by mouth daily 60 capsule 1     warfarin (COUMADIN) 5 MG tablet Take 1 tablet (5 mg) by mouth daily 30 tablet 1       ALLERGIES: Contrast dye; Pravastatin; and Simvastatin      GENERAL PHYSICAL EXAM:   Vitals: /82  Pulse 77  Ht 1.854 m (6' 1\")  Wt 106.6 kg (235 " lb)  BMI 31 kg/m2  Body mass index is 31 kg/(m^2).  Constitutional: healthy, alert and no distress  Head: Normocephalic. No masses, lesions, tenderness or abnormalities  Neck: Neck supple. No adenopathy. Thyroid symmetric, normal size,, Carotids without bruits.  ENT: ENT exam normal, no neck nodes or sinus tenderness  Cardiovascular: negative, PMI normal. No lifts, heaves, or thrills. RRR. No murmurs, clicks gallops or rub  Respiratory: negative, Percussion normal. Good diaphragmatic excursion. Lungs clear  Gastrointestinal: Abdomen soft, non-tender. BS normal. No masses, organomegaly  Musculoskeletal: extremities normal- no gross deformities noted, gait normal and normal muscle tone  Skin: no suspicious lesions or rashes  Neurologic: Gait normal. Reflexes normal and symmetric. Sensation grossly WNL.  Psychiatric: affect normal/bright and mentation appears normal.  Hematologic/Lymphatic/Immunologic: normal ant/post cervical, axillary, supraclavicular and inguinal nodes     EXAM:   Left Flank: negative  Right Flank: negative  Inguinal Area: normal        Urinary Flow Rate  Peak Flow: 9.6 mL/s  Average Flow: 4.4 mL/s  Voided (mL): 158 mL  Residual Volume by Ultrasound: 36 mL  Character of Curve: Bell Shape    RADIOLOGY: The following tests were reviewed: Need to complete the following Radiology exams prior to the office appointment:  LABS: The last test results for Needs to complete the necessary tests prior to appointment: were reviewed.     ASSESSMENT: 61 y/o M with history of renal transplant now with bladder stone and lower urinary tract symptoms despite tamsulosin use     PLAN:   Schedule for cystoscopy next available to evaluate prostate and bladder stone       I spent over 30 minutes with the patient.  Over half this time was spent on counseling for lower urinary tract symptoms and bladder stone.    Again, thank you for allowing me to participate in the care of your patient.      Sincerely,    Meagan  Jigna Story MD

## 2018-08-13 ENCOUNTER — OFFICE VISIT (OUTPATIENT)
Dept: ORTHOPEDICS | Facility: CLINIC | Age: 63
End: 2018-08-13
Payer: MEDICARE

## 2018-08-13 VITALS — HEIGHT: 73 IN | RESPIRATION RATE: 16 BRPM | BODY MASS INDEX: 31.14 KG/M2 | WEIGHT: 235 LBS

## 2018-08-13 DIAGNOSIS — M25.552 PAIN IN JOINT INVOLVING LEFT PELVIC REGION AND THIGH: Primary | ICD-10-CM

## 2018-08-13 RX ORDER — TRIAMCINOLONE ACETONIDE 40 MG/ML
40 INJECTION, SUSPENSION INTRA-ARTICULAR; INTRAMUSCULAR
Status: SHIPPED | OUTPATIENT
Start: 2018-08-13

## 2018-08-13 RX ORDER — LIDOCAINE HYDROCHLORIDE 10 MG/ML
3 INJECTION, SOLUTION INFILTRATION; PERINEURAL
Status: SHIPPED | OUTPATIENT
Start: 2018-08-13

## 2018-08-13 RX ADMIN — TRIAMCINOLONE ACETONIDE 40 MG: 40 INJECTION, SUSPENSION INTRA-ARTICULAR; INTRAMUSCULAR at 14:35

## 2018-08-13 RX ADMIN — LIDOCAINE HYDROCHLORIDE 3 ML: 10 INJECTION, SOLUTION INFILTRATION; PERINEURAL at 14:35

## 2018-08-13 NOTE — MR AVS SNAPSHOT
After Visit Summary   8/13/2018    Rory Bustamante    MRN: [id number]           Patient Information     Date Of Birth          1955        Visit Information        Provider Department      8/13/2018 2:00 PM Isai Hall MD [facility]        Today's Diagnoses     Pain in joint involving left pelvic region and thigh    -  1       Follow-ups after your visit        Your next 10 appointments already scheduled     Aug 30, 2018 10:30 AM CDT   (Arrive by 10:00 AM)   RETURN SPINE with Harshal Rucker MD   [facility] ([facility])    [address]  [facility] [address]   [phone]            Aug 30, 2018 11:30 AM CDT   (Arrive by 11:15 AM)   PAC EVALUATION with Violeta Gregorio PA-C   [facility] ([facility])    [address]  [facility] [address]   [phone]            Aug 30, 2018 12:30 PM CDT   (Arrive by 12:15 PM)   PAC RN ASSESSMENT with  Pac Rn   [facility] ([facility])    [address]  [facility] [address]   [phone]            Aug 30, 2018  1:00 PM CDT   (Arrive by 12:45 PM)   PAC Anesthesia Consult with  Pac Anesthesiologist   [facility] ([facility])    [address]  [facility] [address]   [phone]            Sep 19, 2018   Procedure with Sydney Dawkins MD   [facility] [place], Same Day Surgery (--)    [address] [address]   [phone]            Oct 03, 2018  1:00 PM CDT   (Arrive by 12:45 PM)   Post-Op with ELYSE Odonnell CNS   [facility] ([facility])    [address] [place] [address]  [address]  [facility] [address]   [phone]            Oct 12, 2018  8:15 AM CDT   (Arrive by 8:00 AM)    Cystoscopy with Meagan Story MD   [facility] ([facility])    [address]  4th [address]  [facility] [place] [address] [phone]            Oct 15, 2018  9:00 AM CDT   MR ABDOMEN MRCP W/O & W CONTRAST with UUMR1   [facility] [place], MRI ([facility])    [address] [facility] [id number] [phone]           Take your medicines as usual, unless your doctor tells you not to. Bring a list of your current medicines to your exam (including vitamins, minerals and over-the-counter drugs). Also bring the results of similar scans you may have had.    You may or may not receive IV contrast for this exam pending the discretion of the Radiologist.   Do not eat or drink for 6 hours prior to exam.  The MRI machine uses a strong magnet. Please wear clothes without metal (snaps, zippers). A sweatsuit works well, or we may give you a hospital gown.  Please remove any body piercings and hair extensions before you arrive. You will also remove watches, jewelry, hairpins, wallets, dentures, partial dental plates and hearing aids. You may wear contact lenses, and you may be able to wear your rings. We have a safe place to keep your personal items, but it is safer to leave them at home.  **IMPORTANT** THE INSTRUCTIONS BELOW ARE ONLY FOR THOSE PATIENTS WHO HAVE BEEN PRESCRIBED SEDATION OR GENERAL ANESTHESIA DURING THEIR MRI PROCEDURE:  IF YOUR DOCTOR PRESCRIBED ORAL SEDATION (take medicine to help you relax during your exam):   You must get the medicine from your doctor (oral medication) before you arrive. Bring the medicine to the exam. Do not take it at home. You ll be told when to take it upon arriving for your exam.   Arrive one hour early. Bring someone who can take you home after the test. Your medicine will make you sleepy. After the exam, you may not drive, take a bus or  take a taxi by yourself.  IF YOUR DOCTOR PRESCRIBED IV SEDATION:   Arrive one hour early. Bring someone who can take you home after the test. Your medicine will make you sleepy. After the exam, you may not drive, take a bus or take a taxi by yourself.   No eating 6 hours before your exam. You may have clear liquids up until 4 hours before your exam. (Clear liquids include water, clear tea, black coffee and fruit juice without pulp.)  IF YOUR DOCTOR PRESCRIBED ANESTHESIA (be asleep for your exam):   Arrive 1 1/2 hours early. Bring someone who can take you home after the test. You may not drive, take a bus or take a taxi by yourself.   No eating 8 hours before your exam. You may have clear liquids up until 4 hours before your exam. (Clear liquids include water, clear tea, black coffee and fruit juice without pulp.)   You will spend four to five hours in the recovery room.  If you have any questions, please contact your Imaging Department exam site.            Oct 15, 2018 10:00 AM CDT   (Arrive by 9:45 AM)   New Patient Visit with Shade Manning MD   [facility] ([facility])    [address] [place] [address]  [address] [place] [address] [phone]              Who to contact     Please call your clinic at [phone] to:    Ask questions about your health    Make or cancel appointments    Discuss your medicines    Learn about your test results    Speak to your doctor            Additional Information About Your Visit        [employer] Information     [employer] gives you secure access to your electronic health record. If you see a primary care provider, you can also send messages to your care team and make appointments. If you have questions, please call your primary care clinic.  If you do not have a primary care provider, please call [phone] and they will assist you.      [employer] is an electronic gateway that provides easy, online access to your medical records.  "With MyChart, you can request a clinic appointment, read your test results, renew a prescription or communicate with your care team.     To access your existing account, please contact your [facility] or call [phone] for assistance.        Care EveryWhere ID     This is your Care EveryWhere ID. This could be used by other organizations to access your [facility] medical records  [id number]        Your Vitals Were     Respirations Height BMI (Body Mass Index)             16 6' 1\" (1.854 m) 31 kg/m2          Blood Pressure from Last 3 Encounters:   08/11/18 147/82   08/08/18 138/78   08/06/18 143/83    Weight from Last 3 Encounters:   08/13/18 235 lb (106.6 kg)   08/11/18 235 lb (106.6 kg)   08/06/18 232 lb 8 oz (105.5 kg)              We Performed the Following     Large Joint Injection/Arthocentesis          Today's Medication Changes          These changes are accurate as of 8/13/18  4:09 PM.  If you have any questions, ask your nurse or doctor.               These medicines have changed or have updated prescriptions.        Dose/Directions    allopurinol 100 MG tablet   Commonly known as:  ZYLOPRIM   This may have changed:    - how much to take  - when to take this   Used for:  Kidney replaced by transplant, Gout        Dose:  200 mg   Take 2 tablets by mouth daily.   Quantity:  180 tablet   Refills:  3                Primary Care Provider Office Phone # Fax #    Moris Diaz [phone]       [facility] [address]  [facility] [address]        Equal Access to Services     AZEEM JACOBSEN : Hadii aad ku hadasho Soomaali, waaxda luqadaha, qaybta kaalmada adeegyada, devin perez. So [facility] [phone].    ATENCIÓN: Si habla español, tiene a carr disposición servicios gratuitos de asistencia lingüística. Llame al [phone].    We comply with applicable federal civil rights laws and [place] laws. We do not " discriminate on the basis of race, color, national origin, age, disability, sex, sexual orientation, or gender identity.            Thank you!     Thank you for choosing [facility]  for your care. Our goal is always to provide you with excellent care. Hearing back from our patients is one way we can continue to improve our services. Please take a few minutes to complete the written survey that you may receive in the mail after your visit with us. Thank you!             Your Updated Medication List - Protect others around you: Learn how to safely use, store and throw away your medicines at www.disposemymeds.org.          This list is accurate as of 8/13/18  4:09 PM.  Always use your most recent med list.                   Brand Name Dispense Instructions for use Diagnosis    allopurinol 100 MG tablet    ZYLOPRIM    180 tablet    Take 2 tablets by mouth daily.    Kidney replaced by transplant, Gout       ALPHAGAN OP      Place 1 drop into the right eye 2 times daily        BABY ASPIRIN PO      Take 81 mg by mouth daily        BEVACizumab 400 MG/16ML injection    AVASTIN     Every 5 weeks        cholecalciferol 5000 units Tabs tablet    vitamin D3    30 tablet    Take 1 tablet (5,000 Units) by mouth daily    Hyperparathyroidism due to renal insufficiency (H)       CRESTOR PO      Take 20 mg by mouth every evening        * cycloSPORINE modified 25 MG capsule    GENERIC EQUIVALENT    180 capsule    Take 2 capsules (50 mg) by mouth 2 times daily Total dose 150 mg twice daily    Kidney transplant recipient, Long-term use of immunosuppressant medication       * cycloSPORINE modified 100 MG capsule    GENERIC EQUIVALENT    180 capsule    Take 1 capsule (100 mg) by mouth 2 times daily Total dose 150 mg twice daily    Kidney transplant recipient, Long-term use of immunosuppressant medication       epoetin freya 96743 UNIT/ML injection    PROCRIT    4 mL    Inject 1 mL (20,000 Units) Subcutaneous once a week As  needed for hgb < 10    CKD (chronic kidney disease) stage 4, GFR 15-29 ml/min (H), Anemia in stage 4 chronic kidney disease (H)       fenofibrate 145 MG tablet      Take 145 mg by mouth daily        ISOSORBIDE MONONITRATE PO      Take 60 mg by mouth daily        lidocaine 5 % ointment    XYLOCAINE    50 g    Apply topically daily as needed for moderate pain    Left hip pain       loperamide 2 MG capsule    IMODIUM     Take 2 mg by mouth daily        losartan 50 MG tablet    COZAAR    90 tablet    Take 0.5 tablets (25 mg) by mouth daily    Kidney replaced by transplant       metoprolol tartrate 100 MG tablet    LOPRESSOR    60 tablet    Take 1 tablet by mouth 2 times daily.    Unspecified hypertensive kidney disease with chronic kidney disease stage I through stage IV, or unspecified(403.90), Kidney replaced by transplant       NIFEdipine ER osmotic 60 MG Tb24    PROCARDIA XL    90 tablet    Take 1 tablet (60 mg) by mouth daily    HTN (hypertension)       NITROSTAT 0.4 MG sublingual tablet   Generic drug:  nitroGLYcerin      Place 0.4 mg under the tongue every 5 minutes as needed.        NONFORMULARY      Take 1 tablet by mouth 2 times daily Focus Macula Pro:  Eye vitamin and mineral supplement A, C, E, Zinc, Copper        omega-3 acid ethyl esters 1 g capsule    Lovaza     Take 2 g by mouth 2 times daily        oxyCODONE IR 5 MG tablet    ROXICODONE    80 tablet    Take 1-2 tablets (5-10 mg) by mouth every 4 hours as needed for other (pain control or improvement in physical function. Hold dose for analgesic side effects.)    Spinal stenosis of lumbar region with neurogenic claudication       PANTOPRAZOLE SODIUM PO      Take 40 mg by mouth daily as needed for heartburn        predniSONE 5 MG tablet    DELTASONE     Take 5 mg by mouth daily (with lunch)        probenecid 500 MG tablet    BENEMID     Take 500 mg by mouth 2 times daily.        sulfamethoxazole-trimethoprim 400-80 MG per tablet    BACTRIM/SEPTRA     Take  1 tablet by mouth every other day        tamsulosin 0.4 MG capsule    FLOMAX    60 capsule    Take 1 capsule (0.4 mg) by mouth daily    Benign prostatic hyperplasia, unspecified whether lower urinary tract symptoms present       TYLENOL ARTHRITIS PAIN 650 MG CR tablet   Generic drug:  acetaminophen      Take 2 tablets by mouth 3 times daily.        warfarin 5 MG tablet    COUMADIN    30 tablet    Take 1 tablet (5 mg) by mouth daily    Paroxysmal atrial fibrillation (H)       * Notice:  This list has 2 medication(s) that are the same as other medications prescribed for you. Read the directions carefully, and ask your doctor or other care provider to review them with you.

## 2018-08-13 NOTE — PROGRESS NOTES
Subjective:   Rory Bustamante is a 62 year old male with hx of chronic back pain who is c/o intermittent left lateral hip pain. This has been chronic and persistent at the lateral hip. Worse with walking.   Denies any clicking or popping. The patient would like to discuss an injection today.  He has not started PT yet, but hopefully soon. No provocation or palliation to pain.     Xray 7/16/18 with mildly decreased joint space. ? Of osteonecrosis.    Background:   Date of injury: None  Duration of symptoms: 6 months  Mechanism of Injury: Insidious Onset; Unknown   Intensity: 10/10 at the worst  Aggravating factors: Walking for awhile   Relieving Factors: Tylenol Arthritis sometimes   Prior Evaluation: Prior Physician Evalutation: Dr. Rucker     PAST MEDICAL, SOCIAL, SURGICAL AND FAMILY HISTORY: He  has a past medical history of Angina effort (H) (8/2016); Arthritis; BPH (benign prostatic hyperplasia); CAD (coronary artery disease) (July 2011); Glaucoma; Gout; HTN (hypertension); Hypercholesteremia (2015); Hypertriglyceridemia (2015); Intraocular bleeding; Macular degeneration; Neuropathy; Presence of stent in coronary artery (August 2012); Proteinuria (11/2015); S/P kidney transplant; Spinal stenosis (2013); and Warts.  He  has a past surgical history that includes Transplant kidney recipient living related (1989); TOTAL KNEE ARTHROPLASTY (2015); tonsillectomy; vasectomy; and Laminectomy lumbar minimally invasive three+ levels (N/A, 6/11/2018).  His family history includes Cardiac Sudden Death (age of onset: 87) in his sister; Cerebrovascular Disease in his brother; Colon Cancer (age of onset: 78) in his mother; Diabetes in his brother and mother.  He reports that he has never smoked. He has never used smokeless tobacco. He reports that he does not drink alcohol or use illicit drugs.    ALLERGIES: He is allergic to contrast dye; pravastatin; and simvastatin.    CURRENT MEDICATIONS: He has a current medication list  "which includes the following prescription(s): acetaminophen, allopurinol, aspirin, bevacizumab, brimonidine tartrate, cholecalciferol, cyclosporine modified, cyclosporine modified, epoetin freya, fenofibrate, isosorbide mononitrate, lidocaine, loperamide, losartan, metoprolol tartrate, nifedipine er osmotic, nitroglycerin, NONFORMULARY, omega-3 acid ethyl esters, oxycodone ir, pantoprazole sodium, prednisone, probenecid, rosuvastatin calcium, sulfamethoxazole-trimethoprim, tamsulosin, and warfarin.     REVIEW OF SYSTEMS: 3 point review of systems is negative except as noted above.     Exam:   Resp 16  Ht 6' 1\" (1.854 m)  Wt 235 lb (106.6 kg)  BMI 31 kg/m2           CONSTITUTIONAL: healthy, alert and no distress  HEAD: Normocephalic. No masses, lesions, tenderness or abnormalities  SKIN: no suspicious lesions or rashes  GAIT: normal  NEUROLOGIC: Non-focal  PSYCHIATRIC: affect normal/bright and mentation appears normal.    MUSCULOSKELETAL:  L hip  Mild tenderness low back  Tender lateral hip, prox to the glut med  5/5 FF and abduction without pain provocation  Neg SLR  IR/ER hip without pain  Neg Prieto, Neg FAdIR         Assessment/Plan:   L lateral hip pain  --this could be multifactorial. He does have a hx of back pain, have lateral pain . Per review of notes Dr. Rucker has cleared him for trial of injection.    A steroid injection was performed at L greater trochanter using 4 cc 1% plain Lidocaine and 40 mg of Kenalog. This was well tolerated. Minimal pain relief post injection. Recheck as needed if pain not improved.      Catalina Blackwood MD CAQ      Large Joint Injection/Arthocentesis  Date/Time: 8/13/2018 2:35 PM  Performed by: CATALINA BLACKWOOD  Authorized by: CATALINA BLACKWOOD     Indications:  Pain  Needle Size:  22 G  Approach:  Lateral  Location:  Hip  Site:  L greater trochanteric bursa  Medications:  3 mL lidocaine 1 %; 40 mg triamcinolone acetonide 40 MG/ML  Outcome:  Tolerated well, no " immediate complications  Procedure discussed: discussed risks, benefits, and alternatives    Consent Given by:  Patient  Timeout: timeout called immediately prior to procedure    Prep: patient was prepped and draped in usual sterile fashion     Waste: 17 mL lidocaine, single vial use.

## 2018-08-13 NOTE — LETTER
8/13/2018      RE: Rory Bustamante  [address]  [place] [address]        Subjective:   Rory Bustamante is a 62 year old male with hx of chronic back pain who is c/o intermittent left lateral hip pain. This has been chronic and persistent at the lateral hip. Worse with walking.   Denies any clicking or popping. The patient would like to discuss an injection today.  He has not started PT yet, but hopefully soon. No provocation or palliation to pain.     Xray 7/16/18 with mildly decreased joint space. ? Of osteonecrosis.    Background:   Date of injury: None  Duration of symptoms: 6 months  Mechanism of Injury: Insidious Onset; Unknown   Intensity: 10/10 at the worst  Aggravating factors: Walking for awhile   Relieving Factors: Tylenol Arthritis sometimes   Prior Evaluation: Prior Physician Evalutation: Dr. Rucker     PAST MEDICAL, SOCIAL, SURGICAL AND FAMILY HISTORY: He  has a past medical history of Angina effort (H) (8/2016); Arthritis; BPH (benign prostatic hyperplasia); CAD (coronary artery disease) (July 2011); Glaucoma; Gout; HTN (hypertension); Hypercholesteremia (2015); Hypertriglyceridemia (2015); Intraocular bleeding; Macular degeneration; Neuropathy; Presence of stent in coronary artery (August 2012); Proteinuria (11/2015); S/P kidney transplant; Spinal stenosis (2013); and Warts.  He  has a past surgical history that includes Transplant kidney recipient living related (1989); TOTAL KNEE ARTHROPLASTY (2015); tonsillectomy; vasectomy; and Laminectomy lumbar minimally invasive three+ levels (N/A, 6/11/2018).  His family history includes Cardiac Sudden Death (age of onset: 87) in his sister; Cerebrovascular Disease in his brother; Colon Cancer (age of onset: 78) in his mother; Diabetes in his brother and mother.  He reports that he has never smoked. He has never used smokeless tobacco. He reports that he does not drink alcohol or use illicit drugs.    ALLERGIES: He is allergic to contrast dye;  "pravastatin; and simvastatin.    CURRENT MEDICATIONS: He has a current medication list which includes the following prescription(s): acetaminophen, allopurinol, aspirin, bevacizumab, brimonidine tartrate, cholecalciferol, cyclosporine modified, cyclosporine modified, epoetin freya, fenofibrate, isosorbide mononitrate, lidocaine, loperamide, losartan, metoprolol tartrate, nifedipine er osmotic, nitroglycerin, NONFORMULARY, omega-3 acid ethyl esters, oxycodone ir, pantoprazole sodium, prednisone, probenecid, rosuvastatin calcium, sulfamethoxazole-trimethoprim, tamsulosin, and warfarin.     REVIEW OF SYSTEMS: 3 point review of systems is negative except as noted above.     Exam:   Resp 16  Ht 6' 1\" (1.854 m)  Wt 235 lb (106.6 kg)  BMI 31 kg/m2           CONSTITUTIONAL: healthy, alert and no distress  HEAD: Normocephalic. No masses, lesions, tenderness or abnormalities  SKIN: no suspicious lesions or rashes  GAIT: normal  NEUROLOGIC: Non-focal  PSYCHIATRIC: affect normal/bright and mentation appears normal.    MUSCULOSKELETAL:  L hip  Mild tenderness low back  Tender lateral hip, prox to the glut med  5/5 FF and abduction without pain provocation  Neg SLR  IR/ER hip without pain  Neg Prieto, Neg FAdIR         Assessment/Plan:   L lateral hip pain  --this could be multifactorial. He does have a hx of back pain, have lateral pain . Per review of notes Dr. Rucker has cleared him for trial of injection.    A steroid injection was performed at L greater trochanter using 4 cc 1% plain Lidocaine and 40 mg of Kenalog. This was well tolerated. Minimal pain relief post injection. Recheck as needed if pain not improved.      Catalina Blackwood MD CAQ      Large Joint Injection/Arthocentesis  Date/Time: 8/13/2018 2:35 PM  Performed by: CATALINA BLACKWOOD  Authorized by: CATALINA BLACKWOOD     Indications:  Pain  Needle Size:  22 G  Approach:  Lateral  Location:  Hip  Site:  L greater trochanteric bursa  Medications:  3 mL " lidocaine 1 %; 40 mg triamcinolone acetonide 40 MG/ML  Outcome:  Tolerated well, no immediate complications  Procedure discussed: discussed risks, benefits, and alternatives    Consent Given by:  Patient  Timeout: timeout called immediately prior to procedure    Prep: patient was prepped and draped in usual sterile fashion     Waste: 17 mL lidocaine, single vial use.            Isai Hall MD

## 2018-08-17 ENCOUNTER — TELEPHONE (OUTPATIENT)
Dept: PHARMACY | Facility: CLINIC | Age: 63
End: 2018-08-17

## 2018-08-17 LAB
FERRITIN SERPL-MCNC: 1245 NG/ML
HCT VFR BLD AUTO: 31.1 %
HEMOGLOBIN: 10.4 G/DL (ref 13.3–17.7)
IRON BINDING CAP: 334
IRON SATURATION INDEX: 37 %
IRON: 122 UG/DL

## 2018-08-17 PROCEDURE — 80158 DRUG ASSAY CYCLOSPORINE: CPT | Performed by: TRANSPLANT SURGERY

## 2018-08-17 NOTE — TELEPHONE ENCOUNTER
Anemia Management Note  SUBJECTIVE/OBJECTIVE:  Referred by Dr. Christi Sun on 2018  Primary Diagnosis: Anemia in Chronic Kidney Disease (N18.3, D63.1)     Secondary Diagnosis:  Chronic Kidney Disease, Stage 3 (N18.3)   Hgb goal range:  9-10  Epo/Darbo: Procrit  20,000 units  once weekly for Hgb < 10  In clinic  RX/TX plans : 2019  Iron regimen:  Ferrous Sulfate 325mg QD  Labs : 2019  Recent MARY LOU use, transfusion, IV iron: IV iron scheduled 2018    Anemia Latest Ref Rng & Units 2018 2018 2018 8/3/2018 2018 8/10/2018 2018   MARY LOU Dose - 20,000 units 20,000 units - 20,000 units - 20,000 units -   Hemoglobin 13.3 - 17.7 g/dL 8.8(A) 8.9(A) - 9.8(A) - 9.9(A) 10.4(A)   IV Iron Dose - - - 750mg - 750mg - -   TSAT % 19 - - - - - 37   Ferritin ng/mL 270 - - - - - 1245     BP Readings from Last 3 Encounters:   18 147/82   18 138/78   18 143/83     Wt Readings from Last 2 Encounters:   18 235 lb (106.6 kg)   18 235 lb (106.6 kg)       Received and documented outside lab results drawn on  18    ASSESSMENT:  Hgb: Above goal - recommend hold dose  TSat: at goal >30% Ferritin: Elevated (>1000ng/mL) but labs were drawn too soon after IV iron infusion    PLAN:  Hold Procrit and RTC for hgb then procrit if needed in 1 week(s)  Referred to Grecia to send orders for one time ferritin and iron labs needed due to IV iron infusions    Orders needed to be renewed (for next follow-up date) in LETTERS - for external labs: None    Iron labs due: 18    Plan discussed with:  Rory  Plan provided by:  Judy Ramírez [facility]  Anemia Clinic  [phone]    NEXT FOLLOW-UP DATE:  18  Reviewed 2018 [facility]  Anemia Management Service  Grecia Carroll PharmD and Michelle Ramírez CPhT  Phone: [phone]  Fax: [phone]

## 2018-08-20 ENCOUNTER — TELEPHONE (OUTPATIENT)
Dept: TRANSPLANT | Facility: CLINIC | Age: 63
End: 2018-08-20

## 2018-08-20 LAB
CYCLOSPORINE BLD LC/MS/MS-MCNC: 236 UG/L (ref 50–400)
TME LAST DOSE: NORMAL H

## 2018-08-20 NOTE — LETTER
PHYSICIAN ORDERS      DATE & TIME ISSUED: 2018 10:30 AM  PATIENT NAME: Rory Bustamante   : 1955     [facility] MR# [if applicable]: [id number]     DIAGNOSIS:  Kidney transplant  ICD-10 CODE: Z94.0      Please complete the following lab this 2018  Cyclosporine    Any questions please call: [phone] option #5    Please fax results to [phone]

## 2018-08-20 NOTE — TELEPHONE ENCOUNTER
ISSUE:    (goal 50-75)  Current dose: 150 mg BID    PLAN:  Call placed to patient, reports he took cyclosporine prior to labs.   He will recheck level on Friday.     LPN TASK:   Send order to lab to repeat cyclosporine level on Friday.

## 2018-08-21 DIAGNOSIS — Z94.0 KIDNEY REPLACED BY TRANSPLANT: ICD-10-CM

## 2018-08-21 DIAGNOSIS — Z48.298 AFTERCARE FOLLOWING ORGAN TRANSPLANT: Primary | ICD-10-CM

## 2018-08-21 DIAGNOSIS — Z79.899 ENCOUNTER FOR LONG-TERM CURRENT USE OF MEDICATION: ICD-10-CM

## 2018-08-24 ENCOUNTER — TELEPHONE (OUTPATIENT)
Dept: PHARMACY | Facility: CLINIC | Age: 63
End: 2018-08-24

## 2018-08-24 DIAGNOSIS — Z79.899 ENCOUNTER FOR LONG-TERM CURRENT USE OF MEDICATION: ICD-10-CM

## 2018-08-24 DIAGNOSIS — Z94.0 KIDNEY REPLACED BY TRANSPLANT: ICD-10-CM

## 2018-08-24 DIAGNOSIS — Z48.298 AFTERCARE FOLLOWING ORGAN TRANSPLANT: ICD-10-CM

## 2018-08-24 LAB
HCT VFR BLD AUTO: 30.8 %
HEMOGLOBIN: 10.5 G/DL (ref 13.3–17.7)

## 2018-08-24 PROCEDURE — 80158 DRUG ASSAY CYCLOSPORINE: CPT | Performed by: INTERNAL MEDICINE

## 2018-08-24 NOTE — TELEPHONE ENCOUNTER
Anemia Management Note  SUBJECTIVE/OBJECTIVE:  Referred by Dr. Christi Sun on 2018  Primary Diagnosis: Anemia in Chronic Kidney Disease (N18.3, D63.1)     Secondary Diagnosis:  Chronic Kidney Disease, Stage 3 (N18.3)   Hgb goal range:  9-10  Epo/Darbo: Procrit  20,000 units  once weekly for Hgb < 10  In clinic  RX/TX plans : 2019  Iron regimen:  Ferrous Sulfate 325mg QD  Labs : 2019  Recent MARY LOU use, transfusion, IV iron: IV iron scheduled 2018    Anemia Latest Ref Rng & Units 2018 2018 8/3/2018 2018 8/10/2018 2018 2018   MARY LOU Dose - 20,000 units - 20,000 units - 20,000 units - -   Hemoglobin 13.3 - 17.7 g/dL 8.9(A) - 9.8(A) - 9.9(A) 10.4(A) 10.5(A)   IV Iron Dose - - 750mg - 750mg - - -   TSAT % - - - - - 37 -   Ferritin ng/mL - - - - - 1245 -         BP Readings from Last 3 Encounters:   18 147/82   18 138/78   18 143/83     Wt Readings from Last 2 Encounters:   18 235 lb (106.6 kg)   18 235 lb (106.6 kg)     Feels well, would like to skip Hgb test next week.      ASSESSMENT:  Hgb:Above goal - recommend hold dose  TSat: at goal >30% Ferritin: Elevated(>1000ng/mL)    PLAN:  Hold Procrit and RTC for hgb then procrit if needed in 1 week(s)    Orders needed to be renewed (for next follow-up date) in LETTERS - None    Iron labs due:  2018    Plan discussed with:  Rory  Plan provided by:      NEXT FOLLOW-UP DATE:  2018    Anemia Management Service  Grecia Carroll,PharmD and Michelle Ramírez CPhT  Phone: [phone]  Fax: [phone]

## 2018-08-28 LAB
CYCLOSPORINE BLD LC/MS/MS-MCNC: 54 UG/L (ref 50–400)
TME LAST DOSE: NORMAL H

## 2018-08-30 ENCOUNTER — OFFICE VISIT (OUTPATIENT)
Dept: SURGERY | Facility: CLINIC | Age: 63
End: 2018-08-30
Payer: MEDICARE

## 2018-08-30 ENCOUNTER — APPOINTMENT (OUTPATIENT)
Dept: SURGERY | Facility: CLINIC | Age: 63
End: 2018-08-30
Payer: MEDICARE

## 2018-08-30 ENCOUNTER — ANESTHESIA EVENT (OUTPATIENT)
Dept: SURGERY | Facility: CLINIC | Age: 63
End: 2018-08-30
Payer: MEDICARE

## 2018-08-30 ENCOUNTER — OFFICE VISIT (OUTPATIENT)
Dept: ORTHOPEDICS | Facility: CLINIC | Age: 63
End: 2018-08-30
Payer: MEDICARE

## 2018-08-30 VITALS
HEART RATE: 70 BPM | HEIGHT: 73 IN | RESPIRATION RATE: 16 BRPM | SYSTOLIC BLOOD PRESSURE: 154 MMHG | BODY MASS INDEX: 30.46 KG/M2 | WEIGHT: 229.8 LBS | TEMPERATURE: 97.5 F | OXYGEN SATURATION: 97 % | DIASTOLIC BLOOD PRESSURE: 80 MMHG

## 2018-08-30 VITALS — BODY MASS INDEX: 30.75 KG/M2 | WEIGHT: 232 LBS | HEIGHT: 73 IN

## 2018-08-30 DIAGNOSIS — Z79.899 ENCOUNTER FOR LONG-TERM CURRENT USE OF MEDICATION: ICD-10-CM

## 2018-08-30 DIAGNOSIS — N18.6 ESRD (END STAGE RENAL DISEASE) (H): ICD-10-CM

## 2018-08-30 DIAGNOSIS — Z94.0 KIDNEY REPLACED BY TRANSPLANT: ICD-10-CM

## 2018-08-30 DIAGNOSIS — N18.4 CKD (CHRONIC KIDNEY DISEASE) STAGE 4, GFR 15-29 ML/MIN (H): ICD-10-CM

## 2018-08-30 DIAGNOSIS — Z48.298 AFTERCARE FOLLOWING ORGAN TRANSPLANT: ICD-10-CM

## 2018-08-30 DIAGNOSIS — Z01.818 PREOP EXAMINATION: Primary | ICD-10-CM

## 2018-08-30 DIAGNOSIS — Z98.890 S/P SPINAL SURGERY: Primary | ICD-10-CM

## 2018-08-30 LAB
CREAT UR-MCNC: 69 MG/DL
FERRITIN SERPL-MCNC: 996 NG/ML (ref 26–388)
HCT VFR BLD AUTO: 31.9 % (ref 40–53)
HGB BLD-MCNC: 10.6 G/DL (ref 13.3–17.7)
IRON SATN MFR SERPL: 39 % (ref 15–46)
IRON SERPL-MCNC: 137 UG/DL (ref 35–180)
POTASSIUM SERPL-SCNC: 4.2 MMOL/L (ref 3.4–5.3)
PROT UR-MCNC: 1.04 G/L
PROT/CREAT 24H UR: 1.5 G/G CR (ref 0–0.2)
TIBC SERPL-MCNC: 350 UG/DL (ref 240–430)

## 2018-08-30 ASSESSMENT — ENCOUNTER SYMPTOMS
HALLUCINATIONS: 0
PALPITATIONS: 0
SLEEP DISTURBANCES DUE TO BREATHING: 0
DIFFICULTY URINATING: 1
LIGHT-HEADEDNESS: 1
INCREASED ENERGY: 0
SYNCOPE: 0
SORE THROAT: 0
FLANK PAIN: 0
POLYPHAGIA: 0
CHILLS: 0
SMELL DISTURBANCE: 0
NIGHT SWEATS: 0
DYSURIA: 0
HYPERTENSION: 1
SWOLLEN GLANDS: 0
FATIGUE: 1
SKIN CHANGES: 0
BRUISES/BLEEDS EASILY: 0
WEIGHT LOSS: 0
DYSRHYTHMIAS: 1
TROUBLE SWALLOWING: 0
WEIGHT GAIN: 0
HYPOTENSION: 1
SINUS PAIN: 0
NAIL CHANGES: 0
DECREASED APPETITE: 0
HEMATURIA: 0
POOR WOUND HEALING: 0
ORTHOPNEA: 0
TASTE DISTURBANCE: 0
SINUS CONGESTION: 0
HOARSE VOICE: 0
POLYDIPSIA: 0
EXERCISE INTOLERANCE: 1
ALTERED TEMPERATURE REGULATION: 0
FEVER: 0
NECK MASS: 0
LEG PAIN: 0

## 2018-08-30 NOTE — H&P
Pre-Operative H & P     CC:  Preoperative exam to assess for increased cardiopulmonary risk while undergoing surgery and anesthesia.    Date of Encounter: August 30, 2018   Primary Care Physician:  Moris Diaz   Reason for Visit/Surgery:  ESRD (end stage renal disease) (H) [N18.6]      HPI  Rory Bustamante is a 62 year old male who presents for pre-operative H & P in preparation for a Create Right Upper Arm Arteriovenous Fistula on 9/19/18 with Dr. Dawkins for ESRD at the [facility].       Mr. Bustamante requires permanent dialysis access due to Chronic renal failure from Hypertension and kidney transplant failure, so the above surgery was recommended.  He is not currently on dialysis.      Patient's medical history is complex with a recent diagnosis of AFIB, CAD, s/p MI and stent X2 to the RCA, s/p kidney transplant under evaluation for a new kidney.    History is obtained from the patient and  medical record including Care Everywhere.        Past Medical History  Past Medical History:   Diagnosis Date     Arthritis      BPH (benign prostatic hyperplasia)      CAD (coronary artery disease) July 2011    MI in July 2011, stent placed, on plavix and aspirin     ESRD (end stage renal disease) (H)      Glaucoma      Gout     Uric acid as high as 11 previously, now on allopurinol and probenecid     HTN (hypertension)      Hypercholesteremia 2015     Hypertriglyceridemia 2015     Intraocular bleeding     previously treated [facility] Avastin     Macular degeneration      Neuropathy     Limited sensation bilateral knees to feet     Paroxysmal a-fib (H)      Presence of stent in coronary artery August 2012     Proteinuria 11/2015    recurrent MPGN on biopsy     S/P kidney transplant     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  HLA identical transplant.  Biopsy in 4/2008 with recurrent MPGN type 2, mild interstitial fibrosis and tubular atrophy, CNI toxicity     Spinal stenosis  2013     Warts         Past Surgical History  Past Surgical History:   Procedure Laterality Date     C TOTAL KNEE ARTHROPLASTY  2015     LAMINECTOMY LUMBAR MINIMALLY INVASIVE THREE+ LEVELS N/A 6/11/2018    Procedure: LAMINECTOMY LUMBAR MINIMALLY INVASIVE THREE+ LEVELS;  Minimally Invasive Surgery Laminectomies Lumbar 2-3,Lumbar 3-4,Lumbar 4-5;  Surgeon: Harshal Rucker MD;  Location: [facility] OR     TONSILLECTOMY       TRANSPLANT KIDNEY RECIPIENT LIVING RELATED  1989    HLA identical     VASECTOMY         Hx of Blood transfusions/reactions: No transfusion history       Personal or FH with difficulty with Anesthesia:  Urinary retention    Prior to Admission Medications  Current Outpatient Prescriptions   Medication Sig Dispense Refill     acetaminophen (TYLENOL ARTHRITIS PAIN) 650 MG CR tablet Take 2 tablets by mouth 3 times daily.       allopurinol (ZYLOPRIM) 100 MG tablet Take 2 tablets by mouth daily. (Patient taking differently: Take 100 mg by mouth 2 times daily ) 180 tablet 3     BABY ASPIRIN PO Take 81 mg by mouth daily       BEVACizumab (AVASTIN) 400 MG/16ML injection Every 5 weeks       Brimonidine Tartrate (ALPHAGAN OP) Place 1 drop into the right eye 2 times daily        cycloSPORINE modified (GENERIC EQUIVALENT) 100 MG capsule Take 1 capsule (100 mg) by mouth 2 times daily Total dose 150 mg twice daily 180 capsule 3     cycloSPORINE modified (GENERIC EQUIVALENT) 25 MG capsule Take 2 capsules (50 mg) by mouth 2 times daily Total dose 150 mg twice daily 180 capsule 3     epoetin freya (PROCRIT) 74606 UNIT/ML injection Inject 1 mL (20,000 Units) Subcutaneous once a week As needed for hgb < 10 4 mL 5     fenofibrate 145 MG tablet Take 145 mg by mouth daily       ISOSORBIDE MONONITRATE PO Take 60 mg by mouth daily        lidocaine (XYLOCAINE) 5 % ointment Apply topically daily as needed for moderate pain 50 g 1     loperamide (IMODIUM) 2 MG capsule Take 2 mg by mouth daily        losartan (COZAAR) 50 MG  tablet Take 0.5 tablets (25 mg) by mouth daily 90 tablet 3     metoprolol (LOPRESSOR) 100 MG tablet Take 1 tablet by mouth 2 times daily. 60 tablet 6     NIFEdipine ER osmotic (PROCARDIA XL) 60 MG TB24 Take 1 tablet (60 mg) by mouth daily 90 tablet 3     nitroGLYCERIN (NITROSTAT) 0.4 MG SL tablet Place 0.4 mg under the tongue every 5 minutes as needed.       NONFORMULARY Take 1 tablet by mouth 2 times daily Focus Macula Pro:  Eye vitamin and mineral supplement A, C, E, Zinc, Copper        omega-3 acid ethyl esters (LOVAZA) 1 G capsule Take 2 g by mouth 2 times daily        oxyCODONE IR (ROXICODONE) 5 MG tablet Take 1-2 tablets (5-10 mg) by mouth every 4 hours as needed for other (pain control or improvement in physical function. Hold dose for analgesic side effects.) 80 tablet 0     PANTOPRAZOLE SODIUM PO Take 40 mg by mouth daily as needed for heartburn       predniSONE (DELTASONE) 5 MG tablet Take 5 mg by mouth daily (with lunch)        probenecid (BENEMID) 500 MG tablet Take 500 mg by mouth 2 times daily.       Rosuvastatin Calcium (CRESTOR PO) Take 20 mg by mouth every evening       sulfamethoxazole-trimethoprim (BACTRIM,SEPTRA) 400-80 MG per tablet Take 1 tablet by mouth every other day        tamsulosin (FLOMAX) 0.4 MG capsule Take 1 capsule (0.4 mg) by mouth daily 60 capsule 1     warfarin (COUMADIN) 5 MG tablet Take 1 tablet (5 mg) by mouth daily 30 tablet 1         Allergies  Contrast dye; Pravastatin; and Simvastatin     Social History  Social History     Social History     Marital status:      Spouse name: N/A     Number of children: N/A     Years of education: N/A     Occupational History     Not on file.     Social History Main Topics     Smoking status: Never Smoker     Smokeless tobacco: Never Used     Alcohol use No     Drug use: No     Sexual activity: Not on file     Other Topics Concern     Not on file     Social History Narrative          Family History  No family history of bleeding,  clotting disorders or complications with anesthesia.    ROS/MED HX     ENT/Pulmonary:       Neurologic:     (+)neuropathy - feet,     Cardiovascular:     (+) Dyslipidemia, hypertension--CAD, -past MI,-stent,last 2012  2 Drug Eluting Stent .. Taking blood thinners : . . . :. dysrhythmias a-fib, . Previous cardiac testing Echodate:6/14/18results:date: results:ECG reviewed date:6/16/18 results:SR, LADCath date: 2012 results:        METS/Exercise Tolerance:  3 - Able to walk 1-2 blocks without stopping   Hematologic:     (+) Anemia, History of Transfusion no previous transfusion reaction Other Hematologic Disorder-Procrit and 2 recent iron infusions    Musculoskeletal:   (+) arthritis, , , -     GI/Hepatic:     (+) GERD Asymptomatic on medication,     Renal/Genitourinary: Comment: To be listed for transplant again    (+) chronic renal disease, type: ESRD, Pt does not require dialysis, Pt has history of transplant, date: 1989,     Endo:     (+) Chronic steroid usage for Post Transplant Immunosuppression Date most recently used: 5/14/18,.    Psychiatric:  - neg psychiatric ROS     Infectious Disease:  - neg infectious disease ROS     Malignancy:   (+) Malignancy History of Skin  Skin CA Remission status post Surgery,       Other:    (+) H/O Chronic Pain,         Cardiology Tests: (personally reviewed):   Review Results Below in A/P    Labs: (personally reviewed):  Lab Results   Component Value Date    WBC 8.4 06/17/2018    HGB 10.5 (A) 08/24/2018    IRON 122 08/17/2018    IRONSAT 37 08/17/2018    HCT 30.8 08/24/2018     (L) 06/17/2018    INR 1.38 (H) 06/17/2018    PTT 29 12/08/2017     (L) 07/17/2018    POTASSIUM 4.5 07/17/2018    TRISHA 9.0 07/17/2018     (H) 07/17/2018    CR 4.58 (H) 07/17/2018    BUN 65 (H) 07/17/2018    CO2 21 07/17/2018    ALT 12 06/17/2018    AST 29 06/17/2018    BILITOTAL 0.3 06/17/2018    ALKPHOS 39 (L) 06/17/2018          Physical Exam:  No LMP for male patient.   Vital  "signs:  /80  Pulse 70  Temp 97.5  F (36.4  C) (Oral)  Resp 16  Ht 1.854 m (6' 1\")  Wt 104.2 kg (229 lb 12.8 oz)  SpO2 97%  BMI 30.32 kg/m2    Constitutional: Awake, alert, cooperative, no apparent distress, and appears stated age.  Eyes: Pupils equal, round and reactive to light, sclera clear, conjunctiva normal.  HENT: Normocephalic, oral pharynx with moist mucus membranes. No goiter appreciated.   Respiratory: Clear to auscultation bilaterally, no crackles or wheezing.  Cardiovascular: Regular rate and rhythm and no overt murmur noted.  No carotid bruits auscultated. Mild LE edema. Palpable pulses to radial  DP and PT arteries.   GI: Normal bowel sounds, soft, non-distended, non-tender, no masses palpated  Skin: Warm and dry.  No rashes at anticipated surgical site.   Musculoskeletal: Full extension of the neck.  No redness, warmth, or swelling of exposed joints noted. Gross motor strength is normal.    Neurologic: Awake, alert, oriented to name, place and time.  Gait is normal.   Neuropsychiatric: Calm, cooperative. Normal affect.     Assessment/Plan  Rory Bustamante is a 62 year old male who presents for pre-operative H & P in preparation for a Create Right Upper Arm Arteriovenous Fistula on 9/19/18 with Dr. Dawkins for ESRD at the [facility].     PAC referral for risk assessment and optimization for anesthesia with comorbid conditions of:    Pre-operative considerations:  1.  Cardiac:  Functional status METS = 3    Risk of Major Adverse Cardiac event: 6.6%  -AFIB, CAD, s/p MI and stents, HTN  2.  Pulm:   JITENDRA risk:  Intermediate  -No known pulmonary disease    3.  GI:  Risk of PONV score = 2 .  If > 2, anti-emetic intervention recommended.  -History of pancreatitis  4.  Heme:  -Chronic anemia, last Hgb 10.6. Follows with [facility] Procrit weekly. Has received iron infusions.  4.  Meds:  -Antiplts/Anticoags:  Asa 81 mg and coumadin; patient to receive " final plan from cardiology/coumadin clinic    Patient is optimized and is an acceptable candidate for the proposed procedure.  No further diagnostic evaluation is needed.      AVS given to patient regarding medication instructions,  surgery time/arrival time and NPO status.  Violeta RAMOS-C   Preoperative Assessment Center  [facility]  [facility]  Phone: [phone]  Fax: [phone]

## 2018-08-30 NOTE — LETTER
"8/30/2018      RE: Rory Bustamante  [address]  [place] [address]       Reason for Visit:  Chief Complaint   Patient presents with     Surgical Followup     DOS 6/11/18 MIS Laminectomies L2-3,L3-4, L4-5       S>  60/m, 3 mos postop; last seen at 6 wks.    Unaccompanied.  Much better vs preop.  Still having some aches across low back and buttocks.  Scab from bottom of incision just fell off recently.  No drainage.  Scheduled to start PT on Tuesday at [facility].  Off opioids.    Will get AV fistula on R am in a couple of weeks in preparation for dialysis.        Oswestry (HIRAL) Questionnaire    OSWESTRY DISABILITY INDEX 8/30/2018   Count 10   Sum 4   Oswestry Score (%) 8   Some recent data might be hidden      Preop HIRAL 20%.  Last HIRAL (6 wks) 28.89%.    Visual Analog Pain Scale  Back Pain Scale 0-10: 0  Right leg pain: 0  Left leg pain: 0    PROMIS-10 Scores  Global Mental Health Score: (P) 16  Global Physical Health Score: (P) 15  PROMIS TOTAL - SUBSCORES: (P) 31    O>   Alert, oriented x 3, cooperative.  Not in CP distress.  Ht 1.854 m (6' 1\")  Wt 105.2 kg (232 lb)  BMI 30.61 kg/m2  Surgical incision well-healed, no fluctuanace; no tenderness.  no sign of infection.  Ambulates independently.   Grossly neurologically intact.    Imaging:   No x-rays.    A>  3 mos postop, doing very well.    P>   Congratulated and reassured.  Will start PT next week.  Advised re activities; may resume as tolerated.  RTC prn.        Harshal Rucker MD    Orthopaedic Spine Surgery  Dept Orthopaedic Surgery, [facility]  [phone] office, [phone] pager  [email or web address]      "

## 2018-08-30 NOTE — LETTER
"8/30/2018       RE: Rory Bustamante  [address]  [facility] [address]     Dear Colleague,    Thank you for referring your patient, Rory Bustamante, to [facility]. Please see a copy of my visit note below.    Reason for Visit:  Chief Complaint   Patient presents with     Surgical Followup     DOS 6/11/18 MIS Laminectomies L2-3,L3-4, L4-5       S>  60/m, 3 mos postop; last seen at 6 wks.    Unaccompanied.  Much better vs preop.  Still having some aches across low back and buttocks.  Scab from bottom of incision just fell off recently.  No drainage.  Scheduled to start PT on Tuesday at [facility].  Off opioids.    Will get AV fistula on R am in a couple of weeks in preparation for dialysis.        Oswestry (HIRAL) Questionnaire    OSWESTRY DISABILITY INDEX 8/30/2018   Count 10   Sum 4   Oswestry Score (%) 8   Some recent data might be hidden      Preop HIRAL 20%.  Last HIRAL (6 wks) 28.89%.    Visual Analog Pain Scale  Back Pain Scale 0-10: 0  Right leg pain: 0  Left leg pain: 0    PROMIS-10 Scores  Global Mental Health Score: (P) 16  Global Physical Health Score: (P) 15  PROMIS TOTAL - SUBSCORES: (P) 31    O>   Alert, oriented x 3, cooperative.  Not in CP distress.  Ht 1.854 m (6' 1\")  Wt 105.2 kg (232 lb)  BMI 30.61 kg/m2  Surgical incision well-healed, no fluctuanace; no tenderness.  no sign of infection.  Ambulates independently.   Grossly neurologically intact.    Imaging:   No x-rays.    A>  3 mos postop, doing very well.    P>   Congratulated and reassured.  Will start PT next week.  Advised re activities; may resume as tolerated.  RTC prn.    Again, thank you for allowing me to participate in the care of your patient.      Sincerely,    Harshal Rucker MD      "

## 2018-08-30 NOTE — MR AVS SNAPSHOT
After Visit Summary   2018    Rory Bustamante    MRN: [id number]           Patient Information     Date Of Birth          1955        Visit Information        Provider Department      2018 11:30 AM Violeta Gregorio PA-C [facility] Preoperative Assessment Center        Today's Diagnoses     Preop examination    -  1    ESRD (end stage renal disease) (H)          Care Instructions    Preparing for Your Surgery      Name:  Rory Bustamante   MRN:  [id number]   :  1955   Today's Date:  2018     Arriving for surgery:  Surgery date:  18  Arrival time:  5:30AM  Please come to:       [facility] [address]  [address] [place] [address]    -   parking is available in front of the hospital from 5:15 am to 8:00 pm    -  Stop at the Information Desk in the lobby    -   Inform the information person that you are here for surgery. An escort to 3c will be provided. If you would not like an escort, please proceed to 3C on the 3rd floor. [phone]     What can I eat or drink?  -  You may have solid food or milk products until 8 hours prior to your surgery. 18, 11:30PM  -  You may have water, apple juice or 7up/Sprite until 2 hours prior to your surgery.18, 5:30AM    Which medicines can I take?  Follow recommendations from Cardiologist for stopping Coumadin prior to surgery, surgery team would like INR less than 1.8 for this procedure. .   -  Do NOT take these medications in the morning, the day of surgery:  Please do not take aspirin, probenecid, Losartan and fish oil the morning of surgery.     -  Please take these medications the day of surgery:  Please take allopurinol, eye drops, cyclosporine, fenofibrate, isosorbide, metoprolol, nifedipine, pantoprazole, Bactrim and flomax the morning of surgery.     How do I prepare myself?  -  Take two showers: one the night before surgery; and one the morning of surgery.         Use  Scrubcare or Hibiclens to wash from neck down.  You may use your own shampoo and conditioner. No other hair products.   -  Do NOT use lotion, powder, deodorant, or antiperspirant the day of your surgery.  -  Do NOT wear any jewelry.  - Do not bring your own medications to the hospital, except for inhalers and eye drops.  -  Bring your ID and insurance card.    Questions or Concerns:  -If you have questions or concerns regarding the day of surgery, please call [phone].     -For questions after surgery please call your surgeons office.           AFTER YOUR SURGERY  Breathing exercises   Breathing exercises help you recover faster. Take deep breaths and let the air out slowly. This will:     Help you wake up after surgery.    Help prevent complications like pneumonia.  Preventing complications will help you go home sooner.   Nausea and vomiting   You may feel sick to your stomach after surgery; if so, let your nurse know.    Pain control:  After surgery, you may have pain. Our goal is to help you manage your pain. Pain medicine will help you feel comfortable enough to do activities that will help you heal.  These activities may include breathing exercises, walking and physical therapy.   To help your health care team treat your pain we will ask: 1) If you have pain  2) where it is located 3) describe your pain in your words  Methods of pain control include medications given by mouth, vein or by nerve block for some surgeries.  Sequential Compression Device (SCD) or Pneumo Boots:  You may need to wear SCD S on your legs or feet. These are wraps connected to a machine that pumps in air and releases it. The repeated pumping helps prevent blood clots from forming.           Follow-ups after your visit        Your next 10 appointments already scheduled     Aug 30, 2018  1:00 PM CDT   (Arrive by 12:45 PM)   PAC Anesthesia Consult with  Pac Anesthesiologist   [facility] Preoperative Assessment Center ([facility])    [address] [facility] [address]   [phone]            Aug 30, 2018  1:15 PM CDT   (Arrive by 1:00 PM)   PAC Pharmacist with  Pac Pharmacist   [facility] Preoperative Assessment Center ([facility])    [address]  [address] [facility] [address]   [phone]            Sep 19, 2018   Procedure with Sydney Dawkins MD   [facility], Same Day Surgery (--)    [address] [facility] [address]   [phone]            Oct 03, 2018  1:00 PM CDT   (Arrive by 12:45 PM)   Post-Op with ELYSE Odonnell   [facility] Solid Organ Transplant ([facility])    [address] [place] [address]  [address]  [facility] [address]   [phone]            Oct 12, 2018  8:15 AM CDT   (Arrive by 8:00 AM)   Cystoscopy with Meagan Story MD   [facility] Urology and Inst for Prostate and Urologic Cancers ([facility])    [address] [place] [address]   [phone]            Oct 15, 2018  9:00 AM CDT   MR ABDOMEN MRCP W/O & W CONTRAST with UUMR1   [facility], MRI ([facility])    [address] [facility] [address]   [phone]           Take your medicines as usual, unless your doctor tells you not to. Bring a list of your current medicines to your exam (including vitamins, minerals and over-the-counter drugs). Also bring the results of similar scans you may have had.    You may or may not receive IV contrast for this exam pending the discretion of the Radiologist.   Do not eat or drink for 6 hours prior to exam.  The MRI machine uses a strong magnet. Please wear clothes without metal (snaps, zippers). A sweatsuit works well, or we may give you a hospital gown.  Please remove any body piercings and hair extensions before you arrive. You will also remove watches, jewelry, hairpins, wallets,  dentures, partial dental plates and hearing aids. You may wear contact lenses, and you may be able to wear your rings. We have a safe place to keep your personal items, but it is safer to leave them at home.  **IMPORTANT** THE INSTRUCTIONS BELOW ARE ONLY FOR THOSE PATIENTS WHO HAVE BEEN PRESCRIBED SEDATION OR GENERAL ANESTHESIA DURING THEIR MRI PROCEDURE:  IF YOUR DOCTOR PRESCRIBED ORAL SEDATION (take medicine to help you relax during your exam):   You must get the medicine from your doctor (oral medication) before you arrive. Bring the medicine to the exam. Do not take it at home. You ll be told when to take it upon arriving for your exam.   Arrive one hour early. Bring someone who can take you home after the test. Your medicine will make you sleepy. After the exam, you may not drive, take a bus or take a taxi by yourself.  IF YOUR DOCTOR PRESCRIBED IV SEDATION:   Arrive one hour early. Bring someone who can take you home after the test. Your medicine will make you sleepy. After the exam, you may not drive, take a bus or take a taxi by yourself.   No eating 6 hours before your exam. You may have clear liquids up until 4 hours before your exam. (Clear liquids include water, clear tea, black coffee and fruit juice without pulp.)  IF YOUR DOCTOR PRESCRIBED ANESTHESIA (be asleep for your exam):   Arrive 1 1/2 hours early. Bring someone who can take you home after the test. You may not drive, take a bus or take a taxi by yourself.   No eating 8 hours before your exam. You may have clear liquids up until 4 hours before your exam. (Clear liquids include water, clear tea, black coffee and fruit juice without pulp.)   You will spend four to five hours in the recovery room.  If you have any questions, please contact your Imaging Department exam site.            Oct 15, 2018 10:00 AM CDT   (Arrive by 9:45 AM)   New Patient Visit with Shade Manning MD   [facility] ([facility]  "[place])    [address]  [address]  [facility] [address] [phone]              Future tests that were ordered for you today     Open Future Orders        Priority Expected Expires Ordered    Potassium Routine 8/30/2018 9/29/2018 8/30/2018            Who to contact     Please call your clinic at [phone] to:    Ask questions about your health    Make or cancel appointments    Discuss your medicines    Learn about your test results    Speak to your doctor            Additional Information About Your Visit        [employer] Information     [employer] gives you secure access to your electronic health record. If you see a primary care provider, you can also send messages to your care team and make appointments. If you have questions, please call your primary care clinic.  If you do not have a primary care provider, please call [phone] and they will assist you.      [employer] is an electronic gateway that provides easy, online access to your medical records. With [employer], you can request a clinic appointment, read your test results, renew a prescription or communicate with your care team.     To access your existing account, please contact your [facility] or call [phone] for assistance.        Care EveryWhere ID     This is your Care EveryWhere ID. This could be used by other organizations to access your [facility] medical records  [id number]        Your Vitals Were     Pulse Temperature Respirations Height Pulse Oximetry BMI (Body Mass Index)    70 97.5  F (36.4  C) (Oral) 16 1.854 m (6' 1\") 97% 30.32 kg/m2       Blood Pressure from Last 3 Encounters:   08/30/18 154/80   08/11/18 147/82   08/08/18 138/78    Weight from Last 3 Encounters:   08/30/18 104.2 kg (229 lb 12.8 oz)   08/30/18 105.2 kg (232 lb)   08/13/18 106.6 kg (235 lb)                 Today's Medication Changes          These changes are accurate as of 8/30/18 12:39 PM.  If you have any questions, ask " your nurse or doctor.               These medicines have changed or have updated prescriptions.        Dose/Directions    allopurinol 100 MG tablet   Commonly known as:  ZYLOPRIM   This may have changed:    - how much to take  - when to take this   Used for:  Kidney replaced by transplant, Gout        Dose:  200 mg   Take 2 tablets by mouth daily.   Quantity:  180 tablet   Refills:  3       * cycloSPORINE modified 25 MG capsule   Commonly known as:  GENERIC EQUIVALENT   This may have changed:    - how much to take  - additional instructions   Used for:  Kidney transplant recipient, Long-term use of immunosuppressant medication        Dose:  50 mg   Take 2 capsules (50 mg) by mouth 2 times daily Total dose 150 mg twice daily   Quantity:  180 capsule   Refills:  3       * cycloSPORINE modified 100 MG capsule   Commonly known as:  GENERIC EQUIVALENT   This may have changed:  additional instructions   Used for:  Kidney transplant recipient, Long-term use of immunosuppressant medication        Dose:  100 mg   Take 1 capsule (100 mg) by mouth 2 times daily Total dose 150 mg twice daily   Quantity:  180 capsule   Refills:  3       losartan 50 MG tablet   Commonly known as:  COZAAR   This may have changed:  when to take this   Used for:  Kidney replaced by transplant        Dose:  25 mg   Take 0.5 tablets (25 mg) by mouth daily   Quantity:  90 tablet   Refills:  3       tamsulosin 0.4 MG capsule   Commonly known as:  FLOMAX   This may have changed:  when to take this   Used for:  Benign prostatic hyperplasia, unspecified whether lower urinary tract symptoms present        Dose:  0.4 mg   Take 1 capsule (0.4 mg) by mouth daily   Quantity:  60 capsule   Refills:  1       warfarin 5 MG tablet   Commonly known as:  COUMADIN   This may have changed:    - how much to take  - how to take this  - when to take this  - additional instructions   Used for:  Paroxysmal atrial fibrillation (H)        Dose:  5 mg   Take 1 tablet (5 mg) by  mouth daily   Quantity:  30 tablet   Refills:  1       * Notice:  This list has 2 medication(s) that are the same as other medications prescribed for you. Read the directions carefully, and ask your doctor or other care provider to review them with you.             Primary Care Provider Office Phone # Fax #    Moris Diaz [phone]       [facility] [address] [facility] [address]  [facility] [address]        Equal Access to Services     [facility] : Hadii aad ku hadasho Soomaali, waaxda luqadaha, qaybta kaalmada adeegyada, waxay idiin hayaan adeeg kharash la'aan . So [facility] [phone].    ATENCIÓN: Si habla español, tiene a carr disposición servicios gratuitos de asistencia lingüística. [facility] [phone].    We comply with applicable federal civil rights laws and [place] laws. We do not discriminate on the basis of race, color, national origin, age, disability, sex, sexual orientation, or gender identity.            Thank you!     Thank you for choosing [facility] PREOPERATIVE ASSESSMENT CENTER  for your care. Our goal is always to provide you with excellent care. Hearing back from our patients is one way we can continue to improve our services. Please take a few minutes to complete the written survey that you may receive in the mail after your visit with us. Thank you!             Your Updated Medication List - Protect others around you: Learn how to safely use, store and throw away your medicines at www.disposemymeds.org.          This list is accurate as of 8/30/18 12:39 PM.  Always use your most recent med list.                   Brand Name Dispense Instructions for use Diagnosis    allopurinol 100 MG tablet    ZYLOPRIM    180 tablet    Take 2 tablets by mouth daily.    Kidney replaced by transplant, Gout       ALPHAGAN OP      Place 1 drop into the right eye 2 times daily        BABY ASPIRIN PO      Take 81 mg by mouth daily (with lunch)        BEVACizumab 400 MG/16ML injection     AVASTIN     Every 5 weeks        CRESTOR PO      Take 40 mg by mouth every evening        * cycloSPORINE modified 25 MG capsule    GENERIC EQUIVALENT    180 capsule    Take 2 capsules (50 mg) by mouth 2 times daily Total dose 150 mg twice daily    Kidney transplant recipient, Long-term use of immunosuppressant medication       * cycloSPORINE modified 100 MG capsule    GENERIC EQUIVALENT    180 capsule    Take 1 capsule (100 mg) by mouth 2 times daily Total dose 150 mg twice daily    Kidney transplant recipient, Long-term use of immunosuppressant medication       epoetin freya 49899 UNIT/ML injection    PROCRIT    4 mL    Inject 1 mL (20,000 Units) Subcutaneous once a week As needed for hgb < 10    CKD (chronic kidney disease) stage 4, GFR 15-29 ml/min (H), Anemia in stage 4 chronic kidney disease (H)       fenofibrate 145 MG tablet      Take 145 mg by mouth every morning        ISOSORBIDE MONONITRATE PO      Take 60 mg by mouth every morning        lidocaine 5 % ointment    XYLOCAINE    50 g    Apply topically daily as needed for moderate pain    Left hip pain       loperamide 2 MG capsule    IMODIUM     Take 2 mg by mouth daily        losartan 50 MG tablet    COZAAR    90 tablet    Take 0.5 tablets (25 mg) by mouth daily    Kidney replaced by transplant       metoprolol tartrate 100 MG tablet    LOPRESSOR    60 tablet    Take 1 tablet by mouth 2 times daily.    Unspecified hypertensive kidney disease with chronic kidney disease stage I through stage IV, or unspecified(403.90), Kidney replaced by transplant       NIFEdipine ER osmotic 60 MG Tb24    PROCARDIA XL    90 tablet    Take 1 tablet (60 mg) by mouth daily    HTN (hypertension)       NITROSTAT 0.4 MG sublingual tablet   Generic drug:  nitroGLYcerin      Place 0.4 mg under the tongue every 5 minutes as needed.        NONFORMULARY      Take 1 tablet by mouth 2 times daily Focus Macula Pro:  Eye vitamin and mineral supplement A, C, E, Zinc, Copper        omega-3  acid ethyl esters 1 g capsule    Lovaza     Take 2 g by mouth 2 times daily        PANTOPRAZOLE SODIUM PO      Take 40 mg by mouth daily as needed for heartburn        predniSONE 5 MG tablet    DELTASONE     Take 5 mg by mouth daily (with lunch)        probenecid 500 MG tablet    BENEMID     Take 500 mg by mouth 2 times daily.        sulfamethoxazole-trimethoprim 400-80 MG per tablet    BACTRIM/SEPTRA     Take 1 tablet by mouth every other day        tamsulosin 0.4 MG capsule    FLOMAX    60 capsule    Take 1 capsule (0.4 mg) by mouth daily    Benign prostatic hyperplasia, unspecified whether lower urinary tract symptoms present       TYLENOL ARTHRITIS PAIN 650 MG CR tablet   Generic drug:  acetaminophen      Take 2 tablets by mouth 3 times daily.        warfarin 5 MG tablet    COUMADIN    30 tablet    Take 1 tablet (5 mg) by mouth daily    Paroxysmal atrial fibrillation (H)       * Notice:  This list has 2 medication(s) that are the same as other medications prescribed for you. Read the directions carefully, and ask your doctor or other care provider to review them with you.

## 2018-08-30 NOTE — PROGRESS NOTES
Preoperative Assessment Center medication history for August 30, 2018 is complete.    See Epic admission navigator for prior to admission medications.   Operating room staff will still need to confirm medications and last dose information on day of surgery.     Medication history interview sources:  patient    Changes made to PTA medication list (reason)  Added: none  Deleted: cholecalciferol, oxycodone,   Changed: Cyclosporine - patient confirms x3 that he is taking 125 mg BID (using one 100 mg capsule and one 25 mg capsule BID) and states he has been on this dose for a long time - this differs from most recent notes from 's clinic (pritit instructed to follow up with Dr. Sun's clinic to clarify his dosing), crestor, warfarin.     Additional medication history information (including reliability of information, actions taken by pharmacist):  -- No recent (within 30 days) course of antibiotics (other than prophy bactrim)  -- No recent (within 30 days) course of steroids (other than daily prednisone)  -- No recent (within 30 days) chronic daily medications stopped   -- Patient declines being on any other prescription or over-the-counter medications    Prior to Admission medications    Medication Sig Last Dose Taking? Auth Provider   acetaminophen (TYLENOL ARTHRITIS PAIN) 650 MG CR tablet Take 2 tablets by mouth 3 times daily. Taking Yes Reported, Patient   allopurinol (ZYLOPRIM) 100 MG tablet Take 2 tablets by mouth daily.  Patient taking differently: Take 100 mg by mouth 2 times daily  Taking Yes Christi Sun MD   BABY ASPIRIN PO Take 81 mg by mouth daily (with lunch)  Taking Yes Reported, Patient   BEVACizumab (AVASTIN) 400 MG/16ML injection Every 5 weeks Taking Yes Reported, Patient   Brimonidine Tartrate (ALPHAGAN OP) Place 1 drop into the right eye 2 times daily  Taking Yes Reported, Patient   cycloSPORINE modified (GENERIC EQUIVALENT) 100 MG capsule Take 1 capsule (100 mg) by mouth 2 times  daily Total dose 150 mg twice daily  Patient taking differently: Take 100 mg by mouth 2 times daily Total dose 125 mg twice daily Taking Yes Christi Sun MD   cycloSPORINE modified (GENERIC EQUIVALENT) 25 MG capsule Take 2 capsules (50 mg) by mouth 2 times daily Total dose 150 mg twice daily  Patient taking differently: Take 25 mg by mouth 2 times daily Total dose 125 mg twice daily Taking Yes Christi Sun MD   epoetin freya (PROCRIT) 85105 UNIT/ML injection Inject 1 mL (20,000 Units) Subcutaneous once a week As needed for hgb < 10 Taking Yes Christi Sun MD   fenofibrate 145 MG tablet Take 145 mg by mouth every morning  Taking Yes Reported, Patient   ISOSORBIDE MONONITRATE PO Take 60 mg by mouth every morning  Taking Yes Reported, Patient   lidocaine (XYLOCAINE) 5 % ointment Apply topically daily as needed for moderate pain Taking Yes Isai Hall MD   loperamide (IMODIUM) 2 MG capsule Take 2 mg by mouth daily  Taking Yes Reported, Patient   losartan (COZAAR) 50 MG tablet Take 0.5 tablets (25 mg) by mouth daily  Patient taking differently: Take 25 mg by mouth every evening  Taking Yes Cleo Vargas MD   metoprolol (LOPRESSOR) 100 MG tablet Take 1 tablet by mouth 2 times daily. Taking Yes Christi Sun MD   NIFEdipine ER osmotic (PROCARDIA XL) 60 MG TB24 Take 1 tablet (60 mg) by mouth daily Taking Yes Christi Sun MD   nitroGLYCERIN (NITROSTAT) 0.4 MG SL tablet Place 0.4 mg under the tongue every 5 minutes as needed. Taking Yes Reported, Patient   NONFORMULARY Take 1 tablet by mouth 2 times daily Focus Macula Pro:  Eye vitamin and mineral supplement A, C, E, Zinc, Copper  Taking Yes Reported, Patient   omega-3 acid ethyl esters (LOVAZA) 1 G capsule Take 2 g by mouth 2 times daily  Taking Yes Reported, Patient   PANTOPRAZOLE SODIUM PO Take 40 mg by mouth daily as needed for heartburn Taking Yes Unknown, Entered By History   predniSONE (DELTASONE) 5 MG tablet Take 5 mg by mouth  daily (with lunch)  Taking Yes Reported, Patient   probenecid (BENEMID) 500 MG tablet Take 500 mg by mouth 2 times daily. Taking Yes Reported, Patient   Rosuvastatin Calcium (CRESTOR PO) Take 40 mg by mouth every evening  Taking Yes Unknown, Entered By History   sulfamethoxazole-trimethoprim (BACTRIM,SEPTRA) 400-80 MG per tablet Take 1 tablet by mouth every other day  Taking Yes Reported, Patient   tamsulosin (FLOMAX) 0.4 MG capsule Take 1 capsule (0.4 mg) by mouth daily  Patient taking differently: Take 0.4 mg by mouth 2 times daily  Taking Yes Cleo Vargas MD   warfarin (COUMADIN) 5 MG tablet Take 1 tablet (5 mg) by mouth daily  Patient taking differently: Take 2.5 mg alternating with 5 mg doses. Takes in the evening. Taking Yes Cleo Vargas MD           Medication history completed by: Omar Polk, [facility]

## 2018-08-30 NOTE — PATIENT INSTRUCTIONS
Preparing for Your Surgery      Name:  Rory Bustamante   MRN:  [id number]   :  1955   Today's Date:  2018     Arriving for surgery:  Surgery date:  18  Arrival time:  5:30AM  Please come to:       [facility] [address]  [address] [place]  [address]    -   parking is available in front of the hospital from 5:15 am to 8:00 pm    -  Stop at the Information Desk in the lobby    -   Inform the information person that you are here for surgery. An escort to 3c will be provided. If you would not like an escort, please proceed to 3C on the 3rd floor. [phone]     What can I eat or drink?  -  You may have solid food or milk products until 8 hours prior to your surgery. 18, 11:30PM  -  You may have water, apple juice or 7up/Sprite until 2 hours prior to your surgery.18, 5:30AM    Which medicines can I take?  Follow recommendations from Cardiologist for stopping Coumadin prior to surgery, surgery team would like INR less than 1.8 for this procedure. .   -  Do NOT take these medications in the morning, the day of surgery:  Please do not take aspirin, probenecid, Losartan and fish oil the morning of surgery.     -  Please take these medications the day of surgery:  Please take allopurinol, eye drops, cyclosporine, fenofibrate, isosorbide, metoprolol, nifedipine, pantoprazole, Bactrim and flomax the morning of surgery.     How do I prepare myself?  -  Take two showers: one the night before surgery; and one the morning of surgery.         Use Scrubcare or Hibiclens to wash from neck down.  You may use your own shampoo and conditioner. No other hair products.   -  Do NOT use lotion, powder, deodorant, or antiperspirant the day of your surgery.  -  Do NOT wear any jewelry.  - Do not bring your own medications to the hospital, except for inhalers and eye drops.  -  Bring your ID and insurance card.    Questions or Concerns:  -If you have questions or  concerns regarding the day of surgery, please call [phone].     -For questions after surgery please call your surgeons office.           AFTER YOUR SURGERY  Breathing exercises   Breathing exercises help you recover faster. Take deep breaths and let the air out slowly. This will:     Help you wake up after surgery.    Help prevent complications like pneumonia.  Preventing complications will help you go home sooner.   Nausea and vomiting   You may feel sick to your stomach after surgery; if so, let your nurse know.    Pain control:  After surgery, you may have pain. Our goal is to help you manage your pain. Pain medicine will help you feel comfortable enough to do activities that will help you heal.  These activities may include breathing exercises, walking and physical therapy.   To help your health care team treat your pain we will ask: 1) If you have pain  2) where it is located 3) describe your pain in your words  Methods of pain control include medications given by mouth, vein or by nerve block for some surgeries.  Sequential Compression Device (SCD) or Pneumo Boots:  You may need to wear SCD S on your legs or feet. These are wraps connected to a machine that pumps in air and releases it. The repeated pumping helps prevent blood clots from forming.

## 2018-08-30 NOTE — MR AVS SNAPSHOT
After Visit Summary   8/30/2018    Rory Bustamante    MRN: [id number]           Patient Information     Date Of Birth          1955        Visit Information        Provider Department      8/30/2018 1:15 PM Pharmacist, Gino Machado [facility]        Today's Diagnoses     Preop examination    -  1       Follow-ups after your visit        Your next 10 appointments already scheduled     Aug 30, 2018  1:15 PM CDT   (Arrive by 1:00 PM)   PAC Pharmacist with  Pac Pharmacist   [facility] ([facility])    [address]  4th Floor  [place] [address] [phone]            Aug 30, 2018  1:45 PM CDT   LAB with  LAB   [facility] Lab ([facility])    [address]  1st Floor  [place] [id number] [phone]           Please do not eat 10-12 hours before your appointment if you are coming in fasting for labs on lipids, cholesterol, or glucose (sugar). This does not apply to pregnant women. Water, hot tea and black coffee (with nothing added) are okay. Do not drink other fluids, diet soda or chew gum.            Sep 19, 2018   Procedure with Sydney Dawkins MD   [facility], Same Day Surgery (--)    [address] [facility] [address] [phone]            Oct 03, 2018  1:00 PM CDT   (Arrive by 12:45 PM)   Post-Op with ELYSE Odonnell [facility] Solid Organ Transplant ([facility])    [address]  [address]  [place] [address] [phone]            Oct 12, 2018  8:15 AM CDT   (Arrive by 8:00 AM)   Cystoscopy with Meagan Story MD   [facility] Urology and Inst for Prostate and Urologic Cancers ([facility])    [address]  4th [address]  [place] [address] [phone]            Oct 15, 2018  9:00 AM CDT   MR ABDOMEN MRCP W/O & W CONTRAST with UUMR1   [facility], MRI  ([facility])    [address] [id number] [phone]           Take your medicines as usual, unless your doctor tells you not to. Bring a list of your current medicines to your exam (including vitamins, minerals and over-the-counter drugs). Also bring the results of similar scans you may have had.    You may or may not receive IV contrast for this exam pending the discretion of the Radiologist.   Do not eat or drink for 6 hours prior to exam.  The MRI machine uses a strong magnet. Please wear clothes without metal (snaps, zippers). A sweatsuit works well, or we may give you a hospital gown.  Please remove any body piercings and hair extensions before you arrive. You will also remove watches, jewelry, hairpins, wallets, dentures, partial dental plates and hearing aids. You may wear contact lenses, and you may be able to wear your rings. We have a safe place to keep your personal items, but it is safer to leave them at home.  **IMPORTANT** THE INSTRUCTIONS BELOW ARE ONLY FOR THOSE PATIENTS WHO HAVE BEEN PRESCRIBED SEDATION OR GENERAL ANESTHESIA DURING THEIR MRI PROCEDURE:  IF YOUR DOCTOR PRESCRIBED ORAL SEDATION (take medicine to help you relax during your exam):   You must get the medicine from your doctor (oral medication) before you arrive. Bring the medicine to the exam. Do not take it at home. You ll be told when to take it upon arriving for your exam.   Arrive one hour early. Bring someone who can take you home after the test. Your medicine will make you sleepy. After the exam, you may not drive, take a bus or take a taxi by yourself.  IF YOUR DOCTOR PRESCRIBED IV SEDATION:   Arrive one hour early. Bring someone who can take you home after the test. Your medicine will make you sleepy. After the exam, you may not drive, take a bus or take a taxi by yourself.   No eating 6 hours before your exam. You may have clear liquids up until 4  hours before your exam. (Clear liquids include water, clear tea, black coffee and fruit juice without pulp.)  IF YOUR DOCTOR PRESCRIBED ANESTHESIA (be asleep for your exam):   Arrive 1 1/2 hours early. Bring someone who can take you home after the test. You may not drive, take a bus or take a taxi by yourself.   No eating 8 hours before your exam. You may have clear liquids up until 4 hours before your exam. (Clear liquids include water, clear tea, black coffee and fruit juice without pulp.)   You will spend four to five hours in the recovery room.  If you have any questions, please contact your Imaging Department exam site.            Oct 15, 2018 10:00 AM CDT   (Arrive by 9:45 AM)   New Patient Visit with Shade Manning MD   [facility] ([facility])    [address] [place] [address]  [address] [place] [address] [phone]              Future tests that were ordered for you today     Open Future Orders        Priority Expected Expires Ordered    Potassium Routine 8/30/2018 9/29/2018 8/30/2018            Who to contact     Please call your clinic at [phone] to:    Ask questions about your health    Make or cancel appointments    Discuss your medicines    Learn about your test results    Speak to your doctor            Additional Information About Your Visit        [employer] Information     [employer] gives you secure access to your electronic health record. If you see a primary care provider, you can also send messages to your care team and make appointments. If you have questions, please call your primary care clinic.  If you do not have a primary care provider, please call [phone] and they will assist you.      [employer] is an electronic gateway that provides easy, online access to your medical records. With [employer], you can request a clinic appointment, read your test results, renew a prescription or communicate with your care team.     To access your  existing account, please contact your [facility] or call [phone] for assistance.        Care EveryWhere ID     This is your Care EveryWhere ID. This could be used by other organizations to access your [facility] medical records  [id number]         Blood Pressure from Last 3 Encounters:   08/30/18 154/80   08/11/18 147/82   08/08/18 138/78    Weight from Last 3 Encounters:   08/30/18 104.2 kg (229 lb 12.8 oz)   08/30/18 105.2 kg (232 lb)   08/13/18 106.6 kg (235 lb)              Today, you had the following     No orders found for display         Today's Medication Changes          These changes are accurate as of 8/30/18  1:00 PM.  If you have any questions, ask your nurse or doctor.               These medicines have changed or have updated prescriptions.        Dose/Directions    allopurinol 100 MG tablet   Commonly known as:  ZYLOPRIM   This may have changed:    - how much to take  - when to take this   Used for:  Kidney replaced by transplant, Gout        Dose:  200 mg   Take 2 tablets by mouth daily.   Quantity:  180 tablet   Refills:  3       * cycloSPORINE modified 25 MG capsule   Commonly known as:  GENERIC EQUIVALENT   This may have changed:    - how much to take  - additional instructions   Used for:  Kidney transplant recipient, Long-term use of immunosuppressant medication        Dose:  50 mg   Take 2 capsules (50 mg) by mouth 2 times daily Total dose 150 mg twice daily   Quantity:  180 capsule   Refills:  3       * cycloSPORINE modified 100 MG capsule   Commonly known as:  GENERIC EQUIVALENT   This may have changed:  additional instructions   Used for:  Kidney transplant recipient, Long-term use of immunosuppressant medication        Dose:  100 mg   Take 1 capsule (100 mg) by mouth 2 times daily Total dose 150 mg twice daily   Quantity:  180 capsule   Refills:  3       losartan 50 MG tablet   Commonly known as:  COZAAR   This may have changed:  when to take this    Used for:  Kidney replaced by transplant        Dose:  25 mg   Take 0.5 tablets (25 mg) by mouth daily   Quantity:  90 tablet   Refills:  3       tamsulosin 0.4 MG capsule   Commonly known as:  FLOMAX   This may have changed:  when to take this   Used for:  Benign prostatic hyperplasia, unspecified whether lower urinary tract symptoms present        Dose:  0.4 mg   Take 1 capsule (0.4 mg) by mouth daily   Quantity:  60 capsule   Refills:  1       warfarin 5 MG tablet   Commonly known as:  COUMADIN   This may have changed:    - how much to take  - how to take this  - when to take this  - additional instructions   Used for:  Paroxysmal atrial fibrillation (H)        Dose:  5 mg   Take 1 tablet (5 mg) by mouth daily   Quantity:  30 tablet   Refills:  1       * Notice:  This list has 2 medication(s) that are the same as other medications prescribed for you. Read the directions carefully, and ask your doctor or other care provider to review them with you.             Primary Care Provider Office Phone # Fax #    Moris Diaz [phone]       [facility] [address] [facility] [address]  [facility] [address]        Equal Access to Services     [facility]: Hadii av ku hadasho Soomaali, waaxda luqadaha, qaybta kaalmada adeegyamarsha, devin barriga . So [facility] [phone].    ATENCIÓN: Si habla español, tiene a carr disposición servicios gratuitos de asistencia lingüística. Llame al [phone].    We comply with applicable federal civil rights laws and [place] laws. We do not discriminate on the basis of race, color, national origin, age, disability, sex, sexual orientation, or gender identity.            Thank you!     Thank you for choosing [facility]  for your care. Our goal is always to provide you with excellent care. Hearing back from our patients is one way we can continue to improve our services. Please take a few minutes to complete the  written survey that you may receive in the mail after your visit with us. Thank you!             Your Updated Medication List - Protect others around you: Learn how to safely use, store and throw away your medicines at www.disposemymeds.org.          This list is accurate as of 8/30/18  1:00 PM.  Always use your most recent med list.                   Brand Name Dispense Instructions for use Diagnosis    allopurinol 100 MG tablet    ZYLOPRIM    180 tablet    Take 2 tablets by mouth daily.    Kidney replaced by transplant, Gout       ALPHAGAN OP      Place 1 drop into the right eye 2 times daily        BABY ASPIRIN PO      Take 81 mg by mouth daily (with lunch)        BEVACizumab 400 MG/16ML injection    AVASTIN     Every 5 weeks        CRESTOR PO      Take 40 mg by mouth every evening        * cycloSPORINE modified 25 MG capsule    GENERIC EQUIVALENT    180 capsule    Take 2 capsules (50 mg) by mouth 2 times daily Total dose 150 mg twice daily    Kidney transplant recipient, Long-term use of immunosuppressant medication       * cycloSPORINE modified 100 MG capsule    GENERIC EQUIVALENT    180 capsule    Take 1 capsule (100 mg) by mouth 2 times daily Total dose 150 mg twice daily    Kidney transplant recipient, Long-term use of immunosuppressant medication       epoetin freya 33095 UNIT/ML injection    PROCRIT    4 mL    Inject 1 mL (20,000 Units) Subcutaneous once a week As needed for hgb < 10    CKD (chronic kidney disease) stage 4, GFR 15-29 ml/min (H), Anemia in stage 4 chronic kidney disease (H)       fenofibrate 145 MG tablet      Take 145 mg by mouth every morning        ISOSORBIDE MONONITRATE PO      Take 60 mg by mouth every morning        lidocaine 5 % ointment    XYLOCAINE    50 g    Apply topically daily as needed for moderate pain    Left hip pain       loperamide 2 MG capsule    IMODIUM     Take 2 mg by mouth daily        losartan 50 MG tablet    COZAAR    90 tablet    Take 0.5 tablets (25 mg) by mouth  daily    Kidney replaced by transplant       metoprolol tartrate 100 MG tablet    LOPRESSOR    60 tablet    Take 1 tablet by mouth 2 times daily.    Unspecified hypertensive kidney disease with chronic kidney disease stage I through stage IV, or unspecified(403.90), Kidney replaced by transplant       NIFEdipine ER osmotic 60 MG Tb24    PROCARDIA XL    90 tablet    Take 1 tablet (60 mg) by mouth daily    HTN (hypertension)       NITROSTAT 0.4 MG sublingual tablet   Generic drug:  nitroGLYcerin      Place 0.4 mg under the tongue every 5 minutes as needed.        NONFORMULARY      Take 1 tablet by mouth 2 times daily Focus Macula Pro:  Eye vitamin and mineral supplement A, C, E, Zinc, Copper        omega-3 acid ethyl esters 1 g capsule    Lovaza     Take 2 g by mouth 2 times daily        PANTOPRAZOLE SODIUM PO      Take 40 mg by mouth daily as needed for heartburn        predniSONE 5 MG tablet    DELTASONE     Take 5 mg by mouth daily (with lunch)        probenecid 500 MG tablet    BENEMID     Take 500 mg by mouth 2 times daily.        sulfamethoxazole-trimethoprim 400-80 MG per tablet    BACTRIM/SEPTRA     Take 1 tablet by mouth every other day        tamsulosin 0.4 MG capsule    FLOMAX    60 capsule    Take 1 capsule (0.4 mg) by mouth daily    Benign prostatic hyperplasia, unspecified whether lower urinary tract symptoms present       TYLENOL ARTHRITIS PAIN 650 MG CR tablet   Generic drug:  acetaminophen      Take 2 tablets by mouth 3 times daily.        warfarin 5 MG tablet    COUMADIN    30 tablet    Take 1 tablet (5 mg) by mouth daily    Paroxysmal atrial fibrillation (H)       * Notice:  This list has 2 medication(s) that are the same as other medications prescribed for you. Read the directions carefully, and ask your doctor or other care provider to review them with you.

## 2018-08-30 NOTE — PROGRESS NOTES
"Reason for Visit:  Chief Complaint   Patient presents with     Surgical Followup     DOS 6/11/18 MIS Laminectomies L2-3,L3-4, L4-5       S>  60/m, 3 mos postop; last seen at 6 wks.    Unaccompanied.  Much better vs preop.  Still having some aches across low back and buttocks.  Scab from bottom of incision just fell off recently.  No drainage.  Scheduled to start PT on Tuesday at [facility].  Off opioids.    Will get AV fistula on R am in a couple of weeks in preparation for dialysis.        Oswestry (HIRAL) Questionnaire    OSWESTRY DISABILITY INDEX 8/30/2018   Count 10   Sum 4   Oswestry Score (%) 8   Some recent data might be hidden      Preop HIRAL 20%.  Last HIRAL (6 wks) 28.89%.    Visual Analog Pain Scale  Back Pain Scale 0-10: 0  Right leg pain: 0  Left leg pain: 0    PROMIS-10 Scores  Global Mental Health Score: (P) 16  Global Physical Health Score: (P) 15  PROMIS TOTAL - SUBSCORES: (P) 31    O>   Alert, oriented x 3, cooperative.  Not in CP distress.  Ht 1.854 m (6' 1\")  Wt 105.2 kg (232 lb)  BMI 30.61 kg/m2  Surgical incision well-healed, no fluctuanace; no tenderness.  no sign of infection.  Ambulates independently.   Grossly neurologically intact.    Imaging:   No x-rays.    A>  3 mos postop, doing very well.    P>   Congratulated and reassured.  Will start PT next week.  Advised re activities; may resume as tolerated.  RTC prn.        Harshal Rucker MD    Orthopaedic Spine Surgery  Dept Orthopaedic Surgery, [facility]  [phone] office, [phone] pager  [email or web address]    "

## 2018-08-30 NOTE — MR AVS SNAPSHOT
After Visit Summary   8/30/2018    Rory Bustamante    MRN: [id number]           Patient Information     Date Of Birth          1955        Visit Information        Provider Department      8/30/2018 10:30 AM Harshal Rucker MD [facility]        Today's Diagnoses     s/p laminectomies L2-3,L3-4,L4-5 (6/1/18)    -  1       Follow-ups after your visit        Follow-up notes from your care team     Return if symptoms worsen or fail to improve.      Your next 10 appointments already scheduled     Aug 30, 2018 10:30 AM CDT   (Arrive by 10:00 AM)   RETURN SPINE with Harshal Rucker MD   [facility] ([facility])    [address] [place] [id number] [phone]            Aug 30, 2018 11:30 AM CDT   (Arrive by 11:15 AM)   PAC EVALUATION with Violeta Gregorio PA-C   [facility] Preoperative Assessment [place] ([facility])    [address] [place] [id number]   [phone]            Aug 30, 2018 12:30 PM CDT   (Arrive by 12:15 PM)   PAC RN ASSESSMENT with  Pac Rn   [facility] Preoperative [facility] [place] ([facility])    [address] [place] [id number]   [phone]            Aug 30, 2018  1:00 PM CDT   (Arrive by 12:45 PM)   PAC Anesthesia Consult with  Pac Anesthesiologist   [facility] Preoperative Assessment [place] ([facility])    [address] [place] [id number]   [phone]            Aug 30, 2018  1:15 PM CDT   (Arrive by 1:00 PM)   PAC Pharmacist with  Pac Pharmacist   [facility] Preoperative Assessment [place] ([facility])    [address] [place] [id number]   [phone]            Sep 19, 2018   Procedure with Sydney Dawkins MD   [facility] [place], Same Day Surgery (--)    [address] [place] [address]  [place] [address]   [phone]            Oct 03, 2018  1:00 PM CDT   (Arrive by 12:45 PM)   Post-Op with ELYSE Odonnell   [facility] Solid Organ Transplant ([facility])    [address]  [address]  [facility] [address]   [phone]            Oct 12, 2018  8:15 AM CDT   (Arrive by 8:00 AM)   Cystoscopy with Meagan Story MD   [facility] Inst for Prostate and Urologic Cancers ([facility])    [address]  [address]  [facility] [address]   [phone]            Oct 15, 2018  9:00 AM CDT   MR ABDOMEN MRCP W/O & W CONTRAST with UUMR1   [facility] [place], MRI ([facility])    [address] [address]   [phone]           Take your medicines as usual, unless your doctor tells you not to. Bring a list of your current medicines to your exam (including vitamins, minerals and over-the-counter drugs). Also bring the results of similar scans you may have had.    You may or may not receive IV contrast for this exam pending the discretion of the Radiologist.   Do not eat or drink for 6 hours prior to exam.  The MRI machine uses a strong magnet. Please wear clothes without metal (snaps, zippers). A sweatsuit works well, or we may give you a hospital gown.  Please remove any body piercings and hair extensions before you arrive. You will also remove watches, jewelry, hairpins, wallets, dentures, partial dental plates and hearing aids. You may wear contact lenses, and you may be able to wear your rings. We have a safe place to keep your personal items, but it is safer to leave them at home.  **IMPORTANT** THE INSTRUCTIONS BELOW ARE ONLY FOR THOSE PATIENTS WHO HAVE BEEN PRESCRIBED SEDATION OR GENERAL ANESTHESIA DURING THEIR MRI PROCEDURE:  IF YOUR DOCTOR PRESCRIBED ORAL SEDATION (take medicine to help you relax during your exam):   You must get the  medicine from your doctor (oral medication) before you arrive. Bring the medicine to the exam. Do not take it at home. You ll be told when to take it upon arriving for your exam.   Arrive one hour early. Bring someone who can take you home after the test. Your medicine will make you sleepy. After the exam, you may not drive, take a bus or take a taxi by yourself.  IF YOUR DOCTOR PRESCRIBED IV SEDATION:   Arrive one hour early. Bring someone who can take you home after the test. Your medicine will make you sleepy. After the exam, you may not drive, take a bus or take a taxi by yourself.   No eating 6 hours before your exam. You may have clear liquids up until 4 hours before your exam. (Clear liquids include water, clear tea, black coffee and fruit juice without pulp.)  IF YOUR DOCTOR PRESCRIBED ANESTHESIA (be asleep for your exam):   Arrive 1 1/2 hours early. Bring someone who can take you home after the test. You may not drive, take a bus or take a taxi by yourself.   No eating 8 hours before your exam. You may have clear liquids up until 4 hours before your exam. (Clear liquids include water, clear tea, black coffee and fruit juice without pulp.)   You will spend four to five hours in the recovery room.  If you have any questions, please contact your Imaging Department exam site.            Oct 15, 2018 10:00 AM CDT   (Arrive by 9:45 AM)   New Patient Visit with Shade Manning MD   [facility] ([facility] [place])    [address]  [address] [place] [address] [phone]              Who to contact     Please call your clinic at [phone] to:    Ask questions about your health    Make or cancel appointments    Discuss your medicines    Learn about your test results    Speak to your doctor            Additional Information About Your Visit        ShowMehart Information     [employer] gives you secure access to your electronic health record. If you see a  "primary care provider, you can also send messages to your care team and make appointments. If you have questions, please call your primary care clinic.  If you do not have a primary care provider, please call [phone] and they will assist you.      path intelligence is an electronic gateway that provides easy, online access to your medical records. With path intelligence, you can request a clinic appointment, read your test results, renew a prescription or communicate with your care team.     To access your existing account, please contact your [facility] or call [phone] for assistance.        Care EveryWhere ID     This is your Care EveryWhere ID. This could be used by other organizations to access your [facility] medical records  [id number]        Your Vitals Were     Height BMI (Body Mass Index)                1.854 m (6' 1\") 30.61 kg/m2           Blood Pressure from Last 3 Encounters:   08/11/18 147/82   08/08/18 138/78   08/06/18 143/83    Weight from Last 3 Encounters:   08/30/18 105.2 kg (232 lb)   08/13/18 106.6 kg (235 lb)   08/11/18 106.6 kg (235 lb)              Today, you had the following     No orders found for display         Today's Medication Changes          These changes are accurate as of 8/30/18 10:19 AM.  If you have any questions, ask your nurse or doctor.               These medicines have changed or have updated prescriptions.        Dose/Directions    allopurinol 100 MG tablet   Commonly known as:  ZYLOPRIM   This may have changed:    - how much to take  - when to take this   Used for:  Kidney replaced by transplant, Gout        Dose:  200 mg   Take 2 tablets by mouth daily.   Quantity:  180 tablet   Refills:  3                Primary Care Provider Office Phone # Fax #    Moris Diaz [phone]       [facility] [address]  [facility] [address]        Equal Access to Services     AZEEM JACOBSEN AH: Agatha Doss, " wanguyenda tuaniam, qaybta kahua dixon, devin treviñoaaliudmila ah. So [facility] [phone].    ATENCIÓN: Si liliya taylor, tiene a carr disposición servicios gratuitos de asistencia lingüística. Larissa al [phone].    We comply with applicable federal civil rights laws and [place] laws. We do not discriminate on the basis of race, color, national origin, age, disability, sex, sexual orientation, or gender identity.            Thank you!     Thank you for [facility]  for your care. Our goal is always to provide you with excellent care. Hearing back from our patients is one way we can continue to improve our services. Please take a few minutes to complete the written survey that you may receive in the mail after your visit with us. Thank you!             Your Updated Medication List - Protect others around you: Learn how to safely use, store and throw away your medicines at www.disposemymeds.org.          This list is accurate as of 8/30/18 10:19 AM.  Always use your most recent med list.                   Brand Name Dispense Instructions for use Diagnosis    allopurinol 100 MG tablet    ZYLOPRIM    180 tablet    Take 2 tablets by mouth daily.    Kidney replaced by transplant, Gout       ALPHAGAN OP      Place 1 drop into the right eye 2 times daily        BABY ASPIRIN PO      Take 81 mg by mouth daily        BEVACizumab 400 MG/16ML injection    AVASTIN     Every 5 weeks        cholecalciferol 5000 units Tabs tablet    vitamin D3    30 tablet    Take 1 tablet (5,000 Units) by mouth daily    Hyperparathyroidism due to renal insufficiency (H)       CRESTOR PO      Take 20 mg by mouth every evening        * cycloSPORINE modified 25 MG capsule    GENERIC EQUIVALENT    180 capsule    Take 2 capsules (50 mg) by mouth 2 times daily Total dose 150 mg twice daily    Kidney transplant recipient, Long-term use of immunosuppressant medication       * cycloSPORINE modified 100 MG capsule     GENERIC EQUIVALENT    180 capsule    Take 1 capsule (100 mg) by mouth 2 times daily Total dose 150 mg twice daily    Kidney transplant recipient, Long-term use of immunosuppressant medication       epoetin freya 81383 UNIT/ML injection    PROCRIT    4 mL    Inject 1 mL (20,000 Units) Subcutaneous once a week As needed for hgb < 10    CKD (chronic kidney disease) stage 4, GFR 15-29 ml/min (H), Anemia in stage 4 chronic kidney disease (H)       fenofibrate 145 MG tablet      Take 145 mg by mouth daily        ISOSORBIDE MONONITRATE PO      Take 60 mg by mouth daily        lidocaine 5 % ointment    XYLOCAINE    50 g    Apply topically daily as needed for moderate pain    Left hip pain       loperamide 2 MG capsule    IMODIUM     Take 2 mg by mouth daily        losartan 50 MG tablet    COZAAR    90 tablet    Take 0.5 tablets (25 mg) by mouth daily    Kidney replaced by transplant       metoprolol tartrate 100 MG tablet    LOPRESSOR    60 tablet    Take 1 tablet by mouth 2 times daily.    Unspecified hypertensive kidney disease with chronic kidney disease stage I through stage IV, or unspecified(403.90), Kidney replaced by transplant       NIFEdipine ER osmotic 60 MG Tb24    PROCARDIA XL    90 tablet    Take 1 tablet (60 mg) by mouth daily    HTN (hypertension)       NITROSTAT 0.4 MG sublingual tablet   Generic drug:  nitroGLYcerin      Place 0.4 mg under the tongue every 5 minutes as needed.        NONFORMULARY      Take 1 tablet by mouth 2 times daily Focus Macula Pro:  Eye vitamin and mineral supplement A, C, E, Zinc, Copper        omega-3 acid ethyl esters 1 g capsule    Lovaza     Take 2 g by mouth 2 times daily        oxyCODONE IR 5 MG tablet    ROXICODONE    80 tablet    Take 1-2 tablets (5-10 mg) by mouth every 4 hours as needed for other (pain control or improvement in physical function. Hold dose for analgesic side effects.)    Spinal stenosis of lumbar region with neurogenic claudication       PANTOPRAZOLE  SODIUM PO      Take 40 mg by mouth daily as needed for heartburn        predniSONE 5 MG tablet    DELTASONE     Take 5 mg by mouth daily (with lunch)        probenecid 500 MG tablet    BENEMID     Take 500 mg by mouth 2 times daily.        sulfamethoxazole-trimethoprim 400-80 MG per tablet    BACTRIM/SEPTRA     Take 1 tablet by mouth every other day        tamsulosin 0.4 MG capsule    FLOMAX    60 capsule    Take 1 capsule (0.4 mg) by mouth daily    Benign prostatic hyperplasia, unspecified whether lower urinary tract symptoms present       TYLENOL ARTHRITIS PAIN 650 MG CR tablet   Generic drug:  acetaminophen      Take 2 tablets by mouth 3 times daily.        warfarin 5 MG tablet    COUMADIN    30 tablet    Take 1 tablet (5 mg) by mouth daily    Paroxysmal atrial fibrillation (H)       * Notice:  This list has 2 medication(s) that are the same as other medications prescribed for you. Read the directions carefully, and ask your doctor or other care provider to review them with you.

## 2018-08-30 NOTE — PHARMACY - PREOPERATIVE ASSESSMENT CENTER
Anticoagulation Note - Preoperative Assessment Center (PAC) Pharmacist     Patient seen and interviewed during time of PAC Clinic appointment August 30, 2018.  The purpose of this note is to document the perioperative anticoagulation plan outlined by the providers caring for Rory Bustamante.     Current Regimen  Anticoagulation Regimen as of August 30, 2018: warfarin 2.5 mg alternating with 5 mg doses. Takes in evening.   Indication: atrial flutter  Prescriber:  RICHARD Jeffrey, managed by [facility] anticoagulation clinic  Current medications that may interact with this include: bactrim, aspirin  INR Goal: 2-3    Perioperative plan  Rory Bustamante is scheduled for AV graft on 9/19/18 with Dr. Dawkins.  Per discussion with Dialysis access CNS INR needs to be <1.8 on day of procedure.  Patient plans to follow up with his anticoagulation nurse to determine final plan for holding warfarin prior to the procedure.  He was instructed to contact our clinic if he has any difficulty developing this plan.      Resumption of anticoagulation after procedure will be based on surgery team assessment of bleeding risks and complications.  This plan may require re-assessment and modification by his primary team in the perioperative setting depending on patients clinical situation.        Omar Polk RPH  August 30, 2018  1:01 PM

## 2018-08-30 NOTE — NURSING NOTE
"Reason For Visit:   Chief Complaint   Patient presents with     Surgical Followup     DOS 6/11/18 MIS Laminectomies L2-3,L3-4, L4-5       Primary MD: Moris Diaz  Ref. MD: Dr. Carbajal       ?  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 No.  Work status?  On disability.     Date of injury: N/A     Date of surgery: 06/11/18  Type of surgery: Minimally Invasive Surgery Laminectomies Lumbar 2-3,Lumbar 3-4,Lumbar 4-5     Smoker: No      Ht 1.854 m (6' 1\")  Wt 105.2 kg (232 lb)  BMI 30.61 kg/m2    Pain Assessment  Patient Currently in Pain: Denies    Oswestry (HIRAL) Questionnaire    OSWESTRY DISABILITY INDEX 8/30/2018   Count 10   Sum 4   Oswestry Score (%) 8   Some recent data might be hidden          Visual Analog Pain Scale  Back Pain Scale 0-10: 0  Right leg pain: 0  Left leg pain: 0    Promis 10 Assessment    PROMIS 10 8/30/2018   In general, would you say your health is: Good   In general, would you say your quality of life is: Very good   In general, how would you rate your physical health? Good   In general, how would you rate your mental health, including your mood and your ability to think? Very good   In general, how would you rate your satisfaction with your social activities and relationships? Very good   In general, please rate how well you carry out your usual social activities and roles Good   To what extent are you able to carry out your everyday physical activities such as walking, climbing stairs, carrying groceries, or moving a chair? Mostly   How often have you been bothered by emotional problems such as feeling anxious, depressed or irritable? Rarely   How would you rate your fatigue on average? Moderate   How would you rate your pain on average?   0 = No Pain  to  10 = Worst Imaginable Pain 0   In general, would you say your health is: 3   In general, would you say your quality of life is: 4   In general, how would you rate your physical health? 3   In general, how " would you rate your mental health, including your mood and your ability to think? 4   In general, how would you rate your satisfaction with your social activities and relationships? 4   In general, please rate how well you carry out your usual social activities and roles. (This includes activities at home, at work and in your community, and responsibilities as a parent, child, spouse, employee, friend, etc.) 3   To what extent are you able to carry out your everyday physical activities such as walking, climbing stairs, carrying groceries, or moving a chair? 4   In the past 7 days, how often have you been bothered by emotional problems such as feeling anxious, depressed, or irritable? 2   In the past 7 days, how would you rate your fatigue on average? 3   In the past 7 days, how would you rate your pain on average, where 0 means no pain, and 10 means worst imaginable pain? 0   Global Mental Health Score 16   Global Physical Health Score 15   PROMIS TOTAL - SUBSCORES 31   Some recent data might be hidden                Cindy Fofana LPN

## 2018-08-30 NOTE — ANESTHESIA PREPROCEDURE EVALUATION
Anesthesia Evaluation     . Pt has had prior anesthetic. Type: General and MAC    History of anesthetic complications    Urinary retention      ROS/MED HX    ENT/Pulmonary:       Neurologic:     (+)neuropathy - feet,     Cardiovascular:     (+) Dyslipidemia, hypertension--CAD, -past MI,-stent,last 2012  2 Drug Eluting Stent .. Taking blood thinners : . . . :. dysrhythmias a-fib, . Previous cardiac testing Echodate:6/14/18results:Left ventricular function, chamber size, wall motion, and wall thickness are  normal.The EF is 55-60%.  Right ventricular function, chamber size, wall motion, and thickness are  normal.  No significant valvular abnormalities are noted.  Dilation of the inferior vena cava is present with abnormal respiratory  variation in diameter. Estimated mean right atrial pressure is 15 mmHg  (significantly elevated).date: results:ECG reviewed date:6/16/18 results:SR, LADCath date: 2012 results:          METS/Exercise Tolerance:  3 - Able to walk 1-2 blocks without stopping   Hematologic:     (+) Anemia, History of Transfusion no previous transfusion reaction Other Hematologic Disorder-Procrit and 2 recent iron infusions      Musculoskeletal:   (+) arthritis, , , -       GI/Hepatic:     (+) GERD Asymptomatic on medication,       Renal/Genitourinary: Comment: To be listed for transplant again    (+) chronic renal disease, type: ESRD, Pt does not require dialysis, Pt has history of transplant, date: 1989,       Endo:     (+) Chronic steroid usage for Post Transplant Immunosuppression Date most recently used: 5/14/18,.      Psychiatric:  - neg psychiatric ROS       Infectious Disease:  - neg infectious disease ROS       Malignancy:   (+) Malignancy History of Skin  Skin CA Remission status post Surgery,         Other:    (+) H/O Chronic Pain,                             PAC Discussion and Assessment    ASA Classification: 3  Case is suitable for: [place]  Anesthetic techniques and relevant risks  discussed:   Invasive monitoring and risk discussed:   Types:   Possibility and Risk of blood transfusion discussed:   NPO instructions given:   Additional anesthetic preparation and risks discussed:   Needs early admission to pre-op area:   Other:     PAC Resident/NP Anesthesia Assessment:  Rory Bustamante is a 62 year old male who presents for pre-operative H & P in preparation for a Create Right Upper Arm Arteriovenous Fistula on 9/19/18 with Dr. Dawkins for ESRD at the [facility].     PAC referral for risk assessment and optimization for anesthesia with comorbid conditions of:    Pre-operative considerations:  1.  Cardiac:  Functional status METS = 3    Risk of Major Adverse Cardiac event: 6.6%  -AFIB, CAD, s/p MI and stents, HTN  2.  Pulm:   JITENDRA risk:  Intermediate  -No known pulmonary disease    3.  GI:  Risk of PONV score = 2 .  If > 2, anti-emetic intervention recommended.  -History of pancreatitis  4.  Heme:  -Chronic anemia, last Hgb 10.6. Follows with Anemia Clinic Procrit weekly. Has received iron infusions.  4.  Meds:  -Antiplts/Anticoags:  Asa 81 mg and coumadin; patient to receive final plan from cardiology/coumadin clinic    Patient is optimized and is an acceptable candidate for the proposed procedure.  No further diagnostic evaluation is needed.    Violeta Gregorio MS, PA-C  08/30/18 12:29 PM        Mid-Level Provider/Resident:   Date:   Time:     Attending Anesthesiologist Anesthesia Assessment:  62 year old for right upper arm AV fistula in preparation for HD. He is S/P renal transplant (ESRD due to HTN) that is now failing with most recent creatinine ~4. Mild anemia, hgb 10. He has known CAD with PCI to the RCA 2012, normal echo recently, EF 60%. Atrial fibrillation, on warfarin.    Patient/case discussed with RAY. No need to see patient. Patient is appropriate for the planned procedure without further work-up or medical management.       Reviewed and  Signed by PAC Anesthesiologist  Anesthesiologist: giacomo  Date: 8/30/2018  Time:   Pass/Fail: Pass  Disposition:     PAC Pharmacist Assessment:        Pharmacist:   Date:   Time:      Anesthesia Plan      History & Physical Review  History and physical reviewed and following examination; no interval change.    ASA Status:  3 .    NPO Status:  > 8 hours    Plan for General and Peripheral Nerve Block with Intravenous induction. Maintenance will be Balanced.           Postoperative Care      Consents  Anesthetic plan, risks, benefits and alternatives discussed with:  Patient..                          .

## 2018-09-04 ENCOUNTER — TRANSFERRED RECORDS (OUTPATIENT)
Dept: HEALTH INFORMATION MANAGEMENT | Facility: CLINIC | Age: 63
End: 2018-09-04

## 2018-09-07 ENCOUNTER — TELEPHONE (OUTPATIENT)
Dept: PHARMACY | Facility: CLINIC | Age: 63
End: 2018-09-07

## 2018-09-07 NOTE — TELEPHONE ENCOUNTER
Anemia Management Note  SUBJECTIVE/OBJECTIVE:  Referred by Dr. Christi Sun on 2018  Primary Diagnosis: Anemia in Chronic Kidney Disease (N18.3, D63.1)     Secondary Diagnosis:  Chronic Kidney Disease, Stage 3 (N18.3)   Hgb goal range:  9-10  Epo/Darbo: Procrit  20,000 units  once weekly for Hgb < 10  In clinic  RX/TX plans : 2019  Iron regimen:  Ferrous Sulfate 325mg QD  Labs : 2019  Recent MARY LOU use, transfusion, IV iron: IV iron scheduled 2018    Anemia Latest Ref Rng & Units 2018 8/3/2018 2018 8/10/2018 2018 2018 2018   MARY LOU Dose - - 20,000 units - 20,000 units - - -   Hemoglobin 13.3 - 17.7 g/dL - 9.8(A) - 9.9(A) 10.4(A) 10.5(A) 10.6(L)   IV Iron Dose - 750mg - 750mg - - - -   TSAT 15 - 46 % - - - - 37 - 39   Ferritin 26 - 388 ng/mL - - - - 1245 - 996(H)     BP Readings from Last 3 Encounters:   18 154/80   18 147/82   18 138/78     Wt Readings from Last 2 Encounters:   18 229 lb 12.8 oz (104.2 kg)   18 232 lb (105.2 kg)           ASSESSMENT:  Hgb: Above goal - recommend hold dose  TSat: at goal >30% Ferritin: At goal (>100ng/mL)    PLAN:  Hold Procrit and RTC for hgb then procrit if needed in 1 week(s)    Orders needed to be renewed (for next follow-up date) in EPIC: None    Iron labs due:  18    Plan discussed with:  Kajal   Plan provided by:  Judy Ramírez CPhT  [facility]  [phone]    NEXT FOLLOW-UP DATE:  18  Reviewed 09/10/2018 [facility]  Anemia Management Service  Grecia Carroll,PharmD and Michelle Ramírez CPhT  Phone: [phone]  Fax: [phone]

## 2018-09-11 ENCOUNTER — TRANSFERRED RECORDS (OUTPATIENT)
Dept: HEALTH INFORMATION MANAGEMENT | Facility: CLINIC | Age: 63
End: 2018-09-11

## 2018-09-11 NOTE — TELEPHONE ENCOUNTER
Date of appointment: 10/15/18    Diagnosis/reason for appointment: Pancreatic cyst  Referring provider/facility: Joe / [facility]  Who called: luli sierra    Recent Studies  Imaging: in [facility]  Pathology: in [facility]  Labs: in Epic  Previous chemo/radiation (if known):    Records requested from: in [facility]  Records received from: in [facility]    Additional information:

## 2018-09-14 ENCOUNTER — TELEPHONE (OUTPATIENT)
Dept: PHARMACY | Facility: CLINIC | Age: 63
End: 2018-09-14

## 2018-09-14 LAB
FERRITIN SERPL-MCNC: 927 NG/ML
HCT VFR BLD AUTO: 27.3 %
HEMOGLOBIN: 9.4 G/DL (ref 13.3–17.7)
IRON SATURATION INDEX: 38 %

## 2018-09-14 NOTE — TELEPHONE ENCOUNTER
Anemia Management Note  SUBJECTIVE/OBJECTIVE:  Referred by Dr. Christi Sun on 2018  Primary Diagnosis: Anemia in Chronic Kidney Disease (N18.3, D63.1)     Secondary Diagnosis:  Chronic Kidney Disease, Stage 3 (N18.3)   Hgb goal range:  9-10  Epo/Darbo: Procrit  20,000 units  once weekly for Hgb < 10  In clinic  RX/TX plans : 2019  Iron regimen:  Ferrous Sulfate 325mg QD  Labs : 2019  Recent MARY LOU use, transfusion, IV iron: IV iron scheduled 2018     Anemia Latest Ref Rng & Units 8/3/2018 2018 8/10/2018 2018 2018 2018 2018   MARY LOU Dose - 20,000 units - 20,000 units - - - 20,000 units   Hemoglobin 13.3 - 17.7 g/dL 9.8(A) - 9.9(A) 10.4(A) 10.5(A) 10.6(L) 9.4(A)   IV Iron Dose - - 750mg - - - - -   TSAT % - - - 37 - 39 38   Ferritin ng/mL - - - 1245 - 996(H) 927       BP Readings from Last 3 Encounters:   18 154/80   18 147/82   18 138/78     Wt Readings from Last 2 Encounters:   18 229 lb 12.8 oz (104.2 kg)   18 232 lb (105.2 kg)     Having fistula surgery next week.  Thinking may start dialysis in 2 months minimum.    ASSESSMENT:  Hgb:Not at goal but improving - recommend dose and continue current regimen  TSat: at goal >30% Ferritin: At goal (>100ng/mL)    PLAN:  Dose with procrit and RTC for hgb then procrit if needed in 1 week(s)    Orders needed to be renewed (for next follow-up date) in LETTERS - for external labs: None    Iron labs due:  10/12/2018    Plan discussed with:  Rory  Plan provided by:      NEXT FOLLOW-UP DATE:  2018    Anemia Management Service  Mirza LozanoD and Michelle Ramírez CPhT  Phone: [phone]  Fax: [phone]

## 2018-09-18 ENCOUNTER — TELEPHONE (OUTPATIENT)
Dept: TRANSPLANT | Facility: CLINIC | Age: 63
End: 2018-09-18

## 2018-09-18 ENCOUNTER — DOCUMENTATION ONLY (OUTPATIENT)
Dept: TRANSPLANT | Facility: CLINIC | Age: 63
End: 2018-09-18

## 2018-09-18 NOTE — PROGRESS NOTES
Melani,  I spoke with the patient, he was mistakenly place Coumadin in daily morning pill box. Last dose he took was yesterday morning.  He's instructed to check INR this afternoon and call me for results for plan. I asked him to call me directly to my phone.  Than you for your help.  Lang Mercado) COURTNEY Padilla  Dialysis access program   [facility]  Pager # [phone]    ===View-only below this line===    ----- Message -----     From: Melani Mehta RN     Sent: 9/18/2018   1:25 PM       To: ELYSE Odonnell CNS    ----- Message from Melani Mehta RN sent at 9/18/2018  1:25 PM CDT -----  Yobany Croft, he's scheduled with Juancho tomorrow, but didn't stop warfarin until today...

## 2018-09-19 ENCOUNTER — ANESTHESIA (OUTPATIENT)
Dept: SURGERY | Facility: CLINIC | Age: 63
End: 2018-09-19
Payer: MEDICARE

## 2018-09-19 ENCOUNTER — PRE VISIT (OUTPATIENT)
Dept: UROLOGY | Facility: CLINIC | Age: 63
End: 2018-09-19

## 2018-09-20 ENCOUNTER — TELEPHONE (OUTPATIENT)
Dept: TRANSPLANT | Facility: CLINIC | Age: 63
End: 2018-09-20

## 2018-09-20 NOTE — TELEPHONE ENCOUNTER
Received replied e-mail from Tx fellow yesterday:  Mike Adler [email or web address]  Wed 9/19/2018 3:36 PM  Tell him to hold warfarin for five days prior.   On Tuesday, September 18, 2018, Jose Car [email or web address] wrote:  Yobany quevedoDianna John C MRN: [id number]  LUE AVF surgery is scheduled tomorrow. Patient mistakenly took Coumadin yesterday morning, INR 3.33 today. Would you like to keep surgery as scheduled?   I sent text page to both of you.  Please advise.  Thanks  Sum    Writer call patient today for above instructions to hold Coumadin 5 days prior to surgery and re-check INR tomorrow and 9/24/18. May hole ASA 81 mg for 2 days prior to surgery.  Writer provided contact phone number and instructed patient to call writer with any questions or concerns. Patient verbalized acceptance and understanding of plan.

## 2018-09-21 ENCOUNTER — TELEPHONE (OUTPATIENT)
Dept: PHARMACY | Facility: CLINIC | Age: 63
End: 2018-09-21

## 2018-09-21 ENCOUNTER — TELEPHONE (OUTPATIENT)
Dept: TRANSPLANT | Facility: CLINIC | Age: 63
End: 2018-09-21

## 2018-09-21 LAB
FERRITIN SERPL-MCNC: 986 NG/ML
HCT VFR BLD AUTO: 31.4 %
HEMOGLOBIN: 10.7 G/DL (ref 13.3–17.7)
IRON BINDING CAP: 362
IRON SATURATION INDEX: 25 %
IRON: 91 UG/DL

## 2018-09-21 NOTE — TELEPHONE ENCOUNTER
Anemia Management Note  SUBJECTIVE/OBJECTIVE:  Referred by Dr. Christi Sun on 2018  Primary Diagnosis: Anemia in Chronic Kidney Disease (N18.3, D63.1)     Secondary Diagnosis:  Chronic Kidney Disease, Stage 3 (N18.3)   Hgb goal range:  9-10  Epo/Darbo: Procrit  20,000 units  once weekly for Hgb < 10  In clinic  RX/TX plans : 2019  Iron regimen:  Ferrous Sulfate 325mg QD  Labs : 2019  Recent MARY LOU use, transfusion, IV iron: IV iron scheduled 2018    Anemia Latest Ref Rng & Units 2018 8/10/2018 2018 2018 2018 2018 2018   MARY LOU Dose - - 20,000 units - - - 20,000 units -   Hemoglobin 13.3 - 17.7 g/dL - 9.9(A) 10.4(A) 10.5(A) 10.6(L) 9.4(A) 10.7(A)   IV Iron Dose - 750mg - - - - - -   TSAT % - - 37 - 39 38 25   Ferritin ng/mL - - 1245 - 996(H) 927 986     BP Readings from Last 3 Encounters:   18 154/80   18 147/82   18 138/78     Wt Readings from Last 2 Encounters:   18 229 lb 12.8 oz (104.2 kg)   18 232 lb (105.2 kg)       Received and documented outside lab results drawn on     ASSESSMENT:  Hgb: Above goal - recommend hold dose  TSat: not at goal (>30%) but ferritin >500ng/mL.  IV iron not indicated at this time per anemia protocol. Ferritin: At goal (>100ng/mL)    PLAN:  Hold Procrit and RTC for hgb then procrit if needed in 1 week(s)  Need to see why lab checking iron closer than Q4 weeks.    Orders needed to be renewed (for next follow-up date) in LETTERS - for external labs: None    Iron labs due:  10/19/18    Plan discussed with:  Rory  Plan provided by:  Judy Ramírez CPhT  [facility]  [phone]  Reviewed 2018 DENISE  NEXT FOLLOW-UP DATE:  18    Anemia Management Service  Grecia Carroll PharmD and Michelle Ramírez CPhT  Phone: [phone]  Fax: [phone]

## 2018-09-21 NOTE — TELEPHONE ENCOUNTER
Received VM from patient, who reported INR 1.5 today and will recheck again next Monday as instructed.  Writer called back to patient to acknowledge INR result and recommended patient to contact [facility] cardiology clinic to ensure INR level is safe for patient. Patient verbalized acceptance and understanding of plan, he'll contact [facility] cardiology clinic.

## 2018-09-25 ENCOUNTER — HOSPITAL ENCOUNTER (OUTPATIENT)
Facility: CLINIC | Age: 63
Discharge: HOME | End: 2018-09-25
Attending: SURGERY | Admitting: SURGERY
Payer: MEDICARE

## 2018-09-25 VITALS
SYSTOLIC BLOOD PRESSURE: 167 MMHG | WEIGHT: 234.57 LBS | RESPIRATION RATE: 16 BRPM | OXYGEN SATURATION: 95 % | DIASTOLIC BLOOD PRESSURE: 103 MMHG | TEMPERATURE: 97.8 F | HEIGHT: 73 IN | BODY MASS INDEX: 31.09 KG/M2

## 2018-09-25 DIAGNOSIS — I77.0 A-V FISTULA (H): ICD-10-CM

## 2018-09-25 DIAGNOSIS — Z98.890 S/P SPINAL SURGERY: ICD-10-CM

## 2018-09-25 DIAGNOSIS — N18.4 ANEMIA IN STAGE 4 CHRONIC KIDNEY DISEASE (H): ICD-10-CM

## 2018-09-25 DIAGNOSIS — M48.062 SPINAL STENOSIS OF LUMBAR REGION WITH NEUROGENIC CLAUDICATION: ICD-10-CM

## 2018-09-25 DIAGNOSIS — Z41.9 SURGERY, ELECTIVE: ICD-10-CM

## 2018-09-25 DIAGNOSIS — Z76.82 ORGAN TRANSPLANT CANDIDATE: ICD-10-CM

## 2018-09-25 DIAGNOSIS — I25.118 CORONARY ARTERY DISEASE OF NATIVE ARTERY OF NATIVE HEART WITH STABLE ANGINA PECTORIS (H): ICD-10-CM

## 2018-09-25 DIAGNOSIS — N06.8 ISOLATED PROTEINURIA WITH OTHER MORPHOLOGIC LESION: ICD-10-CM

## 2018-09-25 DIAGNOSIS — I48.21 PERMANENT ATRIAL FIBRILLATION (H): ICD-10-CM

## 2018-09-25 DIAGNOSIS — D63.1 ANEMIA IN STAGE 4 CHRONIC KIDNEY DISEASE (H): ICD-10-CM

## 2018-09-25 DIAGNOSIS — N18.4 CKD (CHRONIC KIDNEY DISEASE) STAGE 4, GFR 15-29 ML/MIN (H): ICD-10-CM

## 2018-09-25 DIAGNOSIS — I10 ESSENTIAL HYPERTENSION: ICD-10-CM

## 2018-09-25 DIAGNOSIS — Z94.0 S/P KIDNEY TRANSPLANT: Primary | ICD-10-CM

## 2018-09-25 LAB
APTT PPP: 26 SEC (ref 22–37)
BASOPHILS # BLD AUTO: 0 10E9/L (ref 0–0.2)
BASOPHILS NFR BLD AUTO: 0.6 %
CREAT SERPL-MCNC: 4.25 MG/DL (ref 0.66–1.25)
DIFFERENTIAL METHOD BLD: ABNORMAL
EOSINOPHIL # BLD AUTO: 0.1 10E9/L (ref 0–0.7)
EOSINOPHIL NFR BLD AUTO: 1.9 %
ERYTHROCYTE [DISTWIDTH] IN BLOOD BY AUTOMATED COUNT: 14.5 % (ref 10–15)
GFR SERPL CREATININE-BSD FRML MDRD: 14 ML/MIN/1.7M2
GLUCOSE BLDC GLUCOMTR-MCNC: 102 MG/DL (ref 70–99)
GLUCOSE SERPL-MCNC: 99 MG/DL (ref 70–99)
HCT VFR BLD AUTO: 31.6 % (ref 40–53)
HGB BLD-MCNC: 10.5 G/DL (ref 13.3–17.7)
IMM GRANULOCYTES # BLD: 0 10E9/L (ref 0–0.4)
IMM GRANULOCYTES NFR BLD: 0.4 %
INR PPP: 1.07 (ref 0.86–1.14)
INTERPRETATION ECG - MUSE: NORMAL
LYMPHOCYTES # BLD AUTO: 1.5 10E9/L (ref 0.8–5.3)
LYMPHOCYTES NFR BLD AUTO: 27.5 %
MCH RBC QN AUTO: 34.7 PG (ref 26.5–33)
MCHC RBC AUTO-ENTMCNC: 33.2 G/DL (ref 31.5–36.5)
MCV RBC AUTO: 104 FL (ref 78–100)
MONOCYTES # BLD AUTO: 0.4 10E9/L (ref 0–1.3)
MONOCYTES NFR BLD AUTO: 7.7 %
NEUTROPHILS # BLD AUTO: 3.3 10E9/L (ref 1.6–8.3)
NEUTROPHILS NFR BLD AUTO: 61.9 %
NRBC # BLD AUTO: 0 10*3/UL
NRBC BLD AUTO-RTO: 0 /100
PLATELET # BLD AUTO: 124 10E9/L (ref 150–450)
POTASSIUM SERPL-SCNC: 4.3 MMOL/L (ref 3.4–5.3)
RBC # BLD AUTO: 3.03 10E12/L (ref 4.4–5.9)
WBC # BLD AUTO: 5.3 10E9/L (ref 4–11)

## 2018-09-25 PROCEDURE — 71000014 ZZH RECOVERY PHASE 1 LEVEL 2 FIRST HR: Performed by: SURGERY

## 2018-09-25 PROCEDURE — 25000128 H RX IP 250 OP 636: Performed by: SURGERY

## 2018-09-25 PROCEDURE — 85730 THROMBOPLASTIN TIME PARTIAL: CPT | Performed by: CLINICAL NURSE SPECIALIST

## 2018-09-25 PROCEDURE — 25000566 ZZH SEVOFLURANE, EA 15 MIN: Performed by: SURGERY

## 2018-09-25 PROCEDURE — 82947 ASSAY GLUCOSE BLOOD QUANT: CPT | Performed by: CLINICAL NURSE SPECIALIST

## 2018-09-25 PROCEDURE — 37000008 ZZH ANESTHESIA TECHNICAL FEE, 1ST 30 MIN: Performed by: SURGERY

## 2018-09-25 PROCEDURE — 71000027 ZZH RECOVERY PHASE 2 EACH 15 MINS: Performed by: SURGERY

## 2018-09-25 PROCEDURE — 82565 ASSAY OF CREATININE: CPT | Performed by: CLINICAL NURSE SPECIALIST

## 2018-09-25 PROCEDURE — 82962 GLUCOSE BLOOD TEST: CPT

## 2018-09-25 PROCEDURE — 25000128 H RX IP 250 OP 636: Performed by: NURSE ANESTHETIST, CERTIFIED REGISTERED

## 2018-09-25 PROCEDURE — 40000171 ZZH STATISTIC PRE-PROCEDURE ASSESSMENT III: Performed by: SURGERY

## 2018-09-25 PROCEDURE — 25000125 ZZHC RX 250: Performed by: STUDENT IN AN ORGANIZED HEALTH CARE EDUCATION/TRAINING PROGRAM

## 2018-09-25 PROCEDURE — 25000128 H RX IP 250 OP 636: Performed by: ANESTHESIOLOGY

## 2018-09-25 PROCEDURE — 25000128 H RX IP 250 OP 636: Performed by: STUDENT IN AN ORGANIZED HEALTH CARE EDUCATION/TRAINING PROGRAM

## 2018-09-25 PROCEDURE — 25000125 ZZHC RX 250: Performed by: ANESTHESIOLOGY

## 2018-09-25 PROCEDURE — 25000125 ZZHC RX 250: Performed by: NURSE ANESTHETIST, CERTIFIED REGISTERED

## 2018-09-25 PROCEDURE — 93005 ELECTROCARDIOGRAM TRACING: CPT

## 2018-09-25 PROCEDURE — 36000057 ZZH SURGERY LEVEL 3 1ST 30 MIN - UMMC: Performed by: SURGERY

## 2018-09-25 PROCEDURE — 36000059 ZZH SURGERY LEVEL 3 EA 15 ADDTL MIN UMMC: Performed by: SURGERY

## 2018-09-25 PROCEDURE — 25000132 ZZH RX MED GY IP 250 OP 250 PS 637: Mod: GY | Performed by: STUDENT IN AN ORGANIZED HEALTH CARE EDUCATION/TRAINING PROGRAM

## 2018-09-25 PROCEDURE — 85025 COMPLETE CBC W/AUTO DIFF WBC: CPT | Performed by: CLINICAL NURSE SPECIALIST

## 2018-09-25 PROCEDURE — 40000065 ZZH STATISTIC EKG NON-CHARGEABLE

## 2018-09-25 PROCEDURE — 71000015 ZZH RECOVERY PHASE 1 LEVEL 2 EA ADDTL HR: Performed by: SURGERY

## 2018-09-25 PROCEDURE — 40000275 ZZH STATISTIC RCP TIME EA 10 MIN

## 2018-09-25 PROCEDURE — 37000009 ZZH ANESTHESIA TECHNICAL FEE, EACH ADDTL 15 MIN: Performed by: SURGERY

## 2018-09-25 PROCEDURE — 84132 ASSAY OF SERUM POTASSIUM: CPT | Performed by: CLINICAL NURSE SPECIALIST

## 2018-09-25 PROCEDURE — 25000128 H RX IP 250 OP 636: Performed by: CLINICAL NURSE SPECIALIST

## 2018-09-25 PROCEDURE — 25000132 ZZH RX MED GY IP 250 OP 250 PS 637: Mod: GY | Performed by: NURSE ANESTHETIST, CERTIFIED REGISTERED

## 2018-09-25 PROCEDURE — A9270 NON-COVERED ITEM OR SERVICE: HCPCS | Mod: GY | Performed by: STUDENT IN AN ORGANIZED HEALTH CARE EDUCATION/TRAINING PROGRAM

## 2018-09-25 PROCEDURE — 85610 PROTHROMBIN TIME: CPT | Performed by: CLINICAL NURSE SPECIALIST

## 2018-09-25 PROCEDURE — 36415 COLL VENOUS BLD VENIPUNCTURE: CPT | Performed by: CLINICAL NURSE SPECIALIST

## 2018-09-25 PROCEDURE — 27210794 ZZH OR GENERAL SUPPLY STERILE: Performed by: SURGERY

## 2018-09-25 RX ORDER — FENTANYL CITRATE 50 UG/ML
25-50 INJECTION, SOLUTION INTRAMUSCULAR; INTRAVENOUS
Status: DISCONTINUED | OUTPATIENT
Start: 2018-09-25 | End: 2018-09-25 | Stop reason: HOSPADM

## 2018-09-25 RX ORDER — ONDANSETRON 4 MG/1
4 TABLET, ORALLY DISINTEGRATING ORAL EVERY 30 MIN PRN
Status: DISCONTINUED | OUTPATIENT
Start: 2018-09-25 | End: 2018-09-25 | Stop reason: HOSPADM

## 2018-09-25 RX ORDER — LABETALOL HYDROCHLORIDE 5 MG/ML
10 INJECTION, SOLUTION INTRAVENOUS EVERY 10 MIN PRN
Status: COMPLETED | OUTPATIENT
Start: 2018-09-25 | End: 2018-09-25

## 2018-09-25 RX ORDER — TRAMADOL HYDROCHLORIDE 50 MG/1
50 TABLET ORAL EVERY 6 HOURS PRN
Status: DISCONTINUED | OUTPATIENT
Start: 2018-09-25 | End: 2018-09-25 | Stop reason: HOSPADM

## 2018-09-25 RX ORDER — DEXAMETHASONE SODIUM PHOSPHATE 4 MG/ML
INJECTION, SOLUTION INTRA-ARTICULAR; INTRALESIONAL; INTRAMUSCULAR; INTRAVENOUS; SOFT TISSUE PRN
Status: DISCONTINUED | OUTPATIENT
Start: 2018-09-25 | End: 2018-09-25

## 2018-09-25 RX ORDER — FLUMAZENIL 0.1 MG/ML
0.2 INJECTION, SOLUTION INTRAVENOUS
Status: DISCONTINUED | OUTPATIENT
Start: 2018-09-25 | End: 2018-09-25 | Stop reason: HOSPADM

## 2018-09-25 RX ORDER — ONDANSETRON 2 MG/ML
4 INJECTION INTRAMUSCULAR; INTRAVENOUS EVERY 30 MIN PRN
Status: DISCONTINUED | OUTPATIENT
Start: 2018-09-25 | End: 2018-09-25 | Stop reason: HOSPADM

## 2018-09-25 RX ORDER — TRAMADOL HYDROCHLORIDE 50 MG/1
50 TABLET ORAL EVERY 6 HOURS PRN
Status: DISCONTINUED | OUTPATIENT
Start: 2018-09-25 | End: 2018-09-25

## 2018-09-25 RX ORDER — OXYCODONE HYDROCHLORIDE 5 MG/1
5 TABLET ORAL EVERY 4 HOURS PRN
Status: DISCONTINUED | OUTPATIENT
Start: 2018-09-25 | End: 2018-09-25 | Stop reason: HOSPADM

## 2018-09-25 RX ORDER — LABETALOL HYDROCHLORIDE 5 MG/ML
10 INJECTION, SOLUTION INTRAVENOUS EVERY 10 MIN PRN
Status: DISCONTINUED | OUTPATIENT
Start: 2018-09-25 | End: 2018-09-25 | Stop reason: HOSPADM

## 2018-09-25 RX ORDER — LIDOCAINE 40 MG/G
CREAM TOPICAL
Status: DISCONTINUED | OUTPATIENT
Start: 2018-09-25 | End: 2018-09-25 | Stop reason: HOSPADM

## 2018-09-25 RX ORDER — ACETAMINOPHEN 325 MG/1
975 TABLET ORAL ONCE
Status: COMPLETED | OUTPATIENT
Start: 2018-09-25 | End: 2018-09-25

## 2018-09-25 RX ORDER — ONDANSETRON 2 MG/ML
INJECTION INTRAMUSCULAR; INTRAVENOUS PRN
Status: DISCONTINUED | OUTPATIENT
Start: 2018-09-25 | End: 2018-09-25

## 2018-09-25 RX ORDER — NALOXONE HYDROCHLORIDE 0.4 MG/ML
.1-.4 INJECTION, SOLUTION INTRAMUSCULAR; INTRAVENOUS; SUBCUTANEOUS
Status: DISCONTINUED | OUTPATIENT
Start: 2018-09-25 | End: 2018-09-25 | Stop reason: HOSPADM

## 2018-09-25 RX ORDER — CEFAZOLIN SODIUM 2 G/100ML
2 INJECTION, SOLUTION INTRAVENOUS
Status: DISCONTINUED | OUTPATIENT
Start: 2018-09-25 | End: 2018-09-25 | Stop reason: HOSPADM

## 2018-09-25 RX ORDER — FENTANYL CITRATE 50 UG/ML
INJECTION, SOLUTION INTRAMUSCULAR; INTRAVENOUS PRN
Status: DISCONTINUED | OUTPATIENT
Start: 2018-09-25 | End: 2018-09-25

## 2018-09-25 RX ORDER — HYDROMORPHONE HYDROCHLORIDE 1 MG/ML
.3-.5 INJECTION, SOLUTION INTRAMUSCULAR; INTRAVENOUS; SUBCUTANEOUS EVERY 5 MIN PRN
Status: DISCONTINUED | OUTPATIENT
Start: 2018-09-25 | End: 2018-09-25 | Stop reason: HOSPADM

## 2018-09-25 RX ORDER — SODIUM CHLORIDE, SODIUM LACTATE, POTASSIUM CHLORIDE, CALCIUM CHLORIDE 600; 310; 30; 20 MG/100ML; MG/100ML; MG/100ML; MG/100ML
INJECTION, SOLUTION INTRAVENOUS CONTINUOUS PRN
Status: DISCONTINUED | OUTPATIENT
Start: 2018-09-25 | End: 2018-09-25

## 2018-09-25 RX ORDER — PROPOFOL 10 MG/ML
INJECTION, EMULSION INTRAVENOUS PRN
Status: DISCONTINUED | OUTPATIENT
Start: 2018-09-25 | End: 2018-09-25

## 2018-09-25 RX ORDER — SODIUM CHLORIDE, SODIUM LACTATE, POTASSIUM CHLORIDE, CALCIUM CHLORIDE 600; 310; 30; 20 MG/100ML; MG/100ML; MG/100ML; MG/100ML
INJECTION, SOLUTION INTRAVENOUS CONTINUOUS
Status: DISCONTINUED | OUTPATIENT
Start: 2018-09-25 | End: 2018-09-25 | Stop reason: HOSPADM

## 2018-09-25 RX ORDER — METOPROLOL TARTRATE 1 MG/ML
1-2 INJECTION, SOLUTION INTRAVENOUS EVERY 5 MIN PRN
Status: DISCONTINUED | OUTPATIENT
Start: 2018-09-25 | End: 2018-09-25 | Stop reason: HOSPADM

## 2018-09-25 RX ORDER — BUPIVACAINE HYDROCHLORIDE 5 MG/ML
INJECTION, SOLUTION EPIDURAL; INTRACAUDAL PRN
Status: DISCONTINUED | OUTPATIENT
Start: 2018-09-25 | End: 2018-09-25

## 2018-09-25 RX ORDER — CEFAZOLIN SODIUM 2 G/100ML
2 INJECTION, SOLUTION INTRAVENOUS
Status: COMPLETED | OUTPATIENT
Start: 2018-09-25 | End: 2018-09-25

## 2018-09-25 RX ADMIN — ROCURONIUM BROMIDE 100 MG: 10 INJECTION INTRAVENOUS at 08:06

## 2018-09-25 RX ADMIN — METOPROLOL TARTRATE 2 MG: 5 INJECTION INTRAVENOUS at 13:23

## 2018-09-25 RX ADMIN — DEXAMETHASONE SODIUM PHOSPHATE 10 MG: 4 INJECTION, SOLUTION INTRA-ARTICULAR; INTRALESIONAL; INTRAMUSCULAR; INTRAVENOUS; SOFT TISSUE at 08:14

## 2018-09-25 RX ADMIN — CEFAZOLIN SODIUM 2 G: 2 INJECTION, SOLUTION INTRAVENOUS at 08:15

## 2018-09-25 RX ADMIN — Medication 10 MG: at 12:46

## 2018-09-25 RX ADMIN — ACETAMINOPHEN 975 MG: 325 TABLET, FILM COATED ORAL at 06:12

## 2018-09-25 RX ADMIN — FENTANYL CITRATE 50 MCG: 50 INJECTION INTRAMUSCULAR; INTRAVENOUS at 07:08

## 2018-09-25 RX ADMIN — METOPROLOL TARTRATE 2 MG: 5 INJECTION INTRAVENOUS at 13:30

## 2018-09-25 RX ADMIN — ONDANSETRON 4 MG: 2 INJECTION INTRAMUSCULAR; INTRAVENOUS at 11:34

## 2018-09-25 RX ADMIN — BUPIVACAINE HYDROCHLORIDE 10 ML: 5 INJECTION, SOLUTION EPIDURAL; INTRACAUDAL; PERINEURAL at 06:40

## 2018-09-25 RX ADMIN — Medication 10 MG: at 14:09

## 2018-09-25 RX ADMIN — ONDANSETRON 4 MG: 2 INJECTION INTRAMUSCULAR; INTRAVENOUS at 08:14

## 2018-09-25 RX ADMIN — SODIUM CHLORIDE, POTASSIUM CHLORIDE, SODIUM LACTATE AND CALCIUM CHLORIDE: 600; 310; 30; 20 INJECTION, SOLUTION INTRAVENOUS at 07:48

## 2018-09-25 RX ADMIN — SUGAMMADEX 200 MG: 100 INJECTION, SOLUTION INTRAVENOUS at 10:52

## 2018-09-25 RX ADMIN — METOPROLOL TARTRATE 1 MG: 5 INJECTION INTRAVENOUS at 11:48

## 2018-09-25 RX ADMIN — MEPIVACAINE HYDROCHLORIDE 10 ML: 20 INJECTION, SOLUTION EPIDURAL; INFILTRATION at 06:47

## 2018-09-25 RX ADMIN — METOPROLOL TARTRATE 2 MG: 5 INJECTION INTRAVENOUS at 12:13

## 2018-09-25 RX ADMIN — METOPROLOL TARTRATE 1 MG: 5 INJECTION INTRAVENOUS at 13:41

## 2018-09-25 RX ADMIN — PROCHLORPERAZINE EDISYLATE 10 MG: 5 INJECTION INTRAMUSCULAR; INTRAVENOUS at 13:19

## 2018-09-25 RX ADMIN — POLYETHYLENE GLYCOL 400 AND PROPYLENE GLYCOL 2 DROP: 4; 3 SOLUTION/ DROPS OPHTHALMIC at 08:11

## 2018-09-25 RX ADMIN — Medication 10 MG: at 10:58

## 2018-09-25 RX ADMIN — Medication 10 MG: at 12:35

## 2018-09-25 RX ADMIN — FENTANYL CITRATE 100 MCG: 50 INJECTION, SOLUTION INTRAMUSCULAR; INTRAVENOUS at 08:04

## 2018-09-25 RX ADMIN — METOPROLOL TARTRATE 2 MG: 5 INJECTION INTRAVENOUS at 12:02

## 2018-09-25 RX ADMIN — PROPOFOL 150 MG: 10 INJECTION, EMULSION INTRAVENOUS at 08:04

## 2018-09-25 NOTE — BRIEF OP NOTE
[facility], [place]    Brief Operative Note    Pre-operative diagnosis: Chronic Kidney Disease   Post-operative diagnosis * No post-op diagnosis entered *  Procedure: Procedure(s):  Create Right Brachial Arteriovenous Fistula  - Wound Class: I-Clean  Surgeon: Surgeon(s) and Role:     * Sydney Dawkins MD - Primary     * Mike Adler MD - Assisting     * Jefferson Jurado MD - Resident - Assisting  Anesthesia: Combined General with Block   Estimated blood loss: 50 mL  Drains: None  Specimens: * No specimens in log *  Findings:   cephalic vein ~5mm diameter through length. Flow measured 444mL/min.  Complications: None.  Implants: None.

## 2018-09-25 NOTE — ANESTHESIA POSTPROCEDURE EVALUATION
Patient: Rory Bustamante    Procedure(s):  Create Right Brachial Arteriovenous Fistula  - Wound Class: I-Clean    Diagnosis:Chronic Kidney Disease   Diagnosis Additional Information: No value filed.    Anesthesia Type:  No value filed.    Note:  Anesthesia Post Evaluation         Comments: Patient location during evaluation: Phase 1  Patient participation: Able to fully participate in evaluation  Level of consciousness: awake  Pain management: adequate  Airway patency: patent  Cardiovascular status: acceptable  Respiratory status: acceptable  Hydration status: acceptable  PONV: none     Anesthetic complications: None        Last vitals:  Vitals:    09/25/18 1400 09/25/18 1415 09/25/18 1448   BP: (!) 169/101 (!) 167/103    Resp: 10 10    Temp:  36.9  C (98.4  F)    SpO2: 94% 93% 93%         Electronically Signed By: Nas Reynaga MD  September 25, 2018  3:09 PM

## 2018-09-25 NOTE — ANESTHESIA CARE TRANSFER NOTE
Patient: Rory Bustamante    Procedure(s):  Create Right Brachial Arteriovenous Fistula  - Wound Class: I-Clean    Diagnosis: Chronic Kidney Disease   Diagnosis Additional Information: No value filed.    Anesthesia Type:   No value filed.     Note:  Airway :Face Mask  Patient transferred to:PACU  Handoff Report: Identifed the Patient, Identified the Reponsible Provider, Reviewed the pertinent medical history, Discussed the surgical course, Reviewed Intra-OP anesthesia mangement and issues during anesthesia, Set expectations for post-procedure period and Allowed opportunity for questions and acknowledgement of understanding      Vitals: (Last set prior to Anesthesia Care Transfer)    CRNA VITALS  9/25/2018 1029 - 9/25/2018 1105      9/25/2018             Resp Rate (observed): 10                Electronically Signed By: Amanda Carroll CRNA, ELYSE DIAZ  September 25, 2018  11:05 AM

## 2018-09-25 NOTE — IP AVS SNAPSHOT
Same Day Surgery [address] [address]    Phone:  [phone]                                       After Visit Summary   9/25/2018    Rory Bustamante    MRN: [id number]           After Visit Summary Signature Page     I have received my discharge instructions, and my questions have been answered. I have discussed any challenges I see with this plan with the nurse or doctor.    ..........................................................................................................................................  Patient/Patient Representative Signature      ..........................................................................................................................................  Patient Representative Print Name and Relationship to Patient    ..................................................               ................................................  Date                                   Time    ..........................................................................................................................................  Reviewed by Signature/Title    ...................................................              ..............................................  Date                                               Time          22EPIC Rev 08/18

## 2018-09-25 NOTE — DISCHARGE INSTRUCTIONS
You have had surgery for creation of RIGHT upper arm AV fistula (brachiocephalic). Please follow these instructions.  -Your incision is closed with dissolving stitches and skin glue. The glue will flake off with time.   -You may shower tomorrow.   -Please avoid soaking or submerging your incision in lakes or pools for one week to allow it to heal.  -Please avoid heavy activity with your right arm for 2 weeks to allow your incision to heal.  -Please do not use your fistula for access or needle sticks until cleared by Dr. Dawkins's office  -Please follow fistula precautions (no BP cuffs, no IVs in your right arm)  -You may resume your warfarin/coumadin and anticoagulation medications tomorrow.     [facility], [place]  Same-Day Surgery   Adult Discharge Orders & Instructions     For 24 hours after surgery    1. Get plenty of rest.  A responsible adult must stay with you for at least 24 hours after you leave the hospital.   2. Do not drive or use heavy equipment.  If you have weakness or tingling, don't drive or use heavy equipment until this feeling goes away.  3. Do not drink alcohol.  4. Avoid strenuous or risky activities.  Ask for help when climbing stairs.   5. You may feel lightheaded.  IF so, sit for a few minutes before standing.  Have someone help you get up.   6. If you have nausea (feel sick to your stomach): Drink only clear liquids such as apple juice, ginger ale, broth or 7-Up.  Rest may also help.  Be sure to drink enough fluids.  Move to a regular diet as you feel able.  7. You may have a slight fever. Call the doctor if your fever is over 100.3 F (33 C) (taken under the tongue) or lasts longer than 24 hours.  8. You may have a dry mouth, a sore throat, muscle aches or trouble sleeping.  These should go away after 24 hours.  9. Do not make important or legal decisions.   Call your doctor for any of the followin.  Signs of infection (fever, growing tenderness at the  surgery site, a large amount of drainage or bleeding, severe pain, foul-smelling drainage, redness, swelling).    2. It has been over 8 to 10 hours since surgery and you are still not able to urinate (pass water).    3.  Headache for over 24 hours.      To contact a doctor, call Dr Dawkins's office at [phone] or:        [phone] and ask for the resident on call for Transplant Surgery (answered 24 hours a day)      Emergency Department:    [facility]: [phone]       (TTY for hearing impaired: [phone])

## 2018-09-25 NOTE — IP AVS SNAPSHOT
MRN:[id number]                      After Visit Summary   9/25/2018    Rory Bustamante    MRN: [id number]           Thank you!     Thank you for choosing [facility] for your care. Our goal is always to provide you with excellent care. Hearing back from our patients is one way we can continue to improve our services. Please take a few minutes to complete the written survey that you may receive in the mail after you visit with us. Thank you!        Patient Information     Date Of Birth          1955        About your hospital stay     You were admitted on:  September 25, 2018 You last received care in the:  Same Day Surgery [facility]    You were discharged on:  September 25, 2018       Who to Call     For medical emergencies, please call 911.  For non-urgent questions about your medical care, please call your primary care provider or clinic, [phone]  For questions related to your surgery, please call your surgery clinic        Attending Provider     Provider Specialty    Sydney Dawkins MD Transplant       Primary Care Provider Office Phone # Fax #    Moris Diaz [phone]      Your next 10 appointments already scheduled     Oct 12, 2018  8:15 AM CDT   (Arrive by 8:00 AM)   Cystoscopy with Meagan Story MD   [facility] Urologic Cancers ([facility] [place])    [address]  4th Floor  [facility] [address] [phone]            Oct 12, 2018 10:00 AM CDT   (Arrive by 9:45 AM)   Post-Op with ELYSE Odonnell [facility] Solid Organ Transplant ([facility])    [address]  [address]  [facility] [id number] [phone]            Oct 29, 2018  9:00 AM CDT   MR ABDOMEN MRCP W/O & W CONTRAST with UUMR1   [facility] [place], MRI ([facility])    [address] [id number] [phone]            How do I prepare for my exam? (Food and drink instructions) Do not eat or drink for 6 hours prior to exam. **If you will be receiving sedation or general anesthesia, please see special notes below.**  How do I prepare for my exam? (Other instructions) Take your medicines as usual, unless your doctor tells you not to. You may or may not receive IV contrast for this exam pending the discretion of the Radiologist.  **If you will be receiving sedation or general anesthesia, please see special notes below.**  What should I wear: The MRI machine uses a strong magnet. Please wear clothes without metal (snaps, zippers). A sweatsuit works well, or we may give you a hospital gown. Please remove any body piercings and hair extensions before you arrive. You will also remove watches, jewelry, hairpins, wallets, dentures, partial dental plates and hearing aids. You may wear contact lenses, and you may be able to wear your rings. We have a safe place to keep your personal items, but it is safer to leave them at home.  How long does the exam take: Most tests take 30 to 60 minutes.  HOWEVER, IF YOUR DOCTOR PRESCRIBES ANESTHESIA please plan on spending four to five hours in the recovery room.  What should I bring: Bring a list of your current medicines to your exam (including vitamins, minerals and over-the-counter drugs). Also bring the results of similar scans you may have had.  Do I need a : **If you will be receiving sedation or general anesthesia, please see special notes below.**  What should I do after the exam: No Restrictions, You may resume normal activities.  What is this test: MRI (magnetic resonance imaging) uses a strong magnet and radio waves to look inside the body. An MRA (magnetic resonance angiogram) does the same thing, but it lets us look at your blood vessels. A computer turns the radio waves into pictures showing cross sections of the body, much like slices of bread. This helps us see any problems  more clearly. You may receive fluid (called  contrast ) before or during your scan. The fluid helps us see the pictures better. We give the fluid through an IV (small needle in your arm).  Who should I call with questions: If you have any questions, please contact your Imaging Department exam site. Directions, parking instructions, and other information is available on our website, [email or web address].            Oct 29, 2018 10:40 AM CDT   (Arrive by 10:25 AM)   New Patient Visit with Shade Manning MD   [facility] ([facility])    [address]  [address]  [facility] [address] [phone]              Further instructions from your care team       You have had surgery for creation of RIGHT upper arm AV fistula (brachiocephalic). Please follow these instructions.  -Your incision is closed with dissolving stitches and skin glue. The glue will flake off with time.   -You may shower tomorrow.   -Please avoid soaking or submerging your incision in lakes or pools for one week to allow it to heal.  -Please avoid heavy activity with your right arm for 2 weeks to allow your incision to heal.  -Please do not use your fistula for access or needle sticks until cleared by Dr. Dawkins's office  -Please follow fistula precautions (no BP cuffs, no IVs in your right arm)  -You may resume your warfarin/coumadin and anticoagulation medications tomorrow.     [facility] [place]  Same-Day Surgery   Adult Discharge Orders & Instructions     For 24 hours after surgery    1. Get plenty of rest.  A responsible adult must stay with you for at least 24 hours after you leave the hospital.   2. Do not drive or use heavy equipment.  If you have weakness or tingling, don't drive or use heavy equipment until this feeling goes away.  3. Do not drink alcohol.  4. Avoid strenuous or risky activities.  Ask for help when climbing stairs.   5. You may  "feel lightheaded.  IF so, sit for a few minutes before standing.  Have someone help you get up.   6. If you have nausea (feel sick to your stomach): Drink only clear liquids such as apple juice, ginger ale, broth or 7-Up.  Rest may also help.  Be sure to drink enough fluids.  Move to a regular diet as you feel able.  7. You may have a slight fever. Call the doctor if your fever is over 100.3 F (33 C) (taken under the tongue) or lasts longer than 24 hours.  8. You may have a dry mouth, a sore throat, muscle aches or trouble sleeping.  These should go away after 24 hours.  9. Do not make important or legal decisions.   Call your doctor for any of the followin.  Signs of infection (fever, growing tenderness at the surgery site, a large amount of drainage or bleeding, severe pain, foul-smelling drainage, redness, swelling).    2. It has been over 8 to 10 hours since surgery and you are still not able to urinate (pass water).    3.  Headache for over 24 hours.      To contact a doctor, call Dr Dawkins's office at [phone] or:        [phone] and ask for the resident on call for Transplant Surgery (answered 24 hours a day)      Emergency Department:    [facility]: [phone]       (TTY for hearing impaired: [phone])              Pending Results     Date and Time Order Name Status Description    2018 0605 EKG 12-lead, tracing only Preliminary             Admission Information     Date & Time Provider Department Dept. Phone    2018 Sydney Dawkins MD Same Day Surgery [facility] [phone]      Your Vitals Were     Blood Pressure Temperature Respirations Height Weight Pulse Oximetry    167/103 98.4  F (36.9  C) (Oral) 10 1.854 m (6' 1\") 106.4 kg (234 lb 9.1 oz) 93%    BMI (Body Mass Index)                   30.95 kg/m2           MyChart Information     [employer] gives you secure access to your electronic health record. If you see a primary care provider, you can also send " messages to your care team and make appointments. If you have questions, please call your primary care clinic.  If you do not have a primary care provider, please call [phone] and they will assist you.        Care EveryWhere ID     This is your Care EveryWhere ID. This could be used by other organizations to access your [facility] medical records  [id number]        Equal Access to Services     [facility] : Hadii av thomas Sodennis, waaxda luqadaha, qaybta kaalmada abhida, devin buenogriseliseo barriga . So [facility] [phone].    ATENCIÓN: Si habla español, tiene a carr disposición servicios gratuitos de asistencia lingüística. Larissa al [phone].    We comply with applicable federal civil rights laws and [place] laws. We do not discriminate on the basis of race, color, national origin, age, disability, sex, sexual orientation, or gender identity.               Review of your medicines      UNREVIEWED medicines. Ask your doctor about these medicines        Dose / Directions    allopurinol 100 MG tablet   Commonly known as:  ZYLOPRIM   Used for:  Kidney replaced by transplant, Gout        Dose:  200 mg   Take 2 tablets by mouth daily.   Quantity:  180 tablet   Refills:  3       ALPHAGAN OP        Dose:  1 drop   Place 1 drop into the right eye 2 times daily   Refills:  0       BABY ASPIRIN PO        Dose:  81 mg   Take 81 mg by mouth daily (with lunch)   Refills:  0       BEVACizumab 400 MG/16ML injection   Commonly known as:  AVASTIN        Every 5 weeks   Refills:  0       CRESTOR PO        Dose:  40 mg   Take 40 mg by mouth every evening   Refills:  0       * cycloSPORINE modified 25 MG capsule   Commonly known as:  GENERIC EQUIVALENT   Used for:  Kidney transplant recipient, Long-term use of immunosuppressant medication        Dose:  50 mg   Take 2 capsules (50 mg) by mouth 2 times daily Total dose 150 mg twice daily   Quantity:  180 capsule   Refills:  3       * cycloSPORINE modified  100 MG capsule   Commonly known as:  GENERIC EQUIVALENT   Used for:  Kidney transplant recipient, Long-term use of immunosuppressant medication        Dose:  100 mg   Take 1 capsule (100 mg) by mouth 2 times daily Total dose 150 mg twice daily   Quantity:  180 capsule   Refills:  3       epoetin freya 60732 UNIT/ML injection   Commonly known as:  PROCRIT   Used for:  CKD (chronic kidney disease) stage 4, GFR 15-29 ml/min (H), Anemia in stage 4 chronic kidney disease (H)        Dose:  06175 Units   Inject 1 mL (20,000 Units) Subcutaneous once a week As needed for hgb < 10   Quantity:  4 mL   Refills:  5       fenofibrate 145 MG tablet        Dose:  145 mg   Take 145 mg by mouth every morning   Refills:  0       ISOSORBIDE MONONITRATE PO        Dose:  60 mg   Take 60 mg by mouth every morning   Refills:  0       lidocaine 5 % ointment   Commonly known as:  XYLOCAINE   Used for:  Left hip pain        Apply topically daily as needed for moderate pain   Quantity:  50 g   Refills:  1       loperamide 2 MG capsule   Commonly known as:  IMODIUM        Dose:  2 mg   Take 2 mg by mouth daily   Refills:  0       losartan 50 MG tablet   Commonly known as:  COZAAR   Used for:  Kidney replaced by transplant        Dose:  25 mg   Take 0.5 tablets (25 mg) by mouth daily   Quantity:  90 tablet   Refills:  3       metoprolol tartrate 100 MG tablet   Commonly known as:  LOPRESSOR   Used for:  Unspecified hypertensive kidney disease with chronic kidney disease stage I through stage IV, or unspecified(403.90), Kidney replaced by transplant        Dose:  100 mg   Take 1 tablet by mouth 2 times daily.   Quantity:  60 tablet   Refills:  6       NIFEdipine ER osmotic 60 MG Tb24   Commonly known as:  PROCARDIA XL   Used for:  HTN (hypertension)        Dose:  60 mg   Take 1 tablet (60 mg) by mouth daily   Quantity:  90 tablet   Refills:  3       NITROSTAT 0.4 MG sublingual tablet   Generic drug:  nitroGLYcerin        Dose:  0.4 mg   Place 0.4  mg under the tongue every 5 minutes as needed.   Refills:  0       NONFORMULARY        Dose:  1 tablet   Take 1 tablet by mouth 2 times daily Focus Macula Pro:  Eye vitamin and mineral supplement A, C, E, Zinc, Copper   Refills:  0       omega-3 acid ethyl esters 1 g capsule   Commonly known as:  Lovaza        Dose:  2 g   Take 2 g by mouth 2 times daily   Refills:  0       PANTOPRAZOLE SODIUM PO        Dose:  40 mg   Take 40 mg by mouth daily as needed for heartburn   Refills:  0       predniSONE 5 MG tablet   Commonly known as:  DELTASONE        Dose:  5 mg   Take 5 mg by mouth daily (with lunch)   Refills:  0       probenecid 500 MG tablet   Commonly known as:  BENEMID        Dose:  500 mg   Take 500 mg by mouth 2 times daily.   Refills:  0       sulfamethoxazole-trimethoprim 400-80 MG per tablet   Commonly known as:  BACTRIM/SEPTRA        Dose:  1 tablet   Take 1 tablet by mouth every other day   Refills:  0       tamsulosin 0.4 MG capsule   Commonly known as:  FLOMAX   Used for:  Benign prostatic hyperplasia, unspecified whether lower urinary tract symptoms present        Dose:  0.4 mg   Take 1 capsule (0.4 mg) by mouth daily   Quantity:  60 capsule   Refills:  1       TYLENOL ARTHRITIS PAIN 650 MG CR tablet   Generic drug:  acetaminophen        Dose:  2 tablet   Take 2 tablets by mouth 3 times daily.   Refills:  0       warfarin 5 MG tablet   Commonly known as:  COUMADIN   Used for:  Paroxysmal atrial fibrillation (H)        Dose:  5 mg   Take 1 tablet (5 mg) by mouth daily   Quantity:  30 tablet   Refills:  1       * Notice:  This list has 2 medication(s) that are the same as other medications prescribed for you. Read the directions carefully, and ask your doctor or other care provider to review them with you.             Protect others around you: Learn how to safely use, store and throw away your medicines at www.disposemymeds.org.             Medication List: This is a list of all your medications and when  to take them. Check marks below indicate your daily home schedule. Keep this list as a reference.      Medications           Morning Afternoon Evening Bedtime As Needed    allopurinol 100 MG tablet   Commonly known as:  ZYLOPRIM   Take 2 tablets by mouth daily.                                ALPHAGAN OP   Place 1 drop into the right eye 2 times daily                                BABY ASPIRIN PO   Take 81 mg by mouth daily (with lunch)                                BEVACizumab 400 MG/16ML injection   Commonly known as:  AVASTIN   Every 5 weeks                                CRESTOR PO   Take 40 mg by mouth every evening                                * cycloSPORINE modified 25 MG capsule   Commonly known as:  GENERIC EQUIVALENT   Take 2 capsules (50 mg) by mouth 2 times daily Total dose 150 mg twice daily                                * cycloSPORINE modified 100 MG capsule   Commonly known as:  GENERIC EQUIVALENT   Take 1 capsule (100 mg) by mouth 2 times daily Total dose 150 mg twice daily                                epoetin freya 74441 UNIT/ML injection   Commonly known as:  PROCRIT   Inject 1 mL (20,000 Units) Subcutaneous once a week As needed for hgb < 10                                fenofibrate 145 MG tablet   Take 145 mg by mouth every morning                                ISOSORBIDE MONONITRATE PO   Take 60 mg by mouth every morning                                lidocaine 5 % ointment   Commonly known as:  XYLOCAINE   Apply topically daily as needed for moderate pain                                loperamide 2 MG capsule   Commonly known as:  IMODIUM   Take 2 mg by mouth daily                                losartan 50 MG tablet   Commonly known as:  COZAAR   Take 0.5 tablets (25 mg) by mouth daily                                metoprolol tartrate 100 MG tablet   Commonly known as:  LOPRESSOR   Take 1 tablet by mouth 2 times daily.                                NIFEdipine ER osmotic 60 MG Tb24    Commonly known as:  PROCARDIA XL   Take 1 tablet (60 mg) by mouth daily                                NITROSTAT 0.4 MG sublingual tablet   Place 0.4 mg under the tongue every 5 minutes as needed.   Generic drug:  nitroGLYcerin                                NONFORMULARY   Take 1 tablet by mouth 2 times daily Focus Macula Pro:  Eye vitamin and mineral supplement A, C, E, Zinc, Copper                                omega-3 acid ethyl esters 1 g capsule   Commonly known as:  Lovaza   Take 2 g by mouth 2 times daily                                PANTOPRAZOLE SODIUM PO   Take 40 mg by mouth daily as needed for heartburn                                predniSONE 5 MG tablet   Commonly known as:  DELTASONE   Take 5 mg by mouth daily (with lunch)                                probenecid 500 MG tablet   Commonly known as:  BENEMID   Take 500 mg by mouth 2 times daily.                                sulfamethoxazole-trimethoprim 400-80 MG per tablet   Commonly known as:  BACTRIM/SEPTRA   Take 1 tablet by mouth every other day                                tamsulosin 0.4 MG capsule   Commonly known as:  FLOMAX   Take 1 capsule (0.4 mg) by mouth daily                                TYLENOL ARTHRITIS PAIN 650 MG CR tablet   Take 2 tablets by mouth 3 times daily.   Generic drug:  acetaminophen                                warfarin 5 MG tablet   Commonly known as:  COUMADIN   Take 1 tablet (5 mg) by mouth daily                                * Notice:  This list has 2 medication(s) that are the same as other medications prescribed for you. Read the directions carefully, and ask your doctor or other care provider to review them with you.

## 2018-09-25 NOTE — OR NURSING
Patient has blood pressures in the 160/100s. Was also high in pre op. Administered 5 mg IV metoprolol in PACU and blood pressure remained unchanged. Writer spoke with Dr. Reynaga (anesthesia) and she states she would like diastolic BP <100. Order received for 10 mg IV labetalol x2 PRN.

## 2018-09-25 NOTE — ANESTHESIA PROCEDURE NOTES
Peripheral Nerve Block Procedure Note    Staff:     Anesthesiologist:  KERRIE FISHER    Resident/CRNA:  JAZMINE AMADOR    Block performed by resident/CRNA in the presence of a teaching physician    Location: Pre-op  Procedure Start/Stop TImes:      9/25/2018 6:40 AM     9/25/2018 6:50 AM    patient identified, IV checked, site marked, risks and benefits discussed, informed consent, monitors and equipment checked, pre-op evaluation, at physician/surgeon's request and post-op pain management      Correct Patient: Yes      Correct Position: Yes      Correct Site: Yes      Correct Procedure: Yes      Correct Laterality:  Yes    Site Marked:  Yes  Procedure details:     Procedure:  Supraclavicular    ASA:  3    Laterality:  Right    Position:  Supine    Sterile Prep: mask and sterile gloves      Needle gauge:  21    Needle length (mm):  110    Ultrasound: Yes      Ultrasound used to identify targeted nerve, plexus, or vascular structure and placed a needle adjacent to it      Permanent Image entered into patiient's record      Abnormal pain on injection: No      Blood Aspirated: No      Paresthesias:  No    Bleeding at site: No      Test dose negative for signs of intravascular injection: Yes      Infusion Method:  Single Shot    Blood aspirated via catheter: No      Complications:  None

## 2018-09-25 NOTE — OP NOTE
Transplant Surgery  Operative Note  PREOP DIAGNOSIS: Chronic Renal Failure   POSTOP DIAGNOSIS: Same  OPERATION: Arteriovenous Fistula creation, right brachial artery to cephalic vein. Preoperative ultrasound  FACULTY: Sydney Dawkins M.D.  FELLOW: Jessy Gomez   ASSISTANT: Jefferson Jurado, resident.    ANESTHESIA: General and Scalene Block  EBL: 50 ml.  SPECIMEN: none  FLUIDS: 200 cc crystalloid  DRAIN: None    INDICATION: The patient was referred by Dr. Sun for permanent dialysis access creation due to renal failure. The indications, benefits, and risks of permanent vascular access creation were discussed, questions answered and informed consent was provided.   FINDINGS:   Artery quality was: Normal, with diameter of 5mm.  Anastomosis diameter: 4 mm.  Vein quality was: Abnormal: thickened/dilated with diameter of 5mm, dilated to 6mm prior to anastomosis.   Blood flow was 444 ml/min as measured by Transonic flow probe.  COMPLICATIONS: None.    PROCEDURE: The patient was positioned supine, and the right upper extremity was positioned, prepped and draped in the usual sterile fashion. An ultrasound was performed to assess the size, quality, and position of the artery and vein. The patient received preoperative IV antibiotics. An incision was made and extended through the subcutaneous tissues above the and antecubital crease. The brachial artery and cephalic vein were circumferentially dissected. The patient was not heparinized. Arterial clamps were placed and arteriotomy was made.  The distal aspect of the vein was then transected, the proximal vein dilated with heparinized saline and secured with a Heifitz clip. The vein was tailored and anastomosed with 7-0 Prolene. The clamps were removed sequentially. Hemostasis was obtained.  Fistula flow was excellent. The distal radial artery pulse was excellent and capillary refill excellent. The wound was closed in layers with absorbable suture and dressed  with Dermabond. Counts were correct. Faculty was present for the key portions of the procedure. The patient was then awakened and transferred to PACU in good condition.     I was present during the key portions of the procedure, and I was immediately available for the entire procedure.

## 2018-09-25 NOTE — OR NURSING
Patient vomited estimated 50 mL bloody, mucous. Writer had administered additional 4 mg IV zofran 1-2 minutes prior to emesis. Patient did not appear to aspirate any emesis. Writer notified Dr. Reynaga (anesthesia) of bloody emesis and no new orders received at this time.

## 2018-09-27 LAB — HEMOGLOBIN: 10.1 G/DL (ref 13.3–17.7)

## 2018-09-28 ENCOUNTER — CARE COORDINATION (OUTPATIENT)
Dept: NEPHROLOGY | Facility: CLINIC | Age: 63
End: 2018-09-28

## 2018-09-28 NOTE — PROGRESS NOTES
Reason for Call    Called patient to check in after fistula creation. States surgery went well, he's feeling fine at home. Denied any issues thus far with site. No new symptoms or concerns for the team today.    Patient Education    1. Uremic symptoms and when to call clinic    Plan    1. Continue to monitor symptoms, call if any changes  2. Follow up as scheduled    Patient was given an opportunity to ask questions and have those questions answered to his satisfaction.  Patient verbalized understanding of instructions provided and agreed to plan of care.    Melani Mehta RN

## 2018-10-01 ENCOUNTER — TRANSFERRED RECORDS (OUTPATIENT)
Dept: HEALTH INFORMATION MANAGEMENT | Facility: CLINIC | Age: 63
End: 2018-10-01

## 2018-10-01 ENCOUNTER — TELEPHONE (OUTPATIENT)
Dept: TRANSPLANT | Facility: CLINIC | Age: 63
End: 2018-10-01

## 2018-10-01 NOTE — LETTER
[facility]  Room 2-200  [facility],  [address]  [address]  Tel [phone]  Toll Free [phone]                OUTPATIENT LABORATORY TEST ORDER    Patient Name: Rory Bustamante  Transplant Date: 12/13/1989 (Kidney)  YOB: 1955  Issue Date & Time:October 1, 20182:46 PM    [facility] MR:  [id number]  Exp. Date (1 year after date issued)      Diagnoses: Kidney Transplant (ICD-9  V42.0)   Long term use of medications (ICD-9  V58.69)     Lab results to be available on the same day drawn.   Patient should release information to the [facility].  Please fax to the Transplant Center at [phone].    Every 3 Months    ?Hemogram and Platelet   ?Basic Metabolic Panel (Sodium, Potassium, Chloride, CO2, Creatinine, BUN, Glucose, Calcium)           ?CSA mail to [facility] mailers and instructions provided by the patient)                   Every 6 Months                                      ?Urine for protein/creatinine      If you have any questions, please call The [facility] at [phone] or [phone].    Please fax labs to [phone]

## 2018-10-01 NOTE — TELEPHONE ENCOUNTER
Provider Call: Transplant Lab/Orders  Route to LPN  Post Transplant Days: 13272  When patient is less than 60 days post-transplant, route high priority  Reason for Call: Annual lab reorder  Liver patients reporting abnormal lab results: Route to RN and Page  Document lab facility information when provider is calling about annual lab orders. Delete facility wildcards when not needed.  Facility Name: [facility]  Facility Location:   Outside Facility Fax Number: [phone]  Callback needed? No

## 2018-10-02 ENCOUNTER — TELEPHONE (OUTPATIENT)
Dept: PHARMACY | Facility: CLINIC | Age: 63
End: 2018-10-02

## 2018-10-02 NOTE — TELEPHONE ENCOUNTER
Anemia Management Note  SUBJECTIVE/OBJECTIVE:  Referred by Dr. Christi Sun on 2018  Primary Diagnosis: Anemia in Chronic Kidney Disease (N18.3, D63.1)     Secondary Diagnosis:  Chronic Kidney Disease, Stage 3 (N18.3)   Hgb goal range:  9-10  Epo/Darbo: Procrit  20,000 units  once weekly for Hgb < 10  In clinic  RX/TX plans : 2019  Iron regimen:  Ferrous Sulfate 325mg QD  Labs : 2019  Recent MARY LOU use, transfusion, IV iron: IV iron scheduled 2018       Anemia Latest Ref Rng & Units 2018   MARY LOU Dose - - - - 20,000 units - - -   Hemoglobin 13.3 - 17.7 g/dL 10.4(A) 10.5(A) 10.6(L) 9.4(A) 10.7(A) 10.5(L) 10.1(A)   IV Iron Dose - - - - - - - -   TSAT % 37 - 39 38 25 - -   Ferritin ng/mL 1245 - 996(H) 927 986 - -         BP Readings from Last 3 Encounters:   18 (!) 167/103   18 154/80   18 147/82     Wt Readings from Last 2 Encounters:   18 234 lb 9.1 oz (106.4 kg)   18 229 lb 12.8 oz (104.2 kg)       Hgb was 10.1 on 18    ASSESSMENT:  Hgb:Above goal - recommend hold dose  TSat: not at goal (>30%) but ferritin >500ng/mL.  IV iron not indicated at this time per anemia protocol. Ferritin: At goal (>100ng/mL)    PLAN:  Hold Procrit and RTC for hgb then procrit if needed in 1 week(s)    Orders needed to be renewed (for next follow-up date) in EPIC: None    Iron labs due:  10/19/18  Reviewed 10/03/2018 DENISE  Plan discussed with:  No call made  Plan provided by:  Judy Ramírez [place]  [facility]  [phone]    NEXT FOLLOW-UP DATE:  10/5/18  Reviewed 10/03/2018 [facility]  Anemia Management Service  Grecia Carroll PharmD and Michelle Ramírez CPhT  Phone: [phone]  Fax: [phone]

## 2018-10-04 ENCOUNTER — CARE COORDINATION (OUTPATIENT)
Dept: TRANSPLANT | Facility: CLINIC | Age: 63
End: 2018-10-04

## 2018-10-05 ENCOUNTER — TELEPHONE (OUTPATIENT)
Dept: PHARMACY | Facility: CLINIC | Age: 63
End: 2018-10-05

## 2018-10-05 LAB
HCT VFR BLD AUTO: 28.3 %
HEMOGLOBIN: 9.6 G/DL (ref 13.3–17.7)

## 2018-10-05 NOTE — TELEPHONE ENCOUNTER
Anemia Management Note  SUBJECTIVE/OBJECTIVE:  Referred by Dr. Christi Sun on 2018  Primary Diagnosis: Anemia in Chronic Kidney Disease (N18.3, D63.1)     Secondary Diagnosis:  Chronic Kidney Disease, Stage 3 (N18.3)   Hgb goal range:  9-10  Epo/Darbo: Procrit  20,000 units once weekly for Hgb < 10  In clinic  RX/TX plans : 2019  Iron regimen:  Ferrous Sulfate 325mg QD  Labs : 2019  Recent MARY LOU use, transfusion, IV iron: IV iron scheduled 2018    Anemia Latest Ref Rng & Units 2018 2018 2018 2018 2018 2018 10/5/2018   MARY LOU Dose - - - 20,000 units - - - 20,000 units   Hemoglobin 13.3 - 17.7 g/dL 10.5(A) 10.6(L) 9.4(A) 10.7(A) 10.5(L) 10.1(A) 9.6(A)   IV Iron Dose - - - - - - - -   TSAT % - 39 38 25 - - -   Ferritin ng/mL - 996(H) 927 986 - - -     BP Readings from Last 3 Encounters:   18 (!) 167/103   18 154/80   18 147/82     Wt Readings from Last 2 Encounters:   18 234 lb 9.1 oz (106.4 kg)   18 229 lb 12.8 oz (104.2 kg)           ASSESSMENT:  Hgb: At goal - received dose in clinic  TSat: not at goal (>30%) but ferritin >500ng/mL.  IV iron not indicated at this time per anemia protocol. Ferritin: At goal (>100ng/mL)    PLAN:  Dosed with procrit and RTC for hgb then procrit if needed in 1 week(s)    Orders needed to be renewed (for next follow-up date) in LETTERS - for external labs: None    Iron labs due:  10/19/18    Plan discussed with:  Rory  Plan provided by:  Judy Ramírez [facility]  Anemia Clinic  [phone]    NEXT FOLLOW-UP DATE:  10/12/18  Reviewed 10/08/2018 [facility]  Anemia Management Service  Grecia Carroll PharmD and Michelle Ramírez CPhT  Phone: [phone]  Fax: [phone]

## 2018-10-12 ENCOUNTER — ALLIED HEALTH/NURSE VISIT (OUTPATIENT)
Dept: UROLOGY | Facility: CLINIC | Age: 63
End: 2018-10-12
Payer: MEDICARE

## 2018-10-12 ENCOUNTER — TELEPHONE (OUTPATIENT)
Dept: PHARMACY | Facility: CLINIC | Age: 63
End: 2018-10-12

## 2018-10-12 ENCOUNTER — OFFICE VISIT (OUTPATIENT)
Dept: UROLOGY | Facility: CLINIC | Age: 63
End: 2018-10-12
Payer: MEDICARE

## 2018-10-12 ENCOUNTER — TELEPHONE (OUTPATIENT)
Dept: UROLOGY | Facility: CLINIC | Age: 63
End: 2018-10-12

## 2018-10-12 ENCOUNTER — OFFICE VISIT (OUTPATIENT)
Dept: TRANSPLANT | Facility: CLINIC | Age: 63
End: 2018-10-12
Attending: CLINICAL NURSE SPECIALIST
Payer: MEDICARE

## 2018-10-12 VITALS
BODY MASS INDEX: 31.81 KG/M2 | OXYGEN SATURATION: 97 % | HEART RATE: 69 BPM | WEIGHT: 240 LBS | TEMPERATURE: 97.5 F | HEIGHT: 73 IN | SYSTOLIC BLOOD PRESSURE: 153 MMHG | DIASTOLIC BLOOD PRESSURE: 80 MMHG

## 2018-10-12 VITALS — WEIGHT: 235 LBS | BODY MASS INDEX: 31.83 KG/M2 | HEIGHT: 72 IN

## 2018-10-12 DIAGNOSIS — N18.5 CHRONIC KIDNEY DISEASE, STAGE 5, KIDNEY FAILURE (H): Primary | ICD-10-CM

## 2018-10-12 DIAGNOSIS — R31.29 OTHER MICROSCOPIC HEMATURIA: ICD-10-CM

## 2018-10-12 DIAGNOSIS — Z48.89 ENCOUNTER FOR POST SURGICAL WOUND CHECK: ICD-10-CM

## 2018-10-12 DIAGNOSIS — N21.0 BLADDER STONE: Primary | ICD-10-CM

## 2018-10-12 DIAGNOSIS — Z09 FOLLOW-UP EXAMINATION AFTER VASCULAR SURGERY: ICD-10-CM

## 2018-10-12 LAB — HEMOGLOBIN: 9.7 G/DL (ref 13.3–17.7)

## 2018-10-12 RX ORDER — CEFAZOLIN SODIUM 2 G/50ML
2 SOLUTION INTRAVENOUS
Status: CANCELLED | OUTPATIENT
Start: 2018-10-12

## 2018-10-12 RX ORDER — CEFAZOLIN SODIUM 1 G/50ML
1 INJECTION, SOLUTION INTRAVENOUS SEE ADMIN INSTRUCTIONS
Status: CANCELLED | OUTPATIENT
Start: 2018-10-12

## 2018-10-12 ASSESSMENT — PAIN SCALES - GENERAL
PAINLEVEL: NO PAIN (0)

## 2018-10-12 NOTE — PATIENT INSTRUCTIONS
"Please do UDS and follow up with  after    It was a pleasure meeting with you today.  Thank you for allowing me and my team the privilege of caring for you today.  YOU are the reason we are here, and I truly hope we provided you with the excellent service you deserve.  Please let us know if there is anything else we can do for you so that we can be sure you are leaving completely satisfied with your care experience.            AFTER YOUR CYSTOSCOPY        You have just completed a cystoscopy, or \"cysto\", which allowed your physician to learn more about your bladder (or to remove a stent placed after surgery). We suggest that you continue to avoid caffeine, fruit juice, and alcohol for the next 24 hours, however, you are encouraged to return to your normal activities.         A few things that are considered normal after your cystoscopy:     * Small amount of bleeding (or spotting) that clears within the next 24 hours     * Slight burning sensation with urination     * Sensation to of needing to avoid more frequently     * The feeling of \"air\" in your urine     * Mild discomfort that is relieved with Tylenol        Please contact our office promptly if you:     * Develop a fever above 101 degrees     * Are unable to urinate     * Develop bright red blood that does not stop     * Severe pain or swelling         Please contact our office with any concerns or questions @[email or web address].  "

## 2018-10-12 NOTE — NURSING NOTE
"Chief Complaint   Patient presents with     Surgical Followup     Blood pressure 153/80, pulse 69, temperature 97.5  F (36.4  C), height 1.854 m (6' 1\"), weight 108.9 kg (240 lb), SpO2 97 %.    Kendall Huitron CMA    "

## 2018-10-12 NOTE — LETTER
10/12/2018       RE: Rory Bustamante  [address]  [facility] [address]     Dear Colleague,    Thank you for referring your patient, Rory Bustamante, to the [facility] at [facility]. Please see a copy of my visit note below.    Dialysis Access Service   Progress Note    S:  Mr. Bustamante is being seen today for surgical followup of his dialysis access.  He reports no issues with the wound, and  no steal syndrome of the distal extremity. C/O swelling in right arm AC fossa incision area and right forearm, on and off after surgery. He was not instructed to elevate right arm at discharge on surgery day. Denies pain in right arm, numbness/tingling, a change of color in right hand and fingers. S/P Arteriovenous Fistula creation, right brachial artery to cephalic vein. on 9/25/2018.    O:  Temp:  [97.5  F (36.4  C)] 97.5  F (36.4  C)  Pulse:  [69] 69  BP: (153)/(80) 153/80  SpO2:  [97 %] 97 %  GENERAL: alert, cooperative  Circulation:   Radial pulse 3+  Ulnar pulse  3+   Capillary refill:  capillary refill < 3 sec    Sensory exam:   arm: Normal   [x]           Abnormal   []          Comment:    hand: Normal   [x]           Abnormal   []          Comment:   Motor exam:   arm: Normal   [x]           Abnormal   []          Comment:    hand: Normal   [x]           Abnormal   []          Comment:    Access: R upper extremity wound(s) healed. non-tender. Mild venous hypertension in right forearm, ++thrill and bruit present via handheld doppler present. A small to moderate size fluid collection in AC fossa incision area, no erythema or warm to touch. No drainage noted.    Assessment & Plan: Mr. Bustamante's dialysis access has matured well at this time point.    1. Wrap right forearm up to elbow area with ACE bandage  2. Check thrill twice a day  3. May start hammer curl exercise in right arm with a squeeze ball. 15 minutes a time, 3-4 times a day as tolerated  4. Elevate right arm  with support as tolerated  5. F/U with Dr. Dawkins in 4 weeks     We would like to see the patient back in the clinic in 4 weeks time to assess progress. The patient was counselled to contact our nurse coordinator, ELYSE Moscoso CNS (Sum) at [phone] with any questions or concerns.  Thank you for the opportunity to participate in Mr. Bustamante's care.    TT: 15 min  CT: 15 min    COURTNEY Prather (Sum)  Dialysis access program   [facility]  Pager # [phone]

## 2018-10-12 NOTE — NURSING NOTE
Invasive Procedure Safety Checklist:    Procedure: Cysto     Action: Complete sections and checkboxes as appropriate.    Pre-procedure:  1. Patient ID Verified with 2 identifiers (Keiry and  or MRN) : YES    2. Procedure and site verified with patient/designee (when able) : YES    3. Accurate consent documentation in medical record : YES    4. H&P (or appropriate assessment) documented in medical record : NO  H&P must be up to 30 days prior to procedure an updated within 24 hours of                 Procedure as applicable.     5. Relevant diagnostic and radiology test results appropriately labeled and displayed as applicable : NO    6. Blood products, implants, devices, and/or special equipment available for the procedure as applicable : NO    7. Procedure site(s) marked with provider initials [Exclusions: none] : NO    8. Marking not required. Reason : Yes  Procedure does not require site marking    Time Out:     Time-Out performed immediately prior to starting procedure, including verbal and active participation of all team members addressing: YES    1. Correct patient identity.  2. Confirmed that the correct side and site are marked.  3. An accurate procedure to be done.  4. Agreement on the procedure to be done.  5. Correct patient position.  6. Relevant images and results are properly labeled and appropriately displayed.  7. The need to administer antibiotics or fluids for irrigation purposes during the procedure as applicable.  8. Safety precautions based on patient history or medication use.    During Procedure: Verification of correct person, site, and procedure occurs any time the responsibility for care of the patient is transferred to another member of the care team.

## 2018-10-12 NOTE — NURSING NOTE
The following medication was given:     MEDICATION:  Lidocaine  ROUTE: topical   SITE: urethra   DOSE: 20mg  LOT #: [id number]  : Limited   EXPIRATION DATE: 4/20  NDC#: [id number]   Was there drug waste? Mary Gale CMA  October 12, 2018

## 2018-10-12 NOTE — TELEPHONE ENCOUNTER
Anemia Management Note  SUBJECTIVE/OBJECTIVE:  Referred by Dr. Christi Sun on 2018  Primary Diagnosis: Anemia in Chronic Kidney Disease (N18.3, D63.1)     Secondary Diagnosis:  Chronic Kidney Disease, Stage 3 (N18.3)   Hgb goal range:  9-10  Epo/Darbo: Procrit  20,000 units once weekly for Hgb < 10  In clinic  RX/TX plans : 2019  Iron regimen:  Ferrous Sulfate 325mg QD  Labs : 2019  Recent MARY LOU use, transfusion, IV iron: IV iron scheduled 2018    Anemia Latest Ref Rng & Units 2018 2018 2018 2018 2018 10/5/2018 10/12/2018   MARY LOU Dose - - 20,000 units - - - 20,000 units 20,000 units   Hemoglobin 13.3 - 17.7 g/dL 10.6(L) 9.4(A) 10.7(A) 10.5(L) 10.1(A) 9.6(A) 9.7(A)   IV Iron Dose - - - - - - - -   TSAT % 39 38 25 - - - -   Ferritin ng/mL 996(H) 927 986 - - - -       BP Readings from Last 3 Encounters:   10/12/18 153/80   18 (!) 167/103   18 154/80     Wt Readings from Last 2 Encounters:   10/12/18 240 lb (108.9 kg)   10/12/18 235 lb (106.6 kg)       Documented outside lab results and dose.    ASSESSMENT:  Hgb: at goal - received dose in clinic - recommend continue current regimen  TSat: not at goal (>30%) but ferritin >500ng/mL.  IV iron not indicated at this time per anemia protocol. Ferritin: At goal (>100ng/mL)    PLAN:  Dosed with Procrit and RTC for hgb, ferritin and iron labs then aranesp if needed in 1 week(s)    Orders needed to be renewed (for next follow-up date) in LETTERS - for external labs: None    Iron labs due:  10/19/18    Plan discussed with:  No call made  Plan provided by:  Judy Ramírez [facility]  Anemia Clinic  [phone]    NEXT FOLLOW-UP DATE:  10/19/18  Reviewed 10/16/2018 [facility]  Anemia Management Service  Grecia Carroll PharmD and Michelle Ramírez CPhT  Phone: [phone]  Fax: [phone]

## 2018-10-12 NOTE — NURSING NOTE
Pre Op Teaching Flowsheet       Pre and Post op Patient Education  Relevant Diagnosis:  Bladdder stone      Motivation Level:  Asks Questions: Yes  Eager to Learn:  Yes  Cooperative: Yes  Receptive (willing/able to accept information):  Yes  Patient demonstrates understanding of the following:  Date and time of surgery:  November 5th  Location of surgery: [address] [place] [address]  History and Physical and any other testing necessary prior to surgery: Yes, having with his primary at [facility] in [place]    NPO Guidelines: Nothing to eat 8 hours prior to surgery. Can have clear liquids up to 2 hours prior to sugery    Patient demonstrates understanding of the following:  Pre-op bowel prep: N/A  Pre-op showering/scrub information with Hibiclens Soap: Yes  Medications to take the day of surgery:  Per PCP  Blood thinner medications discussed and when to stop (if applicable):  Yes  Diabetes medication management (if applicable):  N/A  Discussed pain control after surgery: pain scale, pain medications and pain management techniques  Infection Prevention: Patient demonstrates understanding of the following:  Patient instructed on hand hygiene:  Yes  Surgical procedure site care taught: N/A  Signs and symptoms of infection taught:  N/A  Wound care will be taught at the time of discharge.  Central venous catheter care will be taught at the time of discharge (if applicable).    Post-op follow-up:  Discussed how to contact the hospital, nurse, and clinic scheduling staff if necessary.    Instructional materials used/given/mailed:  [place] Surgery Booklet, post op teaching sheet, Map, Soap, and arrival/location information.    Surgical instructions given to patient in clinic: Yes.    Instructional Materials given:  Before your surgery packet , Medications to avoid before surgery , Showering or Bathing instructions before surgery  and What to expect after surgery  Total time with patient: 10 minutes    Holly Carroll RN

## 2018-10-12 NOTE — MR AVS SNAPSHOT
After Visit Summary   10/12/2018    Rory Bustamante    MRN: [id number]           Patient Information     Date Of Birth          1955        Visit Information        Provider Department      10/12/2018 2:20 PM Nurse, [facility]        Today's Diagnoses     Bladder stone    -  1       Follow-ups after your visit        Your next 10 appointments already scheduled     Oct 17, 2018  7:00 AM CDT   (Arrive by 6:45 AM)   Urodynamics with Luciana Quintero PA-C   [facility] ([facility])    [address]  [address] [facility] [id number] [phone]            Oct 29, 2018  9:00 AM CDT   MR ABDOMEN MRCP W/O & W CONTRAST with UUMR1   [facility], [place], MRI ([facility], [facility])    [address] [id number] [phone]           How do I prepare for my exam? (Food and drink instructions) Do not eat or drink for 6 hours prior to exam. **If you will be receiving sedation or general anesthesia, please see special notes below.**  How do I prepare for my exam? (Other instructions) Take your medicines as usual, unless your doctor tells you not to. You may or may not receive IV contrast for this exam pending the discretion of the Radiologist.  **If you will be receiving sedation or general anesthesia, please see special notes below.**  What should I wear: The MRI machine uses a strong magnet. Please wear clothes without metal (snaps, zippers). A sweatsuit works well, or we may give you a hospital gown. Please remove any body piercings and hair extensions before you arrive. You will also remove watches, jewelry, hairpins, wallets, dentures, partial dental plates and hearing aids. You may wear contact lenses, and you may be able to wear your rings. We have a safe place to keep your personal items,  but it is safer to leave them at home.  How long does the exam take: Most tests take 30 to 60 minutes.  HOWEVER, IF YOUR DOCTOR PRESCRIBES ANESTHESIA please plan on spending four to five hours in the recovery room.  What should I bring: Bring a list of your current medicines to your exam (including vitamins, minerals and over-the-counter drugs). Also bring the results of similar scans you may have had.  Do I need a : **If you will be receiving sedation or general anesthesia, please see special notes below.**  What should I do after the exam: No Restrictions, You may resume normal activities.  What is this test: MRI (magnetic resonance imaging) uses a strong magnet and radio waves to look inside the body. An MRA (magnetic resonance angiogram) does the same thing, but it lets us look at your blood vessels. A computer turns the radio waves into pictures showing cross sections of the body, much like slices of bread. This helps us see any problems more clearly. You may receive fluid (called  contrast ) before or during your scan. The fluid helps us see the pictures better. We give the fluid through an IV (small needle in your arm).  Who should I call with questions: If you have any questions, please contact your Imaging Department exam site. Directions, parking instructions, and other information is available on our website, [email or web address].            Oct 29, 2018 10:40 AM CDT   (Arrive by 10:25 AM)   New Patient Visit with Shade Manning MD   [facility] ([facility])    [address]  [address]  [facility] [facility] [address]   [phone]            Nov 05, 2018   Procedure with Meagan Story MD   [facility], [place], Same Day Surgery (--)    [address] [address]   [phone]            Nov 12, 2018  1:30 PM CST   (Arrive by 1:15 PM)   FISTULA FOLLOW UP with Sydney Dawkins MD   [facility] Organ Transplant ([facility])    [address]  [address]  [place] [address]   [phone]            Nov 16, 2018 10:00 AM CST   (Arrive by 9:45 AM)   Post-Op with Meagan Story MD   [facility]rologic C[facility] ([facility])    [address]  4th Floor  [place] [id number] [phone]              Who to contact     Please call your clinic at [phone] to:    Ask questions about your health    Make or cancel appointments    Discuss your medicines    Learn about your test results    Speak to your doctor            Additional Information About Your Visit        [employer] Information     [employer] gives you secure access to your electronic health record. If you see a primary care provider, you can also send messages to your care team and make appointments. If you have questions, please call your primary care clinic.  If you do not have a primary care provider, please call [phone] and they will assist you.      [employer] is an electronic gateway that provides easy, online access to your medical records. With [employer], you can request a clinic appointment, read your test results, renew a prescription or communicate with your care team.     To access your existing account, please contact your [facility] or call [phone] for assistance.        Care EveryWhere ID     This is your Care EveryWhere ID. This could be used by other organizations to access your [facility] medical records  [id number]         Blood Pressure from Last 3 Encounters:   10/12/18 153/80   09/25/18 (!) 167/103   08/30/18 154/80    Weight from Last 3 Encounters:   10/12/18 108.9 kg (240 lb)   10/12/18 106.6 kg (235 lb)   09/25/18 106.4 kg (234 lb 9.1 oz)              Today, you had the following     No orders found for display         Today's Medication Changes          These changes are accurate as of 10/12/18  5:02 PM.  If you have  any questions, ask your nurse or doctor.               These medicines have changed or have updated prescriptions.        Dose/Directions    allopurinol 100 MG tablet   Commonly known as:  ZYLOPRIM   This may have changed:    - how much to take  - when to take this   Used for:  Kidney replaced by transplant, Gout        Dose:  200 mg   Take 2 tablets by mouth daily.   Quantity:  180 tablet   Refills:  3       * cycloSPORINE modified 25 MG capsule   Commonly known as:  GENERIC EQUIVALENT   This may have changed:    - how much to take  - additional instructions   Used for:  Kidney transplant recipient, Long-term use of immunosuppressant medication        Dose:  50 mg   Take 2 capsules (50 mg) by mouth 2 times daily Total dose 150 mg twice daily   Quantity:  180 capsule   Refills:  3       * cycloSPORINE modified 100 MG capsule   Commonly known as:  GENERIC EQUIVALENT   This may have changed:  additional instructions   Used for:  Kidney transplant recipient, Long-term use of immunosuppressant medication        Dose:  100 mg   Take 1 capsule (100 mg) by mouth 2 times daily Total dose 150 mg twice daily   Quantity:  180 capsule   Refills:  3       losartan 50 MG tablet   Commonly known as:  COZAAR   This may have changed:  when to take this   Used for:  Kidney replaced by transplant        Dose:  25 mg   Take 0.5 tablets (25 mg) by mouth daily   Quantity:  90 tablet   Refills:  3       tamsulosin 0.4 MG capsule   Commonly known as:  FLOMAX   This may have changed:  when to take this   Used for:  Benign prostatic hyperplasia, unspecified whether lower urinary tract symptoms present        Dose:  0.4 mg   Take 1 capsule (0.4 mg) by mouth daily   Quantity:  60 capsule   Refills:  1       warfarin 5 MG tablet   Commonly known as:  COUMADIN   This may have changed:    - how much to take  - how to take this  - when to take this  - additional instructions   Used for:  Paroxysmal atrial fibrillation (H)        Dose:  5 mg   Take  1 tablet (5 mg) by mouth daily   Quantity:  30 tablet   Refills:  1       * Notice:  This list has 2 medication(s) that are the same as other medications prescribed for you. Read the directions carefully, and ask your doctor or other care provider to review them with you.      Stop taking these medicines if you haven't already. Please contact your care team if you have questions.     lidocaine 5 % ointment   Commonly known as:  XYLOCAINE   Stopped by:  Meagan Story MD                    Primary Care Provider Office Phone # Fax #    Moris Diaz [phone]       [facility] [address]  [facility] [address]        Equal Access to Services     [facility]: Hadii aad ku hadasho Soomaali, waaxda luqadaha, qaybta kaalmada adeegyada, waxay arsalan barriga . So [facility] [phone].    ATENCIÓN: Si habla español, tiene a carr disposición servicios gratuitos de asistencia lingüística. Llame al [phone].    We comply with applicable federal civil rights laws and [place] laws. We do not discriminate on the basis of race, color, national origin, age, disability, sex, sexual orientation, or gender identity.            Thank you!     Thank you for choosing [facility] AND [facility] FOR PROSTATE AND UROLOGIC CANCERS  for your care. Our goal is always to provide you with excellent care. Hearing back from our patients is one way we can continue to improve our services. Please take a few minutes to complete the written survey that you may receive in the mail after your visit with us. Thank you!             Your Updated Medication List - Protect others around you: Learn how to safely use, store and throw away your medicines at www.disposemymeds.org.          This list is accurate as of 10/12/18  5:02 PM.  Always use your most recent med list.                   Brand Name Dispense Instructions for use Diagnosis    allopurinol 100 MG tablet    ZYLOPRIM    180  tablet    Take 2 tablets by mouth daily.    Kidney replaced by transplant, Gout       ALPHAGAN OP      Place 1 drop into the right eye 2 times daily        BABY ASPIRIN PO      Take 81 mg by mouth daily (with lunch)        BEVACizumab 400 MG/16ML injection    AVASTIN     Every 5 weeks        CRESTOR PO      Take 40 mg by mouth every evening        * cycloSPORINE modified 25 MG capsule    GENERIC EQUIVALENT    180 capsule    Take 2 capsules (50 mg) by mouth 2 times daily Total dose 150 mg twice daily    Kidney transplant recipient, Long-term use of immunosuppressant medication       * cycloSPORINE modified 100 MG capsule    GENERIC EQUIVALENT    180 capsule    Take 1 capsule (100 mg) by mouth 2 times daily Total dose 150 mg twice daily    Kidney transplant recipient, Long-term use of immunosuppressant medication       epoetin freya 01250 UNIT/ML injection    PROCRIT    4 mL    Inject 1 mL (20,000 Units) Subcutaneous once a week As needed for hgb < 10    CKD (chronic kidney disease) stage 4, GFR 15-29 ml/min (H), Anemia in stage 4 chronic kidney disease (H)       fenofibrate 145 MG tablet      Take 145 mg by mouth every morning        ISOSORBIDE MONONITRATE PO      Take 60 mg by mouth every morning        loperamide 2 MG capsule    IMODIUM     Take 2 mg by mouth daily        losartan 50 MG tablet    COZAAR    90 tablet    Take 0.5 tablets (25 mg) by mouth daily    Kidney replaced by transplant       metoprolol tartrate 100 MG tablet    LOPRESSOR    60 tablet    Take 1 tablet by mouth 2 times daily.    Unspecified hypertensive kidney disease with chronic kidney disease stage I through stage IV, or unspecified(403.90), Kidney replaced by transplant       NIFEdipine ER osmotic 60 MG Tb24    PROCARDIA XL    90 tablet    Take 1 tablet (60 mg) by mouth daily    HTN (hypertension)       NITROSTAT 0.4 MG sublingual tablet   Generic drug:  nitroGLYcerin      Place 0.4 mg under the tongue every 5 minutes as needed.         NONFORMULARY      Take 1 tablet by mouth 2 times daily [employer] Mac[employer] Pro:  Eye vitamin and mineral supplement A, C, E, Zinc, Copper        omega-3 acid ethyl esters 1 g capsule    Lovaza     Take 2 g by mouth 2 times daily        PANTOPRAZOLE SODIUM PO      Take 40 mg by mouth daily as needed for heartburn        predniSONE 5 MG tablet    DELTASONE     Take 5 mg by mouth daily (with lunch)        probenecid 500 MG tablet    BENEMID     Take 500 mg by mouth 2 times daily.        sulfamethoxazole-trimethoprim 400-80 MG per tablet    BACTRIM/SEPTRA     Take 1 tablet by mouth every other day        tamsulosin 0.4 MG capsule    FLOMAX    60 capsule    Take 1 capsule (0.4 mg) by mouth daily    Benign prostatic hyperplasia, unspecified whether lower urinary tract symptoms present       TYLENOL ARTHRITIS PAIN 650 MG CR tablet   Generic drug:  acetaminophen      Take 2 tablets by mouth 3 times daily.        warfarin 5 MG tablet    COUMADIN    30 tablet    Take 1 tablet (5 mg) by mouth daily    Paroxysmal atrial fibrillation (H)       * Notice:  This list has 2 medication(s) that are the same as other medications prescribed for you. Read the directions carefully, and ask your doctor or other care provider to review them with you.

## 2018-10-12 NOTE — NURSING NOTE
Chief Complaint   Patient presents with     Cystoscopy     Evaluate prostate and bladder stone       Elizabeth Gale MA

## 2018-10-12 NOTE — MR AVS SNAPSHOT
After Visit Summary   10/12/2018    Rory Bustamante    MRN: [id number]           Patient Information     Date Of Birth          1955        Visit Information        Provider Department      10/12/2018 10:00 AM Jose Car APRN CNS [facility]         Follow-ups after your visit        Your next 10 appointments already scheduled     Oct 12, 2018  2:20 PM CDT   (Arrive by 2:05 PM)   Return Visit with  Prostate Cancer Ctr Nurse   [facility] ([facility])    [address] [place] [address]   [phone]            Oct 17, 2018  7:00 AM CDT   (Arrive by 6:45 AM)   Urodynamics with Luciana Quintero PA-C   [facility] ([facility])    [address] [place] [address]   [phone]            Oct 29, 2018  9:00 AM CDT   MR ABDOMEN MRCP W/O & W CONTRAST with UUMR1   [facility], [place], [facility] ([facility] [place])    [address] [address]   [phone]           How do I prepare for my exam? (Food and drink instructions) Do not eat or drink for 6 hours prior to exam. **If you will be receiving sedation or general anesthesia, please see special notes below.**  How do I prepare for my exam? (Other instructions) Take your medicines as usual, unless your doctor tells you not to. You may or may not receive IV contrast for this exam pending the discretion of the Radiologist.  **If you will be receiving sedation or general anesthesia, please see special notes below.**  What should I wear: The MRI machine uses a strong magnet. Please wear clothes without metal (snaps, zippers). A sweatsuit works well, or we may give you a hospital gown. Please remove any body piercings and hair extensions before you arrive. You will also remove  watches, jewelry, hairpins, wallets, dentures, partial dental plates and hearing aids. You may wear contact lenses, and you may be able to wear your rings. We have a safe place to keep your personal items, but it is safer to leave them at home.  How long does the exam take: Most tests take 30 to 60 minutes.  HOWEVER, IF YOUR DOCTOR PRESCRIBES ANESTHESIA please plan on spending four to five hours in the recovery room.  What should I bring: Bring a list of your current medicines to your exam (including vitamins, minerals and over-the-counter drugs). Also bring the results of similar scans you may have had.  Do I need a : **If you will be receiving sedation or general anesthesia, please see special notes below.**  What should I do after the exam: No Restrictions, You may resume normal activities.  What is this test: MRI (magnetic resonance imaging) uses a strong magnet and radio waves to look inside the body. An MRA (magnetic resonance angiogram) does the same thing, but it lets us look at your blood vessels. A computer turns the radio waves into pictures showing cross sections of the body, much like slices of bread. This helps us see any problems more clearly. You may receive fluid (called  contrast ) before or during your scan. The fluid helps us see the pictures better. We give the fluid through an IV (small needle in your arm).  Who should I call with questions: If you have any questions, please contact your Imaging Department exam site. Directions, parking instructions, and other information is available on our website, [email or web address].            Oct 29, 2018 10:40 AM CDT   (Arrive by 10:25 AM)   New Patient Visit with Shade Manning MD   [facility] ([facility])    [address]  [address]  [facility] [address] [phone]            Nov 05, 2018   Procedure with Meagan Story MD   [facility], [place], Same Day Surgery (--)    [address] [facility] [address]   [phone]            Nov 12, 2018  1:30 PM CST   (Arrive by 1:15 PM)   FISTULA FOLLOW UP with Sydney Dawkins MD   [facility] Solid Organ Transplant ([facility])    [address]  [address]  [place] [address] [phone]            Nov 16, 2018 10:00 AM CST   (Arrive by 9:45 AM)   Post-Op with Meagan Story MD   [facility] for Prostate and Urologic Cancers ([facility])    [address]  [address]  [facility] [facility] [address] [phone]              Who to contact     If you have questions or need follow up information about today's clinic visit or your schedule please contact [facility] directly at [phone].  Normal or non-critical lab and imaging results will be communicated to you by MyChart, letter or phone within 4 business days after the clinic has received the results. If you do not hear from us within 7 days, please contact the clinic through [employer] or phone. If you have a critical or abnormal lab result, we will notify you by phone as soon as possible.  Submit refill requests through [employer] or call your pharmacy and they will forward the refill request to us. Please allow 3 business days for your refill to be completed.          Additional Information About Your Visit        [employer] Information     [employer] gives you secure access to your electronic health record. If you see a primary care provider, you can also send messages to your care team and make appointments. If you have questions, please call your primary care clinic.  If you do not have a primary care provider, please call [phone] and they will assist you.        Care EveryWhere ID     This is your Care EveryWhere ID. This could be used by other organizations to access your [facility] medical records  [id number]        Your Vitals Were     Pulse Temperature Height Pulse Oximetry BMI  "(Body Mass Index)       69 97.5  F (36.4  C) 1.854 m (6' 1\") 97% 31.66 kg/m2        Blood Pressure from Last 3 Encounters:   10/12/18 153/80   09/25/18 (!) 167/103   08/30/18 154/80    Weight from Last 3 Encounters:   10/12/18 108.9 kg (240 lb)   10/12/18 106.6 kg (235 lb)   09/25/18 106.4 kg (234 lb 9.1 oz)              Today, you had the following     No orders found for display         Today's Medication Changes          These changes are accurate as of 10/12/18 10:39 AM.  If you have any questions, ask your nurse or doctor.               These medicines have changed or have updated prescriptions.        Dose/Directions    allopurinol 100 MG tablet   Commonly known as:  ZYLOPRIM   This may have changed:    - how much to take  - when to take this   Used for:  Kidney replaced by transplant, Gout        Dose:  200 mg   Take 2 tablets by mouth daily.   Quantity:  180 tablet   Refills:  3       * cycloSPORINE modified 25 MG capsule   Commonly known as:  GENERIC EQUIVALENT   This may have changed:    - how much to take  - additional instructions   Used for:  Kidney transplant recipient, Long-term use of immunosuppressant medication        Dose:  50 mg   Take 2 capsules (50 mg) by mouth 2 times daily Total dose 150 mg twice daily   Quantity:  180 capsule   Refills:  3       * cycloSPORINE modified 100 MG capsule   Commonly known as:  GENERIC EQUIVALENT   This may have changed:  additional instructions   Used for:  Kidney transplant recipient, Long-term use of immunosuppressant medication        Dose:  100 mg   Take 1 capsule (100 mg) by mouth 2 times daily Total dose 150 mg twice daily   Quantity:  180 capsule   Refills:  3       losartan 50 MG tablet   Commonly known as:  COZAAR   This may have changed:  when to take this   Used for:  Kidney replaced by transplant        Dose:  25 mg   Take 0.5 tablets (25 mg) by mouth daily   Quantity:  90 tablet   Refills:  3       tamsulosin 0.4 MG capsule   Commonly known as:  " FLOMAX   This may have changed:  when to take this   Used for:  Benign prostatic hyperplasia, unspecified whether lower urinary tract symptoms present        Dose:  0.4 mg   Take 1 capsule (0.4 mg) by mouth daily   Quantity:  60 capsule   Refills:  1       warfarin 5 MG tablet   Commonly known as:  COUMADIN   This may have changed:    - how much to take  - how to take this  - when to take this  - additional instructions   Used for:  Paroxysmal atrial fibrillation (H)        Dose:  5 mg   Take 1 tablet (5 mg) by mouth daily   Quantity:  30 tablet   Refills:  1       * Notice:  This list has 2 medication(s) that are the same as other medications prescribed for you. Read the directions carefully, and ask your doctor or other care provider to review them with you.      Stop taking these medicines if you haven't already. Please contact your care team if you have questions.     lidocaine 5 % ointment   Commonly known as:  XYLOCAINE   Stopped by:  Meagan Story MD                    Primary Care Provider Office Phone # Fax #    Moris Diaz [phone]       [facility] [address]  [facility] [address]        Equal Access to Services     [facility]: Hadii aad ku hadasho Soomaali, waaxda luqadaha, qaybta kaalmada adeegyamarsha, waxserge barriga . So [facility] [phone].    ATENCIÓN: Si habla español, tiene a carr disposición servicios gratuitos de asistencia lingüística. Larissa al [phone].    We comply with applicable federal civil rights laws and [place] laws. We do not discriminate on the basis of race, color, national origin, age, disability, sex, sexual orientation, or gender identity.            Thank you!     Thank you for choosing [facility]  for your care. Our goal is always to provide you with excellent care. Hearing back from our patients is one way we can continue to improve our services. Please take a few minutes  to complete the written survey that you may receive in the mail after your visit with us. Thank you!             Your Updated Medication List - Protect others around you: Learn how to safely use, store and throw away your medicines at www.disposemymeds.org.          This list is accurate as of 10/12/18 10:39 AM.  Always use your most recent med list.                   Brand Name Dispense Instructions for use Diagnosis    allopurinol 100 MG tablet    ZYLOPRIM    180 tablet    Take 2 tablets by mouth daily.    Kidney replaced by transplant, Gout       ALPHAGAN OP      Place 1 drop into the right eye 2 times daily        BABY ASPIRIN PO      Take 81 mg by mouth daily (with lunch)        BEVACizumab 400 MG/16ML injection    AVASTIN     Every 5 weeks        CRESTOR PO      Take 40 mg by mouth every evening        * cycloSPORINE modified 25 MG capsule    GENERIC EQUIVALENT    180 capsule    Take 2 capsules (50 mg) by mouth 2 times daily Total dose 150 mg twice daily    Kidney transplant recipient, Long-term use of immunosuppressant medication       * cycloSPORINE modified 100 MG capsule    GENERIC EQUIVALENT    180 capsule    Take 1 capsule (100 mg) by mouth 2 times daily Total dose 150 mg twice daily    Kidney transplant recipient, Long-term use of immunosuppressant medication       epoetin freya 18969 UNIT/ML injection    PROCRIT    4 mL    Inject 1 mL (20,000 Units) Subcutaneous once a week As needed for hgb < 10    CKD (chronic kidney disease) stage 4, GFR 15-29 ml/min (H), Anemia in stage 4 chronic kidney disease (H)       fenofibrate 145 MG tablet      Take 145 mg by mouth every morning        ISOSORBIDE MONONITRATE PO      Take 60 mg by mouth every morning        loperamide 2 MG capsule    IMODIUM     Take 2 mg by mouth daily        losartan 50 MG tablet    COZAAR    90 tablet    Take 0.5 tablets (25 mg) by mouth daily    Kidney replaced by transplant       metoprolol tartrate 100 MG tablet    LOPRESSOR    60 tablet     Take 1 tablet by mouth 2 times daily.    Unspecified hypertensive kidney disease with chronic kidney disease stage I through stage IV, or unspecified(403.90), Kidney replaced by transplant       NIFEdipine ER osmotic 60 MG Tb24    PROCARDIA XL    90 tablet    Take 1 tablet (60 mg) by mouth daily    HTN (hypertension)       NITROSTAT 0.4 MG sublingual tablet   Generic drug:  nitroGLYcerin      Place 0.4 mg under the tongue every 5 minutes as needed.        NONFORMULARY      Take 1 tablet by mouth 2 times daily [employer] Pro:  Eye vitamin and mineral supplement A, C, E, Zinc, Copper        omega-3 acid ethyl esters 1 g capsule    Lovaza     Take 2 g by mouth 2 times daily        PANTOPRAZOLE SODIUM PO      Take 40 mg by mouth daily as needed for heartburn        predniSONE 5 MG tablet    DELTASONE     Take 5 mg by mouth daily (with lunch)        probenecid 500 MG tablet    BENEMID     Take 500 mg by mouth 2 times daily.        sulfamethoxazole-trimethoprim 400-80 MG per tablet    BACTRIM/SEPTRA     Take 1 tablet by mouth every other day        tamsulosin 0.4 MG capsule    FLOMAX    60 capsule    Take 1 capsule (0.4 mg) by mouth daily    Benign prostatic hyperplasia, unspecified whether lower urinary tract symptoms present       TYLENOL ARTHRITIS PAIN 650 MG CR tablet   Generic drug:  acetaminophen      Take 2 tablets by mouth 3 times daily.        warfarin 5 MG tablet    COUMADIN    30 tablet    Take 1 tablet (5 mg) by mouth daily    Paroxysmal atrial fibrillation (H)       * Notice:  This list has 2 medication(s) that are the same as other medications prescribed for you. Read the directions carefully, and ask your doctor or other care provider to review them with you.

## 2018-10-12 NOTE — TELEPHONE ENCOUNTER
Patient is scheduled for surgery with Dr. Story      Spoke or left message with: scheduled in clinic    Date of Surgery: 11/5/18    Location: [facility] OR    Informed patient they will need an adult  yes    Pre-op with surgeon (if applicable): n/a    H&P: Scheduled with pcp    Additional imaging/appointments: n/a    Surgery packet: given in clinic     Additional comments: n/a

## 2018-10-12 NOTE — MR AVS SNAPSHOT
After Visit Summary   10/12/2018    Rory Bustamante    MRN: [id number]           Patient Information     Date Of Birth          1955        Visit Information        Provider Department      10/12/2018 8:15 AM Meagan Story MD [facility] Urology and Inst for Prostate and Urologic Cancers        Today's Diagnoses     Bladder stone    -  1      Care Instructions    Please do UDS and follow up with  after    It was a pleasure meeting with you today.  Thank you for allowing me and my team the privilege of caring for you today.  YOU are the reason we are here, and I truly hope we provided you with the excellent service you deserve.  Please let us know if there is anything else we can do for you so that we can be sure you are leaving completely satisfied with your care experience.                  Follow-ups after your visit        Your next 10 appointments already scheduled     Oct 12, 2018 10:00 AM CDT   (Arrive by 9:45 AM)   Post-Op with ELYSE Odonnell   [facility] Solid Organ Transplant ([facility])    [address] [place] [address]  [address]  [employer] [address] [phone]            Oct 12, 2018  2:20 PM CDT   (Arrive by 2:05 PM)   Return Visit with  Prostate Cancer Ctr Nurse   [facility] Urology and [facility] for Prostate and Urologic Cancers ([facility])    [address] [place] [address]  4th Floor  [employer] [address] [phone]            Oct 29, 2018  9:00 AM CDT   MR ABDOMEN MRCP W/O & W CONTRAST with UUMR1   [facility] [place], MRI ([facility] [place])    [address] [address] [phone]           How do I prepare for my exam? (Food and drink instructions) Do not eat or drink for 6 hours prior to exam. **If you will be receiving sedation or general anesthesia, please see special notes below.**  How do I prepare for my exam? (Other instructions) Take your  medicines as usual, unless your doctor tells you not to. You may or may not receive IV contrast for this exam pending the discretion of the Radiologist.  **If you will be receiving sedation or general anesthesia, please see special notes below.**  What should I wear: The MRI machine uses a strong magnet. Please wear clothes without metal (snaps, zippers). A sweatsuit works well, or we may give you a hospital gown. Please remove any body piercings and hair extensions before you arrive. You will also remove watches, jewelry, hairpins, wallets, dentures, partial dental plates and hearing aids. You may wear contact lenses, and you may be able to wear your rings. We have a safe place to keep your personal items, but it is safer to leave them at home.  How long does the exam take: Most tests take 30 to 60 minutes.  HOWEVER, IF YOUR DOCTOR PRESCRIBES ANESTHESIA please plan on spending four to five hours in the recovery room.  What should I bring: Bring a list of your current medicines to your exam (including vitamins, minerals and over-the-counter drugs). Also bring the results of similar scans you may have had.  Do I need a : **If you will be receiving sedation or general anesthesia, please see special notes below.**  What should I do after the exam: No Restrictions, You may resume normal activities.  What is this test: MRI (magnetic resonance imaging) uses a strong magnet and radio waves to look inside the body. An MRA (magnetic resonance angiogram) does the same thing, but it lets us look at your blood vessels. A computer turns the radio waves into pictures showing cross sections of the body, much like slices of bread. This helps us see any problems more clearly. You may receive fluid (called  contrast ) before or during your scan. The fluid helps us see the pictures better. We give the fluid through an IV (small needle in your arm).  Who should I call with questions: If you have any questions, please contact your  Imaging Department exam site. Directions, parking instructions, and other information is available on our website, [email or web address].            Oct 29, 2018 10:40 AM CDT   (Arrive by 10:25 AM)   New Patient Visit with Shade Manning MD   [facility] ([facility])    [address]  [address]  [facility] [address] [phone]              Who to contact     Please call your clinic at [phone] to:    Ask questions about your health    Make or cancel appointments    Discuss your medicines    Learn about your test results    Speak to your doctor            Additional Information About Your Visit        [employer] Information     [employer] gives you secure access to your electronic health record. If you see a primary care provider, you can also send messages to your care team and make appointments. If you have questions, please call your primary care clinic.  If you do not have a primary care provider, please call [phone] and they will assist you.      [employer] is an electronic gateway that provides easy, online access to your medical records. With [employer], you can request a clinic appointment, read your test results, renew a prescription or communicate with your care team.     To access your existing account, please contact your [facility] or call [phone] for assistance.        Care EveryWhere ID     This is your Care EveryWhere ID. This could be used by other organizations to access your [facility] medical records  [id number]        Your Vitals Were     Height BMI (Body Mass Index)                1.829 m (6') 31.87 kg/m2           Blood Pressure from Last 3 Encounters:   09/25/18 (!) 167/103   08/30/18 154/80   08/11/18 147/82    Weight from Last 3 Encounters:   10/12/18 106.6 kg (235 lb)   09/25/18 106.4 kg (234 lb 9.1 oz)   08/30/18 104.2 kg (229 lb 12.8 oz)              Today, you had the following     No orders  found for display         Today's Medication Changes          These changes are accurate as of 10/12/18  9:01 AM.  If you have any questions, ask your nurse or doctor.               These medicines have changed or have updated prescriptions.        Dose/Directions    allopurinol 100 MG tablet   Commonly known as:  ZYLOPRIM   This may have changed:    - how much to take  - when to take this   Used for:  Kidney replaced by transplant, Gout        Dose:  200 mg   Take 2 tablets by mouth daily.   Quantity:  180 tablet   Refills:  3       * cycloSPORINE modified 25 MG capsule   Commonly known as:  GENERIC EQUIVALENT   This may have changed:    - how much to take  - additional instructions   Used for:  Kidney transplant recipient, Long-term use of immunosuppressant medication        Dose:  50 mg   Take 2 capsules (50 mg) by mouth 2 times daily Total dose 150 mg twice daily   Quantity:  180 capsule   Refills:  3       * cycloSPORINE modified 100 MG capsule   Commonly known as:  GENERIC EQUIVALENT   This may have changed:  additional instructions   Used for:  Kidney transplant recipient, Long-term use of immunosuppressant medication        Dose:  100 mg   Take 1 capsule (100 mg) by mouth 2 times daily Total dose 150 mg twice daily   Quantity:  180 capsule   Refills:  3       losartan 50 MG tablet   Commonly known as:  COZAAR   This may have changed:  when to take this   Used for:  Kidney replaced by transplant        Dose:  25 mg   Take 0.5 tablets (25 mg) by mouth daily   Quantity:  90 tablet   Refills:  3       tamsulosin 0.4 MG capsule   Commonly known as:  FLOMAX   This may have changed:  when to take this   Used for:  Benign prostatic hyperplasia, unspecified whether lower urinary tract symptoms present        Dose:  0.4 mg   Take 1 capsule (0.4 mg) by mouth daily   Quantity:  60 capsule   Refills:  1       warfarin 5 MG tablet   Commonly known as:  COUMADIN   This may have changed:    - how much to take  - how to take  this  - when to take this  - additional instructions   Used for:  Paroxysmal atrial fibrillation (H)        Dose:  5 mg   Take 1 tablet (5 mg) by mouth daily   Quantity:  30 tablet   Refills:  1       * Notice:  This list has 2 medication(s) that are the same as other medications prescribed for you. Read the directions carefully, and ask your doctor or other care provider to review them with you.      Stop taking these medicines if you haven't already. Please contact your care team if you have questions.     lidocaine 5 % ointment   Commonly known as:  XYLOCAINE   Stopped by:  Meagan Story MD                    Primary Care Provider Office Phone # Fax #    Moris Diaz [phone]       [facility] [address]  [facility] [address]        Equal Access to Services     [facility] : Hadii av simpson hadasho Soomaali, waaxda luqadaha, qaybta kaalmada adeegyada, waxay mooin hayterell barriga . So [facility] [phone].    ATENCIÓN: Si habla español, tiene a carr disposición servicios gratuitos de asistencia lingüística. [facility] [phone].    We comply with applicable federal civil rights laws and [place] laws. We do not discriminate on the basis of race, color, national origin, age, disability, sex, sexual orientation, or gender identity.            Thank you!     Thank you for choosing [facility] UROLOGY AND [facility] FOR PROSTATE AND UROLOGIC CANCERS  for your care. Our goal is always to provide you with excellent care. Hearing back from our patients is one way we can continue to improve our services. Please take a few minutes to complete the written survey that you may receive in the mail after your visit with us. Thank you!             Your Updated Medication List - Protect others around you: Learn how to safely use, store and throw away your medicines at www.disposemymeds.org.          This list is accurate as of 10/12/18  9:01 AM.  Always use your most recent  med list.                   Brand Name Dispense Instructions for use Diagnosis    allopurinol 100 MG tablet    ZYLOPRIM    180 tablet    Take 2 tablets by mouth daily.    Kidney replaced by transplant, Gout       ALPHAGAN OP      Place 1 drop into the right eye 2 times daily        BABY ASPIRIN PO      Take 81 mg by mouth daily (with lunch)        BEVACizumab 400 MG/16ML injection    AVASTIN     Every 5 weeks        CRESTOR PO      Take 40 mg by mouth every evening        * cycloSPORINE modified 25 MG capsule    GENERIC EQUIVALENT    180 capsule    Take 2 capsules (50 mg) by mouth 2 times daily Total dose 150 mg twice daily    Kidney transplant recipient, Long-term use of immunosuppressant medication       * cycloSPORINE modified 100 MG capsule    GENERIC EQUIVALENT    180 capsule    Take 1 capsule (100 mg) by mouth 2 times daily Total dose 150 mg twice daily    Kidney transplant recipient, Long-term use of immunosuppressant medication       epoetin freya 71701 UNIT/ML injection    PROCRIT    4 mL    Inject 1 mL (20,000 Units) Subcutaneous once a week As needed for hgb < 10    CKD (chronic kidney disease) stage 4, GFR 15-29 ml/min (H), Anemia in stage 4 chronic kidney disease (H)       fenofibrate 145 MG tablet      Take 145 mg by mouth every morning        ISOSORBIDE MONONITRATE PO      Take 60 mg by mouth every morning        loperamide 2 MG capsule    IMODIUM     Take 2 mg by mouth daily        losartan 50 MG tablet    COZAAR    90 tablet    Take 0.5 tablets (25 mg) by mouth daily    Kidney replaced by transplant       metoprolol tartrate 100 MG tablet    LOPRESSOR    60 tablet    Take 1 tablet by mouth 2 times daily.    Unspecified hypertensive kidney disease with chronic kidney disease stage I through stage IV, or unspecified(403.90), Kidney replaced by transplant       NIFEdipine ER osmotic 60 MG Tb24    PROCARDIA XL    90 tablet    Take 1 tablet (60 mg) by mouth daily    HTN (hypertension)       NITROSTAT 0.4  MG sublingual tablet   Generic drug:  nitroGLYcerin      Place 0.4 mg under the tongue every 5 minutes as needed.        NONFORMULARY      Take 1 tablet by mouth 2 times daily Focus Macula Pro:  Eye vitamin and mineral supplement A, C, E, Zinc, Copper        omega-3 acid ethyl esters 1 g capsule    Lovaza     Take 2 g by mouth 2 times daily        PANTOPRAZOLE SODIUM PO      Take 40 mg by mouth daily as needed for heartburn        predniSONE 5 MG tablet    DELTASONE     Take 5 mg by mouth daily (with lunch)        probenecid 500 MG tablet    BENEMID     Take 500 mg by mouth 2 times daily.        sulfamethoxazole-trimethoprim 400-80 MG per tablet    BACTRIM/SEPTRA     Take 1 tablet by mouth every other day        tamsulosin 0.4 MG capsule    FLOMAX    60 capsule    Take 1 capsule (0.4 mg) by mouth daily    Benign prostatic hyperplasia, unspecified whether lower urinary tract symptoms present       TYLENOL ARTHRITIS PAIN 650 MG CR tablet   Generic drug:  acetaminophen      Take 2 tablets by mouth 3 times daily.        warfarin 5 MG tablet    COUMADIN    30 tablet    Take 1 tablet (5 mg) by mouth daily    Paroxysmal atrial fibrillation (H)       * Notice:  This list has 2 medication(s) that are the same as other medications prescribed for you. Read the directions carefully, and ask your doctor or other care provider to review them with you.

## 2018-10-12 NOTE — LETTER
10/12/2018       RE: Rory Bustamante  [address]  [facility] [address]     Dear Colleague,    Thank you for referring your patient, Rory Bustamante, to the [facility] SOLID ORGAN TRANSPLANT at [facility]. Please see a copy of my visit note below.    Dialysis Access Service   Progress Note    S:  Mr. Bustamante is being seen today for surgical followup of his dialysis access.  He reports no issues with the wound, and  no steal syndrome of the distal extremity. C/O swelling in right arm AC fossa incision area and right forearm, on and off after surgery. He was not instructed to elevate right arm at discharge on surgery day. Denies pain in right arm, numbness/tingling, a change of color in right hand and fingers. S/P Arteriovenous Fistula creation, right brachial artery to cephalic vein. on 9/25/2018.    O:  Temp:  [97.5  F (36.4  C)] 97.5  F (36.4  C)  Pulse:  [69] 69  BP: (153)/(80) 153/80  SpO2:  [97 %] 97 %  GENERAL: alert, cooperative  Circulation:   Radial pulse 3+  Ulnar pulse  3+   Capillary refill:  capillary refill < 3 sec    Sensory exam:   arm: Normal   [x]           Abnormal   []          Comment:    hand: Normal   [x]           Abnormal   []          Comment:   Motor exam:   arm: Normal   [x]           Abnormal   []          Comment:    hand: Normal   [x]           Abnormal   []          Comment:    Access: R upper extremity wound(s) healed. non-tender. Mild venous hypertension in right forearm, ++thrill and bruit present via handheld doppler present. A small to moderate size fluid collection in AC fossa incision area, no erythema or warm to touch. No drainage noted.    Assessment & Plan: Mr. Bustamante's dialysis access has matured well at this time point.    1. Wrap right forearm up to elbow area with ACE bandage  2. Check thrill twice a day  3. May start hammer curl exercise in right arm with a squeeze ball. 15 minutes a time, 3-4 times a day as tolerated  4. Elevate right arm  with support as tolerated  5. F/U with Dr. Dawkins in 4 weeks     We would like to see the patient back in the clinic in 4 weeks time to assess progress. The patient was counselled to contact our nurse coordinator, ELYSE Moscoso CNS (Sum) at [phone] with any questions or concerns.  Thank you for the opportunity to participate in Mr. Bustamante's care.    TT: 15 min  CT: 15 min    COURTNEY Prather (Sum)  Dialysis access program   [facility]  Pager # [phone]    Again, thank you for allowing me to participate in the care of your patient.      Sincerely,    ELYSE Alfred

## 2018-10-12 NOTE — PROGRESS NOTES
Dialysis Access Service   Progress Note    S:  Mr. Bustamante is being seen today for surgical followup of his dialysis access.  He reports no issues with the wound, and  no steal syndrome of the distal extremity. C/O swelling in right arm AC fossa incision area and right forearm, on and off after surgery. He was not instructed to elevate right arm at discharge on surgery day. Denies pain in right arm, numbness/tingling, a change of color in right hand and fingers. S/P Arteriovenous Fistula creation, right brachial artery to cephalic vein. on 9/25/2018.    O:  Temp:  [97.5  F (36.4  C)] 97.5  F (36.4  C)  Pulse:  [69] 69  BP: (153)/(80) 153/80  SpO2:  [97 %] 97 %  GENERAL: alert, cooperative  Circulation:   Radial pulse 3+  Ulnar pulse  3+   Capillary refill:  capillary refill < 3 sec    Sensory exam:   arm: Normal   [x]           Abnormal   []          Comment:    hand: Normal   [x]           Abnormal   []          Comment:   Motor exam:   arm: Normal   [x]           Abnormal   []          Comment:    hand: Normal   [x]           Abnormal   []          Comment:    Access: R upper extremity wound(s) healed. non-tender. Mild venous hypertension in right forearm, ++thrill and bruit present via handheld doppler present. A small to moderate size fluid collection in AC fossa incision area, no erythema or warm to touch. No drainage noted.    Assessment & Plan: Mr. Bustamante's dialysis access has matured well at this time point.    1. Wrap right forearm up to elbow area with ACE bandage  2. Check thrill twice a day  3. May start hammer curl exercise in right arm with a squeeze ball. 15 minutes a time, 3-4 times a day as tolerated  4. Elevate right arm with support as tolerated  5. F/U with Dr. Dawkins in 4 weeks     We would like to see the patient back in the clinic in 4 weeks time to assess progress. The patient was counselled to contact our nurse coordinator, ELYSE Moscoso (Sum), CNS at [phone] with any questions or  concerns.  Thank you for the opportunity to participate in Mr. Bustamante's care.    TT: 15 min  CT: 15 min    Jose (Lang) Josep PAPPAS, CNS  Dialysis access program   [facility]  Pager # [phone]

## 2018-10-12 NOTE — LETTER
10/12/2018       RE: Rory Bustamante  [address]  [place] [facility] [address]     Dear Colleague,    Thank you for referring your patient, Rory Bustamante, to the [facility] UROLOGY AND INST FOR PROSTATE AND UROLOGIC CANCERS at [facility]. Please see a copy of my visit note below.        PRE-PROCEDURE DIAGNOSIS: bladder stone   POST-PROCEDURE DIAGNOSIS: bladder stone, hematuria   PROCEDURE: Cystoscopy  HISTORY: Rory Bustamante is a 62 year old male with hematuria and bladder stone, preparing for second renal transplant   REVIEW OF OFFICE STUDIES (e.g., uroflow or PVR):   DESCRIPTION OF PROCEDURE: After informed consent was obtained, the patient was brought to the procedure room where he was placed in the supine position with all pressure points well padded.  The penis and scrotum were prepped and draped in a sterile fashion. A flexible cystoscope was introduced through a well-lubricated urethra. Anterior urethra strictures were absent.   The urinary sphincter was intact.  The prostate demonstrated mild//moderate prostatic hypertrophy.  Bladder neck was open.   Bladder signififcant for presence of the following:      Diverticuli: absent      Cellules: absent      Trabeculation: present 0-1      Tumors: absent      Stones: present bladder stone noted adherent to the ureteroneocystotomy from previous transplant   The flexible cystoscope was removed and the findings were described to the patient.   ASSESSMENT AND PLAN: 62 year old male with history of hematuria and bladder stone   Will schedule for cystolithalopaxy with holmium laser lithotripsy     Again, thank you for allowing me to participate in the care of your patient.      Sincerely,    Meagan Story MD

## 2018-10-14 NOTE — PROGRESS NOTES
PRE-PROCEDURE DIAGNOSIS: bladder stone   POST-PROCEDURE DIAGNOSIS: bladder stone, hematuria   PROCEDURE: Cystoscopy  HISTORY: Rory Bustamante is a 62 year old male with hematuria and bladder stone, preparing for second renal transplant   REVIEW OF OFFICE STUDIES (e.g., uroflow or PVR):   DESCRIPTION OF PROCEDURE: After informed consent was obtained, the patient was brought to the procedure room where he was placed in the supine position with all pressure points well padded.  The penis and scrotum were prepped and draped in a sterile fashion. A flexible cystoscope was introduced through a well-lubricated urethra. Anterior urethra strictures were absent.   The urinary sphincter was intact.  The prostate demonstrated mild//moderate prostatic hypertrophy.  Bladder neck was open.   Bladder signififcant for presence of the following:      Diverticuli: absent      Cellules: absent      Trabeculation: present 0-1      Tumors: absent      Stones: present bladder stone noted adherent to the ureteroneocystotomy from previous transplant   The flexible cystoscope was removed and the findings were described to the patient.   ASSESSMENT AND PLAN: 62 year old male with history of hematuria and bladder stone   Will schedule for cystolithalopaxy with holmium laser lithotripsy

## 2018-10-17 NOTE — PATIENT INSTRUCTIONS
Follow up with Dr. Story to discuss further treatment.    It was a pleasure meeting with you today.  Thank you for allowing me and my team the privilege of caring for you today.  YOU are the reason we are here, and I truly hope we provided you with the excellent service you deserve.  Please let us know if there is anything else we can do for you so that we can be sure you are leaving completely satisfied with your care experience.        ALIRIO Katz

## 2018-10-17 NOTE — NURSING NOTE
Invasive Procedure Safety Checklist:    Procedure: Urodynamics Study    Action: Complete sections and checkboxes as appropriate.    Pre-procedure:  1. Patient ID Verified with 2 identifiers (Keiry and  or MRN) : YES    2. Procedure and site verified with patient/designee (when able) : YES    3. Accurate consent documentation in medical record : YES    4. H&P (or appropriate assessment) documented in medical record : NO  H&P must be up to 30 days prior to procedure an updated within 24 hours of                 Procedure as applicable.     5. Relevant diagnostic and radiology test results appropriately labeled and displayed as applicable : NO    6. Blood products, implants, devices, and/or special equipment available for the procedure as applicable : NO    7. Procedure site(s) marked with provider initials [Exclusions: ] :     8. Marking not required. Reason : Yes  Procedure does not require site marking    Time Out:     Time-Out performed immediately prior to starting procedure, including verbal and active participation of all team members addressing: YES    1. Correct patient identity.  2. Confirmed that the correct side and site are marked.  3. An accurate procedure to be done.  4. Agreement on the procedure to be done.  5. Correct patient position.  6. Relevant images and results are properly labeled and appropriately displayed.  7. The need to administer antibiotics or fluids for irrigation purposes during the procedure as applicable.  8. Safety precautions based on patient history or medication use.    During Procedure: Verification of correct person, site, and procedure occurs any time the responsibility for care of the patient is transferred to another member of the care team.    ALIRIO Katz

## 2018-10-17 NOTE — MR AVS SNAPSHOT
After Visit Summary   10/17/2018    Rory Bustamante    MRN: [id number]           Patient Information     Date Of Birth          1955        Visit Information        Provider Department      10/17/2018 7:00 AM Luciana Quintero PA-C [facility] Urology and [facility] for Prostate and Urologic Cancers        Today's Diagnoses     Slow urinary stream    -  1    Incomplete bladder emptying          Care Instructions    Follow up with Dr. Story to discuss further treatment.    It was a pleasure meeting with you today.  Thank you for allowing me and my team the privilege of caring for you today.  YOU are the reason we are here, and I truly hope we provided you with the excellent service you deserve.  Please let us know if there is anything else we can do for you so that we can be sure you are leaving completely satisfied with your care experience.        ALIRIO Katz          Follow-ups after your visit        Your next 10 appointments already scheduled     Oct 29, 2018  9:00 AM CDT   MR ABDOMEN MRCP W/O & W CONTRAST with UUMR1   [facility], [place] ([facility], [facility])    [address] [place] [id number] [phone]           How do I prepare for my exam? (Food and drink instructions) Do not eat or drink for 6 hours prior to exam. **If you will be receiving sedation or general anesthesia, please see special notes below.**  How do I prepare for my exam? (Other instructions) Take your medicines as usual, unless your doctor tells you not to. You may or may not receive IV contrast for this exam pending the discretion of the Radiologist.  **If you will be receiving sedation or general anesthesia, please see special notes below.**  What should I wear: The MRI machine uses a strong magnet. Please wear clothes without metal (snaps, zippers). A sweatsuit works well, or we may give you a hospital gown. Please remove any body piercings and hair  extensions before you arrive. You will also remove watches, jewelry, hairpins, wallets, dentures, partial dental plates and hearing aids. You may wear contact lenses, and you may be able to wear your rings. We have a safe place to keep your personal items, but it is safer to leave them at home.  How long does the exam take: Most tests take 30 to 60 minutes.  HOWEVER, IF YOUR DOCTOR PRESCRIBES ANESTHESIA please plan on spending four to five hours in the recovery room.  What should I bring: Bring a list of your current medicines to your exam (including vitamins, minerals and over-the-counter drugs). Also bring the results of similar scans you may have had.  Do I need a : **If you will be receiving sedation or general anesthesia, please see special notes below.**  What should I do after the exam: No Restrictions, You may resume normal activities.  What is this test: MRI (magnetic resonance imaging) uses a strong magnet and radio waves to look inside the body. An MRA (magnetic resonance angiogram) does the same thing, but it lets us look at your blood vessels. A computer turns the radio waves into pictures showing cross sections of the body, much like slices of bread. This helps us see any problems more clearly. You may receive fluid (called  contrast ) before or during your scan. The fluid helps us see the pictures better. We give the fluid through an IV (small needle in your arm).  Who should I call with questions: If you have any questions, please contact your Imaging Department exam site. Directions, parking instructions, and other information is available on our website, [email or web address].            Oct 29, 2018 10:40 AM CDT   (Arrive by 10:25 AM)   New Patient Visit with Shade Manning MD   [facility] ([facility])    [address] [place] [address]  [address]  [facility] [address] [phone]            Nov 05, 2018   Procedure with Meagan Story  "MD   [facility], [place], Same Day Surgery (--)    [address]  [facility] [place] [address]   [phone]            Nov 12, 2018  1:30 PM CST   (Arrive by 1:15 PM)   FISTULA FOLLOW UP with Sydney Dawkins MD   [facility] Solid Organ Transplant ([facility])    [address]  [address]  [facility] [id number] [phone]            Nov 16, 2018 10:00 AM CST   (Arrive by 9:45 AM)   Post-Op with Meagan Story MD   [facility] Urology and [facility] for Prostate and Urologic Cancers ([facility])    [address]  4th Floor  [facility] [id number] [phone]              Who to contact     Please call your clinic at [phone] to:    Ask questions about your health    Make or cancel appointments    Discuss your medicines    Learn about your test results    Speak to your doctor            Additional Information About Your Visit        [employer] Information     [employer] gives you secure access to your electronic health record. If you see a primary care provider, you can also send messages to your care team and make appointments. If you have questions, please call your primary care clinic.  If you do not have a primary care provider, please call [phone] and they will assist you.      [employer] is an electronic gateway that provides easy, online access to your medical records. With [employer], you can request a clinic appointment, read your test results, renew a prescription or communicate with your care team.     To access your existing account, please contact your [facility] or call [phone] for assistance.        Care EveryWhere ID     This is your Care EveryWhere ID. This could be used by other organizations to access your [place] medical records  [id number]        Your Vitals Were     Height BMI (Body Mass Index)                1.854 m (6' 1\") 31.66 kg/m2           Blood Pressure from Last 3 Encounters:   10/12/18 " 153/80   09/25/18 (!) 167/103   08/30/18 154/80    Weight from Last 3 Encounters:   10/17/18 108.9 kg (240 lb)   10/12/18 108.9 kg (240 lb)   10/12/18 106.6 kg (235 lb)              We Performed the Following     Urinalysis chemical screen POCT          Today's Medication Changes          These changes are accurate as of 10/17/18  8:41 AM.  If you have any questions, ask your nurse or doctor.               These medicines have changed or have updated prescriptions.        Dose/Directions    allopurinol 100 MG tablet   Commonly known as:  ZYLOPRIM   This may have changed:    - how much to take  - when to take this   Used for:  Kidney replaced by transplant, Gout        Dose:  200 mg   Take 2 tablets by mouth daily.   Quantity:  180 tablet   Refills:  3       * cycloSPORINE modified 25 MG capsule   Commonly known as:  GENERIC EQUIVALENT   This may have changed:    - how much to take  - additional instructions   Used for:  Kidney transplant recipient, Long-term use of immunosuppressant medication        Dose:  50 mg   Take 2 capsules (50 mg) by mouth 2 times daily Total dose 150 mg twice daily   Quantity:  180 capsule   Refills:  3       * cycloSPORINE modified 100 MG capsule   Commonly known as:  GENERIC EQUIVALENT   This may have changed:  additional instructions   Used for:  Kidney transplant recipient, Long-term use of immunosuppressant medication        Dose:  100 mg   Take 1 capsule (100 mg) by mouth 2 times daily Total dose 150 mg twice daily   Quantity:  180 capsule   Refills:  3       losartan 50 MG tablet   Commonly known as:  COZAAR   This may have changed:  when to take this   Used for:  Kidney replaced by transplant        Dose:  25 mg   Take 0.5 tablets (25 mg) by mouth daily   Quantity:  90 tablet   Refills:  3       tamsulosin 0.4 MG capsule   Commonly known as:  FLOMAX   This may have changed:  when to take this   Used for:  Benign prostatic hyperplasia, unspecified whether lower urinary tract symptoms  present        Dose:  0.4 mg   Take 1 capsule (0.4 mg) by mouth daily   Quantity:  60 capsule   Refills:  1       warfarin 5 MG tablet   Commonly known as:  COUMADIN   This may have changed:    - how much to take  - how to take this  - when to take this  - additional instructions   Used for:  Paroxysmal atrial fibrillation (H)        Dose:  5 mg   Take 1 tablet (5 mg) by mouth daily   Quantity:  30 tablet   Refills:  1       * Notice:  This list has 2 medication(s) that are the same as other medications prescribed for you. Read the directions carefully, and ask your doctor or other care provider to review them with you.             Primary Care Provider Office Phone # Fax #    Moris Diaz [phone]       [facility] [address]  [facility] [address]        Equal Access to Services     AZEEM JACOBSEN : Hadii av simpson hadasho Soomaali, waaxda luqadaha, qaybta kaalmada adeegyada, waxay arsalan hayterell barriga . So [facility] [phone].    ATENCIÓN: Si habla español, tiene a carr disposición servicios gratuitos de asistencia lingüística. Llame al [phone].    We comply with applicable federal civil rights laws and [place] laws. We do not discriminate on the basis of race, color, national origin, age, disability, sex, sexual orientation, or gender identity.            Thank you!     Thank you for choosing [facility] UROLOGY AND [facility] FOR PROSTATE AND UROLOGIC CANCERS  for your care. Our goal is always to provide you with excellent care. Hearing back from our patients is one way we can continue to improve our services. Please take a few minutes to complete the written survey that you may receive in the mail after your visit with us. Thank you!             Your Updated Medication List - Protect others around you: Learn how to safely use, store and throw away your medicines at www.disposemymeds.org.          This list is accurate as of 10/17/18  8:41 AM.  Always use your most  recent med list.                   Brand Name Dispense Instructions for use Diagnosis    allopurinol 100 MG tablet    ZYLOPRIM    180 tablet    Take 2 tablets by mouth daily.    Kidney replaced by transplant, Gout       ALPHAGAN OP      Place 1 drop into the right eye 2 times daily        BABY ASPIRIN PO      Take 81 mg by mouth daily (with lunch)        BEVACizumab 400 MG/16ML injection    AVASTIN     Every 5 weeks        CRESTOR PO      Take 40 mg by mouth every evening        * cycloSPORINE modified 25 MG capsule    GENERIC EQUIVALENT    180 capsule    Take 2 capsules (50 mg) by mouth 2 times daily Total dose 150 mg twice daily    Kidney transplant recipient, Long-term use of immunosuppressant medication       * cycloSPORINE modified 100 MG capsule    GENERIC EQUIVALENT    180 capsule    Take 1 capsule (100 mg) by mouth 2 times daily Total dose 150 mg twice daily    Kidney transplant recipient, Long-term use of immunosuppressant medication       epoetin freya 41403 UNIT/ML injection    PROCRIT    4 mL    Inject 1 mL (20,000 Units) Subcutaneous once a week As needed for hgb < 10    CKD (chronic kidney disease) stage 4, GFR 15-29 ml/min (H), Anemia in stage 4 chronic kidney disease (H)       fenofibrate 145 MG tablet      Take 145 mg by mouth every morning        ISOSORBIDE MONONITRATE PO      Take 60 mg by mouth every morning        loperamide 2 MG capsule    IMODIUM     Take 2 mg by mouth daily        losartan 50 MG tablet    COZAAR    90 tablet    Take 0.5 tablets (25 mg) by mouth daily    Kidney replaced by transplant       metoprolol tartrate 100 MG tablet    LOPRESSOR    60 tablet    Take 1 tablet by mouth 2 times daily.    Unspecified hypertensive kidney disease with chronic kidney disease stage I through stage IV, or unspecified(403.90), Kidney replaced by transplant       NIFEdipine ER osmotic 60 MG Tb24    PROCARDIA XL    90 tablet    Take 1 tablet (60 mg) by mouth daily    HTN (hypertension)        NITROSTAT 0.4 MG sublingual tablet   Generic drug:  nitroGLYcerin      Place 0.4 mg under the tongue every 5 minutes as needed.        NONFORMULARY      Take 1 tablet by mouth 2 times daily Focus Macula Pro:  Eye vitamin and mineral supplement A, C, E, Zinc, Copper        omega-3 acid ethyl esters 1 g capsule    Lovaza     Take 2 g by mouth 2 times daily        PANTOPRAZOLE SODIUM PO      Take 40 mg by mouth daily as needed for heartburn        predniSONE 5 MG tablet    DELTASONE     Take 5 mg by mouth daily (with lunch)        probenecid 500 MG tablet    BENEMID     Take 500 mg by mouth 2 times daily.        sulfamethoxazole-trimethoprim 400-80 MG per tablet    BACTRIM/SEPTRA     Take 1 tablet by mouth every other day        tamsulosin 0.4 MG capsule    FLOMAX    60 capsule    Take 1 capsule (0.4 mg) by mouth daily    Benign prostatic hyperplasia, unspecified whether lower urinary tract symptoms present       TYLENOL ARTHRITIS PAIN 650 MG CR tablet   Generic drug:  acetaminophen      Take 2 tablets by mouth 3 times daily.        warfarin 5 MG tablet    COUMADIN    30 tablet    Take 1 tablet (5 mg) by mouth daily    Paroxysmal atrial fibrillation (H)       * Notice:  This list has 2 medication(s) that are the same as other medications prescribed for you. Read the directions carefully, and ask your doctor or other care provider to review them with you.

## 2018-10-17 NOTE — LETTER
10/17/2018       RE: Rory Bustamante  [address]  [facility] [address]     Dear Colleague,    Thank you for referring your patient, Rory Bustamante, to the [facility] UROLOGY AND INST FOR PROSTATE AND UROLOGIC CANCERS at [facility]. Please see a copy of my visit note below.    PREPROCEDURE DIAGNOSES:    1. Lower urinary tract symptoms including slow stream, incomplete bladder emptying, spraying stream  2. History of renal transplant in 1989, preparing for second renal transplant due to worsening GFR  3. Bladder stone with hematuria - scheduled for cystolitholapaxy with Dr. Story on 11/5/18  4. History of acute post-operative urinary retention    POSTPROCEDURE DIAGNOSES:  -Normal bladder capacity (nursing home 665 mL) with normal filling sensations.  -Fair/normal bladder compliance (ratio 30) without discrete episodes of DO/DOI. No RADHA.  -Maximum recorded detrusor contraction during patient's attempt to void: 50 cm H2O.  -He was unable to void any volume until the Pves catheter was removed. Therefore, we were unable to record true voiding detrusor pressures.   -Once the catheter is removed, he empties to completion (final PVR 0 mL) with decent flow rate of 23 ml/sec.   -Could not definitively assess for SANDY since patient was unable to void with the Pves catheter in place; unable to rule out (BOOI calculated at 36 which is equivocal for SANDY).   -Increased EMG activity during voiding correlates with abdominal straining. Low suspicion for true DSD.   -Fluoroscopy reveals a smooth-walled bladder without diverticuli or cellules. Possible external compression on the right superior aspect?  No vesicoureteral reflux was observed.  The bladder neck was closed during filling. Could not obtain voiding images as patient required standing to void and could not get bladder neck in view due to equipment/room size limitations.     PROCEDURE:    1. Uroflowmetry.  2. Sterile urethral catheterization for  measurement of postvoid residual urine volume.  3. Complex filling cystometrogram with measurement of bladder and rectal pressures.  4. Complex voiding cystometrogram with measurement of bladder and rectal pressures.  5. Electromyography of the pelvic floor during urodynamics.  6. Fluoroscopic imaging of the bladder during urodynamics, at least 3 views.    7. Interpretation of urodynamics and flouroscopic imaging.      INDICATIONS FOR PROCEDURE:  Mr. Rory Bustamante is a pleasant 62 year old male s/p kidney transplant in 1989 now with worsening GFR, currently undergoing evaluation to be listed for another renal transplant. Also with lower urinary tract symptoms despite Flomax including slow stream, incomplete bladder emptying, and spraying stream. He also has a bladder stone for which is scheduled for cystolithalopaxy with Dr. Story on 11/5/18. Baseline video urodynamic assessment is requested today by Dr. Story to better characterize Mr. Rory Bustamante's voiding dysfunction.      VOIDING DIARY:  Patient did not complete.     DESCRIPTION OF PROCEDURE:  Risks, benefits, and alternatives to urodynamics were discussed with the patient and he wished to proceed.  Urodynamics are planned to better assess the primary etiology for Mr. Bustamante's urologic dysfunction.  The patient does not currently take anticholinergic medications for his bladder.  After informed consent was obtained, the patient was taken to the procedure room where uroflowmetry was performed. Findings below.     PRE-STUDY UROFLOWMETRY:  Voided volume: 264 mL.  Maximum flow rate: 11.1 mL/sec.  Average flow rate: 5.6 mL/sec.  Character of the curve: low amplitude, intermittent.  Postvoid residual by catheter: 40 mL.  Pretest urine dipstick was negative for leukocytes and nitrites.    Next a 7F double-lumen urodynamics catheter was inserted into the bladder under sterile technique via urethra.  A 7F abdominal manometry catheter was placed in the rectum.  EMG pads  were placed on both sides of the anal verge.  The bladder was filled with 200 mL of Cystografin at 35 mL/minute and serial pressures were recorded.  With coughing there was an appropriate rise in vesical and abdominal pressures with no change in detrusor pressure, confirming good study catheter placement.    DURING THE FILLING PHASE:  First sensation: 259 mL.  First Desire: 268 mL.  Strong Desire: 524 mL.  Maximum Capacity: 665 mL.    Uninhibited detrusor contractions: no discrete episodes of DO.  Compliance: fair. PDet=22 cmH20 at capacity. Compliance ratio of 30.  Continence: no DOI or RADHA.  EMG: mostly concordant during filling.    DURING THE VOIDING PHASE:  Maximum detrusor contraction with attempt to void: 50 cm of H2O pressure.  Patient unable to void until Pves catheter is removed, so unable to obtain true voiding detrusor pressures.  Voided volume: 665 mL.  Maximum flow rate: ~ 23 mL/sec.  Postvoid Residual: 0 mL.  EMG activity: increased EMG activity during voiding which correlates with abdominal straining.  Character of voiding curve: intermittent, then blunted bell-shape with plateau at the peak.  BOOI: 35.9 (suggesting equivocal for obstruction - see key below)  [obstructed (SANDY index [BOOI] ? 40); equivocal (no definite   obstruction; BOOI 20-40); and no obstruction (BOOI ? 20)]    FLUOROSCOPIC IMAGING OF THE BLADDER DURING URODYNAMICS:  Fluoroscopy during today's procedure demonstrated a smooth walled bladder without diverticulae or cellules. Possible external compression on the right superior aspect?  No vesicoureteral reflux was observed.  The bladder neck was closed during filling. Could not obtain voiding images as patient required standing to void and could not get bladder neck in view due to equipment/room size limitations.  After voiding to completion, all catheters were removed and the patient was brought back into the consultation room to further discuss today's study results.       ASSESSMENT/PLAN:  Mr. Rory Bustamante is a pleasant 62 year old male with LUTS who demonstrated the following findings today on urodynamic evaluation:    -Normal bladder capacity (care home 665 mL) with normal filling sensations.  -Fair/normal bladder compliance (ratio 30) without discrete episodes of DO/DOI. No RADHA.  -Maximum recorded detrusor contraction during patient's attempt to void: 50 cm H2O.  -He was unable to void any volume until the Pves catheter was removed. Therefore, we were unable to record true voiding detrusor pressures.   -Once the catheter is removed, he empties to completion (final PVR 0 mL) with decent flow rate of 23 ml/sec.   -Could not definitively assess for SANDY since patient was unable to void with the Pves catheter in place; unable to rule out (BOOI calculated at 36 which is equivocal for SANDY).   -Increased EMG activity during voiding correlates with abdominal straining. Low suspicion for true DSD.   -Fluoroscopy reveals a smooth-walled bladder without diverticuli or cellules. Possible external compression on the right superior aspect?  No vesicoureteral reflux was observed.  The bladder neck was closed during filling. Could not obtain voiding images as patient required standing to void and could not get bladder neck in view due to equipment/room size limitations.     The patient will follow up as scheduled with Dr. Story to further discuss today's study results and make plans for how best to proceed.      - A half tablet of Cipro antibiotic (250 mg) was provided for UTI prophylaxis following completion of today's study per department protocol.  The risk of UTI with VUDS is low at ~2.5-3%.      Thank you for allowing me to participate in the care of Mr. Rory Bustamante and please don't hesitate to contact me with any questions or concerns.      Luciana Quintero PA-C  Urology Physician Assistant

## 2018-10-17 NOTE — PROGRESS NOTES
PREPROCEDURE DIAGNOSES:    1. Lower urinary tract symptoms including slow stream, incomplete bladder emptying, spraying stream  2. History of renal transplant in 1989, preparing for second renal transplant due to worsening GFR  3. Bladder stone with hematuria - scheduled for cystolitholapaxy with Dr. Story on 11/5/18  4. History of acute post-operative urinary retention    POSTPROCEDURE DIAGNOSES:  -Normal bladder capacity (residential 665 mL) with normal filling sensations.  -Fair/normal bladder compliance (ratio 30) without discrete episodes of DO/DOI. No RADHA.  -Maximum recorded detrusor contraction during patient's attempt to void: 50 cm H2O.  -He was unable to void any volume until the Pves catheter was removed. Therefore, we were unable to record true voiding detrusor pressures.   -Once the catheter is removed, he empties to completion (final PVR 0 mL) with decent flow rate of 23 ml/sec.   -Could not definitively assess for SANDY since patient was unable to void with the Pves catheter in place; unable to rule out (BOOI calculated at 36 which is equivocal for SANDY).   -Increased EMG activity during voiding correlates with abdominal straining. Low suspicion for true DSD.   -Fluoroscopy reveals a smooth-walled bladder without diverticuli or cellules. Possible external compression on the right superior aspect?  No vesicoureteral reflux was observed.  The bladder neck was closed during filling. Could not obtain voiding images as patient required standing to void and could not get bladder neck in view due to equipment/room size limitations.     PROCEDURE:    1. Uroflowmetry.  2. Sterile urethral catheterization for measurement of postvoid residual urine volume.  3. Complex filling cystometrogram with measurement of bladder and rectal pressures.  4. Complex voiding cystometrogram with measurement of bladder and rectal pressures.  5. Electromyography of the pelvic floor during urodynamics.  6. Fluoroscopic imaging of the  bladder during urodynamics, at least 3 views.    7. Interpretation of urodynamics and flouroscopic imaging.      INDICATIONS FOR PROCEDURE:  Mr. Rory Bustamante is a pleasant 62 year old male s/p kidney transplant in 1989 now with worsening GFR, currently undergoing evaluation to be listed for another renal transplant. Also with lower urinary tract symptoms despite Flomax including slow stream, incomplete bladder emptying, and spraying stream. He also has a bladder stone for which is scheduled for cystolithalopaxy with Dr. Story on 11/5/18. Baseline video urodynamic assessment is requested today by Dr. Story to better characterize Mr. Rory Bustamante's voiding dysfunction.      VOIDING DIARY:  Patient did not complete.     DESCRIPTION OF PROCEDURE:  Risks, benefits, and alternatives to urodynamics were discussed with the patient and he wished to proceed.  Urodynamics are planned to better assess the primary etiology for Mr. Bustamante's urologic dysfunction.  The patient does not currently take anticholinergic medications for his bladder.  After informed consent was obtained, the patient was taken to the procedure room where uroflowmetry was performed. Findings below.     PRE-STUDY UROFLOWMETRY:  Voided volume: 264 mL.  Maximum flow rate: 11.1 mL/sec.  Average flow rate: 5.6 mL/sec.  Character of the curve: low amplitude, intermittent.  Postvoid residual by catheter: 40 mL.  Pretest urine dipstick was negative for leukocytes and nitrites.    Next a 7F double-lumen urodynamics catheter was inserted into the bladder under sterile technique via urethra.  A 7F abdominal manometry catheter was placed in the rectum.  EMG pads were placed on both sides of the anal verge.  The bladder was filled with 200 mL of Cystografin at 35 mL/minute and serial pressures were recorded.  With coughing there was an appropriate rise in vesical and abdominal pressures with no change in detrusor pressure, confirming good study catheter  placement.    DURING THE FILLING PHASE:  First sensation: 259 mL.  First Desire: 268 mL.  Strong Desire: 524 mL.  Maximum Capacity: 665 mL.    Uninhibited detrusor contractions: no discrete episodes of DO.  Compliance: fair. PDet=22 cmH20 at capacity. Compliance ratio of 30.  Continence: no DOI or RADHA.  EMG: mostly concordant during filling.    DURING THE VOIDING PHASE:  Maximum detrusor contraction with attempt to void: 50 cm of H2O pressure.  Patient unable to void until Pves catheter is removed, so unable to obtain true voiding detrusor pressures.  Voided volume: 665 mL.  Maximum flow rate: ~ 23 mL/sec.  Postvoid Residual: 0 mL.  EMG activity: increased EMG activity during voiding which correlates with abdominal straining.  Character of voiding curve: intermittent, then blunted bell-shape with plateau at the peak.  BOOI: 35.9 (suggesting equivocal for obstruction - see key below)  [obstructed (SANDY index [BOOI] ? 40); equivocal (no definite   obstruction; BOOI 20-40); and no obstruction (BOOI ? 20)]    FLUOROSCOPIC IMAGING OF THE BLADDER DURING URODYNAMICS:  Fluoroscopy during today's procedure demonstrated a smooth walled bladder without diverticulae or cellules. Possible external compression on the right superior aspect?  No vesicoureteral reflux was observed.  The bladder neck was closed during filling. Could not obtain voiding images as patient required standing to void and could not get bladder neck in view due to equipment/room size limitations.  After voiding to completion, all catheters were removed and the patient was brought back into the consultation room to further discuss today's study results.      ASSESSMENT/PLAN:  Mr. Rory Bustamante is a pleasant 62 year old male with LUTS who demonstrated the following findings today on urodynamic evaluation:    -Normal bladder capacity (long-term 665 mL) with normal filling sensations.  -Fair/normal bladder compliance (ratio 30) without discrete episodes of DO/DOI. No  RADHA.  -Maximum recorded detrusor contraction during patient's attempt to void: 50 cm H2O.  -He was unable to void any volume until the Pves catheter was removed. Therefore, we were unable to record true voiding detrusor pressures.   -Once the catheter is removed, he empties to completion (final PVR 0 mL) with decent flow rate of 23 ml/sec.   -Could not definitively assess for SANDY since patient was unable to void with the Pves catheter in place; unable to rule out (BOOI calculated at 36 which is equivocal for SANDY).   -Increased EMG activity during voiding correlates with abdominal straining. Low suspicion for true DSD.   -Fluoroscopy reveals a smooth-walled bladder without diverticuli or cellules. Possible external compression on the right superior aspect?  No vesicoureteral reflux was observed.  The bladder neck was closed during filling. Could not obtain voiding images as patient required standing to void and could not get bladder neck in view due to equipment/room size limitations.     The patient will follow up as scheduled with Dr. Story to further discuss today's study results and make plans for how best to proceed.      - A half tablet of Cipro antibiotic (250 mg) was provided for UTI prophylaxis following completion of today's study per department protocol.  The risk of UTI with VUDS is low at ~2.5-3%.      Thank you for allowing me to participate in the care of Mr. Rory Bustamante and please don't hesitate to contact me with any questions or concerns.      Luciana Quintero PA-C  Urology Physician Assistant

## 2018-10-17 NOTE — NURSING NOTE
The following medication was given:     MEDICATION: Cipro  ROUTE: PO  SITE: Medication was given orally   DOSE: 250 mg  LOT #: [id number]  :  [employer]  EXPIRATION DATE:  05/2020  NDC#: [id number]     ALIRIO Katz

## 2018-10-19 NOTE — TELEPHONE ENCOUNTER
Anemia Management Note  SUBJECTIVE/OBJECTIVE:  Referred by Dr. Christi Sun on 2018  Primary Diagnosis: Anemia in Chronic Kidney Disease (N18.3, D63.1)     Secondary Diagnosis:  Chronic Kidney Disease, Stage 3 (N18.3)   Hgb goal range:  9-10  Epo/Darbo: Procrit  20,000 units once weekly for Hgb < 10  In clinic  RX/TX plans : 2019  Iron regimen:  Ferrous Sulfate 325mg QD  Labs : 2019  Recent MARY LOU use, transfusion, IV iron: IV iron scheduled 2018    Anemia Latest Ref Rng & Units 2018 2018 2018 2018 10/5/2018 10/12/2018 10/19/2018   MARY LOU Dose - 20,000 units - - - 20,000 units 20,000 units -   Hemoglobin 13.3 - 17.7 g/dL 9.4(A) 10.7(A) 10.5(L) 10.1(A) 9.6(A) 9.7(A) 10.4(A)   IV Iron Dose - - - - - - - -   TSAT % 38 25 - - - - 21   Ferritin ng/mL 927 986 - - - - 580       BP Readings from Last 3 Encounters:   10/12/18 153/80   18 (!) 167/103   18 154/80     Wt Readings from Last 2 Encounters:   10/17/18 240 lb (108.9 kg)   10/12/18 240 lb (108.9 kg)       ASSESSMENT:  Hgb:Above goal - recommend hold dose  TSat: not at goal of >30% Ferritin: At goal (>100ng/mL) IV iron not indicated at this time, ferritin >500ng/ml        PLAN:  Hold Procrit and RTC for hgb then procrit if needed in 1 week(s)    Orders needed to be renewed (for next follow-up date) in LETTERS - for external labs: None    Iron labs due:  2018    Plan discussed with:  Rory  Plan provided by:      NEXT FOLLOW-UP DATE:  10/26/2018    Anemia Management Service  Mirza LozanoD and Michelle Ramírez CPhT  Phone: [phone]  Fax: [phone]

## 2018-10-26 NOTE — TELEPHONE ENCOUNTER
Follow-up with anemia management service:    Kajal states Rory decided not to go in for Hgb, but she states he has looked a little paler, did not sleep well last night, and stated he was too tired to go to the clinic.  She will encourage him to get in at least for a Hgb check today.  Kajal also states Rory is close to starting dialysis.    Rory called back, states was up most of the night, did agree to go in for Hgb today, schedule Procrit if needed. DENISE    Anemia Latest Ref Rng & Units 2018 2018 2018 2018 10/5/2018 10/12/2018 10/19/2018   MARY LOU Dose - 20,000 units - - - 20,000 units 20,000 units -   Hemoglobin 13.3 - 17.7 g/dL 9.4(A) 10.7(A) 10.5(L) 10.1(A) 9.6(A) 9.7(A) 10.4(A)   IV Iron Dose - - - - - - - -   TSAT % 38 25 - - - - 21   Ferritin ng/mL 927 986 - - - - 580       Orders needed to be renewed (for next follow-up date) in LETTERS - for external labs: None   Med order expires: 2019   Lab orders : 2019    Follow-up call date: 10/29/2018        Anemia Management Service  Grecia Carroll,PharmD and Michelle Ramírez CPhT  Phone: [phone]  Fax: [phone]

## 2018-10-29 NOTE — ED TRIAGE NOTES
Pt is a transfer from [place] for a hematoma over his right fistula site. He is here for a specialized ultrasound of the fistula. This was placed approximately 1 month ago. He denies tenderness at this site

## 2018-10-29 NOTE — ED AVS SNAPSHOT
[facility], Emergency Department    [address] [address]    Phone:  [phone]                                       Rory Bustamante   MRN: [id number]    Department:  [facility], Emergency Department   Date of Visit:  10/29/2018           Patient Information     Date Of Birth          1955        Your diagnoses for this visit were:     Complication of arteriovenous dialysis fistula, initial encounter     Ecchymoses, spontaneous     Hematoma of arm, right, initial encounter        You were seen by Flavio Allen MD.        Discharge Instructions       Please go to your planned appointments this morning. Please make an appointment to follow up with Your Primary Care Provider in a few days.     Return to the ED if you are having worsening symptoms, or any other urgent/life-threatening concerns.       Your next 10 appointments already scheduled     Oct 29, 2018  9:00 AM CDT   MR ABDOMEN MRCP W/O & W CONTRAST with UUMR1   [facility], MRI ([facility], [facility])    [address] [address] [phone]           How do I prepare for my exam? (Food and drink instructions) Do not eat or drink for 6 hours prior to exam. **If you will be receiving sedation or general anesthesia, please see special notes below.**  How do I prepare for my exam? (Other instructions) Take your medicines as usual, unless your doctor tells you not to. You may or may not receive IV contrast for this exam pending the discretion of the Radiologist.  **If you will be receiving sedation or general anesthesia, please see special notes below.**  What should I wear: The MRI machine uses a strong magnet. Please wear clothes without metal (snaps, zippers). A sweatsuit works well, or we may give you a hospital gown. Please remove any body piercings and hair extensions before you arrive. You will also remove watches, jewelry, hairpins, wallets, dentures,  partial dental plates and hearing aids. You may wear contact lenses, and you may be able to wear your rings. We have a safe place to keep your personal items, but it is safer to leave them at home.  How long does the exam take: Most tests take 30 to 60 minutes.  HOWEVER, IF YOUR DOCTOR PRESCRIBES ANESTHESIA please plan on spending four to five hours in the recovery room.  What should I bring: Bring a list of your current medicines to your exam (including vitamins, minerals and over-the-counter drugs). Also bring the results of similar scans you may have had.  Do I need a : **If you will be receiving sedation or general anesthesia, please see special notes below.**  What should I do after the exam: No Restrictions, You may resume normal activities.  What is this test: MRI (magnetic resonance imaging) uses a strong magnet and radio waves to look inside the body. An MRA (magnetic resonance angiogram) does the same thing, but it lets us look at your blood vessels. A computer turns the radio waves into pictures showing cross sections of the body, much like slices of bread. This helps us see any problems more clearly. You may receive fluid (called  contrast ) before or during your scan. The fluid helps us see the pictures better. We give the fluid through an IV (small needle in your arm).  Who should I call with questions: If you have any questions, please contact your Imaging Department exam site. Directions, parking instructions, and other information is available on our website, [email or web address].            Oct 29, 2018 10:40 AM CDT   (Arrive by 10:25 AM)   New Patient Visit with Shade Manning MD   [facility] ([facility])    [address]  [address]  [place] [address] [phone]            Nov 05, 2018   Procedure with Meagan Story MD   [facility], [place], Same Day Surgery (--)    [address] [address] [phone]             Nov 12, 2018  1:30 PM CST   (Arrive by 1:15 PM)   FISTULA FOLLOW UP with Sydney Dawkins MD   [facility] Solid Organ Transplant ([facility])    [address]  [address]  [place] [address] [phone]            Nov 16, 2018 10:00 AM CST   (Arrive by 9:45 AM)   Post-Op with Meagan Story MD   [facility]rology and Inst for Prostate and Urologic Cancers ([facility])    [address]  [address]th Floor  [place] [address] [phone]              24 Hour Appointment Hotline       To make an appointment at any [facility], call [phone] ([phone]). If you don't have a family doctor or clinic, we will help you find one. [facility] clinics are conveniently located to serve the needs of you and your family.             Review of your medicines      Our records show that you are taking the medicines listed below. If these are incorrect, please call your family doctor or clinic.        Dose / Directions Last dose taken    allopurinol 100 MG tablet   Commonly known as:  ZYLOPRIM   Dose:  200 mg   Quantity:  180 tablet        Take 2 tablets by mouth daily.   Refills:  3        ALPHAGAN OP   Dose:  1 drop        Place 1 drop into the right eye 2 times daily   Refills:  0        BABY ASPIRIN PO   Dose:  81 mg        Take 81 mg by mouth daily (with lunch)   Refills:  0        BEVACizumab 400 MG/16ML injection   Commonly known as:  AVASTIN        Every 5 weeks   Refills:  0        CRESTOR PO   Dose:  40 mg        Take 40 mg by mouth every evening   Refills:  0        * cycloSPORINE modified 25 MG capsule   Commonly known as:  GENERIC EQUIVALENT   Dose:  50 mg   Quantity:  180 capsule        Take 2 capsules (50 mg) by mouth 2 times daily Total dose 150 mg twice daily   Refills:  3        * cycloSPORINE modified 100 MG capsule   Commonly known as:  GENERIC EQUIVALENT   Dose:  100 mg   Quantity:  180 capsule        Take 1  capsule (100 mg) by mouth 2 times daily Total dose 150 mg twice daily   Refills:  3        epoetin freya 02760 UNIT/ML injection   Commonly known as:  PROCRIT   Dose:  56750 Units   Quantity:  4 mL        Inject 1 mL (20,000 Units) Subcutaneous once a week As needed for hgb < 10   Refills:  5        fenofibrate 145 MG tablet   Dose:  145 mg        Take 145 mg by mouth every morning   Refills:  0        ISOSORBIDE MONONITRATE PO   Dose:  60 mg        Take 60 mg by mouth every morning   Refills:  0        loperamide 2 MG capsule   Commonly known as:  IMODIUM   Dose:  2 mg        Take 2 mg by mouth daily   Refills:  0        losartan 50 MG tablet   Commonly known as:  COZAAR   Dose:  25 mg   Quantity:  90 tablet        Take 0.5 tablets (25 mg) by mouth daily   Refills:  3        metoprolol tartrate 100 MG tablet   Commonly known as:  LOPRESSOR   Dose:  100 mg   Quantity:  60 tablet        Take 1 tablet by mouth 2 times daily.   Refills:  6        NIFEdipine ER osmotic 60 MG Tb24   Commonly known as:  PROCARDIA XL   Dose:  60 mg   Quantity:  90 tablet        Take 1 tablet (60 mg) by mouth daily   Refills:  3        NITROSTAT 0.4 MG sublingual tablet   Dose:  0.4 mg   Generic drug:  nitroGLYcerin        Place 0.4 mg under the tongue every 5 minutes as needed.   Refills:  0        NONFORMULARY   Dose:  1 tablet        Take 1 tablet by mouth 2 times daily Focus Macula Pro:  Eye vitamin and mineral supplement A, C, E, Zinc, Copper   Refills:  0        omega-3 acid ethyl esters 1 g capsule   Commonly known as:  Lovaza   Dose:  2 g        Take 2 g by mouth 2 times daily   Refills:  0        PANTOPRAZOLE SODIUM PO   Dose:  40 mg        Take 40 mg by mouth daily as needed for heartburn   Refills:  0        predniSONE 5 MG tablet   Commonly known as:  DELTASONE   Dose:  5 mg        Take 5 mg by mouth daily (with lunch)   Refills:  0        probenecid 500 MG tablet   Commonly known as:  BENEMID   Dose:  500 mg        Take 500 mg  by mouth 2 times daily.   Refills:  0        sulfamethoxazole-trimethoprim 400-80 MG per tablet   Commonly known as:  BACTRIM/SEPTRA   Dose:  1 tablet        Take 1 tablet by mouth every other day   Refills:  0        tamsulosin 0.4 MG capsule   Commonly known as:  FLOMAX   Dose:  0.4 mg   Quantity:  60 capsule        Take 1 capsule (0.4 mg) by mouth daily   Refills:  1        TYLENOL ARTHRITIS PAIN 650 MG CR tablet   Dose:  2 tablet   Generic drug:  acetaminophen        Take 2 tablets by mouth 3 times daily.   Refills:  0        warfarin 5 MG tablet   Commonly known as:  COUMADIN   Dose:  5 mg   Quantity:  30 tablet        Take 1 tablet (5 mg) by mouth daily   Refills:  1        * Notice:  This list has 2 medication(s) that are the same as other medications prescribed for you. Read the directions carefully, and ask your doctor or other care provider to review them with you.            Procedures and tests performed during your visit     Procedure/Test Number of Times Performed    CBC with platelets differential 1    Comprehensive metabolic panel 2    INR 1    Procalcitonin 1    US Ext Arterial Venous Dialys Acs Graft 1    Vascular Access Adult IP Consult 1      Orders Needing Specimen Collection     None      Pending Results     Date and Time Order Name Status Description    10/29/2018 0506 Procalcitonin In process     10/29/2018 0229 US Ext Arterial Venous Dialys Acs Graft Preliminary             Pending Culture Results     No orders found from 10/27/2018 to 10/30/2018.            Pending Results Instructions     If you had any lab results that were not finalized at the time of your Discharge, you can call the ED Lab Result RN at [phone]. You will be contacted by this team for any positive Lab results or changes in treatment. The nurses are available 7 days a week from 10A to 6:30P.  You can leave a message 24 hours per day and they will return your call.        Thank you for choosing [facility]       Thank you  for choosing [facility] for your care. Our goal is always to provide you with excellent care. Hearing back from our patients is one way we can continue to improve our services. Please take a few minutes to complete the written survey that you may receive in the mail after you visit with us. Thank you!        [facility] Information     [employer] gives you secure access to your electronic health record. If you see a primary care provider, you can also send messages to your care team and make appointments. If you have questions, please call your primary care clinic.  If you do not have a primary care provider, please call [phone] and they will assist you.        Care EveryWhere ID     This is your Care EveryWhere ID. This could be used by other organizations to access your [facility] medical records  [id number]        Equal Access to Services     AZEEM JACOBSEN : Agatha Doss, vero montoya, ricardo dixon, devin perez. So [facility] [phone].    ATENCIÓN: Si habla español, tiene a carr disposición servicios gratuitos de asistencia lingüística. Llame al [phone].    We comply with applicable federal civil rights laws and [place] laws. We do not discriminate on the basis of race, color, national origin, age, disability, sex, sexual orientation, or gender identity.            After Visit Summary       This is your record. Keep this with you and show to your community pharmacist(s) and doctor(s) at your next visit.

## 2018-10-29 NOTE — ED AVS SNAPSHOT
[facility], [place], Emergency Department    [address] [place] [address]    Phone:  [phone]                                       Rory Bustamante   MRN: [id number]    Department:  [facility], Emergency Department   Date of Visit:  10/29/2018           After Visit Summary Signature Page     I have received my discharge instructions, and my questions have been answered. I have discussed any challenges I see with this plan with the nurse or doctor.    ..........................................................................................................................................  Patient/Patient Representative Signature      ..........................................................................................................................................  Patient Representative Print Name and Relationship to Patient    ..................................................               ................................................  Date                                   Time    ..........................................................................................................................................  Reviewed by Signature/Title    ...................................................              ..............................................  Date                                               Time          22EPIC Rev 08/18

## 2018-10-29 NOTE — NURSING NOTE
"Oncology Rooming Note    October 29, 2018 11:06 AM   Rory Bustamante is a 62 year old male who presents for:    Chief Complaint   Patient presents with     Oncology Clinic Visit     New patient; Pancreatic Cyst     Initial Vitals: BP (!) 154/96  Pulse 74  Temp 98  F (36.7  C) (Oral)  Resp 16  Ht 1.854 m (6' 1\")  Wt 106.6 kg (235 lb)  SpO2 97%  BMI 31 kg/m2 Estimated body mass index is 31 kg/(m^2) as calculated from the following:    Height as of this encounter: 1.854 m (6' 1\").    Weight as of this encounter: 106.6 kg (235 lb). Body surface area is 2.34 meters squared.  No Pain (0) Comment: Data Unavailable   No LMP for male patient.  Allergies reviewed: Yes  Medications reviewed: Yes    Medications: Medication refills not needed today.  Pharmacy name entered into [facility]: [facility] - [address]    Clinical concerns: Pancreatic cyst     6 minutes for nursing intake (face to face time)     [facility]              "

## 2018-10-29 NOTE — PROGRESS NOTES
SUBJECTIVE:  The patient is a 62-year-old white male who I was asked to see in consultation at the request of Dr. Shaun Diaz for evaluation of cystic lesions in the pancreas.      HISTORY OF PRESENT ILLNESS:  The patient was admitted to [facility] in [place] in February of this year with acute pancreatitis.  He had upper abdominal pain with a lipase level of 1300.  Imaging at that time included a CT scan which was interpreted as being consistent with edematous pancreatitis.  There was mention of a 15x15 mm cystic lesion in the posterior midbody.  Subsequently, a followup CT on 02/15 mentioned a 2.6 cm lobulated cyst in the body, which was felt to connect to the main pancreatic duct.  The patient underwent endoscopic ultrasound examination by Dr. Flores on 02/28/2018.  At that time 2 cysts were seen.  One of these had a mucin globule visualized sonographically within it.  None of the cysts had visible solid components and there was no dilation of the main pancreatic duct.  The largest cyst measured 22x20 mm in diameter and the smallest 15x9 mm.  Needle aspiration was performed.  One of the cysts returned with an elevated CEA level of 8914.  Cytology also returned showing visible mucus with mucinous epithelium.  At that time, he was referred to see Dr. Shaun Diaz at our institution.  A decision was made to not pursue surgical intervention, but to follow him conservatively, I believe based upon his overall comorbidity.      The patient now reports that he had a recurrent episode of acute pancreatitis in June of this year while recovering from a back surgery.  He has a significant history of hypertriglyceridemia and his triglyceride level was 883 at the time of his recurrent episode.  It appears that his lipid lowering agents were adjusted at that time, although it is very difficult to confirm this with certainty.  In reviewing the chart, it appears that he was previously on Fenofibrate 145 mg daily, Lovaza 2  grams twice a day and rosuvastatin 20 mg once a day.  His current medication list states he is on 40 mg of rosuvastatin.  He thinks he has had a triglyceride level checked since that time, but we do not have documentation of this.      The patient had no evidence of gallstone disease at his endoscopic ultrasound examination.  He denies alcohol use.  He is currently feeling well without abdominal pain.      PAST MEDICAL HISTORY:  Significant for chronic kidney disease.  He had a renal transplant membranoproliferative glomerular disease in 1989.  However, this is functioning poorly at present.  He recently had a fistula placed in his forearm and is being reevaluated for possible repeat transplant.  He has history of coronary artery disease with a cardiac stent.  He has atrial fibrillation on chronic anticoagulation, peripheral neuropathy, hypertriglyceridemia, hypertension, gout, glaucoma, benign prostatic hypertrophy, and arthritis.      PAST SURGICAL HISTORY:  Significant for recent lumbar laminectomy and a dialysis fistula in the right forearm.  Total knee arthroplasty and tonsillectomy.      ALLERGIES:  IV contrast, pravastatin and simvastatin.      MEDICATIONS:  Reviewed and documented in [facility].      SOCIAL HISTORY:  The patient denies alcohol use.  He was never a smoker.      FAMILY HISTORY:  Reviewed and documented in [facility].      REVIEW OF SYSTEMS:  See history of present illness and patient self-reported inventory.      PHYSICAL EXAMINATION:   VITAL SIGNS:  Documented in Epic.   GENERAL:  The patient is awake, alert, in no acute distress.   HEENT:  Shows no evidence of scleral icterus.  The sclera on his right eye have significant conjunctival hematoma.  He states that this is related to a recent injection and he has been followed up by his ophthalmologist since the bleeding.   LUNGS:  Clear to auscultation.   CARDIAC:  Regular rate and rhythm without murmur, gallop or rub.   ABDOMEN:  Soft and benign.  There is  a palpable transplant kidney in the right lower quadrant which is mildly tender.  Bowel sounds are normoactive.  There is no rebound, guarding or rigidity.  There is no hepatosplenomegaly or upper abdominal tenderness.   SKIN:  Shows numerous skin tags and skin lesions suggestive of warts.      RADIOLOGIC DATA:  MRI with MRCP was performed earlier today and was personally reviewed.  By my review, this shows a stable appearing cystic lesion in the pancreatic body measuring 26x17 mm in diameter.  Additional smaller cysts are seen in the pancreatic tail.  There is no dilation of the main pancreatic duct.  I do not see any solid components within any of these cysts.  Formal report is still pending.      IMPRESSION AND PLAN:   1.  Intraductal papillary mucinous neoplasm of the pancreas.  He had positive needle aspiration, cytology and elevated CEA confirming this diagnosis.  He does have presence of a worrisome feature given the association with relapsing acute pancreatitis.  He has been seen previously by our surgical team and felt not to be a candidate for surgery.  I will discuss this with surgery and ask that this case be presented formally at our Multidisciplinary Pancreaticobiliary Conference.  Typically I would consider relapsing acute pancreatitis indication for surgical resection; however, I suspect that this was deferred based on his underlying kidney disease.  I would like to know whether he would ever be considered a candidate for surgery.  In any event, I do not see any features on the current MRCP which would preclude proceeding with planned transplant.   Presuming he will not be undergoing distal pancreatectomy, my recommendation will be for endoscopic ultrasound examination in 6 months.  This is based on published consensus guidelines.  I would like to see him earlier if he has a recurrent episode of acute pancreatitis.   It is also possible that his recurrent pancreatitis is related to his  hypertriglyceridemia.  It appears that his medications have been adjusted, but this is very difficult to clarify.  He also states that he triglyceride levels checked within the last few weeks.  We will try to obtain these test results and if this is not the case ask for a fasting lipid profile to be performed.   2.  Loose stools.  He has loose stools which he takes Imodium for on a regular basis.  I would like to check for pancreatic exocrine insufficiency with a fecal elastase, he also feels that this might have been done in the recent past and we will try to track down these results from [place] and if not available repeat a fecal elastase to see if he needs enzyme supplements.      I will see him in followup at the time of his endoscopic ultrasound.  A tentative plan will be for an MRI at 1 year from today if the ultrasound exam is stable.

## 2018-10-29 NOTE — ED NOTES
Bed: ED09  Expected date:   Expected time:   Means of arrival:   Comments:  Rory Bustamante  1 mo ago Fistula made here, today discoloration and swelling.  10 cm diameter raised and boggy, US techs there not experienced with dialysis   Anticoagulated with warfarin, INR 3.0.   [place]

## 2018-10-29 NOTE — PATIENT INSTRUCTIONS
You will find a brief summary of your discussion and care plan from today's visit below.  Please review this information with your primary care provider.  ______________________________________________________________________    As discussed today by Dr. Manning, we will plan the following:     Plan for follow up endoscopic ultrasound in 6 months in the operating room with Dr. Manning.  Please contact me about one month prior to the date that you want to have this done.  You will need to see your primary care MD for a preoperative history and physical prior to this procedure.     ______________________________________________________________________    It was a pleasure seeing you in clinic today - please contact us if there are any further questions about upcoming appointments that arise following today's visit.  During business hours, you may reach the Clinic Coordinator at [phone].  For urgent/emergent questions after business hours, you may reach the on-call GI Fellow by contacting the [facility]  at [phone].    Any benign/non-urgent test results are usually communicated via letter or [employer] message within 1-2 weeks after completion.  Urgent results (those that require a change in the previously-discussed care plan) are usually communicated via a phone call once available from our clinic staff to discuss the results and the next steps in your evaluation.    I recommend signing up for [employer]t access if you have not already done so and are comfortable with using a computer.  This allows for online access to your lab results and also helps you communicate efficiently with the clinic should any questions arise in your care.    My role as your RN care coordinator is to assist with any additional questions or concerns regarding your diagnosis and plan of care and to be your advocate.  I am happy to be part of your care team at the [facility].      Sincerely,      Jacki SAGASTUME  Jaren  BSN, HNBC, STAR-T  RN Care Coordinator  Ph: [phone]  FAX: [phone]

## 2018-10-29 NOTE — LETTER
10/29/2018       RE: Rory Bustamante  [address]  [place] [address]     Dear Colleague,    Thank you for referring your patient, Rory Bustamante, to the [facility] at [facility]. Please see a copy of my visit note below.    SUBJECTIVE:  The patient is a 62-year-old white male who I was asked to see in consultation at the request of Dr. Shaun Diaz for evaluation of cystic lesions in the pancreas.      HISTORY OF PRESENT ILLNESS:  The patient was admitted to [facility] in [place] in February of this year with acute pancreatitis.  He had upper abdominal pain with a lipase level of 1300.  Imaging at that time included a CT scan which was interpreted as being consistent with edematous pancreatitis.  There was mention of a 15x15 mm cystic lesion in the posterior midbody.  Subsequently, a followup CT on 02/15 mentioned a 2.6 cm lobulated cyst in the body, which was felt to connect to the main pancreatic duct.  The patient underwent endoscopic ultrasound examination by Dr. Flores on 02/28/2018.  At that time 2 cysts were seen.  One of these had a mucin globule visualized sonographically within it.  None of the cysts had visible solid components and there was no dilation of the main pancreatic duct.  The largest cyst measured 22x20 mm in diameter and the smallest 15x9 mm.  Needle aspiration was performed.  One of the cysts returned with an elevated CEA level of 8914.  Cytology also returned showing visible mucus with mucinous epithelium.  At that time, he was referred to see Dr. Shaun Diaz at our institution.  A decision was made to not pursue surgical intervention, but to follow him conservatively, I believe based upon his overall comorbidity.      The patient now reports that he had a recurrent episode of acute pancreatitis in June of this year while recovering from a back surgery.  He has a significant history of hypertriglyceridemia and his  triglyceride level was 883 at the time of his recurrent episode.  It appears that his lipid lowering agents were adjusted at that time, although it is very difficult to confirm this with certainty.  In reviewing the chart, it appears that he was previously on Fenofibrate 145 mg daily, Lovaza 2 grams twice a day and rosuvastatin 20 mg once a day.  His current medication list states he is on 40 mg of rosuvastatin.  He thinks he has had a triglyceride level checked since that time, but we do not have documentation of this.      The patient had no evidence of gallstone disease at his endoscopic ultrasound examination.  He denies alcohol use.  He is currently feeling well without abdominal pain.      PAST MEDICAL HISTORY:  Significant for chronic kidney disease.  He had a renal transplant membranoproliferative glomerular disease in 1989.  However, this is functioning poorly at present.  He recently had a fistula placed in his forearm and is being reevaluated for possible repeat transplant.  He has history of coronary artery disease with a cardiac stent.  He has atrial fibrillation on chronic anticoagulation, peripheral neuropathy, hypertriglyceridemia, hypertension, gout, glaucoma, benign prostatic hypertrophy, and arthritis.      PAST SURGICAL HISTORY:  Significant for recent lumbar laminectomy and a dialysis fistula in the right forearm.  Total knee arthroplasty and tonsillectomy.      ALLERGIES:  IV contrast, pravastatin and simvastatin.      MEDICATIONS:  Reviewed and documented in [facility].      SOCIAL HISTORY:  The patient denies alcohol use.  He was never a smoker.      FAMILY HISTORY:  Reviewed and documented in [facility].      REVIEW OF SYSTEMS:  See history of present illness and patient self-reported inventory.      PHYSICAL EXAMINATION:   VITAL SIGNS:  Documented in Epic.   GENERAL:  The patient is awake, alert, in no acute distress.   HEENT:  Shows no evidence of scleral icterus.  The sclera on his right eye have  significant conjunctival hematoma.  He states that this is related to a recent injection and he has been followed up by his ophthalmologist since the bleeding.   LUNGS:  Clear to auscultation.   CARDIAC:  Regular rate and rhythm without murmur, gallop or rub.   ABDOMEN:  Soft and benign.  There is a palpable transplant kidney in the right lower quadrant which is mildly tender.  Bowel sounds are normoactive.  There is no rebound, guarding or rigidity.  There is no hepatosplenomegaly or upper abdominal tenderness.   SKIN:  Shows numerous skin tags and skin lesions suggestive of warts.      RADIOLOGIC DATA:  MRI with MRCP was performed earlier today and was personally reviewed.  By my review, this shows a stable appearing cystic lesion in the pancreatic body measuring 26x17 mm in diameter.  Additional smaller cysts are seen in the pancreatic tail.  There is no dilation of the main pancreatic duct.  I do not see any solid components within any of these cysts.  Formal report is still pending.      IMPRESSION AND PLAN:   1.  Intraductal papillary mucinous neoplasm of the pancreas.  He had positive needle aspiration, cytology and elevated CEA confirming this diagnosis.  He does have presence of a worrisome feature given the association with relapsing acute pancreatitis.  He has been seen previously by our surgical team and felt not to be a candidate for surgery.  I will discuss this with surgery and ask that this case be presented formally at our Multidisciplinary Pancreaticobiliary Conference.  Typically I would consider relapsing acute pancreatitis indication for surgical resection; however, I suspect that this was deferred based on his underlying kidney disease.  I would like to know whether he would ever be considered a candidate for surgery.  In any event, I do not see any features on the current MRCP which would preclude proceeding with planned transplant.   Presuming he will not be undergoing distal pancreatectomy,  my recommendation will be for endoscopic ultrasound examination in 6 months.  This is based on published consensus guidelines.  I would like to see him earlier if he has a recurrent episode of acute pancreatitis.   It is also possible that his recurrent pancreatitis is related to his hypertriglyceridemia.  It appears that his medications have been adjusted, but this is very difficult to clarify.  He also states that he triglyceride levels checked within the last few weeks.  We will try to obtain these test results and if this is not the case ask for a fasting lipid profile to be performed.   2.  Loose stools.  He has loose stools which he takes Imodium for on a regular basis.  I would like to check for pancreatic exocrine insufficiency with a fecal elastase, he also feels that this might have been done in the recent past and we will try to track down these results from [place] and if not available repeat a fecal elastase to see if he needs enzyme supplements.      I will see him in followup at the time of his endoscopic ultrasound.  A tentative plan will be for an MRI at 1 year from today if the ultrasound exam is stable.       Again, thank you for allowing me to participate in the care of your patient.      Sincerely,    Shade Manning MD

## 2018-10-29 NOTE — MR AVS SNAPSHOT
After Visit Summary   10/29/2018    Rory Bustamante    MRN: [id number]           Patient Information     Date Of Birth          1955        Visit Information        Provider Department      10/29/2018 10:40 AM Shade Manning MD [facility] [place]        Care Instructions    You will find a brief summary of your discussion and care plan from today's visit below.  Please review this information with your primary care provider.  ______________________________________________________________________    As discussed today by Dr. Manning, we will plan the following:     Plan for follow up endoscopic ultrasound in 6 months in the operating room with Dr. Manning.  Please contact me about one month prior to the date that you want to have this done.  You will need to see your primary care MD for a preoperative history and physical prior to this procedure.     ______________________________________________________________________    It was a pleasure seeing you in clinic today - please contact us if there are any further questions about upcoming appointments that arise following today's visit.  During business hours, you may reach the Clinic Coordinator at [phone].  For urgent/emergent questions after business hours, you may reach the on-call GI Fellow by contacting the [facility]  at [phone].    Any benign/non-urgent test results are usually communicated via letter or MyChart message within 1-2 weeks after completion.  Urgent results (those that require a change in the previously-discussed care plan) are usually communicated via a phone call once available from our clinic staff to discuss the results and the next steps in your evaluation.    I recommend signing up for [employer] access if you have not already done so and are comfortable with using a computer.  This allows for online access to your lab results and also helps you communicate efficiently with the  clinic should any questions arise in your care.    My role as your RN care coordinator is to assist with any additional questions or concerns regarding your diagnosis and plan of care and to be your advocate.  I am happy to be part of your care team at the [facility].      Sincerely,      Jacki Eastman  BSN, HNBC, STAR-T  RN Care Coordinator  Ph: [phone]  FAX: [phone]                Follow-ups after your visit        Your next 10 appointments already scheduled     Nov 05, 2018   Procedure with Meagan Story MD   [facility] [place], Same Day Surgery (--)    [address] [address] [phone]            Nov 12, 2018  1:30 PM CST   (Arrive by 1:15 PM)   FISTULA FOLLOW UP with Sydney Dawkins MD   [facility] Solid Organ Transplant ([facility])    [address]  [address]  [facility] [place] [id number] [phone]            Nov 16, 2018 10:00 AM CST   (Arrive by 9:45 AM)   Post-Op with Meagan Story MD   [facility] Urology and Inst for Prostate and Urologic Cancers ([facility])    [address]  [address]  [place] [facility] [place] [address] [phone]              Future tests that were ordered for you today     Open Future Orders        Priority Expected Expires Ordered    MR Abdomen w/o Contrast Routine  4/16/2019 4/16/2018            Who to contact     If you have questions or need follow up information about today's clinic visit or your schedule please contact [facility] directly at [phone].  Normal or non-critical lab and imaging results will be communicated to you by MyChart, letter or phone within 4 business days after the clinic has received the results. If you do not hear from us within 7 days, please contact the clinic through MyChart or phone. If you have a critical or abnormal lab result, we will notify you by phone as soon as possible.  Submit refill requests through  "[employer] or call your pharmacy and they will forward the refill request to us. Please allow 3 business days for your refill to be completed.          Additional Information About Your Visit        W[employer] Information     [employer] gives you secure access to your electronic health record. If you see a primary care provider, you can also send messages to your care team and make appointments. If you have questions, please call your primary care clinic.  If you do not have a primary care provider, please call [phone] and they will assist you.        Care EveryWhere ID     This is your Care EveryWhere ID. This could be used by other organizations to access your [facility] medical records  [id number]        Your Vitals Were     Pulse Temperature Respirations Height Pulse Oximetry BMI (Body Mass Index)    74 98  F (36.7  C) (Oral) 16 1.854 m (6' 1\") 97% 31 kg/m2       Blood Pressure from Last 3 Encounters:   10/29/18 (!) 154/96   10/29/18 (!) 154/96   10/12/18 153/80    Weight from Last 3 Encounters:   10/29/18 106.6 kg (235 lb)   10/17/18 108.9 kg (240 lb)   10/12/18 108.9 kg (240 lb)              Today, you had the following     No orders found for display         Today's Medication Changes          These changes are accurate as of 10/29/18 11:53 AM.  If you have any questions, ask your nurse or doctor.               These medicines have changed or have updated prescriptions.        Dose/Directions    allopurinol 100 MG tablet   Commonly known as:  ZYLOPRIM   This may have changed:    - how much to take  - when to take this   Used for:  Kidney replaced by transplant, Gout        Dose:  200 mg   Take 2 tablets by mouth daily.   Quantity:  180 tablet   Refills:  3       * cycloSPORINE modified 25 MG capsule   Commonly known as:  GENERIC EQUIVALENT   This may have changed:    - how much to take  - additional instructions   Used for:  Kidney transplant recipient, Long-term use of immunosuppressant medication        Dose:  " 50 mg   Take 2 capsules (50 mg) by mouth 2 times daily Total dose 150 mg twice daily   Quantity:  180 capsule   Refills:  3       * cycloSPORINE modified 100 MG capsule   Commonly known as:  GENERIC EQUIVALENT   This may have changed:  additional instructions   Used for:  Kidney transplant recipient, Long-term use of immunosuppressant medication        Dose:  100 mg   Take 1 capsule (100 mg) by mouth 2 times daily Total dose 150 mg twice daily   Quantity:  180 capsule   Refills:  3       losartan 50 MG tablet   Commonly known as:  COZAAR   This may have changed:  when to take this   Used for:  Kidney replaced by transplant        Dose:  25 mg   Take 0.5 tablets (25 mg) by mouth daily   Quantity:  90 tablet   Refills:  3       tamsulosin 0.4 MG capsule   Commonly known as:  FLOMAX   This may have changed:  when to take this   Used for:  Benign prostatic hyperplasia, unspecified whether lower urinary tract symptoms present        Dose:  0.4 mg   Take 1 capsule (0.4 mg) by mouth daily   Quantity:  60 capsule   Refills:  1       warfarin 5 MG tablet   Commonly known as:  COUMADIN   This may have changed:    - how much to take  - how to take this  - when to take this  - additional instructions   Used for:  Paroxysmal atrial fibrillation (H)        Dose:  5 mg   Take 1 tablet (5 mg) by mouth daily   Quantity:  30 tablet   Refills:  1       * Notice:  This list has 2 medication(s) that are the same as other medications prescribed for you. Read the directions carefully, and ask your doctor or other care provider to review them with you.             Primary Care Provider Office Phone # Fax #    Moris Diaz [phone]       [facility] [address]  [facility] [address]        Equal Access to Services     [facility] AH: Hadii av colono Sodennis, waaxda luqadaha, qaybta kaalmada adeegyamarsha, devin perez. So [facility] [phone].    ATENCIÓN: Lynette lovell  español, tiene a carr disposición servicios gratuitos de asistencia lingüística. Larissa boogie [phone].    We comply with applicable federal civil rights laws and [place] laws. We do not discriminate on the basis of race, color, national origin, age, disability, sex, sexual orientation, or gender identity.            Thank you!     Thank you for choosing [facility]  for your care. Our goal is always to provide you with excellent care. Hearing back from our patients is one way we can continue to improve our services. Please take a few minutes to complete the written survey that you may receive in the mail after your visit with us. Thank you!             Your Updated Medication List - Protect others around you: Learn how to safely use, store and throw away your medicines at www.disposemymeds.org.          This list is accurate as of 10/29/18 11:53 AM.  Always use your most recent med list.                   Brand Name Dispense Instructions for use Diagnosis    allopurinol 100 MG tablet    ZYLOPRIM    180 tablet    Take 2 tablets by mouth daily.    Kidney replaced by transplant, Gout       ALPHAGAN OP      Place 1 drop into the right eye 2 times daily        BABY ASPIRIN PO      Take 81 mg by mouth daily (with lunch)        BEVACizumab 400 MG/16ML injection    AVASTIN     Every 5 weeks        CRESTOR PO      Take 40 mg by mouth every evening        * cycloSPORINE modified 25 MG capsule    GENERIC EQUIVALENT    180 capsule    Take 2 capsules (50 mg) by mouth 2 times daily Total dose 150 mg twice daily    Kidney transplant recipient, Long-term use of immunosuppressant medication       * cycloSPORINE modified 100 MG capsule    GENERIC EQUIVALENT    180 capsule    Take 1 capsule (100 mg) by mouth 2 times daily Total dose 150 mg twice daily    Kidney transplant recipient, Long-term use of immunosuppressant medication       epoetin freya 68100 UNIT/ML injection    PROCRIT    4 mL    Inject 1 mL (20,000  Units) Subcutaneous once a week As needed for hgb < 10    CKD (chronic kidney disease) stage 4, GFR 15-29 ml/min (H), Anemia in stage 4 chronic kidney disease (H)       fenofibrate 145 MG tablet      Take 145 mg by mouth every morning        ISOSORBIDE MONONITRATE PO      Take 60 mg by mouth every morning        loperamide 2 MG capsule    IMODIUM     Take 2 mg by mouth daily        losartan 50 MG tablet    COZAAR    90 tablet    Take 0.5 tablets (25 mg) by mouth daily    Kidney replaced by transplant       metoprolol tartrate 100 MG tablet    LOPRESSOR    60 tablet    Take 1 tablet by mouth 2 times daily.    Unspecified hypertensive kidney disease with chronic kidney disease stage I through stage IV, or unspecified(403.90), Kidney replaced by transplant       NIFEdipine ER osmotic 60 MG Tb24    PROCARDIA XL    90 tablet    Take 1 tablet (60 mg) by mouth daily    HTN (hypertension)       NITROSTAT 0.4 MG sublingual tablet   Generic drug:  nitroGLYcerin      Place 0.4 mg under the tongue every 5 minutes as needed.        NONFORMULARY      Take 1 tablet by mouth 2 times daily Focus Macula Pro:  Eye vitamin and mineral supplement A, C, E, Zinc, Copper        omega-3 acid ethyl esters 1 g capsule    Lovaza     Take 2 g by mouth 2 times daily        PANTOPRAZOLE SODIUM PO      Take 40 mg by mouth daily as needed for heartburn        predniSONE 5 MG tablet    DELTASONE     Take 5 mg by mouth daily (with lunch)        probenecid 500 MG tablet    BENEMID     Take 500 mg by mouth 2 times daily.        sulfamethoxazole-trimethoprim 400-80 MG per tablet    BACTRIM/SEPTRA     Take 1 tablet by mouth every other day        tamsulosin 0.4 MG capsule    FLOMAX    60 capsule    Take 1 capsule (0.4 mg) by mouth daily    Benign prostatic hyperplasia, unspecified whether lower urinary tract symptoms present       TYLENOL ARTHRITIS PAIN 650 MG CR tablet   Generic drug:  acetaminophen      Take 2 tablets by mouth 3 times daily.         warfarin 5 MG tablet    COUMADIN    30 tablet    Take 1 tablet (5 mg) by mouth daily    Paroxysmal atrial fibrillation (H)       * Notice:  This list has 2 medication(s) that are the same as other medications prescribed for you. Read the directions carefully, and ask your doctor or other care provider to review them with you.

## 2018-10-29 NOTE — ED PROVIDER NOTES
History     Chief Complaint   Patient presents with     Vascular Access Problem     HPI  Rory Bustamante is a 62 year old male with PMH notable for ESRD s/p LRRT transplant in 1989, right AC fistula placement 1 month ago who presents to the ED with fistula redness and swelling. Patient presents as transfer from [facility], signout received from Dr. Abdullahi. This morning, patient noted mild swelling and redness surrounding the fistula site. Redness and swelling increased throughout the day. No pain in the area. No fevers. Patient feeling otherwise in his usual state of health. Redness area was outlined at the outside hospital. That facility did not have capability for doppler fistula ultrasound, hence transfer here.     I have reviewed the Medications, Allergies, Past Medical and Surgical History, and Social History in the Epic system.    Review of Systems  A complete review of systems was performed with pertinent positives and negatives noted in the HPI, and all other systems negative.     Physical Exam   BP: (!) 156/101  Pulse: 74  Temp: 97.6  F (36.4  C)  SpO2: 96 %      Physical Exam  BP (!) 156/101  Pulse 74  Temp 97.6  F (36.4  C) (Oral)  SpO2 96%  General: no acute distress. Appears stated age.   HENT: MMM, no oropharyngeal lesions  Eyes: PERRL, normal sclerae  Cardio: regular rate. Regular rhythm. Extremities well perfused  Resp: Normal work of breathing, clear breath sounds  Abdomen: no tenderness, non-distended, no rebound, no guarding  Neuro: alert and fully oriented. CN II-XII grossly intact. Grossly normal strength and sensation in all extremities.   MSK: no deformities.   Integumentary/Skin: Right AC: fistula with palpable thrill, ~10 cm diameter area of purple/red skin discoloration centering around fistula, non-tender. Other areas of the body with no rash visualized, normal color  Psych: normal affect, normal behavior    ED Course     ED Course     Procedures        Critical Care time:  none        Labs Ordered and Resulted from Time of ED Arrival Up to the Time of Departure from the ED   CBC WITH PLATELETS DIFFERENTIAL - Abnormal; Notable for the following:        Result Value    RBC Count 3.06 (*)     Hemoglobin 10.7 (*)     Hematocrit 33.3 (*)      (*)     MCH 35.0 (*)     Platelet Count 104 (*)     All other components within normal limits   INR - Abnormal; Notable for the following:     INR 2.60 (*)     All other components within normal limits   COMPREHENSIVE METABOLIC PANEL - Abnormal; Notable for the following:     Urea Nitrogen 72 (*)     Creatinine 3.92 (*)     GFR Estimate 16 (*)     GFR Estimate If Black 19 (*)     All other components within normal limits   COMPREHENSIVE METABOLIC PANEL   PROCALCITONIN            Assessments & Plan (with Medical Decision Making)   Patient presenting with ecchymosis over the site of his right arm AV fistula that was created 1 month ago. Vitals in the ED unremarkable. US of the area demonstrated a 5-6 cm hematoma overlying the fistula with intact flow within the fistula, no visualized ongoing bleeding. Erythema has not progressed outside the marks made at the outside hospital. Hgb stable. Electrolytes wnl. Cr chronically elevated. INR therapeutic. Discussed the case with Dr. Amaya of transplant, who recommended no further testing or intervention.     The complete clinical picture is most consistent with hematoma from right AV maturing fistula. After counseling on the diagnosis, work-up, and treatment plan, the patient was discharged. He will go from here to his appointments upstairs this morning. He is also to f/u with his PCP in a few days. The patient was advised to return to the ED if worsening symptoms, or if there are any urgent/life-threatening concerns.       Clinical Impression:  Spontaneous hematoma in right AC near maturing fistula   Right AC ecchymosis       Flavio Allen MD  Emergency Medicine     I have reviewed the nursing  notes.    I have reviewed the findings, diagnosis, plan and need for follow up with the patient.    New Prescriptions    No medications on file       Final diagnoses:   Complication of arteriovenous dialysis fistula, initial encounter   Ecchymoses, spontaneous   Hematoma of arm, right, initial encounter       10/29/2018   [facility], [place], EMERGENCY DEPARTMENT     Flavio Allen MD  10/29/18 [id number]

## 2018-10-29 NOTE — DISCHARGE INSTRUCTIONS
Please go to your planned appointments this morning. Please make an appointment to follow up with Your Primary Care Provider in a few days.     Return to the ED if you are having worsening symptoms, or any other urgent/life-threatening concerns.

## 2018-10-30 NOTE — TELEPHONE ENCOUNTER
Received recent triglyceride level from [facility].    Was still 632 in setting of prior severe hypertriglyceridemia and possibly two episodes of pancreatitis related to this.    Jacki - please arrange for consultation with Dr. Rider in Cardiology. Ind = hypertriglyceridemia in setting of two episodes acute pancreatitis.    I have left a message with his primary care provider to call me to discuss (currently out of town).    Called pt and he is aware of plan.    OTTONIEL Manning MD  Associate Professor of Medicine  Division of Gastroenterology, Hepatology and Nutrition  [facility]      .

## 2018-11-02 NOTE — MR AVS SNAPSHOT
After Visit Summary   11/2/2018    Rory Bustamante    MRN: [id number]           Patient Information     Date Of Birth          1955        Visit Information        Provider Department      11/2/2018 1:00 PM Jose Car APRN CNS [facility] Solid Organ Transplant         Follow-ups after your visit        Your next 10 appointments already scheduled     Nov 05, 2018   Procedure with Meagan Story MD   [facility], [place], Same Day Surgery (--)    [address]  [facility] [address]   [phone]            Nov 05, 2018  3:30 PM CST   (Arrive by 3:15 PM)   FISTULA FOLLOW UP with Sydney Dawkins MD   [facility] Solid [facility] Transplant ([facility])    [address]  [address]  [facility] [id number] [phone]            Nov 12, 2018  1:30 PM CST   (Arrive by 1:15 PM)   FISTULA FOLLOW UP with Sydney Dawkins MD   [facility]plant ([facility])    [address] [place] [address]  [address]  [facility] [id number] [phone]            Nov 16, 2018 10:00 AM CST   (Arrive by 9:45 AM)   Post-Op with Meagan Story MD   [facility] Prostate [facility] Urologic Cancers ([facility])    [address] [place] [address]  4th Floor  [facility] [id number] [phone]            Nov 26, 2018  5:30 PM CST   (Arrive by 5:15 PM)   New Patient Visit with Elroy Rider MD   [facility] ([facility])    [address] [place] [address]  [address]  [facility] [id number] [phone]              Who to contact     If you have questions or need follow up information about today's clinic visit or your schedule please contact [facility] SOLID ORGAN TRANSPLANT directly at [phone].  Normal or non-critical lab and imaging results will be communicated to you by MyChart, letter or phone within 4 business days after the clinic has received the results. If you do not hear from us  "within 7 days, please contact the clinic through [facility] or phone. If you have a critical or abnormal lab result, we will notify you by phone as soon as possible.  Submit refill requests through [facility] or call your pharmacy and they will forward the refill request to us. Please allow 3 business days for your refill to be completed.          Additional Information About Your Visit        [employer] Information     [employer] gives you secure access to your electronic health record. If you see a primary care provider, you can also send messages to your care team and make appointments. If you have questions, please call your primary care clinic.  If you do not have a primary care provider, please call [phone] and they will assist you.        Care EveryWhere ID     This is your Care EveryWhere ID. This could be used by other organizations to access your [facility] medical records  [id number]        Your Vitals Were     Pulse Height Pulse Oximetry BMI (Body Mass Index)          96 1.854 m (6' 1\") 98% 31.16 kg/m2         Blood Pressure from Last 3 Encounters:   11/02/18 152/78   10/29/18 (!) 154/96   10/29/18 (!) 154/96    Weight from Last 3 Encounters:   11/02/18 107.1 kg (236 lb 3.2 oz)   10/29/18 106.6 kg (235 lb)   10/17/18 108.9 kg (240 lb)              Today, you had the following     No orders found for display         Today's Medication Changes          These changes are accurate as of 11/2/18  1:20 PM.  If you have any questions, ask your nurse or doctor.               These medicines have changed or have updated prescriptions.        Dose/Directions    allopurinol 100 MG tablet   Commonly known as:  ZYLOPRIM   This may have changed:    - how much to take  - when to take this   Used for:  Kidney replaced by transplant, Gout        Dose:  200 mg   Take 2 tablets by mouth daily.   Quantity:  180 tablet   Refills:  3       * cycloSPORINE modified 25 MG capsule   Commonly known as:  GENERIC EQUIVALENT   This may have " changed:    - how much to take  - additional instructions   Used for:  Kidney transplant recipient, Long-term use of immunosuppressant medication        Dose:  50 mg   Take 2 capsules (50 mg) by mouth 2 times daily Total dose 150 mg twice daily   Quantity:  180 capsule   Refills:  3       * cycloSPORINE modified 100 MG capsule   Commonly known as:  GENERIC EQUIVALENT   This may have changed:  additional instructions   Used for:  Kidney transplant recipient, Long-term use of immunosuppressant medication        Dose:  100 mg   Take 1 capsule (100 mg) by mouth 2 times daily Total dose 150 mg twice daily   Quantity:  180 capsule   Refills:  3       losartan 50 MG tablet   Commonly known as:  COZAAR   This may have changed:  when to take this   Used for:  Kidney replaced by transplant        Dose:  25 mg   Take 0.5 tablets (25 mg) by mouth daily   Quantity:  90 tablet   Refills:  3       tamsulosin 0.4 MG capsule   Commonly known as:  FLOMAX   This may have changed:  when to take this   Used for:  Benign prostatic hyperplasia, unspecified whether lower urinary tract symptoms present        Dose:  0.4 mg   Take 1 capsule (0.4 mg) by mouth daily   Quantity:  60 capsule   Refills:  1       * Notice:  This list has 2 medication(s) that are the same as other medications prescribed for you. Read the directions carefully, and ask your doctor or other care provider to review them with you.             Primary Care Provider Office Phone # Fax #    Moris Diaz [phone]       [facility] [address]  [facility] [address]        Equal Access to Services     FIDELIA JACOBSEN : Hadii av Doss, waaxda luqadaha, qaybta kaaldevin bustos adevirginia perez. So [facility] [phone].    ATENCIÓN: Si habla español, tiene a carr disposición servicios gratuitos de asistencia lingüística. Larissa al [phone].    We comply with applicable federal civil rights laws and  [place] laws. We do not discriminate on the basis of race, color, national origin, age, disability, sex, sexual orientation, or gender identity.            Thank you!     Thank you for choosing [facility] SOLID ORGAN TRANSPLANT  for your care. Our goal is always to provide you with excellent care. Hearing back from our patients is one way we can continue to improve our services. Please take a few minutes to complete the written survey that you may receive in the mail after your visit with us. Thank you!             Your Updated Medication List - Protect others around you: Learn how to safely use, store and throw away your medicines at www.disposemymeds.org.          This list is accurate as of 11/2/18  1:20 PM.  Always use your most recent med list.                   Brand Name Dispense Instructions for use Diagnosis    allopurinol 100 MG tablet    ZYLOPRIM    180 tablet    Take 2 tablets by mouth daily.    Kidney replaced by transplant, Gout       ALPHAGAN OP      Place 1 drop into the right eye 2 times daily        BABY ASPIRIN PO      Take 81 mg by mouth daily (with lunch)        BEVACizumab 400 MG/16ML injection    AVASTIN     Every 5 weeks        CRESTOR PO      Take 40 mg by mouth every evening        * cycloSPORINE modified 25 MG capsule    GENERIC EQUIVALENT    180 capsule    Take 2 capsules (50 mg) by mouth 2 times daily Total dose 150 mg twice daily    Kidney transplant recipient, Long-term use of immunosuppressant medication       * cycloSPORINE modified 100 MG capsule    GENERIC EQUIVALENT    180 capsule    Take 1 capsule (100 mg) by mouth 2 times daily Total dose 150 mg twice daily    Kidney transplant recipient, Long-term use of immunosuppressant medication       epoetin freya 71790 UNIT/ML injection    PROCRIT    4 mL    Inject 1 mL (20,000 Units) Subcutaneous once a week As needed for hgb < 10    CKD (chronic kidney disease) stage 4, GFR 15-29 ml/min (H), Anemia in stage 4 chronic kidney disease (H)        fenofibrate 145 MG tablet      Take 145 mg by mouth every morning        ISOSORBIDE MONONITRATE PO      Take 60 mg by mouth every morning        loperamide 2 MG capsule    IMODIUM     Take 2 mg by mouth daily        losartan 50 MG tablet    COZAAR    90 tablet    Take 0.5 tablets (25 mg) by mouth daily    Kidney replaced by transplant       metoprolol tartrate 100 MG tablet    LOPRESSOR    60 tablet    Take 1 tablet by mouth 2 times daily.    Unspecified hypertensive kidney disease with chronic kidney disease stage I through stage IV, or unspecified(403.90), Kidney replaced by transplant       NIFEdipine ER osmotic 60 MG Tb24    PROCARDIA XL    90 tablet    Take 1 tablet (60 mg) by mouth daily    HTN (hypertension)       NITROSTAT 0.4 MG sublingual tablet   Generic drug:  nitroGLYcerin      Place 0.4 mg under the tongue every 5 minutes as needed.        NONFORMULARY      Take 1 tablet by mouth 2 times daily Focus Macula Pro:  Eye vitamin and mineral supplement A, C, E, Zinc, Copper        omega-3 acid ethyl esters 1 g capsule    Lovaza     Take 2 g by mouth 2 times daily        PANTOPRAZOLE SODIUM PO      Take 40 mg by mouth daily as needed for heartburn        predniSONE 5 MG tablet    DELTASONE     Take 5 mg by mouth daily (with lunch)        probenecid 500 MG tablet    BENEMID     Take 500 mg by mouth 2 times daily.        sulfamethoxazole-trimethoprim 400-80 MG per tablet    BACTRIM/SEPTRA     Take 1 tablet by mouth every other day        tamsulosin 0.4 MG capsule    FLOMAX    60 capsule    Take 1 capsule (0.4 mg) by mouth daily    Benign prostatic hyperplasia, unspecified whether lower urinary tract symptoms present       TYLENOL ARTHRITIS PAIN 650 MG CR tablet   Generic drug:  acetaminophen      Take 2 tablets by mouth 3 times daily.        warfarin 5 MG tablet    COUMADIN    30 tablet    Take 1 tablet (5 mg) by mouth daily    Paroxysmal atrial fibrillation (H)       * Notice:  This list has 2  medication(s) that are the same as other medications prescribed for you. Read the directions carefully, and ask your doctor or other care provider to review them with you.

## 2018-11-02 NOTE — LETTER
11/2/2018       RE: Rory Bustamante  [address]  [facility] [address]     Dear Colleague,    Thank you for referring your patient, Rory Bustamante, to the [facility] SOLID ORGAN TRANSPLANT at [facility]. Please see a copy of my visit note below.    Dialysis Access Service   Progress Note    S:  Mr. Bustamante is being seen today for surgical followup of his dialysis access.  He reports no issues with the wound, and  a little steal syndrome of the distal extremity. He reports noted mild swelling and redness surrounding the fistula site. Redness and swelling increased throughout the day and went to [facility] and then transferred to [facility] for further evaluation on 10/29/2018. RUE US of AV fistula was obtained and showed: Spontaneous hematoma in right AC near maturing fistula. Right AC ecchymosis. No pain in the area. No fevers. He is on Coumadin, INR is within therapeutic range from 2.6 to 3.0 on 10/29/2018. Patient discharged home without treatments. He wrapped right elbow around fluid collection area as instructed, the size of fluid collection area has no change, but bruises is getting larger. Right forearm swelling subsided. C/O mild numbness in right hand when band elbow, and muscle pulling from right upper arm interior aspect. S/P Arteriovenous Fistula creation, right brachial artery to cephalic vein. on 9/25/2018.      O:  Pulse:  [96] 96  BP: (152)/(78) 152/78  SpO2:  [98 %] 98 %  GENERAL: alert, cooperative  Circulation:   Radial pulse 3+  Ulnar pulse  3+   Capillary refill:  capillary refill < 2 sec    Sensory exam:   arm: Normal   [x]           Abnormal   []          Comment:    hand: Normal   [x]           Abnormal   []          Comment:   Motor exam:   arm: Normal   [x]           Abnormal   []          Comment:    hand: Normal   [x]           Abnormal   []          Comment:    Access: R upper extremity wound(s) healed. non-tender. capillary refill < 3 sec ++ thrill and  bruit via handheld doppler present. A moderate size of fluid collection area at AC fossa incision, with ecchymosis across from distal upper arm to proximal forearm. Fluid collection area is not warm nor skin breaks down noted. Left forearm has a moderate size of petechiae area in the interior aspect of left forearm.       RUE US of AV fistula on 10/29/2018 showed:  Adjacent to the arteriovenous fistula is an ill-defined heterogeneous acute chronic collection measuring approximately 5.7 x 10.1 x 6.8 cm    Impression:  1. Moderate Ill-defined perifistula soft tissue hematoma.  2. Patent Doppler evaluation of the maturing right brachiocephalic arteriovenous fistula. Peak anastomotic velocity is 256 cm/s.  3. Normal distal arterial waveforms.    Assessment & Plan: Mr. Velez dialysis access has matured well at this time point.    1. No need to wrap right forearm up to elbow area with ACE bandage  2. Check thrill twice a day  3. Elevate right arm with support as tolerated  5. F/U with Dr. Dawkins on 11/5/2018     We would like to see the patient back in the clinic in 3 days to assess progress. The patient was counselled to contact our nurse coordinator, ELYSE Moscoso CNS (Sum) at [phone] with any questions or concerns.  Thank you for the opportunity to participate in Mr. Bustamante's care.    TT: 15 min  CT: 15 min    COURTNEY Prather (Sum)  Dialysis access program   [facility]  Pager # [phone]    Again, thank you for allowing me to participate in the care of your patient.      Sincerely,    ELYSE Alfred

## 2018-11-02 NOTE — LETTER
11/2/2018       RE: Rory Bustamante  [address]  [facility] [address]     Dear Colleague,    Thank you for referring your patient, Rory Bustamante, to the [facility] SOLID ORGAN TRANSPLANT at [facility]. Please see a copy of my visit note below.    Dialysis Access Service   Progress Note    S:  Mr. Bustamante is being seen today for surgical followup of his dialysis access.  He reports no issues with the wound, and  a little steal syndrome of the distal extremity. He reports noted mild swelling and redness surrounding the fistula site. Redness and swelling increased throughout the day and went to [facility] and then transferred to [facility] for further evaluation on 10/29/2018. RUE US of AV fistula was obtained and showed: Spontaneous hematoma in right AC near maturing fistula. Right AC ecchymosis. No pain in the area. No fevers. He is on Coumadin, INR is within therapeutic range from 2.6 to 3.0 on 10/29/2018. Patient discharged home without treatments. He wrapped right elbow around fluid collection area as instructed, the size of fluid collection area has no change, but bruises is getting larger. Right forearm swelling subsided. C/O mild numbness in right hand when band elbow, and muscle pulling from right upper arm interior aspect. S/P Arteriovenous Fistula creation, right brachial artery to cephalic vein. on 9/25/2018.      O:  Pulse:  [96] 96  BP: (152)/(78) 152/78  SpO2:  [98 %] 98 %  GENERAL: alert, cooperative  Circulation:   Radial pulse 3+  Ulnar pulse  3+   Capillary refill:  capillary refill < 2 sec    Sensory exam:   arm: Normal   [x]           Abnormal   []          Comment:    hand: Normal   [x]           Abnormal   []          Comment:   Motor exam:   arm: Normal   [x]           Abnormal   []          Comment:    hand: Normal   [x]           Abnormal   []          Comment:    Access: R upper extremity wound(s) healed. non-tender. capillary refill < 3 sec ++ thrill and  bruit via handheld doppler present. A moderate size of fluid collection area at AC fossa incision, with ecchymosis across from distal upper arm to proximal forearm. Fluid collection area is not warm nor skin breaks down noted. Left forearm has a moderate size of petechiae area in the interior aspect of left forearm.       RUE US of AV fistula on 10/29/2018 showed:  Adjacent to the arteriovenous fistula is an ill-defined heterogeneous acute chronic collection measuring approximately 5.7 x 10.1 x 6.8 cm    Impression:  1. Moderate Ill-defined perifistula soft tissue hematoma.  2. Patent Doppler evaluation of the maturing right brachiocephalic arteriovenous fistula. Peak anastomotic velocity is 256 cm/s.  3. Normal distal arterial waveforms.    Assessment & Plan: Mr. Velez dialysis access has matured well at this time point.    1. No need to wrap right forearm up to elbow area with ACE bandage  2. Check thrill twice a day  3. Elevate right arm with support as tolerated  5. F/U with Dr. Dawkins on 11/5/2018     We would like to see the patient back in the clinic in 3 days to assess progress. The patient was counselled to contact our nurse coordinator, ELYSE Moscoso CNS (Sum) at [phone] with any questions or concerns.  Thank you for the opportunity to participate in Mr. Bustamante's care.    TT: 15 min  CT: 15 min    COURTNEY Prather (Sum)  Dialysis access program   [facility]  Pager # [phone]

## 2018-11-02 NOTE — NURSING NOTE
"Chief Complaint   Patient presents with     RECHECK     fistula      Blood pressure 152/78, pulse 96, height 1.854 m (6' 1\"), weight 107.1 kg (236 lb 3.2 oz), SpO2 98 %.    FLORINA WASHINGTON CMA    "

## 2018-11-02 NOTE — LETTER
11/2/2018      RE: Rory Bustamante  [address]  [place] [address]       Dialysis Access Service   Progress Note    S:  Mr. Bustamante is being seen today for surgical followup of his dialysis access.  He reports no issues with the wound, and  a little steal syndrome of the distal extremity. He reports noted mild swelling and redness surrounding the fistula site. Redness and swelling increased throughout the day and went to [facility] and then transferred to [facility] for further evaluation on 10/29/2018. RUE US of AV fistula was obtained and showed: Spontaneous hematoma in right AC near maturing fistula. Right AC ecchymosis. No pain in the area. No fevers. He is on Coumadin, INR is within therapeutic range from 2.6 to 3.0 on 10/29/2018. Patient discharged home without treatments. He wrapped right elbow around fluid collection area as instructed, the size of fluid collection area has no change, but bruises is getting larger. Right forearm swelling subsided. C/O mild numbness in right hand when band elbow, and muscle pulling from right upper arm interior aspect. S/P Arteriovenous Fistula creation, right brachial artery to cephalic vein. on 9/25/2018.      O:  Pulse:  [96] 96  BP: (152)/(78) 152/78  SpO2:  [98 %] 98 %  GENERAL: alert, cooperative  Circulation:   Radial pulse 3+  Ulnar pulse  3+   Capillary refill:  capillary refill < 2 sec    Sensory exam:   arm: Normal   [x]           Abnormal   []          Comment:    hand: Normal   [x]           Abnormal   []          Comment:   Motor exam:   arm: Normal   [x]           Abnormal   []          Comment:    hand: Normal   [x]           Abnormal   []          Comment:    Access: R upper extremity wound(s) healed. non-tender. capillary refill < 3 sec ++ thrill and bruit via handheld doppler present. A moderate size of fluid collection area at AC fossa incision, with ecchymosis across from distal upper arm to proximal forearm. Fluid collection area is not warm nor  skin breaks down noted. Left forearm has a moderate size of petechiae area in the interior aspect of left forearm.       RUE US of AV fistula on 10/29/2018 showed:  Adjacent to the arteriovenous fistula is an ill-defined heterogeneous acute chronic collection measuring approximately 5.7 x 10.1 x 6.8 cm    Impression:  1. Moderate Ill-defined perifistula soft tissue hematoma.  2. Patent Doppler evaluation of the maturing right brachiocephalic arteriovenous fistula. Peak anastomotic velocity is 256 cm/s.  3. Normal distal arterial waveforms.    Assessment & Plan: Mr. Velez dialysis access has matured well at this time point.    1. No need to wrap right forearm up to elbow area with ACE bandage  2. Check thrill twice a day  3. Elevate right arm with support as tolerated  5. F/U with Dr. Dawkins on 11/5/2018     We would like to see the patient back in the clinic in 3 days to assess progress. The patient was counselled to contact our nurse coordinator, ELYSE Moscoso CNS (Sum) at [phone] with any questions or concerns.  Thank you for the opportunity to participate in Mr. Velez care.    TT: 15 min  CT: 15 min    COURTNEY Prather (Sum)  Dialysis access program   [facility]  Pager # [phone]

## 2018-11-05 NOTE — ANESTHESIA PREPROCEDURE EVALUATION
Anesthesia Pre-Procedure Evaluation    Patient: Rory Bustamante   MRN:     [id number] Gender:   male   Age:    62 year old :      1955        Preoperative Diagnosis: Bladder Stone    Procedure(s):  Cystsocopy, Cystolithalopaxy Of Bladder Stone With Holmium Laser     Past Medical History:   Diagnosis Date     Anemia      Arrhythmia     atrial fib     Arthritis      BPH (benign prostatic hyperplasia)      CAD (coronary artery disease) 2011    MI in 2011, stent placed, on plavix and aspirin     ESRD (end stage renal disease) (H)     tx, renal insufficiency     Glaucoma      Gout     Uric acid as high as 11 previously, now on allopurinol and probenecid     HTN (hypertension)      Hypercholesteremia      Hypertriglyceridemia      Intraocular bleeding     previously treated wt Avastin     Macular degeneration      Neuropathy     Limited sensation bilateral knees to feet     Obese     BMI 31     Paroxysmal A-fib (H)      Presence of stent in coronary artery 2012     Proteinuria 2015    recurrent MPGN on biopsy     S/P kidney transplant     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  HLA identical transplant.  Biopsy in 2008 with recurrent MPGN type 2, mild interstitial fibrosis and tubular atrophy, CNI toxicity     Spinal stenosis      Warts       Past Surgical History:   Procedure Laterality Date     C TOTAL KNEE ARTHROPLASTY       CREATE FISTULA ARTERIOVENOUS UPPER EXTREMITY Right 2018    Procedure: CREATE FISTULA ARTERIOVENOUS UPPER EXTREMITY;  Create Right Brachial Arteriovenous Fistula ;  Surgeon: Sydney Dawkins MD;  Location: [facility]     LAMINECTOMY LUMBAR MINIMALLY INVASIVE THREE+ LEVELS N/A 2018    Procedure: LAMINECTOMY LUMBAR MINIMALLY INVASIVE THREE+ LEVELS;  Minimally Invasive Surgery Laminectomies Lumbar 2-3,Lumbar 3-4,Lumbar 4-5;  Surgeon: Harshal Rucker MD;  Location: [facility]     TONSILLECTOMY       TRANSPLANT KIDNEY RECIPIENT LIVING  RELATED  1989    HLA identical     VASECTOMY            Anesthesia Evaluation     .             ROS/MED HX    ENT/Pulmonary:  - neg pulmonary ROS     Neurologic:  - neg neurologic ROS     Cardiovascular:     (+) hypertension--CAD, --stent,2012  2 Drug Eluting Stent .. : . . . :. dysrhythmias a-fib and a-flutter, . Previous cardiac testing Echodate:6/14/18results:Left ventricular function, chamber size, wall motion, and wall thickness are  normal.The EF is 55-60%.  Right ventricular function, chamber size, wall motion, and thickness are  normal.  No significant valvular abnormalities are noted.  Dilation of the inferior vena cava is present with abnormal respiratory  variation in diameter. Estimated mean right atrial pressure is 15 mmHg  (significantly elevated).date: results: date: results: date: results:          METS/Exercise Tolerance:     Hematologic: Comments: On warfarin          Musculoskeletal:         GI/Hepatic:     (+) GERD Asymptomatic on medication,       Renal/Genitourinary:     (+) chronic renal disease, type: ESRD, Pt requires dialysis, type: Hemodialysis, Pt has history of transplant, date: 1989,       Endo:     (+) Chronic steroid usage for Post Transplant Immunosuppression Obesity, .      Psychiatric:         Infectious Disease:         Malignancy:         Other:                         PHYSICAL EXAM:   Mental Status/Neuro: A/A/O   Airway: Facies: Feasible  Mallampati: I  Mouth/Opening: Full  TM distance: > 6 cm  Neck ROM: Full   Respiratory: Auscultation: CTAB     Resp. Rate: Normal     Resp. Effort: Normal      CV: Rhythm: Regular  Rate: Age appropriate  Heart: Normal Sounds   Comments:      Dental: Normal                  Lab Results   Component Value Date    WBC 5.1 10/29/2018    HGB 10.7 (L) 10/29/2018    HCT 33.3 (L) 10/29/2018     (L) 10/29/2018     10/29/2018    POTASSIUM 4.9 10/29/2018    CHLORIDE 101 10/29/2018    CO2 22 10/29/2018    BUN 72 (H) 10/29/2018    CR 3.92 (H)  "10/29/2018    GLC 92 10/29/2018    TRISHA 9.3 10/29/2018    PHOS 3.7 07/17/2018    MAG 2.5 (H) 06/14/2018    ALBUMIN 3.8 10/29/2018    PROTTOTAL 7.7 10/29/2018    ALT 24 10/29/2018    AST 32 10/29/2018    ALKPHOS 55 10/29/2018    BILITOTAL 0.4 10/29/2018    LIPASE 2413 (H) 06/13/2018    PTT 26 09/25/2018    INR 2.60 (H) 10/29/2018    TSH 2.66 06/17/2018       Preop Vitals  BP Readings from Last 3 Encounters:   11/02/18 152/78   10/29/18 (!) 154/96   10/29/18 (!) 154/96    Pulse Readings from Last 3 Encounters:   11/02/18 96   10/29/18 74   10/29/18 74      Resp Readings from Last 3 Encounters:   10/29/18 16   10/29/18 16   09/25/18 16    SpO2 Readings from Last 3 Encounters:   11/02/18 98%   10/29/18 97%   10/29/18 97%      Temp Readings from Last 1 Encounters:   10/29/18 36.7  C (98  F) (Oral)    Ht Readings from Last 1 Encounters:   11/02/18 1.854 m (6' 1\")      Wt Readings from Last 1 Encounters:   11/02/18 107.1 kg (236 lb 3.2 oz)    Estimated body mass index is 31.16 kg/(m^2) as calculated from the following:    Height as of 11/2/18: 1.854 m (6' 1\").    Weight as of 11/2/18: 107.1 kg (236 lb 3.2 oz).     LDA:  Hemodialysis Vascular Access Arteriovenous fistula Right Arm (Active)   Site Assessment WDL;Bruit present;Thrill present 9/25/2018  3:45 PM   Dressing Status Not applicable 9/25/2018  2:14 PM   Number of days:41            Assessment:   ASA SCORE: 3       Documentation: H&P complete; Preop Testing complete; Consents complete   Proceeding: Proceed without further delay  Tobacco Use:  NO Active use of Tobacco/UNKNOWN Tobacco use status     Plan:   Anes. Type:  General   Pre-Induction: Midazolam IV   Induction:  IV (Standard)   Airway: Oral ETT   Access/Monitoring: PIV   Maintenance: Balanced   Emergence: Procedure Site   Logistics: Same Day Surgery     Postop Pain/Sedation Strategy:  Standard-Options: Opioids PRN     PONV Management:  Adult Risk Factors:, Non-Smoker, Postop Opioids  Prevention: Ondansetron "     CONSENT: Direct conversation   Plan and risks discussed with: Patient                            Alfredo Gerard MD

## 2018-11-05 NOTE — IP AVS SNAPSHOT
MRN:[id number]                      After Visit Summary   11/5/2018    Rory Bustamante    MRN: [id number]           Thank you!     Thank you for choosing [facility] for your care. Our goal is always to provide you with excellent care. Hearing back from our patients is one way we can continue to improve our services. Please take a few minutes to complete the written survey that you may receive in the mail after you visit with us. Thank you!        Patient Information     Date Of Birth          1955        About your hospital stay     You were admitted on:  November 5, 2018 You last received care in the:  Post Anesthesia Care Unit [facility]    You were discharged on:  November 5, 2018       Who to Call     For medical emergencies, please call 911.  For non-urgent questions about your medical care, please call your primary care provider or clinic, [phone]  For questions related to your surgery, please call your surgery clinic        Attending Provider     Provider Meagan Castillo MD Urology       Primary Care Provider Office Phone # Fax #    Morsi Diaz [phone]      After Care Instructions     Discharge Instructions       Please do not take your coumadin until 3 days from your surgery to minimize bleeding risk.     What to expect post-operatively  - Some hematuria (pink urine) with a few clots is expected for about 1 week from your procedure. Consider calling the numbers below if this persists past 1 week or if the hematuria progresses to significant blood and or clots.   - Some dysuria (pain with urination) and bladder discomfort/spasms are also expected post-operatively    Activity  - Do not strain with bowel movements.  - Do not drive until you can press the brake pedal quickly and fully without pain.   - Do not operate a motor vehicle while taking narcotic pain medications. .    Medications    - Transition from narcotic pain medications to  "tylenol (acetaminophen) as you are able.  Wean yourself off all pain medications as you are able.  - Some pain medications contain both tylenol (acetaminophen) and a narcotic (Norco, vicodin, percocet), do not take more than 4,000mg of Tylenol (acetaminophen) from all sources in any 24 hour period.  - Narcotics can make you constipated.  Take over the counter fiber (metamucil or benefiber) and stool softeners (miralax, docusate or senna) while taking narcotic pain medications, but stop if you develop diarrhea.  - No driving or operating machinery while taking narcotic pain medications     Follow-Up:  - Follow up with Dr. Story as scheduled in two weeks to review your urodynamic study and come up with a plan moving forward    - Call your primary care provider to touch base regarding your recent procedure.    - Call or return sooner than your regularly scheduled visit if you develop any of the following: fever (greater than 101.5), uncontrolled pain, uncontrolled nausea or vomiting, as well as increased redness, swelling, or drainage from your wound.     Phone numbers:   - Monday through Friday 8am to 4:30pm: Call [phone] with questions or to schedule or confirm appointment.    - Nights or weekends: call the after hours emergency pager - [phone] and tell the  \"I would like to page the Urology Resident on call.\"  - For emergencies, call [phone]                  Your next 10 appointments already scheduled     Nov 12, 2018  1:30 PM CST   (Arrive by 1:15 PM)   FISTULA FOLLOW UP with Sydney Dawkins MD   [facility]plant ([facility])    [address]  [address]  [facility] [address]   [phone]            Nov 16, 2018 10:00 AM CST   (Arrive by 9:45 AM)   Post-Op with Meagan Story MD   [facility]state [facility] Urologic Cancers ([facility])    [address] [place] [address]  4th Floor  [facility] " [id number]   [phone]            2018  5:30 PM CST   (Arrive by 5:15 PM)   New Patient Visit with Elroy Rider MD   [facility] ([facility])    [address]  [address] [place] [id number] [phone]              Further instructions from your care team       Take it easy when you get home.  Remember, same day surgery DOES NOT MEAN SAME DAY RECOVERY!  Healing is a gradual process.  You will need some time to recover - you may be more tired than you realize at first.  Rest and relax for at least the first 24 hours at home.  You'll feel better and heal faster if you take good care of yourself.    [facility] [place]  Same-Day Surgery   Adult Discharge Orders & Instructions     For 24 hours after surgery    1. Get plenty of rest.  A responsible adult must stay with you for at least 24 hours after you leave the hospital.   2. Do not drive or use heavy equipment.  If you have weakness or tingling, don't drive or use heavy equipment until this feeling goes away.  3. Do not drink alcohol.  4. Avoid strenuous or risky activities.  Ask for help when climbing stairs.   5. You may feel lightheaded.  IF so, sit for a few minutes before standing.  Have someone help you get up.   6. If you have nausea (feel sick to your stomach): Drink only clear liquids such as apple juice, ginger ale, broth or 7-Up.  Rest may also help.  Be sure to drink enough fluids.  Move to a regular diet as you feel able.  7. You may have a slight fever. Call the doctor if your fever is over 100 F (37.7 C) (taken under the tongue) or lasts longer than 24 hours.  8. You may have a dry mouth, a sore throat, muscle aches or trouble sleeping.  These should go away after 24 hours.  9. Do not make important or legal decisions.   Call your doctor for any of the followin.  Signs of infection (fever, growing tenderness at the surgery site, a large amount of  "drainage or bleeding, severe pain, foul-smelling drainage, redness, swelling).    2. It has been over 8 to 10 hours since surgery and you are still not able to urinate (pass water).    3.  Headache for over 24 hours.    To contact a doctor, call Dr. Story's  office at [phone] or:        [phone] and ask for the resident on call for          Urology (answered 24 hours a day) at night or on the weekend.       Emergency Department:    [facility]: [phone]       (TTY for hearing impaired: [phone])          Pending Results     Date and Time Order Name Status Description    11/5/2018 1745 Stone analysis In process     11/5/2018 1333 EKG 12-lead, tracing only Preliminary             Statement of Approval     Ordered          11/05/18 1812  I have reviewed and agree with all the recommendations and orders detailed in this document.  EFFECTIVE NOW     Approved and electronically signed by:  Naina Austin MD             Admission Information     Date & Time Provider Department Dept. Phone    11/5/2018 Meagan Story MD Post Anesthesia Care Unit [facility] [phone]      Your Vitals Were     Blood Pressure Pulse Temperature Respirations Height Weight    163/98 (Cuff Size: Adult Large) 76 97.6  F (36.4  C) (Oral) 20 1.854 m (6' 1\") 108.5 kg (239 lb 3.2 oz)    Pulse Oximetry BMI (Body Mass Index)                94% 31.56 kg/m2          MyChart Information     [employer] gives you secure access to your electronic health record. If you see a primary care provider, you can also send messages to your care team and make appointments. If you have questions, please call your primary care clinic.  If you do not have a primary care provider, please call [phone] and they will assist you.        Care EveryWhere ID     This is your Care EveryWhere ID. This could be used by other organizations to access your [place] medical records  [id number]        Equal Access to Services     [facility] " GAAR : Hadii aad ku martha Doss, waaxda luqadaha, qaybta karenettada waltershola, waxserge arsalan hayterell buenogriseliseo barriga . So [facility] [phone].    ATENCIÓN: Si habla español, tiene a carr disposición servicios gratuitos de asistencia lingüística. Llame al [phone].    We comply with applicable federal civil rights laws and [place] laws. We do not discriminate on the basis of race, color, national origin, age, disability, sex, sexual orientation, or gender identity.               Review of your medicines      CONTINUE these medicines which may have CHANGED, or have new prescriptions. If we are uncertain of the size of tablets/capsules you have at home, strength may be listed as something that might have changed.        Dose / Directions    allopurinol 100 MG tablet   Commonly known as:  ZYLOPRIM   This may have changed:    - how much to take  - when to take this   Used for:  Kidney replaced by transplant, Gout        Dose:  200 mg   Take 2 tablets by mouth daily.   Quantity:  180 tablet   Refills:  3       * cycloSPORINE modified 25 MG capsule   Commonly known as:  GENERIC EQUIVALENT   This may have changed:    - how much to take  - additional instructions   Used for:  Kidney transplant recipient, Long-term use of immunosuppressant medication        Dose:  50 mg   Take 2 capsules (50 mg) by mouth 2 times daily Total dose 150 mg twice daily   Quantity:  180 capsule   Refills:  3       * cycloSPORINE modified 100 MG capsule   Commonly known as:  GENERIC EQUIVALENT   This may have changed:  additional instructions   Used for:  Kidney transplant recipient, Long-term use of immunosuppressant medication        Dose:  100 mg   Take 1 capsule (100 mg) by mouth 2 times daily Total dose 150 mg twice daily   Quantity:  180 capsule   Refills:  3       losartan 50 MG tablet   Commonly known as:  COZAAR   This may have changed:  when to take this   Used for:  Kidney replaced by transplant        Dose:  25 mg   Take 0.5  tablets (25 mg) by mouth daily   Quantity:  90 tablet   Refills:  3       tamsulosin 0.4 MG capsule   Commonly known as:  FLOMAX   This may have changed:  when to take this   Used for:  Benign prostatic hyperplasia, unspecified whether lower urinary tract symptoms present        Dose:  0.4 mg   Take 1 capsule (0.4 mg) by mouth daily   Quantity:  60 capsule   Refills:  1       * Notice:  This list has 2 medication(s) that are the same as other medications prescribed for you. Read the directions carefully, and ask your doctor or other care provider to review them with you.      CONTINUE these medicines which have NOT CHANGED        Dose / Directions    ALPHAGAN OP        Dose:  1 drop   Place 1 drop into the right eye 2 times daily   Refills:  0       BABY ASPIRIN PO        Dose:  81 mg   Take 81 mg by mouth daily (with lunch)   Refills:  0       BEVACizumab 400 MG/16ML injection   Commonly known as:  AVASTIN        Every 5 weeks   Refills:  0       CRESTOR PO        Dose:  40 mg   Take 40 mg by mouth every evening   Refills:  0       epoetin freya 88030 UNIT/ML injection   Commonly known as:  PROCRIT   Used for:  CKD (chronic kidney disease) stage 4, GFR 15-29 ml/min (H), Anemia in stage 4 chronic kidney disease (H)        Dose:  31554 Units   Inject 1 mL (20,000 Units) Subcutaneous once a week As needed for hgb < 10   Quantity:  4 mL   Refills:  5       fenofibrate 145 MG tablet        Dose:  145 mg   Take 145 mg by mouth every morning   Refills:  0       ISOSORBIDE MONONITRATE PO        Dose:  60 mg   Take 60 mg by mouth every morning   Refills:  0       loperamide 2 MG capsule   Commonly known as:  IMODIUM        Dose:  2 mg   Take 2 mg by mouth daily   Refills:  0       metoprolol tartrate 100 MG tablet   Commonly known as:  LOPRESSOR   Used for:  Unspecified hypertensive kidney disease with chronic kidney disease stage I through stage IV, or unspecified(403.90), Kidney replaced by transplant        Dose:  100 mg    Take 1 tablet by mouth 2 times daily.   Quantity:  60 tablet   Refills:  6       NIFEdipine ER osmotic 60 MG Tb24   Commonly known as:  PROCARDIA XL   Used for:  HTN (hypertension)        Dose:  60 mg   Take 1 tablet (60 mg) by mouth daily   Quantity:  90 tablet   Refills:  3       NITROSTAT 0.4 MG sublingual tablet   Generic drug:  nitroGLYcerin        Dose:  0.4 mg   Place 0.4 mg under the tongue every 5 minutes as needed.   Refills:  0       NONFORMULARY        Dose:  1 tablet   Take 1 tablet by mouth 2 times daily Focus Macula Pro:  Eye vitamin and mineral supplement A, C, E, Zinc, Copper   Refills:  0       omega-3 acid ethyl esters 1 g capsule   Commonly known as:  Lovaza        Dose:  2 g   Take 2 g by mouth 2 times daily   Refills:  0       PANTOPRAZOLE SODIUM PO        Dose:  40 mg   Take 40 mg by mouth daily as needed for heartburn   Refills:  0       predniSONE 5 MG tablet   Commonly known as:  DELTASONE        Dose:  5 mg   Take 5 mg by mouth daily (with lunch)   Refills:  0       probenecid 500 MG tablet   Commonly known as:  BENEMID        Dose:  500 mg   Take 500 mg by mouth 2 times daily.   Refills:  0       sulfamethoxazole-trimethoprim 400-80 MG per tablet   Commonly known as:  BACTRIM/SEPTRA        Dose:  1 tablet   Take 1 tablet by mouth every other day   Refills:  0       TYLENOL ARTHRITIS PAIN 650 MG CR tablet   Generic drug:  acetaminophen        Dose:  2 tablet   Take 2 tablets by mouth 3 times daily.   Refills:  0         STOP taking     warfarin 5 MG tablet   Commonly known as:  COUMADIN                    Protect others around you: Learn how to safely use, store and throw away your medicines at www.disposemymeds.org.             Medication List: This is a list of all your medications and when to take them. Check marks below indicate your daily home schedule. Keep this list as a reference.      Medications           Morning Afternoon Evening Bedtime As Needed    allopurinol 100 MG tablet    Commonly known as:  ZYLOPRIM   Take 2 tablets by mouth daily.                                ALPHAGAN OP   Place 1 drop into the right eye 2 times daily                                BABY ASPIRIN PO   Take 81 mg by mouth daily (with lunch)                                BEVACizumab 400 MG/16ML injection   Commonly known as:  AVASTIN   Every 5 weeks                                CRESTOR PO   Take 40 mg by mouth every evening                                * cycloSPORINE modified 25 MG capsule   Commonly known as:  GENERIC EQUIVALENT   Take 2 capsules (50 mg) by mouth 2 times daily Total dose 150 mg twice daily                                * cycloSPORINE modified 100 MG capsule   Commonly known as:  GENERIC EQUIVALENT   Take 1 capsule (100 mg) by mouth 2 times daily Total dose 150 mg twice daily                                epoetin freya 47248 UNIT/ML injection   Commonly known as:  PROCRIT   Inject 1 mL (20,000 Units) Subcutaneous once a week As needed for hgb < 10                                fenofibrate 145 MG tablet   Take 145 mg by mouth every morning                                ISOSORBIDE MONONITRATE PO   Take 60 mg by mouth every morning                                loperamide 2 MG capsule   Commonly known as:  IMODIUM   Take 2 mg by mouth daily                                losartan 50 MG tablet   Commonly known as:  COZAAR   Take 0.5 tablets (25 mg) by mouth daily                                metoprolol tartrate 100 MG tablet   Commonly known as:  LOPRESSOR   Take 1 tablet by mouth 2 times daily.                                NIFEdipine ER osmotic 60 MG Tb24   Commonly known as:  PROCARDIA XL   Take 1 tablet (60 mg) by mouth daily                                NITROSTAT 0.4 MG sublingual tablet   Place 0.4 mg under the tongue every 5 minutes as needed.   Generic drug:  nitroGLYcerin                                NONFORMULARY   Take 1 tablet by mouth 2 times daily Focus Macula Pro:  Eye  vitamin and mineral supplement A, C, E, Zinc, Copper                                omega-3 acid ethyl esters 1 g capsule   Commonly known as:  Lovaza   Take 2 g by mouth 2 times daily                                PANTOPRAZOLE SODIUM PO   Take 40 mg by mouth daily as needed for heartburn                                predniSONE 5 MG tablet   Commonly known as:  DELTASONE   Take 5 mg by mouth daily (with lunch)                                probenecid 500 MG tablet   Commonly known as:  BENEMID   Take 500 mg by mouth 2 times daily.                                sulfamethoxazole-trimethoprim 400-80 MG per tablet   Commonly known as:  BACTRIM/SEPTRA   Take 1 tablet by mouth every other day                                tamsulosin 0.4 MG capsule   Commonly known as:  FLOMAX   Take 1 capsule (0.4 mg) by mouth daily                                TYLENOL ARTHRITIS PAIN 650 MG CR tablet   Take 2 tablets by mouth 3 times daily.   Generic drug:  acetaminophen                                * Notice:  This list has 2 medication(s) that are the same as other medications prescribed for you. Read the directions carefully, and ask your doctor or other care provider to review them with you.

## 2018-11-05 NOTE — TELEPHONE ENCOUNTER
Follow-up with anemia management service:    Patient is in the hospital  Admitted: 18   Discharged: 18    Hgb: Above goal - recommend hold dose  TSat: not at goal (>30%) but ferritin >500ng/mL.  IV iron not indicated at this time per anemia protocol. Ferritin: At goal (>100ng/mL)     PLAN:  Hold Procrit and RTC for hgb then procrit if needed in 1 week(s)     Orders needed to be renewed (for next follow-up date) in EPIC: None     Iron labs due:  18    Anemia Latest Ref Rng & Units 2018 2018 2018 10/5/2018 10/12/2018 10/19/2018 10/29/2018   MARY LOU Dose - - - - 20,000 units 20,000 units - -   Hemoglobin 13.3 - 17.7 g/dL 10.7(A) 10.5(L) 10.1(A) 9.6(A) 9.7(A) 10.4(A) 10.7(L)   IV Iron Dose - - - - - - - -   TSAT % 25 - - - - 21 -   Ferritin ng/mL 986 - - - - 580 -         Orders needed to be renewed (for next follow-up date) in LETTERS - for external labs: None   Med order expires: 2019   Lab orders : 2019    Follow-up call date: 18    [facility]  Anemia Clinic  [phone]  Reviewed 2018 [facility]  Anemia Management Service  Grecia Carroll,PharmD and Michelle Ramírez CPhT  Phone: [phone]  Fax: [phone]

## 2018-11-06 NOTE — ANESTHESIA CARE TRANSFER NOTE
Patient: Rory Bustamante    Procedure(s):  Cystsocopy, Cystolithalopaxy Of Bladder Stone With Holmium Laser    Diagnosis: Bladder Stone   Diagnosis Additional Information: No value filed.    Anesthesia Type:   No value filed.     Note:  Airway :Nasal Cannula  Patient transferred to:PACU  Handoff Report: Identifed the Patient, Identified the Reponsible Provider, Reviewed the pertinent medical history, Discussed the surgical course, Reviewed Intra-OP anesthesia mangement and issues during anesthesia, Set expectations for post-procedure period and Allowed opportunity for questions and acknowledgement of understanding      Vitals: (Last set prior to Anesthesia Care Transfer)    CRNA VITALS  11/5/2018 1724 - 11/5/2018 1801      11/5/2018             EKG: Sinus rhythm                Electronically Signed By: ELYSE Nagy CRNA  November 5, 2018  6:01 PM

## 2018-11-06 NOTE — DISCHARGE INSTRUCTIONS
Take it easy when you get home.  Remember, same day surgery DOES NOT MEAN SAME DAY RECOVERY!  Healing is a gradual process.  You will need some time to recover - you may be more tired than you realize at first.  Rest and relax for at least the first 24 hours at home.  You'll feel better and heal faster if you take good care of yourself.    [facility], [place]  Same-Day Surgery   Adult Discharge Orders & Instructions     For 24 hours after surgery    1. Get plenty of rest.  A responsible adult must stay with you for at least 24 hours after you leave the hospital.   2. Do not drive or use heavy equipment.  If you have weakness or tingling, don't drive or use heavy equipment until this feeling goes away.  3. Do not drink alcohol.  4. Avoid strenuous or risky activities.  Ask for help when climbing stairs.   5. You may feel lightheaded.  IF so, sit for a few minutes before standing.  Have someone help you get up.   6. If you have nausea (feel sick to your stomach): Drink only clear liquids such as apple juice, ginger ale, broth or 7-Up.  Rest may also help.  Be sure to drink enough fluids.  Move to a regular diet as you feel able.  7. You may have a slight fever. Call the doctor if your fever is over 100 F (37.7 C) (taken under the tongue) or lasts longer than 24 hours.  8. You may have a dry mouth, a sore throat, muscle aches or trouble sleeping.  These should go away after 24 hours.  9. Do not make important or legal decisions.   Call your doctor for any of the followin.  Signs of infection (fever, growing tenderness at the surgery site, a large amount of drainage or bleeding, severe pain, foul-smelling drainage, redness, swelling).    2. It has been over 8 to 10 hours since surgery and you are still not able to urinate (pass water).    3.  Headache for over 24 hours.    To contact a doctor, call Dr. Story's  office at [phone] or:        [phone] and ask for the resident  on call for          Urology (answered 24 hours a day) at night or on the weekend.       Emergency Department:    [facility]: [phone]       (TTY for hearing impaired: [phone])

## 2018-11-06 NOTE — ANESTHESIA POSTPROCEDURE EVALUATION
Anesthesia POST Procedure Evaluation    Patient: Rory Bustamante   MRN:     [id number] Gender:   male   Age:    62 year old :      1955        Preoperative Diagnosis: Bladder Stone    Procedure(s):  Cystsocopy, Cystolithalopaxy Of Bladder Stone With Holmium Laser   Postop Comments: No value filed.       Anesthesia Type:  General    Reportable Event: NO     PAIN: Uncomplicated   Sign Out status: Comfortable, Well controlled pain     PONV: No PONV   Sign Out status:  No Nausea or Vomiting     Neuro/Psych: Uneventful perioperative course   Sign Out Status: Preoperative baseline; Age appropriate mentation     Airway/Resp.: Uneventful perioperative course   Sign Out Status: Non labored breathing, age appropriate RR; Resp. Status within EXPECTED Parameters     CV: Uneventful perioperative course   Sign Out status: Appropriate BP and perfusion indices; Appropriate HR/Rhythm     Disposition:   Sign Out in:  PACU  Disposition:  Phase II; Home  Recovery Course: Uneventful  Follow-Up: Not required           Last Anesthesia Record Vitals:  CRNA VITALS  2018 1724 - 2018 1824      2018             Resp Rate (observed): 16    EKG: Sinus rhythm          Last PACU/Preop Vitals:  Vitals:    18 2133 18 2145 18 2200   BP: (!) 163/97 (!) 167/100 (!) 163/98   Pulse:      Resp:  20    Temp:      SpO2: 95% 93% 94%         Electronically Signed By: Heather Purcell MD, 2018, 10:22 PM

## 2018-11-06 NOTE — OP NOTE
OPERATIVE REPORT    PREOPERATIVE DIAGNOSIS:  Kidney transplant,     POSTOPERATIVE DIAGNOSIS: Same    PROCEDURES PERFORMED:   1. Cystourethroscopy  2. Holmium laser lithotripsy of calcification near transplant uretero-neocystotomy    STAFF SURGEON: Dr. Meagan Garcia MD    RESIDENT(S):  Naina Austin MD    ANESTHESIA: General    ESTIMATED BLOOD LOSS: < 10 mL.     DRAINS: None    OPERATIVE INDICATIONS:   62M with hx of ESRD s/p ktxp that has failed, planned for next txp, who has a bladder stone and hx of obstructive LUTS despite Flomax. Cystoscopy in clinic noted a stone adherent to the ureteroneocystotomy from prior txp    UDS: qmax 11, qavg 5, intermittent, vv 264, PVR 40. Equivocal for  obstruction, normal compliance, no DO.    DESCRIPTION OF PROCEDURE:   After verification of informed consent was obtained, the patient was brought to the operating room, and moved to the operating table. After adequate anesthesia was induced, the patient was repositioned in dorsal lithotomy position and prepped and draped in the usual sterile fashion. A formal timeout was performed to confirm the correct patient, procedure and operative site.     We inserted a 22 Guinean cystoscope into a well-lubricated urethra.  On entry we did note a high bladder neck.  The native ureteral orifice ease were orthotopic.  We were then able to identify the area of calcification in the anterior wall/dome roughly 2 cm proximal to the bladder neck.  This calcification seem to encircle an area of raised urothelium, consistent with the site of the ureteroneocystotomy.  We were unable to visually identify the transplant ureteral orifice.  We did not note any tumors, or diverticuli.  Bladder was a relatively large.    We then attempted to manipulate this calcification with the use of a rigid cold cup biopsy forceps, but it was quite adherent to the urothelium.  We then switched to using the holmium laser (500 micron fiber, set to 6 J and 6 Hz), and used it  to slowly break the calcifcation of the urothelium.  We removed all the loose calcifications, and on repeat bladder survey noted that the now freed urothelium had a small remaining plaque, but we chose not to address this with our laser, as we were still unable to identify the ureteral orifice and were concerned regarding injuring it or the area of the anastomosis.  There was minimal bleeding from the denuded raised urothelium, and we fulgurated using or Bugbee device.  Once this was completed, we concluded the case by removing our scope, and inserting a three-way 22 Slovenian catheter with 20 cc instilled in the balloon, though was connected to continuous bladder irrigation.      The procedure was then concluded. The patient was transferred to the postanesthesia care unit in stable condition and tolerated the procedure well.    POSTOP PLAN:  1.  Continue with bladder irrigation in the PACU, then remove patient's catheter in a trial of void fashion  2. Discharge today and RTC to see Dr. Garcia in 2 weeks to review stone analysis and discuss the UDS findings.

## 2018-11-06 NOTE — PROGRESS NOTES
Urology resident returned page. Pt can either have a catheter and go back to see Dr. Story in 2-3 days for removal or go home without a catheter and go to the nearest ED if he cannot void in 6-7 hours after discharge. Pt chose to go home with out catheter. Will notify surgical team.

## 2018-11-06 NOTE — PROGRESS NOTES
Discharge instructions to pt and responsible adult.  All questions regarding discharge information answered.  Pt and responsible adult verbalized understanding of all discharge instruction information given. Pt was instructed specifically to go to nearest ED if he has not voided again by 0400. He was also told to skip his Metoprolol tonight as we gave it here prior to discharge and hold his Coumadin for 3 days.

## 2018-11-06 NOTE — PROGRESS NOTES
Morillo out and CBI DC'd at 2020. Pt attempting to void. Dr. Austin at bedside speaking with pt's wife.

## 2018-11-06 NOTE — PROGRESS NOTES
Urology resident paged. Pt attempted second void after catheter removal. Second bladder scan shover over 200 in baldder. Awaiting further orders.

## 2018-11-07 NOTE — TELEPHONE ENCOUNTER
Health Call Center    Phone Message    May a detailed message be left on voicemail: yes    Reason for Call: Symptoms or Concerns     If patient has red-flag symptoms, warm transfer to triage line    Current symptom or concern: Hard to get all urine out and noticed more blood. Had kidney stones removed Monday.     Symptoms have been present for:  1 day(s)    Has patient previously been seen for this? No    Are there any new or worsening symptoms? Yes: Unable to empty and more blood      Action Taken: Message routed to:  [facility] ([facility] Urology

## 2018-11-08 NOTE — TELEPHONE ENCOUNTER
Patient called again this morning regarding blood and a few clots in his rudolph bag.  He had surgery on Monday with margo and had difficulty voiding after surgery  Went to ED and had rudolph catheter placed he is wanting to know if he should start his coumadin at this time.  Message sent to dr aragon. Dea Kwon LPN Staff Nurse

## 2018-11-08 NOTE — PROGRESS NOTES
Post op call~Spoke with patient. He ended up the ED yesterday unable to void. A catheter was placed in the ER. Dr. Story would like him to hold coumadin for another 5days. He is set up to see Dr. Stroy next Wednesday for a TOV. Holly Carroll RN

## 2018-11-12 NOTE — LETTER
11/12/2018       RE: Rory Bustamante  [address]  [facility] [address]     Dear Colleague,    Thank you for referring your patient, Rory Bustamante, to the [facility] SOLID ORGAN TRANSPLANT at [facility]. Please see a copy of my visit note below.          Dialysis Access Service   Progress Note    S:  Mr. Bustamante is being seen today for surgical followup of his dialysis access.  He reports no issues with the wound  Hematoma resolving, and  no steal syndrome of the distal extremity.      O:  Temp:  [98  F (36.7  C)] 98  F (36.7  C)  Pulse:  [75] 75  BP: (139)/(59) 139/59  SpO2:  [98 %] 98 %  GENERAL: healthy, alert, no distress  Circulation:   Radial pulse 2+  Ulnar pulse  2+   Capillary refill:  capillary refill brisk    Sensory exam:   arm: Normal   [x]           Abnormal   []          Comment:    hand: Normal   [x]           Abnormal   []          Comment:   Motor exam:   arm: Normal   [x]           Abnormal   []          Comment:    hand: Normal   [x]           Abnormal   []          Comment:    Access: R upper extremity wound(s) healed. non-tender. capillary refill brisk     Assessment & Plan: Mr. Bustamante's dialysis access has matured well at this time point.  He can start using the fistula when needed and need only follow up with Dr. Dawkins if needed.      The patient was counselled to contact our nurse coordinator, ELYSE Moscoso (Sum), CNS at [phone] with any questions or concerns.  Thank you for the opportunity to participate in Mr. Bustamante's care.    TT: 10 min  CT: 5 min      Sanjuana Burton CNP      ATTESTATION:  I saw and examined patient with the NP and agree with the plan as stated.          .        Again, thank you for allowing me to participate in the care of your patient.      Sincerely,    Dannie Abbott MD

## 2018-11-12 NOTE — LETTER
11/12/2018       RE: Rory Bustamante  [address]  [facility] [facility] [address]     Dear Colleague,    Thank you for referring your patient, Rory Bustamante, to the [facility] SOLID ORGAN TRANSPLANT at [facility]. Please see a copy of my visit note below.          Dialysis Access Service   Progress Note    S:  Mr. Bustamante is being seen today for surgical followup of his dialysis access.  He reports no issues with the wound  Hematoma resolving, and  no steal syndrome of the distal extremity.      O:  Temp:  [98  F (36.7  C)] 98  F (36.7  C)  Pulse:  [75] 75  BP: (139)/(59) 139/59  SpO2:  [98 %] 98 %  GENERAL: healthy, alert, no distress  Circulation:   Radial pulse 2+  Ulnar pulse  2+   Capillary refill:  capillary refill brisk    Sensory exam:   arm: Normal   [x]           Abnormal   []          Comment:    hand: Normal   [x]           Abnormal   []          Comment:   Motor exam:   arm: Normal   [x]           Abnormal   []          Comment:    hand: Normal   [x]           Abnormal   []          Comment:    Access: R upper extremity wound(s) healed. non-tender. capillary refill brisk     Assessment & Plan: Mr. Bustamante's dialysis access has matured well at this time point.  He can start using the fistula when needed and need only follow up with Dr. Dawkins if needed.      The patient was counselled to contact our nurse coordinator, ELYSE Moscoso (Sum), CNS at [phone] with any questions or concerns.  Thank you for the opportunity to participate in Mr. Bustamante's care.    TT: 10 min  CT: 5 min      Sanjuana Burton CNP      ATTESTATION:  I saw and examined patient with the NP and agree with the plan as stated.          .        Again, thank you for allowing me to participate in the care of your patient.      Sincerely,    Dannie Abbott MD

## 2018-11-12 NOTE — PROGRESS NOTES
Dialysis Access Service   Progress Note    S:  Mr. Bustamante is being seen today for surgical followup of his dialysis access.  He reports no issues with the wound  Hematoma resolving, and  no steal syndrome of the distal extremity.      O:  Temp:  [98  F (36.7  C)] 98  F (36.7  C)  Pulse:  [75] 75  BP: (139)/(59) 139/59  SpO2:  [98 %] 98 %  GENERAL: healthy, alert, no distress  Circulation:   Radial pulse 2+  Ulnar pulse  2+   Capillary refill:  capillary refill brisk    Sensory exam:   arm: Normal   [x]           Abnormal   []          Comment:    hand: Normal   [x]           Abnormal   []          Comment:   Motor exam:   arm: Normal   [x]           Abnormal   []          Comment:    hand: Normal   [x]           Abnormal   []          Comment:    Access: R upper extremity wound(s) healed. non-tender. capillary refill brisk     Assessment & Plan: Mr. Velez dialysis access has matured well at this time point.  He can start using the fistula when needed and need only follow up with Dr. Dawkins if needed.      The patient was counselled to contact our nurse coordinator, ELYSE Moscoso (Sum), CNS at [phone] with any questions or concerns.  Thank you for the opportunity to participate in Mr. Bustamante's care.    TT: 10 min  CT: 5 min      Sanjuana Burton CNP      ATTESTATION:  I saw and examined patient with the NP and agree with the plan as stated.          .

## 2018-11-12 NOTE — NURSING NOTE
"Chief Complaint   Patient presents with     RECHECK     fistula follow up     Blood pressure 139/59, pulse 75, temperature 98  F (36.7  C), temperature source Oral, height 1.854 m (6' 1\"), weight 106.2 kg (234 lb 1.6 oz), SpO2 98 %.    FLORINA WASHINGTON CMA    "

## 2018-11-12 NOTE — MR AVS SNAPSHOT
After Visit Summary   11/12/2018    Rory Bustamante    MRN: [id number]           Patient Information     Date Of Birth          1955        Visit Information        Provider Department      11/12/2018 1:30 PM Dannie Abbott MD [facility] Solid Organ Transplant        Today's Diagnoses     AV fistula (H)    -  1    Postop check           Follow-ups after your visit        Your next 10 appointments already scheduled     Nov 14, 2018 10:00 AM CST   (Arrive by 9:45 AM)   Return Visit with Meagan Story MD   [facility]rologic [facility] ([facility])    [address]  4th Floor  [place] [id number] [phone]            Nov 15, 2018  6:00 PM CST   (Arrive by 5:45 PM)   NEW LIPID VISIT with Elroy Rider MD   [facility] ([facility])    [address]  [address]  [place] [id number] [phone]              Future tests that were ordered for you today     Open Future Orders        Priority Expected Expires Ordered    Pancreatic Elastase Fecal Routine  11/12/2019 11/12/2018    Pancreatic Elastase Fecal Routine  11/12/2019 11/12/2018            Who to contact     If you have questions or need follow up information about today's clinic visit or your schedule please contact [facility] TRANSPL[facility] directly at [phone].  Normal or non-critical lab and imaging results will be communicated to you by MyChart, letter or phone within 4 business days after the clinic has received the results. If you do not hear from us within 7 days, please contact the clinic through MyChart or phone. If you have a critical or abnormal lab result, we will notify you by phone as soon as possible.  Submit refill requests through [employer] or call your pharmacy and they will forward the refill request to us. Please allow 3 business days for your refill to be completed.          Additional  "Information About Your Visit        MyChart Information     [employer] gives you secure access to your electronic health record. If you see a primary care provider, you can also send messages to your care team and make appointments. If you have questions, please call your primary care clinic.  If you do not have a primary care provider, please call [phone] and they will assist you.        Care EveryWhere ID     This is your Care EveryWhere ID. This could be used by other organizations to access your [place] medical records  [id number]        Your Vitals Were     Pulse Temperature Height Pulse Oximetry BMI (Body Mass Index)       75 98  F (36.7  C) (Oral) 1.854 m (6' 1\") 98% 30.89 kg/m2        Blood Pressure from Last 3 Encounters:   11/12/18 139/59   11/05/18 (!) 164/94   11/02/18 152/78    Weight from Last 3 Encounters:   11/12/18 106.2 kg (234 lb 1.6 oz)   11/05/18 108.5 kg (239 lb 3.2 oz)   11/02/18 107.1 kg (236 lb 3.2 oz)              Today, you had the following     No orders found for display         Today's Medication Changes          These changes are accurate as of 11/12/18 11:59 PM.  If you have any questions, ask your nurse or doctor.               These medicines have changed or have updated prescriptions.        Dose/Directions    allopurinol 100 MG tablet   Commonly known as:  ZYLOPRIM   This may have changed:    - how much to take  - when to take this   Used for:  Kidney replaced by transplant, Gout        Dose:  200 mg   Take 2 tablets by mouth daily.   Quantity:  180 tablet   Refills:  3       * cycloSPORINE modified 25 MG capsule   Commonly known as:  GENERIC EQUIVALENT   This may have changed:    - how much to take  - additional instructions   Used for:  Kidney transplant recipient, Long-term use of immunosuppressant medication        Dose:  50 mg   Take 2 capsules (50 mg) by mouth 2 times daily Total dose 150 mg twice daily   Quantity:  180 capsule   Refills:  3       * cycloSPORINE " modified 100 MG capsule   Commonly known as:  GENERIC EQUIVALENT   This may have changed:  additional instructions   Used for:  Kidney transplant recipient, Long-term use of immunosuppressant medication        Dose:  100 mg   Take 1 capsule (100 mg) by mouth 2 times daily Total dose 150 mg twice daily   Quantity:  180 capsule   Refills:  3       tamsulosin 0.4 MG capsule   Commonly known as:  FLOMAX   This may have changed:  when to take this   Used for:  Benign prostatic hyperplasia, unspecified whether lower urinary tract symptoms present        Dose:  0.4 mg   Take 1 capsule (0.4 mg) by mouth daily   Quantity:  60 capsule   Refills:  1       * Notice:  This list has 2 medication(s) that are the same as other medications prescribed for you. Read the directions carefully, and ask your doctor or other care provider to review them with you.             Primary Care Provider Office Phone # Fax #    Moris Diaz [phone]       [facility] [address]  [facility] [place] [address]        Equal Access to Services     [facility] : Hadii aad ku hadasho Soomaali, waaxda luqadaha, qaybta kaalmada adeegyada, waxay mooin haynickn randi barriga . So [facility] [phone].    ATENCIÓN: Si habla español, tiene a carr disposición servicios gratuitos de asistencia lingüística. Llame al [phone].    We comply with applicable federal civil rights laws and [place] laws. We do not discriminate on the basis of race, color, national origin, age, disability, sex, sexual orientation, or gender identity.            Thank you!     Thank you for choosing [facility]GAN TRANSPL[facility]  for your care. Our goal is always to provide you with excellent care. Hearing back from our patients is one way we can continue to improve our services. Please take a few minutes to complete the written survey that you may receive in the mail after your visit with us. Thank you!             Your Updated Medication  List - Protect others around you: Learn how to safely use, store and throw away your medicines at www.disposemymeds.org.          This list is accurate as of 11/12/18 11:59 PM.  Always use your most recent med list.                   Brand Name Dispense Instructions for use Diagnosis    allopurinol 100 MG tablet    ZYLOPRIM    180 tablet    Take 2 tablets by mouth daily.    Kidney replaced by transplant, Gout       ALPHAGAN OP      Place 1 drop into the right eye 2 times daily        BABY ASPIRIN PO      Take 81 mg by mouth daily (with lunch)        BEVACizumab 400 MG/16ML injection    AVASTIN     Every 5 weeks        cephALEXin 500 MG capsule    KEFLEX          CRESTOR PO      Take 40 mg by mouth every evening        * cycloSPORINE modified 25 MG capsule    GENERIC EQUIVALENT    180 capsule    Take 2 capsules (50 mg) by mouth 2 times daily Total dose 150 mg twice daily    Kidney transplant recipient, Long-term use of immunosuppressant medication       * cycloSPORINE modified 100 MG capsule    GENERIC EQUIVALENT    180 capsule    Take 1 capsule (100 mg) by mouth 2 times daily Total dose 150 mg twice daily    Kidney transplant recipient, Long-term use of immunosuppressant medication       epoetin freya 33252 UNIT/ML injection    PROCRIT    4 mL    Inject 1 mL (20,000 Units) Subcutaneous once a week As needed for hgb < 10    CKD (chronic kidney disease) stage 4, GFR 15-29 ml/min (H), Anemia in stage 4 chronic kidney disease (H)       fenofibrate 145 MG tablet      Take 145 mg by mouth every morning        ISOSORBIDE MONONITRATE PO      Take 60 mg by mouth every morning        loperamide 2 MG capsule    IMODIUM     Take 2 mg by mouth daily        metoprolol tartrate 100 MG tablet    LOPRESSOR    60 tablet    Take 1 tablet by mouth 2 times daily.    Unspecified hypertensive kidney disease with chronic kidney disease stage I through stage IV, or unspecified(403.90), Kidney replaced by transplant       NIFEdipine ER  osmotic 60 MG Tb24    PROCARDIA XL    90 tablet    Take 1 tablet (60 mg) by mouth daily    HTN (hypertension)       NITROSTAT 0.4 MG sublingual tablet   Generic drug:  nitroGLYcerin      Place 0.4 mg under the tongue every 5 minutes as needed.        NONFORMULARY      Take 1 tablet by mouth 2 times daily Focus Macula Pro:  Eye vitamin and mineral supplement A, C, E, Zinc, Copper        omega-3 acid ethyl esters 1 g capsule    Lovaza     Take 2 g by mouth 2 times daily        PANTOPRAZOLE SODIUM PO      Take 40 mg by mouth daily as needed for heartburn        predniSONE 5 MG tablet    DELTASONE     Take 5 mg by mouth daily (with lunch)        probenecid 500 MG tablet    BENEMID     Take 500 mg by mouth 2 times daily.        sulfamethoxazole-trimethoprim 400-80 MG per tablet    BACTRIM/SEPTRA     Take 1 tablet by mouth every other day        tamsulosin 0.4 MG capsule    FLOMAX    60 capsule    Take 1 capsule (0.4 mg) by mouth daily    Benign prostatic hyperplasia, unspecified whether lower urinary tract symptoms present       TYLENOL ARTHRITIS PAIN 650 MG CR tablet   Generic drug:  acetaminophen      Take 2 tablets by mouth 3 times daily.        warfarin 5 MG tablet    COUMADIN          * Notice:  This list has 2 medication(s) that are the same as other medications prescribed for you. Read the directions carefully, and ask your doctor or other care provider to review them with you.

## 2018-11-12 NOTE — TELEPHONE ENCOUNTER
FUTURE VISIT INFORMATION:    Date: 11/15/18    Time: 1800    Location:  Cardiology  REFERRAL INFORMATION:    Referring provider:  Brian     Referring providers clinic:  [facility]    Reason for visit/diagnosis  :  Hypertriglyceridemia      All records in epic.

## 2018-11-12 NOTE — LETTER
11/12/2018      RE: Rory Bustamante  [address]  [place] [facility] [address]             Dialysis Access Service   Progress Note    S:  Mr. Bustamante is being seen today for surgical followup of his dialysis access.  He reports no issues with the wound  Hematoma resolving, and  no steal syndrome of the distal extremity.      O:  Temp:  [98  F (36.7  C)] 98  F (36.7  C)  Pulse:  [75] 75  BP: (139)/(59) 139/59  SpO2:  [98 %] 98 %  GENERAL: healthy, alert, no distress  Circulation:   Radial pulse 2+  Ulnar pulse  2+   Capillary refill:  capillary refill brisk    Sensory exam:   arm: Normal   [x]           Abnormal   []          Comment:    hand: Normal   [x]           Abnormal   []          Comment:   Motor exam:   arm: Normal   [x]           Abnormal   []          Comment:    hand: Normal   [x]           Abnormal   []          Comment:    Access: R upper extremity wound(s) healed. non-tender. capillary refill brisk     Assessment & Plan: Mr. Velez dialysis access has matured well at this time point.  He can start using the fistula when needed and need only follow up with Dr. Dawkins if needed.      The patient was counselled to contact our nurse coordinator, ELYSE Moscoso (Sum), CNS at [phone] with any questions or concerns.  Thank you for the opportunity to participate in Mr. Bustamante's care.    TT: 10 min  CT: 5 min      Sanjuana Burton CNP      ATTESTATION:  I saw and examined patient with the NP and agree with the plan as stated.          .        Dannie Abbott MD

## 2018-11-13 NOTE — TELEPHONE ENCOUNTER
[facility] Call Center    Phone Message    May a detailed message be left on voicemail: yes    Reason for Call: Order(s): Other:   Reason for requested:Request Lab Orders for Lipids please put lipid orders in  Date needed:11/14/18 by 7 AM  Provider name: Dr. Rider      Action Taken: Message routed to:  [facility] ([facility] Heart

## 2018-11-13 NOTE — TELEPHONE ENCOUNTER
Called and spoke with Pt and informed him that Dr Rider typically prefers to evaluate Pt's before ordering labs as new patients as he frequently orders additional labs on top of Lipid Panel.  Pt verbalized understanding, agreed to current plan and denied any further questions.    May French LPN

## 2018-11-13 NOTE — TELEPHONE ENCOUNTER
Follow-up with anemia management service:    Due for Hgb, left VM reminding.  Has upcoming clinic appt 11/15    Rory called back, plans to do labs locally , states has had bleeding with kidney stone, clots and surgery. - DENISE    Anemia Latest Ref Rng & Units 2018 2018 2018 10/5/2018 10/12/2018 10/19/2018 10/29/2018   MARY LOU Dose - - - - 20,000 units 20,000 units - -   Hemoglobin 13.3 - 17.7 g/dL 10.7(A) 10.5(L) 10.1(A) 9.6(A) 9.7(A) 10.4(A) 10.7(L)   IV Iron Dose - - - - - - - -   TSAT % 25 - - - - 21 -   Ferritin ng/mL 986 - - - - 580 -       Orders needed to be renewed (for next follow-up date) in LETTERS - for external labs: None                          Med order expires: 2019                          Lab orders : 2019    Follow-up call date: 2018    DENISE    Anemia Management Service  Grecia Carroll,PharmD and Michelle Ramírez CPhT  Phone: [phone]  Fax: [phone]

## 2018-11-14 NOTE — NURSING NOTE
Chief Complaint   Patient presents with     RECHECK     [facility] F/U     Jake Stubbs CMA     Chief Complaint   Patient presents with     RECHECK     [facility] F/U       Patient Active Problem List   Diagnosis     S/P kidney transplant     Essential hypertension     CAD (coronary artery disease)     Gout     Warts     Peripheral neuropathy     Proteinuria     Organ transplant candidate     CKD (chronic kidney disease) stage 4, GFR 15-29 ml/min (H)     Anemia in stage 4 chronic kidney disease (H)     Lumbar stenosis     Surgery, elective     Atrial fibrillation (H)     s/p laminectomies L2-3,L3-4,L4-5 (6/1/18)       Allergies   Allergen Reactions     Contrast Dye Other (See Comments) and Itching     Pt reports sneezing     Pravastatin Muscle Pain (Myalgia)     Simvastatin Muscle Pain (Myalgia)       Current Outpatient Prescriptions   Medication Sig Dispense Refill     fenofibrate 145 MG tablet Take 145 mg by mouth       acetaminophen (TYLENOL ARTHRITIS PAIN) 650 MG CR tablet Take 2 tablets by mouth 3 times daily.       allopurinol (ZYLOPRIM) 100 MG tablet Take 2 tablets by mouth daily. (Patient taking differently: Take 100 mg by mouth 2 times daily ) 180 tablet 3     BABY ASPIRIN PO Take 81 mg by mouth daily (with lunch)        BEVACizumab (AVASTIN) 400 MG/16ML injection Every 5 weeks       Brimonidine Tartrate (ALPHAGAN OP) Place 1 drop into the right eye 2 times daily        cephALEXin (KEFLEX) 500 MG capsule        cycloSPORINE modified (GENERIC EQUIVALENT) 100 MG capsule Take 1 capsule (100 mg) by mouth 2 times daily Total dose 150 mg twice daily (Patient taking differently: Take 100 mg by mouth 2 times daily Total dose 125 mg twice daily) 180 capsule 3     cycloSPORINE modified (GENERIC EQUIVALENT) 25 MG capsule Take 2 capsules (50 mg) by mouth 2 times daily Total dose 150 mg twice daily (Patient taking differently: Take 25 mg by mouth 2 times daily Total dose 125 mg twice daily) 180 capsule 3      epoetin freya (PROCRIT) 07948 UNIT/ML injection Inject 1 mL (20,000 Units) Subcutaneous once a week As needed for hgb < 10 (Patient not taking: Reported on 10/29/2018) 4 mL 5     fenofibrate 145 MG tablet Take 145 mg by mouth every morning        ISOSORBIDE MONONITRATE PO Take 60 mg by mouth every morning        loperamide (IMODIUM) 2 MG capsule Take 2 mg by mouth daily        losartan (COZAAR) 50 MG tablet TAKE ONE TABLET BY MOUTH DAILY 90 tablet 3     metoprolol (LOPRESSOR) 100 MG tablet Take 1 tablet by mouth 2 times daily. 60 tablet 6     NIFEdipine ER osmotic (PROCARDIA XL) 60 MG TB24 Take 1 tablet (60 mg) by mouth daily 90 tablet 3     nitroGLYCERIN (NITROSTAT) 0.4 MG SL tablet Place 0.4 mg under the tongue every 5 minutes as needed.       NONFORMULARY Take 1 tablet by mouth 2 times daily Focus Macula Pro:  Eye vitamin and mineral supplement A, C, E, Zinc, Copper        omega-3 acid ethyl esters (LOVAZA) 1 G capsule Take 2 g by mouth 2 times daily        PANTOPRAZOLE SODIUM PO Take 40 mg by mouth daily as needed for heartburn       predniSONE (DELTASONE) 5 MG tablet Take 5 mg by mouth daily (with lunch)        probenecid (BENEMID) 500 MG tablet Take 500 mg by mouth 2 times daily.       Rosuvastatin Calcium (CRESTOR PO) Take 40 mg by mouth every evening        sulfamethoxazole-trimethoprim (BACTRIM,SEPTRA) 400-80 MG per tablet Take 1 tablet by mouth every other day        tamsulosin (FLOMAX) 0.4 MG capsule Take 1 capsule (0.4 mg) by mouth daily (Patient taking differently: Take 0.4 mg by mouth 2 times daily ) 60 capsule 1     warfarin (COUMADIN) 5 MG tablet          Social History   Substance Use Topics     Smoking status: Never Smoker     Smokeless tobacco: Never Used     Alcohol use No       Rory Bustamante presented today for a trial of void.  Approximately 200 mL of normal saline instilled into bladder via catheter.  Patient stated he had urge to urinate and catheter was removed without difficulty.  Patient  was given a cup to measure urine output.  Patient voided approximately 200 mL of clear/yellow urine.  PVR 26 mL.    Cipro 500 given per protocol: Yes    Patient did tolerate procedure well.    Jake Stubbs CMA  11/14/2018  10:18 AM

## 2018-11-14 NOTE — LETTER
11/14/2018       RE: Rory Bustamante  [address]  [facility] [address]     Dear Colleague,    Thank you for referring your patient, Rory Bustamante, to the [facility] UROLOGY AND [facility] FOR PROSTATE AND UROLOGIC CANCERS at [facility]. Please see a copy of my visit note below.    UROLOGIC DIAGNOSES:       CURRENT INTERVENTIONS:       HISTORY:     Patient presents for evaluation and management of bladder stone and lower urinary tract symptoms.      Patient is s/p transplant in 1989 but now with decreasing GFR. Will be listed for transplant.   Had CT scan at [facility] that showed an 1.2cm bladder stone near the insertion of the transplant ureter.     Patient also notes lower urinary tract symptoms as below despite taking tamsulosin.       Hesitancy   Slowed stream occasionally   Occasional sensation of incomplete emptying   Sometimes needs to sit to urinate due to spraying stream   No hematuria   No dysuria   No straining to void   No urgency   No UUI    Had episode of urinary retention after knee replacement. Had slowed stream prior but then developed AUR. Started tamsulosin after this. Has been taking since 2015.     Also had urinary retention after back surgery.        Patient currently s/p cystolithalopaxy with holmium for bladder stone at the transplant ureteral orifice. No issues immediately post op but patient developed hematuria and required catheter irrigation.   UDS was equivocal for obstruction though patient could not void with catheter in place.     We discussed post op course, UDS results, further follow up, changes in lower urinary tract symptoms post transplant.         PAST MEDICAL HISTORY:   Past Medical History:   Diagnosis Date     Anemia      Arrhythmia     atrial fib     Arthritis      BPH (benign prostatic hyperplasia)      CAD (coronary artery disease) July 2011    MI in July 2011, stent placed, on plavix and aspirin     ESRD (end stage renal disease)  (H)     tx, renal insufficiency     Glaucoma      Gout     Uric acid as high as 11 previously, now on allopurinol and probenecid     HTN (hypertension)      Hypercholesteremia 2015     Hypertriglyceridemia 2015     Intraocular bleeding     previously treated wtih Avastin     Macular degeneration      Neuropathy     Limited sensation bilateral knees to feet     Obese     BMI 31     Paroxysmal A-fib (H)      Presence of stent in coronary artery August 2012     Proteinuria 11/2015    recurrent MPGN on biopsy     S/P kidney transplant     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  HLA identical transplant.  Biopsy in 4/2008 with recurrent MPGN type 2, mild interstitial fibrosis and tubular atrophy, CNI toxicity     Spinal stenosis 2013     Warts        PAST SURGICAL HISTORY:   Past Surgical History:   Procedure Laterality Date     C TOTAL KNEE ARTHROPLASTY  2015     CREATE FISTULA ARTERIOVENOUS UPPER EXTREMITY Right 9/25/2018    Procedure: CREATE FISTULA ARTERIOVENOUS UPPER EXTREMITY;  Create Right Brachial Arteriovenous Fistula ;  Surgeon: Sydney Dawkins MD;  Location: [facility] OR     LAMINECTOMY LUMBAR MINIMALLY INVASIVE THREE+ LEVELS N/A 6/11/2018    Procedure: LAMINECTOMY LUMBAR MINIMALLY INVASIVE THREE+ LEVELS;  Minimally Invasive Surgery Laminectomies Lumbar 2-3,Lumbar 3-4,Lumbar 4-5;  Surgeon: Harshal Rucker MD;  Location: [facility] OR     LASER HOLMIUM LITHOTRIPSY BLADDER N/A 11/5/2018    Procedure: Cystsocopy, Cystolithalopaxy Of Bladder Stone With Holmium Laser;  Surgeon: Meagan Story MD;  Location: [facility] OR     TONSILLECTOMY       TRANSPLANT KIDNEY RECIPIENT LIVING RELATED  1989    HLA identical     VASECTOMY         FAMILY HISTORY:   Family History   Problem Relation Age of Onset     Diabetes Mother      Colon Cancer Mother 78     Cardiac Sudden Death Sister 87     Diabetes Brother      Cerebrovascular Disease Brother        SOCIAL HISTORY:   Social History   Substance Use Topics     Smoking  "status: Never Smoker     Smokeless tobacco: Never Used     Alcohol use No       Current Outpatient Prescriptions   Medication     fenofibrate 145 MG tablet     acetaminophen (TYLENOL ARTHRITIS PAIN) 650 MG CR tablet     allopurinol (ZYLOPRIM) 100 MG tablet     BABY ASPIRIN PO     BEVACizumab (AVASTIN) 400 MG/16ML injection     Brimonidine Tartrate (ALPHAGAN OP)     cephALEXin (KEFLEX) 500 MG capsule     cycloSPORINE modified (GENERIC EQUIVALENT) 100 MG capsule     cycloSPORINE modified (GENERIC EQUIVALENT) 25 MG capsule     epoetin freya (PROCRIT) 30147 UNIT/ML injection     fenofibrate 145 MG tablet     ISOSORBIDE MONONITRATE PO     loperamide (IMODIUM) 2 MG capsule     losartan (COZAAR) 50 MG tablet     metoprolol (LOPRESSOR) 100 MG tablet     NIFEdipine ER osmotic (PROCARDIA XL) 60 MG TB24     nitroGLYCERIN (NITROSTAT) 0.4 MG SL tablet     NONFORMULARY     omega-3 acid ethyl esters (LOVAZA) 1 G capsule     PANTOPRAZOLE SODIUM PO     predniSONE (DELTASONE) 5 MG tablet     probenecid (BENEMID) 500 MG tablet     Rosuvastatin Calcium (CRESTOR PO)     sulfamethoxazole-trimethoprim (BACTRIM,SEPTRA) 400-80 MG per tablet     tamsulosin (FLOMAX) 0.4 MG capsule     warfarin (COUMADIN) 5 MG tablet     Current Facility-Administered Medications   Medication     lidocaine 1 % injection 3 mL     triamcinolone acetonide (KENALOG-40) injection 40 mg         PHYSICAL EXAM:    /62  Pulse 77  Ht 1.854 m (6' 1\")  Wt 106.1 kg (234 lb)  BMI 30.87 kg/m2    HEENT: Normocephalic and atraumatic   Cardiac: Not done  Back/Flank: Not done  CNS/PNS: Not done  Respiratory: Normal non-labored breathing  Abdomen: Soft nontender and nondistended  Peripheral Vascular: Not done  Mental Status: Not done    Penis: Not done  Scrotal Skin: Not done  Testicles: Not done  Epididymis: Not done  Digital Rectal Exam:     Cystoscopy: Not done    Imaging: None    Urinalysis: [facility] RESULTS:  Recent Labs   Lab Test 10/17/18  06/13/18   0867   COLOR  Yellow "  Yellow   APPEARANCE  Clear  Slightly Cloudy   URINEGLC  100   30*   URINEBILI  Neg  Negative   URINEKETONE  Neg  Negative   SG  1.025  1.011   UBLD  Small  Small*   URINEPH  6.0  5.0   PROTEIN  100   30*   UROBILINOGEN  0.2   --    NITRITE  Neg  Negative   LEUKEST  Neg  Small*   RBCU   --   2   WBCU   --   3       PSA:     Post Void Residual:     Other labs: None today      IMPRESSION:  64 y/o M with s/p transplant with bladder stone at ureteral orifice, also with lower urinary tract symptoms despite tamsulosin     PLAN:  Continue tamsulosin   Will monitor lower urinary tract symptoms at present   Will consider outlet procedure should lower urinary tract symptoms worsen after second transplant   Uroflow/PVR in 4-6 months   Follow up in 4-6 monhts     Total Time: 15 minutes                                       Total in Consultation: greater than 50%       Again, thank you for allowing me to participate in the care of your patient.      Sincerely,    Meagan Story MD

## 2018-11-14 NOTE — MR AVS SNAPSHOT
After Visit Summary   11/14/2018    Rory Bustamante    MRN: [id number]           Patient Information     Date Of Birth          1955        Visit Information        Provider Department      11/14/2018 10:00 AM Meagan Story MD [facility] Urology and [facility] for Prostate and Urologic Cancers        Care Instructions    Follow-up in 4-6 months with UroFlow/PVR          Follow-ups after your visit        Follow-up notes from your care team     Return in about 6 months (around 5/14/2019).      Your next 10 appointments already scheduled     Nov 15, 2018  6:00 PM CST   (Arrive by 5:45 PM)   NEW LIPID VISIT with Elroy Rider MD   [facility] ([facility])    [address]  [address] [place] [address] [phone]              Who to contact     Please call your clinic at [phone] to:    Ask questions about your health    Make or cancel appointments    Discuss your medicines    Learn about your test results    Speak to your doctor            Additional Information About Your Visit        MyChart Information     [employer] gives you secure access to your electronic health record. If you see a primary care provider, you can also send messages to your care team and make appointments. If you have questions, please call your primary care clinic.  If you do not have a primary care provider, please call [phone] and they will assist you.      [employer] is an electronic gateway that provides easy, online access to your medical records. With [employer], you can request a clinic appointment, read your test results, renew a prescription or communicate with your care team.     To access your existing account, please contact your [facility] or call [phone] for assistance.        Care EveryWhere ID     This is your Care EveryWhere ID. This could be used by other organizations to access your [facility]  "records  [id number]        Your Vitals Were     Pulse Height BMI (Body Mass Index)             77 1.854 m (6' 1\") 30.87 kg/m2          Blood Pressure from Last 3 Encounters:   11/14/18 138/62   11/12/18 139/59   11/05/18 (!) 164/94    Weight from Last 3 Encounters:   11/14/18 106.1 kg (234 lb)   11/12/18 106.2 kg (234 lb 1.6 oz)   11/05/18 108.5 kg (239 lb 3.2 oz)              Today, you had the following     No orders found for display         Today's Medication Changes          These changes are accurate as of 11/14/18 10:43 AM.  If you have any questions, ask your nurse or doctor.               These medicines have changed or have updated prescriptions.        Dose/Directions    allopurinol 100 MG tablet   Commonly known as:  ZYLOPRIM   This may have changed:    - how much to take  - when to take this   Used for:  Kidney replaced by transplant, Gout        Dose:  200 mg   Take 2 tablets by mouth daily.   Quantity:  180 tablet   Refills:  3       * cycloSPORINE modified 25 MG capsule   Commonly known as:  GENERIC EQUIVALENT   This may have changed:    - how much to take  - additional instructions   Used for:  Kidney transplant recipient, Long-term use of immunosuppressant medication        Dose:  50 mg   Take 2 capsules (50 mg) by mouth 2 times daily Total dose 150 mg twice daily   Quantity:  180 capsule   Refills:  3       * cycloSPORINE modified 100 MG capsule   Commonly known as:  GENERIC EQUIVALENT   This may have changed:  additional instructions   Used for:  Kidney transplant recipient, Long-term use of immunosuppressant medication        Dose:  100 mg   Take 1 capsule (100 mg) by mouth 2 times daily Total dose 150 mg twice daily   Quantity:  180 capsule   Refills:  3       tamsulosin 0.4 MG capsule   Commonly known as:  FLOMAX   This may have changed:  when to take this   Used for:  Benign prostatic hyperplasia, unspecified whether lower urinary tract symptoms present        Dose:  0.4 mg   Take 1 capsule " (0.4 mg) by mouth daily   Quantity:  60 capsule   Refills:  1       * Notice:  This list has 2 medication(s) that are the same as other medications prescribed for you. Read the directions carefully, and ask your doctor or other care provider to review them with you.             Primary Care Provider Office Phone # Fax #    Moris Diaz [phone]       [facility] [address] [facility] [address]  [facility] [address]        Equal Access to Services     [facility]: Hadii aad ku hadasho Soomaali, waaxda luqadaha, qaybta kaalmada adeegyada, waxay idiin hayaan adeeg kharash la'aan . So [facility] [phone].    ATENCIÓN: Si habla español, tiene a carr disposición servicios gratuitos de asistencia lingüística. [facility] [phone].    We comply with applicable federal civil rights laws and [place] laws. We do not discriminate on the basis of race, color, national origin, age, disability, sex, sexual orientation, or gender identity.            Thank you!     Thank you for choosing [facility] UROLOGY AND [facility] FOR PROSTATE AND UROLOGIC CANCERS  for your care. Our goal is always to provide you with excellent care. Hearing back from our patients is one way we can continue to improve our services. Please take a few minutes to complete the written survey that you may receive in the mail after your visit with us. Thank you!             Your Updated Medication List - Protect others around you: Learn how to safely use, store and throw away your medicines at www.disposemymeds.org.          This list is accurate as of 11/14/18 10:43 AM.  Always use your most recent med list.                   Brand Name Dispense Instructions for use Diagnosis    allopurinol 100 MG tablet    ZYLOPRIM    180 tablet    Take 2 tablets by mouth daily.    Kidney replaced by transplant, Gout       ALPHAGAN OP      Place 1 drop into the right eye 2 times daily        BABY ASPIRIN PO      Take 81 mg by mouth daily (with lunch)         BEVACizumab 400 MG/16ML injection    AVASTIN     Every 5 weeks        cephALEXin 500 MG capsule    KEFLEX          CRESTOR PO      Take 40 mg by mouth every evening        * cycloSPORINE modified 25 MG capsule    GENERIC EQUIVALENT    180 capsule    Take 2 capsules (50 mg) by mouth 2 times daily Total dose 150 mg twice daily    Kidney transplant recipient, Long-term use of immunosuppressant medication       * cycloSPORINE modified 100 MG capsule    GENERIC EQUIVALENT    180 capsule    Take 1 capsule (100 mg) by mouth 2 times daily Total dose 150 mg twice daily    Kidney transplant recipient, Long-term use of immunosuppressant medication       epoetin freya 73701 UNIT/ML injection    PROCRIT    4 mL    Inject 1 mL (20,000 Units) Subcutaneous once a week As needed for hgb < 10    CKD (chronic kidney disease) stage 4, GFR 15-29 ml/min (H), Anemia in stage 4 chronic kidney disease (H)       * fenofibrate 145 MG tablet      Take 145 mg by mouth every morning        * fenofibrate 145 MG tablet      Take 145 mg by mouth        ISOSORBIDE MONONITRATE PO      Take 60 mg by mouth every morning        loperamide 2 MG capsule    IMODIUM     Take 2 mg by mouth daily        losartan 50 MG tablet    COZAAR    90 tablet    TAKE ONE TABLET BY MOUTH DAILY    Kidney replaced by transplant       metoprolol tartrate 100 MG tablet    LOPRESSOR    60 tablet    Take 1 tablet by mouth 2 times daily.    Unspecified hypertensive kidney disease with chronic kidney disease stage I through stage IV, or unspecified(403.90), Kidney replaced by transplant       NIFEdipine ER osmotic 60 MG Tb24    PROCARDIA XL    90 tablet    Take 1 tablet (60 mg) by mouth daily    HTN (hypertension)       NITROSTAT 0.4 MG sublingual tablet   Generic drug:  nitroGLYcerin      Place 0.4 mg under the tongue every 5 minutes as needed.        NONFORMULARY      Take 1 tablet by mouth 2 times daily [employer] Mac[employer] Pro:  Eye vitamin and mineral supplement A, C, E, Zinc, Copper         omega-3 acid ethyl esters 1 g capsule    Lovaza     Take 2 g by mouth 2 times daily        PANTOPRAZOLE SODIUM PO      Take 40 mg by mouth daily as needed for heartburn        predniSONE 5 MG tablet    DELTASONE     Take 5 mg by mouth daily (with lunch)        probenecid 500 MG tablet    BENEMID     Take 500 mg by mouth 2 times daily.        sulfamethoxazole-trimethoprim 400-80 MG per tablet    BACTRIM/SEPTRA     Take 1 tablet by mouth every other day        tamsulosin 0.4 MG capsule    FLOMAX    60 capsule    Take 1 capsule (0.4 mg) by mouth daily    Benign prostatic hyperplasia, unspecified whether lower urinary tract symptoms present       TYLENOL ARTHRITIS PAIN 650 MG CR tablet   Generic drug:  acetaminophen      Take 2 tablets by mouth 3 times daily.        warfarin 5 MG tablet    COUMADIN          * Notice:  This list has 4 medication(s) that are the same as other medications prescribed for you. Read the directions carefully, and ask your doctor or other care provider to review them with you.

## 2018-11-15 NOTE — NURSING NOTE
Chief Complaint   Patient presents with     New Patient     New lipid pt     Medications reviewed and vitals performed.  Fatmata Kulkarni CMA

## 2018-11-15 NOTE — PROGRESS NOTES
Cardiology Consultation      HPI:     Mr. 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    Patient had MPI stress from 102/018 revealing large fixed inferoseptal perfusion defect with minimal rosey infarct ischemia; EF 57%.    Lipids from 9/2018: total 176, HDL 22, LDL 81, triglycerides 486.    Reports that in past year he has had 2 episodes of pancreatitis.    Patient reports no CP, SOB, lightheadedness, dizziness, palpitations.    PAST MEDICAL HISTORY:  Past Medical History:   Diagnosis Date     Anemia      Arrhythmia     atrial fib     Arthritis      BPH (benign prostatic hyperplasia)      CAD (coronary artery disease) July 2011    MI in July 2011, stent placed, on plavix and aspirin     ESRD (end stage renal disease) (H)     tx, renal insufficiency     Glaucoma      Gout     Uric acid as high as 11 previously, now on allopurinol and probenecid     HTN (hypertension)      Hypercholesteremia 2015     Hypertriglyceridemia 2015     Intraocular bleeding     previously treated wt Avastin     Macular degeneration      Neuropathy     Limited sensation bilateral knees to feet     Obese     BMI 31     Paroxysmal A-fib (H)      Presence of stent in coronary artery August 2012     Proteinuria 11/2015    recurrent MPGN on biopsy     S/P kidney transplant     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  HLA identical transplant.  Biopsy in 4/2008 with recurrent MPGN type 2, mild interstitial fibrosis and tubular atrophy, CNI toxicity     Spinal stenosis 2013     Warts        CURRENT MEDICATIONS:  Current Outpatient Prescriptions   Medication Sig Dispense Refill     acetaminophen (TYLENOL ARTHRITIS PAIN) 650 MG CR tablet Take 2 tablets by mouth 3 times daily.       allopurinol (ZYLOPRIM) 100 MG tablet Take 2 tablets by mouth daily. (Patient taking differently: Take 100 mg by mouth 2 times daily ) 180 tablet 3     BABY ASPIRIN PO Take 81 mg by  mouth daily (with lunch)        BEVACizumab (AVASTIN) 400 MG/16ML injection Every 5 weeks       Brimonidine Tartrate (ALPHAGAN OP) Place 1 drop into the right eye 2 times daily        cephALEXin (KEFLEX) 500 MG capsule        cycloSPORINE modified (GENERIC EQUIVALENT) 100 MG capsule Take 1 capsule (100 mg) by mouth 2 times daily Total dose 150 mg twice daily (Patient taking differently: Take 100 mg by mouth 2 times daily Total dose 125 mg twice daily) 180 capsule 3     cycloSPORINE modified (GENERIC EQUIVALENT) 25 MG capsule Take 2 capsules (50 mg) by mouth 2 times daily Total dose 150 mg twice daily (Patient taking differently: Take 25 mg by mouth 2 times daily Total dose 125 mg twice daily) 180 capsule 3     epoetin freya (PROCRIT) 29692 UNIT/ML injection Inject 1 mL (20,000 Units) Subcutaneous once a week As needed for hgb < 10 (Patient not taking: Reported on 10/29/2018) 4 mL 5     fenofibrate 145 MG tablet Take 145 mg by mouth       fenofibrate 145 MG tablet Take 145 mg by mouth every morning        ISOSORBIDE MONONITRATE PO Take 60 mg by mouth every morning        loperamide (IMODIUM) 2 MG capsule Take 2 mg by mouth daily        losartan (COZAAR) 50 MG tablet TAKE ONE TABLET BY MOUTH DAILY 90 tablet 3     metoprolol (LOPRESSOR) 100 MG tablet Take 1 tablet by mouth 2 times daily. 60 tablet 6     NIFEdipine ER osmotic (PROCARDIA XL) 60 MG TB24 Take 1 tablet (60 mg) by mouth daily 90 tablet 3     nitroGLYCERIN (NITROSTAT) 0.4 MG SL tablet Place 0.4 mg under the tongue every 5 minutes as needed.       NONFORMULARY Take 1 tablet by mouth 2 times daily Focus Macula Pro:  Eye vitamin and mineral supplement A, C, E, Zinc, Copper        omega-3 acid ethyl esters (LOVAZA) 1 G capsule Take 2 g by mouth 2 times daily        PANTOPRAZOLE SODIUM PO Take 40 mg by mouth daily as needed for heartburn       predniSONE (DELTASONE) 5 MG tablet Take 5 mg by mouth daily (with lunch)        probenecid (BENEMID) 500 MG tablet Take 500  mg by mouth 2 times daily.       Rosuvastatin Calcium (CRESTOR PO) Take 40 mg by mouth every evening        sulfamethoxazole-trimethoprim (BACTRIM,SEPTRA) 400-80 MG per tablet Take 1 tablet by mouth every other day        tamsulosin (FLOMAX) 0.4 MG capsule Take 1 capsule (0.4 mg) by mouth daily (Patient taking differently: Take 0.4 mg by mouth 2 times daily ) 60 capsule 1     warfarin (COUMADIN) 5 MG tablet          PAST SURGICAL HISTORY:  Past Surgical History:   Procedure Laterality Date     C TOTAL KNEE ARTHROPLASTY  2015     CREATE FISTULA ARTERIOVENOUS UPPER EXTREMITY Right 9/25/2018    Procedure: CREATE FISTULA ARTERIOVENOUS UPPER EXTREMITY;  Create Right Brachial Arteriovenous Fistula ;  Surgeon: Sydney Dawkins MD;  Location: [facility]     LAMINECTOMY LUMBAR MINIMALLY INVASIVE THREE+ LEVELS N/A 6/11/2018    Procedure: LAMINECTOMY LUMBAR MINIMALLY INVASIVE THREE+ LEVELS;  Minimally Invasive Surgery Laminectomies Lumbar 2-3,Lumbar 3-4,Lumbar 4-5;  Surgeon: Harshal Rucker MD;  Location: [facility]     LASER HOLMIUM LITHOTRIPSY BLADDER N/A 11/5/2018    Procedure: Cystsocopy, Cystolithalopaxy Of Bladder Stone With Holmium Laser;  Surgeon: Meagan Story MD;  Location: [facility] OR     TONSILLECTOMY       TRANSPLANT KIDNEY RECIPIENT LIVING RELATED  1989    HLA identical     VASECTOMY         ALLERGIES     Allergies   Allergen Reactions     Contrast Dye Other (See Comments) and Itching     Pt reports sneezing     Pravastatin Muscle Pain (Myalgia)     Simvastatin Muscle Pain (Myalgia)       FAMILY HISTORY:  Family History   Problem Relation Age of Onset     Diabetes Mother      Colon Cancer Mother 78     Cardiac Sudden Death Sister 87     Diabetes Brother      Cerebrovascular Disease Brother        SOCIAL HISTORY:  Social History     Social History     Marital status:      Spouse name: N/A     Number of children: N/A     Years of education: N/A     Social History Main Topics     Smoking status: Never  "Smoker     Smokeless tobacco: Never Used     Alcohol use No     Drug use: No     Sexual activity: Not on file     Other Topics Concern     Not on file     Social History Narrative       ROS:   Constitutional: No fever, chills, or sweats. No weight gain/loss   ENT: No visual disturbance, ear ache, epistaxis, sore throat  Allergies/Immunologic: Negative.   Respiratory: No cough, hemoptysia  Cardiovascular: As per HPI  GI: No nausea, vomiting, hematemesis, melena, or hematochezia  : No urinary frequency, dysuria, or hematuria  Integument: Negative  Psychiatric: Negative  Neuro: Negative  Endocrinology: Negative   Musculoskeletal: Negative    EXAM:  /80 (BP Location: Left arm, Patient Position: Chair, Cuff Size: Adult Large)  Pulse 74  Ht 1.854 m (6' 1\")  Wt 108 kg (238 lb)  SpO2 99%  BMI 31.4 kg/m2  In general, the patient is a pleasant male in no apparent distress.    HEENT: NC/AT.  PERRLA.  EOMI.  Sclerae white, not injected.  Nares clear.  Pharynx without erythema or exudate.  Dentition intact.    Neck: No adenopathy.  No thyromegaly. Carotids +4/4 bilaterally without bruits.  No jugular venous distension.   Heart: RRR. Normal S1, S2 splits physiologically. No murmur, rub, click, or gallop. The PMI is in the 5th ICS in the midclavicular line. There is no heave.    Lungs: CTA.  No ronchi, wheezes, rales.  No dullness to percussion.   Abdomen: Soft, nontender, nondistended. No organomegaly.  No bruits.   Extremities: No clubbing, cyanosis, or edema.  The pulses are +4/4 at the radial, brachial, femoral, popliteal, DP, and PT sites bilaterally.  No bruits are noted.  Neurologic: Alert and oriented to person/place/time, normal speech, gait and affect  Skin: No petechiae, purpura or rash.    Labs:  LIPID RESULTS:  Lab Results   Component Value Date    CHOL 158 06/16/2018    HDL 12 (L) 06/16/2018    LDL  06/16/2018     Cannot estimate LDL when triglyceride exceeds 400 mg/dL    LDL 97 06/16/2018    TRIG 429 " (H) 06/16/2018    NHDL 146 (H) 06/16/2018       LIVER ENZYME RESULTS:  Lab Results   Component Value Date    AST 32 10/29/2018    ALT 24 10/29/2018       CBC RESULTS:  Lab Results   Component Value Date    WBC 6.3 11/14/2018    RBC 2.78 (L) 11/14/2018    HGB 9.7 (L) 11/14/2018    HCT 29.3 (L) 11/14/2018     (H) 11/14/2018    MCH 34.9 (H) 11/14/2018    MCHC 33.1 11/14/2018    RDW 13.1 11/14/2018     (L) 11/14/2018       BMP RESULTS:  Lab Results   Component Value Date     11/14/2018    POTASSIUM 4.2 11/14/2018    CHLORIDE 102 11/14/2018    CO2 22 11/14/2018    ANIONGAP 11 11/14/2018     (H) 11/14/2018    BUN 71 (H) 11/14/2018    CR 4.56 (H) 11/14/2018    GFRESTIMATED 13 (L) 11/14/2018    GFRESTBLACK 16 (L) 11/14/2018    TRISHA 9.0 11/14/2018        A1C RESULTS:  Lab Results   Component Value Date    A1C 4.8 12/08/2017       INR RESULTS:  Lab Results   Component Value Date    INR 1.15 (H) 11/05/2018    INR 2.60 (H) 10/29/2018       Procedures:     EKG: sin rhythm, AV block 1st degree, LAHB, slow R progression pericardial leads.  Assessment and Plan:     Mr. 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    We discussed the results with patient and also the importance of a heart healthy lifestyle and diet    #Hypertriglyceridemia:  Etiology likely 2/2 immunosuppression as well as CKD in past. Given that he has had pancreatitis x2 in past year, would not reduce any of his triglyceride lowering therapy at this time. Would consider stopping fenofibrate after renal tx  -Continue fenofibrate 145 mg PO every day  -Continue rosuvastatin 40 mg PO every day  -Continue Lovaza 2g PO BID    #HTN:  -Continue losartan 50 mg PO every day  -Continue Imdur 60 mg PO every day  -Continue nifedipine 60 mg PO QD    Follow-up with labs within 6months    Elroy Rider MD, PhD  Professor of Medicine  Division of Cardiology    CC  Patient Care  Team:  Moris Diaz MD as PCP - General  [facility] Center[facility] MD as MD (Nephrology)  Tl Greenberg MD as MD (Ophthalmology)  Jeremy Sage PA-C as Physician Assistant (Physician Assistant)  Melani Mehta, RN as Nurse Coordinator (Nephrology)  Holly Valenzuela PA, PA-C as Physician Assistant (Dermatology)  Belinda Moseley MD as MD (Family Medicine - Sports Medicine)  Harshal Rucker MD as MD (Orthopaedic Surgery)  Pradip Carbajal MD as MD (Neurology)  Isai Hall MD as MD (Family Medicine - Sports Medicine)  Isai Fagan MD as MD (Cardiology)  Meagan Story MD as MD (Urology)  Holly Carroll, RN as Registered Nurse (Urology)  Moris Diaz MD as Referring Physician (Family Practice)  ANEUDY BLAKELY

## 2018-11-15 NOTE — LETTER
11/15/2018      RE: Rory Bustamante  [address]  [facility] [address]       Dear Colleague,    Thank you for the opportunity to participate in the care of your patient, Rory Bustamante, at the [facility]. Please see a copy of my visit note below.         Cardiology Consultation      HPI:     Mr. 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    Patient had MPI stress from 102/018 revealing large fixed inferoseptal perfusion defect with minimal rosey infarct ischemia; EF 57%.    Lipids from 9/2018: total 176, HDL 22, LDL 81, triglycerides 486.    Reports that in past year he has had 2 episodes of pancreatitis.    Patient reports no CP, SOB, lightheadedness, dizziness, palpitations.    PAST MEDICAL HISTORY:  Past Medical History:   Diagnosis Date     Anemia      Arrhythmia     atrial fib     Arthritis      BPH (benign prostatic hyperplasia)      CAD (coronary artery disease) July 2011    MI in July 2011, stent placed, on plavix and aspirin     ESRD (end stage renal disease) (H)     tx, renal insufficiency     Glaucoma      Gout     Uric acid as high as 11 previously, now on allopurinol and probenecid     HTN (hypertension)      Hypercholesteremia 2015     Hypertriglyceridemia 2015     Intraocular bleeding     previously treated [facility] Avastin     Macular degeneration      Neuropathy     Limited sensation bilateral knees to feet     Obese     BMI 31     Paroxysmal A-fib (H)      Presence of stent in coronary artery August 2012     Proteinuria 11/2015    recurrent MPGN on biopsy     S/P kidney transplant     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  HLA identical transplant.  Biopsy in 4/2008 with recurrent MPGN type 2, mild interstitial fibrosis and tubular atrophy, CNI toxicity     Spinal stenosis 2013     Warts        CURRENT MEDICATIONS:  Current Outpatient Prescriptions   Medication  Sig Dispense Refill     acetaminophen (TYLENOL ARTHRITIS PAIN) 650 MG CR tablet Take 2 tablets by mouth 3 times daily.       allopurinol (ZYLOPRIM) 100 MG tablet Take 2 tablets by mouth daily. (Patient taking differently: Take 100 mg by mouth 2 times daily ) 180 tablet 3     BABY ASPIRIN PO Take 81 mg by mouth daily (with lunch)        BEVACizumab (AVASTIN) 400 MG/16ML injection Every 5 weeks       Brimonidine Tartrate (ALPHAGAN OP) Place 1 drop into the right eye 2 times daily        cephALEXin (KEFLEX) 500 MG capsule        cycloSPORINE modified (GENERIC EQUIVALENT) 100 MG capsule Take 1 capsule (100 mg) by mouth 2 times daily Total dose 150 mg twice daily (Patient taking differently: Take 100 mg by mouth 2 times daily Total dose 125 mg twice daily) 180 capsule 3     cycloSPORINE modified (GENERIC EQUIVALENT) 25 MG capsule Take 2 capsules (50 mg) by mouth 2 times daily Total dose 150 mg twice daily (Patient taking differently: Take 25 mg by mouth 2 times daily Total dose 125 mg twice daily) 180 capsule 3     epoetin freya (PROCRIT) 16273 UNIT/ML injection Inject 1 mL (20,000 Units) Subcutaneous once a week As needed for hgb < 10 (Patient not taking: Reported on 10/29/2018) 4 mL 5     fenofibrate 145 MG tablet Take 145 mg by mouth       fenofibrate 145 MG tablet Take 145 mg by mouth every morning        ISOSORBIDE MONONITRATE PO Take 60 mg by mouth every morning        loperamide (IMODIUM) 2 MG capsule Take 2 mg by mouth daily        losartan (COZAAR) 50 MG tablet TAKE ONE TABLET BY MOUTH DAILY 90 tablet 3     metoprolol (LOPRESSOR) 100 MG tablet Take 1 tablet by mouth 2 times daily. 60 tablet 6     NIFEdipine ER osmotic (PROCARDIA XL) 60 MG TB24 Take 1 tablet (60 mg) by mouth daily 90 tablet 3     nitroGLYCERIN (NITROSTAT) 0.4 MG SL tablet Place 0.4 mg under the tongue every 5 minutes as needed.       NONFORMULARY Take 1 tablet by mouth 2 times daily Focus Macula Pro:  Eye vitamin and mineral supplement A, C, E,  Zinc, Copper        omega-3 acid ethyl esters (LOVAZA) 1 G capsule Take 2 g by mouth 2 times daily        PANTOPRAZOLE SODIUM PO Take 40 mg by mouth daily as needed for heartburn       predniSONE (DELTASONE) 5 MG tablet Take 5 mg by mouth daily (with lunch)        probenecid (BENEMID) 500 MG tablet Take 500 mg by mouth 2 times daily.       Rosuvastatin Calcium (CRESTOR PO) Take 40 mg by mouth every evening        sulfamethoxazole-trimethoprim (BACTRIM,SEPTRA) 400-80 MG per tablet Take 1 tablet by mouth every other day        tamsulosin (FLOMAX) 0.4 MG capsule Take 1 capsule (0.4 mg) by mouth daily (Patient taking differently: Take 0.4 mg by mouth 2 times daily ) 60 capsule 1     warfarin (COUMADIN) 5 MG tablet          PAST SURGICAL HISTORY:  Past Surgical History:   Procedure Laterality Date     C TOTAL KNEE ARTHROPLASTY  2015     CREATE FISTULA ARTERIOVENOUS UPPER EXTREMITY Right 9/25/2018    Procedure: CREATE FISTULA ARTERIOVENOUS UPPER EXTREMITY;  Create Right Brachial Arteriovenous Fistula ;  Surgeon: Sydney Dawkins MD;  Location: [facility] OR     LAMINECTOMY LUMBAR MINIMALLY INVASIVE THREE+ LEVELS N/A 6/11/2018    Procedure: LAMINECTOMY LUMBAR MINIMALLY INVASIVE THREE+ LEVELS;  Minimally Invasive Surgery Laminectomies Lumbar 2-3,Lumbar 3-4,Lumbar 4-5;  Surgeon: Harshal Rucker MD;  Location: [facility]     LASER HOLMIUM LITHOTRIPSY BLADDER N/A 11/5/2018    Procedure: Cystsocopy, Cystolithalopaxy Of Bladder Stone With Holmium Laser;  Surgeon: Meagan Story MD;  Location: [facility] OR     TONSILLECTOMY       TRANSPLANT KIDNEY RECIPIENT LIVING RELATED  1989    HLA identical     VASECTOMY         ALLERGIES     Allergies   Allergen Reactions     Contrast Dye Other (See Comments) and Itching     Pt reports sneezing     Pravastatin Muscle Pain (Myalgia)     Simvastatin Muscle Pain (Myalgia)       FAMILY HISTORY:  Family History   Problem Relation Age of Onset     Diabetes Mother      Colon Cancer Mother 78      "Cardiac Sudden Death Sister 87     Diabetes Brother      Cerebrovascular Disease Brother        SOCIAL HISTORY:  Social History     Social History     Marital status:      Spouse name: N/A     Number of children: N/A     Years of education: N/A     Social History Main Topics     Smoking status: Never Smoker     Smokeless tobacco: Never Used     Alcohol use No     Drug use: No     Sexual activity: Not on file     Other Topics Concern     Not on file     Social History Narrative       ROS:   Constitutional: No fever, chills, or sweats. No weight gain/loss   ENT: No visual disturbance, ear ache, epistaxis, sore throat  Allergies/Immunologic: Negative.   Respiratory: No cough, hemoptysia  Cardiovascular: As per HPI  GI: No nausea, vomiting, hematemesis, melena, or hematochezia  : No urinary frequency, dysuria, or hematuria  Integument: Negative  Psychiatric: Negative  Neuro: Negative  Endocrinology: Negative   Musculoskeletal: Negative    EXAM:  /80 (BP Location: Left arm, Patient Position: Chair, Cuff Size: Adult Large)  Pulse 74  Ht 1.854 m (6' 1\")  Wt 108 kg (238 lb)  SpO2 99%  BMI 31.4 kg/m2  In general, the patient is a pleasant male in no apparent distress.    HEENT: NC/AT.  PERRLA.  EOMI.  Sclerae white, not injected.  Nares clear.  Pharynx without erythema or exudate.  Dentition intact.    Neck: No adenopathy.  No thyromegaly. Carotids +4/4 bilaterally without bruits.  No jugular venous distension.   Heart: RRR. Normal S1, S2 splits physiologically. No murmur, rub, click, or gallop. The PMI is in the 5th ICS in the midclavicular line. There is no heave.    Lungs: CTA.  No ronchi, wheezes, rales.  No dullness to percussion.   Abdomen: Soft, nontender, nondistended. No organomegaly.  No bruits.   Extremities: No clubbing, cyanosis, or edema.  The pulses are +4/4 at the radial, brachial, femoral, popliteal, DP, and PT sites bilaterally.  No bruits are noted.  Neurologic: Alert and oriented to " person/place/time, normal speech, gait and affect  Skin: No petechiae, purpura or rash.    Labs:  LIPID RESULTS:  Lab Results   Component Value Date    CHOL 158 06/16/2018    HDL 12 (L) 06/16/2018    LDL  06/16/2018     Cannot estimate LDL when triglyceride exceeds 400 mg/dL    LDL 97 06/16/2018    TRIG 429 (H) 06/16/2018    NHDL 146 (H) 06/16/2018       LIVER ENZYME RESULTS:  Lab Results   Component Value Date    AST 32 10/29/2018    ALT 24 10/29/2018       CBC RESULTS:  Lab Results   Component Value Date    WBC 6.3 11/14/2018    RBC 2.78 (L) 11/14/2018    HGB 9.7 (L) 11/14/2018    HCT 29.3 (L) 11/14/2018     (H) 11/14/2018    MCH 34.9 (H) 11/14/2018    MCHC 33.1 11/14/2018    RDW 13.1 11/14/2018     (L) 11/14/2018       BMP RESULTS:  Lab Results   Component Value Date     11/14/2018    POTASSIUM 4.2 11/14/2018    CHLORIDE 102 11/14/2018    CO2 22 11/14/2018    ANIONGAP 11 11/14/2018     (H) 11/14/2018    BUN 71 (H) 11/14/2018    CR 4.56 (H) 11/14/2018    GFRESTIMATED 13 (L) 11/14/2018    GFRESTBLACK 16 (L) 11/14/2018    TRISHA 9.0 11/14/2018        A1C RESULTS:  Lab Results   Component Value Date    A1C 4.8 12/08/2017       INR RESULTS:  Lab Results   Component Value Date    INR 1.15 (H) 11/05/2018    INR 2.60 (H) 10/29/2018       Procedures:     EKG: sin rhythm, AV block 1st degree, LAHB, slow R progression pericardial leads.  Assessment and Plan:     Mr. 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    We discussed the results with patient and also the importance of a heart healthy lifestyle and diet    #Hypertriglyceridemia:  Etiology likely 2/2 immunosuppression as well as CKD in past. Given that he has had pancreatitis x2 in past year, would not reduce any of his triglyceride lowering therapy at this time. Would consider stopping fenofibrate after renal tx  -Continue fenofibrate 145 mg PO every day  -Continue  rosuvastatin 40 mg PO every day  -Continue Lovaza 2g PO BID    #HTN:  -Continue losartan 50 mg PO every day  -Continue Imdur 60 mg PO every day  -Continue nifedipine 60 mg PO QD    Follow-up with labs within 6months    Elroy Rider MD, PhD  Professor of Medicine  Division of Cardiology    CC  Patient Care Team:  Moris Diaz MD as PCP - General  MansChristi mercado MD as MD (Nephrology)  Tl Greenberg MD as MD (Ophthalmology)  Jeremy Sage PA-C as Physician Assistant (Physician Assistant)  Melani Mehta, SHIMA as Nurse Coordinator (Nephrology)  Holly Valenzuela PA, PA-C as Physician Assistant (Dermatology)  Belinda Moseley MD as MD (Family Medicine - Sports Medicine)  Harshal Rucker MD as MD (Orthopaedic Surgery)  Pradip Carbajal MD as MD (Neurology)  Isai Hall MD as MD (Family Medicine - Sports Medicine)  Isai Fagan MD as MD (Cardiology)  Meagan Story MD as MD (Urology)  Holly Carroll, RN as Registered Nurse (Urology)  Moris Diaz MD as Referring Physician (Family Practice)  ANEUDY BLAKELY

## 2018-11-15 NOTE — PROGRESS NOTES
UROLOGIC DIAGNOSES:       CURRENT INTERVENTIONS:       HISTORY:     Patient presents for evaluation and management of bladder stone and lower urinary tract symptoms.      Patient is s/p transplant in 1989 but now with decreasing GFR. Will be listed for transplant.   Had CT scan at [facility] that showed an 1.2cm bladder stone near the insertion of the transplant ureter.     Patient also notes lower urinary tract symptoms as below despite taking tamsulosin.       Hesitancy   Slowed stream occasionally   Occasional sensation of incomplete emptying   Sometimes needs to sit to urinate due to spraying stream   No hematuria   No dysuria   No straining to void   No urgency   No UUI    Had episode of urinary retention after knee replacement. Had slowed stream prior but then developed AUR. Started tamsulosin after this. Has been taking since 2015.     Also had urinary retention after back surgery.        Patient currently s/p cystolithalopaxy with holmium for bladder stone at the transplant ureteral orifice. No issues immediately post op but patient developed hematuria and required catheter irrigation.   UDS was equivocal for obstruction though patient could not void with catheter in place.     We discussed post op course, UDS results, further follow up, changes in lower urinary tract symptoms post transplant.         PAST MEDICAL HISTORY:   Past Medical History:   Diagnosis Date     Anemia      Arrhythmia     atrial fib     Arthritis      BPH (benign prostatic hyperplasia)      CAD (coronary artery disease) July 2011    MI in July 2011, stent placed, on plavix and aspirin     ESRD (end stage renal disease) (H)     tx, renal insufficiency     Glaucoma      Gout     Uric acid as high as 11 previously, now on allopurinol and probenecid     HTN (hypertension)      Hypercholesteremia 2015     Hypertriglyceridemia 2015     Intraocular bleeding     previously treated [facility] Avastin     Macular degeneration      Neuropathy      Limited sensation bilateral knees to feet     Obese     BMI 31     Paroxysmal A-fib (H)      Presence of stent in coronary artery August 2012     Proteinuria 11/2015    recurrent MPGN on biopsy     S/P kidney transplant     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  HLA identical transplant.  Biopsy in 4/2008 with recurrent MPGN type 2, mild interstitial fibrosis and tubular atrophy, CNI toxicity     Spinal stenosis 2013     Warts        PAST SURGICAL HISTORY:   Past Surgical History:   Procedure Laterality Date     C TOTAL KNEE ARTHROPLASTY  2015     CREATE FISTULA ARTERIOVENOUS UPPER EXTREMITY Right 9/25/2018    Procedure: CREATE FISTULA ARTERIOVENOUS UPPER EXTREMITY;  Create Right Brachial Arteriovenous Fistula ;  Surgeon: Sydney Dawkins MD;  Location: [facility]     LAMINECTOMY LUMBAR MINIMALLY INVASIVE THREE+ LEVELS N/A 6/11/2018    Procedure: LAMINECTOMY LUMBAR MINIMALLY INVASIVE THREE+ LEVELS;  Minimally Invasive Surgery Laminectomies Lumbar 2-3,Lumbar 3-4,Lumbar 4-5;  Surgeon: Harshal Rucker MD;  Location: [facility] OR     LASER HOLMIUM LITHOTRIPSY BLADDER N/A 11/5/2018    Procedure: Cystsocopy, Cystolithalopaxy Of Bladder Stone With Holmium Laser;  Surgeon: Meagan Story MD;  Location: [facility] OR     TONSILLECTOMY       TRANSPLANT KIDNEY RECIPIENT LIVING RELATED  1989    HLA identical     VASECTOMY         FAMILY HISTORY:   Family History   Problem Relation Age of Onset     Diabetes Mother      Colon Cancer Mother 78     Cardiac Sudden Death Sister 87     Diabetes Brother      Cerebrovascular Disease Brother        SOCIAL HISTORY:   Social History   Substance Use Topics     Smoking status: Never Smoker     Smokeless tobacco: Never Used     Alcohol use No       Current Outpatient Prescriptions   Medication     fenofibrate 145 MG tablet     acetaminophen (TYLENOL ARTHRITIS PAIN) 650 MG CR tablet     allopurinol (ZYLOPRIM) 100 MG tablet     BABY ASPIRIN PO     BEVACizumab (AVASTIN) 400  "MG/16ML injection     Brimonidine Tartrate (ALPHAGAN OP)     cephALEXin (KEFLEX) 500 MG capsule     cycloSPORINE modified (GENERIC EQUIVALENT) 100 MG capsule     cycloSPORINE modified (GENERIC EQUIVALENT) 25 MG capsule     epoetin freya (PROCRIT) 53956 UNIT/ML injection     fenofibrate 145 MG tablet     ISOSORBIDE MONONITRATE PO     loperamide (IMODIUM) 2 MG capsule     losartan (COZAAR) 50 MG tablet     metoprolol (LOPRESSOR) 100 MG tablet     NIFEdipine ER osmotic (PROCARDIA XL) 60 MG TB24     nitroGLYCERIN (NITROSTAT) 0.4 MG SL tablet     NONFORMULARY     omega-3 acid ethyl esters (LOVAZA) 1 G capsule     PANTOPRAZOLE SODIUM PO     predniSONE (DELTASONE) 5 MG tablet     probenecid (BENEMID) 500 MG tablet     Rosuvastatin Calcium (CRESTOR PO)     sulfamethoxazole-trimethoprim (BACTRIM,SEPTRA) 400-80 MG per tablet     tamsulosin (FLOMAX) 0.4 MG capsule     warfarin (COUMADIN) 5 MG tablet     Current Facility-Administered Medications   Medication     lidocaine 1 % injection 3 mL     triamcinolone acetonide (KENALOG-40) injection 40 mg         PHYSICAL EXAM:    /62  Pulse 77  Ht 1.854 m (6' 1\")  Wt 106.1 kg (234 lb)  BMI 30.87 kg/m2    HEENT: Normocephalic and atraumatic   Cardiac: Not done  Back/Flank: Not done  CNS/PNS: Not done  Respiratory: Normal non-labored breathing  Abdomen: Soft nontender and nondistended  Peripheral Vascular: Not done  Mental Status: Not done    Penis: Not done  Scrotal Skin: Not done  Testicles: Not done  Epididymis: Not done  Digital Rectal Exam:     Cystoscopy: Not done    Imaging: None    Urinalysis: [facility] RESULTS:  Recent Labs   Lab Test 10/17/18  06/13/18   1825   COLOR  Yellow  Yellow   APPEARANCE  Clear  Slightly Cloudy   URINEGLC  100   30*   URINEBILI  Neg  Negative   URINEKETONE  Neg  Negative   SG  1.025  1.011   UBLD  Small  Small*   URINEPH  6.0  5.0   PROTEIN  100   30*   UROBILINOGEN  0.2   --    NITRITE  Neg  Negative   LEUKEST  Neg  Small*   RBCU   --   2   WBCU   -- "   3       PSA:     Post Void Residual:     Other labs: None today      IMPRESSION:  64 y/o M with s/p transplant with bladder stone at ureteral orifice, also with lower urinary tract symptoms despite tamsulosin     PLAN:  Continue tamsulosin   Will monitor lower urinary tract symptoms at present   Will consider outlet procedure should lower urinary tract symptoms worsen after second transplant   Uroflow/PVR in 4-6 months   Follow up in 4-6 monhts     Total Time: 15 minutes                                       Total in Consultation: greater than 50%

## 2018-11-15 NOTE — MR AVS SNAPSHOT
After Visit Summary   11/15/2018    Rory Bustamante    MRN: [id number]           Patient Information     Date Of Birth          1955        Visit Information        Provider Department      11/15/2018 6:00 PM Elroy Rider MD [facility]        Today's Diagnoses     CKD (chronic kidney disease) stage 4, GFR 15-29 ml/min (H)    -  1    Hyperlipidemia LDL goal <70        Hypertriglyceridemia          Care Instructions    Patient Instructions:  It was a pleasure to see you in the cardiology clinic today.      If you have any questions, you can reach my nurse, May French LPN, at [phone].  Press Option #1 for the [facility], and then press Option #3 for nursing.    We are encouraging the use of femeninas to communicate with your HealthCare Provider    Medication Changes: None.    Studies Ordered: None.    The results from today include: None.    Please follow up: With Dr. Rider in 6 months with fasting labs prior.    Sincerely,    Elroy Rider MD     If you have an urgent need after hours (8:00 am to 4:30 pm) please call [phone] and ask for the cardiology fellow on call.                    Follow-ups after your visit        Additional Services     Follow-Up with Cardiologist                 Your next 10 appointments already scheduled     Apr 05, 2019 11:15 AM CDT   (Arrive by 11:00 AM)   Return Visit with Meagan Story MD   [facility] ([facility])    [address]  4th Floor  [place] [address] [phone]            May 13, 2019 12:00 PM CDT   (Arrive by 11:45 AM)   Return Visit with Elroy Rider MD   [facility] ([facility])    [address]  [address]  [place] [address] [phone]              Future tests that were ordered for you today     Open Future Orders        Priority Expected  "Expires Ordered    Follow-Up with Cardiologist Routine 5/14/2019 11/15/2019 11/15/2018    Comprehensive metabolic panel Routine 5/14/2019 11/15/2019 11/15/2018    Lipid panel reflex to direct LDL Fasting Routine 5/14/2019 11/15/2019 11/15/2018    Cyclosporine Routine  11/14/2019 11/14/2018            Who to contact     If you have questions or need follow up information about today's clinic visit or your schedule please contact [facility] directly at [phone].  Normal or non-critical lab and imaging results will be communicated to you by Advanced Patient Carehart, letter or phone within 4 business days after the clinic has received the results. If you do not hear from us within 7 days, please contact the clinic through Linguee or phone. If you have a critical or abnormal lab result, we will notify you by phone as soon as possible.  Submit refill requests through Linguee or call your pharmacy and they will forward the refill request to us. Please allow 3 business days for your refill to be completed.          Additional Information About Your Visit        Linguee Information     Linguee gives you secure access to your electronic health record. If you see a primary care provider, you can also send messages to your care team and make appointments. If you have questions, please call your primary care clinic.  If you do not have a primary care provider, please call [phone] and they will assist you.        Care EveryWhere ID     This is your Care EveryWhere ID. This could be used by other organizations to access your [facility] medical records  [id number]        Your Vitals Were     Pulse Height Pulse Oximetry BMI (Body Mass Index)          74 1.854 m (6' 1\") 99% 31.4 kg/m2         Blood Pressure from Last 3 Encounters:   11/15/18 149/80   11/14/18 138/62   11/12/18 139/59    Weight from Last 3 Encounters:   11/15/18 108 kg (238 lb)   11/14/18 106.1 kg (234 lb)   11/12/18 106.2 kg (234 lb 1.6 oz)                 Today's " Medication Changes          These changes are accurate as of 11/15/18  6:35 PM.  If you have any questions, ask your nurse or doctor.               These medicines have changed or have updated prescriptions.        Dose/Directions    allopurinol 100 MG tablet   Commonly known as:  ZYLOPRIM   This may have changed:    - how much to take  - when to take this   Used for:  Kidney replaced by transplant, Gout        Dose:  200 mg   Take 2 tablets by mouth daily.   Quantity:  180 tablet   Refills:  3       * cycloSPORINE modified 25 MG capsule   Commonly known as:  GENERIC EQUIVALENT   This may have changed:    - how much to take  - additional instructions   Used for:  Kidney transplant recipient, Long-term use of immunosuppressant medication        Dose:  50 mg   Take 2 capsules (50 mg) by mouth 2 times daily Total dose 150 mg twice daily   Quantity:  180 capsule   Refills:  3       * cycloSPORINE modified 100 MG capsule   Commonly known as:  GENERIC EQUIVALENT   This may have changed:  additional instructions   Used for:  Kidney transplant recipient, Long-term use of immunosuppressant medication        Dose:  100 mg   Take 1 capsule (100 mg) by mouth 2 times daily Total dose 150 mg twice daily   Quantity:  180 capsule   Refills:  3       tamsulosin 0.4 MG capsule   Commonly known as:  FLOMAX   This may have changed:  when to take this   Used for:  Benign prostatic hyperplasia, unspecified whether lower urinary tract symptoms present        Dose:  0.4 mg   Take 1 capsule (0.4 mg) by mouth daily   Quantity:  60 capsule   Refills:  1       * Notice:  This list has 2 medication(s) that are the same as other medications prescribed for you. Read the directions carefully, and ask your doctor or other care provider to review them with you.             Primary Care Provider Office Phone # Fax #    Moris Diaz [phone]       [facility] [address]  [facility] [address]        Equal  Access to Services     [facility]: Hadii aad ku hadcollinsherrell Leali, wanguyenda luqadaha, qamargita karenettadevin harden. So [facility] [phone].    ATENCIÓN: Si habla español, tiene a carr disposición servicios gratuitos de asistencia lingüística. Llame al [phone].    We comply with applicable federal civil rights laws and [place] laws. We do not discriminate on the basis of race, color, national origin, age, disability, sex, sexual orientation, or gender identity.            Thank you!     Thank you for choosing [facility]  for your care. Our goal is always to provide you with excellent care. Hearing back from our patients is one way we can continue to improve our services. Please take a few minutes to complete the written survey that you may receive in the mail after your visit with us. Thank you!             Your Updated Medication List - Protect others around you: Learn how to safely use, store and throw away your medicines at www.disposemymeds.org.          This list is accurate as of 11/15/18  6:35 PM.  Always use your most recent med list.                   Brand Name Dispense Instructions for use Diagnosis    allopurinol 100 MG tablet    ZYLOPRIM    180 tablet    Take 2 tablets by mouth daily.    Kidney replaced by transplant, Gout       ALPHAGAN OP      Place 1 drop into the right eye 2 times daily        BABY ASPIRIN PO      Take 81 mg by mouth daily (with lunch)        BEVACizumab 400 MG/16ML injection    AVASTIN     Every 5 weeks        cephALEXin 500 MG capsule    KEFLEX          CRESTOR PO      Take 40 mg by mouth every evening        * cycloSPORINE modified 25 MG capsule    GENERIC EQUIVALENT    180 capsule    Take 2 capsules (50 mg) by mouth 2 times daily Total dose 150 mg twice daily    Kidney transplant recipient, Long-term use of immunosuppressant medication       * cycloSPORINE modified 100 MG capsule    GENERIC EQUIVALENT    180 capsule    Take 1  capsule (100 mg) by mouth 2 times daily Total dose 150 mg twice daily    Kidney transplant recipient, Long-term use of immunosuppressant medication       epoetin freya 34885 UNIT/ML injection    PROCRIT    4 mL    Inject 1 mL (20,000 Units) Subcutaneous once a week As needed for hgb < 10    CKD (chronic kidney disease) stage 4, GFR 15-29 ml/min (H), Anemia in stage 4 chronic kidney disease (H)       * fenofibrate 145 MG tablet      Take 145 mg by mouth every morning        * fenofibrate 145 MG tablet      Take 145 mg by mouth        ISOSORBIDE MONONITRATE PO      Take 60 mg by mouth every morning        loperamide 2 MG capsule    IMODIUM     Take 2 mg by mouth daily        losartan 50 MG tablet    COZAAR    90 tablet    TAKE ONE TABLET BY MOUTH DAILY    Kidney replaced by transplant       metoprolol tartrate 100 MG tablet    LOPRESSOR    60 tablet    Take 1 tablet by mouth 2 times daily.    Unspecified hypertensive kidney disease with chronic kidney disease stage I through stage IV, or unspecified(403.90), Kidney replaced by transplant       NIFEdipine ER osmotic 60 MG Tb24    PROCARDIA XL    90 tablet    Take 1 tablet (60 mg) by mouth daily    HTN (hypertension)       NITROSTAT 0.4 MG sublingual tablet   Generic drug:  nitroGLYcerin      Place 0.4 mg under the tongue every 5 minutes as needed.        NONFORMULARY      Take 1 tablet by mouth 2 times daily Focus Macula Pro:  Eye vitamin and mineral supplement A, C, E, Zinc, Copper        omega-3 acid ethyl esters 1 g capsule    Lovaza     Take 2 g by mouth 2 times daily        PANTOPRAZOLE SODIUM PO      Take 40 mg by mouth daily as needed for heartburn        predniSONE 5 MG tablet    DELTASONE     Take 5 mg by mouth daily (with lunch)        probenecid 500 MG tablet    BENEMID     Take 500 mg by mouth 2 times daily.        sulfamethoxazole-trimethoprim 400-80 MG per tablet    BACTRIM/SEPTRA     Take 1 tablet by mouth every other day        tamsulosin 0.4 MG capsule     FLOMAX    60 capsule    Take 1 capsule (0.4 mg) by mouth daily    Benign prostatic hyperplasia, unspecified whether lower urinary tract symptoms present       TYLENOL ARTHRITIS PAIN 650 MG CR tablet   Generic drug:  acetaminophen      Take 2 tablets by mouth 3 times daily.        warfarin 5 MG tablet    COUMADIN          * Notice:  This list has 4 medication(s) that are the same as other medications prescribed for you. Read the directions carefully, and ask your doctor or other care provider to review them with you.

## 2018-11-16 NOTE — TELEPHONE ENCOUNTER
Anemia Management Note  SUBJECTIVE/OBJECTIVE:  Referred by Dr. Christi Sun on 2018  Primary Diagnosis: Anemia in Chronic Kidney Disease (N18.3, D63.1)     Secondary Diagnosis:  Chronic Kidney Disease, Stage 3 (N18.3)   Hgb goal range:  9-10  Epo/Darbo: Procrit  20,000 units once weekly for Hgb < 10  In clinic  RX/TX plans : 2019  Iron regimen:  Ferrous Sulfate 325mg QD  Labs : 2019  Recent MARY LOU use, transfusion, IV iron: IV iron scheduled 2018    Anemia Latest Ref Rng & Units 2018 2018 10/5/2018 10/12/2018 10/19/2018 10/29/2018 2018   MARY LOU Dose - - - 20,000 units 20,000 units - - -   Hemoglobin 13.3 - 17.7 g/dL 10.5(L) 10.1(A) 9.6(A) 9.7(A) 10.4(A) 10.7(L) 9.7(L)   IV Iron Dose - - - - - - - -   TSAT 15 - 46 % - - - - 21 - 23   Ferritin 26 - 388 ng/mL - - - - 580 - 626(H)     BP Readings from Last 3 Encounters:   11/15/18 149/80   18 138/62   18 139/59     Wt Readings from Last 2 Encounters:   11/15/18 238 lb (108 kg)   18 234 lb (106.1 kg)       Rory was scheduled to get a procrit dose on  however, he is not feeling well so he will be cancelling his appt and rescheduling for next week    ASSESSMENT:  Hgb: At goal - recommend dose  TSat: not at goal (>30%) but ferritin >500ng/mL.  IV iron not indicated at this time per anemia protocol. Ferritin: At goal (>100ng/mL)    PLAN:  Dose with procrit and RTC for hgb then procrit if needed in 1 week(s)    Orders needed to be renewed (for next follow-up date) in EPIC: None    Iron labs due:  18    Plan discussed with:  Rory  Plan provided by:  Judy Ramírez [facility]  Anemia Clinic  [phone]    NEXT FOLLOW-UP DATE:  18  Reviewed 2018 [facility]  Anemia Management Service  Grecia Carroll PharmD and Michelle Ramírez CPhT  Phone: [phone]  Fax: [phone]

## 2018-11-16 NOTE — PATIENT INSTRUCTIONS
Patient Instructions:  It was a pleasure to see you in the cardiology clinic today.      If you have any questions, you can reach my nurse, May French LPN, at [phone].  Press Option #1 for the [employer], and then press Option #3 for nursing.    We are encouraging the use of Remoovt to communicate with your HealthCare Provider    Medication Changes: None.    Studies Ordered: None.    The results from today include: None.    Please follow up: With Dr. Rider in 6 months with fasting labs prior.    Sincerely,    Elroy Rider MD     If you have an urgent need after hours (8:00 am to 4:30 pm) please call [phone] and ask for the cardiology fellow on call.

## 2018-11-16 NOTE — NURSING NOTE
Labs:  Patient was instructed to return for the next laboratory testing in 6 months. Patient demonstrated understanding of this information and agreed to call with further questions or concerns.   Med Reconcile: Reviewed and verified all current medications with the patient. The updated medication list was printed and given to the patient.  Return Appointment: Patient given instructions regarding scheduling next clinic visit. Patient demonstrated understanding of this information and agreed to call with further questions or concerns.  Patient stated he understood all health information given and agreed to call with further questions or concerns.    May French LPN

## 2018-11-21 NOTE — TELEPHONE ENCOUNTER
Anemia Management Note  SUBJECTIVE/OBJECTIVE:  Referred by Dr. Christi Sun on 2018  Primary Diagnosis: Anemia in Chronic Kidney Disease (N18.3, D63.1)     Secondary Diagnosis:  Chronic Kidney Disease, Stage 3 (N18.3)   Hgb goal range:  9-10  Epo/Darbo: Procrit  20,000 units once weekly for Hgb < 10  In clinic  RX/TX plans : 2019  Iron regimen:  Ferrous Sulfate 325mg QD  Labs : 2019  Recent MARY LOU use, transfusion, IV iron: IV iron scheduled 2018    Anemia Latest Ref Rng & Units 2018 10/5/2018 10/12/2018 10/19/2018 10/29/2018 2018 2018   MARY LOU Dose - - 20,000 units 20,000 units - - - 20,000 units   Hemoglobin 13.3 - 17.7 g/dL 10.1(A) 9.6(A) 9.7(A) 10.4(A) 10.7(L) 9.7(L) 9.0(A)   IV Iron Dose - - - - - - - -   TSAT % - - - 21 - 23 28   Ferritin ng/mL - - - 580 - 626(H) 675     BP Readings from Last 3 Encounters:   11/15/18 149/80   18 138/62   18 139/59     Wt Readings from Last 2 Encounters:   11/15/18 238 lb (108 kg)   18 234 lb (106.1 kg)       Received and documented outside lab results drawn on     ASSESSMENT:  Hgb: at goal - received dose in clinic - recommend continue current regimen  TSat: not at goal (>30%) but ferritin >500ng/mL.  IV iron not indicated at this time per anemia protocol. Ferritin: At goal (>100ng/mL)    PLAN:  Dosed with procrit and RTC for hgb then procrit if needed in 1 week(s)  oRry galarza will be going in at 10:30 2018 for INR and Hgb/Procrit 2018 DENISE    Orders needed to be renewed (for next follow-up date) in EPIC & LETTERS - for external labs: None    Iron labs due:      Plan discussed with:  No call made  Plan provided by:  Judy Ramírez CPhT  Anemia Clinic  [phone]    NEXT FOLLOW-UP DATE:  18  Reviewed 2018 [facility]  Anemia Management Service  Grecia Carroll PharmD and Michelle Ramírez CPhT  Phone: [phone]  Fax: [phone]

## 2018-11-21 NOTE — TELEPHONE ENCOUNTER
Spoke to Rory to let him know I have reviewed his chart, and he is cleared to be active. Dr. Sun and I have discussed his pancreas issues and he recently saw cardiology and had his annual vineet.   He has a ALA/HLA kit at home and will go to [place] to have them drawn after the holiday.   He had a few questions about travel.   Answered all questions.

## 2018-11-28 NOTE — TELEPHONE ENCOUNTER
Team Conference:      Attendees: Juancho Modi Schumacher, Schenk, Schuller Coordinator, , Dietician, Pharmacy    Discussion and Outcome: Patient status changed to Active approved.

## 2018-11-30 NOTE — TELEPHONE ENCOUNTER
Anemia Management Note  SUBJECTIVE/OBJECTIVE:  Referred by Dr. Christi Sun on 2018  Primary Diagnosis: Anemia in Chronic Kidney Disease (N18.3, D63.1)     Secondary Diagnosis:  Chronic Kidney Disease, Stage 3 (N18.3)   Hgb goal range:  9-10  Epo/Darbo: Procrit  20,000 units once weekly for Hgb < 10  In clinic  RX/TX plans : 2019  Iron regimen:  Ferrous Sulfate 325mg QD  Labs : 2019  Recent MARY LOU use, transfusion, IV iron: IV iron scheduled 2018    Anemia Latest Ref Rng & Units 10/5/2018 10/12/2018 10/19/2018 10/29/2018 2018 2018 2018   MARY LOU Dose - 20,000 units 20,000 units - - - 20,000 units 20,000 units   Hemoglobin 13.3 - 17.7 g/dL 9.6(A) 9.7(A) 10.4(A) 10.7(L) 9.7(L) 9.0(A) 9.5(A)   IV Iron Dose - - - - - - - -   TSAT % - - 21 - 23 28 -   Ferritin ng/mL - - 580 - 626(H) 675 -     BP Readings from Last 3 Encounters:   11/15/18 149/80   18 138/62   18 139/59     Wt Readings from Last 2 Encounters:   11/15/18 238 lb (108 kg)   18 234 lb (106.1 kg)     Received and documented outside lab results drawn on   I called the lab and spoke to Mena and verified that Rory did receive a procrit dose on     ASSESSMENT:  Hgb:at goal - received dose in clinic - recommend continue current regimen  TSat: not at goal (>30%) but ferritin >500ng/mL.  IV iron not indicated at this time per anemia protocol. Ferritin: At goal (>100ng/mL)    PLAN:  Dosed with procrit and RTC for hgb then procrit if needed in 1 week(s)    Orders needed to be renewed (for next follow-up date) in EPIC and LETTERS: None    Iron labs due:  18    Plan discussed with:  Mena in the lab - No call made to Rory  Plan provided by:  Judy Ramírez CP  Anemia Clinic  [phone]    NEXT FOLLOW-UP DATE:  18  Reviewed 2018 [facility]  Anemia Management Service  Grecia Carroll,MirzaD, Michelle Ramírez,[facility]  Belinda Escalona RN  Phone: [phone]  Fax: [phone]

## 2018-12-05 NOTE — TELEPHONE ENCOUNTER
Received call from [facility] in [place]. States patient is scheduled for an appointment today to evaluate shortness of breath which started last night. He does have edema. Staff feel it's likely CHF related. They are considering starting him on lasix, wanted provider's opinion. Offered Dr. Sun's pager number for provider to provider contact.    Melani Mehta RN

## 2018-12-07 NOTE — TELEPHONE ENCOUNTER
Anemia Management Note  SUBJECTIVE/OBJECTIVE:  Referred by Dr. Christi Sun on 2018  Primary Diagnosis: Anemia in Chronic Kidney Disease (N18.3, D63.1)     Secondary Diagnosis:  Chronic Kidney Disease, Stage 3 (N18.3)   Hgb goal range:  9-10  Epo/Darbo: Procrit  20,000 units once weekly for Hgb < 10  In clinic  RX/TX plans : 2019  Iron regimen:  Ferrous Sulfate 325mg QD  Labs : 2019  Recent MARY LOU use, transfusion, IV iron: IV iron scheduled 2018    Anemia Latest Ref Rng & Units 10/12/2018 10/19/2018 10/29/2018 2018 2018 2018 2018   MARY LOU Dose - 20,000 units - - - 20,000 units 20,000 units 20,000 units   Hemoglobin 13.3 - 17.7 g/dL 9.7(A) 10.4(A) 10.7(L) 9.7(L) 9.0(A) 9.5(A) 9.7(A)   IV Iron Dose - - - - - - - -   TSAT % -  -  28 - -   Ferritin ng/mL - 580 - 626(H) 675 - -     BP Readings from Last 3 Encounters:   11/15/18 149/80   18 138/62   18 139/59     Wt Readings from Last 2 Encounters:   11/15/18 238 lb (108 kg)   18 234 lb (106.1 kg)       Received and documented outside lab results and procrit dose given on 18    ASSESSMENT:  Hgb:at goal - received dose in clinic - recommend continue current regimen  TSat: not at goal (>30%) but ferritin >500ng/mL.  IV iron not indicated at this time per anemia protocol. Ferritin: At goal (>100ng/mL)    PLAN:  Dosed with procrit and RTC for hgb then procrit if needed in 1 week(s)    Orders needed to be renewed (for next follow-up date) in LETTERS - for external labs: None    Iron labs due:  18    Plan discussed with: Rory  Plan provided by:  Judy Ramírez CPhT  Anemia Clinic  [phone]    NEXT FOLLOW-UP DATE:  18  Reviewed 2018 [facility]  Anemia Management Service  Grecia Carroll,[place], Sheri Samuel RN  Phone: [phone]  Fax: [phone]

## 2018-12-14 NOTE — TELEPHONE ENCOUNTER
Anemia Management Note  SUBJECTIVE/OBJECTIVE:  Referred by Dr. Christi Sun on 2018  Primary Diagnosis: Anemia in Chronic Kidney Disease (N18.3, D63.1)     Secondary Diagnosis:  Chronic Kidney Disease, Stage 3 (N18.3)   Hgb goal range:  9-10  Epo/Darbo: Procrit  20,000 units once weekly for Hgb < 10  In clinic  RX/TX plans : 2019  Iron regimen:  Ferrous Sulfate 325mg QD  Labs : 2019    Anemia Latest Ref Rng & Units 10/19/2018 10/29/2018 2018 2018 2018 2018 2018   MARY LOU Dose - - - - 20,000 units 20,000 units 20,000 units -   Hemoglobin 13.3 - 17.7 g/dL 10.4(A) 10.7(L) 9.7(L) 9.0(A) 9.5(A) 9.7(A) 10.1(A)   IV Iron Dose - - - - - - - -   TSAT %  - - -   Ferritin ng/mL 580 - 626(H) 675 - - -     BP Readings from Last 3 Encounters:   11/15/18 149/80   18 138/62   18 139/59     Wt Readings from Last 2 Encounters:   11/15/18 238 lb (108 kg)   18 234 lb (106.1 kg)       Received and documented outside lab results drawn on   No procrit dose give per note on fax sheet    ASSESSMENT:  Hgb: Above goal - recommend hold dose  TSat:  not at goal (>30%) but ferritin >500ng/mL.  IV iron not indicated at this time per anemia protocol.  Ferritin: At goal (>100ng/mL)    PLAN:  Held Procrit and RTC for hgb, ferritin and iron labs then procrit if needed in 1 week(s)    Orders needed to be renewed (for next follow-up date) in Epic & LETTERS: None    Iron labs due:  18    Plan discussed with:  No call made  Plan provided by:  Judy Ramírez [facility]  Anemia Clinic  [phone]    NEXT FOLLOW-UP DATE:  18  Reviewed 2018 [facility]  Anemia Management Service  Grecia Carorll,MirzaD, Michelle Ramírez,JUAN F  Belinda Escalona, SHIMA  Phone: [phone]  Fax: [phone]

## 2018-12-21 NOTE — TELEPHONE ENCOUNTER
"Anemia Management Note  SUBJECTIVE/OBJECTIVE:  Referred by Dr. Christi Sun on 2018  Primary Diagnosis: Anemia in Chronic Kidney Disease (N18.3, D63.1)     Secondary Diagnosis:  Chronic Kidney Disease, Stage 3 (N18.3)   Hgb goal range:  9-10  Epo/Darbo: Procrit  20,000 units once weekly for Hgb < 10  In clinic  RX/TX plans : 2019  Iron regimen:  Ferrous Sulfate 325mg QD  Labs : 2019    Anemia Latest Ref Rng & Units 10/29/2018 2018 2018 2018 2018 2018 2018   MARY LOU Dose - - - 20,000 units 20,000 units 20,000 units - -   Hemoglobin 13.3 - 17.7 g/dL 10.7(L) 9.7(L) 9.0(A) 9.5(A) 9.7(A) 10.1(A) 10.5(A)   IV Iron Dose - - - - - - - -   TSAT % - 23 28 - - - 20   Ferritin ng/mL - 626(H) 675 - - - 486     BP Readings from Last 3 Encounters:   11/15/18 149/80   18 138/62   18 139/59     Wt Readings from Last 2 Encounters:   11/15/18 238 lb (108 kg)   18 234 lb (106.1 kg)       Received and documented outside lab results drawn on     I spoke to Rory and verified that he stopped taking ferrous sulfate quite awhile ago due to side affects.    ASSESSMENT:  Hgb: Above goal - recommend hold dose - per note on fax sheet \"Patient did not receive procrit today\"  TSat: not at goal of >30% Ferritin: At goal (>100ng/mL)    PLAN:  Held Procrit and RTC for hgb then procrit if needed in 1 week(s)  Referred to Grecia Carroll to determine if additional iron therapy is needed     Orders needed to be renewed (for next follow-up date) in LETTERS - for external labs: None    Iron labs due:  19    Plan discussed with:  COLLEEN Valadez/Kajal  Plan provided by:  Judy Ramírez [facility]  Anemia Clinic  [phone]    NEXT FOLLOW-UP DATE:  18  Reviewed 2018 [facility]  Anemia Management Service  Grecia Carroll PharmD, Sheri Samuel RN  Phone: [phone]  Fax: [phone]      "

## 2018-12-21 NOTE — TELEPHONE ENCOUNTER
Call from Flavia at [place] with critical BUN of 83    Last 2 BUN were:  11-17=74  10-20=75    Results were faxed to:   Grecia Carroll [facility]   Pharmacist     Critical lab results called to Presley in the Nephrology clinic - stated they will notify the nephrologist.    Kathleen M Doege RN

## 2018-12-28 NOTE — TELEPHONE ENCOUNTER
Anemia Management Note  SUBJECTIVE/OBJECTIVE:  Referred by Dr. Christi Sun on 2018  Primary Diagnosis: Anemia in Chronic Kidney Disease (N18.3, D63.1)     Secondary Diagnosis:  Chronic Kidney Disease, Stage 3 (N18.3)   Hgb goal range:  9-10  Epo/Darbo: Procrit  20,000 units once weekly for Hgb < 10  In clinic  RX/TX plans : 2019  Iron regimen:  None  Labs : 2019    Anemia Latest Ref Rng & Units 2018   MARY LOU Dose - - 20,000 units 20,000 units 20,000 units - - -   Hemoglobin 13.3 - 17.7 g/dL 9.7(L) 9.0(A) 9.5(A) 9.7(A) 10.1(A) 10.5(A) 10.5(A)   IV Iron Dose - - - - - - - -   TSAT %  - - - 20 -   Ferritin ng/mL 626(H) 675 - - - 486 -     BP Readings from Last 3 Encounters:   11/15/18 149/80   18 138/62   18 139/59     Wt Readings from Last 2 Encounters:   11/15/18 238 lb (108 kg)   18 234 lb (106.1 kg)       Received and documented outside lab results drawn on      I spoke to Rory and verified that he stopped taking ferrous sulfate quite awhile ago due to side affects.     ASSESSMENT:  Hgb: Above goal - recommend hold dose - Patient did not receive procrit today per not on fax sheet  TSat: not at goal of >30% Ferritin: At goal (>100ng/mL)     PLAN:  Held Procrit and RTC for hgb then procrit if needed in 1 week(s)  Referred to Grecia Carroll to determine if additional iron therapy is needed   As long as Hgb stays stable, and ferritin close to 500ng/ml, OK to not take addition iron. DENISE    Orders needed to be renewed (for next follow-up date) in LETTERS - for external labs: None     Iron labs due:  19     Follow up date:  19    Plan discussed with:  COLLEEN Valadez/Kajal  Plan provided by:  Judy Ramírez CPhT  Anemia Clinic  [phone]  Reviewed 2018 [facility]  Anemia Management Service  Grecia Carroll,MirzaD, Michelle Ramírez[facility]  Belinda Escalona RN  Phone: [phone]  Fax:  [phone]

## 2019-01-01 ENCOUNTER — TELEPHONE (OUTPATIENT)
Dept: GASTROENTEROLOGY | Facility: CLINIC | Age: 64
End: 2019-01-01

## 2019-01-01 ENCOUNTER — TELEPHONE (OUTPATIENT)
Dept: PHARMACY | Facility: CLINIC | Age: 64
End: 2019-01-01

## 2019-01-01 ENCOUNTER — PATIENT OUTREACH (OUTPATIENT)
Dept: GASTROENTEROLOGY | Facility: CLINIC | Age: 64
End: 2019-01-01

## 2019-01-01 ENCOUNTER — TRANSFERRED RECORDS (OUTPATIENT)
Dept: HEALTH INFORMATION MANAGEMENT | Facility: CLINIC | Age: 64
End: 2019-01-01

## 2019-01-01 ENCOUNTER — TELEPHONE (OUTPATIENT)
Dept: TRANSPLANT | Facility: CLINIC | Age: 64
End: 2019-01-01

## 2019-01-01 ENCOUNTER — CARE COORDINATION (OUTPATIENT)
Dept: SURGERY | Facility: CLINIC | Age: 64
End: 2019-01-01

## 2019-01-01 ENCOUNTER — CARE COORDINATION (OUTPATIENT)
Dept: GASTROENTEROLOGY | Facility: CLINIC | Age: 64
End: 2019-01-01

## 2019-01-01 ENCOUNTER — MEDICAL CORRESPONDENCE (OUTPATIENT)
Dept: TRANSPLANT | Facility: CLINIC | Age: 64
End: 2019-01-01

## 2019-01-01 ENCOUNTER — COMMITTEE REVIEW (OUTPATIENT)
Dept: TRANSPLANT | Facility: CLINIC | Age: 64
End: 2019-01-01

## 2019-01-01 ENCOUNTER — HOSPITAL ENCOUNTER (OUTPATIENT)
Dept: CARDIOLOGY | Facility: CLINIC | Age: 64
Discharge: HOME | End: 2019-09-23
Attending: INTERNAL MEDICINE | Admitting: INTERNAL MEDICINE
Payer: MEDICARE

## 2019-01-01 ENCOUNTER — PRE VISIT (OUTPATIENT)
Dept: UROLOGY | Facility: CLINIC | Age: 64
End: 2019-01-01

## 2019-01-01 ENCOUNTER — HOSPITAL ENCOUNTER (OUTPATIENT)
Dept: NUCLEAR MEDICINE | Facility: CLINIC | Age: 64
Setting detail: NUCLEAR MEDICINE
End: 2019-09-23
Attending: INTERNAL MEDICINE
Payer: MEDICARE

## 2019-01-01 ENCOUNTER — HEALTH MAINTENANCE LETTER (OUTPATIENT)
Age: 64
End: 2019-01-01

## 2019-01-01 ENCOUNTER — OFFICE VISIT (OUTPATIENT)
Dept: CARDIOLOGY | Facility: CLINIC | Age: 64
End: 2019-01-01
Attending: INTERNAL MEDICINE
Payer: MEDICARE

## 2019-01-01 ENCOUNTER — HOSPITAL ENCOUNTER (OUTPATIENT)
Facility: CLINIC | Age: 64
End: 2019-01-01
Attending: INTERNAL MEDICINE | Admitting: INTERNAL MEDICINE
Payer: MEDICARE

## 2019-01-01 ENCOUNTER — TELEPHONE (OUTPATIENT)
Dept: NEPHROLOGY | Facility: CLINIC | Age: 64
End: 2019-01-01

## 2019-01-01 ENCOUNTER — ALLIED HEALTH/NURSE VISIT (OUTPATIENT)
Dept: TRANSPLANT | Facility: CLINIC | Age: 64
End: 2019-01-01
Attending: INTERNAL MEDICINE
Payer: MEDICARE

## 2019-01-01 ENCOUNTER — EXTERNAL ORDER RESULTS (OUTPATIENT)
Dept: TRANSPLANT | Facility: CLINIC | Age: 64
End: 2019-01-01

## 2019-01-01 ENCOUNTER — OFFICE VISIT (OUTPATIENT)
Dept: NEPHROLOGY | Facility: CLINIC | Age: 64
End: 2019-01-01
Attending: INTERNAL MEDICINE
Payer: MEDICARE

## 2019-01-01 ENCOUNTER — RESULTS ONLY (OUTPATIENT)
Dept: OTHER | Facility: CLINIC | Age: 64
End: 2019-01-01

## 2019-01-01 ENCOUNTER — DOCUMENTATION ONLY (OUTPATIENT)
Dept: TRANSPLANT | Facility: CLINIC | Age: 64
End: 2019-01-01

## 2019-01-01 ENCOUNTER — DOCUMENTATION ONLY (OUTPATIENT)
Dept: CARE COORDINATION | Facility: CLINIC | Age: 64
End: 2019-01-01

## 2019-01-01 ENCOUNTER — APPOINTMENT (OUTPATIENT)
Dept: LAB | Facility: CLINIC | Age: 64
End: 2019-01-01
Attending: TRANSPLANT SURGERY
Payer: MEDICARE

## 2019-01-01 ENCOUNTER — ALLIED HEALTH/NURSE VISIT (OUTPATIENT)
Dept: UROLOGY | Facility: CLINIC | Age: 64
End: 2019-01-01
Payer: MEDICARE

## 2019-01-01 ENCOUNTER — OFFICE VISIT (OUTPATIENT)
Dept: UROLOGY | Facility: CLINIC | Age: 64
End: 2019-01-01
Payer: MEDICARE

## 2019-01-01 ENCOUNTER — PREP FOR PROCEDURE (OUTPATIENT)
Dept: GASTROENTEROLOGY | Facility: CLINIC | Age: 64
End: 2019-01-01

## 2019-01-01 ENCOUNTER — MEDICAL CORRESPONDENCE (OUTPATIENT)
Dept: HEALTH INFORMATION MANAGEMENT | Facility: CLINIC | Age: 64
End: 2019-01-01

## 2019-01-01 VITALS — WEIGHT: 227.2 LBS | OXYGEN SATURATION: 97 % | BODY MASS INDEX: 29.98 KG/M2

## 2019-01-01 VITALS — WEIGHT: 227.2 LBS | HEIGHT: 73 IN | BODY MASS INDEX: 30.11 KG/M2

## 2019-01-01 VITALS
OXYGEN SATURATION: 98 % | SYSTOLIC BLOOD PRESSURE: 125 MMHG | DIASTOLIC BLOOD PRESSURE: 63 MMHG | WEIGHT: 218.8 LBS | BODY MASS INDEX: 29 KG/M2 | HEART RATE: 73 BPM | HEIGHT: 73 IN

## 2019-01-01 VITALS
DIASTOLIC BLOOD PRESSURE: 53 MMHG | HEIGHT: 73 IN | WEIGHT: 228.5 LBS | BODY MASS INDEX: 30.28 KG/M2 | HEART RATE: 71 BPM | SYSTOLIC BLOOD PRESSURE: 100 MMHG

## 2019-01-01 VITALS
SYSTOLIC BLOOD PRESSURE: 119 MMHG | HEIGHT: 73 IN | HEART RATE: 69 BPM | DIASTOLIC BLOOD PRESSURE: 75 MMHG | BODY MASS INDEX: 31.14 KG/M2 | WEIGHT: 235 LBS

## 2019-01-01 DIAGNOSIS — Z12.5 PROSTATE CANCER SCREENING: ICD-10-CM

## 2019-01-01 DIAGNOSIS — Z76.82 ORGAN TRANSPLANT CANDIDATE: ICD-10-CM

## 2019-01-01 DIAGNOSIS — Z48.298 AFTERCARE FOLLOWING ORGAN TRANSPLANT: ICD-10-CM

## 2019-01-01 DIAGNOSIS — N18.4 ANEMIA IN STAGE 4 CHRONIC KIDNEY DISEASE (H): Primary | ICD-10-CM

## 2019-01-01 DIAGNOSIS — N18.5 CKD (CHRONIC KIDNEY DISEASE) STAGE 5, GFR LESS THAN 15 ML/MIN (H): ICD-10-CM

## 2019-01-01 DIAGNOSIS — E78.1 HYPERTRIGLYCERIDEMIA: ICD-10-CM

## 2019-01-01 DIAGNOSIS — Z12.5 PROSTATE CANCER SCREENING: Primary | ICD-10-CM

## 2019-01-01 DIAGNOSIS — N18.4 CKD (CHRONIC KIDNEY DISEASE) STAGE 4, GFR 15-29 ML/MIN (H): ICD-10-CM

## 2019-01-01 DIAGNOSIS — N18.4 ANEMIA IN STAGE 4 CHRONIC KIDNEY DISEASE (H): ICD-10-CM

## 2019-01-01 DIAGNOSIS — Z79.899 ENCOUNTER FOR LONG-TERM CURRENT USE OF MEDICATION: ICD-10-CM

## 2019-01-01 DIAGNOSIS — E78.5 HYPERLIPIDEMIA LDL GOAL <70: ICD-10-CM

## 2019-01-01 DIAGNOSIS — D47.2 MGUS (MONOCLONAL GAMMOPATHY OF UNKNOWN SIGNIFICANCE): Primary | ICD-10-CM

## 2019-01-01 DIAGNOSIS — Z94.0 S/P KIDNEY TRANSPLANT: ICD-10-CM

## 2019-01-01 DIAGNOSIS — Z76.82 AWAITING ORGAN TRANSPLANT: ICD-10-CM

## 2019-01-01 DIAGNOSIS — Z76.82 AWAITING ORGAN TRANSPLANT: Primary | ICD-10-CM

## 2019-01-01 DIAGNOSIS — K86.2 PANCREAS CYST: ICD-10-CM

## 2019-01-01 DIAGNOSIS — D63.1 ANEMIA IN STAGE 4 CHRONIC KIDNEY DISEASE (H): ICD-10-CM

## 2019-01-01 DIAGNOSIS — Z48.298 AFTERCARE FOLLOWING ORGAN TRANSPLANT: Primary | ICD-10-CM

## 2019-01-01 DIAGNOSIS — N18.6 ESRD (END STAGE RENAL DISEASE) (H): ICD-10-CM

## 2019-01-01 DIAGNOSIS — N06.8 ISOLATED PROTEINURIA WITH OTHER MORPHOLOGIC LESION: ICD-10-CM

## 2019-01-01 DIAGNOSIS — Z94.0 KIDNEY REPLACED BY TRANSPLANT: ICD-10-CM

## 2019-01-01 DIAGNOSIS — D49.0 IPMN (INTRADUCTAL PAPILLARY MUCINOUS NEOPLASM): Primary | ICD-10-CM

## 2019-01-01 DIAGNOSIS — I48.21 PERMANENT ATRIAL FIBRILLATION (H): ICD-10-CM

## 2019-01-01 DIAGNOSIS — N18.6 ESRD (END STAGE RENAL DISEASE) (H): Primary | ICD-10-CM

## 2019-01-01 DIAGNOSIS — N18.4 CKD (CHRONIC KIDNEY DISEASE) STAGE 4, GFR 15-29 ML/MIN (H): Primary | ICD-10-CM

## 2019-01-01 DIAGNOSIS — I10 ESSENTIAL HYPERTENSION: ICD-10-CM

## 2019-01-01 DIAGNOSIS — Z94.0 KIDNEY TRANSPLANT RECIPIENT: Primary | ICD-10-CM

## 2019-01-01 DIAGNOSIS — Z01.810 ENCOUNTER FOR PRE-OPERATIVE CARDIOVASCULAR CLEARANCE: ICD-10-CM

## 2019-01-01 DIAGNOSIS — Z79.60 LONG-TERM USE OF IMMUNOSUPPRESSANT MEDICATION: ICD-10-CM

## 2019-01-01 DIAGNOSIS — R33.9 INCOMPLETE EMPTYING OF BLADDER: ICD-10-CM

## 2019-01-01 DIAGNOSIS — Z94.0 KIDNEY REPLACED BY TRANSPLANT: Primary | ICD-10-CM

## 2019-01-01 DIAGNOSIS — E55.9 VITAMIN D DEFICIENCY: ICD-10-CM

## 2019-01-01 DIAGNOSIS — K86.2 PANCREAS CYST: Primary | ICD-10-CM

## 2019-01-01 DIAGNOSIS — D63.1 ANEMIA IN STAGE 4 CHRONIC KIDNEY DISEASE (H): Primary | ICD-10-CM

## 2019-01-01 DIAGNOSIS — M1A.3790 CHRONIC GOUT DUE TO RENAL IMPAIRMENT INVOLVING FOOT WITHOUT TOPHUS, UNSPECIFIED LATERALITY: Primary | ICD-10-CM

## 2019-01-01 DIAGNOSIS — Z94.0 KIDNEY TRANSPLANT RECIPIENT: ICD-10-CM

## 2019-01-01 DIAGNOSIS — R39.198 SLOWING OF URINARY STREAM: Primary | ICD-10-CM

## 2019-01-01 DIAGNOSIS — R33.9 URINARY RETENTION: Primary | ICD-10-CM

## 2019-01-01 LAB
ALBUMIN SERPL-MCNC: 3.7 G/DL
ALP SERPL-CCNC: 43 U/L
ALP SERPL-CCNC: 43 U/L (ref 46–116)
ALT SERPL-CCNC: 18 U/L (ref 14–63)
ALT SERPL-CCNC: 24 U/L
ALT SERPL-CCNC: 27 U/L
ANION GAP SERPL CALCULATED.3IONS-SCNC: 9.4 MMOL/L
AST SERPL-CCNC: 17 U/L (ref 15–37)
AST SERPL-CCNC: 19 U/L
AST SERPL-CCNC: 25 U/L
BILIRUB SERPL-MCNC: 0.3 MG/DL
BILIRUB SERPL-MCNC: 0.4 MG/DL
BUN SERPL-MCNC: 64 MG/DL (ref 7–18)
BUN SERPL-MCNC: 75 MG/DL
BUN SERPL-MCNC: 89 MG/DL
CALCIUM SERPL-MCNC: 9.1 MG/DL
CALCIUM SERPL-MCNC: 9.4 MG/DL
CALCIUM SERPL-MCNC: 9.8 MG/DL
CHLORIDE SERPLBLD-SCNC: 101 MMOL/L
CHLORIDE SERPLBLD-SCNC: 102 MMOL/L
CHLORIDE SERPLBLD-SCNC: 99 MMOL/L
CHOLEST SERPL-MCNC: 153 MG/DL
CO2 SERPL-SCNC: 20 MMOL/L
CO2 SERPL-SCNC: 23 MMOL/L
COMMENT VXMB1: NORMAL
COMMENT VXMT1: NORMAL
CREAT SERPL-MCNC: 4.02 MG/DL
CREAT SERPL-MCNC: 4.28 MG/DL (ref 0.67–1.17)
CREAT SERPL-MCNC: 4.44 MG/DL
CREAT SERPL-MCNC: 4.45 MG/DL (ref 0.67–1.17)
CREAT SERPL-MCNC: 4.51 MG/DL (ref 0.67–1.17)
CREATININE URINE: 72
CROSSMATCHDATEVXM: NORMAL
CYCLOSPORINE BLD LC/MS/MS-MCNC: 66 UG/L (ref 50–400)
CYCLOSPORINE BLD LC/MS/MS-MCNC: 67 UG/L (ref 50–400)
CYCLOSPORINE BLD LC/MS/MS-MCNC: 72 UG/L (ref 50–400)
DONOR VXM: NORMAL
DSA VXM B1: NORMAL
DSA VXMT1: NORMAL
FERRITIN SERPL-MCNC: 188 NG/ML
FERRITIN SERPL-MCNC: 276 NG/ML
FERRITIN SERPL-MCNC: 44 NG/ML
FERRITIN SERPL-MCNC: 458 NG/ML
FERRITIN SERPL-MCNC: 551 NG/ML
FERRITIN SERPL-MCNC: 80 NG/ML
FERRITIN SERPL-MCNC: 832 NG/ML
FERRITIN SERPL-MCNC: 93 NG/ML (ref 26–388)
GFR SERPL CREATININE-BSD FRML MDRD: 13 ML/MIN/1.73M2
GFR SERPL CREATININE-BSD FRML MDRD: 13 ML/MIN/1.73M2
GFR SERPL CREATININE-BSD FRML MDRD: 13 ML/MIN/1.73M2 (ref 60–150)
GFR SERPL CREATININE-BSD FRML MDRD: 14 ML/MIN/1.73M2 (ref 60–150)
GFR SERPL CREATININE-BSD FRML MDRD: 15 ML/MIN/1.73M2
GLUCOSE SERPL-MCNC: 105 MG/DL (ref 74–100)
GLUCOSE SERPL-MCNC: 119 MG/DL (ref 74–100)
GLUCOSE SERPL-MCNC: 130 MG/DL (ref 70–99)
GLUCOSE SERPL-MCNC: 146 MG/DL (ref 70–99)
GLUCOSE SERPL-MCNC: 90 MG/DL (ref 70–99)
HCT VFR BLD AUTO: 25.7 %
HCT VFR BLD AUTO: 26.6 %
HCT VFR BLD AUTO: 26.7 %
HCT VFR BLD AUTO: 26.8 %
HCT VFR BLD AUTO: 27.9 %
HCT VFR BLD AUTO: 28.7 %
HCT VFR BLD AUTO: 29.1 %
HCT VFR BLD AUTO: 29.2 %
HCT VFR BLD AUTO: 29.3 %
HCT VFR BLD AUTO: 29.4 %
HCT VFR BLD AUTO: 29.5 % (ref 40–53)
HCT VFR BLD AUTO: 29.6 %
HCT VFR BLD AUTO: 29.8 %
HCT VFR BLD AUTO: 29.9 %
HCT VFR BLD AUTO: 29.9 %
HCT VFR BLD AUTO: 30 %
HCT VFR BLD AUTO: 30.1 %
HCT VFR BLD AUTO: 30.2 %
HCT VFR BLD AUTO: 30.2 %
HCT VFR BLD AUTO: 30.4 %
HCT VFR BLD AUTO: 30.6 %
HCT VFR BLD AUTO: 30.9 %
HCT VFR BLD AUTO: 31.1 %
HCT VFR BLD AUTO: 31.2 %
HCT VFR BLD AUTO: 31.2 %
HCT VFR BLD AUTO: 31.4 %
HCT VFR BLD AUTO: 31.7 %
HCT VFR BLD AUTO: 32.2 %
HDLC SERPL-MCNC: 36 MG/DL
HEMATOCRIT (EXTERNAL): 31.7 % (ref 40–52)
HEMATOCRIT (EXTERNAL): 33.7 % (ref 40–52)
HEMOGLOBIN (EXTERNAL): 10.4 G/DL (ref 13–18)
HEMOGLOBIN: 10 G/DL (ref 13.3–17.7)
HEMOGLOBIN: 10.1 G/DL (ref 13.3–17.7)
HEMOGLOBIN: 10.3 G/DL (ref 13.3–17.7)
HEMOGLOBIN: 10.3 G/DL (ref 13.3–17.7)
HEMOGLOBIN: 10.4 G/DL (ref 13.3–17.7)
HEMOGLOBIN: 10.4 G/DL (ref 13.3–17.7)
HEMOGLOBIN: 10.5 G/DL (ref 13.3–17.7)
HEMOGLOBIN: 10.6 G/DL (ref 13.3–17.7)
HEMOGLOBIN: 10.9 G/DL (ref 13.3–17.7)
HEMOGLOBIN: 7.6 G/DL (ref 13.3–17.7)
HEMOGLOBIN: 7.8 G/DL (ref 13.3–17.7)
HEMOGLOBIN: 8.3 G/DL (ref 13.3–17.7)
HEMOGLOBIN: 8.7 G/DL (ref 13.3–17.7)
HEMOGLOBIN: 8.8 G/DL (ref 13.3–17.7)
HEMOGLOBIN: 8.9 G/DL (ref 13.3–17.7)
HEMOGLOBIN: 9.2 G/DL (ref 13.3–17.7)
HEMOGLOBIN: 9.5 G/DL (ref 13.3–17.7)
HEMOGLOBIN: 9.7 G/DL (ref 13.3–17.7)
HEMOGLOBIN: 9.8 G/DL (ref 13.3–17.7)
HEMOGLOBIN: 9.8 G/DL (ref 13.3–17.7)
HEMOGLOBIN: 9.9 G/DL (ref 13.3–17.7)
HGB BLD-MCNC: 9.9 G/DL (ref 13.3–17.7)
INR PPP: 1.9 (ref 0.9–1.1)
INR PPP: 2.3
INR PPP: 3.7
IRON (EXTERNAL): 75 MCG/DL (ref 65–175)
IRON BINDING CAP (EXTERNAL): 379 MCG/DL (ref 250–450)
IRON BINDING CAP: 334
IRON SATN MFR SERPL: 9 % (ref 15–46)
IRON SATURATION INDEX: 11 %
IRON SATURATION INDEX: 11 %
IRON SATURATION INDEX: 12 %
IRON SATURATION INDEX: 16 %
IRON SATURATION INDEX: 19 %
IRON SATURATION INDEX: 20 %
IRON SATURATION INDEX: 5 %
IRON SERPL-MCNC: 43 UG/DL (ref 35–180)
IRON: 64 UG/DL
LDLC SERPL CALC-MCNC: 78 MG/DL (ref 1–129)
NONHDLC SERPL-MCNC: 117 MG/DL
POTASSIUM SERPL-SCNC: 3.8 MMOL/L (ref 3.5–5.1)
POTASSIUM SERPL-SCNC: 4 MMOL/L
POTASSIUM SERPL-SCNC: 4.2 MMOL/L
POTASSIUM SERPL-SCNC: 4.2 MMOL/L (ref 3.5–5.1)
POTASSIUM SERPL-SCNC: 4.4 MMOL/L
PROT SERPL-MCNC: 7.7 G/DL
PROT SERPL-MCNC: 7.9 G/DL
PROTOCOL CUTOFF: NORMAL
PSA SERPL-ACNC: 1.54 UG/L (ref 0–4)
RESULT VXM B1: NORMAL
RESULT VXM T1: NORMAL
SA1 CELL: NORMAL
SA1 COMMENTS: NORMAL
SA1 HI RISK ABY: NORMAL
SA1 MOD RISK ABY: NORMAL
SA1 TEST METHOD: NORMAL
SA2 CELL: NORMAL
SA2 COMMENTS: NORMAL
SA2 HI RISK ABY UA: NORMAL
SA2 MOD RISK ABY: NORMAL
SA2 TEST METHOD: NORMAL
SERUM DATE VXM B1: NORMAL
SERUM DATE VXM T1: NORMAL
SODIUM SERPL-SCNC: 136 MMOL/L
SODIUM SERPL-SCNC: 138 MMOL/L
SODIUM SERPL-SCNC: 139 MMOL/L
TIBC SERPL-MCNC: 457 UG/DL (ref 240–430)
TME LAST DOSE: NORMAL H
TRIGL SERPL-MCNC: 194 MG/DL (ref 35–150)
UNACCEPTABLE ANTIGEN: NORMAL
UNOS CPRA: 100
UNOS CPRA: 98
WBC # BLD AUTO: 6.6 10^9/L

## 2019-01-01 PROCEDURE — 85014 HEMATOCRIT: CPT | Performed by: INTERNAL MEDICINE

## 2019-01-01 PROCEDURE — 78452 HT MUSCLE IMAGE SPECT MULT: CPT | Mod: 26 | Performed by: INTERNAL MEDICINE

## 2019-01-01 PROCEDURE — 82728 ASSAY OF FERRITIN: CPT | Performed by: INTERNAL MEDICINE

## 2019-01-01 PROCEDURE — G0103 PSA SCREENING: HCPCS | Performed by: INTERNAL MEDICINE

## 2019-01-01 PROCEDURE — 80158 DRUG ASSAY CYCLOSPORINE: CPT | Performed by: INTERNAL MEDICINE

## 2019-01-01 PROCEDURE — 85018 HEMOGLOBIN: CPT | Performed by: INTERNAL MEDICINE

## 2019-01-01 PROCEDURE — 78452 HT MUSCLE IMAGE SPECT MULT: CPT

## 2019-01-01 PROCEDURE — 34300033 ZZH RX 343: Performed by: INTERNAL MEDICINE

## 2019-01-01 PROCEDURE — G0463 HOSPITAL OUTPT CLINIC VISIT: HCPCS | Mod: ZF

## 2019-01-01 PROCEDURE — 25000128 H RX IP 250 OP 636: Performed by: INTERNAL MEDICINE

## 2019-01-01 PROCEDURE — 93018 CV STRESS TEST I&R ONLY: CPT | Performed by: INTERNAL MEDICINE

## 2019-01-01 PROCEDURE — A9502 TC99M TETROFOSMIN: HCPCS | Performed by: INTERNAL MEDICINE

## 2019-01-01 PROCEDURE — 93016 CV STRESS TEST SUPVJ ONLY: CPT | Performed by: INTERNAL MEDICINE

## 2019-01-01 PROCEDURE — 83540 ASSAY OF IRON: CPT | Performed by: INTERNAL MEDICINE

## 2019-01-01 PROCEDURE — 83550 IRON BINDING TEST: CPT | Performed by: INTERNAL MEDICINE

## 2019-01-01 PROCEDURE — 99213 OFFICE O/P EST LOW 20 MIN: CPT | Mod: ZP | Performed by: INTERNAL MEDICINE

## 2019-01-01 PROCEDURE — 93017 CV STRESS TEST TRACING ONLY: CPT

## 2019-01-01 RX ORDER — HEPARIN SODIUM,PORCINE 10 UNIT/ML
5 VIAL (ML) INTRAVENOUS
Status: CANCELLED | OUTPATIENT
Start: 2019-01-01

## 2019-01-01 RX ORDER — REGADENOSON 0.08 MG/ML
0.4 INJECTION, SOLUTION INTRAVENOUS ONCE
Status: COMPLETED | OUTPATIENT
Start: 2019-01-01 | End: 2019-01-01

## 2019-01-01 RX ORDER — LIDOCAINE 40 MG/G
CREAM TOPICAL
Status: CANCELLED | OUTPATIENT
Start: 2019-01-01

## 2019-01-01 RX ORDER — ACYCLOVIR 200 MG/1
0-1 CAPSULE ORAL
Status: DISCONTINUED | OUTPATIENT
Start: 2019-01-01 | End: 2019-01-01 | Stop reason: HOSPADM

## 2019-01-01 RX ORDER — HEPARIN SODIUM (PORCINE) LOCK FLUSH IV SOLN 100 UNIT/ML 100 UNIT/ML
5 SOLUTION INTRAVENOUS
Status: CANCELLED | OUTPATIENT
Start: 2019-01-01

## 2019-01-01 RX ORDER — AMINOPHYLLINE 25 MG/ML
50-100 INJECTION, SOLUTION INTRAVENOUS
Status: DISCONTINUED | OUTPATIENT
Start: 2019-01-01 | End: 2019-01-01 | Stop reason: HOSPADM

## 2019-01-01 RX ORDER — METOPROLOL SUCCINATE 100 MG/1
TABLET, EXTENDED RELEASE ORAL
COMMUNITY
Start: 2019-01-01

## 2019-01-01 RX ORDER — REGADENOSON 0.08 MG/ML
0.4 INJECTION, SOLUTION INTRAVENOUS ONCE
Status: DISCONTINUED | OUTPATIENT
Start: 2019-01-01 | End: 2019-01-01 | Stop reason: HOSPADM

## 2019-01-01 RX ORDER — ALBUTEROL SULFATE 90 UG/1
2 AEROSOL, METERED RESPIRATORY (INHALATION) EVERY 5 MIN PRN
Status: DISCONTINUED | OUTPATIENT
Start: 2019-01-01 | End: 2019-01-01 | Stop reason: HOSPADM

## 2019-01-01 RX ORDER — LOSARTAN POTASSIUM 50 MG/1
50 TABLET ORAL DAILY
Qty: 90 TABLET | Refills: 3 | Status: SHIPPED | OUTPATIENT
Start: 2019-01-01

## 2019-01-01 RX ORDER — CYCLOSPORINE 100 MG/1
100 CAPSULE, LIQUID FILLED ORAL 2 TIMES DAILY
Qty: 60 CAPSULE | Refills: 11 | Status: SHIPPED | OUTPATIENT
Start: 2019-01-01

## 2019-01-01 RX ORDER — CYCLOSPORINE 25 MG/1
CAPSULE, LIQUID FILLED ORAL
Qty: 120 CAPSULE | Refills: 1 | Status: SHIPPED | OUTPATIENT
Start: 2019-01-01

## 2019-01-01 RX ADMIN — TETROFOSMIN 10.3 MCI.: 1.38 INJECTION, POWDER, LYOPHILIZED, FOR SOLUTION INTRAVENOUS at 10:24

## 2019-01-01 RX ADMIN — TETROFOSMIN 35 MCI.: 1.38 INJECTION, POWDER, LYOPHILIZED, FOR SOLUTION INTRAVENOUS at 11:50

## 2019-01-01 RX ADMIN — REGADENOSON 0.4 MG: 0.08 INJECTION, SOLUTION INTRAVENOUS at 11:48

## 2019-01-01 ASSESSMENT — PAIN SCALES - GENERAL
PAINLEVEL: NO PAIN (0)

## 2019-01-01 ASSESSMENT — MIFFLIN-ST. JEOR
SCORE: 1924.83
SCORE: 1891.38
SCORE: 1851.35
SCORE: 1889.45

## 2019-01-04 NOTE — TELEPHONE ENCOUNTER
Anemia Management Note  SUBJECTIVE/OBJECTIVE:  Referred by Dr. Christi Sun on 2018  Primary Diagnosis: Anemia in Chronic Kidney Disease (N18.3, D63.1)     Secondary Diagnosis:  Chronic Kidney Disease, Stage 3 (N18.3)   Hgb goal range:  9-10  Epo/Darbo: Procrit  20,000 units once weekly for Hgb < 10  In clinic  RX/TX plans : 2019  Iron regimen:  None  Labs : 2019    Anemia Latest Ref Rng & Units 2018   MARY LOU Dose - 20,000 units 20,000 units 20,000 units - - - -   Hemoglobin 13.3 - 17.7 g/dL 9.0(A) 9.5(A) 9.7(A) 10.1(A) 10.5(A) 10.5(A) 10.5(A)   IV Iron Dose - - - - - - - -   TSAT % 28 - - - 20 - -   Ferritin ng/mL 675 - - - 486 - -     BP Readings from Last 3 Encounters:   11/15/18 149/80   18 138/62   18 139/59     Wt Readings from Last 2 Encounters:   11/15/18 238 lb (108 kg)   18 234 lb (106.1 kg)       Received and documented outside lab results drawn on    As long as Hgb stays stable, and ferritin close to 500ng/ml, OK to not take addition iron. DENISE     ASSESSMENT:  Hgb: Above goal - recommend hold dose - Patient did not receive procrit today per not on fax sheet  TSat: not at goal of >30% Ferritin: At goal (>100ng/mL) - Rory  stopped taking ferrous sulfate quite awhile ago due to side affects.     PLAN:  Held Procrit and RTC for hgb then procrit if needed in 1 week(s)     Orders needed to be renewed (for next follow-up date) in LETTERS - for external labs: Lab orders an Procrit will  on 19     Iron labs due:  19    Plan discussed with:  No call made  Plan provided by:  Judy Ramírez [facility]  Anemia Clinic  [phone]    NEXT FOLLOW-UP DATE:  19  Reviewed 2019 [facility]  Anemia Management Service  Grecia Carroll,MirzaD, Michelle Ramírez,[facility]  Belinda Escalona RN  Phone: [phone]  Fax: [phone]

## 2019-01-11 NOTE — TELEPHONE ENCOUNTER
Follow-up with anemia management service:    Spoke with Rory.  He is having labs drawn today at [facility] and they will dose his Procrit if needed.  He has not needed for approx 3 weeks.  He has been tired the last couple of days and he noticed a drop in his Blood Pressure.  He recently started taking Furosemide 40mg daily. Instructed pt to continue to monitor his blood pressure, if BP drops that he should contact the MD that ordered his Furosemide.  That dose may need to be decreased. Pt verbalized understanding.  He is leaving for [place] today and will be back in town on Monday evening. He does not get very good cell phone reception.      Anemia Latest Ref Rng & Units 2018   MARY LOU Dose - 20,000 units 20,000 units 20,000 units - - - -   Hemoglobin 13.3 - 17.7 g/dL 9.0(A) 9.5(A) 9.7(A) 10.1(A) 10.5(A) 10.5(A) 10.5(A)   IV Iron Dose - - - - - - - -   TSAT % 28 - - - 20 - -   Ferritin ng/mL 675 - - - 486 - -       Orders needed to be renewed (for next follow-up date) in LETTERS - for external labs: None   Med order expires: 2019   Lab orders : 2019    Follow-up call date: 01/15/2019  1/15/19; Pt did not go in for his labs on 19 since he has been feeling good.  He will have his labs drawn this 19 around 10:30a when he goes in for his INR. dutchw    dutchw    Anemia Management Service  Grecia Carroll,MirzaD, Sheri Samuel, SHIMA  Phone: [phone]  Fax: [phone]

## 2019-01-17 NOTE — TELEPHONE ENCOUNTER
[facility] Call Center    Phone Message    May a detailed message be left on voicemail: no    Reason for Call: Other: [employer] calling for verbal approval on losartan (COZAAR) 50 MG tablet. Please contact them at [phone], Reference number [id number]      Action Taken: Message routed to:  [facility] Nephrology

## 2019-01-18 NOTE — TELEPHONE ENCOUNTER
Anemia Management Note  SUBJECTIVE/OBJECTIVE:  Referred by Dr. Christi Sun on 2018  Primary Diagnosis: Anemia in Chronic Kidney Disease (N18.3, D63.1)     Secondary Diagnosis:  Chronic Kidney Disease, Stage 3 (N18.3)   Hgb goal range:  9-10  Epo/Darbo: Procrit  20,000 units once weekly for Hgb < 10  In clinic  RX/TX plans : 2019  Iron regimen:  None  Labs : 2020. Need to be faxed once signed.     Labs and Procrit are done at [facility].    Phone: [phone] # is [phone]     Anemia Latest Ref Rng & Units 2018   AMRY LOU Dose - 20,000 units 20,000 units - - - - -   Hemoglobin 13.3 - 17.7 g/dL 9.5(A) 9.7(A) 10.1(A) 10.5(A) 10.5(A) 10.5(A) 10.0(A)   IV Iron Dose - - - - - - - -   TSAT % - - - 20 - - 19   Ferritin ng/mL - - - 486 - - -     BP Readings from Last 3 Encounters:   11/15/18 149/80   18 138/62   18 139/59     Wt Readings from Last 2 Encounters:   11/15/18 238 lb (108 kg)   18 234 lb (106.1 kg)       Left message for Rory.  Ok to have labs drawn in 2 weeks. If pt feels his Hgb is dropping, ok to have labs drawn sooner.  Lab orders sent to MD for signature. Once signed, orders will need to be faxed to [facility]. Fax # is [phone].     ASSESSMENT:  Hgb: Above goal - recommend hold dose - Patient did not receive procrit today per not on fax sheet  TSat: not at goal of >30% Ferritin: At goal (>100ng/mL) - Rory  stopped taking ferrous sulfate quite awhile ago due to side affects.    PLAN:  Hold Procrit and RTC for hgb then procrit if needed in 2 week(s)    Orders needed to be renewed (for next follow-up date) in Letters;  Lab orders an will  on 19; orders submitted to MD for signature.     Iron labs due: 2019 Ferrtin was not drawn on 19    Plan discussed with:  Left message for Rory   Plan provided by:  ena    NEXT FOLLOW-UP DATE:   02/01/2019    Anemia Management Service  Grecia Carroll,MirzaD, Michelle Ramírez,JUAN F  Belinda Escalona RN  Phone: [phone]  Fax: [phone]

## 2019-01-31 NOTE — TELEPHONE ENCOUNTER
[facility] Call Center    Phone Message    May a detailed message be left on voicemail: yes    Reason for Call: Other: Jeff Ford [phone] ,  Needs Dr. Sun or a nurse to Call regarding medication and lab orders       Action Taken: Message routed to:  [facility] Neph

## 2019-02-01 NOTE — TELEPHONE ENCOUNTER
Anemia Management Note  SUBJECTIVE/OBJECTIVE:  Referred by Dr. Christi Sun on 2018  Primary Diagnosis: Anemia in Chronic Kidney Disease (N18.3, D63.1)     Secondary Diagnosis:  Chronic Kidney Disease, Stage 3 (N18.3)   Hgb goal range:  9-10  Epo/Darbo: Procrit  20,000 units once weekly for Hgb < 10  In clinic  RX/TX plans : 2019  Iron regimen:  None  Labs : 2020.     Patient prefers calls only when labs drastically change. Otherwise [facility] will always give him a dose if his Hgb is below.     Labs and Procrit are done at [facility].  Drawn on  10:30a  Phone: [phone] # is [phone]     Anemia Latest Ref Rng & Units 2018   MARY LOU Dose - 20,000 units - - - - - -   Hemoglobin 13.3 - 17.7 g/dL 9.7(A) 10.1(A) 10.5(A) 10.5(A) 10.5(A) 10.0(A) 10.1(A)   IV Iron Dose - - - - - - - -   TSAT % - - 20 - - 19 -   Ferritin ng/mL - - 486 - - - -     BP Readings from Last 3 Encounters:   11/15/18 149/80   18 138/62   18 139/59     Wt Readings from Last 2 Encounters:   11/15/18 238 lb (108 kg)   18 234 lb (106.1 kg)     Spoke with Rory, he is doing well. Goes in for labs every Friday at 10:30a.  Rory prefers calls only when labs drastically change. Otherwise Moro will always give him a dose if his Hgb is below.    ASSESSMENT:  Hgb:Above goal - recommend hold dose  TSat: not at goal of >30% Ferritin: At goal (>100ng/mL)    PLAN:  Hold Procrit and RTC for hgb then procrit if needed in 1 week(s)    Orders needed to be renewed (for next follow-up date) in LETTERS - for external labs: None    Iron labs due:  Iron labs due: 2019 Ferrtin was not drawn on 19    Plan discussed with:  Rory  Plan provided by:  ena    NEXT FOLLOW-UP DATE:  2019- to document lab and procrit dose.     Anemia Management Service  Grecia Carroll,PharmD, Michelle Ramírez,CPhT  Belinda Escalona, RN  Phone:  [phone]  Fax: [phone]

## 2019-02-12 NOTE — TELEPHONE ENCOUNTER
"Spoke with [facility], pt was a \"No Show\" for his 2/8/19 appt.  Will follow up again this Friday 2/15/19.   "

## 2019-02-15 NOTE — TELEPHONE ENCOUNTER
Anemia Management Note  SUBJECTIVE/OBJECTIVE:  Referred by Dr. Christi Sun on 2018  Primary Diagnosis: Anemia in Chronic Kidney Disease (N18.3, D63.1)     Secondary Diagnosis:  Chronic Kidney Disease, Stage 3 (N18.3)   Hgb goal range:  9-10  Epo/Darbo: Procrit  20,000 units once weekly for Hgb < 10  In clinic  RX/TX plans : 2019  Iron regimen:  None  Labs : 2020.      Patient prefers calls only when labs drastically change. Otherwise Hertel will always give him a dose if his Hgb is below.     Labs and Procrit are done at [facility].  Drawn on  10:30a  Phone: [phone] # is [phone]     Anemia Latest Ref Rng & Units 2018 2018 2018 2019 2019 2019 2/15/2019   MARY LOU Dose - - - - - - - -   Hemoglobin 13.3 - 17.7 g/dL 10.1(A) 10.5(A) 10.5(A) 10.5(A) 10.0(A) 10.1(A) 10.3(A)   IV Iron Dose - - - - - - - -   TSAT % - 20 - - 19 - -   Ferritin ng/mL - 486 - - - - -     BP Readings from Last 3 Encounters:   11/15/18 149/80   18 138/62   18 139/59     Wt Readings from Last 2 Encounters:   11/15/18 238 lb (108 kg)   18 234 lb (106.1 kg)         ASSESSMENT:  Hgb:Above goal - recommend hold dose  TSat: not at goal of >30% Ferritin: At goal (>100ng/mL)    PLAN:  Hold Procrit and RTC for hgb then procrit if needed in 1 week(s)     Orders needed to be renewed (for next follow-up date) in LETTERS - for external labs: None     Iron labs due:  Iron labs are overdue     Plan discussed with:  No call made per pt request.   Plan provided by:  ena    NEXT FOLLOW-UP DATE:  2019    Anemia Management Service  Belinda Escalona RN  Phone: [phone]  Fax: [phone]

## 2019-02-15 NOTE — TELEPHONE ENCOUNTER
Follow-up with anemia management service:    Per lab Rory was a no show for 2/8/19 appt. Left message for Rory to remind him that he is due for labs and possible Procrit.     Anemia Latest Ref Rng & Units 12/7/2018 12/14/2018 12/21/2018 12/28/2018 1/4/2019 1/18/2019 2/1/2019   MAR YLOU Dose - 20,000 units - - - - - -   Hemoglobin 13.3 - 17.7 g/dL 9.7(A) 10.1(A) 10.5(A) 10.5(A) 10.5(A) 10.0(A) 10.1(A)   IV Iron Dose - - - - - - - -   TSAT % - - 20 - - 19 -   Ferritin ng/mL - - 486 - - - -           Follow-up call date: 02/22/2019        Anemia Management Service  Belinda Escalona RN  Phone: [phone]  Fax: [phone]

## 2019-02-25 NOTE — TELEPHONE ENCOUNTER
Follow-up with anemia management service:    Spoke with [facility] ([facility]), pt was a no show for labs on 2/22/2019.  Pt had labs 2 weeks apart last time, will follow up again in 1 week.     Anemia Latest Ref Rng & Units 12/14/2018 12/21/2018 12/28/2018 1/4/2019 1/18/2019 2/1/2019 2/15/2019   MARY LOU Dose - - - - - - - -   Hemoglobin 13.3 - 17.7 g/dL 10.1(A) 10.5(A) 10.5(A) 10.5(A) 10.0(A) 10.1(A) 10.3(A)   IV Iron Dose - - - - - - - -   TSAT % - 20 - - 19 - -   Ferritin ng/mL - 486 - - - - -       Orders needed to be renewed (for next follow-up date) in LETTERS - for external labs: None       Follow-up call date: 03/04/2019        Anemia Management Service  Belinda Escalona RN  Phone: [phone]  Fax: [phone]

## 2019-03-04 NOTE — TELEPHONE ENCOUNTER
Anemia Management Note  SUBJECTIVE/OBJECTIVE:  Referred by Dr. Christi Sun on 2018  Primary Diagnosis: Anemia in Chronic Kidney Disease (N18.3, D63.1)     Secondary Diagnosis:  Chronic Kidney Disease, Stage 3 (N18.3)   Hgb goal range:  9-10  Epo/Darbo: Procrit  20,000 units once weekly for Hgb < 10  In clinic  RX/TX plans : 2019  Iron regimen:  None  Labs : 2020.      Patient prefers calls only when labs drastically change. Otherwise [place] will always give him a dose if his Hgb is below.     Labs and Procrit are done at [facility].  Drawn on  10:30a  Phone: [phone] # is [phone]     Anemia Latest Ref Rng & Units 2018 2018 2019 2019 2019 2/15/2019 3/1/2019   MARY LOU Dose - - - - - - - -   Hemoglobin 13.3 - 17.7 g/dL 10.5(A) 10.5(A) 10.5(A) 10.0(A) 10.1(A) 10.3(A) 9.2(A)   IV Iron Dose - - - - - - - -   TSAT % 20 - - 19 - - 16   Ferritin ng/mL 486 - - - - - 551     BP Readings from Last 3 Encounters:   11/15/18 149/80   18 138/62   18 139/59     Wt Readings from Last 2 Encounters:   11/15/18 238 lb (108 kg)   18 234 lb (106.1 kg)         ASSESSMENT:  Hgb:At goal - recommend dose  TSat: not at goal (>30%) but ferritin >500ng/mL.  IV iron not indicated at this time per anemia protocol. Ferritin: At goal (>100ng/mL)    PLAN:  Dose with procrit and RTC for hgb then procrit if needed in 1 week(s)    Orders needed to be renewed (for next follow-up date) in LETTERS - for external labs: None    Iron labs due:  3/29/2019    Plan discussed with:  No call  Made per pt request  Plan provided by:  ena    NEXT FOLLOW-UP DATE:  2019    Anemia Management Service  Belinda Escalona RN  Phone: [phone]  Fax: [phone]

## 2019-03-08 NOTE — TELEPHONE ENCOUNTER
Anemia Management Note  SUBJECTIVE/OBJECTIVE:  Referred by Dr. Christi Sun on 2018  Primary Diagnosis: Anemia in Chronic Kidney Disease (N18.3, D63.1)     Secondary Diagnosis:  Chronic Kidney Disease, Stage 3 (N18.3)   Hgb goal range:  9-10  Epo/Darbo: Procrit  20,000 units once weekly for Hgb < 10  In clinic  RX/TX plans : 2019  Iron regimen:  None  Labs : 2020.      Patient prefers calls only when labs drastically change. Otherwise [place] will always give him a dose if his Hgb is below.     Labs and Procrit are done at [facility].  Drawn on  10:30a  Phone: [phone] # is [phone]        Anemia Latest Ref Rng & Units 2018 2019 2019 2019 2/15/2019 3/1/2019 3/8/2019   MARY LOU Dose - - - - - - 20,000 units 20,000 units   Hemoglobin 13.3 - 17.7 g/dL 10.5(A) 10.5(A) 10.0(A) 10.1(A) 10.3(A) 9.2(A) 9.9(A)   IV Iron Dose - - - - - - - -   TSAT % - - 19 - - 16 -   Ferritin ng/mL - - - - - 551 -     BP Readings from Last 3 Encounters:   11/15/18 149/80   18 138/62   18 139/59     Wt Readings from Last 2 Encounters:   11/15/18 238 lb (108 kg)   18 234 lb (106.1 kg)       No call made per pt request    ASSESSMENT:  Hgb:At goal - recommend dose  TSat: Not at goal  Ferritin: At goal (>100ng/mL)    PLAN:  Dose with procrit and RTC for hgb then procrit if needed in 1 week(s)    Orders needed to be renewed (for next follow-up date) in LETTERS - for external labs: None    Iron labs due:  3/29/2019    Plan discussed with:  No call made per pt request  Plan provided by:  ena    NEXT FOLLOW-UP DATE:  3/18/2019    Anemia Management Service  Belinda Escalona RN  Phone: [phone]  Fax: [phone]

## 2019-03-18 NOTE — TELEPHONE ENCOUNTER
Anemia Management Note  SUBJECTIVE/OBJECTIVE:  Referred by Dr. Christi Sun on 2018  Primary Diagnosis: Anemia in Chronic Kidney Disease (N18.3, D63.1)     Secondary Diagnosis:  Chronic Kidney Disease, Stage 3 (N18.3)   Hgb goal range:  9-10  Epo/Darbo: Procrit  20,000 units once weekly for Hgb < 10  In clinic  RX/TX plans : 2019  Iron regimen:  None  Labs : 2020.      Patient prefers calls only when labs drastically change. Otherwise [place] will always give him a dose if his Hgb is below.     Labs and Procrit are done at [facility].  Drawn on  10:30a  Phone: [phone] # is [phone]     Anemia Latest Ref Rng & Units 2019 2019 2019 2/15/2019 3/1/2019 3/8/2019 3/15/2019   MARY LOU Dose - - - - - 20,000 units 20,000 units 20,000 units   Hemoglobin 13.3 - 17.7 g/dL 10.5(A) 10.0(A) 10.1(A) 10.3(A) 9.2(A) 9.9(A) 9.5(A)   IV Iron Dose - - - - - - - -   TSAT % - 19 - - 16 - -   Ferritin ng/mL - - - - 551 - -     BP Readings from Last 3 Encounters:   11/15/18 149/80   18 138/62   18 139/59     Wt Readings from Last 2 Encounters:   11/15/18 238 lb (108 kg)   18 234 lb (106.1 kg)           ASSESSMENT:  Hgb:At goal - recommend dose  TSat: Not at goal  Ferritin: At goal (>100ng/mL)       PLAN:  Dose with procrit and RTC for hgb then procrit if needed in 1 week(s)    Orders needed to be renewed (for next follow-up date) in LETTERS - for external labs: None    Iron labs due:  2019    Plan discussed with:  No call made per pt request  Plan provided by:  ena    NEXT FOLLOW-UP DATE:  2019    Anemia Management Service  Belinda Escalona RN  Phone: [phone]  Fax: [phone]

## 2019-03-25 NOTE — TELEPHONE ENCOUNTER
Anemia Management Note  SUBJECTIVE/OBJECTIVE:  Referred by Dr. Christi Sun on 2018  Primary Diagnosis: Anemia in Chronic Kidney Disease (N18.3, D63.1)     Secondary Diagnosis:  Chronic Kidney Disease, Stage 3 (N18.3)   Hgb goal range:  9-10  Epo/Darbo: Procrit  20,000 units once weekly for Hgb < 10  In clinic  RX/TX plans : 2019  Iron regimen:  None  Labs : 2020.      Patient prefers calls only when labs drastically change. Otherwise [place] will always give him a dose if his Hgb is below.     Labs and Procrit are done at [facility].  Drawn on  10:30a  Phone: [phone] # is [phone]     Anemia Latest Ref Rng & Units 2019 2019 2/15/2019 3/1/2019 3/8/2019 3/15/2019 3/22/2019   MARY LOU Dose - - - - 20,000 units 20,000 units 20,000 units 20,000 units   Hemoglobin 13.3 - 17.7 g/dL 10.0(A) 10.1(A) 10.3(A) 9.2(A) 9.9(A) 9.5(A) 9.9(A)   IV Iron Dose - - - - - - - -   TSAT % 19 - - 16 - - -   Ferritin ng/mL - - - 551 - - -     BP Readings from Last 3 Encounters:   11/15/18 149/80   18 138/62   18 139/59     Wt Readings from Last 2 Encounters:   11/15/18 238 lb (108 kg)   18 234 lb (106.1 kg)           ASSESSMENT:  Hgb:At goal - recommend dose  TSat: not at goal (>30%) but ferritin >500ng/mL.  IV iron not indicated at this time per anemia protocol. Ferritin: At goal (>100ng/mL)    PLAN:  Dose with procrit and RTC for hgb then procrit if needed in 1 week(s)    Orders needed to be renewed (for next follow-up date) in LETTERS - for external labs: None    Iron labs due:  3/29/2019    Plan discussed with:  No call made per pt request  Plan provided by:  ena    NEXT FOLLOW-UP DATE:  2019    Anemia Management Service  Belinda Escalona RN  Phone: [phone]  Fax: [phone]

## 2019-03-29 NOTE — TELEPHONE ENCOUNTER
Anemia Management Note  SUBJECTIVE/OBJECTIVE:  Referred by Dr. Christi Sun on 2018  Primary Diagnosis: Anemia in Chronic Kidney Disease (N18.3, D63.1)     Secondary Diagnosis:  Chronic Kidney Disease, Stage 3 (N18.3)   Hgb goal range:  9-10  Epo/Darbo: Procrit  20,000 units once weekly for Hgb < 10  In clinic  RX/TX plans : 2019  Iron regimen:  None  Labs : 2020.      Patient prefers calls only when labs drastically change. Otherwise [place] will always give him a dose if his Hgb is below.     Labs and Procrit are done at [facility].  Drawn on  10:30a  Phone: [phone] # is [phone]     Anemia Latest Ref Rng & Units 2019 2/15/2019 3/1/2019 3/8/2019 3/15/2019 3/22/2019 3/29/2019   MARY LOU Dose - - - 20,000 units 20,000 units 20,000 units 20,000 units 20,000 units   Hemoglobin 13.3 - 17.7 g/dL 10.1(A) 10.3(A) 9.2(A) 9.9(A) 9.5(A) 9.9(A) 8.9(A)   IV Iron Dose - - - - - - - -   TSAT % - - 16 - - - 11   Ferritin ng/mL - - 551 - - - 276     BP Readings from Last 3 Encounters:   11/15/18 149/80   18 138/62   18 139/59     Wt Readings from Last 2 Encounters:   11/15/18 238 lb (108 kg)   18 234 lb (106.1 kg)           ASSESSMENT:  Hgb:Not at goal but improving - recommend dose and continue current regimen  TSat: not at goal of >30% Ferritin: At goal (>100ng/mL)    PLAN:  Dose with procrit and RTC for hgb then procrit if needed in 1 week(s)    Orders needed to be renewed (for next follow-up date) in LETTERS - for external labs: None    Iron labs due:  2019    Plan discussed with:  No call made per pt request.   Plan provided by:  ena    NEXT FOLLOW-UP DATE:  2019    Anemia Management Service  Belinda Escalona RN  Phone: [phone]  Fax: [phone]

## 2019-04-03 NOTE — TELEPHONE ENCOUNTER
Patient is scheduled for surgery with Dr. Goldy Manning      Spoke with: Kajal patient's spouse    Date of Surgery: 05/02/2019    Location: [place] [address]  3rd floor- 3C    Informed patient they will need an adult  YES    H&P: Scheduled with Moris Diaz    Surgery packet: To be sent     Additional comments: Patient's spouse agreed to the information above and will get a pre-op scheduled for the patient.

## 2019-04-03 NOTE — TELEPHONE ENCOUNTER
Chief Complaint : Return-5 Mo    New Hx/Sx: Bladder Stone/Hematuria/Kidney Transplant    Records/Orders/Proced: Available     Pt Contacted: Not Needed    At Rooming: Flow/PVR

## 2019-04-04 NOTE — TELEPHONE ENCOUNTER
Patient called to discuss length of procedure and to see if Dr. Manning would like his MRI results from February or March 2019 at [facility]. This writer sent this information on to Jacki Eastman RN.

## 2019-04-05 NOTE — LETTER
RE: Rory Bustamante  [address]  [place] [address]     Dear Colleague,    Thank you for referring your patient, Rory Bustamante, to the [facility] UROLOGY AND [facility] FOR PROSTATE AND UROLOGIC CANCERS at [facility]. Please see a copy of my visit note below.    UROLOGIC DIAGNOSES:     CURRENT INTERVENTIONS:     HISTORY:     Patient presents for evaluation and management of bladder stone and lower urinary tract symptoms.      Patient is s/p transplant in 1989 but now with decreasing GFR. Will be listed for transplant.   Had CT scan at [facility] that showed an 1.2cm bladder stone near the insertion of the transplant ureter.     Patient also notes lower urinary tract symptoms as below despite taking tamsulosin.     Hesitancy   Slowed stream occasionally   Occasional sensation of incomplete emptying   Sometimes needs to sit to urinate due to spraying stream   No hematuria   No dysuria   No straining to void   No urgency   No UUI    Had episode of urinary retention after knee replacement. Had slowed stream prior but then developed AUR. Started tamsulosin after this. Has been taking since 2015.     Also had urinary retention after back surgery.        Patient currently s/p cystolithalopaxy with holmium for bladder stone at the transplant ureteral orifice. No issues immediately post op but patient developed hematuria and required catheter irrigation.   UDS was equivocal for obstruction though patient could not void with catheter in place.     Noticed that his stream is somewhat slower over the past few days. Unclear if this will persist.     We discussed further slowed stream, lower urinary tract symptoms, possible intervention prior to transplant.     PAST MEDICAL HISTORY:   Past Medical History:   Diagnosis Date     Anemia      Arrhythmia     atrial fib     Arthritis      BPH (benign prostatic hyperplasia)      CAD (coronary artery disease) July 2011    MI in July 2011, stent  placed, on plavix and aspirin     ESRD (end stage renal disease) (H)     tx, renal insufficiency     Glaucoma      Gout     Uric acid as high as 11 previously, now on allopurinol and probenecid     HTN (hypertension)      Hypercholesteremia 2015     Hypertriglyceridemia 2015     Intraocular bleeding     previously treated wtih Avastin     Macular degeneration      Neuropathy     Limited sensation bilateral knees to feet     Obese     BMI 31     Paroxysmal A-fib (H)      Presence of stent in coronary artery August 2012     Proteinuria 11/2015    recurrent MPGN on biopsy     S/P kidney transplant     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  HLA identical transplant.  Biopsy in 4/2008 with recurrent MPGN type 2, mild interstitial fibrosis and tubular atrophy, CNI toxicity     Spinal stenosis 2013     Warts        PAST SURGICAL HISTORY:   Past Surgical History:   Procedure Laterality Date     C TOTAL KNEE ARTHROPLASTY  2015     CREATE FISTULA ARTERIOVENOUS UPPER EXTREMITY Right 9/25/2018    Procedure: CREATE FISTULA ARTERIOVENOUS UPPER EXTREMITY;  Create Right Brachial Arteriovenous Fistula ;  Surgeon: Sydney Dawkins MD;  [facility]     LAMINECTOMY LUMBAR MINIMALLY INVASIVE THREE+ LEVELS N/A 6/11/2018    Procedure: LAMINECTOMY LUMBAR MINIMALLY INVASIVE THREE+ LEVELS;  Minimally Invasive Surgery Laminectomies Lumbar 2-3,Lumbar 3-4,Lumbar 4-5;  Surgeon: Harshal Rucker MD;  [facility] OR     LASER HOLMIUM LITHOTRIPSY BLADDER N/A 11/5/2018    Procedure: Cystsocopy, Cystolithalopaxy Of Bladder Stone With Holmium Laser;  Surgeon: Meagan Story MD;  Location: [facility] OR     TONSILLECTOMY       TRANSPLANT KIDNEY RECIPIENT LIVING RELATED  1989    HLA identical     VASECTOMY         FAMILY HISTORY:   Family History   Problem Relation Age of Onset     Diabetes Mother      Colon Cancer Mother 78     Cardiac Sudden Death Sister 87     Diabetes Brother      Cerebrovascular Disease Brother   "    SOCIAL HISTORY:   Social History     Tobacco Use     Smoking status: Never Smoker     Smokeless tobacco: Never Used   Substance Use Topics     Alcohol use: No     Alcohol/week: 0.0 oz     Current Outpatient Medications   Medication     acetaminophen (TYLENOL ARTHRITIS PAIN) 650 MG CR tablet     allopurinol (ZYLOPRIM) 100 MG tablet     BABY ASPIRIN PO     BEVACizumab (AVASTIN) 400 MG/16ML injection     Brimonidine Tartrate (ALPHAGAN OP)     cephALEXin (KEFLEX) 500 MG capsule     cycloSPORINE modified (GENERIC EQUIVALENT) 100 MG capsule     cycloSPORINE modified (GENERIC EQUIVALENT) 25 MG capsule     epoetin freya (PROCRIT) 79896 UNIT/ML injection     fenofibrate 145 MG tablet     fenofibrate 145 MG tablet     ISOSORBIDE MONONITRATE PO     loperamide (IMODIUM) 2 MG capsule     losartan (COZAAR) 50 MG tablet     metoprolol (LOPRESSOR) 100 MG tablet     NIFEdipine ER osmotic (PROCARDIA XL) 60 MG TB24     nitroGLYCERIN (NITROSTAT) 0.4 MG SL tablet     NONFORMULARY     omega-3 acid ethyl esters (LOVAZA) 1 G capsule     PANTOPRAZOLE SODIUM PO     predniSONE (DELTASONE) 5 MG tablet     probenecid (BENEMID) 500 MG tablet     Rosuvastatin Calcium (CRESTOR PO)     sulfamethoxazole-trimethoprim (BACTRIM,SEPTRA) 400-80 MG per tablet     tamsulosin (FLOMAX) 0.4 MG capsule     warfarin (COUMADIN) 5 MG tablet     Current Facility-Administered Medications   Medication     lidocaine 1 % injection 3 mL     triamcinolone acetonide (KENALOG-40) injection 40 mg     PHYSICAL EXAM:    /75   Pulse 69   Ht 1.854 m (6' 1\")   Wt 106.6 kg (235 lb)   BMI 31.00 kg/m       HEENT: Normocephalic and atraumatic   Cardiac: Not done  Back/Flank: Not done  CNS/PNS: Not done  Respiratory: Normal non-labored breathing  Abdomen: Soft nontender and nondistended  Peripheral Vascular: Not done  Mental Status: Not done    Penis: Not done  Scrotal Skin: Not done  Testicles: Not done  Epididymis: Not done  Digital Rectal Exam:     Cystoscopy: Not " done    Imaging: None    Urinalysis: [facility] RESULTS:  Recent Labs   Lab Test 10/17/18  06/13/18   1825   COLOR  Yellow  Yellow   APPEARANCE  Clear  Slightly Cloudy   URINEGLC  100   30*   URINEBILI  Neg  Negative   URINEKETONE  Neg  Negative   SG  1.025  1.011   UBLD  Small  Small*   URINEPH  6.0  5.0   PROTEIN  100   30*   UROBILINOGEN  0.2   --    NITRITE  Neg  Negative   LEUKEST  Neg  Small*   RBCU   --   2   WBCU   --   3     PSA:     Post Void Residual:     Other labs: None today    IMPRESSION:  64 y/o M with s/p transplant with bladder stone at ureteral orifice, also with lower urinary tract symptoms despite tamsulosin     PLAN:  Continue tamsulosin   Will monitor lower urinary tract symptoms at present   Follow up in six months for uroflow/PVR      Total Time: 15 minutes                                       Total in Consultation: greater than 50%     Again, thank you for allowing me to participate in the care of your patient.      Sincerely,    Meagan Story MD

## 2019-04-05 NOTE — PROGRESS NOTES
UROLOGIC DIAGNOSES:       CURRENT INTERVENTIONS:       HISTORY:     Patient presents for evaluation and management of bladder stone and lower urinary tract symptoms.      Patient is s/p transplant in 1989 but now with decreasing GFR. Will be listed for transplant.   Had CT scan at [facility] that showed an 1.2cm bladder stone near the insertion of the transplant ureter.     Patient also notes lower urinary tract symptoms as below despite taking tamsulosin.       Hesitancy   Slowed stream occasionally   Occasional sensation of incomplete emptying   Sometimes needs to sit to urinate due to spraying stream   No hematuria   No dysuria   No straining to void   No urgency   No UUI    Had episode of urinary retention after knee replacement. Had slowed stream prior but then developed AUR. Started tamsulosin after this. Has been taking since 2015.     Also had urinary retention after back surgery.        Patient currently s/p cystolithalopaxy with holmium for bladder stone at the transplant ureteral orifice. No issues immediately post op but patient developed hematuria and required catheter irrigation.   UDS was equivocal for obstruction though patient could not void with catheter in place.     Noticed that his stream is somewhat slower over the past few days. Unclear if this will persist.     We discussed further slowed stream, lower urinary tract symptoms, possible intervention prior to transplant.           PAST MEDICAL HISTORY:   Past Medical History:   Diagnosis Date     Anemia      Arrhythmia     atrial fib     Arthritis      BPH (benign prostatic hyperplasia)      CAD (coronary artery disease) July 2011    MI in July 2011, stent placed, on plavix and aspirin     ESRD (end stage renal disease) (H)     tx, renal insufficiency     Glaucoma      Gout     Uric acid as high as 11 previously, now on allopurinol and probenecid     HTN (hypertension)      Hypercholesteremia 2015     Hypertriglyceridemia 2015      Intraocular bleeding     previously treated wt Avastin     Macular degeneration      Neuropathy     Limited sensation bilateral knees to feet     Obese     BMI 31     Paroxysmal A-fib (H)      Presence of stent in coronary artery August 2012     Proteinuria 11/2015    recurrent MPGN on biopsy     S/P kidney transplant     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  HLA identical transplant.  Biopsy in 4/2008 with recurrent MPGN type 2, mild interstitial fibrosis and tubular atrophy, CNI toxicity     Spinal stenosis 2013     Warts        PAST SURGICAL HISTORY:   Past Surgical History:   Procedure Laterality Date     C TOTAL KNEE ARTHROPLASTY  2015     CREATE FISTULA ARTERIOVENOUS UPPER EXTREMITY Right 9/25/2018    Procedure: CREATE FISTULA ARTERIOVENOUS UPPER EXTREMITY;  Create Right Brachial Arteriovenous Fistula ;  Surgeon: Sydney Dawkins MD;  [facility]     LAMINECTOMY LUMBAR MINIMALLY INVASIVE THREE+ LEVELS N/A 6/11/2018    Procedure: LAMINECTOMY LUMBAR MINIMALLY INVASIVE THREE+ LEVELS;  Minimally Invasive Surgery Laminectomies Lumbar 2-3,Lumbar 3-4,Lumbar 4-5;  Surgeon: Harshal Rucker MD;  [facility]     LASER HOLMIUM LITHOTRIPSY BLADDER N/A 11/5/2018    Procedure: Cystsocopy, Cystolithalopaxy Of Bladder Stone With Holmium Laser;  Surgeon: Meagan Story MD;  Location[facility] OR     TONSILLECTOMY       TRANSPLANT KIDNEY RECIPIENT LIVING RELATED  1989    HLA identical     VASECTOMY         FAMILY HISTORY:   Family History   Problem Relation Age of Onset     Diabetes Mother      Colon Cancer Mother 78     Cardiac Sudden Death Sister 87     Diabetes Brother      Cerebrovascular Disease Brother        SOCIAL HISTORY:   Social History     Tobacco Use     Smoking status: Never Smoker     Smokeless tobacco: Never Used   Substance Use Topics     Alcohol use: No     Alcohol/week: 0.0 oz       Current Outpatient Medications   Medication     acetaminophen (TYLENOL ARTHRITIS PAIN) 650  "MG CR tablet     allopurinol (ZYLOPRIM) 100 MG tablet     BABY ASPIRIN PO     BEVACizumab (AVASTIN) 400 MG/16ML injection     Brimonidine Tartrate (ALPHAGAN OP)     cephALEXin (KEFLEX) 500 MG capsule     cycloSPORINE modified (GENERIC EQUIVALENT) 100 MG capsule     cycloSPORINE modified (GENERIC EQUIVALENT) 25 MG capsule     epoetin freya (PROCRIT) 28572 UNIT/ML injection     fenofibrate 145 MG tablet     fenofibrate 145 MG tablet     ISOSORBIDE MONONITRATE PO     loperamide (IMODIUM) 2 MG capsule     losartan (COZAAR) 50 MG tablet     metoprolol (LOPRESSOR) 100 MG tablet     NIFEdipine ER osmotic (PROCARDIA XL) 60 MG TB24     nitroGLYCERIN (NITROSTAT) 0.4 MG SL tablet     NONFORMULARY     omega-3 acid ethyl esters (LOVAZA) 1 G capsule     PANTOPRAZOLE SODIUM PO     predniSONE (DELTASONE) 5 MG tablet     probenecid (BENEMID) 500 MG tablet     Rosuvastatin Calcium (CRESTOR PO)     sulfamethoxazole-trimethoprim (BACTRIM,SEPTRA) 400-80 MG per tablet     tamsulosin (FLOMAX) 0.4 MG capsule     warfarin (COUMADIN) 5 MG tablet     Current Facility-Administered Medications   Medication     lidocaine 1 % injection 3 mL     triamcinolone acetonide (KENALOG-40) injection 40 mg         PHYSICAL EXAM:    /75   Pulse 69   Ht 1.854 m (6' 1\")   Wt 106.6 kg (235 lb)   BMI 31.00 kg/m      HEENT: Normocephalic and atraumatic   Cardiac: Not done  Back/Flank: Not done  CNS/PNS: Not done  Respiratory: Normal non-labored breathing  Abdomen: Soft nontender and nondistended  Peripheral Vascular: Not done  Mental Status: Not done    Penis: Not done  Scrotal Skin: Not done  Testicles: Not done  Epididymis: Not done  Digital Rectal Exam:     Cystoscopy: Not done    Imaging: None    Urinalysis: [facility] RESULTS:  Recent Labs   Lab Test 10/17/18  06/13/18   1825   COLOR  Yellow  Yellow   APPEARANCE  Clear  Slightly Cloudy   URINEGLC  100   30*   URINEBILI  Neg  Negative   URINEKETONE  Neg  Negative   SG  1.025  1.011   UBLD  Small  Small* "   URINEPH  6.0  5.0   PROTEIN  100   30*   UROBILINOGEN  0.2   --    NITRITE  Neg  Negative   LEUKEST  Neg  Small*   RBCU   --   2   WBCU   --   3       PSA:     Post Void Residual:     Other labs: None today      IMPRESSION:  64 y/o M with s/p transplant with bladder stone at ureteral orifice, also with lower urinary tract symptoms despite tamsulosin     PLAN:  Continue tamsulosin   Will monitor lower urinary tract symptoms at present   Follow up in six months for uroflow/PVR      Total Time: 15 minutes                                       Total in Consultation: greater than 50%

## 2019-04-05 NOTE — PROGRESS NOTES
"Care Coordination Telephone Call  GI Service and Surgical Oncology    Called patient to discuss plan for EUS.  He relates that he does have \"a lot of health concerns\" right now and I have informed him that Dr. Manning will review EUS and timing of procedure.     I have asked the patient to call with any additional questions or concerns and have provided my contact information.    Plan:  Will contact next week to discuss plan.    Jacki VELAN, HNBC, STAR-T  RN Care Coordinator  Surgical Oncology and GI service  Ph: [phone]  FAX: [phone]          "

## 2019-04-05 NOTE — PATIENT INSTRUCTIONS
Please follow up in six (6) months with a flow/PVR; come to your appointment with a full bladder.    It was a pleasure meeting with you today.  Thank you for allowing me and my team the privilege of caring for you today.  YOU are the reason we are here, and I truly hope we provided you with the excellent service you deserve.  Please let us know if there is anything else we can do for you so that we can be sure you are leaving completely satisfied with your care experience.      Neel Carroll, EMT

## 2019-04-08 NOTE — TELEPHONE ENCOUNTER
Anemia Management Note  SUBJECTIVE/OBJECTIVE:  Referred by Dr. Christi Sun on 2018  Primary Diagnosis: Anemia in Chronic Kidney Disease (N18.3, D63.1)     Secondary Diagnosis:  Chronic Kidney Disease, Stage 3 (N18.3)   Hgb goal range:  9-10  Epo/Darbo: Procrit  20,000 units once weekly for Hgb < 10  In clinic  RX/TX plans : 2019  Iron regimen:  None  Labs : 2020.      Patient prefers calls only when labs drastically change. Otherwise [place] will always give him a dose if his Hgb is below.     Labs and Procrit are done at [facility].  Drawn on  10:30a  Phone: [phone] # is [phone]     Anemia Latest Ref Rng & Units 2/15/2019 3/1/2019 3/8/2019 3/15/2019 3/22/2019 3/29/2019 2019   MARY LOU Dose - - 20,000 units 20,000 units 20,000 units 20,000 units 20,000 units 20,000 units   Hemoglobin 13.3 - 17.7 g/dL 10.3(A) 9.2(A) 9.9(A) 9.5(A) 9.9(A) 8.9(A) 8.8(A)   IV Iron Dose - - - - - - - -   TSAT % - 16 - - - 11 -   Ferritin ng/mL - 551 - - - 276 -     BP Readings from Last 3 Encounters:   19 119/75   11/15/18 149/80   18 138/62     Wt Readings from Last 2 Encounters:   19 235 lb (106.6 kg)   11/15/18 238 lb (108 kg)           ASSESSMENT:  Hgb:Not at goal but improving - recommend dose and continue current regimen  TSat: not at goal of >30% Ferritin: At goal (>100ng/mL)    PLAN:  Dose with procrit and RTC for hgb then procrit if needed in 1 week(s)    Orders needed to be renewed (for next follow-up date) in LETTERS - for external labs: None    Iron labs due:  2019    Plan discussed with:  No call made per pt request.   Need to discuss IV Iron with pt.   Plan provided by:  ena    NEXT FOLLOW-UP DATE:  04/15/2019    Anemia Management Service  Belinda Escalona RN  Phone: [phone]  Fax: [phone]

## 2019-04-15 NOTE — TELEPHONE ENCOUNTER
Follow-up with anemia management service:    Spoke with [facility]. Rory was admitted on 4/10/2019. Reason for admission was not provided. He has received PRBC's    Will follow up with Rory later this week.       4/18/2019; Spoke with Rory, he was admitted 4/10/2019 to [facility] with weakness and Afib.  Troponin levels have increased, Hgb dropped.  He did have a colonoscopy and surgery to remove hemorrhoids. Fainted from blood loss r/t surgery.   He is currently admitted to [facility] for rehab.  He has not sure how long will be there. The phone # to his room is [phone].     Call placed to [facility] ([phone]) ask to speak to hospital nurse.   Spoke with Melodie RONQUILLO regarding his Anemia and Procrit Rx.  Melodie stated this will be addressed today.  Will follow up again in 1 week.         Anemia Latest Ref Rng & Units 3/1/2019 3/8/2019 3/15/2019 3/22/2019 3/29/2019 4/5/2019 4/15/2019   MARY LOU Dose - 20,000 units 20,000 units 20,000 units 20,000 units 20,000 units 20,000 units -   Hemoglobin 13.3 - 17.7 g/dL 9.2(A) 9.9(A) 9.5(A) 9.9(A) 8.9(A) 8.8(A) 7.6(A)   IV Iron Dose - - - - - - - -   TSAT % 16 - - - 11 - -   Ferritin ng/mL 551 - - - 276 - -           Follow-up call date: 04/25/2019        Anemia Management Service  Belinda Escalona RN  Phone: [phone]  Fax: [phone]

## 2019-04-23 NOTE — TELEPHONE ENCOUNTER
Received voicemail from Kajal requesting a call back. Rory is currently at [facility], was admitted for bleeding and emergent hemorrhoid surgery. He will be discharged today. Kajal called to get a follow up scheduled with Dr. Sun (or another transplant provider), as they were told they need close follow up. Sent message to transplant team, will update Dr. Sun.    Melani Mehta RN

## 2019-04-24 NOTE — TELEPHONE ENCOUNTER
I spoke to wife and informed her that the first opening is June 18 and she said that Rory is very sick and really cant wait to be seen that long. I spoke to pt.'s corrdinator, Rosita Liu and asked her to request permission to overbook with Dr. Haskins

## 2019-04-25 NOTE — TELEPHONE ENCOUNTER
Follow-up with anemia management service:    Spoke with Rory, he is still at rehab/swingbed,  his Hgb today was 7.8.  Facility is planning on giving his Procrit tomorrow (4/26/19).     Will follow up on 4/29/2019.    Anemia Latest Ref Rng & Units 3/1/2019 3/8/2019 3/15/2019 3/22/2019 3/29/2019 4/5/2019 4/15/2019   MARY LOU Dose - 20,000 units 20,000 units 20,000 units 20,000 units 20,000 units 20,000 units -   Hemoglobin 13.3 - 17.7 g/dL 9.2(A) 9.9(A) 9.5(A) 9.9(A) 8.9(A) 8.8(A) 7.6(A)   IV Iron Dose - - - - - - - -   TSAT % 16 - - - 11 - -   Ferritin ng/mL 551 - - - 276 - -         Follow-up call date: 4/29/2019, did he get his Procrit?         Anemia Management Service  Belinda Escalona RN  Phone: [phone]  Fax: [phone]

## 2019-04-29 NOTE — TELEPHONE ENCOUNTER
Rory called and said to call him at his hospital room because he and his wife have bad cell reception there. The direct number to his room is [phone]

## 2019-04-30 NOTE — PROGRESS NOTES
PATHOLOGY HLA CROSSMATCH CONSULTATION: DONOR/RECIPIENT  VIRTUAL CROSSMATCH - Kidney  Consultation Date: 2019  Consultation Requested by: Dr. Jenn Harrington  Regarding: Compatibility of  donor organ UNOS #[id number] from OPO: MNOP with Rory Bustamante    Findings: Regarding a virtual crossmatch between Rory Bustamante and  donor listed above (match ID [id number]):  The most recent (19) and nine (9) additional patient serum/sera  were analyzed.  The patient has no antibodies listed with HLA specificity against the donor organ.      Record Review Indicates: I personally reviewed the most recent serum, the historic peak sera, and all other sera with solid-phase HLA Single Antigen test results:  The patient has no HLA antibodies against the donor organ.     The results of this virtual XM are:   -most recent serum: compatible   -peak #1: compatible  -peak #2: compatible    Disclaimer: Clinical judgement must take into account other factors, such as non-HLA antibodies not detected in the assay. The VXM gives probabilities only.  The probability does not account for the potential for auto-antibodies that may be present in the patient's serum.  These autoantibodies may render the physical crossmatch falsely positive, and would be detected by an autologous crossmatch.  When possible, confirm findings with prospective allogeneic and autologous flow crossmatches before going to transplant as clinically indicated.     Brandyn Cruz, PhD,[facility]  Medical Director, Immunology/Histocompatibility Laboratory  Pager [phone]

## 2019-04-30 NOTE — TELEPHONE ENCOUNTER
Call placed to pt's TCU room #, no answer.     Spoke w/ pt's wife. 3 weeks ago pt went in to ED, was admitted to the hospital for anemia. Found to have bleeding hemorrhoids, had hemorrhoidectomy. Was discharged and readmitted again for another repeat surgery. Also received multiple blood transfusions during this time. Is on coumadin for afib, wife aware pt should f/u with cardiology regarding coumadin. Plan is for pt to discharge to home soon. Has f/u appt with Surgeon in [place] on Thursday. [facility] urology [facility], has rudolph catheter for urinary retention. Wife agreeable to schedule nephrology appt next available.

## 2019-04-30 NOTE — TELEPHONE ENCOUNTER
I spoke to wife and confirmed nephrology appointments on 6/25/19.  Informed patient an itinerary can be accessed on [employer], and will be sent via mail.

## 2019-04-30 NOTE — OR NURSING
Pt is currently in TCU at [facility] after having surgery on 4/23 per notes. Pt noted to be having f/u with surgeon in [place] on Thursday which is the day he is to be having surgery with Dr. Manning. In basket message sent to Noemi BECERRIL,  with Dr. Manning to clarify that they know this patient's current status and that this patient is planning to proceed with the procedure on Thursday prior to doing PAN call. Awaiting message back

## 2019-05-01 NOTE — TELEPHONE ENCOUNTER
Reason for visit: voiding trial     Relevant information: retention    Records/imaging/labs/orders: no allergy to antibiotics    Pt called: no need for a call    At Room[facility]: cipro

## 2019-05-01 NOTE — TELEPHONE ENCOUNTER
LVM for patient in regards to rescheduling procedure. Left direct line for patient to call when he is ready to reschedule.     5/1/19 1050am

## 2019-05-01 NOTE — PROGRESS NOTES
[facility] RN Care Coordination Note:    Called and spoke with patients wife regarding upcoming procedure with Dr. Manning. Informed her that based on the information and his recent health issues we would like to post pone this procedure until he is discharged for the TCU and medical stable. Informed her we would set him up for a routine visit with Dr. Manning for a couple of months from now and if she feels he needs more time pending his recovery they can reschedule as needed.  She agreed with this plan and requested the  contact her to arrange the appointment d/t patient being in a TCU and ensure that things are not missed.      Ann Marie Carroll RN   Care Coordinator   [phone]

## 2019-05-02 NOTE — TELEPHONE ENCOUNTER
Called and spoke to Rory. He is in a SNF currently, expecting to be discharged 5/3/2019.   I let him know that Dr. Sun will be reviewing your recent hospitalization records.   At this time you are inactive.   He understands, and said he kind of wondered. He asked if he continues to get the HLA draws. No.   I wished him better health, and once I have more information, I will call him.

## 2019-05-03 NOTE — PROGRESS NOTES
Rory Bustamante comes into clinic today at the request of Dr. Cochran.    The following medication was given:     MEDICATION:  Cipro   ROUTE: PO  SITE: mouth  DOSE: 500 mg  LOT #: [id number]  : Realie  EXPIRATION DATE: 10/20  NDC#: 49988 [id number] 11   Was there drug waste? No    Prior to administration, verified patient identity using patient's name and date of birth.    Drug Amount Wasted:  None.  Vial/Syringe: single      Approx 300 mL of sterile water instilled into the bladder via catheter.      Removal:  16 Fr straight tipped silicone rudolph catheter removed from urethral meatus without difficulty after removing 8 mL of fluid from the balloon, balloon intact.    Patient voided approx 350 mL of clear urine.     Post-void residual was: 12 mL per bladder scan.    Patient tolerated procedure well.      Education: Teaching done with patient verbally as to where to go or call  if unable to urinate post-catheter removal. Increase fluids.   Plan: Follow-up as planned      This service provided today was under the supervising provider of the day Dr. Story, who was available if needed.    Shelia AMEZQUITA

## 2019-05-06 NOTE — TELEPHONE ENCOUNTER
Anemia Management Note  SUBJECTIVE/OBJECTIVE:  Referred by Dr. Christi Sun on 2018  Primary Diagnosis: Anemia in Chronic Kidney Disease (N18.3, D63.1)     Secondary Diagnosis:  Chronic Kidney Disease, Stage 3 (N18.3)   Hgb goal range:  9-10  Epo/Darbo: Procrit  20,000 units once weekly for Hgb < 10  In clinic  RX/TX plans : 2019  Iron regimen:  None  Labs : 2020.      Patient prefers calls only when labs drastically change. Otherwise [place] will always give him a dose if his Hgb is below.     Labs and Procrit are done at [facility].  Drawn on  10:30a  Phone: [phone] # is [phone]     Anemia Latest Ref Rng & Units 3/29/2019 2019 4/15/2019 2019 2019 2019 5/3/2019   MARY LOU Dose - 20,000 units 20,000 units - - 20,000 units - 20,000 units   Hemoglobin 13.3 - 17.7 g/dL 8.9(A) 8.8(A) 7.6(A) 7.8(A) - 9.5(A) -   IV Iron Dose - - - - - - - -   TSAT % 11 - - - - - -   Ferritin ng/mL 276 - - - - - -     BP Readings from Last 3 Encounters:   19 119/75   11/15/18 149/80   18 138/62     Wt Readings from Last 2 Encounters:   19 235 lb (106.6 kg)   11/15/18 238 lb (108 kg)     Spoke with Rory, he is home now and feeling much better.  He plans to get back to his weekly lab schedule, he will have labs drawn every Friday morning and will get the injection at clinic if needed. Reminded Rory to have his Iron and Ferritin labs drawn.     ASSESSMENT:  Hgb:At goal - recommend dose  TSat: Due for labs Ferritin: Due for labs    PLAN:  Dose with procrit and RTC for hgb then procrit if needed in 1 week(s)    Orders needed to be renewed (for next follow-up date) in LETTERS - for external labs: None    Iron labs due:  Due    Plan discussed with:  Rory  Plan provided by:  ena    NEXT FOLLOW-UP DATE:  05/10/2019    Anemia Management Service  Belinda Escalona RN  Phone: [phone]  Fax: [phone]

## 2019-05-10 NOTE — TELEPHONE ENCOUNTER
Anemia Management Note  SUBJECTIVE/OBJECTIVE:  Referred by Dr. Christi Sun on 2018  Primary Diagnosis: Anemia in Chronic Kidney Disease (N18.3, D63.1)     Secondary Diagnosis:  Chronic Kidney Disease, Stage 3 (N18.3)   Hgb goal range:  9-10  Epo/Darbo: Procrit  20,000 units once weekly for Hgb < 10  In clinic  RX/TX plans : 2019  Iron regimen:  None  Labs : 2020.      Patient prefers calls only when labs drastically change. Otherwise [place] will always give him a dose if his Hgb is below.     Labs and Procrit are done at [facility].  Drawn on  10:30a  Phone: [phone]  Fax # is [phone]     Anemia Latest Ref Rng & Units 2019 4/15/2019 2019 2019 2019 5/3/2019 5/10/2019   MARY LOU Dose - 20,000 units - - 20,000 units - 20,000 units -   Hemoglobin 13.3 - 17.7 g/dL 8.8(A) 7.6(A) 7.8(A) - 9.5(A) - 9.5(A)   IV Iron Dose - - - - - - - -   TSAT % - - - - - - 5   Ferritin ng/mL - - - - - - 44     BP Readings from Last 3 Encounters:   19 119/75   11/15/18 149/80   18 138/62     Wt Readings from Last 2 Encounters:   19 235 lb (106.6 kg)   11/15/18 238 lb (108 kg)       Left message to call back and discuss the need for IV Iron.     ASSESSMENT:  Hgb:At goal - recommend dose  TSat: not at goal of >30% Ferritin: Not at goal of (>100ng/mL)    PLAN:  Dose with procrit and RTC for hgb then procrit if needed in 1 week(s)    Orders needed to be renewed (for next follow-up date) in LETTERS - for external labs: None    Iron labs due:  2019    Plan discussed with:  Left message for Rory  Plan provided by:  ena    NEXT FOLLOW-UP DATE:  2019  5/10; Rory called back and would like IV Iron. Orders placed.     Anemia Management Service  Belinda Escalona RN  Phone: [phone]  Fax: [phone]

## 2019-05-13 PROBLEM — E78.1 HYPERTRIGLYCERIDEMIA: Status: ACTIVE | Noted: 2019-01-01

## 2019-05-13 PROBLEM — E78.5 HYPERLIPIDEMIA LDL GOAL <70: Status: ACTIVE | Noted: 2019-01-01

## 2019-05-13 PROBLEM — M1A.3790: Status: ACTIVE | Noted: 2019-01-01

## 2019-05-13 NOTE — NURSING NOTE
Labs: Patient was given results of the laboratory testing obtained today. Patient was instructed to return for the next laboratory testing in one year. Patient demonstrated understanding of this information and agreed to call with further questions or concerns.   Med Reconcile: Reviewed and verified all current medications with the patient. The updated medication list was printed and given to the patient.  Return Appointment: Patient given instructions regarding scheduling next clinic visit. Patient demonstrated understanding of this information and agreed to call with further questions or concerns.  Patient stated he understood all health information given and agreed to call with further questions or concerns.    May French LPN

## 2019-05-13 NOTE — PROGRESS NOTES
Cardiology Clinic      HPI:     Mr. 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  We last saw him Nov 2018.    Patient had MPI stress from 10/2018 revealing large fixed inferoseptal perfusion defect with minimal rosey infarct ischemia; EF 57%.    He was hospitalized for lower GI hemorrhoid bleeding, requiring surgical ligation.  Required blood transfusions.  Now recovering and doing better.    Labwork performed 5/10/19 via [facility]  Total C 153    HDL 36  LDL 78  Hgb 9.5  hematocrit 29.1  Iron 26 L  TIBC 455  % Sat 6  Ferrtitin 44    Lipids from 9/2018: total 176, HDL 22, LDL 81, triglycerides 486.    Patient reports no CP, SOB, lightheadedness, dizziness, palpitations.    PAST MEDICAL HISTORY:  Past Medical History:   Diagnosis Date     Anemia      Arrhythmia     atrial fib     Arthritis      BPH (benign prostatic hyperplasia)      CAD (coronary artery disease) July 2011    MI in July 2011, stent placed, on plavix and aspirin     ESRD (end stage renal disease) (H)     tx, renal insufficiency     Glaucoma      Gout     Uric acid as high as 11 previously, now on allopurinol and probenecid     HTN (hypertension)      Hypercholesteremia 2015     Hypertriglyceridemia 2015     Intraocular bleeding     previously treated [facility] Avastin     Macular degeneration      Neuropathy     Limited sensation bilateral knees to feet     Obese     BMI 31     Paroxysmal A-fib (H)      Presence of stent in coronary artery August 2012     Proteinuria 11/2015    recurrent MPGN on biopsy     S/P kidney transplant     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  HLA identical transplant.  Biopsy in 4/2008 with recurrent MPGN type 2, mild interstitial fibrosis and tubular atrophy, CNI toxicity     Spinal stenosis 2013     Warts        CURRENT MEDICATIONS:  Current Outpatient Medications   Medication Sig Dispense Refill     acetaminophen (TYLENOL  ARTHRITIS PAIN) 650 MG CR tablet Take 2 tablets by mouth 3 times daily.       allopurinol (ZYLOPRIM) 100 MG tablet Take 2 tablets by mouth daily. (Patient taking differently: Take 100 mg by mouth 2 times daily ) 180 tablet 3     BABY ASPIRIN PO Take 81 mg by mouth daily (with lunch)        BEVACizumab (AVASTIN) 400 MG/16ML injection Every 5 weeks       Brimonidine Tartrate (ALPHAGAN OP) Place 1 drop into the right eye 2 times daily        cycloSPORINE modified (GENERIC EQUIVALENT) 100 MG capsule Take 1 capsule (100 mg) by mouth 2 times daily Total dose 125 mg twice daily 60 capsule 11     cycloSPORINE modified (GENERIC EQUIVALENT) 25 MG capsule Take 2 capsules (50 mg) by mouth 2 times daily Total dose 150 mg twice daily (Patient taking differently: Take 25 mg by mouth 2 times daily Total dose 125 mg twice daily) 180 capsule 3     epoetin freya (PROCRIT) 00025 UNIT/ML injection Inject 1 mL (20,000 Units) Subcutaneous once a week As needed for hgb < 10 4 mL 5     fenofibrate 145 MG tablet Take 145 mg by mouth every morning        ISOSORBIDE MONONITRATE PO Take 60 mg by mouth every morning        metoprolol (LOPRESSOR) 100 MG tablet Take 1 tablet by mouth 2 times daily. 60 tablet 6     metoprolol succinate ER (TOPROL-XL) 100 MG 24 hr tablet        NIFEdipine ER osmotic (PROCARDIA XL) 60 MG TB24 Take 1 tablet (60 mg) by mouth daily 90 tablet 3     nitroGLYCERIN (NITROSTAT) 0.4 MG SL tablet Place 0.4 mg under the tongue every 5 minutes as needed.       NONFORMULARY Take 1 tablet by mouth 2 times daily Focus Macula Pro:  Eye vitamin and mineral supplement A, C, E, Zinc, Copper        omega-3 acid ethyl esters (LOVAZA) 1 G capsule Take 2 g by mouth 2 times daily        PANTOPRAZOLE SODIUM PO Take 40 mg by mouth daily as needed for heartburn       predniSONE (DELTASONE) 5 MG tablet Take 5 mg by mouth daily (with lunch)        Rosuvastatin Calcium (CRESTOR PO) Take 40 mg by mouth every evening         sulfamethoxazole-trimethoprim (BACTRIM,SEPTRA) 400-80 MG per tablet Take 1 tablet by mouth every other day        tamsulosin (FLOMAX) 0.4 MG capsule Take 1 capsule (0.4 mg) by mouth daily (Patient taking differently: Take 0.4 mg by mouth 2 times daily ) 60 capsule 1     warfarin (COUMADIN) 5 MG tablet        cephALEXin (KEFLEX) 500 MG capsule        fenofibrate 145 MG tablet Take 145 mg by mouth       loperamide (IMODIUM) 2 MG capsule Take 2 mg by mouth daily        losartan (COZAAR) 50 MG tablet Take 1 tablet (50 mg) by mouth daily (Patient not taking: Reported on 5/13/2019) 90 tablet 3     probenecid (BENEMID) 500 MG tablet Take 500 mg by mouth 2 times daily.         PAST SURGICAL HISTORY:  Past Surgical History:   Procedure Laterality Date     C TOTAL KNEE ARTHROPLASTY  2015     CREATE FISTULA ARTERIOVENOUS UPPER EXTREMITY Right 9/25/2018    Procedure: CREATE FISTULA ARTERIOVENOUS UPPER EXTREMITY;  Create Right Brachial Arteriovenous Fistula ;  Surgeon: Sdyney Dawkins MD;  Location: [facility] OR     LAMINECTOMY LUMBAR MINIMALLY INVASIVE THREE+ LEVELS N/A 6/11/2018    Procedure: LAMINECTOMY LUMBAR MINIMALLY INVASIVE THREE+ LEVELS;  Minimally Invasive Surgery Laminectomies Lumbar 2-3,Lumbar 3-4,Lumbar 4-5;  Surgeon: Harshal Rucker MD;  Location: [facility] OR     LASER HOLMIUM LITHOTRIPSY BLADDER N/A 11/5/2018    Procedure: Cystsocopy, Cystolithalopaxy Of Bladder Stone With Holmium Laser;  Surgeon: Meagan Story MD;  Location: [facility] OR     TONSILLECTOMY       TRANSPLANT KIDNEY RECIPIENT LIVING RELATED  1989    HLA identical     VASECTOMY       ALLERGIES     Allergies   Allergen Reactions     Contrast Dye Other (See Comments) and Itching     Pt reports sneezing     Pravastatin Muscle Pain (Myalgia)     Simvastatin Muscle Pain (Myalgia)     FAMILY HISTORY:  Family History   Problem Relation Age of Onset     Diabetes Mother      Colon Cancer Mother 78     Cardiac Sudden Death Sister 87     Diabetes Brother   "    Cerebrovascular Disease Brother      SOCIAL HISTORY:  Social History     Social History     Marital status:      Spouse name: N/A     Number of children: N/A     Years of education: N/A     Social History Main Topics     Smoking status: Never Smoker     Smokeless tobacco: Never Used     Alcohol use No     Drug use: No     Sexual activity: Not on file     Other Topics Concern     Not on file     Social History Narrative     ROS:   Constitutional: No fever, chills, or sweats. No weight gain/loss   ENT: No visual disturbance, ear ache, epistaxis, sore throat  Allergies/Immunologic: Negative.   Respiratory: No cough, hemoptysia  Cardiovascular: As per HPI  GI: slight hematochezia  : No urinary frequency, dysuria, or hematuria  Integument: Negative  Psychiatric: Negative  Neuro: Negative  Endocrinology: Negative   Musculoskeletal: Negative    EXAM:  /63 (BP Location: Left arm, Patient Position: Chair, Cuff Size: Adult Regular)   Pulse 73   Ht 1.854 m (6' 1\")   Wt 99.2 kg (218 lb 12.8 oz)   SpO2 98%   BMI 28.87 kg/m    In general, the patient is a pleasant male in no apparent distress.    HEENT: NC/AT.  PERRLA.  EOMI.  Sclerae white, not injected.  Nares clear.  Pharynx without erythema or exudate.  Dentition intact.    Neck: No adenopathy.  No thyromegaly. Carotids +4/4 bilaterally without bruits.  No jugular venous distension.   Heart: RRR. Normal S1, S2 splits physiologically. No murmur, rub, click, or gallop. The PMI is in the 5th ICS in the midclavicular line. There is no heave.    Lungs: CTA.  No ronchi, wheezes, rales.  No dullness to percussion.   Abdomen: Soft, nontender, nondistended. No organomegaly.  No bruits.   Extremities: No clubbing, cyanosis, or edema.  The pulses are +4/4 at the radial, brachial, femoral, popliteal, DP, and PT sites bilaterally.  No bruits are noted.  Neurologic: Alert and oriented to person/place/time, normal speech, gait and affect  Skin: No petechiae, purpura " or rash.    Labs:  LIPID RESULTS:  Lab Results   Component Value Date    CHOL 158 06/16/2018    HDL 12 (L) 06/16/2018    LDL  06/16/2018     Cannot estimate LDL when triglyceride exceeds 400 mg/dL    LDL 97 06/16/2018    TRIG 429 (H) 06/16/2018    NHDL 146 (H) 06/16/2018       LIVER ENZYME RESULTS:  Lab Results   Component Value Date    AST 19 01/18/2019    ALT 24 01/18/2019       CBC RESULTS:  Lab Results   Component Value Date    WBC 6.3 11/14/2018    RBC 2.78 (L) 11/14/2018    HGB 9.5 (A) 05/10/2019    HCT 29.1 05/10/2019     (H) 11/14/2018    MCH 34.9 (H) 11/14/2018    MCHC 33.1 11/14/2018    RDW 13.1 11/14/2018     (L) 11/14/2018       BMP RESULTS:  Lab Results   Component Value Date     01/18/2019    POTASSIUM 4.4 01/18/2019    CHLORIDE 99 01/18/2019    CO2 17 (A) 12/07/2018    ANIONGAP 11 11/14/2018     (A) 01/18/2019    BUN 75 01/18/2019    CR 4.02 01/18/2019    GFRESTIMATED 15 01/18/2019    GFRESTBLACK 13 12/07/2018    TRISHA 9.1 01/18/2019        A1C RESULTS:  Lab Results   Component Value Date    A1C 4.8 12/08/2017       INR RESULTS:  Lab Results   Component Value Date    INR 2.3 01/18/2019    INR 1.15 (H) 11/05/2018       Procedures:     EKG: sin rhythm, AV block 1st degree, LAHB, slow R progression pericardial leads.  Assessment and Plan:     Mr. 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    We discussed the results with patient and also the importance of a heart healthy lifestyle and diet    #Hypertriglyceridemia:  Etiology likely 2/2 immunosuppression as well as CKD in past. Given that he has had pancreatitis previously, would not reduce any of his triglyceride lowering therapy at this time.  His panel, including triglycerides actually appears decent. Would consider stopping fenofibrate after renal tx  -Continue fenofibrate 145 mg PO every day  -Continue rosuvastatin 40 mg PO every day  -Continue Lovaza  2g PO BID    #HTN:  -Continue losartan 50 mg PO every day  -Continue Imdur 60 mg PO every day  -Continue nifedipine 60 mg PO QD    Follow-up with labs within 1 year    Patient seen and discussed with Dr. Tereso Carrizales MD PGY5  Cardiology Fellow     I saw and evaluated patient with CV fellow.  I examined patient and I discussed results and therapy with patient and CV fellow.  I agree with CV fellows note and I edit this note.      Elroy Rider MD, PhD  Professor of Medicine  Division of Cardiology    CC  Patient Care Team:  Moris Diaz MD as PCP - General  ManskeChristi MD as MD (Nephrology)  Tl Greenberg MD as MD (Ophthalmology)  Jeremy Sage PA-C as Physician Assistant (Physician Assistant)  Melani Mehta, SHIMA as Nurse Coordinator (Nephrology)  Holly Valenzuela PA, PA-C as Physician Assistant (Dermatology)  Belinda Moseley MD as MD (Family Medicine - Sports Medicine)  Harshal Rucker MD as MD (Orthopaedic Surgery)  Pradip Carbajal MD as MD (Neurology)  Isai Hall MD as MD (Family Medicine - Sports Medicine)  Isai Fagan MD as MD (Cardiology)  Meagan Story MD as MD (Urology)  Holly Carroll, RN as Registered Nurse (Urology)  Moris Diaz MD as Referring Physician (Family Practice)  ANEUDY BLAKELY

## 2019-05-13 NOTE — NURSING NOTE
Vitals completed successfully and medication reconciled.     Radha Cruz CMA  12:07 PM  Chief Complaint   Patient presents with     Follow Up     6 month follow up

## 2019-05-13 NOTE — LETTER
5/13/2019    RE: Rory Bustamante  [address]  [facility] [address]       Dear Colleague,    Thank you for the opportunity to participate in the care of your patient, Rory Bustamante, at the [facility]. Please see a copy of my visit note below.             Cardiology Clinic      HPI:     Mr. 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  We last saw him Nov 2018.    Patient had MPI stress from 10/2018 revealing large fixed inferoseptal perfusion defect with minimal rosey infarct ischemia; EF 57%.    He was hospitalized for lower GI hemorrhoid bleeding, requiring surgical ligation.  Required blood transfusions.  Now recovering and doing better.    Labwork performed 5/10/19 via [facility]  Total C 153    HDL 36  LDL 78  Hgb 9.5  hematocrit 29.1  Iron 26 L  TIBC 455  % Sat 6  Ferrtitin 44    Lipids from 9/2018: total 176, HDL 22, LDL 81, triglycerides 486.    Patient reports no CP, SOB, lightheadedness, dizziness, palpitations.    PAST MEDICAL HISTORY:  Past Medical History:   Diagnosis Date     Anemia      Arrhythmia     atrial fib     Arthritis      BPH (benign prostatic hyperplasia)      CAD (coronary artery disease) July 2011    MI in July 2011, stent placed, on plavix and aspirin     ESRD (end stage renal disease) (H)     tx, renal insufficiency     Glaucoma      Gout     Uric acid as high as 11 previously, now on allopurinol and probenecid     HTN (hypertension)      Hypercholesteremia 2015     Hypertriglyceridemia 2015     Intraocular bleeding     previously treated wt Avastin     Macular degeneration      Neuropathy     Limited sensation bilateral knees to feet     Obese     BMI 31     Paroxysmal A-fib (H)      Presence of stent in coronary artery August 2012     Proteinuria 11/2015    recurrent MPGN on biopsy     S/P kidney transplant     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  HLA identical transplant.  Biopsy in 4/2008 with recurrent MPGN type 2, mild interstitial fibrosis and tubular atrophy, CNI toxicity     Spinal stenosis 2013     Warts        CURRENT MEDICATIONS:  Current Outpatient Medications   Medication Sig Dispense Refill     acetaminophen (TYLENOL ARTHRITIS PAIN) 650 MG CR tablet Take 2 tablets by mouth 3 times daily.       allopurinol (ZYLOPRIM) 100 MG tablet Take 2 tablets by mouth daily. (Patient taking differently: Take 100 mg by mouth 2 times daily ) 180 tablet 3     BABY ASPIRIN PO Take 81 mg by mouth daily (with lunch)        BEVACizumab (AVASTIN) 400 MG/16ML injection Every 5 weeks       Brimonidine Tartrate (ALPHAGAN OP) Place 1 drop into the right eye 2 times daily        cycloSPORINE modified (GENERIC EQUIVALENT) 100 MG capsule Take 1 capsule (100 mg) by mouth 2 times daily Total dose 125 mg twice daily 60 capsule 11     cycloSPORINE modified (GENERIC EQUIVALENT) 25 MG capsule Take 2 capsules (50 mg) by mouth 2 times daily Total dose 150 mg twice daily (Patient taking differently: Take 25 mg by mouth 2 times daily Total dose 125 mg twice daily) 180 capsule 3     epoetin freya (PROCRIT) 12240 UNIT/ML injection Inject 1 mL (20,000 Units) Subcutaneous once a week As needed for hgb < 10 4 mL 5     fenofibrate 145 MG tablet Take 145 mg by mouth every morning        ISOSORBIDE MONONITRATE PO Take 60 mg by mouth every morning        metoprolol (LOPRESSOR) 100 MG tablet Take 1 tablet by mouth 2 times daily. 60 tablet 6     metoprolol succinate ER (TOPROL-XL) 100 MG 24 hr tablet        NIFEdipine ER osmotic (PROCARDIA XL) 60 MG TB24 Take 1 tablet (60 mg) by mouth daily 90 tablet 3     nitroGLYCERIN (NITROSTAT) 0.4 MG SL tablet Place 0.4 mg under the tongue every 5 minutes as needed.       NONFORMULARY Take 1 tablet by mouth 2 times daily Focus Macula Pro:  Eye vitamin and mineral supplement A, C, E, Zinc, Copper        omega-3 acid ethyl esters  (LOVAZA) 1 G capsule Take 2 g by mouth 2 times daily        PANTOPRAZOLE SODIUM PO Take 40 mg by mouth daily as needed for heartburn       predniSONE (DELTASONE) 5 MG tablet Take 5 mg by mouth daily (with lunch)        Rosuvastatin Calcium (CRESTOR PO) Take 40 mg by mouth every evening        sulfamethoxazole-trimethoprim (BACTRIM,SEPTRA) 400-80 MG per tablet Take 1 tablet by mouth every other day        tamsulosin (FLOMAX) 0.4 MG capsule Take 1 capsule (0.4 mg) by mouth daily (Patient taking differently: Take 0.4 mg by mouth 2 times daily ) 60 capsule 1     warfarin (COUMADIN) 5 MG tablet        cephALEXin (KEFLEX) 500 MG capsule        fenofibrate 145 MG tablet Take 145 mg by mouth       loperamide (IMODIUM) 2 MG capsule Take 2 mg by mouth daily        losartan (COZAAR) 50 MG tablet Take 1 tablet (50 mg) by mouth daily (Patient not taking: Reported on 5/13/2019) 90 tablet 3     probenecid (BENEMID) 500 MG tablet Take 500 mg by mouth 2 times daily.         PAST SURGICAL HISTORY:  Past Surgical History:   Procedure Laterality Date     C TOTAL KNEE ARTHROPLASTY  2015     CREATE FISTULA ARTERIOVENOUS UPPER EXTREMITY Right 9/25/2018    Procedure: CREATE FISTULA ARTERIOVENOUS UPPER EXTREMITY;  Create Right Brachial Arteriovenous Fistula ;  Surgeon: Sydney Dawkins MD;  Location: [facility] OR     LAMINECTOMY LUMBAR MINIMALLY INVASIVE THREE+ LEVELS N/A 6/11/2018    Procedure: LAMINECTOMY LUMBAR MINIMALLY INVASIVE THREE+ LEVELS;  Minimally Invasive Surgery Laminectomies Lumbar 2-3,Lumbar 3-4,Lumbar 4-5;  Surgeon: Harshal Rucker MD;  Location: [facility] OR     LASER HOLMIUM LITHOTRIPSY BLADDER N/A 11/5/2018    Procedure: Cystsocopy, Cystolithalopaxy Of Bladder Stone With Holmium Laser;  Surgeon: Meagan Story MD;  Location: [facility] OR     TONSILLECTOMY       TRANSPLANT KIDNEY RECIPIENT LIVING RELATED  1989    HLA identical     VASECTOMY       ALLERGIES     Allergies   Allergen Reactions     Contrast Dye Other (See  "Comments) and Itching     Pt reports sneezing     Pravastatin Muscle Pain (Myalgia)     Simvastatin Muscle Pain (Myalgia)     FAMILY HISTORY:  Family History   Problem Relation Age of Onset     Diabetes Mother      Colon Cancer Mother 78     Cardiac Sudden Death Sister 87     Diabetes Brother      Cerebrovascular Disease Brother      SOCIAL HISTORY:  Social History     Social History     Marital status:      Spouse name: N/A     Number of children: N/A     Years of education: N/A     Social History Main Topics     Smoking status: Never Smoker     Smokeless tobacco: Never Used     Alcohol use No     Drug use: No     Sexual activity: Not on file     Other Topics Concern     Not on file     Social History Narrative     ROS:   Constitutional: No fever, chills, or sweats. No weight gain/loss   ENT: No visual disturbance, ear ache, epistaxis, sore throat  Allergies/Immunologic: Negative.   Respiratory: No cough, hemoptysia  Cardiovascular: As per HPI  GI: slight hematochezia  : No urinary frequency, dysuria, or hematuria  Integument: Negative  Psychiatric: Negative  Neuro: Negative  Endocrinology: Negative   Musculoskeletal: Negative    EXAM:  /63 (BP Location: Left arm, Patient Position: Chair, Cuff Size: Adult Regular)   Pulse 73   Ht 1.854 m (6' 1\")   Wt 99.2 kg (218 lb 12.8 oz)   SpO2 98%   BMI 28.87 kg/m     In general, the patient is a pleasant male in no apparent distress.    HEENT: NC/AT.  PERRLA.  EOMI.  Sclerae white, not injected.  Nares clear.  Pharynx without erythema or exudate.  Dentition intact.    Neck: No adenopathy.  No thyromegaly. Carotids +4/4 bilaterally without bruits.  No jugular venous distension.   Heart: RRR. Normal S1, S2 splits physiologically. No murmur, rub, click, or gallop. The PMI is in the 5th ICS in the midclavicular line. There is no heave.    Lungs: CTA.  No ronchi, wheezes, rales.  No dullness to percussion.   Abdomen: Soft, nontender, nondistended. No " organomegaly.  No bruits.   Extremities: No clubbing, cyanosis, or edema.  The pulses are +4/4 at the radial, brachial, femoral, popliteal, DP, and PT sites bilaterally.  No bruits are noted.  Neurologic: Alert and oriented to person/place/time, normal speech, gait and affect  Skin: No petechiae, purpura or rash.    Labs:  LIPID RESULTS:  Lab Results   Component Value Date    CHOL 158 06/16/2018    HDL 12 (L) 06/16/2018    LDL  06/16/2018     Cannot estimate LDL when triglyceride exceeds 400 mg/dL    LDL 97 06/16/2018    TRIG 429 (H) 06/16/2018    NHDL 146 (H) 06/16/2018       LIVER ENZYME RESULTS:  Lab Results   Component Value Date    AST 19 01/18/2019    ALT 24 01/18/2019       CBC RESULTS:  Lab Results   Component Value Date    WBC 6.3 11/14/2018    RBC 2.78 (L) 11/14/2018    HGB 9.5 (A) 05/10/2019    HCT 29.1 05/10/2019     (H) 11/14/2018    MCH 34.9 (H) 11/14/2018    MCHC 33.1 11/14/2018    RDW 13.1 11/14/2018     (L) 11/14/2018       BMP RESULTS:  Lab Results   Component Value Date     01/18/2019    POTASSIUM 4.4 01/18/2019    CHLORIDE 99 01/18/2019    CO2 17 (A) 12/07/2018    ANIONGAP 11 11/14/2018     (A) 01/18/2019    BUN 75 01/18/2019    CR 4.02 01/18/2019    GFRESTIMATED 15 01/18/2019    GFRESTBLACK 13 12/07/2018    TRISHA 9.1 01/18/2019        A1C RESULTS:  Lab Results   Component Value Date    A1C 4.8 12/08/2017       INR RESULTS:  Lab Results   Component Value Date    INR 2.3 01/18/2019    INR 1.15 (H) 11/05/2018       Procedures:     EKG: sin rhythm, AV block 1st degree, LAHB, slow R progression pericardial leads.  Assessment and Plan:     Mr. 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    We discussed the results with patient and also the importance of a heart healthy lifestyle and diet    #Hypertriglyceridemia:  Etiology likely 2/2 immunosuppression as well as CKD in past. Given that he has had  pancreatitis previously, would not reduce any of his triglyceride lowering therapy at this time.  His panel, including triglycerides actually appears decent. Would consider stopping fenofibrate after renal tx  -Continue fenofibrate 145 mg PO every day  -Continue rosuvastatin 40 mg PO every day  -Continue Lovaza 2g PO BID    #HTN:  -Continue losartan 50 mg PO every day  -Continue Imdur 60 mg PO every day  -Continue nifedipine 60 mg PO QD    Follow-up with labs within 1 year    Patient seen and discussed with Dr. Tereso Carrizales MD PGY5  Cardiology Fellow     I saw and evaluated patient with CV fellow.  I examined patient and I discussed results and therapy with patient and CV fellow.  I agree with CV fellows note and I edit this note.      Elroy Rider MD, PhD  Professor of Medicine  Division of Cardiology    CC  Patient Care Team:  Moris Diaz MD as PCP - General  ManskeChristi MD as MD (Nephrology)  Tl Greenberg MD as MD (Ophthalmology)  Jeremy Sage PA-C as Physician Assistant (Physician Assistant)  Melani Mehta, SHIMA as Nurse Coordinator (Nephrology)  Holly Valenzuela PA, PA-C as Physician Assistant (Dermatology)  Belinda Moseley MD as MD (Family Medicine - Sports Medicine)  Harshal Rucker MD as MD (Orthopaedic Surgery)  Pradip Carbajal MD as MD (Neurology)  Isai Hall MD as MD (Family Medicine - Sports Medicine)  Isai Fagan MD as MD (Cardiology)  Meagan Story MD as MD (Urology)  Holly Carroll, RN as Registered Nurse (Urology)  Moris Diaz MD as Referring Physician (Family Practice)  ANEUDY BLAKELY

## 2019-05-13 NOTE — PATIENT INSTRUCTIONS
Patient Instructions:  It was a pleasure to see you in the cardiology clinic today.      If you have any questions, you can reach my nurse, May French LPN, at [phone].  Press Option #1 for the [facility], and then press Option #3 for nursing.    We are encouraging the use of Greetzt to communicate with your HealthCare Provider    Medication Changes: None.    Studies Ordered: None.    The results from today include: None.    Please follow up: With Dr. Rider in one year with fasting labs prior.    Sincerely,    Elroy Rider MD     If you have an urgent need after hours (8:00 am to 4:30 pm) please call [phone] and ask for the cardiology fellow on call.

## 2019-05-20 NOTE — TELEPHONE ENCOUNTER
Follow-up with anemia management service:    Spoke with Rory he was [facility] x 3 days last week with a fever. Was discharged on oral Abx.  Procrit was not given.    He will go Friday for labs/Procrit.      Rory prefers to get his IV Iron at [facility]. They are able to administer the Iron. Orders sent to Dr. Sun for signature.    Once orders are signed they need to be faxed to [phone]    Anemia Latest Ref Rng & Units 4/5/2019 4/15/2019 4/25/2019 4/26/2019 5/2/2019 5/3/2019 5/10/2019   MARY LOU Dose - 20,000 units - - 20,000 units - 20,000 units 20,000 units   Hemoglobin 13.3 - 17.7 g/dL 8.8(A) 7.6(A) 7.8(A) - 9.5(A) - 9.5(A)   IV Iron Dose - - - - - - - -   TSAT % - - - - - - 5   Ferritin ng/mL - - - - - - 44           Follow-up call date: 05/21/2019  Fax orders    5/21/2019; Orders faxed to [phone]. Notified Rory.  He will be having labs drawn and Procrit on Friday 5/24/2019.    Anemia Management Service  Belinda Escalona RN  Phone: [phone]  Fax: [phone]

## 2019-05-28 NOTE — TELEPHONE ENCOUNTER
Anemia Management Note  SUBJECTIVE/OBJECTIVE:  Referred by Dr. Christi Sun on 2018  Primary Diagnosis: Anemia in Chronic Kidney Disease (N18.3, D63.1)     Secondary Diagnosis:  Chronic Kidney Disease, Stage 3 (N18.3)   Hgb goal range:  9-10  Epo/Darbo: Procrit  20,000 units once weekly for Hgb < 10  In clinic  RX/TX plans : 2019  Iron regimen:  None  Labs : 2020.      Patient prefers calls only when labs drastically change. Otherwise [facility] will always give him a dose if his Hgb is below.     Labs and Procrit are done at [facility].  Drawn on  10:30a  Phone: [phone]  Fax # is [phone]     Anemia Latest Ref Rng & Units 4/15/2019 2019 2019 2019 5/3/2019 5/10/2019 2019   MARY LOU Dose - - - 20,000 units - 20,000 units 20,000 units 20,000 units   Hemoglobin 13.3 - 17.7 g/dL 7.6(A) 7.8(A) - 9.5(A) - 9.5(A) 8.3(A)   IV Iron Dose - - - - - - - 750mg   TSAT % - - - - - 5 -   Ferritin ng/mL - - - - - 44 -     BP Readings from Last 3 Encounters:   19 125/63   19 119/75   11/15/18 149/80     Wt Readings from Last 2 Encounters:   19 99.2 kg (218 lb 12.8 oz)   19 106.6 kg (235 lb)       2nd Iron infusion sched for 19    ASSESSMENT:  Hgb:Not at goal/Unknown  TSat: Due for iron studies 4 weeks after last IV iron infusion Ferritin: Due for iron studies 4 weeks after last IV iron infusion    PLAN:  Dose with procrit and RTC for hgb then procrit if needed in 1 week(s)    Orders needed to be renewed (for next follow-up date) in LETTERS - for external labs: None    Iron labs due:  4 weeks after last IV iron infusion    Plan discussed with:  Rory  Plan provided by:  ena    NEXT FOLLOW-UP DATE:  2019    Anemia Management Service  Belinda Escalona RN  Phone: [phone]  Fax: [phone]

## 2019-06-03 NOTE — TELEPHONE ENCOUNTER
Anemia Management Note  SUBJECTIVE/OBJECTIVE:  Referred by Dr. Christi Sun on 2018  Primary Diagnosis: Anemia in Chronic Kidney Disease (N18.3, D63.1)     Secondary Diagnosis:  Chronic Kidney Disease, Stage 3 (N18.3)   Hgb goal range:  9-10  Epo/Darbo: Procrit  20,000 units once weekly for Hgb < 10  In clinic  RX/TX plans : 2019  Iron regimen:  None  Labs : 2020.      Patient prefers calls only when labs drastically change. Otherwise [facility] will always give him a dose if his Hgb is below.     Labs and Procrit are done at [facility].  Drawn on  10:30a  Phone: [phone]  Fax # is [phone]     Anemia Latest Ref Rng & Units 2019 2019 2019 5/3/2019 5/10/2019 2019 2019   MARY LOU Dose - - 20,000 units - 20,000 units 20,000 units 20,000 units 20,000 units   Hemoglobin 13.3 - 17.7 g/dL 7.8(A) - 9.5(A) - 9.5(A) 8.3(A) 8.7(A)   IV Iron Dose - - - - - - 750mg 750mg   TSAT % - - - - 5 - -   Ferritin ng/mL - - - - 44 - -     BP Readings from Last 3 Encounters:   19 125/63   19 119/75   11/15/18 149/80     Wt Readings from Last 2 Encounters:   19 99.2 kg (218 lb 12.8 oz)   19 106.6 kg (235 lb)       Will recheck labs in 1 week, if  No improvement may need to increase Procrit dose to 40,000 units weekly.     ASSESSMENT:  Hgb:Not at goal but improving - recommend dose and continue current regimen  TSat: Due for iron studies 4 weeks after last IV iron infusion Ferritin: Due for iron studies 4 weeks after last IV iron infusion    PLAN:  Dose with procrit and RTC for hgb then procrit if needed in 1 week(s)    Orders needed to be renewed (for next follow-up date) in LETTERS - for external labs: None    Iron labs due:  2019    Plan discussed with:  No call made per pt request.   Plan provided by:  ena    NEXT FOLLOW-UP DATE:  06/10/2019    Anemia Management Service  Belinda Esaclona RN  Phone: [phone]  Fax:  [phone]

## 2019-06-07 NOTE — TELEPHONE ENCOUNTER
Anemia Management Note  SUBJECTIVE/OBJECTIVE:  Referred by Dr. Christi Sun on 2018  Primary Diagnosis: Anemia in Chronic Kidney Disease (N18.3, D63.1)     Secondary Diagnosis:  Chronic Kidney Disease, Stage 3 (N18.3)   Hgb goal range:  9-10  Epo/Darbo: Procrit  20,000 units once weekly for Hgb < 10  In clinic  RX/TX plans : 2019  Iron regimen:  None  Labs : 2020.      Patient prefers calls only when labs drastically change. Otherwise [facility] will always give him a dose if his Hgb is below.     Labs and Procrit are done at [facility].  Drawn on  10:30a  Phone: [phone]  Fax # is [phone]       Anemia Latest Ref Rng & Units 2019 2019 5/3/2019 5/10/2019 2019 2019 2019   MARY LOU Dose - 20,000 units - 20,000 units 20,000 units 20,000 units 20,000 units -   Hemoglobin 13.3 - 17.7 g/dL - 9.5(A) - 9.5(A) 8.3(A) 8.7(A) 9.9(A)   IV Iron Dose - - - - - 750mg 750mg -   TSAT % - - - 5 - - 20   Ferritin ng/mL - - - 44 - - 832     BP Readings from Last 3 Encounters:   19 125/63   19 119/75   11/15/18 149/80     Wt Readings from Last 2 Encounters:   19 99.2 kg (218 lb 12.8 oz)   19 106.6 kg (235 lb)           ASSESSMENT:  Hgb:At goal but rapid rise in hgb (>1pt/2 weeks) - recommend hold dose  TSat: not at goal (>30%) but ferritin >500ng/mL.  IV iron not indicated at this time per anemia protocol. Ferritin: At goal (>100ng/mL)    PLAN:  Hold Procrit and RTC for hgb then procrit if needed in 1 week(s)    Orders needed to be renewed (for next follow-up date) in LETTERS - for external labs: None    Iron labs due:  2019    Plan discussed with:  No call made per pt request  Plan provided by:  ena    NEXT FOLLOW-UP DATE:  2019    Anemia Management Service  Belinda Escalona RN  Phone: [phone]  Fax: [phone]

## 2019-06-14 NOTE — TELEPHONE ENCOUNTER
Anemia Management Note  SUBJECTIVE/OBJECTIVE:  Referred by Dr. Christi Sun on 2018  Primary Diagnosis: Anemia in Chronic Kidney Disease (N18.3, D63.1)     Secondary Diagnosis:  Chronic Kidney Disease, Stage 3 (N18.3)   Hgb goal range:  9-10  Epo/Darbo: Procrit  20,000 units once weekly for Hgb < 10  In clinic  RX/TX plans : 2019  Iron regimen:  None  Labs : 2020.      Patient prefers calls only when labs drastically change. Otherwise [facility] will always give him a dose if his Hgb is below.     Labs and Procrit are done at [facility].  Drawn on  10:30a  Phone: [phone]  Fax # is [phone]     Anemia Latest Ref Rng & Units 2019 2019 5/3/2019 5/10/2019 2019 2019 2019   MARY LOU Dose - 20,000 units - 20,000 units 20,000 units 20,000 units 20,000 units -   Hemoglobin 13.3 - 17.7 g/dL - 9.5(A) - 9.5(A) 8.3(A) 8.7(A) 9.9(A)   IV Iron Dose - - - - - 750mg 750mg -   TSAT % - - - 5 - - 20   Ferritin ng/mL - - - 44 - - 832     BP Readings from Last 3 Encounters:   19 125/63   19 119/75   11/15/18 149/80     Wt Readings from Last 2 Encounters:   19 99.2 kg (218 lb 12.8 oz)   19 106.6 kg (235 lb)       Hgb today 11.6. Procrit was held.     ASSESSMENT:  Hgb:Above goal - recommend hold dose  TSat: not at goal (>30%) but ferritin >500ng/mL.  IV iron not indicated at this time per anemia protocol. Ferritin: At goal (>100ng/mL)    PLAN:  Hold Procrit and RTC for hgb then procrit if needed in 1 week(s)    Orders needed to be renewed (for next follow-up date) in Letters & EPIC: None    Iron labs due:  2019    Plan discussed with:  No call need per pt request  Plan provided by:  ena    NEXT FOLLOW-UP DATE:  2019    Anemia Management Service  Belinda Escalona RN  Phone: [phone]  Fax: [phone]

## 2019-06-24 NOTE — TELEPHONE ENCOUNTER
Anemia Management Note  SUBJECTIVE/OBJECTIVE:  Referred by Dr. Christi Sun on 2018  Primary Diagnosis: Anemia in Chronic Kidney Disease (N18.3, D63.1)     Secondary Diagnosis:  Chronic Kidney Disease, Stage 3 (N18.3)   Hgb goal range:  9-10  Epo/Darbo: Procrit  20,000 units once weekly for Hgb < 10  In clinic  RX/TX plans : 2019  Iron regimen:  None  Labs : 2020.      Patient prefers calls only when labs drastically change. Otherwise [facility] will always give him a dose if his Hgb is below.     Labs and Procrit are done at [facility].  Drawn on  10:30a  Phone: [phone]  Fax # is [phone]     Anemia Latest Ref Rng & Units 2019 5/3/2019 5/10/2019 2019 2019 2019 2019   MARY LOU Dose - - 20,000 units 20,000 units 20,000 units 20,000 units - -   Hemoglobin 13.3 - 17.7 g/dL 9.5(A) - 9.5(A) 8.3(A) 8.7(A) 9.9(A) 10.3(A)   IV Iron Dose - - - - 750mg 750mg - -   TSAT % - - 5 - - 20 -   Ferritin ng/mL - - 44 - - 832 -     BP Readings from Last 3 Encounters:   19 125/63   19 119/75   11/15/18 149/80     Wt Readings from Last 2 Encounters:   19 99.2 kg (218 lb 12.8 oz)   19 106.6 kg (235 lb)           ASSESSMENT:  Hgb:Above goal - recommend hold dose  TSat: not at goal (>30%) but ferritin >500ng/mL.  IV iron not indicated at this time per anemia protocol. Ferritin: At goal (>100ng/mL)    PLAN:  Hold Procrit and RTC for hgb then procrit if needed in 1* week(s)    Orders needed to be renewed (for next follow-up date) in LETTERS - for external labs: None    Iron labs due:  2019    Plan discussed with:  No call made per pt request  Plan provided by:  ena    NEXT FOLLOW-UP DATE:  2019    Anemia Management Service  Belinda Escalona RN  Phone: [phone]  Fax: [phone]

## 2019-06-25 NOTE — TELEPHONE ENCOUNTER
ISSUE:   Due for labs    Plan:   Call pt and remind him to get BMP, CBC and cyclosporine level done asap  Update standing lab orders to monthly    OUTCOME:   Attempted to call pt, poor phone service, line disconnected.    LPN TASK:   Call pt and remind him to get labs done.   Fax standing lab orders.

## 2019-06-25 NOTE — LETTER
6/25/2019       RE: Rory Bustamante  [address]  [place] [facility] [place] [address]     Dear Colleague,    Thank you for referring your patient, Rory Bustamante, to the [facility] NEPHROLOGY at [facility]. Please see a copy of my visit note below.    CHRONIC TRANSPLANT NEPHROLOGY VISIT    Assessment & Plan   # LDKT: Variable with recurrent MPGN   - Baseline Cr ~ 4-5   - Inactive on kidney wait list. Patient had a recent infection that appears cleared. Additionally patient required several blood transfusions that alsop appear resolved. Will recheck basic labs and contact transplant committee.     # Immunosuppression: Cyclosporine (goal 50-75) and Prednisone (dose 5 mg daily)   - Changes: No    # Prophylaxis:   - PJP: Sulfa/TMP (Bactrim)    # Blood Pressure:  Controlled; Goal BP: < 130/80   - Changes: No     # Anemia in Chronic Renal Disease: Hgb: Stable      MARY LOU: No   - Iron studies: Replete    # Mineral Bone Disorder:   - Secondary renal hyperparathyroidism; PTH level: Not checked recently  - Vitamin D; level: Not checked recently  - Calcium; level: Normal  - Phosphorus; level: Not checked recently     # Electrolytes:   - Potassium; level: Normal  - Magnesium; level: Not checked recently  - Bicarbonate; level: Normal  - Sodium; level: Low    # Transplant History:  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    Return visit: No follow-ups on file.    Chief Complaint   Mr. Bustamante is a 63 year old here for hospital follow up.    History of Present Illness    The patient was in the hospital last April to address fever and A-fib in [place] in  [place]. There the patient had his hemorrhoids fixed but started bleeding during the surgery. He believes that he had an infection during this time, originally masked by Tylenol, and returned to the hospital a second time later that month. He was given keflex which has been effective. He also endorses chronic diarrhea and has been taking imodium for several years. He says that his diarrhea has stopped since taking keflex. During his time at the hospital he had 3 transfusions and his hemoglobin decreased to 5. Since this episode, the patient has been doing well. Compared to his fatigue in April, his energy levels are significantly improved. He recently received an iron infusion. He had a rudolph placed for ~ month post surgery, but does not currently use it.     Otherwise he is eating and drinking well. He endorses lower extremity edema. His blood pressures run in the 140's/70's.     Recent Hospitalizations:  [] No [x] Yes See above.    New Medical Issues: [x] No [] Yes    Decreased energy: [x] No [] Yes    Chest pain or SOB with exertion:  [x] No [] Yes    Appetite change or weight change: [x] No [] Yes    Nausea, vomiting or diarrhea:  [x] No [] Yes    Fever, sweats or chills: [x] No [] Yes    Leg swelling: [] No [x] Yes See above.      Home BP: at goal    Review of Systems   A comprehensive review of systems was obtained and negative, except as noted in the HPI or PMH.    Problem List   Patient Active Problem List   Diagnosis     S/P kidney transplant     Essential hypertension     CAD (coronary artery disease)     Gout     Warts     Peripheral neuropathy     Proteinuria     Organ transplant candidate     CKD (chronic kidney disease) stage 4, GFR 15-29 ml/min (H)     Anemia in stage 4 chronic kidney disease (H)     Lumbar stenosis     Surgery, elective     Atrial fibrillation (H)     s/p laminectomies L2-3,L3-4,L4-5 (6/1/18)     Hypertriglyceridemia     Chronic gout due to renal impairment involving foot without  tophus, unspecified laterality       Social History   Social History     Tobacco Use     Smoking status: Never Smoker     Smokeless tobacco: Never Used   Substance Use Topics     Alcohol use: No     Alcohol/week: 0.0 oz     Drug use: No       Allergies   Allergies   Allergen Reactions     Contrast Dye Other (See Comments) and Itching     Pt reports sneezing     Pravastatin Muscle Pain (Myalgia)     Simvastatin Muscle Pain (Myalgia)       Medications   Current Outpatient Medications   Medication Sig     acetaminophen (TYLENOL ARTHRITIS PAIN) 650 MG CR tablet Take 2 tablets by mouth 3 times daily.     allopurinol (ZYLOPRIM) 100 MG tablet Take 2 tablets by mouth daily. (Patient taking differently: Take 100 mg by mouth 2 times daily )     BABY ASPIRIN PO Take 81 mg by mouth daily (with lunch)      BEVACizumab (AVASTIN) 400 MG/16ML injection Every 5 weeks     Brimonidine Tartrate (ALPHAGAN OP) Place 1 drop into the right eye 2 times daily      cephALEXin (KEFLEX) 500 MG capsule      cycloSPORINE modified (GENERIC EQUIVALENT) 100 MG capsule Take 1 capsule (100 mg) by mouth 2 times daily Total dose 125 mg twice daily     cycloSPORINE modified (GENERIC EQUIVALENT) 25 MG capsule TAKE TWO CAPSULES BY MOUTH TWICE A DAY (TOTAL DOSE 150 MG TWICE  A DAY)     epoetin freya (PROCRIT) 30964 UNIT/ML injection Inject 1 mL (20,000 Units) Subcutaneous once a week As needed for hgb < 10     fenofibrate 145 MG tablet Take 145 mg by mouth     fenofibrate 145 MG tablet Take 145 mg by mouth every morning      ISOSORBIDE MONONITRATE PO Take 60 mg by mouth every morning      loperamide (IMODIUM) 2 MG capsule Take 2 mg by mouth daily      losartan (COZAAR) 50 MG tablet Take 1 tablet (50 mg) by mouth daily (Patient not taking: Reported on 5/13/2019)     metoprolol (LOPRESSOR) 100 MG tablet Take 1 tablet by mouth 2 times daily.     metoprolol succinate ER (TOPROL-XL) 100 MG 24 hr tablet      NIFEdipine ER osmotic (PROCARDIA XL) 60 MG TB24 Take 1  tablet (60 mg) by mouth daily     nitroGLYCERIN (NITROSTAT) 0.4 MG SL tablet Place 0.4 mg under the tongue every 5 minutes as needed.     NONFORMULARY Take 1 tablet by mouth 2 times daily Focus Macula Pro:  Eye vitamin and mineral supplement A, C, E, Zinc, Copper      omega-3 acid ethyl esters (LOVAZA) 1 G capsule Take 2 g by mouth 2 times daily      PANTOPRAZOLE SODIUM PO Take 40 mg by mouth daily as needed for heartburn     predniSONE (DELTASONE) 5 MG tablet Take 5 mg by mouth daily (with lunch)      probenecid (BENEMID) 500 MG tablet Take 500 mg by mouth 2 times daily.     Rosuvastatin Calcium (CRESTOR PO) Take 40 mg by mouth every evening      sulfamethoxazole-trimethoprim (BACTRIM,SEPTRA) 400-80 MG per tablet Take 1 tablet by mouth every other day      tamsulosin (FLOMAX) 0.4 MG capsule Take 1 capsule (0.4 mg) by mouth daily (Patient taking differently: Take 0.4 mg by mouth 2 times daily )     warfarin (COUMADIN) 5 MG tablet      Current Facility-Administered Medications   Medication     lidocaine 1 % injection 3 mL     triamcinolone acetonide (KENALOG-40) injection 40 mg     There are no discontinued medications.    Physical Exam   Vital Signs: There were no vitals taken for this visit.    GENERAL APPEARANCE: alert and no distress  HENT: mouth without ulcers or lesions  LYMPHATICS: no cervical or supraclavicular nodes  RESP: lungs clear to auscultation - no rales, rhonchi or wheezes  CV: regular rhythm, normal rate, no rub, no murmur  EDEMA: no LE edema bilaterally  ABDOMEN: soft, nondistended, nontender, bowel sounds normal  MS: extremities normal - no gross deformities noted, no evidence of inflammation in joints, no muscle tenderness  SKIN: no rash      Data     Renal Latest Ref Rng & Units 5/10/2019 4/26/2019 1/18/2019   Na mmol/L 139 - 136   Na (external) 136 - 145 mmol/L - 135(L) -   K mmol/L 4.2 - 4.4   K (external) 3.5 - 5.1 mmol/L - 4.3 -   Cl mmol/L 102 - 99   Cl (external) 98 - 107 mmol/L - 99 -    CO2 mmol/L 23 - -   CO2 (external) 21 - 32 mmol/L - 22 -   BUN 7 - 18 mg/dL 64(A) - 75   BUN (external) 7 - 18 mg/dL - 42(H) -   Cr 0.67 - 1.17 mg/dL 4.45(A) - 4.02   Cr (external) 0.67 - 1.17 mg/dL - 5.26(HH) -   Glucose 70 - 99 mg/dL 90 - 130(A)   Glucose (external) 74 - 100 mg/dL - 95 -   Ca  mg/dL 9.4 - 9.1   Ca (external) 8.5 - 10.1 mg/dL - 8.5 -   Mg 1.6 - 2.3 mg/dL - - -     Bone Health Latest Ref Rng & Units 7/17/2018 6/15/2018 6/13/2018   Phos 2.5 - 4.5 mg/dL 3.7 2.9 5.2(H)   PTHi 12 - 72 pg/mL - - -   Vit D Def 20 - 75 ug/L - - -     Heme Latest Ref Rng & Units 6/21/2019 6/7/2019 5/31/2019   WBC 4.0 - 11.0 10e9/L - - -   WBC (external) 4.0 - 11.0 X10:3/uL - - -   Hgb 13.3 - 17.7 g/dL 10.3(A) 9.9(A) 8.7(A)   Hgb (external) 13.0 - 18.0 g/dL - - -   Plt 150 - 450 10e9/L - - -   Plt (external) 150 - 420 X10:3/uL - - -     Liver Latest Ref Rng & Units 5/10/2019 1/18/2019 10/29/2018   AP 46 - 116 U/L 43(A) 43 55   AP (external) 39 - 117 IU/L - - -   TBili mg/dL 0.4 0.3 0.4   TBili (external) 0.0 - 1.2 mg/dL - - -   DBili 0.0 - 0.2 mg/dL - - -   ALT U/L 27 24 24   ALT (external) 0 - 44 IU/L - - -   AST U/L 25 19 32   AST (external) 0 - 40 IU/L - - -   Tot Protein g/dL 7.9 7.7 7.7   Tot Protein (external) 6.0 - 8.5 g/dL - - -   Albumin g/dL 3.7 - 3.8   Albumin (external) 3.6 - 4.8 g/dL - - -     Pancreas Latest Ref Rng & Units 6/13/2018 6/12/2018 2/6/2018   A1C 4.3 - 6.0 % - - -   Lipase 73 - 393 U/L 2,413(H) 4,647(H) 372     Iron studies Latest Ref Rng & Units 6/7/2019 5/10/2019 3/29/2019   Iron ug/dL - - -   Iron sat % 20 5 11   Ferritin ng/mL 832 44 276   Ferritin (external) 22 - 322 ng/mL - - -     [facility] Txp Virology Latest Ref Rng & Units 8/3/2017 11/16/2010 7/24/2008   CVM DNA Quant - - - Whole blood, EDTA anticoagulant   CMV Quant <100 Copies/mL - - <100   CMV QT Log <2.0 Log copies/mL - - <2.0   BK Spec - Plasma Plasma, EDTA anticoagulant Plasma, EDTA anticoagulant   BK Res BKNEG copies/mL BK Virus DNA Not  Detected <1000 <1000   BK Log <2.7 Log copies/mL Not Calculated   The Real-Time quantitative BK Virus assay was developed and its performance   characteristics determined by the Infect[facility] Diagnostic Laboratory at   the [facility] in [place], [place]. The   primers and probes for each analyte are Analyte Specific Reagents (ASRs)   manufactured by [employer].   ASRs are used in many laboratory tests necessary for standard medical care and   generally do not require U.S. Food and Drug Administration approval. The FDA   has determined that such clearance or approval is not necessary.   This test is used for clinical purposes. It should not be regarded as   investigational or for research. This laboratory is certified under the   Clinical Laboratory Improvement Amendments of 1988 (CLIA-88) as qualified to   perform high complexity clinical laboratory testing.   <3.0 <3.0   Hep B Core NR Nonreactive - -                  Scribe Disclosure:  IDale, am serving as a scribe to document services personally performed by Kidney/Pancreas Recipient at this visit, based upon the provider's statements to me. All documentation has been reviewed by the aforementioned provider prior to being entered into the official medical record.     Dale PEOPLES, a scribe, prepared the chart for today's encounter.       Again, thank you for allowing me to participate in the care of your patient.      Sincerely,    Kidney/Pancreas Recipient

## 2019-06-25 NOTE — LETTER
[facility]  Room 2-200  [facility],  [address]  [address]  Tel [phone]  Toll Free [phone]                OUTPATIENT LABORATORY TEST ORDER    Patient Name: Rory Bustamante  Transplant Date: 12/13/1989 (Kidney)  YOB: 1955  Issue Date & Time:June 25, 20194:45 PM    [facility] MR:  [id number]  Exp. Date (1 year after date issued)      Diagnoses: Kidney Transplant (ICD-10 Z94.0)   Long term use of medications (ICD-10 Z79.899)     Lab results to be available on the same day drawn.   Patient should release information to the [facility].  Please fax to the Transplant Center at [phone].    Every 3 Months   ?Hemogram and Platelet   ?Basic Metabolic Panel (Sodium, Potassium, Chloride, CO2, Creatinine, BUN, Glucose, Calcium)           ?CSA mail to [facility] - [facility] mailers and instructions provided by the patient)                  Every 6 Months                                           ?Urine for protein/creatinine      If you have any questions, please call [facility] at [phone] or [phone].    Please fax labs to [phone]

## 2019-06-25 NOTE — PROGRESS NOTES
CHRONIC TRANSPLANT NEPHROLOGY VISIT    Assessment & Plan   # LDKT: Variable with recurrent MPGN   - Baseline Cr ~ 4-5   - Inactive on kidney wait list. Patient had a recent infection that appears cleared. Additionally patient required several blood transfusions that alsop appear resolved. Will recheck basic labs and contact transplant committee.     # Immunosuppression: Cyclosporine (goal 50-75) and Prednisone (dose 5 mg daily)   - Changes: No    # Prophylaxis:   - PJP: Sulfa/TMP (Bactrim)    # Blood Pressure:  Controlled; Goal BP: < 130/80   - Changes: No     # Anemia in Chronic Renal Disease: Hgb: Stable      MARY LOU: No   - Iron studies: Replete    # Mineral Bone Disorder:   - Secondary renal hyperparathyroidism; PTH level: Not checked recently  - Vitamin D; level: Not checked recently  - Calcium; level: Normal  - Phosphorus; level: Not checked recently     # Electrolytes:   - Potassium; level: Normal  - Magnesium; level: Not checked recently  - Bicarbonate; level: Normal  - Sodium; level: Low    # Transplant History:  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    Return visit: No follow-ups on file.    Chief Complaint   Mr. Bustamante is a 63 year old here for hospital follow up.    History of Present Illness    The patient was in the hospital last April to address fever and A-fib in [facility]. There the patient had his hemorrhoids fixed but started bleeding during the surgery. He believes that he had an infection during this time, originally masked by Tylenol, and returned to the hospital a second time later that month. He was given keflex which has been  effective. He also endorses chronic diarrhea and has been taking imodium for several years. He says that his diarrhea has stopped since taking keflex. During his time at the hospital he had 3 transfusions and his hemoglobin decreased to 5. Since this episode, the patient has been doing well. Compared to his fatigue in April, his energy levels are significantly improved. He recently received an iron infusion. He had a rudolph placed for ~ month post surgery, but does not currently use it.     Otherwise he is eating and drinking well. He endorses lower extremity edema. His blood pressures run in the 140's/70's.     Recent Hospitalizations:  [] No [x] Yes See above.    New Medical Issues: [x] No [] Yes    Decreased energy: [x] No [] Yes    Chest pain or SOB with exertion:  [x] No [] Yes    Appetite change or weight change: [x] No [] Yes    Nausea, vomiting or diarrhea:  [x] No [] Yes    Fever, sweats or chills: [x] No [] Yes    Leg swelling: [] No [x] Yes See above.      Home BP: at goal    Review of Systems   A comprehensive review of systems was obtained and negative, except as noted in the HPI or PMH.    Problem List   Patient Active Problem List   Diagnosis     S/P kidney transplant     Essential hypertension     CAD (coronary artery disease)     Gout     Warts     Peripheral neuropathy     Proteinuria     Organ transplant candidate     CKD (chronic kidney disease) stage 4, GFR 15-29 ml/min (H)     Anemia in stage 4 chronic kidney disease (H)     Lumbar stenosis     Surgery, elective     Atrial fibrillation (H)     s/p laminectomies L2-3,L3-4,L4-5 (6/1/18)     Hypertriglyceridemia     Chronic gout due to renal impairment involving foot without tophus, unspecified laterality       Social History   Social History     Tobacco Use     Smoking status: Never Smoker     Smokeless tobacco: Never Used   Substance Use Topics     Alcohol use: No     Alcohol/week: 0.0 oz     Drug use: No       Allergies   Allergies   Allergen  Reactions     Contrast Dye Other (See Comments) and Itching     Pt reports sneezing     Pravastatin Muscle Pain (Myalgia)     Simvastatin Muscle Pain (Myalgia)       Medications   Current Outpatient Medications   Medication Sig     acetaminophen (TYLENOL ARTHRITIS PAIN) 650 MG CR tablet Take 2 tablets by mouth 3 times daily.     allopurinol (ZYLOPRIM) 100 MG tablet Take 2 tablets by mouth daily. (Patient taking differently: Take 100 mg by mouth 2 times daily )     BABY ASPIRIN PO Take 81 mg by mouth daily (with lunch)      BEVACizumab (AVASTIN) 400 MG/16ML injection Every 5 weeks     Brimonidine Tartrate (ALPHAGAN OP) Place 1 drop into the right eye 2 times daily      cephALEXin (KEFLEX) 500 MG capsule      cycloSPORINE modified (GENERIC EQUIVALENT) 100 MG capsule Take 1 capsule (100 mg) by mouth 2 times daily Total dose 125 mg twice daily     cycloSPORINE modified (GENERIC EQUIVALENT) 25 MG capsule TAKE TWO CAPSULES BY MOUTH TWICE A DAY (TOTAL DOSE 150 MG TWICE  A DAY)     epoetin freya (PROCRIT) 79584 UNIT/ML injection Inject 1 mL (20,000 Units) Subcutaneous once a week As needed for hgb < 10     fenofibrate 145 MG tablet Take 145 mg by mouth     fenofibrate 145 MG tablet Take 145 mg by mouth every morning      ISOSORBIDE MONONITRATE PO Take 60 mg by mouth every morning      loperamide (IMODIUM) 2 MG capsule Take 2 mg by mouth daily      losartan (COZAAR) 50 MG tablet Take 1 tablet (50 mg) by mouth daily (Patient not taking: Reported on 5/13/2019)     metoprolol (LOPRESSOR) 100 MG tablet Take 1 tablet by mouth 2 times daily.     metoprolol succinate ER (TOPROL-XL) 100 MG 24 hr tablet      NIFEdipine ER osmotic (PROCARDIA XL) 60 MG TB24 Take 1 tablet (60 mg) by mouth daily     nitroGLYCERIN (NITROSTAT) 0.4 MG SL tablet Place 0.4 mg under the tongue every 5 minutes as needed.     NONFORMULARY Take 1 tablet by mouth 2 times daily Focus Macula Pro:  Eye vitamin and mineral supplement A, C, E, Zinc, Copper      omega-3  acid ethyl esters (LOVAZA) 1 G capsule Take 2 g by mouth 2 times daily      PANTOPRAZOLE SODIUM PO Take 40 mg by mouth daily as needed for heartburn     predniSONE (DELTASONE) 5 MG tablet Take 5 mg by mouth daily (with lunch)      probenecid (BENEMID) 500 MG tablet Take 500 mg by mouth 2 times daily.     Rosuvastatin Calcium (CRESTOR PO) Take 40 mg by mouth every evening      sulfamethoxazole-trimethoprim (BACTRIM,SEPTRA) 400-80 MG per tablet Take 1 tablet by mouth every other day      tamsulosin (FLOMAX) 0.4 MG capsule Take 1 capsule (0.4 mg) by mouth daily (Patient taking differently: Take 0.4 mg by mouth 2 times daily )     warfarin (COUMADIN) 5 MG tablet      Current Facility-Administered Medications   Medication     lidocaine 1 % injection 3 mL     triamcinolone acetonide (KENALOG-40) injection 40 mg     There are no discontinued medications.    Physical Exam   Vital Signs: There were no vitals taken for this visit.    GENERAL APPEARANCE: alert and no distress  HENT: mouth without ulcers or lesions  LYMPHATICS: no cervical or supraclavicular nodes  RESP: lungs clear to auscultation - no rales, rhonchi or wheezes  CV: regular rhythm, normal rate, no rub, no murmur  EDEMA: no LE edema bilaterally  ABDOMEN: soft, nondistended, nontender, bowel sounds normal  MS: extremities normal - no gross deformities noted, no evidence of inflammation in joints, no muscle tenderness  SKIN: no rash      Data     Renal Latest Ref Rng & Units 5/10/2019 4/26/2019 1/18/2019   Na mmol/L 139 - 136   Na (external) 136 - 145 mmol/L - 135(L) -   K mmol/L 4.2 - 4.4   K (external) 3.5 - 5.1 mmol/L - 4.3 -   Cl mmol/L 102 - 99   Cl (external) 98 - 107 mmol/L - 99 -   CO2 mmol/L 23 - -   CO2 (external) 21 - 32 mmol/L - 22 -   BUN 7 - 18 mg/dL 64(A) - 75   BUN (external) 7 - 18 mg/dL - 42(H) -   Cr 0.67 - 1.17 mg/dL 4.45(A) - 4.02   Cr (external) 0.67 - 1.17 mg/dL - 5.26(HH) -   Glucose 70 - 99 mg/dL 90 - 130(A)   Glucose (external) 74 - 100  mg/dL - 95 -   Ca  mg/dL 9.4 - 9.1   Ca (external) 8.5 - 10.1 mg/dL - 8.5 -   Mg 1.6 - 2.3 mg/dL - - -     Bone Health Latest Ref Rng & Units 7/17/2018 6/15/2018 6/13/2018   Phos 2.5 - 4.5 mg/dL 3.7 2.9 5.2(H)   PTHi 12 - 72 pg/mL - - -   Vit D Def 20 - 75 ug/L - - -     Heme Latest Ref Rng & Units 6/21/2019 6/7/2019 5/31/2019   WBC 4.0 - 11.0 10e9/L - - -   WBC (external) 4.0 - 11.0 X10:3/uL - - -   Hgb 13.3 - 17.7 g/dL 10.3(A) 9.9(A) 8.7(A)   Hgb (external) 13.0 - 18.0 g/dL - - -   Plt 150 - 450 10e9/L - - -   Plt (external) 150 - 420 X10:3/uL - - -     Liver Latest Ref Rng & Units 5/10/2019 1/18/2019 10/29/2018   AP 46 - 116 U/L 43(A) 43 55   AP (external) 39 - 117 IU/L - - -   TBili mg/dL 0.4 0.3 0.4   TBili (external) 0.0 - 1.2 mg/dL - - -   DBili 0.0 - 0.2 mg/dL - - -   ALT U/L 27 24 24   ALT (external) 0 - 44 IU/L - - -   AST U/L 25 19 32   AST (external) 0 - 40 IU/L - - -   Tot Protein g/dL 7.9 7.7 7.7   Tot Protein (external) 6.0 - 8.5 g/dL - - -   Albumin g/dL 3.7 - 3.8   Albumin (external) 3.6 - 4.8 g/dL - - -     Pancreas Latest Ref Rng & Units 6/13/2018 6/12/2018 2/6/2018   A1C 4.3 - 6.0 % - - -   Lipase 73 - 393 U/L 2,413(H) 4,647(H) 372     Iron studies Latest Ref Rng & Units 6/7/2019 5/10/2019 3/29/2019   Iron ug/dL - - -   Iron sat % 20 5 11   Ferritin ng/mL 832 44 276   Ferritin (external) 22 - 322 ng/mL - - -     [facility] Txp Virology Latest Ref Rng & Units 8/3/2017 11/16/2010 7/24/2008   CVM DNA Quant - - - Whole blood, EDTA anticoagulant   CMV Quant <100 Copies/mL - - <100   CMV QT Log <2.0 Log copies/mL - - <2.0   BK Spec - Plasma Plasma, EDTA anticoagulant Plasma, EDTA anticoagulant   BK Res BKNEG copies/mL BK Virus DNA Not Detected <1000 <1000   BK Log <2.7 Log copies/mL Not Calculated   The Real-Time quantitative BK Virus assay was developed and its performance   characteristics determined by the Infect[facility] Diagnostic Laboratory at   the [facility] in  [place], [place]. The   primers and probes for each analyte are Analyte Specific Reagents (ASRs)   manufactured by [employer].   ASRs are used in many laboratory tests necessary for standard medical care and   generally do not require U.S. Food and Drug Administration approval. The FDA   has determined that such clearance or approval is not necessary.   This test is used for clinical purposes. It should not be regarded as   investigational or for research. This laboratory is certified under the   Clinical Laboratory Improvement Amendments of 1988 (CLIA-88) as qualified to   perform high complexity clinical laboratory testing.   <3.0 <3.0   Hep B Core NR Nonreactive - -                  Scribe Disclosure:  Dale PEOPLES, am serving as a scribe to document services personally performed by Kidney/Pancreas Recipient at this visit, based upon the provider's statements to me. All documentation has been reviewed by the aforementioned provider prior to being entered into the official medical record.     Dale PEOPLES, a scribe, prepared the chart for today's encounter.

## 2019-07-01 NOTE — TELEPHONE ENCOUNTER
Anemia Management Note  SUBJECTIVE/OBJECTIVE:  Referred by Dr. Christi Sun on 2018  Primary Diagnosis: Anemia in Chronic Kidney Disease (N18.3, D63.1)     Secondary Diagnosis:  Chronic Kidney Disease, Stage 3 (N18.3)   Hgb goal range:  9-10  Epo/Darbo: Procrit  20,000 units once weekly for Hgb < 10  In clinic  RX/TX plans : 2019  Iron regimen:  None  Labs : 2020.      Patient prefers calls only when labs drastically change. Otherwise [place] will always give him a dose if his Hgb is below.     Labs and Procrit are done at [facility].  Drawn on  10:30a  Phone: [phone]  Fax # is [phone]     Anemia Latest Ref Rng & Units 5/3/2019 5/10/2019 2019 2019 2019 2019 2019   MARY LOU Dose - 20,000 units 20,000 units 20,000 units 20,000 units - - -   Hemoglobin 13.3 - 17.7 g/dL - 9.5(A) 8.3(A) 8.7(A) 9.9(A) 10.3(A) 10.1(A)   IV Iron Dose - - - 750mg 750mg - - -   TSAT % - 5 - - 20 - -   Ferritin ng/mL - 44 - - 832 - -     BP Readings from Last 3 Encounters:   19 125/63   19 119/75   11/15/18 149/80     Wt Readings from Last 2 Encounters:   19 103.1 kg (227 lb 3.2 oz)   19 99.2 kg (218 lb 12.8 oz)           ASSESSMENT:  Hgb:Above goal - recommend hold dose  TSat: not at goal (>30%) but ferritin >500ng/mL.  IV iron not indicated at this time per anemia protocol. Ferritin: At goal (>100ng/mL)    PLAN:  Hold Procrit and RTC for hgb then procrit if needed in 1 week(s)    Orders needed to be renewed (for next follow-up date) in LETTERS - for external labs: None    Iron labs due:  2019    Plan discussed with:  No tristan made per pt request  Plan provided by:  ena    NEXT FOLLOW-UP DATE:  2019    Anemia Management Service  Belinda Escalona RN  Phone: [phone]  Fax: [phone]

## 2019-07-08 NOTE — TELEPHONE ENCOUNTER
Anemia Management Note  SUBJECTIVE/OBJECTIVE:  Referred by Dr. Christi Sun on 2018  Primary Diagnosis: Anemia in Chronic Kidney Disease (N18.3, D63.1)     Secondary Diagnosis:  Chronic Kidney Disease, Stage 3 (N18.3)   Hgb goal range:  9-10  Epo/Darbo: Procrit  20,000 units once weekly for Hgb < 10  In clinic  RX/TX plans : 2019  Iron regimen:  None  Labs : 2020.      Patient prefers calls only when labs drastically change. Otherwise [place] will always give him a dose if his Hgb is below.     Labs and Procrit are done at [facility].  Drawn on  10:30a  Phone: [phone]  Fax # is [phone]     Anemia Latest Ref Rng & Units 5/10/2019 2019 2019 2019 2019 2019 2019   MARY LOU Dose - 20,000 units 20,000 units 20,000 units - - - -   Hemoglobin 13.3 - 17.7 g/dL 9.5(A) 8.3(A) 8.7(A) 9.9(A) 10.3(A) 10.1(A) 10.5(A)   IV Iron Dose - - 750mg 750mg - - - -   TSAT % 5 - - 20 - - 19   Ferritin ng/mL 44 - - 832 - - 458     BP Readings from Last 3 Encounters:   19 125/63   19 119/75   11/15/18 149/80     Wt Readings from Last 2 Encounters:   19 103.1 kg (227 lb 3.2 oz)   19 103.1 kg (227 lb 3.2 oz)       May need IV Iron again.  Will follow up with next lab draw.     ASSESSMENT:  Hgb:Above goal - recommend hold dose  TSat: not at goal of >30% Ferritin: At goal (>100ng/mL)    PLAN:  Hold Procrit and RTC for hgb then procrit if needed in 1 week(s)    Orders needed to be renewed (for next follow-up date) in LETTERS - for external labs: None    Iron labs due:  19    Plan discussed with:  No call made per pt request  Plan provided by:  ena    NEXT FOLLOW-UP DATE:  2019    Anemia Management Service  Belinda Escalona RN  Phone: [phone]  Fax: [phone]

## 2019-07-15 NOTE — TELEPHONE ENCOUNTER
Anemia Management Note  SUBJECTIVE/OBJECTIVE:  Referred by Dr. Christi Sun on 2018  Primary Diagnosis: Anemia in Chronic Kidney Disease (N18.3, D63.1)     Secondary Diagnosis:  Chronic Kidney Disease, Stage 3 (N18.3)   Hgb goal range:  9-10  Epo/Darbo: Procrit  20,000 units once weekly for Hgb < 10  In clinic  RX/TX plans : 2019  Iron regimen:  None  Labs : 2020.      Patient prefers calls only when labs drastically change. Otherwise [place] will always give him a dose if his Hgb is below.     Labs and Procrit are done at [facility].  Drawn on  10:30a  Phone: [phone]  Fax # is [phone]     Anemia Latest Ref Rng & Units 2019   MARY LOU Dose - 20,000 units 20,000 units - - - - 20,000 units   Hemoglobin 13.3 - 17.7 g/dL 8.3(A) 8.7(A) 9.9(A) 10.3(A) 10.1(A) 10.5(A) -   IV Iron Dose - 750mg 750mg - - - - -   TSAT % - - 20 - - 19 -   Ferritin ng/mL - - 832 - - 458 -     BP Readings from Last 3 Encounters:   19 125/63   19 119/75   11/15/18 149/80     Wt Readings from Last 2 Encounters:   19 103.1 kg (227 lb 3.2 oz)   19 103.1 kg (227 lb 3.2 oz)       Hgb from 2019  9.9  Hematocrit 29.3    Sent rx for Procrit to Dr. Sun for signature.   Once Orders are signed, fax to [facility].     ASSESSMENT:  Hgb:At goal - recommend dose  TSat: not at goal of >30% Ferritin: At goal (>100ng/mL)    PLAN:  Dose with procrit and RTC for hgb then procrit if needed in 1 week(s)    Orders needed to be renewed (for next follow-up date) in Letters: None    Iron labs due:  2019    Plan discussed with:  No call made per pt request  Plan provided by:  ena    NEXT FOLLOW-UP DATE:  2019.  Fax signed orders.   19; Orders faxed to [facility] [phone]    Anemia Management Service  Belinda Escalona RN  Phone: [phone]  Fax: [phone]

## 2019-07-20 NOTE — TELEPHONE ENCOUNTER
Anemia Management Note  SUBJECTIVE/OBJECTIVE:  Referred by Dr. Christi Sun on 2018  Primary Diagnosis: Anemia in Chronic Kidney Disease (N18.3, D63.1)     Secondary Diagnosis:  Chronic Kidney Disease, Stage 3 (N18.3)   Hgb goal range:  9-10  Epo/Darbo: Procrit  20,000 units once weekly for Hgb < 10  In clinic  RX/TX plans : 2019  Iron regimen:  None  Labs : 2020.      Patient prefers calls only when labs drastically change. Otherwise [place] will always give him a dose if his Hgb is below.     Labs and Procrit are done at [facility].  Drawn on  10:30a  Phone: [phone]  Fax # is [phone]     Anemia Latest Ref Rng & Units 2019   MARY LOU Dose - 20,000 units - - - - 20,000 units -   Hemoglobin 13.3 - 17.7 g/dL 8.7(A) 9.9(A) 10.3(A) 10.1(A) 10.5(A) 9.9(A) 10.5(A)   IV Iron Dose - 750mg - - - - - -   TSAT % - 20 - - 19 - -   Ferritin ng/mL - 832 - - 458 - -     BP Readings from Last 3 Encounters:   19 125/63   19 119/75   11/15/18 149/80     Wt Readings from Last 2 Encounters:   19 103.1 kg (227 lb 3.2 oz)   19 103.1 kg (227 lb 3.2 oz)           ASSESSMENT:  Hgb:Above goal - recommend hold dose  TSat: not at goal of >30% Ferritin: At goal (>100ng/mL)    PLAN:  Hold Procrit and RTC for hgb then procrit if needed in 1 week(s)    Orders needed to be renewed (for next follow-up date) in LETTERS - for external labs: None    Iron labs due:  2019    Plan discussed with:  No call made per pt request  Plan provided by:  ena    NEXT FOLLOW-UP DATE:  2019    Anemia Management Service  Belinda Escalona RN  Phone: [phone]  Fax: [phone]

## 2019-07-26 NOTE — TELEPHONE ENCOUNTER
Anemia Management Note  SUBJECTIVE/OBJECTIVE:  Referred by Dr. Christi Sun on 2018  Primary Diagnosis: Anemia in Chronic Kidney Disease (N18.3, D63.1)     Secondary Diagnosis:  Chronic Kidney Disease, Stage 3 (N18.3)   Hgb goal range:  9-10  Epo/Darbo: Procrit  20,000 units once weekly for Hgb < 10  In clinic  RX/TX plans : 2020  Iron regimen:  None  Labs : 2020.      Patient prefers calls only when labs drastically change. Otherwise [place] will always give him a dose if his Hgb is below.     Labs and Procrit are done at [facility].  Drawn on  10:30a  Phone: [phone]  Fax # is [phone]     Anemia Latest Ref Rng & Units 2019   MARY LOU Dose - - - - - 20,000 units - 20,000 units   Hemoglobin 13.3 - 17.7 g/dL 9.9(A) 10.3(A) 10.1(A) 10.5(A) 9.9(A) 10.5(A) 9.9(A)   IV Iron Dose - - - - - - - -   TSAT % 20 - - 19 - - -   Ferritin ng/mL 832 - - 458 - - -     BP Readings from Last 3 Encounters:   19 125/63   19 119/75   11/15/18 149/80     Wt Readings from Last 2 Encounters:   19 103.1 kg (227 lb 3.2 oz)   19 103.1 kg (227 lb 3.2 oz)         ASSESSMENT:  Hgb:At goal - recommend dose  TSat: not at goal of >30% Ferritin: At goal (>100ng/mL)    PLAN:  Dose with procrit and RTC for hgb then procrit if needed in 1* week(s)    Orders needed to be renewed (for next follow-up date) in LETTERS - for external labs: None    Iron labs due:  2019    Plan discussed with:  No call made per pt request  Plan provided by:  ena    NEXT FOLLOW-UP DATE:  2019    Anemia Management Service  Belinda Escalona RN  Phone: [phone]  Fax: [phone]

## 2019-07-31 NOTE — TELEPHONE ENCOUNTER
Patient called confused why he is being asked to come back for RWL appts when he was here in June. He doesn't know if he is on our list or not on our list. Patient would like call back.    Adequate: hears normal conversation without difficulty

## 2019-08-05 NOTE — TELEPHONE ENCOUNTER
Anemia Management Note  SUBJECTIVE/OBJECTIVE:  Referred by Dr. Christi Sun on 2018  Primary Diagnosis: Anemia in Chronic Kidney Disease (N18.3, D63.1)     Secondary Diagnosis:  Chronic Kidney Disease, Stage 3 (N18.3)   Hgb goal range:  9-10  Epo/Darbo: Procrit  20,000 units once weekly for Hgb < 10  In clinic  RX/TX plans : 2020  Iron regimen:  None  Labs : 2020.      Patient prefers calls only when labs drastically change. Otherwise [place] will always give him a dose if his Hgb is below.     Labs and Procrit are done at [facility].  Drawn on  10:30a  Phone: [phone]  Fax # is [phone]     Anemia Latest Ref Rng & Units 2019   MARY LOU Dose - - - - 20,000 units - 20,000 units 20,000 units   Hemoglobin 13.3 - 17.7 g/dL 10.3(A) 10.1(A) 10.5(A) 9.9(A) 10.5(A) 9.9(A) 9.9(A)   IV Iron Dose - - - - - - - -   TSAT % - - 19 - - - 19   Ferritin ng/mL - - 458 - - - -     BP Readings from Last 3 Encounters:   19 125/63   19 119/75   11/15/18 149/80     Wt Readings from Last 2 Encounters:   19 103.1 kg (227 lb 3.2 oz)   19 103.1 kg (227 lb 3.2 oz)           ASSESSMENT:  Hgb:At goal - recommend dose  TSat: not at goal of >30% Ferritin: At goal (>100ng/mL)    PLAN:  Dose with procrit and RTC for hgb then procrit if needed in 1week(s)    Orders needed to be renewed (for next follow-up date) in LETTERS - for external labs: None    Iron labs due:  2019 (Ferritin is due)    Plan discussed with:  No call made per pt request.  Plan provided by:  ena    NEXT FOLLOW-UP DATE:  2019    Anemia Management Service  Belinda Escalona RN  Phone: [phone]  Fax: [phone]

## 2019-08-06 NOTE — TELEPHONE ENCOUNTER
Spoke with the patient and confirmed Evals: Waitlist appointments on September 23 and 24.  Informed patient an itinerary can be accessed via [employer], and will be sent via mail.

## 2019-08-07 NOTE — COMMITTEE REVIEW
Abdominal Committee Review Note     Evaluation Date: 8/21/2017  Committee Review Date: 8/7/2019    Organ being evaluated for: Kidney    Transplant Phase: Waitlist  Transplant Status: Active    Transplant Coordinator: Rosita Galindo  Transplant Surgeon:       Referring Physician: Christi Sun    Primary Diagnosis: Chronic Glomerulonephritis - Unspecified  Secondary Diagnosis:     Committee Review Members:  Nephrology Elroy Lizama MD, Will Yang, APRN CNP, Sj Haskins MD   Nurse Deanna Bunn, SHIMA, Rosario Guevara, SHIMA   Nutrition Patti Flower, ANSHUL   Pharmacist Keanu Maher, [facility]    - Clinical Ibeth Novak, [facility], Kassy Hunt, MSW   Surgery Kayla Bess MD, MD   Transplant Little Murrell, RN, Micaela Pablo, SHIMA, Sanjuana Burton, NP, Carolina Bull, SHIMA, Sydney Dawkins MD, Neo Modi MD, Sheron Gilman RN       Transplant Eligibility: Irreversible chronic kidney disease treated w/dialysis or expected need for dialysis    Committee Review Decision: Remain Active    Relative Contraindications: None    Absolute Contraindications: None    Committee Chair Sydney Dawkins MD verbally attested to the committee's decision.    Committee Discussion Details: Presented Mr. Bustamante as he was scheduled to come in for RWL 9/23-9/24/19. He did, however see Dr. Kemp 6/25/19 who stated he was ok for active status pending lab work. (resulted/complete). Dr. Kemp has now left the practice and needed teams input on when he needs to be seen, by whom, and when for follow on his past transplant. Team stated he can come in September, with the exception of cardiology as he is an established patient with Dr. Rider. The only need would be a Lexiscan.   Team decision is to make active today and for him to return in September for the waitlist appointments.

## 2019-08-07 NOTE — TELEPHONE ENCOUNTER
Spoke to Rory to discuss his presenting to committee today and to review what he will need for his return wait appointments.   He is agreeable to all with the exception of cardiology as he already follows with Dr. Rider here (established patient).  He is also able to be active today.   He has many kits for ALA and does not request further kits.

## 2019-08-12 NOTE — TELEPHONE ENCOUNTER
Anemia Management Note  SUBJECTIVE/OBJECTIVE:  Referred by Dr. Christi Sun on 2018  Primary Diagnosis: Anemia in Chronic Kidney Disease (N18.3, D63.1)     Secondary Diagnosis:  Chronic Kidney Disease, Stage 3 (N18.3)   Hgb goal range:  9-10  Epo/Darbo: Procrit  20,000 units once weekly for Hgb < 10  In clinic  RX/TX plans : 2020  Iron regimen:  None  Labs : 2020.      Patient prefers calls only when labs drastically change. Otherwise [place] will always give him a dose if his Hgb is below.     Labs and Procrit are done at [facility].  Drawn on  10:30a  Phone: [phone]  Fax # is [phone]     Anemia Latest Ref Rng & Units 2019   MARY LOU Dose - - - 20,000 units - 20,000 units 20,000 units -   Hemoglobin 13.3 - 17.7 g/dL 10.1(A) 10.5(A) 9.9(A) 10.5(A) 9.9(A) 9.9(A) 10.9(A)   IV Iron Dose - - - - - - - -   TSAT % - 19 - - - 19 -   Ferritin ng/mL - 458 - - - - -     BP Readings from Last 3 Encounters:   19 125/63   19 119/75   11/15/18 149/80     Wt Readings from Last 2 Encounters:   19 103.1 kg (227 lb 3.2 oz)   19 103.1 kg (227 lb 3.2 oz)           ASSESSMENT:  Hgb:Above goal - recommend hold dose  TSat: not at goal of >30% Ferritin: Due for labs    PLAN:  Hold Procrit and RTC for hgb then procrit if needed in 1 week(s)    Orders needed to be renewed (for next follow-up date) in LETTERS - for external labs: None    Iron labs due:  Ferritin is due    Plan discussed with:  No call made per pt request  Plan provided by:  ena    NEXT FOLLOW-UP DATE:  2019    Anemia Management Service  Belinda Escalona RN  Phone: [phone]  Fax: [phone]

## 2019-08-14 NOTE — COMMITTEE REVIEW
Abdominal Patient Discussion Note Transplant Coordinator: Rosita Galindo  Transplant Surgeon:       Referring Physician: Christi Sun    Committee Review Members:  Nephrology Elroy Lizama MD, Will Yang, APRN CNP, Sj Haskins MD, Rosario Guevara, RN   Nurse Keanu Maher, [facility], Deanna Bunn, RN   Nutrition Patti Flower, ANSHUL    - Clinical Ibeth Novak, [facility], Kassy Hunt, MSW   Transplant Tiara Waite PA-C, Aruna Mathew, RN, Micaela Pablo, RN, Carolina Bull, RN, Missy Du, RN, Neo Modi MD, Sheron Gilman, RN   Transplant Surgery Kayla Bess MD, MD       Additional Discussion Notes and Findings: Pt approved for active status.

## 2019-08-16 NOTE — TELEPHONE ENCOUNTER
Anemia Management Note  SUBJECTIVE/OBJECTIVE:  Referred by Dr. Christi Sun on 2018  Primary Diagnosis: Anemia in Chronic Kidney Disease (N18.3, D63.1)     Secondary Diagnosis:  Chronic Kidney Disease, Stage 3 (N18.3)   Hgb goal range:  9-10  Epo/Darbo: Procrit  20,000 units once weekly for Hgb < 10  In clinic  RX/TX plans : 2020  Iron regimen:  None  Labs : 2020.      Patient prefers calls only when labs drastically change. Otherwise [place] will always give him a dose if his Hgb is below.     Labs and Procrit are done at [facility].  Drawn on  10:30a  Phone: [phone]  Fax # is [phone]     Anemia Latest Ref Rng & Units 2019   MARY LOU Dose - - 20,000 units - 20,000 units 20,000 units - 20,000 units   Hemoglobin 13.3 - 17.7 g/dL 10.5(A) 9.9(A) 10.5(A) 9.9(A) 9.9(A) 10.9(A) 9.8(A)   IV Iron Dose - - - - - - - -   TSAT % 19 - - - 19 - -   Ferritin ng/mL 458 - - - - - -     BP Readings from Last 3 Encounters:   19 125/63   19 119/75   11/15/18 149/80     Wt Readings from Last 2 Encounters:   19 103.1 kg (227 lb 3.2 oz)   19 103.1 kg (227 lb 3.2 oz)         ASSESSMENT:  Hgb:At goal - recommend dose  TSat: not at goal of >30% Ferritin: Due for labs    PLAN:  Dose with procrit and RTC for hgb then procrit if needed in 1 week(s)    Orders needed to be renewed (for next follow-up date) in LETTERS - for external labs: None    Iron labs due:  2019    Plan discussed with:  No call made per pt request  Plan provided by:  ena    NEXT FOLLOW-UP DATE:  2019    Anemia Management Service  Belinda Escalona RN  Phone: [phone]  Fax: [phone]

## 2019-08-23 NOTE — TELEPHONE ENCOUNTER
Anemia Management Note  SUBJECTIVE/OBJECTIVE:  Referred by Dr. Christi Sun on 2018  Primary Diagnosis: Anemia in Chronic Kidney Disease (N18.3, D63.1)     Secondary Diagnosis:  Chronic Kidney Disease, Stage 3 (N18.3)   Hgb goal range:  9-10  Epo/Darbo: Procrit  20,000 units once weekly for Hgb < 10  In clinic  RX/TX plans : 2020  Iron regimen:  None  Labs : 2020.      Patient prefers calls only when labs drastically change. Otherwise [place] will always give him a dose if his Hgb is below.     Labs and Procrit are done at [facility].  Drawn on  10:30a  Phone: [phone]  Fax # is [phone]     Anemia Latest Ref Rng & Units 2019   MARY LOU Dose - 20,000 units - 20,000 units 20,000 units - 20,000 units -   Hemoglobin 13.3 - 17.7 g/dL 9.9(A) 10.5(A) 9.9(A) 9.9(A) 10.9(A) 9.8(A) 10.4(A)   IV Iron Dose - - - - - - - -   TSAT % - - - 19 - - -   Ferritin ng/mL - - - - - - -     BP Readings from Last 3 Encounters:   19 125/63   19 119/75   11/15/18 149/80     Wt Readings from Last 2 Encounters:   19 103.1 kg (227 lb 3.2 oz)   19 103.1 kg (227 lb 3.2 oz)           ASSESSMENT:  Hgb:Above goal - recommend hold dose  TSat: Due for labs Ferritin: Due for labs    PLAN:  Hold Procrit and RTC for hgb then procrit if needed in 1week(s)    Orders needed to be renewed (for next follow-up date) in LETTERS - for external labs: None    Iron labs due:  Due    Plan discussed with:  No call made per pt request  Plan provided by:  ena    NEXT FOLLOW-UP DATE:  2019 Document labs/dose from     Anemia Management Service  Belinda Escalona RN  Phone: [phone]  Fax: [phone]

## 2019-08-26 NOTE — PROGRESS NOTES
PATHOLOGY HLA CROSSMATCH CONSULTATION: DONOR/RECIPIENT VIRTUAL CROSSMATCH - Kidney  Consultation Date: 2019  Consultation Requested by: Dr. Mike Adler  Regarding: Compatibility of  donor organ UNOS #[id number] from OPO: TXSB with patient Rory Bustamante   Findings: Regarding a virtual crossmatch between Rory Bustamante and  donor listed above (match ID [id number]):  The most recent and peak patient sera were analyzed.  The patient has one (1) donor-specific antibody(ies)  (DSA) as listed in table below. No other antibodies listed with specificity against donor organ.      ANTIBODY MOST RECENT SERUM (mfi) 19 Peak Serum #1 (mfi)  19      DPB1*04:01 [id number]          Record Review Indicates: I personally reviewed the most recent serum and the  peak serum/sera.  In addition, I analyzed seven (7) more sera:  The patient has antibodies against the donor organ.   Based on historical data from this hospital's histocompatibility lab, using the sum mfi of the patient's DSA with specificity against this donor organ, the probability of a positive B cell crossmatch is  98% [UCL, 100%; LCL, 90%] for the most recent serum.  DSA to C-locus antigens were not considered in deriving the probability of positive crossmatch because DSA to C-locus antigens rarely contribute to a positive lymphocyte crossmatch test.    The results of this virtual XM are:   -most recent serum: Incompatible    Disclaimer: Clinical judgement must take into account other factors, such as non-HLA antibodies not detected in the assay.   The VXM gives probabilities only.  The probability does not account for the potential for auto-antibodies that may be present in the patient's serum.  These autoantibodies may render the physical crossmatch falsely positive, and would be detected by an autologous crossmatch.  When possible, confirm findings with a prospective allogeneic and autologous flow crossmatches before going to  transplant.    Brandyn Cruz, PhD PhD, [facility]  Medical Director, Immunology/Histocompatibility Laboratory  Pager: [phone]

## 2019-09-03 NOTE — TELEPHONE ENCOUNTER
Anemia Management Note  SUBJECTIVE/OBJECTIVE:  Referred by Dr. Christi Sun on 2018  Primary Diagnosis: Anemia in Chronic Kidney Disease (N18.3, D63.1)     Secondary Diagnosis:  Chronic Kidney Disease, Stage 3 (N18.3)   Hgb goal range:  9-10  Epo/Darbo: Procrit  20,000 units once weekly for Hgb < 10  In clinic  RX/TX plans : 2020  Iron regimen:  None  Labs : 2020.      Patient prefers calls only when labs drastically change. Otherwise [place] will always give him a dose if his Hgb is below.     Labs and Procrit are done at [facility].  Drawn on  10:30a  Phone: [phone]  Fax # is [phone]     Anemia Latest Ref Rng & Units 2019   MARY LOU Dose - - 20,000 units 20,000 units - 20,000 units - -   Hemoglobin 13.3 - 17.7 g/dL 10.5(A) 9.9(A) 9.9(A) 10.9(A) 9.8(A) 10.4(A) 10.4(A)   IV Iron Dose - - - - - - - -   TSAT % - - 19 - - - 11   Ferritin ng/mL - - - - - - 188     BP Readings from Last 3 Encounters:   19 125/63   19 119/75   11/15/18 149/80     Wt Readings from Last 2 Encounters:   19 103.1 kg (227 lb 3.2 oz)   19 103.1 kg (227 lb 3.2 oz)       Spoke with Rory re his Iron levels, he woud like to wait 1 more month before getting an Iron infusion.  His Hgb is within his range and he feels pretty good.     ASSESSMENT:  Hgb:Above goal - recommend hold dose  TSat: not at goal of >30% Ferritin: At goal (>100ng/mL)    PLAN:  Hold Procrit and RTC for hgb then procrit if needed in 1 week(s)    Orders needed to be renewed (for next follow-up date) in LETTERS - for external labs: None    Iron labs due:  2019    Plan discussed with:  Rory  Plan provided by:  ena    NEXT FOLLOW-UP DATE:  2019    Anemia Management Service  Belinda Escalona RN  Phone: [phone]  Fax: [phone]

## 2019-09-06 NOTE — TELEPHONE ENCOUNTER
DATE:  9/6/2019   TIME OF RECEIPT FROM LAB:  1:00 PM  LAB TEST:  BUN  LAB VALUE:  89  RESULTS GIVEN WITH READ-BACK TO (PROVIDER):  NICK Freeman RN for SUE Liu RN  TIME LAB VALUE REPORTED TO PROVIDER:   1:05 PM

## 2019-09-09 NOTE — TELEPHONE ENCOUNTER
Anemia Management Note  SUBJECTIVE/OBJECTIVE:  Referred by Dr. Christi Sun on 2018  Primary Diagnosis: Anemia in Chronic Kidney Disease (N18.3, D63.1)     Secondary Diagnosis:  Chronic Kidney Disease, Stage 3 (N18.3)   Hgb goal range:  9-10  Epo/Darbo: Procrit  20,000 units once weekly for Hgb < 10  In clinic  RX/TX plans : 2020  Iron regimen:  None  Labs : 2020.      Patient prefers calls only when labs drastically change. Otherwise [place] will always give him a dose if his Hgb is below.     Labs and Procrit are done at [facility].  Drawn on  10:30a  Phone: [phone]  Fax # is [phone]     Anemia Latest Ref Rng & Units 2019   MAR YLOU Dose - 20,000 units 20,000 units - 20,000 units - - 20,000 units   Hemoglobin 13.3 - 17.7 g/dL 9.9(A) 9.9(A) 10.9(A) 9.8(A) 10.4(A) 10.4(A) 9.8(A)   IV Iron Dose - - - - - - - -   TSAT % - 19 - - - 11 -   Ferritin ng/mL - - - - - 188 -     BP Readings from Last 3 Encounters:   19 125/63   19 119/75   11/15/18 149/80     Wt Readings from Last 2 Encounters:   19 103.1 kg (227 lb 3.2 oz)   19 103.1 kg (227 lb 3.2 oz)       9/3/2019; Spoke with Rory re his Iron levels, he woud like to wait 1 more month before getting an Iron infusion.  His Hgb is within his range and he feels pretty good.     ASSESSMENT:  Hgb:At goal - recommend dose  TSat: not at goal of >30% Ferritin: At goal (>100ng/mL)    PLAN:  Dose with procrit and RTC for hgb then procrit if needed in 1 week(s)    Orders needed to be renewed (for next follow-up date) in LETTERS - for external labs: None    Iron labs due:  2019    Plan discussed with:  No call per pt request  Plan provided by:  ena    NEXT FOLLOW-UP DATE:  2019    Anemia Management Service  Belinda Escalona RN  Phone: [phone]  Fax: [phone]

## 2019-09-16 NOTE — TELEPHONE ENCOUNTER
Anemia Management Note  SUBJECTIVE/OBJECTIVE:  Referred by Dr. Christi Sun on 2018  Primary Diagnosis: Anemia in Chronic Kidney Disease (N18.3, D63.1)     Secondary Diagnosis:  Chronic Kidney Disease, Stage 3 (N18.3)   Hgb goal range:  9-10  Epo/Darbo: Procrit  20,000 units once weekly for Hgb < 10  In clinic  RX/TX plans : 2020  Iron regimen:  None  Labs : 2020.      Patient prefers calls only when labs drastically change. Otherwise [place] will always give him a dose if his Hgb is below.     Labs and Procrit are done at [facility].  Drawn on  10:30a  Phone: [phone]  Fax # is [phone]     Anemia Latest Ref Rng & Units 2019   MARY LOU Dose - 20,000 units - 20,000 units - - 20,000 units 20,000 units   Hemoglobin 13.3 - 17.7 g/dL 9.9(A) 10.9(A) 9.8(A) 10.4(A) 10.4(A) 9.8(A) 9.7(A)   IV Iron Dose - - - - - - - -   TSAT % 19 - - - 11 - -   Ferritin ng/mL - - - - 188 - -     BP Readings from Last 3 Encounters:   19 125/63   19 119/75   11/15/18 149/80     Wt Readings from Last 2 Encounters:   19 103.1 kg (227 lb 3.2 oz)   19 103.1 kg (227 lb 3.2 oz)       Per note on 9/3/2019; Spoke with Rory re his Iron levels, he woud like to wait 1 more month before getting an Iron infusion.  His Hgb is within his range and he feels pretty good.     ASSESSMENT:  Hgb:At goal - recommend dose  TSat: not at goal of >30% Ferritin: At goal (>100ng/mL)    PLAN:  Dose with procrit and RTC for hgb then procrit if needed in 1 week(s)    Orders needed to be renewed (for next follow-up date) in Letters: None    Iron labs due:  2019    Plan discussed with:  No call per pt request  Plan provided by:  ena    NEXT FOLLOW-UP DATE:  2019    Anemia Management Service  Belinda Escalona RN  Phone: [phone]  Fax: [phone]

## 2019-09-20 NOTE — TELEPHONE ENCOUNTER
Anemia Management Note  SUBJECTIVE/OBJECTIVE:  Referred by Dr. Christi Sun on 2018  Primary Diagnosis: Anemia in Chronic Kidney Disease (N18.3, D63.1)     Secondary Diagnosis:  Chronic Kidney Disease, Stage 3 (N18.3)   Hgb goal range:  9-10  Epo/Darbo: Procrit  20,000 units once weekly for Hgb < 10  In clinic  RX/TX plans : 2020  Iron regimen:  None  Labs : 2020.      Patient prefers calls only when labs drastically change. Otherwise [place] will always give him a dose if his Hgb is below.     Labs and Procrit are done at [facility].  Drawn on  10:30a  Phone: [phone]  Fax # is [phone]     Anemia Latest Ref Rng & Units 2019   MARY LOU Dose - - 20,000 units - - 20,000 units 20,000 units -   Hemoglobin 13.3 - 17.7 g/dL 10.9(A) 9.8(A) 10.4(A) 10.4(A) 9.8(A) 9.7(A) 10.1(A)   IV Iron Dose - - - - - - - -   TSAT % - - - 11 - - -   Ferritin ng/mL - - - 188 - - -     BP Readings from Last 3 Encounters:   19 125/63   19 119/75   11/15/18 149/80     Wt Readings from Last 2 Encounters:   19 103.1 kg (227 lb 3.2 oz)   19 103.1 kg (227 lb 3.2 oz)           ASSESSMENT:  Hgb:Above goal - recommend hold dose  TSat: not at goal of >30% Ferritin: At goal (>100ng/mL)    PLAN:  Hold Procrit and RTC for hgb then procrit if needed in 1 week(s)    Orders needed to be renewed (for next follow-up date) in LETTERS - for external labs: None    Iron labs due:  2019    Plan discussed with:  No call made per pt request  Plan provided by:  ena    NEXT FOLLOW-UP DATE:  2019    Anemia Management Service  Belinda Escalona RN  Phone: [phone]  Fax: [phone]

## 2019-09-20 NOTE — TELEPHONE ENCOUNTER
Patient contacted and reminded of upcoming appointment.  Patient confirmed they will be attending.  Patient instructed to bring updated medications list to appointment.      Lashawn Hanna CMA CMA    9/20/2019 2:29 PM

## 2019-09-24 NOTE — NURSING NOTE
"Chief Complaint   Patient presents with     Transplant Waitlist Maintenance     wait list      Blood pressure 100/53, pulse 71, height 1.848 m (6' 0.75\"), weight 103.6 kg (228 lb 8 oz).    Byron Crow CMA on 9/24/2019 at 1:03 PM    "

## 2019-09-24 NOTE — LETTER
9/24/2019      RE: Rory Bustamante  [address]  [facility] [address]       Assessment and Plan:  #Kidney Transplant Wait List Evaluation: Patient is a reasonable candidate overall. Given cardiac status, he may need to be inactive pending further cardiac evaluation, and will review this with committee.     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 Current eGFR 13-15 ml/min over the past year. He has uremic symptoms and recently had an AVF placed. He reports that he has a potential donor. He is currently maintained on cyclosporine 125 mg bid and prednisone 5 mg daily.    # Cardiac Risk: He's had two NSTEMIs post-op (February and June 2018) and a stress test yesterday showed findings consistent with prior inferior wall infarct. Last coronary angiogram was in 2012 and he underwent PCI of the RCA. Currently, he is asymptomatic. This will need to be reviewed with the committee to determine if further testing and/or cardiac risk assessment is needed at this time. He is now on coumadin for atrial fibrillation.    # LUTS: stable symptoms on tamsulosin. Followed by urology and has follow up scheduled.    # IPMN with relapsing acute pancreatitis: Recent outside imaging from May 2019 showed possible pancreatitis. He is overdue for imaging and GI follow up and will refer him back.     # Limited physical function: recommend he follow up with PCP and/or ortho surgery to discuss completing physical therapy, which he wasn't able to do initially post-op.     # MGUS IgA kappa: negative skeletal survey February 2018. Normal serum free light chain ratio in February 2018 and no monoclonal peak in June 2018. Due for repeat SPEP and serum free light chains.    # Skin cancer: has dermatology follow up in October.     # Health Maintenance: Colonoscopy: Up to date and Dental: Up to date    Discussed with Dr. Lizama.      Discussed the risks and benefits of a transplant, including the risk of surgery and  immunosuppression medications.      Patient s overall re-evaluation may require further discussion in the Transplant Program s multidisciplinary selection committee for a final recommendation on the patient s suitability for transplant.     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     Date of Initial Transplant Evaluation:  August 2017        Current Transplant Phase: Waitlist: Inactive  Official [facility] Listing Date: 9/14/2017  Blood Type: O        cPRA: 100       Date of cPRA: August 2019  Transplant Coordinator: Rosita Galindo Transplant Office phone number [phone]     Previous Medical Issues:    # Obesity: surgery recommended abdominal weight loss. Weighed around 245 lbs at time of initial evaluation in Summer 2017. Has since lost approximately 20 lbs. BMI currently around 28-29.     History of Present Illness:   Mr. 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 His other medical history is significant for CAD s/p PCI to the RCA in 2012, HTN, dyslipidemia, gout, spinal stenosis with neurogenic claudication s/p surgical repair June 2018, MGUS (IgA kappa), and IPMN with history of recurrent pancreatitis.         Interim Events:        - IPMN with relapsing acute pancreatitis: Seen by Dr. Manning in October 2018, and IPMN not thought to be prohibitive for transplant. Recommendations were for EUS and tentatively MRI in 1 year if EUS findings stable, however, patient has not completed either. Recent May 2019 outside CT with findings concerning for pancreatitis. Loose stools have improved but still present. No abdominal pain, nausea, or vomiting.          - Spinal stenosis s/p surgical repair June 2018 complicated by pancreatitis, NSTEMI (no coronary angiogram  given CKD, troponin 2.5), and afib/flutter. Plavix stopped and warfarin started.          - Follows with Dr. Rider for hypertriglyceridemia, which is thought 2/2 immunosuppression, but hasn't had cardiac risk assessment since 2017. Abnormal stress test October 2018 showed large fixed inferoapical perfusion defect with minimal rosey-infarct reversibility. Stress test yesterday with similar findings but no reversible ischemia. Last ECHO June 2018 EF 55-60%, mildly dilated aortic root 4.4 cm, and estimated mean right atrial pressure elevated at 15 mmHg.           - Bladder stone: s/p holium laser lithotripsy in November 2018. Pre-op urodynamics were equivocal for outlet obstruction. He has LUTS and is on tamsulosin. Last PVR unknown.         - Hemorrhoidectomy April 2019 complicated by acute blood loss anemia requiring blood transfusions and demand ischemia 2/2 anemia with elevated troponin to 1.6 and chest pain. Re-admitted shortly after for possible UTI.         Kidney Disease:        Kidney Disease Dx: Membranoproliferative glomerulonephritis (MPGN)        On Dialysis: No       Primary Nephrologist: [facility] transplant nephrology         Cardiac/Vascular Disease History:       Known CAD: Yes        Last Cardiac Risk Assessment: 2017       Last Cardiac Stress Test/Coronary Angiogram: stress test yesterday, angio 2012       Known PAD/Claudication Symptoms: No       Known Heart Failure: No       Arrhythmia:  Yes; as above       Pulmonary Hypertension: No       Valvular Disease: No       Other: None       New Cardiac/Vascular Events: No         Functional Capacity/Frailty:        He has been physically limited due to known BLE peripheral neuropathy from his knee to toes, and also reports some back weakness since his back surgery. He wasn't able to go to pool therapy. He's interested in trying again, but is cautious as it was difficult for him to get changed, walk to and from pool, and get ready given his peripheral  neuropathy. He was constantly afraid he was going to fall. He is not currently doing back exercises but says he will start. He can do most things around his house (cook, clean, etc.), but is limited to walking just a few blocks. No chest pain, SOB, or claudication.    Fatigue/Decreased Energy: [] No [x] Yes    Chest Pain or SOB with Exertion: [x] No [] Yes    Significant Weight Change: [x] No [] Yes    Nausea, Vomiting or Diarrhea: [] No [x] Yes Diarrhea as above. No nausea or vomiting.   Fever, Sweats or Chills:  [x] No [] Yes    Leg Swelling [x] No [] Yes        Other Pertinent Transplant Surgical Issues:  Recent Blood Transfusion: May 2019, as above  Previous Abdominal Transplant: Yes; LDKT  Bladder Dysfunction: Yes; as above  Chronic/Recurrent Infections: No  Chronic Anticoagulation: Yes; warfarin  [place]: No    Review Of Systems:  A comprehensive review of systems was obtained and negative, except as noted in the HPI or PMH.     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, unspecified laterality       Personal History:  Social History     Socioeconomic History     Marital status:      Spouse name: Not on file     Number of children: Not on file     Years of education: Not on file     Highest education level: Not on file   Occupational History     Not on file   Social Needs     Financial resource strain: Not on file     Food insecurity:     Worry: Not on file     Inability: Not on  file     Transportation needs:     Medical: Not on file     Non-medical: Not on file   Tobacco Use     Smoking status: Never Smoker     Smokeless tobacco: Never Used   Substance and Sexual Activity     Alcohol use: No     Alcohol/week: 0.0 standard drinks     Drug use: No     Sexual activity: Not on file   Lifestyle     Physical activity:     Days per week: Not on file     Minutes per session: Not on file     Stress: Not on file   Relationships     Social connections:     Talks on phone: Not on file     Gets together: Not on file     Attends [employer] service: Not on file     Active member of club or organization: Not on file     Attends meetings of clubs or organizations: Not on file     Relationship status: Not on file     Intimate partner violence:     Fear of current or ex partner: Not on file     Emotionally abused: Not on file     Physically abused: Not on file     Forced sexual activity: Not on file   Other Topics Concern     Not on file   Social History Narrative     Not on file        Allergies:  Allergies   Allergen Reactions     Contrast Dye Other (See Comments) and Itching     Pt reports sneezing     Pravastatin Muscle Pain (Myalgia)     Simvastatin Muscle Pain (Myalgia)        Medications:  Current Outpatient Medications   Medication Sig     acetaminophen (TYLENOL ARTHRITIS PAIN) 650 MG CR tablet Take 2 tablets by mouth 3 times daily.     allopurinol (ZYLOPRIM) 100 MG tablet Take 2 tablets by mouth daily. (Patient taking differently: Take 100 mg by mouth 2 times daily )     BABY ASPIRIN PO Take 81 mg by mouth daily (with lunch)      BEVACizumab (AVASTIN) 400 MG/16ML injection Every 5 weeks     Brimonidine Tartrate (ALPHAGAN OP) Place 1 drop into the right eye 2 times daily      cephALEXin (KEFLEX) 500 MG capsule      cycloSPORINE modified (GENERIC EQUIVALENT) 100 MG capsule Take 1 capsule (100 mg) by mouth 2 times daily Total dose 125 mg twice daily     cycloSPORINE modified (GENERIC EQUIVALENT) 25 MG  "capsule TAKE TWO CAPSULES BY MOUTH TWICE A DAY (TOTAL DOSE 150 MG TWICE  A DAY)     fenofibrate 145 MG tablet Take 145 mg by mouth     fenofibrate 145 MG tablet Take 145 mg by mouth every morning      ISOSORBIDE MONONITRATE PO Take 60 mg by mouth every morning      loperamide (IMODIUM) 2 MG capsule Take 2 mg by mouth daily      losartan (COZAAR) 50 MG tablet Take 1 tablet (50 mg) by mouth daily     metoprolol (LOPRESSOR) 100 MG tablet Take 1 tablet by mouth 2 times daily.     metoprolol succinate ER (TOPROL-XL) 100 MG 24 hr tablet      NIFEdipine ER osmotic (PROCARDIA XL) 60 MG TB24 Take 1 tablet (60 mg) by mouth daily     nitroGLYCERIN (NITROSTAT) 0.4 MG SL tablet Place 0.4 mg under the tongue every 5 minutes as needed.     NONFORMULARY Take 1 tablet by mouth 2 times daily Focus Macula Pro:  Eye vitamin and mineral supplement A, C, E, Zinc, Copper      omega-3 acid ethyl esters (LOVAZA) 1 G capsule Take 2 g by mouth 2 times daily      PANTOPRAZOLE SODIUM PO Take 40 mg by mouth daily as needed for heartburn     predniSONE (DELTASONE) 5 MG tablet Take 5 mg by mouth daily (with lunch)      probenecid (BENEMID) 500 MG tablet Take 500 mg by mouth 2 times daily.     Rosuvastatin Calcium (CRESTOR PO) Take 40 mg by mouth every evening      sulfamethoxazole-trimethoprim (BACTRIM,SEPTRA) 400-80 MG per tablet Take 1 tablet by mouth every other day      tamsulosin (FLOMAX) 0.4 MG capsule Take 1 capsule (0.4 mg) by mouth daily (Patient taking differently: Take 0.4 mg by mouth 2 times daily )     warfarin (COUMADIN) 5 MG tablet      Current Facility-Administered Medications   Medication     lidocaine 1 % injection 3 mL     triamcinolone acetonide (KENALOG-40) injection 40 mg        Vitals:  /53   Pulse 71   Ht 1.848 m (6' 0.75\")   Wt 103.6 kg (228 lb 8 oz)   BMI 30.35 kg/m        Exam:  GENERAL APPEARANCE: alert and no distress  HENT: mouth without ulcers or lesions. Fair dentition   LYMPHATICS: no cervical or " supraclavicular nodes  RESP: lungs clear to auscultation - no rales, rhonchi or wheezes  CV: regular rhythm, normal rate, no rub, no murmur  EDEMA: no LE edema bilaterally  ABDOMEN: soft, nondistended, nontender  MS: extremities normal - no gross deformities noted, no evidence of inflammation in joints, no muscle tenderness  SKIN: no rash          Tiara Waite PA-C

## 2019-09-24 NOTE — PROGRESS NOTES
Assessment and Plan:  #Kidney Transplant Wait List Evaluation: Patient is a reasonable candidate overall. Given cardiac status, he may need to be inactive pending further cardiac evaluation, and will review this with committee.     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 Current eGFR 13-15 ml/min over the past year. He has uremic symptoms and recently had an AVF placed. He reports that he has a potential donor. He is currently maintained on cyclosporine 125 mg bid and prednisone 5 mg daily.    # Cardiac Risk: He's had two NSTEMIs post-op (February and June 2018) and a stress test yesterday showed findings consistent with prior inferior wall infarct. Last coronary angiogram was in 2012 and he underwent PCI of the RCA. Currently, he is asymptomatic. This will need to be reviewed with the committee to determine if further testing and/or cardiac risk assessment is needed at this time. He is now on coumadin for atrial fibrillation.    # LUTS: stable symptoms on tamsulosin. Followed by urology and has follow up scheduled.    # IPMN with relapsing acute pancreatitis: Recent outside imaging from May 2019 showed possible pancreatitis. He is overdue for imaging and GI follow up and will refer him back.     # Limited physical function: recommend he follow up with PCP and/or ortho surgery to discuss completing physical therapy, which he wasn't able to do initially post-op.     # MGUS IgA kappa: negative skeletal survey February 2018. Normal serum free light chain ratio in February 2018 and no monoclonal peak in June 2018. Due for repeat SPEP and serum free light chains.    # Skin cancer: has dermatology follow up in October.     # Health Maintenance: Colonoscopy: Up to date and Dental: Up to date    Discussed with Dr. Lizama.      Discussed the risks and benefits of a transplant, including the risk of surgery and immunosuppression medications.      Patient s overall re-evaluation may require further  discussion in the Transplant Program s multidisciplinary selection committee for a final recommendation on the patient s suitability for transplant.     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     Date of Initial Transplant Evaluation:  August 2017        Current Transplant Phase: Waitlist: Inactive  Official [facility] Listing Date: 9/14/2017  Blood Type: O        cPRA: 100       Date of cPRA: August 2019  Transplant Coordinator: Rosita Galindo Transplant Office phone number [phone]     Previous Medical Issues:    # Obesity: surgery recommended abdominal weight loss. Weighed around 245 lbs at time of initial evaluation in Summer 2017. Has since lost approximately 20 lbs. BMI currently around 28-29.     History of Present Illness:   Mr. 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 His other medical history is significant for CAD s/p PCI to the RCA in 2012, HTN, dyslipidemia, gout, spinal stenosis with neurogenic claudication s/p surgical repair June 2018, MGUS (IgA kappa), and IPMN with history of recurrent pancreatitis.         Interim Events:        - IPMN with relapsing acute pancreatitis: Seen by Dr. Manning in October 2018, and IPMN not thought to be prohibitive for transplant. Recommendations were for EUS and tentatively MRI in 1 year if EUS findings stable, however, patient has not completed either. Recent May 2019 outside CT with findings concerning for pancreatitis. Loose stools have improved but still present. No abdominal pain, nausea, or vomiting.          - Spinal stenosis s/p surgical repair June 2018 complicated by pancreatitis, NSTEMI (no coronary angiogram given CKD, troponin 2.5), and afib/flutter. Plavix stopped and warfarin started.           - Follows with Dr. Rider for hypertriglyceridemia, which is thought 2/2 immunosuppression, but hasn't had cardiac risk assessment since 2017. Abnormal stress test October 2018 showed large fixed inferoapical perfusion defect with minimal rosey-infarct reversibility. Stress test yesterday with similar findings but no reversible ischemia. Last ECHO June 2018 EF 55-60%, mildly dilated aortic root 4.4 cm, and estimated mean right atrial pressure elevated at 15 mmHg.           - Bladder stone: s/p holium laser lithotripsy in November 2018. Pre-op urodynamics were equivocal for outlet obstruction. He has LUTS and is on tamsulosin. Last PVR unknown.         - Hemorrhoidectomy April 2019 complicated by acute blood loss anemia requiring blood transfusions and demand ischemia 2/2 anemia with elevated troponin to 1.6 and chest pain. Re-admitted shortly after for possible UTI.         Kidney Disease:        Kidney Disease Dx: Membranoproliferative glomerulonephritis (MPGN)        On Dialysis: No       Primary Nephrologist: [facility] transplant nephrology         Cardiac/Vascular Disease History:       Known CAD: Yes        Last Cardiac Risk Assessment: 2017       Last Cardiac Stress Test/Coronary Angiogram: stress test yesterday, angio 2012       Known PAD/Claudication Symptoms: No       Known Heart Failure: No       Arrhythmia:  Yes; as above       Pulmonary Hypertension: No       Valvular Disease: No       Other: None       New Cardiac/Vascular Events: No         Functional Capacity/Frailty:        He has been physically limited due to known BLE peripheral neuropathy from his knee to toes, and also reports some back weakness since his back surgery. He wasn't able to go to pool therapy. He's interested in trying again, but is cautious as it was difficult for him to get changed, walk to and from pool, and get ready given his peripheral neuropathy. He was constantly afraid he was going to fall. He is not currently doing back  exercises but says he will start. He can do most things around his house (cook, clean, etc.), but is limited to walking just a few blocks. No chest pain, SOB, or claudication.    Fatigue/Decreased Energy: [] No [x] Yes    Chest Pain or SOB with Exertion: [x] No [] Yes    Significant Weight Change: [x] No [] Yes    Nausea, Vomiting or Diarrhea: [] No [x] Yes Diarrhea as above. No nausea or vomiting.   Fever, Sweats or Chills:  [x] No [] Yes    Leg Swelling [x] No [] Yes        Other Pertinent Transplant Surgical Issues:  Recent Blood Transfusion: May 2019, as above  Previous Abdominal Transplant: Yes; LDKT  Bladder Dysfunction: Yes; as above  Chronic/Recurrent Infections: No  Chronic Anticoagulation: Yes; warfarin  [place]: No    Review Of Systems:  A comprehensive review of systems was obtained and negative, except as noted in the HPI or PMH.     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, unspecified laterality       Personal History:  Social History     Socioeconomic History     Marital status:      Spouse name: Not on file     Number of children: Not on file     Years of education: Not on file     Highest education level: Not on file   Occupational History     Not on file   Social Needs     Financial resource strain: Not on file     Food insecurity:     Worry: Not on file     Inability: Not on file     Transportation needs:     Medical: Not on file     Non-medical: Not on file    Tobacco Use     Smoking status: Never Smoker     Smokeless tobacco: Never Used   Substance and Sexual Activity     Alcohol use: No     Alcohol/week: 0.0 standard drinks     Drug use: No     Sexual activity: Not on file   Lifestyle     Physical activity:     Days per week: Not on file     Minutes per session: Not on file     Stress: Not on file   Relationships     Social connections:     Talks on phone: Not on file     Gets together: Not on file     Attends Alevism service: Not on file     Active member of club or organization: Not on file     Attends meetings of clubs or organizations: Not on file     Relationship status: Not on file     Intimate partner violence:     Fear of current or ex partner: Not on file     Emotionally abused: Not on file     Physically abused: Not on file     Forced sexual activity: Not on file   Other Topics Concern     Not on file   Social History Narrative     Not on file        Allergies:  Allergies   Allergen Reactions     Contrast Dye Other (See Comments) and Itching     Pt reports sneezing     Pravastatin Muscle Pain (Myalgia)     Simvastatin Muscle Pain (Myalgia)        Medications:  Current Outpatient Medications   Medication Sig     acetaminophen (TYLENOL ARTHRITIS PAIN) 650 MG CR tablet Take 2 tablets by mouth 3 times daily.     allopurinol (ZYLOPRIM) 100 MG tablet Take 2 tablets by mouth daily. (Patient taking differently: Take 100 mg by mouth 2 times daily )     BABY ASPIRIN PO Take 81 mg by mouth daily (with lunch)      BEVACizumab (AVASTIN) 400 MG/16ML injection Every 5 weeks     Brimonidine Tartrate (ALPHAGAN OP) Place 1 drop into the right eye 2 times daily      cephALEXin (KEFLEX) 500 MG capsule      cycloSPORINE modified (GENERIC EQUIVALENT) 100 MG capsule Take 1 capsule (100 mg) by mouth 2 times daily Total dose 125 mg twice daily     cycloSPORINE modified (GENERIC EQUIVALENT) 25 MG capsule TAKE TWO CAPSULES BY MOUTH TWICE A DAY (TOTAL DOSE 150 MG TWICE  A DAY)      "fenofibrate 145 MG tablet Take 145 mg by mouth     fenofibrate 145 MG tablet Take 145 mg by mouth every morning      ISOSORBIDE MONONITRATE PO Take 60 mg by mouth every morning      loperamide (IMODIUM) 2 MG capsule Take 2 mg by mouth daily      losartan (COZAAR) 50 MG tablet Take 1 tablet (50 mg) by mouth daily     metoprolol (LOPRESSOR) 100 MG tablet Take 1 tablet by mouth 2 times daily.     metoprolol succinate ER (TOPROL-XL) 100 MG 24 hr tablet      NIFEdipine ER osmotic (PROCARDIA XL) 60 MG TB24 Take 1 tablet (60 mg) by mouth daily     nitroGLYCERIN (NITROSTAT) 0.4 MG SL tablet Place 0.4 mg under the tongue every 5 minutes as needed.     NONFORMULARY Take 1 tablet by mouth 2 times daily Focus Macula Pro:  Eye vitamin and mineral supplement A, C, E, Zinc, Copper      omega-3 acid ethyl esters (LOVAZA) 1 G capsule Take 2 g by mouth 2 times daily      PANTOPRAZOLE SODIUM PO Take 40 mg by mouth daily as needed for heartburn     predniSONE (DELTASONE) 5 MG tablet Take 5 mg by mouth daily (with lunch)      probenecid (BENEMID) 500 MG tablet Take 500 mg by mouth 2 times daily.     Rosuvastatin Calcium (CRESTOR PO) Take 40 mg by mouth every evening      sulfamethoxazole-trimethoprim (BACTRIM,SEPTRA) 400-80 MG per tablet Take 1 tablet by mouth every other day      tamsulosin (FLOMAX) 0.4 MG capsule Take 1 capsule (0.4 mg) by mouth daily (Patient taking differently: Take 0.4 mg by mouth 2 times daily )     warfarin (COUMADIN) 5 MG tablet      Current Facility-Administered Medications   Medication     lidocaine 1 % injection 3 mL     triamcinolone acetonide (KENALOG-40) injection 40 mg        Vitals:  /53   Pulse 71   Ht 1.848 m (6' 0.75\")   Wt 103.6 kg (228 lb 8 oz)   BMI 30.35 kg/m       Exam:  GENERAL APPEARANCE: alert and no distress  HENT: mouth without ulcers or lesions. Fair dentition   LYMPHATICS: no cervical or supraclavicular nodes  RESP: lungs clear to auscultation - no rales, rhonchi or wheezes  CV: " regular rhythm, normal rate, no rub, no murmur  EDEMA: no LE edema bilaterally  ABDOMEN: soft, nondistended, nontender  MS: extremities normal - no gross deformities noted, no evidence of inflammation in joints, no muscle tenderness  SKIN: no rash

## 2019-09-24 NOTE — Clinical Note
9/24/2019       RE: Rory Bustamante  [address]  [facility] [address]     Dear Colleague,    Thank you for referring your patient, Rory Bustamante, to the [facility] NEPHROLOGY at [facility]. Please see a copy of my visit note below.    Assessment and Plan:  #Kidney Transplant Wait List Evaluation: Patient is a {desc.:848060} candidate overall. Patient should {Wait List Status:641867} on the wait list.    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 Current eGFR 13-15 ml/min over the past year. He has uremic symptoms and recently had an AVF placed. He reports that his brother is a potential donor.     # Cardiac Risk: He's had two NSTEMIs post-op (February and June 2018) and a stress test yesterday that showed ***. Last coronary angiogram in 2012 showed ***and he underwent PCI of the RCA. Currently, he is asymptomatic. This will need to be reviewed with the committee to determine if further testing and/or cardiac risk assessment is needed at this time.    # LUTS: stable symptoms on tamsulosin. Followed by urology and has follow up scheduled.    # IPMN and recurrent pancreatitis: Recent outside imaging from May 2019 showed possible pancreatitis. Previously evaluated by GI in October 2018 ***and recommendations for EUS and repeat abdominal MRI in 1 year, although neither have been done.    # MGUS IgA kappa: negative skeletal survey February 2018. Normal serum free light chain ratio in February 2018 and no monoclonal peak in June 2018. ***.    # Skin cancer: has dermatology follow up in October.     # Health Maintenance: Colonoscopy: {UMP TX UP TO DATE:886999783} and Dental: {UMP TX UP TO DATE:967358746}     Discussed the risks and benefits of a transplant, including the risk of surgery and immunosuppression medications.    KDPI: We discussed approximate remaining wait time and how that is influenced by issues such as blood type and sensitization (PRA) and access  to a living donor. I contrasted potential waiting time for living vs  donor kidneys from  normal (0-85%) or higher (%) kidney donor profile index (KDPI) donors and their associated outcomes. I {Would:042473} recommend Mr. Bustamante to consider kidneys from high KDPI donors. The reason for this decision is best summarized as: {KDPI REASON:619452936}.    Patient s overall re-evaluation may require further discussion in the Transplant Program s multidisciplinary selection committee for a final recommendation on the patient s suitability for transplant.     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     Date of Initial Transplant Evaluation:  2017        Current Transplant Phase: Waitlist: Active  Official UNOS Listing Date: 2017  Blood Type: O        cPRA: 100       Date of cPRA: 2019  Transplant Coordinator: Rosita Galindo Transplant Office phone number [phone]     Previous Medical Issues:    # Obesity: surgery recommended abdominal weight loss. Weighed around 245 lbs at time of initial evaluation in Summer 2017. Has since lost approximately 20 lbs. BMI currently around 28-29.     History of Present Illness:   Mr. 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 His other medical history is significant for CAD s/p PCI to the RCA in , HTN, dyslipidemia, gout, spinal stenosis with neurogenic claudication s/p surgical repair 2018, MGUS (IgA kappa), IPMN, and ***.         Interim Events:        - Acute pancreatitis x 2 (February and 2018), IPMN, chronic loose stools. Seen in 2018, not thought to be prohibitive for transplant. Recs for EUS and tentatively MRI in 1 year if EUS findings stable. Recent May 2019 outside CT  with findings concerning for pancreatitis. Diarrhea significantly improved but still present ***. No abdominal pain.         - Spinal stenosis s/p surgical repair June 2018 complicated by pancreatitis, NSTEMI (no coronary angiogram given CKD, troponin 2.5), and afib/flutter. Plavix stopped and warfarin started.          - Abnormal stress test October 2018 showed large fixed inferoapical perfusion defect with minimal rosey-infarct reversibility. Done for angina         - Bladder stone: s/p holium laser lithotripsy in November 2018. Pre-op urodynamics were equivocal for outlet obstruction. He has LUTS and is on tamsulosin. Last PVR ***ml.         - Hemorrhoidectomy April 2019 complicated by acute blood loss anemia requiring blood transfusions and demand ischemia 2/2 anemia with elevated troponin to 1.6 and chest pain. Re-admitted shortly after for possible UTI.         - Exertional angina ***followed by Dr. Rider for hypertriglyceridemia, but no risk assessment since 2017. Last ECHO June 2018 EF 55-60%, mildly dilated aortic root 4.4 cm, and estimated mean right atrial pressure elevated at 15 mmHg.     PN, back weakness, didn't get to do pool therapy          Kidney Disease:        Kidney Disease Dx: Membranoproliferative glomerulonephritis (MPGN)        On Dialysis: No       Primary Nephrologist: [facility] transplant nephrology         Cardiac/Vascular Disease History:       Known CAD: Yes ***       Last Cardiac Risk Assessment: ***       Last Cardiac Stress Test/Coronary Angiogram: ***       Known PAD/Claudication Symptoms: {YES WITH WILD CARD/NO:61215047}       Known Heart Failure: {Cardiac Risk Factors:390066}       Arrhythmia:  {YES WITH WILD CARD/NO:66055522}       Pulmonary Hypertension: {YES WITH WILD CARD/NO:41007560}       Valvular Disease: {YES WITH WILD CARD/NO:22298452}       Other: {Other Risk Factors:780029}       New Cardiac/Vascular Events: {YES WITH WILD CARD/NO:38689321}         Functional  Capacity/Frailty:        ***limited due to known BLE PN knee-toes, has some back weakness since back surgery, wasn't able to go to pool therapy, not currently doing back exercises         {FV RENAL TX OPTIONAL COMMENT (Optional):304517}     Fatigue/Decreased Energy: [] No [x] Yes    Chest Pain or SOB with Exertion: [x] No [] Yes    Significant Weight Change: [x] No [] Yes    Nausea, Vomiting or Diarrhea: [] No [x] Yes    Fever, Sweats or Chills:  [x] No [] Yes    Leg Swelling [x] No [] Yes        Other Pertinent Transplant Surgical Issues:  Recent Blood Transfusion: May 2019, as above  Previous Abdominal Transplant: Yes; LDKT  Bladder Dysfunction: Yes; as above  Chronic/Recurrent Infections: No  Chronic Anticoagulation: Yes; warfarin  Jehovah s Witness: No    Review Of Systems:  A comprehensive review of systems was obtained and negative, except as noted in the HPI or PMH.     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, unspecified laterality       Personal History:  Social History     Socioeconomic History     Marital status:      Spouse name: Not on file     Number of children: Not on file     Years of education: Not on file     Highest education level: Not on file   Occupational History     Not on file   Social Needs     Financial resource strain: Not on file     Food insecurity:     Worry: Not on file     Inability: Not on file     Transportation needs:     Medical: Not on file      Non-medical: Not on file   Tobacco Use     Smoking status: Never Smoker     Smokeless tobacco: Never Used   Substance and Sexual Activity     Alcohol use: No     Alcohol/week: 0.0 standard drinks     Drug use: No     Sexual activity: Not on file   Lifestyle     Physical activity:     Days per week: Not on file     Minutes per session: Not on file     Stress: Not on file   Relationships     Social connections:     Talks on phone: Not on file     Gets together: Not on file     Attends [employer] service: Not on file     Active member of club or organization: Not on file     Attends meetings of clubs or organizations: Not on file     Relationship status: Not on file     Intimate partner violence:     Fear of current or ex partner: Not on file     Emotionally abused: Not on file     Physically abused: Not on file     Forced sexual activity: Not on file   Other Topics Concern     Not on file   Social History Narrative     Not on file        Allergies:  Allergies   Allergen Reactions     Contrast Dye Other (See Comments) and Itching     Pt reports sneezing     Pravastatin Muscle Pain (Myalgia)     Simvastatin Muscle Pain (Myalgia)        Medications:  Current Outpatient Medications   Medication Sig     acetaminophen (TYLENOL ARTHRITIS PAIN) 650 MG CR tablet Take 2 tablets by mouth 3 times daily.     allopurinol (ZYLOPRIM) 100 MG tablet Take 2 tablets by mouth daily. (Patient taking differently: Take 100 mg by mouth 2 times daily )     BABY ASPIRIN PO Take 81 mg by mouth daily (with lunch)      BEVACizumab (AVASTIN) 400 MG/16ML injection Every 5 weeks     Brimonidine Tartrate (ALPHAGAN OP) Place 1 drop into the right eye 2 times daily      cephALEXin (KEFLEX) 500 MG capsule      cycloSPORINE modified (GENERIC EQUIVALENT) 100 MG capsule Take 1 capsule (100 mg) by mouth 2 times daily Total dose 125 mg twice daily     cycloSPORINE modified (GENERIC EQUIVALENT) 25 MG capsule TAKE TWO CAPSULES BY MOUTH TWICE A DAY (TOTAL DOSE  150 MG TWICE  A DAY) (Patient not taking: Reported on 6/25/2019)     fenofibrate 145 MG tablet Take 145 mg by mouth     fenofibrate 145 MG tablet Take 145 mg by mouth every morning      ISOSORBIDE MONONITRATE PO Take 60 mg by mouth every morning      loperamide (IMODIUM) 2 MG capsule Take 2 mg by mouth daily      losartan (COZAAR) 50 MG tablet Take 1 tablet (50 mg) by mouth daily (Patient not taking: Reported on 5/13/2019)     metoprolol (LOPRESSOR) 100 MG tablet Take 1 tablet by mouth 2 times daily. (Patient not taking: Reported on 6/25/2019)     metoprolol succinate ER (TOPROL-XL) 100 MG 24 hr tablet      NIFEdipine ER osmotic (PROCARDIA XL) 60 MG TB24 Take 1 tablet (60 mg) by mouth daily     nitroGLYCERIN (NITROSTAT) 0.4 MG SL tablet Place 0.4 mg under the tongue every 5 minutes as needed.     NONFORMULARY Take 1 tablet by mouth 2 times daily Focus Macula Pro:  Eye vitamin and mineral supplement A, C, E, Zinc, Copper      omega-3 acid ethyl esters (LOVAZA) 1 G capsule Take 2 g by mouth 2 times daily      PANTOPRAZOLE SODIUM PO Take 40 mg by mouth daily as needed for heartburn     predniSONE (DELTASONE) 5 MG tablet Take 5 mg by mouth daily (with lunch)      probenecid (BENEMID) 500 MG tablet Take 500 mg by mouth 2 times daily.     Rosuvastatin Calcium (CRESTOR PO) Take 40 mg by mouth every evening      sulfamethoxazole-trimethoprim (BACTRIM,SEPTRA) 400-80 MG per tablet Take 1 tablet by mouth every other day      tamsulosin (FLOMAX) 0.4 MG capsule Take 1 capsule (0.4 mg) by mouth daily (Patient taking differently: Take 0.4 mg by mouth 2 times daily )     warfarin (COUMADIN) 5 MG tablet      Current Facility-Administered Medications   Medication     lidocaine 1 % injection 3 mL     triamcinolone acetonide (KENALOG-40) injection 40 mg        Vitals:  There were no vitals taken for this visit.     Exam:  GENERAL APPEARANCE: alert and no distress  HENT: mouth without ulcers or lesions  LYMPHATICS: no cervical or  supraclavicular nodes  RESP: lungs clear to auscultation - no rales, rhonchi or wheezes  CV: regular rhythm, normal rate, no rub, no murmur  EDEMA: no LE edema bilaterally  ABDOMEN: soft, nondistended, nontender, bowel sounds normal  MS: extremities normal - no gross deformities noted, no evidence of inflammation in joints, no muscle tenderness  SKIN: no rash  DIALYSIS ACCESS:  { :650239602}        Again, thank you for allowing me to participate in the care of your patient.      Sincerely,    Tiara Waite PA-C

## 2019-09-27 NOTE — PROGRESS NOTES
Patient Name: Rory Bustamante  : 1955  Age: 63 year old  MRN: [id number]  Date of Initial Social Work Evaluation: 2017    Patient on kidney transplant wait list active.  Met with Rory today to update psychosocial assessment.      Presenting Information   Living Situation: in [place], [place] with his wife Kajal  If not local, plans for short term stay:  N/A  Previous Functional Status: Independent but indicated he will be talking to his PCP about outpatient physical therapy.  Cultural/Language/Spiritual Considerations: N/A    Support System  Primary Support Person Kajal  Other support:  Sons and siblings  Plan for support in immediate post-transplant period: Kajal    Health Care Directive  Decision Maker: Self  Alternate Decision Maker: [employer] Care Directive: Copy in Chart    Mental Health/Coping:   History of Mental Health: No known history  History of Chemical Health: No known history  Current status: Appropriate  Coping: Rory indicated not a lot bothers him so unsure what his coping strategy would be.  Services Needed/Recommended: None indicated at this time.    Financial   Income: Rory is on disability and his wife is retired.  Impact of transplant on income: Due to age transplant should not affect disability eligibility.  Insurance and medication coverage: Medicare, Cigna Supplement policy and Part D through [employer].  Rory indicated he is paying his own premiums.  Financial concerns: None indicated at this time.  Resources needed: None indicated at this time.    Assessment and recommendations and plan:  Rory continues to not be on dialysis.  He also indicated his brother has expressed interest in being a donor.  Discussed medication costs post transplant (Valcyte) and he did not express concern at this time.  Rory continues to be an appropriate transplant candidate.   Reviewed transplant education (Medicare, rehabilitation, donor issues, community/financial resources, and psych/family adjustment) as well  as psychosocial risks of transplant. Provided patient with a copy of post-transplant informational sheet that includes information on potential costs of medications, Medicare ESRD, post-transplant lodging, etc. Patient seemed to process information well. Appeared well informed, motivated, and able to follow post transplant requirements. Behavior was appropriate during interview. Has adequate income and insurance coverage. Adequate social support. No major contraindications noted for transplant. At this time, patient appears to understand the risks and benefits of transplant.

## 2019-09-27 NOTE — TELEPHONE ENCOUNTER
Anemia Management Note  SUBJECTIVE/OBJECTIVE:  Referred by Dr. Christi Sun on 2018  Primary Diagnosis: Anemia in Chronic Kidney Disease (N18.3, D63.1)     Secondary Diagnosis:  Chronic Kidney Disease, Stage 3 (N18.3)   Hgb goal range:  9-10  Epo/Darbo: Procrit  20,000 units once weekly for Hgb < 10  In clinic  RX/TX plans : 2020  Iron regimen:  None  Labs : 2020.      Patient prefers calls only when labs drastically change. Otherwise Harrell will always give him a dose if his Hgb is below.     Labs and Procrit are done at [facility].  Drawn on  10:30a  Phone: [phone]  Fax # is [phone]     IV Iron at [facility]  Fax [phone]    Anemia Latest Ref Rng & Units 2019   MARY LOU Dose - - - 20,000 units 20,000 units - - 20,000 units   Hemoglobin 13.3 - 17.7 g/dL 10.4(A) 10.4(A) 9.8(A) 9.7(A) 10.1(A) 9.9(L) -   IV Iron Dose - - - - - - - -   TSAT 15 - 46 % - 11 - - - 9(L) -   Ferritin 26 - 388 ng/mL - 188 - - - 93 -     BP Readings from Last 3 Encounters:   19 100/53   19 125/63   19 119/75     Wt Readings from Last 2 Encounters:   19 103.6 kg (228 lb 8 oz)   19 103.1 kg (227 lb 3.2 oz)       Injectafer orders sent to Dr. Modi for signature.  Once orders are signed they will need to be faxed to [facility].    ASSESSMENT:  Hgb:At goal - recommend dose  TSat: not at goal of >30% Ferritin: Not at goal of (>100ng/mL)    PLAN:  Dose with procrit and RTC for hgb then procrit if needed in 1 week(s)    Orders needed to be renewed (for next follow-up date) in LETTERS - for external labs: None    Iron labs due:  10/22/2019    Plan discussed with:  BASIL Valadez  Plan provided by:  ena    NEXT FOLLOW-UP DATE:  10/04/2019    Anemia Management Service  Belinda Escalona RN  Phone: [phone]  Fax: [phone]

## 2019-10-01 NOTE — TELEPHONE ENCOUNTER
Anemia Management Note  SUBJECTIVE/OBJECTIVE:  Referred by Dr. Christi Sun on 2018  Primary Diagnosis: Anemia in Chronic Kidney Disease (N18.3, D63.1)     Secondary Diagnosis:  Chronic Kidney Disease, Stage 3 (N18.3)   Hgb goal range:  9-10  Epo/Darbo: Procrit  20,000 units  once weekly for Hgb < 10  In clinic  RX/TX plans : 2019  Iron regimen:  Ferrous Sulfate 325mg QD  Labs : 2019  Recent MARY LOU use, transfusion, IV iron: IV iron scheduled 2018     Anemia Latest Ref Rng & Units 2018 2018 2018 2018 2018 2018 8/3/2018   MARY LOU Dose - 10,000 units 10,000 units - 20,000 units 20,000 units - 20,000 units   Hemoglobin 13.3 - 17.7 g/dL 8.8(A) 8.6(A) 9.0(L) 8.8(A) 8.9(A) - 9.8(A)   IV Iron Dose - - - - - - 750mg -   TSAT % - - 24 19 - - -   Ferritin ng/mL - - 262 270 - - -       BP Readings from Last 3 Encounters:   18 121/71   18 119/72   18 133/83     Wt Readings from Last 2 Encounters:   18 237 lb (107.5 kg)   18 237 lb (107.5 kg)     Received and documented outside lab results drawn on 8/3 and dose given 8/3 Sjw    ASSESSMENT:  Hgb:Not at goal but improving - recommend dose and continue current regimen  TSat: Due for iron studies 4 weeks after last IV iron infusion Ferritin: Due for iron studies 4 weeks after last IV iron infusion    PLAN:  RTC for hgb then procrit if needed now, RTC for IV iron in 1 week(s) and Iron labs due 4 weeks after second iron infusion      Orders needed to be renewed (for next follow-up date) in EPIC: None    Iron labs due: 4 weeks after second iron infusion      Plan discussed with:  Left   Plan provided by:     NEXT FOLLOW-UP DATE:  08/10/2018    Anemia Management Service  Grecia Carroll,PharmD and Michelle Ramírez CPhT  Phone: [phone]  Fax: [phone]         patient has had multiple episodes of diarrhea

## 2019-10-02 PROBLEM — N18.5 CKD (CHRONIC KIDNEY DISEASE) STAGE 5, GFR LESS THAN 15 ML/MIN (H): Status: ACTIVE | Noted: 2018-02-06

## 2019-10-02 NOTE — COMMITTEE REVIEW
Abdominal Committee Review Note     Evaluation Date: 8/21/2017  Committee Review Date: 10/2/2019    Organ being evaluated for: Kidney    Transplant Phase: Waitlist  Transplant Status: Active    Transplant Coordinator: Rosita Galindo  Transplant Surgeon:       Referring Physician: Christi Sun    Primary Diagnosis: Chronic Glomerulonephritis - Unspecified  Secondary Diagnosis:     Committee Review Members:  Nephrology Elroy Lizama MD, Will Yang, APRN CNP, Sj Haskins MD   Nutrition Patti Flower,    Pharmacy Keanu Maher, [facility]    - Clinical Ibeth Novak, [facility]   Transplant Tiara Waite PA-C, Dinorah Ly, RN, Micaela Pablo, RN, Sanjuana Burton, NP, Carolina Bull, RN, Missy Du, RN, Sheron Gilman, RN, Elmer Gallego MD   Transplant Surgery Elmer Gallego MD       Transplant Eligibility: Irreversible chronic kidney disease treated w/dialysis or expected need for dialysis    Committee Review Decision: Make Inactive    Relative Contraindications: None    Absolute Contraindications: None    Committee Chair Elmer Gallego MD verbally attested to the committee's decision.    Committee Discussion Details: Tiara Waite presented her clinical recommendations after seeing Rory Bustamante. First, he is a patient of Dr. Rider, cardiology, however he has not had a formal cardiac risk assessment for transplant. He has a history of coronary artery disease with stents. Of note, he has had MI after a hernia repair. Due to this, chair surgeon is concerned about the chance of this happening while/during the transplant surgery. Rory is also a patient of Dr. Leahy for his cystic lesions in the pancreas, seen 10/29/2018. At that time, Dr. Leahy recommended he have a endoscopic ultrasound in 6 months and a MRI in 1 year from today, and then see him.   Finally, Tiara reviewed his physical function status as poor. She strongly  "recommended him to see his PCP and/or ortho surgery to discuss physical therapy. Rory described to Tiara he has \"back weakness\", and noted his spouse fell and he was unable to help her back up. He also has neuropathy from the knees down, making his mobility challenging.   Decision from committee is Rory needs to be inactive until he has completed the followin. Cardiology risk assessment  2. Endoscopic Ultra sound and MRI as recommended by Dr. Leahy.   3. Complete Physical Therapy for improved mobility and strength.     "

## 2019-10-02 NOTE — TELEPHONE ENCOUNTER
Spoke to Rory and his wife about the committee recommendations for his transplant work up.   Reviewed he will need a complete cardiac risk assessment due to his coronary artery disease and history of MI with stents.   Reviewed he needs to return to see Dr. Manning for the cystic lesion follow up on his pancreas.   Reviewed he needs to complete physical therapy to improve his mobility and strength.   His wife and he are in agreement and understand he is now inactive on the transplant list until all are completed to the satisfaction of the committee.

## 2019-10-02 NOTE — LETTER
"2019    Rory Bustamante  [address]  [facility] [address]      Dear Mr. Bustamante,    This letter is sent to notify you that your listing status was changed from Active to Inactive on the kidney transplant waitlist at the [facility].  Your status was changed because of the need for follow up care.   First, he is a patient of Dr. Rider, cardiology, however he has not had a formal cardiac risk assessment for transplant. He has a history of coronary artery disease with stents.  Rory is also a patient of Dr. Manning for his cystic lesions in the pancreas, seen 10/29/2018. At that time, Dr. Leahy recommended he have a endoscopic ultrasound in 6 months and a MRI in 1 year from today, and then see Dr. Manning. These were not completed.   Finally, Tiara reviewed his physical function status. She strongly recommended him to see his PCP and/or ortho surgeon to discuss physical therapy. Rory described to Tiara he has \"back weakness\" since his back surgery.   Decision from committee is Rory needs to be inactive until he has completed the followin. Cardiology risk assessment  2. Follow up with Dr. Manning to determine his plan of care  3. Complete Physical Therapy for improved mobility and strength.    During this waiting period, we may still request periodic laboratory tests with your primary physician.  It will be your responsibility to remind your physician to forward your results to the Transplant Office.    Sincerely,        Kidney Transplant Program        CC: Christi Sun MD;  Elroy Rider MD;  Shade Manning MD;             Moris Diaz MD  "

## 2019-10-03 NOTE — TELEPHONE ENCOUNTER
Follow-up with anemia management service:    LVm for Rory that IV Iron orders were faxed to [facility]    Anemia Latest Ref Rng & Units 8/23/2019 8/30/2019 9/6/2019 9/13/2019 9/20/2019 9/24/2019 9/27/2019   MARY LOU Dose - - - 20,000 units 20,000 units - - 20,000 units   Hemoglobin 13.3 - 17.7 g/dL 10.4(A) 10.4(A) 9.8(A) 9.7(A) 10.1(A) 9.9(L) -   IV Iron Dose - - - - - - - -   TSAT 15 - 46 % - 11 - - - 9(L) -   Ferritin 26 - 388 ng/mL - 188 - - - 93 -           Follow-up call date: 10/07/2019        Anemia Management Service  Belinda Escalona RN  Phone: [phone]  Fax: [phone]

## 2019-10-07 NOTE — TELEPHONE ENCOUNTER
Anemia Management Note  SUBJECTIVE/OBJECTIVE:  Referred by Dr. Christi Sun on 2018  Primary Diagnosis: Anemia in Chronic Kidney Disease (N18.3, D63.1)     Secondary Diagnosis:  Chronic Kidney Disease, Stage 3 (N18.3)   Hgb goal range:  9-10  Epo/Darbo: Procrit  20,000 units once weekly for Hgb < 10  In clinic  RX/TX plans : 2020  Iron regimen:  None  Labs : 2020.      Patient prefers calls only when labs drastically change. Otherwise [facility] will always give him a dose if his Hgb is below.     Labs and Procrit are done at [facility].  Drawn on  10:30a  Phone: [phone]  Fax # is [phone]      IV Iron at [facility]  Fax [phone]    Anemia Latest Ref Rng & Units 2019 2019 2019 2019 2019 2019 10/4/2019   MARY LOU Dose - - 20,000 units 20,000 units - - 20,000 units -   Hemoglobin 13.3 - 17.7 g/dL 10.4(A) 9.8(A) 9.7(A) 10.1(A) 9.9(L) - 10.6(A)   IV Iron Dose - - - - - - - 750mg   TSAT % 11 - - - 9(L) - 12   Ferritin ng/mL 188 - - - 93 - 80     BP Readings from Last 3 Encounters:   19 100/53   19 125/63   19 119/75     Wt Readings from Last 2 Encounters:   19 103.6 kg (228 lb 8 oz)   19 103.1 kg (227 lb 3.2 oz)           ASSESSMENT:  Hgb:Above goal - recommend hold dose  TSat: not at goal of >30% Ferritin: Not at goal of (>100ng/mL)    PLAN:  Hold Procrit and RTC for hgb then procrit if needed in 1* week(s)    Orders needed to be renewed (for next follow-up date) in LETTERS - for external labs: None    Iron labs due:  4 weeks after 2nd Iron Infusion    Plan discussed with:  No call made per pt request  Plan provided by:  ena    NEXT FOLLOW-UP DATE:  10/14/2019    Anemia Management Service  Belinda Escalona RN  Phone: [phone]  Fax: [phone]

## 2019-10-08 NOTE — PROGRESS NOTES
"Care Coordination Telephone Call  [facility]     Called patient to discuss plan of care that Dr. Manning has recommended.      He states that he was in [facility] April/May of 2019 and he was there for 3 weeks due to Atrial fibrillation and issues with bleeding hemorrhoids that were complicated due to blood thinners.   He relates that he is feeling better now \"but it was really rough for a while.\"    He will schedule an appointment with PCP to have a preoperative evaluation to decide if we can proceed with EUS and he will contact me.    I have asked the patient to call with any additional questions or concerns and have provided my contact information.    Plan:  preop appointment with PCP and then contact me.    Jacki VELAN, HNBC, STAR-T  RN Care Coordinator  [facility]  Ph: [phone]  FAX: [phone]          "

## 2019-10-08 NOTE — TELEPHONE ENCOUNTER
Called [employer] cell phone and he answered. He is in a MD appointment and wanted to call me back. I asked him to call Dr. Manning's office as they have been trying to reach him.   He said OK.

## 2019-10-09 PROBLEM — K86.2 PANCREAS CYST: Status: ACTIVE | Noted: 2019-01-01

## 2019-10-10 NOTE — TELEPHONE ENCOUNTER
Spoke to patient in regards to scheduled procedure. Informed patient he is scheduled with Dr. Manning on 10/24/19. Informed patient he will need an updated pre-op physical within 30 days of his procedure. Patient stated he is going to have this done locally. Informed patient he will need a  and someone to monitor him for 24 hours after the procedure. Informed patient all scheduling details will be sent to his MyChart per his request.     10/10/19 305pm

## 2019-10-11 NOTE — TELEPHONE ENCOUNTER
Chief Complaint : Return-6 Mo    Hx/Sx: AUR/Bladder Stones     Records/Orders: Available     Pt Contacted: n/a    At Rooming: Flow/PVR

## 2019-10-14 NOTE — TELEPHONE ENCOUNTER
Anemia Management Note  SUBJECTIVE/OBJECTIVE:  Referred by Dr. Christi Sun on 2018  Primary Diagnosis: Anemia in Chronic Kidney Disease (N18.3, D63.1)     Secondary Diagnosis:  Chronic Kidney Disease, Stage 3 (N18.3)   Hgb goal range:  9-10  Epo/Darbo: Procrit  20,000 units once weekly for Hgb < 10  In clinic  RX/TX plans : 2020  Iron regimen:  None  Labs : 2020.      Patient prefers calls only when labs drastically change. Otherwise [facility] will always give him a dose if his Hgb is below.     Labs and Procrit are done at [facility].  Drawn on  10:30a  Phone: [phone]  Fax # is [phone]      IV Iron at [facility]  Fax [phone]    Anemia Latest Ref Rng & Units 2019 2019 2019 2019 2019 10/4/2019 10/11/2019   MARY LOU Dose - 20,000 units 20,000 units - - 20,000 units - -   Hemoglobin 13.3 - 17.7 g/dL 9.8(A) 9.7(A) 10.1(A) 9.9(L) - 10.6(A) 10.1(A)   IV Iron Dose - - - - - - 750mg 750mg   TSAT % - - - 9(L) - 12 -   Ferritin ng/mL - - - 93 - 80 -     BP Readings from Last 3 Encounters:   19 100/53   19 125/63   19 119/75     Wt Readings from Last 2 Encounters:   19 103.6 kg (228 lb 8 oz)   19 103.1 kg (227 lb 3.2 oz)           ASSESSMENT:  Hgb:Above goal - recommend hold dose  TSat: Due for iron studies 4 weeks after last IV iron infusion Ferritin: Due for iron studies 4 weeks after last IV iron infusion    PLAN:  Hold Procrit and RTC for hgb then procrit if needed in 1 week(s)    Orders needed to be renewed (for next follow-up date) in LETTERS - for external labs: None    Iron labs due:  2019    Plan discussed with:  Rory  Plan provided by:  ena    NEXT FOLLOW-UP DATE:  10/21/2019    Anemia Management Service  Belinda Escalona RN  Phone: [phone]  Fax: [phone]

## 2019-10-21 ENCOUNTER — TELEPHONE (OUTPATIENT)
Dept: PHARMACY | Facility: CLINIC | Age: 64
End: 2019-10-21

## 2019-10-21 ENCOUNTER — POST MORTEM DOCUMENTATION (OUTPATIENT)
Dept: TRANSPLANT | Facility: CLINIC | Age: 64
End: 2019-10-21

## 2019-10-21 NOTE — PROGRESS NOTES
Received notification on 10/21/2019 at 9:00 of patient's death from Belinda Escalona RN, from [facility].  Place of death was reported as unknown.  Graft status at the time of death was reported as Unknown.  Additional information:   TIS verification is: Pending  The Transplant Office has been notified that patient is . The Post Mortem Encounter has been completed. Notifications have been sent to the Care team.   Instructions have been sent to cancel pending appointments, discontinue pending orders and send a sympathy card to the family.    KACY CUTLER    Received notification of patient's death from Melodie Miller.  Place of death was reported as Unknown.  Graft status at the time of death was reported as Functioning.  Additional information: Unable to obtain any information regarding his cause of death. He was seen at [facility] the day he  for a pre-op consult and was cleared for surgery that was to take place on 10/24/2019.  TIS verification is: Complete

## 2020-04-01 NOTE — TELEPHONE ENCOUNTER
"----- Message from Christi Sun MD sent at 5/15/2017  2:28 PM CDT -----  Regarding: RE: Elevated Cr  Franklyn can you please get DSA?  Also prot/Cr ratio.  If BP still labile would get transplant US with dopplers.  Thanks  ----- Message -----     From: Kaitlyn Mason RN     Sent: 5/15/2017  11:37 AM       To: Christi Sun MD, Melodie Sanz RN  Subject: FW: Elevated Cr                                  Pt called our office again today regarding his elevated Cr.  He also stated he has had erratic blood pressures, reported his highest BP to be 170/111.  Today his BP is stable at 116/87.  How would you like to proceed with elevated Cr and BP.      ----- Message -----     From: Kaitlyn Mason RN     Sent: 5/11/2017  11:16 AM       To: Christi Sun MD, Elroy Dumont RN  Subject: Elevated Cr                                      Pt's Cr has been trending up over the past few months.  I called patient today.  He feels, \"pretty well,\" but did report having diarrhea once a day for the past two months.  I did encourage him to hydrate.  He did not notice any changes in UOP.  He does not have any new medications, he states his cardiologist started and stopped a blood pressure medication earlier this year.  He reports some intermittent edema in his legs.    Would you like any additional tests?  How would you like to proceed with elevated Cr?    ThanksKaitlyn" Monegasque

## 2020-09-10 NOTE — CONSULTS
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  Not uremic today.  Pt plans to make appointment for fistula assessment.    -- does not need HD  -- agree with plan to hold off on coronary angio unless CP recurs  -- daily Cr    # Immunosuppression:  currently on pred and  bid. Recent levels 40-70.  -- CSA level tomorrow (ordered)  -- continue CSA and pred    # Volume status:  appears euvolemic    # Proteinuria: low level proteinuria from MPGN.  Has been on losartan for that. In addition he has MGUS.  --  hold losartan for now b/o rising Cr  -- check SIEP and immunofixation (ordered)    # HBP: on 4 drugs at home.  BP now 140-150/70-90.  -- continue home BP meds except for losartan    # Volume status: appears euvolemic.      # Tremor and shakiness: hx muscle pain secondary to statins and is now on crestor.  CK elevated possibly from surgery  -- weight risk vs benefits of statin in this pt    # Pancreatitis:  Acute onset of sx post-op in pt with hx pancreatitis.  Lipase peak >4000.  Sx now gone. On conservative diet per GI.  Pt has hx hypertriglyceridemia which may be contributing.   -- recs per GI    # Anemia: pt is on 10,000 U epo weekly.  Hg today 7.9 likely from blood draws. 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 Level was 24 in Feb. Phos 5.2  -- DC vit D supplement       Recommendations were communicated to primary team via note    Christi Sun MD   [phone]      REASON FOR  no edema, no murmurs, regular rate and rhythm CONSULT: ROBERTH/CKD in kidney transplant recipient    HISTORY OF PRESENT ILLNESS:  Rory Bustamante is a 62 year old man S/P LDKT in 1989 that is now failing (GFR 15) who was admitted to [place]  for elective spinal fusion 6/11.  The surgery went well but his course was complicated by troponin elevation, urinary retention, pancreatitis and rapid aflutter [facility]      The patient developed CP and bdominal pain 6/12.  Lipase was elevated and pt has hx pancreatitis previously thought secondary to high TGs. His pancreatitis is now resolving with no further abdominal pain.  His lipase peaked at 4647 but no major alterations in volume or calcium level.      The patient developed CP 6/12, nonradiating and no SOB.  His troponin peaked at 2.5 with lateral  EKG changes and cards considers this NSTEMI.  His aflutter resolved.  He has not had coronary angiography b/o CKD stage 4.  Hx CAD S/P MI in 2011 and stent in 2011    Patient is S/P HLA ID kidney transplant 1989 form MPGN with irregular follow-up.  He has had low level proteinuria (0.3 -0.5) since 2007 with one spike to 4 g/g in May 2017 but subsequently down to 1 g/g.  Kidney biopsy in 2008 showed recurrent MGPN type 1 (atypical per Dr Feliciano Becker).  A second biopsy in May 2017 showed end stage kidney disease.  Pt is planning on HD and has had vein mapping but has not made an appointment. He is listed for transplant but is on hold.  Current IS is CS and prednisone 5 mg.     The patient has a monoclonal protein found when proteinuria was further evaluated with IEP and he has a low level of IgA kappa. He was referred to heme clinic but they addressed his pos APLA so he will need to see them as OP.    He has a history of APLA but no DVTs or clots. He has anemia and is on epo    He has a history of multiple skin cancers and is being followed in derm clinic, seen within the past 3 months      PAST MEDICAL  HISTORY:  Reviewed with patient on 06/14/2018      Past Medical History:   Diagnosis Date     Angina effort (H) 8/2016     Arthritis      BPH (benign prostatic hyperplasia)      CAD (coronary artery disease) July 2011    MI in July 2011, stent placed, on plavix and aspirin     Glaucoma      Gout     Uric acid as high as 11 previously, now on allopurinol and probenecid     HTN (hypertension)      Hypercholesteremia 2015     Hypertriglyceridemia 2015     Intraocular bleeding     previously treated wtih Avastin     Macular degeneration      Neuropathy     Limited sensation bilateral knees to feet     Presence of stent in coronary artery August 2012     Proteinuria 11/2015    recurrent MPGN on biopsy     S/P kidney transplant     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  HLA identical transplant.  Biopsy in 4/2008 with recurrent MPGN type 2, mild interstitial fibrosis and tubular atrophy, CNI toxicity     Spinal stenosis 2013     Warts        Past Surgical History:   Procedure Laterality Date     C TOTAL KNEE ARTHROPLASTY  2015     LAMINECTOMY LUMBAR MINIMALLY INVASIVE THREE+ LEVELS N/A 6/11/2018    Procedure: LAMINECTOMY LUMBAR MINIMALLY INVASIVE THREE+ LEVELS;  Minimally Invasive Surgery Laminectomies Lumbar 2-3,Lumbar 3-4,Lumbar 4-5;  Surgeon: Harshal Rucker MD;  Location: [facility] OR     TONSILLECTOMY       TRANSPLANT KIDNEY RECIPIENT LIVING RELATED  1989    HLA identical     VASECTOMY          MEDICATIONS:  PTA Meds  Prior to Admission medications    Medication Sig Last Dose Taking? Auth Provider   acetaminophen (TYLENOL ARTHRITIS PAIN) 650 MG CR tablet Take 2 tablets by mouth 3 times daily. 6/11/2018 at 0400 Yes Reported, Patient   allopurinol (ZYLOPRIM) 100 MG tablet Take 2 tablets by mouth daily.  Patient taking differently: Take 100 mg by mouth 2 times daily  6/10/2018 at 2000 Yes Christi Sun MD   Brimonidine Tartrate (ALPHAGAN OP) Place 1 drop into the right eye 2 times daily  6/10/2018 at  1600 Yes Reported, Patient   cycloSPORINE modified (GENERIC EQUIVALENT) 100 MG capsule Take 1 capsule (100 mg) by mouth 2 times daily Total dose 150 mg twice daily 6/10/2018 at 2000 Yes Christi Sun MD   cycloSPORINE modified (GENERIC EQUIVALENT) 25 MG capsule Take 2 capsules (50 mg) by mouth 2 times daily Total dose 150 mg twice daily 6/10/2018 at 2000 Yes Christi Sun MD   epoetin freya (PROCRIT) 19583 UNIT/ML injection Inject 1 mL (10,000 Units) Subcutaneous once a week Please make sure Hgb has been checked within 4 days of dose, use as needed for Hgb<10.  If Hgb increases >1 point in 2 weeks, SYSTOLIC BP > 180 mmHg, OR Hgb >=10 g/dL, HOLD DOSE.  Per anemia protocol with Christi Sun MD. Past Month at Unknown time Yes Christi Sun MD   fenofibrate 145 MG tablet Take 145 mg by mouth daily 6/10/2018 at 0800 Yes Reported, Patient   ISOSORBIDE MONONITRATE PO Take 60 mg by mouth daily  6/10/2018 at 0800 Yes Reported, Patient   loperamide (IMODIUM) 2 MG capsule Take 2 mg by mouth daily  6/10/2018 at 0800 Yes Reported, Patient   losartan (COZAAR) 50 MG tablet Take 1 tablet (50 mg) by mouth daily 6/10/2018 at 0800 Yes Christi Sun MD   metoprolol (LOPRESSOR) 100 MG tablet Take 1 tablet by mouth 2 times daily. 6/10/2018 at 2000 Yes Christi Sun MD   NIFEdipine ER (ADALAT CC) 90 MG TB24 Take 90 mg by mouth daily  6/10/2018 at 0800 Yes Reported, Patient   NONFORMULARY Take 1 tablet by mouth 2 times daily Focus Macula Pro:  Eye vitamin and mineral supplement A, C, E, Zinc, Copper  6/10/2018 at 2000 Yes Reported, Patient   omega-3 acid ethyl esters (LOVAZA) 1 G capsule Take 2 g by mouth 2 times daily  6/10/2018 at 2100 Yes Reported, Patient   oxyCODONE IR (ROXICODONE) 5 MG tablet Take 1-2 tablets (5-10 mg) by mouth every 4 hours as needed for other (pain control or improvement in physical function. Hold dose for analgesic side effects.)  Yes SemHarshal dowling MD   PANTOPRAZOLE SODIUM PO Take  40 mg by mouth daily as needed for heartburn Past Week at Unknown time Yes Unknown, Entered By History   predniSONE (DELTASONE) 5 MG tablet Take 5 mg by mouth daily (with lunch)  6/10/2018 at 2000 Yes Reported, Patient   probenecid (BENEMID) 500 MG tablet Take 500 mg by mouth 2 times daily. 6/10/2018 at 2000 Yes Reported, Patient   Rosuvastatin Calcium (CRESTOR PO) Take 20 mg by mouth every evening 6/10/2018 at 2100 Yes Unknown, Entered By History   sulfamethoxazole-trimethoprim (BACTRIM,SEPTRA) 400-80 MG per tablet Take 1 tablet by mouth every other day  6/10/2018 at 2000 Yes Reported, Patient   TAMSULOSIN HCL PO Take 0.4 mg by mouth 2 times daily  6/11/2018 at 0400 Yes Reported, Patient   BABY ASPIRIN PO Take 81 mg by mouth daily 6/3/2018  Reported, Patient   BEVACizumab (AVASTIN) 400 MG/16ML injection Every 5 weeks More than a month at Unknown time  Reported, Patient   cholecalciferol (VITAMIN D3) 5000 UNITS TABS tablet Take 1 tablet (5,000 Units) by mouth daily More than a month at Unknown time  Christi Sun MD   clopidogrel (PLAVIX) 75 MG tablet Take 75 mg by mouth daily. 6/3/2018  Reported, Patient   nitroGLYCERIN (NITROSTAT) 0.4 MG SL tablet Place 0.4 mg under the tongue every 5 minutes as needed. More than a month at Unknown time  Reported, Patient      Current Meds    acetaminophen  975 mg Oral Q8H     allopurinol  100 mg Oral Daily     aspirin  81 mg Oral Daily     brimonidine  1 drop Right Eye BID     cholecalciferol  5,000 Units Oral Daily     cycloSPORINE modified  100 mg Oral BID     cycloSPORINE modified  50 mg Oral BID     heparin  5,000 Units Subcutaneous BID     isosorbide mononitrate  60 mg Oral Daily     loperamide  2 mg Oral Daily     metoprolol tartrate  100 mg Oral BID     multivitamin  with lutein  1 capsule Oral BID     NIFEdipine ER  90 mg Oral Daily     omega-3 acid ethyl esters  2 g Oral BID     pantoprazole (PROTONIX) EC tablet 40 mg  40 mg Oral QAM AC     piperacillin-tazobactam   "3.375 g Intravenous Q6H     predniSONE  5 mg Oral Daily with lunch     probenecid  500 mg Oral BID     rosuvastatin (CRESTOR) tablet 20 mg  20 mg Oral QPM     senna-docusate  1 tablet Oral BID    Or     senna-docusate  2 tablet Oral BID     sodium chloride (PF)  3 mL Intracatheter Q8H     sulfamethoxazole-trimethoprim  1 tablet Oral Every Other Day     tamsulosin (FLOMAX) capsule 0.4 mg  0.4 mg Oral BID     Infusion Meds      ALLERGIES:    Allergies   Allergen Reactions     Contrast Dye Other (See Comments) and Itching     Pt reports sneezing     Pravastatin Muscle Pain (Myalgia)     Simvastatin Muscle Pain (Myalgia)       REVIEW OF SYSTEMS:  A comprehensive of systems was negative except as noted above.    SOCIAL HISTORY:   Social History     Social History     Marital status:      Spouse name: N/A     Number of children: N/A     Years of education: N/A     Occupational History     Not on file.     Social History Main Topics     Smoking status: Never Smoker     Smokeless tobacco: Never Used     Alcohol use No     Drug use: No     Sexual activity: Not on file     Other Topics Concern     Not on file     Social History Narrative     Reviewed with patient    No one accompanies Rory ARSALAN Bustamante in hospital room    FAMILY MEDICAL HISTORY:   Family History   Problem Relation Age of Onset     DIABETES Mother      Colon Cancer Mother 78     Cardiac Sudden Death Sister 87     DIABETES Brother      CEREBROVASCULAR DISEASE Brother      Reviewed with patient      PHYSICAL EXAM:   Temp  Av.7  F (37.1  C)  Min: 96.3  F (35.7  C)  Max: 101.1  F (38.4  C)      Pulse  Av.3  Min: 73  Max: 128 Resp  Avg: 15.7  Min: 9  Max: 30  SpO2  Av.3 %  Min: 88 %  Max: 99 %       /80 (BP Location: Right arm)  Pulse 89  Temp 98  F (36.7  C) (Oral)  Resp 18  Ht 1.854 m (6' 1\")  Wt 106.2 kg (234 lb 1.6 oz)  SpO2 98%  BMI 30.89 kg/m2   Date 18 07 - 06/15/18 0659   Shift 2756-3732 5199-0711 6487-6133 24 Hour Total "   I  N  T  A  K  E   Shift Total  (mL/kg)       O  U  T  P  U  T   Drains 50   50    Shift Total  (mL/kg) 50  (0.47)   50  (0.47)   Weight (kg) 106.19 106.19 106.19 106.19        Admit Weight: 106.4 kg (234 lb 9.1 oz)     GENERAL APPEARANCE: no  distress,   awake  EYES: no scleral icterus, pupils equal  Endo: no goiter, no moon facies  Lymphatics: no cervical or supraclavicular LAD  Pulmonary: lungs clear to auscultation with equal breath sounds bilaterally,  CV: regular rhythm, normal rate, no rub   - JVD no   - Edema none  GI: soft, nontender, normal bowel sounds  MS: no evidence of inflammation in joints, no muscle tenderness  : does have rudolph  SKIN: no rash, warm, dry, no cyanosis  NEURO: face symmetric, very tremulous and shivering     LABS:   CMP  Recent Labs  Lab 06/14/18  0603 06/14/18  0005 06/13/18  1221 06/13/18  0617 06/12/18  0615 06/11/18  1318     --   --  134 138 133   POTASSIUM 4.0  --   --  4.5 5.0 5.3   CHLORIDE 100  --   --  102 104 101   CO2 20  --   --  22 21 19*   ANIONGAP 14  --   --  10 13 13   *  --   --  141* 101* 147*   BUN 58*  --   --  63* 72* 73*   CR 5.11*  --   --  4.64* 4.66* 4.47*   GFRESTIMATED 12*  --   --  13* 13* 14*   GFRESTBLACK 14*  --   --  16* 16* 16*   TRISHA 8.8  --   --  9.0 8.6 8.9   MAG  --  2.5* 1.8 1.4*  --   --    PHOS  --   --   --  5.2*  --   --    PROTTOTAL 6.4*  --   --  6.5*  --   --    ALBUMIN 2.5*  --   --  2.8*  --   --    BILITOTAL 0.6  --   --  0.3  --   --    ALKPHOS 35*  --   --  35*  --   --    AST 34  --   --  37  --   --    ALT 8  --   --  7  --   --      CBC  Recent Labs  Lab 06/14/18  0603 06/13/18  1903 06/13/18  0617 06/12/18  0615 06/11/18  1318   HGB 7.9* 8.4* 8.8* 9.4* 9.6*   WBC 9.5 12.8* 12.7* 9.2 11.6*   RBC 2.22* 2.39* 2.49*  --  2.70*   HCT 23.5* 25.2* 26.4*  --  29.1*   * 105* 106*  --  108*   MCH 35.6* 35.1* 35.3*  --  35.6*   MCHC 33.6 33.3 33.3  --  33.0   RDW 13.8 13.8 14.0  --  14.2   PLT 85* 87* 97*  --  115*      INRNo lab results found in last 7 days.  ABG  Recent Labs  Lab 06/13/18  1356   O2PER 5%      URINE STUDIES  Recent Labs   Lab Test  06/13/18   1825  05/14/18   1133  12/08/17   1055  08/03/17   1408   COLOR  Yellow  Straw  Light Yellow  Yellow   APPEARANCE  Slightly Cloudy  Clear  Clear  Clear   URINEGLC  30*  50*  30*  Negative   URINEBILI  Negative  Negative  Negative  Negative   URINEKETONE  Negative  Negative  Negative  Negative   SG  1.011  1.012  1.008  1.012   UBLD  Small*  Negative  Negative  Negative   URINEPH  5.0  5.0  6.0  5.0   PROTEIN  30*  100*  30*  >499*   NITRITE  Negative  Negative  Negative  Negative   LEUKEST  Small*  Negative  Negative  Small*   RBCU  2  <1  <1  2   WBCU  3  <1  <1  11*     Recent Labs   Lab Test  02/06/18   1236  12/08/17   1055  05/19/17   1104  12/04/12   1410  06/05/12   1738   UTPG  1.03*  1.26*  4.84*  0.26*  0.63*     PTH  Recent Labs   Lab Test  02/06/18   1221  05/30/17   1651  10/21/16   1153  06/05/12   1749   PTHI  248*  238*  86*  90*     IRON STUDIES  Recent Labs   Lab Test  05/30/18   1100  05/01/18   0852  04/04/18   0728 03/06/18 02/06/18   1221   IRON  90  118  130   --   79   FEB  338  352  397  371  391   IRONSAT  27  34  33  22  20   VALERIA  388  581  852  114  214       IMAGING:  Findings:   The pulmonary vasculature is distinct. Left basilar opacities with  blunting of left costophrenic angle. Small right pleural effusion  intrafissural fluid. Minimal linear atelectasis right lung base. No  acute osseous injury.         Impression:  1. Small bilateral pleural effusions with associated atelectasis or  consolidation.    Christi Sun MD

## 2021-03-21 NOTE — TELEPHONE ENCOUNTER
4 Eyes Admission Assessment     I agree as the admission nurse that 2 RN's have performed a thorough Head to Toe Skin Assessment on the patient. ALL assessment sites listed below have been assessed on admission. Areas assessed by both nurses:   [x]   Head, Face, and Ears   [x]   Shoulders, Back, and Chest  [x]   Arms, Elbows, and Hands --Tear R wrist   [x]   Coccyx, Sacrum, and Ischium- Redness   [x]   Legs, Feet, and Heels- swelling, bruising scattered, large bruise to L hip        Does the Patient have Skin Breakdown?   No         Xavier Prevention initiated:  Yes   Wound Care Orders initiated:  No      C nurse consulted for Pressure Injury (Stage 3,4, Unstageable, DTI, NWPT, and Complex wounds) or Xavier score 18 or lower:  No      Nurse 1 eSignature: Electronically signed by Kaleigh Jones RN on 3/20/21 at 8:24 PM EDT    **SHARE this note so that the co-signing nurse is able to place an eSignature**    Nurse 2 eSignature: Electronically signed by Cali Graves RN on 3/21/21 at 3:03 AM EDT Called Liliana. She is requesting updated orders for procrit and the necessary labs, as they  this week. Provided contact information for Belinda Escalona with Anemia Services. She will contact her for the updated orders.    Melani Mehta RN

## 2022-07-18 NOTE — LETTER
January 3, 2018    Rory Bustamante  [address]  [facility] [address]      Dear Mr. Bustamante,    This letter is sent to notify you that your listing status was changed from Inactive to Active on the kidney transplant waitlist at the [facility].  Your status was changed because your PCP notified us of the clear margins from your recent skin care treatment.    During this waiting period, we may still request periodic laboratory tests with your primary physician.  It will be your responsibility to remind your physician to forward your results to the Transplant Office.    We still need to be kept informed of any changes such as:    o changes in your insurance coverage  o change in your phone number, address or emergency contact  o significant changes in your health  o significant infections (such as pneumonia or abscesses)  o blood transfusions  o any condition which requires hospitalization or surgery    Sincerely,        Kidney Transplant Program    Enclosures: UNOS Letter    CC: Christi Snu MD   Provider Procedure Text (A): After obtaining clear surgical margins the defect was repaired by another provider.

## 2023-01-01 NOTE — TELEPHONE ENCOUNTER
Anemia Management Note  SUBJECTIVE/OBJECTIVE:  Referred by Dr. Christi Sun on 2018  Primary Diagnosis: Anemia in Chronic Kidney Disease (N18.3, D63.1)     Secondary Diagnosis:  Chronic Kidney Disease, Stage 3 (N18.3)   Hgb goal range:  9-10  Epo/Darbo: Procrit  20,000 units once weekly for Hgb < 10  In clinic  RX/TX plans : 2019  Iron regimen:  Ferrous Sulfate 325mg QD  Labs : 2019  Recent MARY LOU use, transfusion, IV iron: IV iron scheduled 2018    Anemia Latest Ref Rng & Units 2018 2018 2018 10/5/2018 10/12/2018 10/19/2018 10/29/2018   MARY LOU Dose - - - - 20,000 units 20,000 units - -   Hemoglobin 13.3 - 17.7 g/dL 10.7(A) 10.5(L) 10.1(A) 9.6(A) 9.7(A) 10.4(A) 10.7(L)   IV Iron Dose - - - - - - - -   TSAT % 25 - - - - 21 -   Ferritin ng/mL 986 - - - - 580 -     BP Readings from Last 3 Encounters:   10/29/18 (!) 154/96   10/29/18 (!) 154/96   10/12/18 153/80     Wt Readings from Last 2 Encounters:   10/29/18 235 lb (106.6 kg)   10/17/18 240 lb (108.9 kg)     Patient was in the ED and discharged today  He has a surgical procedure scheduled for     ASSESSMENT:  Hgb:Above goal - recommend hold dose  TSat: not at goal (>30%) but ferritin >500ng/mL.  IV iron not indicated at this time per anemia protocol. Ferritin: At goal (>100ng/mL)    PLAN:  Hold Procrit and RTC for hgb then procrit if needed in 1 week(s)    Orders needed to be renewed (for next follow-up date) in EPIC: None    Iron labs due:  18    Plan discussed with:  No call made  Plan provided by:  Judy Ramírez [facility]  [facility]  [phone]    NEXT FOLLOW-UP DATE:  18  Reviewed 10/30/2018   Anemia Management Service  Grecia Carroll PharmD and Michelle Ramírez CPhT  Phone: [phone]  Fax: [phone]        
Scrotal size/Scrotal symmetry/Scrotal shape/Scrotal color texture normal/Testes palpated in scrotum/canals with normal texture/shape and pain-free exam/Prepuce of normal shape and contour/Urethral orifice appears normally positioned/Shaft of normal size/No hernias

## 2023-10-03 NOTE — PROGRESS NOTES
"Orthopedic Surgery Progress Note    Subjective:   NAEO.  Pain well controlled.  (-)cp/sob,  still having stomach discomfort. (+)tolerating PO, requiring cath for voiding.  No nausea.    Exam:  /70 (BP Location: Left arm)  Pulse 84  Temp 98.6  F (37  C) (Oral)  Resp 20  Ht 1.854 m (6' 1\")  Wt 106.4 kg (234 lb 9.1 oz)  SpO2 90%  BMI 30.95 kg/m2  Gen: Awake, alert, NAD  Resp: breathing equal and non-labored  Extremities:  RLE:   No obvious deformity, skin intact   SLR negative   SILT L2-S1 distributions, stocking glove neuropathy   Fires Hip flexor, quad, TA, EHL, FHL, Gsc 5/5 strength   DP 2+  LLE:   No obvious deformity, skin intact   SLR negative   SILT L2-S1 distributions, stocking glove neuropathy   Fires Hip flexor, quad, TA, EHL, FHL, Gsc 5/5 strength   DP 2+      Drain: 70 / 12 / 10    Labs:    Recent Labs  Lab 06/13/18 0617 06/12/18  0615 06/11/18  1318 06/06/18  0935   WBC 12.7* 9.2 11.6*  --    HGB 8.8* 9.4* 9.6* 10.1*   PLT 97*  --  115*  --        Recent Labs  Lab 06/13/18 0617 06/12/18 0615 06/11/18  1318 06/11/18  0609    138 133  --    POTASSIUM 4.5 5.0 5.3 5.0   CHLORIDE 102 104 101  --    CO2 22 21 19*  --    BUN 63* 72* 73*  --    CR 4.64* 4.66* 4.47* 4.30*   * 101* 147* 113*     No lab results found in last 7 days.      Assessment:   62 year old male s/p L2-L5 MIS laminectomy on 6/11/18 w Dr. Flynn.  Having stomach discomfort and urinary retention post-op.    Plan:  Pain: Scheduled Tylenol, Dilaudid IV PRN, oxycodone PRN, valium PRN.    Activity: No lifting >10 lbs. No excessive twisting or bending. Start physical therapy POD#1  Wound care: Dressing change as needed per Ortho  [place] out   Drains: Remove today  Brace:  N/A  Abx: Ancef x 24 hours post op for surgical ppx  Diet: Clear liquids and ADAT  DVT ppx: Mechanical only.  ASA 81 OK.  Restart Plavix after 5 days.  If these are more urgently needed for cardiologic protection, ok to restart sooner per IM/card " recs.  Radiographs:  none  Disposition:  Pending medical improvement and progress w therapy.         Shaun Leonard MD  PGY-4, Orthopaedic Surgery  [phone]       Declines

## 2024-09-29 NOTE — TELEPHONE ENCOUNTER
ISSUE: creatinine = 3.65, trending up  Kidney Listed (INACTIVE)  Results sent to Dr. Sun to review.     Ref. Range 4/12/2017 10:58 5/5/2017 09:35 5/8/2017 14:20 7/26/2017 10:09 10/5/2017 09:18   Creatinine (External) Latest Ref Range: 0.67 - 1.17 mg/dL 2.37 (H) 2.95 (H) 2.86 (H) 3.02 (H) 3.65 (H)     Rory just left. Spoke with wife who reports Rory always has diarrhea, and always has congestion.  Recommend improving hydration and rechecking BMP.  Lab orders to recheck BMP faxed to:    [facility] [phone] (Phone)  [phone] (Fax)       
No

## 2025-02-14 NOTE — IP AVS SNAPSHOT
Post Anesthesia Care Unit [address] [address]    Phone:  [phone]                                       After Visit Summary   11/5/2018    Rory uBstamante    MRN: [id number]           After Visit Summary Signature Page     I have received my discharge instructions, and my questions have been answered. I have discussed any challenges I see with this plan with the nurse or doctor.    ..........................................................................................................................................  Patient/Patient Representative Signature      ..........................................................................................................................................  Patient Representative Print Name and Relationship to Patient    ..................................................               ................................................  Date                                   Time    ..........................................................................................................................................  Reviewed by Signature/Title    ...................................................              ..............................................  Date                                               Time          22EPIC Rev 08/18         Universal Safety Interventions

## 2025-05-22 NOTE — LETTER
8/3/2017      RE: Rory Bustamante  [address]  [address]  [facility] [address]       Assessment and Plan:  1. Kidney transplant evaluation - patient is a excellent candidate overall. Benefits of a living donor transplant were discussed. Has son and several siblings as possible donors   - full transplant eval   - PRA sent  2. 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  Biopsy is equivocal but shows ESRD   - fistula mapping  3. Hypertriglyceridemia:  Discussed role of wt loss, resumption of fibrate.     - to contact PCP once he has fasting lipids  4.  CAD: S/P stent placement remotely with CP recurrence last year.  Seen by cardiology with Lexiscan suggesting distal disease.    - Will need to see cardiology  5.  HBP: reasonable control today.    - Continued edema so could add diuretic if needed  6. Proteinuria: large increase in past year so needs immunofixation.  Biospy is equivocal about cause of proteinuria.  Pathologist specifically said the degree of proteinuria is out of proportion to glomerular damage.   - serum and urine IEP and immunofixation  7.  Hx Skin Ca:  Higher risk of skin cancer with re-induction.  Will need close derm FU as  well as derm clearance pre-op    Discussed the risks and benefits of a transplant, including the risk of surgery and immunosuppression medications.  Patients overall evaluation will be discussed in the Transplant Program's regular meeting with a final recommendation on the patients suitability for transplant to be made at that time.    Christi Sun MD    Consult:  Rory Bustamante was seen in consultation at the request of Dr. Sydney Dawkins for evaluation as a potential Kidney transplant recipient.    Reason for Visit:  Rory Bustamante is a 61 year old male with CKD from failing transplant who presents for kidney transplant evaluation.    HPI:  Patient  Mr. Bustamante is a 61-year-old gentleman S/P HLA identical transplant in 1989.  He has recently developed nephrotic range  proteinuria.  He has no known recurrent MPGN first seen on a biopsy in 2008.  However, recently his proteinuria level has increased dramatically, as has his creatinine.  The protein was 0.45 in 02/2015, 1.27 grams in 11/2015, 0.86 gram in 08/2016 and is now 4.8 grams.  His albumin is 4.0.   Kidney biopsy in May 2017 showed end stage kidney.  Possibility of recurrent MPGN was not excluded but changes were not felt to be typical.       His immunosuppression is currently cyclosporine with a target of 50-60 with prednisone 5 daily.  His Imuran was stopped in 2012 because he has extensive skin cancer.        Recently his blood pressure has been increasingly difficult to control.  It went up as high as 174/111 on 05/14.  Blood pressure meds have been adjusted and currently his blood pressure is in the 130-150/80-90 range at home.  His current blood pressure medications include losartan 50 mg started 2 weeks ago after he developed throat constriction with lisinoprl, isosorbide 30 b.i.d. (started for angina), tamsulosin 0.4  (for voiding symptoms), nifedipine XL 60 mg daily and metoprolol 100 b.i.d.  He is not on a diuretic and is not alwyas compliant with low salt diet.       The patient has a history of anginal symptoms.  He was completely evaluated in 08/2016 including cardiac Lexiscan.  He is status post MI in 2011 with two stents placed and currently is on Plavix for that.  He has not had chest pain for the past year.        He has been seeing a dermatologist and has had a number of skin lesions removed         Kidney Disease Hx:        Kidney Disease Dx: see above       Biopsy Proven: Yes       On Dialysis: No       Primary Nephrologist: Dr Rianna flowers      Medical Hx:       h/o HTN: Yes  Usual BP: 130-150/80-95       h/o DM:  No       h/o Protein in Urine: Yes        h/o Blood in Urine:  No       h/o Kidney Stones:  No       h/o UTI: No       h/o Chronic NSAID Use: No         Previous Transplant Hx:       Yes; see  above         Transplant Sensitization Hx:       Previous Tx: Yes       Blood Transfusion: Yes       Pregnancy: No         Uremic Symptoms: none          Cardiovascular Hx:       h/o Cardiac Issues:  S/P stents with re-eval 2016 Lexiscan DD      Exercise Tolerance: no chest pain or shortness of breath with exertion. Pt has recently started rising a bike and can go up to 3 miles without sx.  Exercise is limited by spinal stenosis and LE numbness         Health Maintenance:       Colonoscopy: Up to date         Potential Donor(s): Yes; son, siblings    ROS:  A comprehensive review of systems was obtained and negative, except as noted in the HPI or PMH.    PMH:   Medical records were reviewed  and PMH was discussed with patient and noted below.  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  HLA identical transplant.  Biopsy in 4/2008 with recurrent MPGN type 2, mild interstitial fibrosis and tubular atrophy, CNI toxicity     Spinal stenosis 2013     Warts        PSH:   Past Surgical History:   Procedure Laterality Date     C TOTAL KNEE ARTHROPLASTY  2015     TRANSPLANT KIDNEY RECIPIENT LIVING RELATED  1989    HLA identical     Personal or family history of bleeding or anesthesia problems: No    Family Hx:  Family History   Problem Relation Age of Onset     DIABETES Mother      Colon Cancer Mother 78     Cardiac Sudden Death Sister 87     DIABETES Brother      CEREBROVASCULAR DISEASE Brother        Personal Hx:   Social  History     Social History     Marital status:      Spouse name: N/A     Number of children: N/A     Years of education: N/A     Occupational History     Not on file.     Social History Main Topics     Smoking status: Never Smoker     Smokeless tobacco: Never Used     Alcohol use No     Drug use: No     Sexual activity: Not on file     Other Topics Concern     Not on file     Social History Narrative       Allergies:  Allergies   Allergen Reactions     Simvastatin Muscle Pain (Myalgia)       Medications:  Prior to Admission medications    Medication Sig Start Date End Date Taking? Authorizing Provider   timolol (TIMOPTIC) 0.25 % ophthalmic solution 2 times daily. 2/22/16  Yes Reported, Patient   BEVACizumab (AVASTIN) 400 MG/16ML injection  10/6/16  Yes Reported, Patient   losartan (COZAAR) 50 MG tablet Take 1 tablet (50 mg) by mouth daily 7/18/17  Yes Christi Sun MD   cycloSPORINE modified (GENERIC EQUIVALENT) 100 MG capsule Take 1 capsule (100 mg) by mouth 2 times daily (total dose 125 mg twice daily) 2/1/17  Yes Christi Sun MD   cycloSPORINE modified (GENERIC EQUIVALENT) 25 MG capsule Take 1 capsule (25 mg) by mouth 2 times daily (total dose 125 mg twice daily) 2/1/17  Yes Christi Sun MD   cholecalciferol (VITAMIN D3) 5000 UNITS TABS tablet Take 1 tablet (5,000 Units) by mouth daily 11/17/15  Yes Christi Sun MD   BABY ASPIRIN PO Take 81 mg by mouth daily   Yes Reported, Patient   NONFORMULARY Focus Macula Pro:  Eye vitamin and mineral supplement A, C, E, Zinc, Copper   Yes Reported, Patient   ISOSORBIDE MONONITRATE PO Take 60 mg by mouth daily    Yes Reported, Patient   TAMSULOSIN HCL PO Take 0.4 mg by mouth daily    Yes Reported, Patient   allopurinol (ZYLOPRIM) 100 MG tablet Take 2 tablets by mouth daily. 1/14/13  Yes Christi Sun MD   NIFEdipine osmotic (NIFEDICAL XL) 30 MG 24 hr tablet Take 2 tablets by mouth daily. 9/10/12  Yes Christi Sun MD   metoprolol (LOPRESSOR)  "100 MG tablet Take 1 tablet by mouth 2 times daily. 6/13/12  Yes Christi Sun MD   rosuvastatin (CRESTOR) 5 MG tablet Take 10 mg by mouth daily    Yes Reported, Patient   nitroGLYCERIN (NITROSTAT) 0.4 MG SL tablet Place 0.4 mg under the tongue every 5 minutes as needed.   Yes Reported, Patient   Brimonidine Tartrate (ALPHAGAN OP) 2 drops daily   Yes Reported, Patient   clopidogrel (PLAVIX) 75 MG tablet Take 75 mg by mouth daily.   Yes Reported, Patient   predniSONE (DELTASONE) 5 MG tablet Take 5 mg by mouth daily.   Yes Reported, Patient   probenecid (BENEMID) 500 MG tablet Take 500 mg by mouth 2 times daily.   Yes Reported, Patient   sulfamethoxazole-trimethoprim (BACTRIM,SEPTRA) 400-80 MG per tablet Take 1 tablet by mouth daily Take on every other day   Yes Reported, Patient   acetaminophen (TYLENOL ARTHRITIS PAIN) 650 MG CR tablet Take 2 tablets by mouth 3 times daily.   Yes Reported, Patient       Vitals:  /74  Pulse 75  Temp 97.5  F (36.4  C) (Oral)  Ht 1.854 m (6' 1\")  Wt 111.4 kg (245 lb 9.6 oz)  SpO2 96%  BMI 32.4 kg/m2  GENERAL APPEARANCE: alert and no distress  HENT: mouth without ulcers or lesions  LYMPHATICS: no cervical or supraclavicular nodes  RESP: lungs clear to auscultation - no rales, rhonchi or wheezes  CV: regular rhythm, normal rate, no rub, no murmur  EDEMA: one plus edema to mid calf  LE edema bilaterally  ABDOMEN: soft, nondistended, nontender, bowel sounds normal.  Central obesity  MS: extremities normal - no gross deformities noted, no evidence of inflammation in joints, no muscle tenderness.  Nonfunction LUE fistula  SKIN: no rash  multiple skin tags and AKs over head, LEs, UEs    Results:   Labs and imaging were ordered for this visit and reviewed by me.  Recent Results (from the past 336 hour(s))   TXP External Lab Result    Collection Time: 07/26/17 10:09 AM   Result Value Ref Range    WBC Count (External) 8.4 4.0 - 11.0 X10^3/uL    RBC Count (External) 3.07 (L) 4.40 - " 6.00 X10^6/uL    Hemoglobin (External) 11.1 (L) 13.0 - 18.0 g/dL    Hematocrit (External) 30.6 (L) 40.0 - 52.0 %    MCV (External) 100 (H) 80 - 96 fL    MCH (External) 36 (H) 27 - 34 pg    MCHC (External) 36 32 - 36 g/dL    Platelet Count (External) 126 (L) 150 - 420 X10^3/uL    RDW (External) 12.8 11.6 - 14.4 %    Sodium (External) 135 (L) 136 - 145 mmol/L    Potassium (External) 3.9 3.5 - 5.1 mmol/L    Chloride (External) 98 98 - 107 mmol/L    CO2 (External) 23 21 - 32 mmol/L    Calcium (External) 10.0 8.5 - 10.1 mg/dL    Glucose (External) 147 (H) 74 - 100 mg/dL    Urea Nitrogen (External) 61 (H) 7 - 18 mg/dL    Creatinine (External) 3.02 (H) 0.67 - 1.17 mg/dL    GFR Estimated (External) 21 (L) 60 - 150 ml/min/1.73 m(2)   Cyclosporine    Collection Time: 07/26/17 10:25 AM   Result Value Ref Range    Cyclosporine Last Dose 07/25/17 2225     Cyclosporine Level 70 50 - 400 ug/L   Routine UA with microscopic [YBF2248]    Collection Time: 08/03/17  2:08 PM   Result Value Ref Range    Color Urine Yellow     Appearance Urine Clear     Glucose Urine Negative NEG mg/dL    Bilirubin Urine Negative NEG    Ketones Urine Negative NEG mg/dL    Specific Gravity Urine 1.012 1.003 - 1.035    Blood Urine Negative NEG    pH Urine 5.0 5.0 - 7.0 pH    Protein Albumin Urine >499 (A) NEG mg/dL    Urobilinogen mg/dL 0.0 0.0 - 2.0 mg/dL    Nitrite Urine Negative NEG    Leukocyte Esterase Urine Small (A) NEG    Source Midstream Urine     WBC Urine 11 (H) 0 - 2 /HPF    RBC Urine 2 0 - 2 /HPF    Squamous Epithelial /HPF Urine <1 0 - 1 /HPF   Lipid Profile [LAB18]    Collection Time: 08/03/17  2:27 PM   Result Value Ref Range    Cholesterol 210 (H) <200 mg/dL    Triglycerides 815 (H) <150 mg/dL    HDL Cholesterol 34 (L) >39 mg/dL    LDL Cholesterol Calculated  <100 mg/dL     Cannot estimate LDL when triglyceride exceeds 400 mg/dL    Non HDL Cholesterol 176 (H) <130 mg/dL   Comprehensive metabolic panel [LAB17]    Collection Time: 08/03/17   2:27 PM   Result Value Ref Range    Sodium 134 133 - 144 mmol/L    Potassium 4.3 3.4 - 5.3 mmol/L    Chloride 99 94 - 109 mmol/L    Carbon Dioxide 22 20 - 32 mmol/L    Anion Gap 13 3 - 14 mmol/L    Glucose 106 (H) 70 - 99 mg/dL    Urea Nitrogen 58 (H) 7 - 30 mg/dL    Creatinine 3.16 (H) 0.66 - 1.25 mg/dL    GFR Estimate 20 (L) >60 mL/min/1.7m2    GFR Estimate If Black 24 (L) >60 mL/min/1.7m2    Calcium 9.8 8.5 - 10.1 mg/dL    Bilirubin Total 0.3 0.2 - 1.3 mg/dL    Albumin 3.8 3.4 - 5.0 g/dL    Protein Total 7.8 6.8 - 8.8 g/dL    Alkaline Phosphatase 84 40 - 150 U/L    ALT 22 0 - 70 U/L    AST 16 0 - 45 U/L   Phosphorus    Collection Time: 08/03/17  2:27 PM   Result Value Ref Range    Phosphorus 3.2 2.5 - 4.5 mg/dL   Prostate spec antigen screen [FCG2683]    Collection Time: 08/03/17  2:27 PM   Result Value Ref Range    PSA 1.80 0 - 4 ug/L   INR [CHQ8196]    Collection Time: 08/03/17  2:27 PM   Result Value Ref Range    INR 0.99 0.86 - 1.14   Partial thromboplastin time [LAB56]    Collection Time: 08/03/17  2:27 PM   Result Value Ref Range    PTT 28 22 - 37 sec   CBC with platelets differential [ELO499]    Collection Time: 08/03/17  2:27 PM   Result Value Ref Range    WBC 8.4 4.0 - 11.0 10e9/L    RBC Count 3.09 (L) 4.4 - 5.9 10e12/L    Hemoglobin 11.1 (L) 13.3 - 17.7 g/dL    Hematocrit 30.6 (L) 40.0 - 53.0 %    MCV 99 78 - 100 fl    MCH 35.9 (H) 26.5 - 33.0 pg    MCHC 36.3 31.5 - 36.5 g/dL    RDW 12.4 10.0 - 15.0 %    Platelet Count 141 (L) 150 - 450 10e9/L    Diff Method Automated Method     % Neutrophils 69.9 %    % Lymphocytes 16.8 %    % Monocytes 9.4 %    % Eosinophils 2.0 %    % Basophils 0.7 %    % Immature Granulocytes 1.2 %    Nucleated RBCs 0 0 /100    Absolute Neutrophil 5.9 1.6 - 8.3 10e9/L    Absolute Lymphocytes 1.4 0.8 - 5.3 10e9/L    Absolute Monocytes 0.8 0.0 - 1.3 10e9/L    Absolute Eosinophils 0.2 0.0 - 0.7 10e9/L    Absolute Basophils 0.1 0.0 - 0.2 10e9/L    Abs Immature Granulocytes 0.1 0 - 0.4  The patient is a 85y Male complaining of abdominal pain. 10e9/L    Absolute Nucleated RBC 0.0    ABO/Rh type and screen [LPP740]    Collection Time: 08/03/17  2:28 PM   Result Value Ref Range    ABO Pending     RH(D) Pending     Antibody Screen Pending     Test Valid Only At       [facility]    Specimen Expires 08/06/2017        Christi Sun MD       Quality 111:Pneumonia Vaccination Status For Older Adults: Pneumococcal Vaccination Previously Received Quality 110: Preventive Care And Screening: Influenza Immunization: Influenza Immunization previously received during influenza season Quality 431: Preventive Care And Screening: Unhealthy Alcohol Use - Screening: Patient screened for unhealthy alcohol use using a single question and scores less than 2 times per year Detail Level: Detailed Quality 226: Preventive Care And Screening: Tobacco Use: Screening And Cessation Intervention: Patient screened for tobacco use and is an ex/non-smoker

## (undated) DEVICE — MITT SURGICAL PREP HAIR REMOVER LATEX FREE SPM100

## (undated) DEVICE — SU VICRYL 3-0 SH 27" J316H

## (undated) DEVICE — SU SILK 4-0 TIE 12X30" A303H

## (undated) DEVICE — PAD CHUX UNDERPAD 23X24" 7136

## (undated) DEVICE — SYR PISTON URETHRAL 60ML 68000

## (undated) DEVICE — SHEATH URETERAL 12/14FRX35CM SET

## (undated) DEVICE — DRAPE U SPLIT 74X120" 29440

## (undated) DEVICE — SU SILK 3-0 TIE 12X30" A304H

## (undated) DEVICE — SPONGE SURGIFOAM 100 1974

## (undated) DEVICE — EYE SPONGE SPEAR WECK CEL 0008685

## (undated) DEVICE — DRAPE EXTREMITY UPPER 120X76" 29414

## (undated) DEVICE — ESU GROUND PAD UNIVERSAL W/O CORD

## (undated) DEVICE — SU SILK 2-0 TIE 12X30" A305H

## (undated) DEVICE — GLOVE PROTEXIS MICRO 7.5  2D73PM75

## (undated) DEVICE — CATH URETERAL OPEN END 05FR 70CM 020015

## (undated) DEVICE — GLOVE PROTEXIS POWDER FREE SMT 6.5  2D72PT65X

## (undated) DEVICE — CATH FOLEY 3WAY 20FR 30ML LATEX 0167SI20

## (undated) DEVICE — LINEN BACK PACK 5440

## (undated) DEVICE — Device

## (undated) DEVICE — SYR 50ML LL W/O NDL 309653

## (undated) DEVICE — PREP CHLORAPREP 26ML TINTED ORANGE  260815

## (undated) DEVICE — DRSG TEGADERM 4X4 3/4" 1626W

## (undated) DEVICE — SU VICRYL 2-0 CT-2 27" UND J269H

## (undated) DEVICE — SU SILK 2-0 SH 30" K833H

## (undated) DEVICE — SURGICEL ABSORBABLE HEMOSTAT SNOW 4"X4" 2083

## (undated) DEVICE — TUBING SUCTION MEDI-VAC 1/4"X20' N620A

## (undated) DEVICE — ESU GROUND PAD ADULT W/CORD E7507

## (undated) DEVICE — SPONGE RAY-TEC 4X8" 7318

## (undated) DEVICE — ESU ELEC BLADE 6" COATED/INSULATED E1455-6

## (undated) DEVICE — SU MONOCRYL 4-0 PS-2 27" UND Y426H

## (undated) DEVICE — BASIN SET MAJOR

## (undated) DEVICE — SU VICRYL 2-0 CT-1 27" UND J259H

## (undated) DEVICE — ESU CORD BIPOLAR GREEN 10-4000

## (undated) DEVICE — DRAPE MICROSCOPE LEICA 46X120" AR8033651

## (undated) DEVICE — LINEN GOWN XLG 5407

## (undated) DEVICE — LINEN TOWEL PACK X5 5464

## (undated) DEVICE — MIDAS REX DISSECTING TOOL 2.5MM  T12MH25

## (undated) DEVICE — CLIP HORIZON MED BLUE 002200

## (undated) DEVICE — SOL NACL 0.9% IRRIG 1000ML BOTTLE 2F7124

## (undated) DEVICE — RX SURGIFLO HEMOSTATIC MATRIX W/THROMBIN 8ML NEXTGEN 2993

## (undated) DEVICE — SOL WATER IRRIG 1000ML BOTTLE 2F7114

## (undated) DEVICE — PAD ARMBOARD FOAM EGGCRATE COVIDEN 3114367

## (undated) DEVICE — SUCTION MANIFOLD DORNOCH ULTRA CART UL-CL500

## (undated) DEVICE — GOWN XLG DISP 9545

## (undated) DEVICE — NDL ANGIOCATH 14GA 1.25" 4048

## (undated) DEVICE — GLOVE PROTEXIS BLUE W/NEU-THERA 8.5  2D73EB85

## (undated) DEVICE — GLOVE PROTEXIS MICRO 8.0  2D73PM80

## (undated) DEVICE — DRAPE MAYO STAND 23X54 8337

## (undated) DEVICE — NDL SPINAL 18GA 3.5" 405184

## (undated) DEVICE — CATH TRAY FOLEY SURESTEP 16FR WDRAIN BAG STLK LATEX A300316A

## (undated) DEVICE — PACK AV FISTULA

## (undated) DEVICE — GEL ULTRASOUND AQUASONIC 20GM 01-01

## (undated) DEVICE — LINEN TOWEL PACK X30 5481

## (undated) DEVICE — BLADE CLIPPER SGL USE 9680

## (undated) DEVICE — DRAPE IOBAN INCISE 23X17" 6650EZ

## (undated) DEVICE — WIPES FOLEY CARE SURESTEP PROVON DFC100

## (undated) DEVICE — CATH FOLEY 20FR 5ML SILVER COAT LATEX 0165SI20

## (undated) DEVICE — GUIDEWIRE SENSOR DUAL FLEX STR 0.035"X150CM M0066703080

## (undated) DEVICE — ESU ELEC BLADE 2.75" COATED/INSULATED E1455

## (undated) DEVICE — DRAPE C-ARM W/STRAPS 42X72" 07-CA104

## (undated) DEVICE — SPONGE COTTONOID 1/2X1" 80-1402

## (undated) DEVICE — LINEN TOWEL PACK X6 WHITE 5487

## (undated) DEVICE — COVER NEOPROBE SOFTFLEX 5X96" W/BANDS 20-PC596

## (undated) DEVICE — SU MONOCRYL 3-0 PS-2 18" UND Y497G

## (undated) DEVICE — DRAIN HEMOVAC RESERVOIR KIT 10FR 1/8" MED 00-2550-002-10

## (undated) DEVICE — CATH FOLEY 3WAY 20FR 30ML SIL 73020SI

## (undated) DEVICE — SU DERMABOND ADVANCED .7ML DNX12

## (undated) DEVICE — DRAPE POUCH INSTRUMENT 1018

## (undated) DEVICE — SOL NACL 0.9% IRRIG 3000ML BAG 2B7477

## (undated) DEVICE — SPONGE SURGIFOAM 12 1972

## (undated) DEVICE — LASER FIBER HOLMIUM FLEXIVA 550UM M0068403930 840-393

## (undated) DEVICE — PACK CYSTO UMMC CUSTOM

## (undated) DEVICE — SYR INFLATION DEVICE 20ML W/ 26GA TORQUE DEVICE M001151062

## (undated) DEVICE — SPONGE LAP 18X18" X8435

## (undated) DEVICE — BASIN SET SINGLE STERILE 13752-624

## (undated) RX ORDER — PROPOFOL 10 MG/ML
INJECTION, EMULSION INTRAVENOUS
Status: DISPENSED
Start: 2018-06-11

## (undated) RX ORDER — LABETALOL HYDROCHLORIDE 5 MG/ML
INJECTION, SOLUTION INTRAVENOUS
Status: DISPENSED
Start: 2018-01-01

## (undated) RX ORDER — DEXAMETHASONE SODIUM PHOSPHATE 4 MG/ML
INJECTION, SOLUTION INTRA-ARTICULAR; INTRALESIONAL; INTRAMUSCULAR; INTRAVENOUS; SOFT TISSUE
Status: DISPENSED
Start: 2018-09-25

## (undated) RX ORDER — FENTANYL CITRATE 50 UG/ML
INJECTION, SOLUTION INTRAMUSCULAR; INTRAVENOUS
Status: DISPENSED
Start: 2018-06-11

## (undated) RX ORDER — SODIUM CHLORIDE 9 MG/ML
INJECTION, SOLUTION INTRAVENOUS
Status: DISPENSED
Start: 2017-06-06

## (undated) RX ORDER — SODIUM CHLORIDE 9 MG/ML
INJECTION, SOLUTION INTRAVENOUS
Status: DISPENSED
Start: 2018-01-01

## (undated) RX ORDER — CIPROFLOXACIN 500 MG/1
TABLET, FILM COATED ORAL
Status: DISPENSED
Start: 2018-01-01

## (undated) RX ORDER — PHENYLEPHRINE HCL IN 0.9% NACL 1 MG/10 ML
SYRINGE (ML) INTRAVENOUS
Status: DISPENSED
Start: 2018-06-11

## (undated) RX ORDER — ONDANSETRON 2 MG/ML
INJECTION INTRAMUSCULAR; INTRAVENOUS
Status: DISPENSED
Start: 2018-06-11

## (undated) RX ORDER — LIDOCAINE HYDROCHLORIDE 20 MG/ML
INJECTION, SOLUTION EPIDURAL; INFILTRATION; INTRACAUDAL; PERINEURAL
Status: DISPENSED
Start: 2018-06-11

## (undated) RX ORDER — ONDANSETRON 2 MG/ML
INJECTION INTRAMUSCULAR; INTRAVENOUS
Status: DISPENSED
Start: 2018-01-01

## (undated) RX ORDER — TOLTERODINE TARTRATE 1 MG/1
TABLET, EXTENDED RELEASE ORAL
Status: DISPENSED
Start: 2018-01-01

## (undated) RX ORDER — EPHEDRINE SULFATE 50 MG/ML
INJECTION, SOLUTION INTRAMUSCULAR; INTRAVENOUS; SUBCUTANEOUS
Status: DISPENSED
Start: 2018-06-11

## (undated) RX ORDER — FENTANYL CITRATE 50 UG/ML
INJECTION, SOLUTION INTRAMUSCULAR; INTRAVENOUS
Status: DISPENSED
Start: 2018-01-01

## (undated) RX ORDER — CEFAZOLIN SODIUM 2 G/100ML
INJECTION, SOLUTION INTRAVENOUS
Status: DISPENSED
Start: 2018-06-11

## (undated) RX ORDER — LIDOCAINE HYDROCHLORIDE 20 MG/ML
INJECTION, SOLUTION EPIDURAL; INFILTRATION; INTRACAUDAL; PERINEURAL
Status: DISPENSED
Start: 2018-09-25

## (undated) RX ORDER — HYDRALAZINE HYDROCHLORIDE 20 MG/ML
INJECTION INTRAMUSCULAR; INTRAVENOUS
Status: DISPENSED
Start: 2018-01-01

## (undated) RX ORDER — FENTANYL CITRATE 50 UG/ML
INJECTION, SOLUTION INTRAMUSCULAR; INTRAVENOUS
Status: DISPENSED
Start: 2018-09-25

## (undated) RX ORDER — HYDROMORPHONE HYDROCHLORIDE 1 MG/ML
INJECTION, SOLUTION INTRAMUSCULAR; INTRAVENOUS; SUBCUTANEOUS
Status: DISPENSED
Start: 2018-06-11

## (undated) RX ORDER — HEPARIN SODIUM 1000 [USP'U]/ML
INJECTION, SOLUTION INTRAVENOUS; SUBCUTANEOUS
Status: DISPENSED
Start: 2018-09-25

## (undated) RX ORDER — METOPROLOL TARTRATE 50 MG
TABLET ORAL
Status: DISPENSED
Start: 2018-01-01

## (undated) RX ORDER — METOPROLOL TARTRATE 1 MG/ML
INJECTION, SOLUTION INTRAVENOUS
Status: DISPENSED
Start: 2018-09-25

## (undated) RX ORDER — ONDANSETRON 2 MG/ML
INJECTION INTRAMUSCULAR; INTRAVENOUS
Status: DISPENSED
Start: 2018-09-25

## (undated) RX ORDER — TRIAMCINOLONE ACETONIDE 40 MG/ML
INJECTION, SUSPENSION INTRA-ARTICULAR; INTRAMUSCULAR
Status: DISPENSED
Start: 2018-08-13

## (undated) RX ORDER — CIPROFLOXACIN 500 MG/1
TABLET, FILM COATED ORAL
Status: DISPENSED
Start: 2019-01-01

## (undated) RX ORDER — CEFAZOLIN SODIUM 2 G/100ML
INJECTION, SOLUTION INTRAVENOUS
Status: DISPENSED
Start: 2018-09-25

## (undated) RX ORDER — PAPAVERINE HYDROCHLORIDE 30 MG/ML
INJECTION INTRAMUSCULAR; INTRAVENOUS
Status: DISPENSED
Start: 2018-09-25

## (undated) RX ORDER — LIDOCAINE HYDROCHLORIDE 10 MG/ML
INJECTION, SOLUTION INFILTRATION; PERINEURAL
Status: DISPENSED
Start: 2018-08-13

## (undated) RX ORDER — GLYCOPYRROLATE 0.2 MG/ML
INJECTION, SOLUTION INTRAMUSCULAR; INTRAVENOUS
Status: DISPENSED
Start: 2018-09-25

## (undated) RX ORDER — MAGNESIUM HYDROXIDE 1200 MG/15ML
LIQUID ORAL
Status: DISPENSED
Start: 2018-01-01

## (undated) RX ORDER — REGADENOSON 0.08 MG/ML
INJECTION, SOLUTION INTRAVENOUS
Status: DISPENSED
Start: 2019-01-01

## (undated) RX ORDER — LABETALOL HYDROCHLORIDE 5 MG/ML
INJECTION, SOLUTION INTRAVENOUS
Status: DISPENSED
Start: 2018-09-25

## (undated) RX ORDER — PROPOFOL 10 MG/ML
INJECTION, EMULSION INTRAVENOUS
Status: DISPENSED
Start: 2018-09-25

## (undated) RX ORDER — ACETAMINOPHEN 325 MG/1
TABLET ORAL
Status: DISPENSED
Start: 2018-09-25

## (undated) RX ORDER — CEFAZOLIN SODIUM 1 G/3ML
INJECTION, POWDER, FOR SOLUTION INTRAMUSCULAR; INTRAVENOUS
Status: DISPENSED
Start: 2018-06-11

## (undated) RX ORDER — ROCURONIUM BROMIDE 50 MG/5 ML
SYRINGE (ML) INTRAVENOUS
Status: DISPENSED
Start: 2018-06-11

## (undated) RX ORDER — SODIUM CHLORIDE 9 MG/ML
INJECTION, SOLUTION INTRAVENOUS
Status: DISPENSED
Start: 2018-06-11

## (undated) RX ORDER — FENTANYL CITRATE 50 UG/ML
INJECTION, SOLUTION INTRAMUSCULAR; INTRAVENOUS
Status: DISPENSED
Start: 2017-06-06

## (undated) RX ORDER — PHENYLEPHRINE HCL IN 0.9% NACL 1 MG/10 ML
SYRINGE (ML) INTRAVENOUS
Status: DISPENSED
Start: 2018-09-25